# Patient Record
Sex: MALE | Race: BLACK OR AFRICAN AMERICAN | NOT HISPANIC OR LATINO | Employment: FULL TIME | URBAN - METROPOLITAN AREA
[De-identification: names, ages, dates, MRNs, and addresses within clinical notes are randomized per-mention and may not be internally consistent; named-entity substitution may affect disease eponyms.]

---

## 2020-09-17 ENCOUNTER — OFFICE VISIT (OUTPATIENT)
Dept: PRIMARY CARE CLINIC | Facility: CLINIC | Age: 80
End: 2020-09-17
Payer: COMMERCIAL

## 2020-09-17 ENCOUNTER — LAB VISIT (OUTPATIENT)
Dept: LAB | Facility: HOSPITAL | Age: 80
End: 2020-09-17
Attending: PHYSICIAN ASSISTANT
Payer: COMMERCIAL

## 2020-09-17 VITALS
SYSTOLIC BLOOD PRESSURE: 134 MMHG | WEIGHT: 153.56 LBS | DIASTOLIC BLOOD PRESSURE: 60 MMHG | OXYGEN SATURATION: 99 % | TEMPERATURE: 98 F | HEART RATE: 89 BPM

## 2020-09-17 DIAGNOSIS — I10 HYPERTENSION, UNSPECIFIED TYPE: ICD-10-CM

## 2020-09-17 DIAGNOSIS — Z00.00 WELLNESS EXAMINATION: ICD-10-CM

## 2020-09-17 DIAGNOSIS — K21.00 REFLUX ESOPHAGITIS: ICD-10-CM

## 2020-09-17 DIAGNOSIS — E78.5 HYPERLIPIDEMIA, UNSPECIFIED HYPERLIPIDEMIA TYPE: ICD-10-CM

## 2020-09-17 DIAGNOSIS — I10 HYPERTENSION, UNSPECIFIED TYPE: Primary | ICD-10-CM

## 2020-09-17 DIAGNOSIS — R35.0 URINARY FREQUENCY: ICD-10-CM

## 2020-09-17 LAB
ALBUMIN SERPL BCP-MCNC: 4.1 G/DL (ref 3.5–5.2)
ALP SERPL-CCNC: 33 U/L (ref 55–135)
ALT SERPL W/O P-5'-P-CCNC: 10 U/L (ref 10–44)
ANION GAP SERPL CALC-SCNC: 9 MMOL/L (ref 8–16)
AST SERPL-CCNC: 22 U/L (ref 10–40)
BILIRUB SERPL-MCNC: 0.6 MG/DL (ref 0.1–1)
BUN SERPL-MCNC: 15 MG/DL (ref 8–23)
CALCIUM SERPL-MCNC: 9.1 MG/DL (ref 8.7–10.5)
CHLORIDE SERPL-SCNC: 104 MMOL/L (ref 95–110)
CHOLEST SERPL-MCNC: 144 MG/DL (ref 120–199)
CHOLEST/HDLC SERPL: 2.4 {RATIO} (ref 2–5)
CO2 SERPL-SCNC: 27 MMOL/L (ref 23–29)
CREAT SERPL-MCNC: 0.8 MG/DL (ref 0.5–1.4)
EST. GFR  (AFRICAN AMERICAN): >60 ML/MIN/1.73 M^2
EST. GFR  (NON AFRICAN AMERICAN): >60 ML/MIN/1.73 M^2
GLUCOSE SERPL-MCNC: 90 MG/DL (ref 70–110)
HDLC SERPL-MCNC: 61 MG/DL (ref 40–75)
HDLC SERPL: 42.4 % (ref 20–50)
LDLC SERPL CALC-MCNC: 74 MG/DL (ref 63–159)
NONHDLC SERPL-MCNC: 83 MG/DL
POTASSIUM SERPL-SCNC: 4.2 MMOL/L (ref 3.5–5.1)
PROT SERPL-MCNC: 7.1 G/DL (ref 6–8.4)
SODIUM SERPL-SCNC: 140 MMOL/L (ref 136–145)
TRIGL SERPL-MCNC: 45 MG/DL (ref 30–150)

## 2020-09-17 PROCEDURE — 99214 OFFICE O/P EST MOD 30 MIN: CPT | Mod: PBBFAC,PN | Performed by: PHYSICIAN ASSISTANT

## 2020-09-17 PROCEDURE — 87088 URINE BACTERIA CULTURE: CPT

## 2020-09-17 PROCEDURE — 99999 PR PBB SHADOW E&M-EST. PATIENT-LVL IV: ICD-10-PCS | Mod: PBBFAC,,, | Performed by: PHYSICIAN ASSISTANT

## 2020-09-17 PROCEDURE — 99203 OFFICE O/P NEW LOW 30 MIN: CPT | Mod: S$GLB,,, | Performed by: PHYSICIAN ASSISTANT

## 2020-09-17 PROCEDURE — 99999 PR PBB SHADOW E&M-EST. PATIENT-LVL IV: CPT | Mod: PBBFAC,,, | Performed by: PHYSICIAN ASSISTANT

## 2020-09-17 PROCEDURE — 87086 URINE CULTURE/COLONY COUNT: CPT

## 2020-09-17 PROCEDURE — 87077 CULTURE AEROBIC IDENTIFY: CPT

## 2020-09-17 PROCEDURE — 36415 COLL VENOUS BLD VENIPUNCTURE: CPT | Mod: PN

## 2020-09-17 PROCEDURE — 80053 COMPREHEN METABOLIC PANEL: CPT

## 2020-09-17 PROCEDURE — 80061 LIPID PANEL: CPT

## 2020-09-17 PROCEDURE — 99203 PR OFFICE/OUTPT VISIT, NEW, LEVL III, 30-44 MIN: ICD-10-PCS | Mod: S$GLB,,, | Performed by: PHYSICIAN ASSISTANT

## 2020-09-17 PROCEDURE — 87186 SC STD MICRODIL/AGAR DIL: CPT

## 2020-09-17 RX ORDER — HYDROCHLOROTHIAZIDE 12.5 MG/1
12.5 TABLET ORAL DAILY
COMMUNITY
End: 2020-09-17 | Stop reason: SDUPTHER

## 2020-09-17 RX ORDER — ATORVASTATIN CALCIUM 10 MG/1
10 TABLET, FILM COATED ORAL DAILY
COMMUNITY
End: 2020-09-17 | Stop reason: SDUPTHER

## 2020-09-17 RX ORDER — FAMOTIDINE 40 MG/1
40 TABLET, FILM COATED ORAL DAILY
COMMUNITY
End: 2020-09-17

## 2020-09-17 RX ORDER — ATORVASTATIN CALCIUM 10 MG/1
10 TABLET, FILM COATED ORAL DAILY
Qty: 30 TABLET | Refills: 11 | Status: SHIPPED | OUTPATIENT
Start: 2020-09-17 | End: 2021-03-17 | Stop reason: SDUPTHER

## 2020-09-17 RX ORDER — PANTOPRAZOLE SODIUM 40 MG/1
40 TABLET, DELAYED RELEASE ORAL DAILY
Qty: 30 TABLET | Refills: 11 | Status: SHIPPED | OUTPATIENT
Start: 2020-09-17 | End: 2021-03-17 | Stop reason: SDUPTHER

## 2020-09-17 RX ORDER — TIOTROPIUM BROMIDE 18 UG/1
18 CAPSULE ORAL; RESPIRATORY (INHALATION) DAILY
COMMUNITY
End: 2021-03-01 | Stop reason: SDUPTHER

## 2020-09-17 RX ORDER — HYDROCHLOROTHIAZIDE 12.5 MG/1
12.5 TABLET ORAL DAILY
Qty: 30 TABLET | Refills: 11 | Status: SHIPPED | OUTPATIENT
Start: 2020-09-17 | End: 2021-03-17 | Stop reason: SDUPTHER

## 2020-09-17 RX ORDER — PANTOPRAZOLE SODIUM 40 MG/1
40 TABLET, DELAYED RELEASE ORAL DAILY
COMMUNITY
End: 2020-09-17 | Stop reason: CLARIF

## 2020-09-17 NOTE — LETTER
September 17, 2020      [facility]  [id number]  [place] [address]  Phone: [phone]  Fax: [phone]       Patient: Richy Douglas   YOB: 1940  Date of Visit: 09/17/2020    To Whom It May Concern:    Richard Douglas  was at [facility] on 09/17/2020. He may return to work on 9/17/2020 with no restrictions. If you have any questions or concerns, or if I can be of further assistance, please do not hesitate to contact me.    Sincerely,          Vivian Chao PA-C

## 2020-09-17 NOTE — PROGRESS NOTES
"Subjective:      Patient ID: Richy Douglas is a 79 y.o. male.    Chief Complaint: Annual Exam    Richy Douglas is a very pleasant 79-year-old gentleman who presents to clinic to establish care.  Has a history of Iron deficiency from anemia of chronic disease, hypertension, reflux, prostate cancer.  He has a known leaking aortic aneurysm.  Per patient, Dr. Garcia with CVT, did not recommend repair.  Per Richy, he was told by Dr. Garcia that he would never come off the table if he had it repaired.  He receives blood infusion treatments for his anemia.      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  He worked previously as [employer] in [employer].      Still smoking.  Has thought about quitting.  He is not interested in the smoking cessation clinic at this time, saying he can "do it on his own" and those [employer] teams make him want to smoke.       He was recently seen at [facility] for some fluid on his elbow, which has gone down without the need for draining.  Doctor recommended elbow sleeve, which he is wearing today.       Believe it was Dr. Kim who prescribed Spiriva.  Denies much trouble with difficulty breathing.      Patient declines flu shot today     He would like medication refills here today.  BP has been well controlled on HCTZ.        Review of Systems   Constitutional: Negative for chills, diaphoresis, fatigue and fever.   HENT: Negative for congestion, ear discharge, ear pain, postnasal drip, rhinorrhea, sinus pressure, sinus pain, sneezing and sore throat.    Respiratory: Negative for cough, shortness of breath and wheezing.    Cardiovascular: Negative for chest pain and palpitations.   Genitourinary: Positive for frequency. Negative for discharge, dysuria, flank pain and hematuria.   Musculoskeletal: Negative for back pain and myalgias.        Elbow swelling, improving    Neurological: Negative for weakness and headaches.     "   Objective:   /60   Pulse 89   Temp 97.7 °F (36.5 °C)   Wt 69.7 kg (153 lb 8.8 oz)   SpO2 99%   Physical Exam  Constitutional:       General: He is not in acute distress.     Appearance: Normal appearance. He is well-developed. He is not ill-appearing, toxic-appearing or diaphoretic.   HENT:      Head: Normocephalic.      Right Ear: Tympanic membrane, ear canal and external ear normal.      Left Ear: Tympanic membrane, ear canal and external ear normal.      Nose: Nose normal. No mucosal edema or rhinorrhea.      Right Sinus: No maxillary sinus tenderness or frontal sinus tenderness.      Left Sinus: No maxillary sinus tenderness or frontal sinus tenderness.      Mouth/Throat:      Pharynx: Uvula midline. No oropharyngeal exudate or posterior oropharyngeal erythema.   Eyes:      Conjunctiva/sclera: Conjunctivae normal.   Neck:      Musculoskeletal: Normal range of motion and neck supple.   Cardiovascular:      Rate and Rhythm: Normal rate and regular rhythm.      Pulses:           Radial pulses are 2+ on the right side and 2+ on the left side.      Heart sounds: Normal heart sounds, S1 normal and S2 normal.   Pulmonary:      Effort: Pulmonary effort is normal. No tachypnea, bradypnea, accessory muscle usage or respiratory distress.      Breath sounds: Normal breath sounds. No decreased breath sounds, wheezing, rhonchi or rales.   Chest:      Chest wall: No tenderness.   Abdominal:      General: Bowel sounds are normal. There is no distension.      Palpations: Abdomen is soft. Abdomen is not rigid.      Tenderness: There is no abdominal tenderness. There is no guarding or rebound. Negative signs include Brown's sign and McBurney's sign.   Lymphadenopathy:      Head:      Right side of head: No submental, submandibular or tonsillar adenopathy.      Left side of head: No submental, submandibular or tonsillar adenopathy.      Cervical: No cervical adenopathy.   Skin:     General: Skin is warm and dry.    Neurological:      Mental Status: He is alert and oriented to person, place, and time. He is not disoriented.      Cranial Nerves: No cranial nerve deficit.      Sensory: No sensory deficit.   Psychiatric:         Mood and Affect: Mood normal.         Behavior: Behavior normal.         Thought Content: Thought content normal.       Assessment:      1. Hypertension, unspecified type    2. Reflux esophagitis    3. Hyperlipidemia, unspecified hyperlipidemia type    4. Urinary frequency    5. Wellness examination       Plan:   Hypertension, unspecified type  Comments:  stable, refill HCTZ, check electrolytes   Orders:  -     hydroCHLOROthiazide (HYDRODIURIL) 12.5 MG Tab; Take 1 tablet (12.5 mg total) by mouth once daily.  Dispense: 30 tablet; Refill: 11  -     atorvastatin (LIPITOR) 10 MG tablet; Take 1 tablet (10 mg total) by mouth once daily.  Dispense: 30 tablet; Refill: 11  -     Comprehensive metabolic panel; Future; Expected date: 09/17/2020    Reflux esophagitis  Comments:  stable, cont. protonix   Orders:  -     pantoprazole (PROTONIX) 40 MG tablet; Take 1 tablet (40 mg total) by mouth once daily.  Dispense: 30 tablet; Refill: 11    Hyperlipidemia, unspecified hyperlipidemia type  Comments:  continue statin medication, check lipid panel   Orders:  -     atorvastatin (LIPITOR) 10 MG tablet; Take 1 tablet (10 mg total) by mouth once daily.  Dispense: 30 tablet; Refill: 11  -     Lipid Panel; Future; Expected date: 09/17/2020    Urinary frequency  Comments:  urinalysis to ensure no signs of infection   Orders:  -     Cancel: Urinalysis; Future; Expected date: 09/17/2020  -     Urinalysis; Future; Expected date: 09/17/2020    Wellness examination  -     Lipid Panel; Future; Expected date: 09/17/2020  -     Comprehensive metabolic panel; Future; Expected date: 09/17/2020          Vivian Chao PA-C   Physician Assistant   [facility]

## 2020-09-18 ENCOUNTER — TELEPHONE (OUTPATIENT)
Dept: PRIMARY CARE CLINIC | Facility: CLINIC | Age: 80
End: 2020-09-18

## 2020-09-18 DIAGNOSIS — N30.00 ACUTE CYSTITIS WITHOUT HEMATURIA: Primary | ICD-10-CM

## 2020-09-18 RX ORDER — CEPHALEXIN 500 MG/1
500 CAPSULE ORAL EVERY 12 HOURS
Qty: 14 CAPSULE | Refills: 0 | Status: SHIPPED | OUTPATIENT
Start: 2020-09-18 | End: 2020-09-28

## 2020-09-18 NOTE — TELEPHONE ENCOUNTER
----- Message from Vivian Chao PA-C sent at 9/18/2020  1:35 PM CDT -----  Unable to reach Richy earlier today.  Please try to reach to let know that he had white blood cells which can indicate infection in his urine.  I sent an antibiotic to the pharmacy.  We will repeat UA/UC in 2 weeks after the antibiotic is complete (orders in)    Vivian Chao PA-C   Physician Assistant   [facility]

## 2020-09-18 NOTE — TELEPHONE ENCOUNTER
----- Message from Vivian Chao PA-C sent at 9/18/2020  1:35 PM CDT -----  Unable to reach Richy earlier today.  Please try to reach to let know that he had white blood cells which can indicate infection in his urine.  I sent an antibiotic to the pharmacy.  We will repeat UA/UC in 2 weeks after the antibiotic is complete (orders in)    Vivian Chao PA-C   Physician Assistant   [facility]

## 2020-09-21 ENCOUNTER — TELEPHONE (OUTPATIENT)
Dept: PRIMARY CARE CLINIC | Facility: CLINIC | Age: 80
End: 2020-09-21

## 2020-09-24 LAB — BACTERIA UR CULT: ABNORMAL

## 2020-09-25 ENCOUNTER — TELEPHONE (OUTPATIENT)
Dept: PRIMARY CARE CLINIC | Facility: CLINIC | Age: 80
End: 2020-09-25

## 2020-09-25 NOTE — TELEPHONE ENCOUNTER
----- Message from Vivian Chao PA-C sent at 9/25/2020  8:08 AM CDT -----  Please call patient and let him know his urine culture is positive for E. Coli.  This antibiotic that he was placed on should be effective against this infection.  Please advise that he complete the antibiotic and let us know if he has any further symptoms.    Vivian Chao PA-C   Physician Assistant   [facility]

## 2020-09-25 NOTE — TELEPHONE ENCOUNTER
Called and spoke with patient spouse and she advised that patient is still taking his antibiotic and have no concerning questions.

## 2020-09-28 ENCOUNTER — OFFICE VISIT (OUTPATIENT)
Dept: OPHTHALMOLOGY | Facility: CLINIC | Age: 80
End: 2020-09-28
Payer: COMMERCIAL

## 2020-09-28 DIAGNOSIS — H52.223 REGULAR ASTIGMATISM OF BOTH EYES: ICD-10-CM

## 2020-09-28 DIAGNOSIS — Z96.1 PSEUDOPHAKIA OF BOTH EYES: ICD-10-CM

## 2020-09-28 DIAGNOSIS — H40.013 OPEN ANGLE WITH BORDERLINE FINDINGS OF BOTH EYES: Primary | ICD-10-CM

## 2020-09-28 PROCEDURE — 99212 OFFICE O/P EST SF 10 MIN: CPT | Mod: PBBFAC,25 | Performed by: OPTOMETRIST

## 2020-09-28 PROCEDURE — 99999 PR PBB SHADOW E&M-EST. PATIENT-LVL II: CPT | Mod: PBBFAC,,, | Performed by: OPTOMETRIST

## 2020-09-28 PROCEDURE — 92133 POSTERIOR SEGMENT OCT OPTIC NERVE(OCULAR COHERENCE TOMOGRAPHY) - OU - BOTH EYES: ICD-10-PCS | Mod: 26,S$PBB,, | Performed by: OPTOMETRIST

## 2020-09-28 PROCEDURE — 92015 PR REFRACTION: ICD-10-PCS | Mod: ,,, | Performed by: OPTOMETRIST

## 2020-09-28 PROCEDURE — 92015 DETERMINE REFRACTIVE STATE: CPT | Mod: ,,, | Performed by: OPTOMETRIST

## 2020-09-28 PROCEDURE — 92133 CPTRZD OPH DX IMG PST SGM ON: CPT | Mod: PBBFAC | Performed by: OPTOMETRIST

## 2020-09-28 PROCEDURE — 99999 PR PBB SHADOW E&M-EST. PATIENT-LVL II: ICD-10-PCS | Mod: PBBFAC,,, | Performed by: OPTOMETRIST

## 2020-09-28 PROCEDURE — 92004 PR EYE EXAM, NEW PATIENT,COMPREHESV: ICD-10-PCS | Mod: S$PBB,,, | Performed by: OPTOMETRIST

## 2020-09-28 PROCEDURE — 92004 COMPRE OPH EXAM NEW PT 1/>: CPT | Mod: S$PBB,,, | Performed by: OPTOMETRIST

## 2020-09-28 NOTE — PROGRESS NOTES
HPI     Eye Exam     Comments: Yearly              Comments     NP to DNL  Patient here today for yearly eye exam  HPI    Any vision changes since last exam: No   Eye pain: No  Other ocular symptoms: No    Do you wear currently wear glasses or contacts? Glasses Bifocal    Interested in contacts today? No    Do you plan on getting new glasses today? If needed                Last edited by Karina Spencer, PCT on 9/28/2020 10:42 AM. (History)              Assessment /Plan     For exam results, see Encounter Report.    Open angle with borderline findings of both eyes  -     Posterior Segment OCT Optic Nerve- Both eyes  Suspect based on ONH cupping with asymmetry  No NFL thinning on gOCT with nice, symmetrical GCL OD, OS  Stable IOP   Monitor 12 months    Pseudophakia of both eyes  Doing well OU  Monitor 12 months    Regular astigmatism of both eyes  Eyeglass Final Rx     Eyeglass Final Rx       Sphere Cylinder Axis Add    Right [place] +0.75 170 +2.50    Left [place] +0.50 155 +2.50    Expiration Date: 9/29/2021              RTC 1 yr for dilated eye exam with gOCT or PRN if any problems.   Discussed above and answered questions.

## 2020-10-07 ENCOUNTER — OFFICE VISIT (OUTPATIENT)
Dept: INTERNAL MEDICINE | Facility: CLINIC | Age: 80
End: 2020-10-07
Payer: COMMERCIAL

## 2020-10-07 VITALS
WEIGHT: 153.69 LBS | SYSTOLIC BLOOD PRESSURE: 132 MMHG | OXYGEN SATURATION: 97 % | BODY MASS INDEX: 20.37 KG/M2 | HEART RATE: 92 BPM | TEMPERATURE: 98 F | DIASTOLIC BLOOD PRESSURE: 62 MMHG | HEIGHT: 73 IN

## 2020-10-07 DIAGNOSIS — M54.2 NECK PAIN: Primary | ICD-10-CM

## 2020-10-07 DIAGNOSIS — M62.838 NECK MUSCLE SPASM: ICD-10-CM

## 2020-10-07 PROCEDURE — 99214 OFFICE O/P EST MOD 30 MIN: CPT | Mod: PBBFAC,25 | Performed by: NURSE PRACTITIONER

## 2020-10-07 PROCEDURE — 99214 PR OFFICE/OUTPT VISIT, EST, LEVL IV, 30-39 MIN: ICD-10-PCS | Mod: S$GLB,,, | Performed by: NURSE PRACTITIONER

## 2020-10-07 PROCEDURE — 99999 PR PBB SHADOW E&M-EST. PATIENT-LVL IV: ICD-10-PCS | Mod: PBBFAC,,, | Performed by: NURSE PRACTITIONER

## 2020-10-07 PROCEDURE — 96372 THER/PROPH/DIAG INJ SC/IM: CPT | Mod: PBBFAC

## 2020-10-07 PROCEDURE — 99999 PR PBB SHADOW E&M-EST. PATIENT-LVL IV: CPT | Mod: PBBFAC,,, | Performed by: NURSE PRACTITIONER

## 2020-10-07 PROCEDURE — 99214 OFFICE O/P EST MOD 30 MIN: CPT | Mod: S$GLB,,, | Performed by: NURSE PRACTITIONER

## 2020-10-07 RX ORDER — NAPROXEN 375 MG/1
375 TABLET ORAL 2 TIMES DAILY WITH MEALS
Qty: 10 TABLET | Refills: 0 | Status: SHIPPED | OUTPATIENT
Start: 2020-10-07 | End: 2020-10-14

## 2020-10-07 RX ORDER — KETOROLAC TROMETHAMINE 30 MG/ML
30 INJECTION, SOLUTION INTRAMUSCULAR; INTRAVENOUS
Status: COMPLETED | OUTPATIENT
Start: 2020-10-07 | End: 2020-10-07

## 2020-10-07 RX ORDER — TIZANIDINE 2 MG/1
2 TABLET ORAL NIGHTLY PRN
Qty: 7 TABLET | Refills: 0 | Status: SHIPPED | OUTPATIENT
Start: 2020-10-07 | End: 2020-10-17

## 2020-10-07 RX ADMIN — KETOROLAC TROMETHAMINE 30 MG: 30 INJECTION, SOLUTION INTRAMUSCULAR; INTRAVENOUS at 11:10

## 2020-10-07 NOTE — PROGRESS NOTES
Subjective:       Patient ID: Richy Douglas is a 79 y.o. male.    Chief Complaint: Neck Pain    HPI    Pt reports neck pain bilateral x 5 days. He reports falling asleep in the recliner multiple days in a row watching sports. No inciting injury otherwise. He tried a heating pad which did not help. There is no other associated complaints    Past Medical History:   Diagnosis Date    Anemia of chronic disease     Aortic aneurysm     GERD (gastroesophageal reflux disease)     Hypertension     Prostate cancer      Past Surgical History:   Procedure Laterality Date    COLONOSCOPY      PROSTATE SURGERY      SPINE SURGERY       Social History     Socioeconomic History    Marital status:      Spouse name: Not on file    Number of children: Not on file    Years of education: Not on file    Highest education level: Not on file   Occupational History    Not on file   Social Needs    Financial resource strain: Not on file    Food insecurity     Worry: Not on file     Inability: Not on file    Transportation needs     Medical: Not on file     Non-medical: Not on file   Tobacco Use    Smoking status: Current Every Day Smoker     Packs/day: 1.00     Types: Cigarettes    Smokeless tobacco: Never Used   Substance and Sexual Activity    Alcohol use: Not on file    Drug use: Not on file    Sexual activity: Not on file   Lifestyle    Physical activity     Days per week: Not on file     Minutes per session: Not on file    Stress: Not on file   Relationships    Social connections     Talks on phone: Not on file     Gets together: Not on file     Attends [employer] service: Not on file     Active member of club or organization: Not on file     Attends meetings of clubs or organizations: Not on file     Relationship status: Not on file   Other Topics Concern    Not on file   Social History Narrative    Not on file     Review of patient's allergies indicates:   Allergen Reactions    Fish containing products      Iodine and iodide containing products      Current Outpatient Medications   Medication Sig    atorvastatin (LIPITOR) 10 MG tablet Take 1 tablet (10 mg total) by mouth once daily.    hydroCHLOROthiazide (HYDRODIURIL) 12.5 MG Tab Take 1 tablet (12.5 mg total) by mouth once daily.    pantoprazole (PROTONIX) 40 MG tablet Take 1 tablet (40 mg total) by mouth once daily.    tiotropium (SPIRIVA) 18 mcg inhalation capsule Inhale 18 mcg into the lungs once daily. Controller    naproxen (EC-NAPROSYN) 375 MG TbEC EC tablet Take 1 tablet (375 mg total) by mouth 2 (two) times daily with meals. Start tomorrow for 5 days    tiZANidine (ZANAFLEX) 2 MG tablet Take 1 tablet (2 mg total) by mouth nightly as needed. Caution for drowsiness     No current facility-administered medications for this visit.            Review of Systems   Constitutional: Negative for activity change, appetite change, chills, diaphoresis, fatigue, fever and unexpected weight change.   HENT: Negative for congestion, ear pain, postnasal drip, rhinorrhea, sinus pressure, sinus pain, sneezing, sore throat, tinnitus, trouble swallowing and voice change.    Eyes: Negative for photophobia, pain and visual disturbance.   Respiratory: Negative for cough, chest tightness, shortness of breath and wheezing.    Cardiovascular: Negative for chest pain, palpitations and leg swelling.   Gastrointestinal: Negative for abdominal distention, abdominal pain, constipation, diarrhea, nausea and vomiting.   Genitourinary: Negative for decreased urine volume, difficulty urinating, dysuria, flank pain, frequency, hematuria and urgency.   Musculoskeletal: Positive for neck pain and neck stiffness. Negative for arthralgias, back pain and joint swelling.   Allergic/Immunologic: Negative for immunocompromised state.   Neurological: Negative for dizziness, tremors, seizures, syncope, facial asymmetry, speech difficulty, weakness, light-headedness, numbness and headaches.    Hematological: Negative for adenopathy. Does not bruise/bleed easily.   Psychiatric/Behavioral: Negative for confusion and sleep disturbance.       Objective:      Physical Exam  Vitals signs reviewed.   Neck:      Musculoskeletal: Spinous process tenderness and muscular tenderness present.     Cardiovascular:      Rate and Rhythm: Normal rate and regular rhythm.      Pulses: Normal pulses.      Heart sounds: Normal heart sounds.   Pulmonary:      Effort: Pulmonary effort is normal.      Breath sounds: Normal breath sounds.   Skin:     General: Skin is warm and dry.   Neurological:      General: No focal deficit present.      Mental Status: He is alert and oriented to person, place, and time.         Assessment:     Vitals:    10/07/20 1004   BP: 132/62   Pulse: 92   Temp: 97.5 °F (36.4 °C)         1. Neck pain    2. Neck muscle spasm        Plan:   Neck pain  -     naproxen (EC-NAPROSYN) 375 MG TbEC EC tablet; Take 1 tablet (375 mg total) by mouth 2 (two) times daily with meals. Start tomorrow for 5 days  Dispense: 10 tablet; Refill: 0  -     tiZANidine (ZANAFLEX) 2 MG tablet; Take 1 tablet (2 mg total) by mouth nightly as needed. Caution for drowsiness  Dispense: 7 tablet; Refill: 0  -     ketorolac injection 30 mg    Neck muscle spasm    as above  Med indications and SE discussed  Heat as discussed  Return PRN

## 2020-10-07 NOTE — PATIENT INSTRUCTIONS
Neck Spasm     A spasm of the neck muscles can happen after a sudden awkward neck movement. Sleeping with your neck in a crooked position can also cause spasm. Some people respond to emotional stress by tensing the muscles of their neck, shoulders, and upper back. If neck spasm lasts long enough, it can cause headache.  The treatment described below will usually help the pain to go away in 5 to 7 days. Pain that continues may need further evaluation or other types of treatment such as physical therapy.  Home care  · Rest and relax the muscles. Use a comfortable pillow that supports the head and keeps the spine in a neutral position. The position of the head should not be tilted forward or backward. A rolled up towel may help for a custom fit.  · Some people find relief with heat. Heat can be applied with either a warm shower or bath or a moist towel heated in the microwave and massage. Others prefer cold packs. You can make an ice pack by filling a plastic bag that seals at the top with ice cubes or crushed ice and then wrapping it with a thin towel. Try both and use the method that feels best for 15 to 20 minutes, several times a day.  · Whether using ice or heat, be careful that you do not injure your skin. Never put ice directly on the skin. Always wrap the ice in a towel or other type of cloth. This is very important, especially in people with poor skin sensations.  · Try to reduce your stress level. Emotional stress can lead to neck muscle tension and get in the way of or delay the healing process.  · You may use over-the-counter pain medicine to control pain, unless another medicine was prescribed.If you have chronic liver or kidney disease or ever had a stomach ulcer or GI bleeding, talk with your healthcare provider before using these medicines.  Follow-up care  Follow up with your healthcare provider if your symptoms do not show signs of improvement after one week. Physical therapy or further tests may be  needed.  If X-rays, CT scans, or MRI scans were taken, you will be told of any new findings that may affect your care.  Call 911  Call 911 if you have:  · Sudden weakness or numbness in one or both arms  · Neck swelling, difficulty or painful swallowing  · Difficulty breathing  · Chest pain  When to seek medical advice  Call your healthcare provider right away if any of these occur:  · Pain becomes worse or spreads into one or both arms  · Increasing headache with nausea or vomiting  · Fever of 100.4°F (38°C) or above lasting for 24 to 48 hours  Date Last Reviewed: 11/21/2015  © 2445-1131 [employer]. [address] [place] [address], [place] [address]. All rights reserved. This information is not intended as a substitute for professional medical care. Always follow your healthcare professional's instructions.

## 2020-11-11 ENCOUNTER — HOSPITAL ENCOUNTER (EMERGENCY)
Facility: HOSPITAL | Age: 80
Discharge: HOME | End: 2020-11-11
Attending: EMERGENCY MEDICINE
Payer: MEDICARE

## 2020-11-11 VITALS
BODY MASS INDEX: 20.24 KG/M2 | WEIGHT: 166.25 LBS | DIASTOLIC BLOOD PRESSURE: 75 MMHG | RESPIRATION RATE: 27 BRPM | OXYGEN SATURATION: 98 % | SYSTOLIC BLOOD PRESSURE: 191 MMHG | HEART RATE: 75 BPM | HEIGHT: 76 IN | TEMPERATURE: 98 F

## 2020-11-11 DIAGNOSIS — S22.32XA CLOSED FRACTURE OF ONE RIB OF LEFT SIDE, INITIAL ENCOUNTER: Primary | ICD-10-CM

## 2020-11-11 DIAGNOSIS — D64.9 ANEMIA, UNSPECIFIED TYPE: ICD-10-CM

## 2020-11-11 DIAGNOSIS — R06.02 SOB (SHORTNESS OF BREATH): ICD-10-CM

## 2020-11-11 LAB
ABO + RH BLD: NORMAL
ALBUMIN SERPL BCP-MCNC: 4 G/DL (ref 3.5–5.2)
ALP SERPL-CCNC: 34 U/L (ref 55–135)
ALT SERPL W/O P-5'-P-CCNC: 11 U/L (ref 10–44)
ANION GAP SERPL CALC-SCNC: 6 MMOL/L (ref 8–16)
AST SERPL-CCNC: 19 U/L (ref 10–40)
BASOPHILS # BLD AUTO: 0.09 K/UL (ref 0–0.2)
BASOPHILS NFR BLD: 1.9 % (ref 0–1.9)
BILIRUB SERPL-MCNC: 0.6 MG/DL (ref 0.1–1)
BLD GP AB SCN CELLS X3 SERPL QL: NORMAL
BNP SERPL-MCNC: 202 PG/ML (ref 0–99)
BUN SERPL-MCNC: 15 MG/DL (ref 8–23)
CALCIUM SERPL-MCNC: 9 MG/DL (ref 8.7–10.5)
CHLORIDE SERPL-SCNC: 104 MMOL/L (ref 95–110)
CO2 SERPL-SCNC: 27 MMOL/L (ref 23–29)
CREAT SERPL-MCNC: 0.8 MG/DL (ref 0.5–1.4)
DACRYOCYTES BLD QL SMEAR: ABNORMAL
DIFFERENTIAL METHOD: ABNORMAL
EOSINOPHIL # BLD AUTO: 0.4 K/UL (ref 0–0.5)
EOSINOPHIL NFR BLD: 8.5 % (ref 0–8)
ERYTHROCYTE [DISTWIDTH] IN BLOOD BY AUTOMATED COUNT: 25.4 % (ref 11.5–14.5)
EST. GFR  (AFRICAN AMERICAN): >60 ML/MIN/1.73 M^2
EST. GFR  (NON AFRICAN AMERICAN): >60 ML/MIN/1.73 M^2
GLUCOSE SERPL-MCNC: 93 MG/DL (ref 70–110)
HCT VFR BLD AUTO: 25.8 % (ref 40–54)
HGB BLD-MCNC: 7.1 G/DL (ref 14–18)
HYPOCHROMIA BLD QL SMEAR: ABNORMAL
IMM GRANULOCYTES # BLD AUTO: 0.01 K/UL (ref 0–0.04)
IMM GRANULOCYTES NFR BLD AUTO: 0.2 % (ref 0–0.5)
LYMPHOCYTES # BLD AUTO: 0.7 K/UL (ref 1–4.8)
LYMPHOCYTES NFR BLD: 13.7 % (ref 18–48)
MCH RBC QN AUTO: 18.3 PG (ref 27–31)
MCHC RBC AUTO-ENTMCNC: 27.5 G/DL (ref 32–36)
MCV RBC AUTO: 67 FL (ref 82–98)
MONOCYTES # BLD AUTO: 0.5 K/UL (ref 0.3–1)
MONOCYTES NFR BLD: 10.1 % (ref 4–15)
NEUTROPHILS # BLD AUTO: 3.2 K/UL (ref 1.8–7.7)
NEUTROPHILS NFR BLD: 65.6 % (ref 38–73)
NRBC BLD-RTO: 0 /100 WBC
OVALOCYTES BLD QL SMEAR: ABNORMAL
PLATELET # BLD AUTO: 298 K/UL (ref 150–350)
PMV BLD AUTO: ABNORMAL FL (ref 9.2–12.9)
POIKILOCYTOSIS BLD QL SMEAR: SLIGHT
POTASSIUM SERPL-SCNC: 4.2 MMOL/L (ref 3.5–5.1)
PROT SERPL-MCNC: 7 G/DL (ref 6–8.4)
RBC # BLD AUTO: 3.88 M/UL (ref 4.6–6.2)
SARS-COV-2 RDRP RESP QL NAA+PROBE: NEGATIVE
SODIUM SERPL-SCNC: 137 MMOL/L (ref 136–145)
TARGETS BLD QL SMEAR: ABNORMAL
TROPONIN I SERPL DL<=0.01 NG/ML-MCNC: <0.006 NG/ML (ref 0–0.03)
WBC # BLD AUTO: 4.83 K/UL (ref 3.9–12.7)

## 2020-11-11 PROCEDURE — 84484 ASSAY OF TROPONIN QUANT: CPT

## 2020-11-11 PROCEDURE — U0002 COVID-19 LAB TEST NON-CDC: HCPCS

## 2020-11-11 PROCEDURE — 93005 ELECTROCARDIOGRAM TRACING: CPT

## 2020-11-11 PROCEDURE — 80053 COMPREHEN METABOLIC PANEL: CPT

## 2020-11-11 PROCEDURE — 93010 ELECTROCARDIOGRAM REPORT: CPT | Mod: ,,, | Performed by: INTERNAL MEDICINE

## 2020-11-11 PROCEDURE — 85025 COMPLETE CBC W/AUTO DIFF WBC: CPT

## 2020-11-11 PROCEDURE — 93010 EKG 12-LEAD: ICD-10-PCS | Mod: ,,, | Performed by: INTERNAL MEDICINE

## 2020-11-11 PROCEDURE — 86901 BLOOD TYPING SEROLOGIC RH(D): CPT

## 2020-11-11 PROCEDURE — 99285 EMERGENCY DEPT VISIT HI MDM: CPT | Mod: 25

## 2020-11-11 PROCEDURE — 94799 UNLISTED PULMONARY SVC/PX: CPT

## 2020-11-11 PROCEDURE — 83880 ASSAY OF NATRIURETIC PEPTIDE: CPT

## 2020-11-11 PROCEDURE — 99900035 HC TECH TIME PER 15 MIN (STAT)

## 2020-11-11 RX ORDER — BENZONATATE 100 MG/1
100 CAPSULE ORAL 3 TIMES DAILY PRN
Qty: 30 CAPSULE | Refills: 0 | Status: CANCELLED | OUTPATIENT
Start: 2020-11-11

## 2020-11-11 RX ORDER — HYDROCODONE BITARTRATE AND ACETAMINOPHEN 5; 325 MG/1; MG/1
1 TABLET ORAL EVERY 6 HOURS PRN
Qty: 18 TABLET | Refills: 0 | Status: CANCELLED | OUTPATIENT
Start: 2020-11-11

## 2020-11-11 RX ORDER — HYDROCODONE BITARTRATE AND ACETAMINOPHEN 5; 325 MG/1; MG/1
1 TABLET ORAL EVERY 6 HOURS PRN
Qty: 18 TABLET | Refills: 0 | Status: SHIPPED | OUTPATIENT
Start: 2020-11-11 | End: 2022-01-09

## 2020-11-11 RX ORDER — BENZONATATE 100 MG/1
100 CAPSULE ORAL 3 TIMES DAILY PRN
Qty: 30 CAPSULE | Refills: 0 | Status: SHIPPED | OUTPATIENT
Start: 2020-11-11 | End: 2022-01-09

## 2020-11-11 NOTE — ED TRIAGE NOTES
Patient complains of shortness of breath and knot to left side of chest since last Friday. + white productive cough

## 2020-11-11 NOTE — ED PROVIDER NOTES
SCRIBE #1 NOTE: I, Valorie Draper, am scribing for, and in the presence of, Syed Forman MD. I have scribed the entire note.       History     Chief Complaint   Patient presents with    Shortness of Breath     Review of patient's allergies indicates:   Allergen Reactions    Fish containing products     Iodine and iodide containing products          History of Present Illness     HPI    11/11/2020, 8:12 AM  History obtained from the patient      History of Present Illness: Richy Douglas is a 80 y.o. male patient with a PMHx of anemia of chronic disease, aortic aneurysm, GERD, HTN, prostate cancer who presents to the Emergency Department for evaluation of left sided lower chest pain. Associated symptoms include cough with white sputum. Symptoms are intermittent and moderate in severity. Reports left lower chest wall pain when he coughs, laughs, and breaths. Patient denies any fever, chills, SOB, leg pain/swelling, palpitations, HA, dizziness, extremity weakness/numbness, hematochezia, abd pain, n/v, CP at rest and all other sxs at this time. No prior Tx included. No further complaints or concerns at this time.       Arrival mode: Personal vehicle     PCP: Primary Doctor No        Past Medical History:  Past Medical History:   Diagnosis Date    Anemia of chronic disease     Aortic aneurysm     GERD (gastroesophageal reflux disease)     Hypertension     Prostate cancer        Past Surgical History:  Past Surgical History:   Procedure Laterality Date    COLONOSCOPY      PROSTATE SURGERY      SPINE SURGERY           Family History:  History reviewed. No pertinent family history.     Social History:  Social History     Tobacco Use    Smoking status: Current Every Day Smoker     Packs/day: 1.00     Types: Cigarettes    Smokeless tobacco: Never Used   Substance and Sexual Activity    Alcohol use: Yes     Comment: reports only drinks red wine when games are on    Drug use: Unknown    Sexual activity: Unknown         Review of Systems     Review of Systems   Constitutional: Negative for chills and fever.   HENT: Negative for sore throat.    Respiratory: Positive for cough (w/ white sputum). Negative for shortness of breath.    Cardiovascular: Positive for chest pain (left lower chest wall). Negative for palpitations and leg swelling.   Gastrointestinal: Negative for abdominal pain, blood in stool, nausea and vomiting.   Genitourinary: Negative for dysuria.   Musculoskeletal: Negative for back pain.   Skin: Negative for rash.   Neurological: Negative for dizziness, weakness, numbness and headaches.   Hematological: Does not bruise/bleed easily.   All other systems reviewed and are negative.       Physical Exam     Initial Vitals [11/11/20 0759]   BP Pulse Resp Temp SpO2   (!) 183/82 87 (!) 28 97.9 °F (36.6 °C) 95 %      MAP       --          Physical Exam  Nursing Notes and Vital Signs Reviewed.  Constitutional: Patient is in no acute distress. Well-developed and well-nourished.  Head: Atraumatic. Normocephalic.  Eyes: PERRL. EOM intact. Conjunctivae are not pale. No scleral icterus.  ENT: Mucous membranes are moist.   Neck: Supple. Full ROM.   Cardiovascular: Regular rate. Regular rhythm. No murmurs, rubs, or gallops. Distal pulses are 2+ and symmetric.  Pulmonary/Chest: No respiratory distress. Clear to auscultation bilaterally. No wheezing or rales.splinting respirations. Pain with deep breathing  Abdominal: Soft and non-distended.  There is no tenderness.  No rebound, guarding, or rigidity. Good bowel sounds.  Genitourinary: No CVA tenderness  Musculoskeletal: Moves all extremities. There is L lateral rib tenderness. No obvious deformities. No edema. No calf tenderness.  Skin: Warm and dry.  Neurological:  Alert, awake, and appropriate.  Normal speech.  No acute focal neurological deficits are appreciated.  Psychiatric: Normal affect. Good eye contact. Appropriate in content.     ED Course   Procedures  ED Vital  "Signs:  Vitals:    11/11/20 0759 11/11/20 0808 11/11/20 0840 11/11/20 0843   BP: (!) 183/82  (!) 153/69    Pulse: 87 82 74 74   Resp: (!) 28  20    Temp: 97.9 °F (36.6 °C)      TempSrc: Oral      SpO2: 95%  99%    Weight: 75.4 kg (166 lb 3.6 oz)      Height: 6' 4" (1.93 m)       11/11/20 0900 11/11/20 1000 11/11/20 1030   BP: (!) 179/75 (!) 171/70 (!) 191/75   Pulse: 74 66 75   Resp: (!) 21 19 (!) 27   Temp:   98 °F (36.7 °C)   TempSrc:   Oral   SpO2: 98% 97% 98%   Weight:      Height:          Abnormal Lab Results:  Labs Reviewed   CBC W/ AUTO DIFFERENTIAL - Abnormal; Notable for the following components:       Result Value    RBC 3.88 (*)     Hemoglobin 7.1 (*)     Hematocrit 25.8 (*)     MCV 67 (*)     MCH 18.3 (*)     MCHC 27.5 (*)     RDW 25.4 (*)     Lymph # 0.7 (*)     Lymph % 13.7 (*)     Eosinophil % 8.5 (*)     All other components within normal limits   COMPREHENSIVE METABOLIC PANEL - Abnormal; Notable for the following components:    Alkaline Phosphatase 34 (*)     Anion Gap 6 (*)     All other components within normal limits   B-TYPE NATRIURETIC PEPTIDE - Abnormal; Notable for the following components:     (*)     All other components within normal limits   TROPONIN I   SARS-COV-2 RNA AMPLIFICATION, QUAL   TYPE & SCREEN        All Lab Results:  Results for orders placed or performed during the hospital encounter of 11/11/20   CBC auto differential   Result Value Ref Range    WBC 4.83 3.90 - 12.70 K/uL    RBC 3.88 (L) 4.60 - 6.20 M/uL    Hemoglobin 7.1 (L) 14.0 - 18.0 g/dL    Hematocrit 25.8 (L) 40.0 - 54.0 %    MCV 67 (L) 82 - 98 fL    MCH 18.3 (L) 27.0 - 31.0 pg    MCHC 27.5 (L) 32.0 - 36.0 g/dL    RDW 25.4 (H) 11.5 - 14.5 %    Platelets 298 150 - 350 K/uL    MPV SEE COMMENT 9.2 - 12.9 fL    Immature Granulocytes 0.2 0.0 - 0.5 %    Gran # (ANC) 3.2 1.8 - 7.7 K/uL    Immature Grans (Abs) 0.01 0.00 - 0.04 K/uL    Lymph # 0.7 (L) 1.0 - 4.8 K/uL    Mono # 0.5 0.3 - 1.0 K/uL    Eos # 0.4 0.0 - 0.5 " K/uL    Baso # 0.09 0.00 - 0.20 K/uL    nRBC 0 0 /100 WBC    Gran % 65.6 38.0 - 73.0 %    Lymph % 13.7 (L) 18.0 - 48.0 %    Mono % 10.1 4.0 - 15.0 %    Eosinophil % 8.5 (H) 0.0 - 8.0 %    Basophil % 1.9 0.0 - 1.9 %    Poik Slight     Hypo Occasional     Ovalocytes Occasional     Target Cells Occasional     Tear Drop Cells Occasional     Differential Method Automated    Comprehensive metabolic panel   Result Value Ref Range    Sodium 137 136 - 145 mmol/L    Potassium 4.2 3.5 - 5.1 mmol/L    Chloride 104 95 - 110 mmol/L    CO2 27 23 - 29 mmol/L    Glucose 93 70 - 110 mg/dL    BUN 15 8 - 23 mg/dL    Creatinine 0.8 0.5 - 1.4 mg/dL    Calcium 9.0 8.7 - 10.5 mg/dL    Total Protein 7.0 6.0 - 8.4 g/dL    Albumin 4.0 3.5 - 5.2 g/dL    Total Bilirubin 0.6 0.1 - 1.0 mg/dL    Alkaline Phosphatase 34 (L) 55 - 135 U/L    AST 19 10 - 40 U/L    ALT 11 10 - 44 U/L    Anion Gap 6 (L) 8 - 16 mmol/L    eGFR if African American >60 >60 mL/min/1.73 m^2    eGFR if non African American >60 >60 mL/min/1.73 m^2   Troponin I   Result Value Ref Range    Troponin I <0.006 0.000 - 0.026 ng/mL   Brain natriuretic peptide   Result Value Ref Range     (H) 0 - 99 pg/mL   COVID-19 Rapid Screening   Result Value Ref Range    SARS-CoV-2 RNA, Amplification, Qual Negative Negative   Type & Screen   Result Value Ref Range    Group & Rh A POS     Indirect Xavi NEG          Imaging Results:  Imaging Results          X-Ray Chest AP Portable (Edited Result - FINAL)  Result time 11/11/20 10:19:26    Addendum 1 of 1 by Syed Lozada MD (11/11/20 10:19:26)      Nondisplaced left ninth posterolateral rib fracture noted.  Consider dedicated views of the ribs.      Electronically signed by: Syed Lozada MD  Date:    11/11/2020  Time:    10:19               Final result by Syed Lozada MD (11/11/20 09:42:18)                 Impression:      No acute process seen.      Electronically signed by: Syed Lozada MD  Date:    11/11/2020  Time:    09:42              Narrative:    EXAMINATION:  XR CHEST AP PORTABLE    CLINICAL HISTORY:  chest pain;    FINDINGS:  Single view of the chest.  No comparison.    Cardiac silhouette is normal.  Aorta demonstrates atherosclerotic disease.  The lungs demonstrate no evidence of active disease.  Emphysematous changes are seen within the lung apices.  No evidence of pleural effusion or pneumothorax.  Bones demonstrate moderate degenerative changes.  Skin folds are suspected bilaterally.                                 The EKG was ordered, reviewed, and independently interpreted by the ED provider.  Interpretation time: 758  Rate: 84 BPM  Rhythm: normal sinus rhythm  Interpretation: No acute ST changes. No STEMI.           The Emergency Provider reviewed the vital signs and test results, which are outlined above.     ED Discussion     10:19 AM: Discussed pt's case with Dr. Lozada (Diagnostic Radiology) who states L Nondisplaced ninth posterolateral rib fracture noted.      10:34 AM: Reassessed pt at this time. Pt denies blood in stool. Pt reports hx of anemia, used to receive iron transfusions. Pt will follow up with PCP. Discussed with pt all pertinent ED information and results. Discussed pt dx and plan of tx. Gave pt all f/u and return to the ED instructions. All questions and concerns were addressed at this time. Pt expresses understanding of information and instructions, and is comfortable with plan to discharge. Pt is stable for discharge.    I discussed with patient and/or family/caretaker that evaluation in the ED does not suggest any emergent or life threatening medical conditions requiring immediate intervention beyond what was provided in the ED, and I believe patient is safe for discharge.  Regardless, an unremarkable evaluation in the ED does not preclude the development or presence of a serious of life threatening condition. As such, patient was instructed to return immediately for any worsening or change in current  symptoms.         Medical Decision Making:   Clinical Tests:   Lab Tests: Ordered and Reviewed  Radiological Study: Ordered and Reviewed  Medical Tests: Ordered and Reviewed  Left rib fracture.  Prescriptions provided for pain medication and cough medication.  Patient was provided with an incentive spirometer to use at home.  Chronic anemia.  Advised primary care physician follow-up.  ER return precautions provided for any new or worsening symptoms           ED Medication(s):  Medications - No data to display    Discharge Medication List as of 11/11/2020 10:33 AM      START taking these medications    Details   benzonatate (TESSALON) 100 MG capsule Take 1 capsule (100 mg total) by mouth 3 (three) times daily as needed for Cough., Starting Wed 11/11/2020, Print      HYDROcodone-acetaminophen (NORCO) 5-325 mg per tablet Take 1 tablet by mouth every 6 (six) hours as needed for Pain., Starting Wed 11/11/2020, Print             Follow-up Information     [facility]    Specialty: Emergency Medicine  Why: As needed, If symptoms worsen  Contact information:  [address] [address] [phone]           Schedule an appointment as soon as possible for a visit  with Follow-up with your primary care physician.                     Scribe Attestation:   Scribe #1: I performed the above scribed service and the documentation accurately describes the services I performed. I attest to the accuracy of the note.     Attending:   Physician Attestation Statement for Scribe #1: I, Syed Forman MD, personally performed the services described in this documentation, as scribed by Valorie Draper, in my presence, and it is both accurate and complete.           Clinical Impression       ICD-10-CM ICD-9-CM   1. Closed fracture of one rib of left side, initial encounter  S22.32XA 807.01   2. SOB (shortness of breath)  R06.02 786.05   3. Anemia, unspecified type  D64.9 285.9       Disposition:    Disposition: Discharged  Condition: Stable         Syed Forman MD  11/12/20 2051

## 2020-11-12 ENCOUNTER — TELEPHONE (OUTPATIENT)
Dept: PRIMARY CARE CLINIC | Facility: CLINIC | Age: 80
End: 2020-11-12

## 2020-11-12 DIAGNOSIS — D50.0 BLOOD LOSS ANEMIA: Primary | ICD-10-CM

## 2020-11-12 NOTE — TELEPHONE ENCOUNTER
Patient was seen in the ED at [facility] please schedule an appt asap Per Dr Ch referral in system.

## 2020-11-12 NOTE — TELEPHONE ENCOUNTER
Patient just came from [facility] on 11/11/2020 . He has fractured ribs and was advised that he will need a transfusion his doctor is no longer at [facility] and he want to know if you can send a referral to one of the [facility] Doctors for his transfusion.

## 2020-11-12 NOTE — TELEPHONE ENCOUNTER
Referral placed for hematology since he has seen Vivian Chao, my PA. However, if worsens will need to go back to ED if worsens or further blood loss.

## 2020-11-12 NOTE — TELEPHONE ENCOUNTER
Patient was seen in the ED at [facility] please schedule an appt asap Per Dr Ch referral in system.    Thank you

## 2020-11-13 ENCOUNTER — TELEPHONE (OUTPATIENT)
Dept: HEMATOLOGY/ONCOLOGY | Facility: CLINIC | Age: 80
End: 2020-11-13

## 2020-11-13 NOTE — TELEPHONE ENCOUNTER
Appt scheduled for 11/24 at [facility] at 4:00 with Dr. Street. Verbalized understanding

## 2020-11-16 NOTE — TELEPHONE ENCOUNTER
I think you sent this to the wrong office, We are family medicine in [place]. I called the patient and he let me know he was in [place]. It took me a few days to get in contact with them sorry for the delayed response!

## 2020-11-24 ENCOUNTER — OFFICE VISIT (OUTPATIENT)
Dept: HEMATOLOGY/ONCOLOGY | Facility: CLINIC | Age: 80
End: 2020-11-24
Payer: COMMERCIAL

## 2020-11-24 ENCOUNTER — LAB VISIT (OUTPATIENT)
Dept: LAB | Facility: HOSPITAL | Age: 80
End: 2020-11-24
Attending: INTERNAL MEDICINE
Payer: COMMERCIAL

## 2020-11-24 VITALS
TEMPERATURE: 99 F | BODY MASS INDEX: 20.74 KG/M2 | SYSTOLIC BLOOD PRESSURE: 151 MMHG | OXYGEN SATURATION: 98 % | WEIGHT: 156.5 LBS | HEART RATE: 94 BPM | HEIGHT: 73 IN | DIASTOLIC BLOOD PRESSURE: 61 MMHG

## 2020-11-24 DIAGNOSIS — D50.0 BLOOD LOSS ANEMIA: ICD-10-CM

## 2020-11-24 DIAGNOSIS — D52.0 DIETARY FOLATE DEFICIENCY ANEMIA: ICD-10-CM

## 2020-11-24 DIAGNOSIS — R73.03 PREDIABETES: ICD-10-CM

## 2020-11-24 DIAGNOSIS — D50.9 MICROCYTIC ANEMIA: ICD-10-CM

## 2020-11-24 DIAGNOSIS — D51.8 OTHER VITAMIN B12 DEFICIENCY ANEMIAS: ICD-10-CM

## 2020-11-24 DIAGNOSIS — D53.9 NUTRITIONAL ANEMIA: ICD-10-CM

## 2020-11-24 LAB
ALBUMIN SERPL BCP-MCNC: 4.1 G/DL (ref 3.5–5.2)
ALP SERPL-CCNC: 32 U/L (ref 55–135)
ALT SERPL W/O P-5'-P-CCNC: 9 U/L (ref 10–44)
ANION GAP SERPL CALC-SCNC: 9 MMOL/L (ref 8–16)
AST SERPL-CCNC: 19 U/L (ref 10–40)
BILIRUB SERPL-MCNC: 0.5 MG/DL (ref 0.1–1)
BUN SERPL-MCNC: 24 MG/DL (ref 8–23)
CALCIUM SERPL-MCNC: 9.3 MG/DL (ref 8.7–10.5)
CHLORIDE SERPL-SCNC: 106 MMOL/L (ref 95–110)
CO2 SERPL-SCNC: 25 MMOL/L (ref 23–29)
CREAT SERPL-MCNC: 0.8 MG/DL (ref 0.5–1.4)
ERYTHROCYTE [DISTWIDTH] IN BLOOD BY AUTOMATED COUNT: 25.7 % (ref 11.5–14.5)
EST. GFR  (AFRICAN AMERICAN): >60 ML/MIN/1.73 M^2
EST. GFR  (NON AFRICAN AMERICAN): >60 ML/MIN/1.73 M^2
GLUCOSE SERPL-MCNC: 90 MG/DL (ref 70–110)
HCT VFR BLD AUTO: 24 % (ref 40–54)
HGB BLD-MCNC: 6.7 G/DL (ref 14–18)
IMM GRANULOCYTES # BLD AUTO: 0.01 K/UL (ref 0–0.04)
MCH RBC QN AUTO: 18.8 PG (ref 27–31)
MCHC RBC AUTO-ENTMCNC: 27.9 G/DL (ref 32–36)
MCV RBC AUTO: 67 FL (ref 82–98)
NEUTROPHILS # BLD AUTO: 4.5 K/UL (ref 1.8–7.7)
PLATELET # BLD AUTO: 347 K/UL (ref 150–350)
PMV BLD AUTO: 9 FL (ref 9.2–12.9)
POTASSIUM SERPL-SCNC: 4.1 MMOL/L (ref 3.5–5.1)
PROT SERPL-MCNC: 6.9 G/DL (ref 6–8.4)
RBC # BLD AUTO: 3.57 M/UL (ref 4.6–6.2)
SODIUM SERPL-SCNC: 140 MMOL/L (ref 136–145)
WBC # BLD AUTO: 6.22 K/UL (ref 3.9–12.7)

## 2020-11-24 PROCEDURE — 99213 OFFICE O/P EST LOW 20 MIN: CPT | Mod: PBBFAC | Performed by: INTERNAL MEDICINE

## 2020-11-24 PROCEDURE — 82607 VITAMIN B-12: CPT

## 2020-11-24 PROCEDURE — 85027 COMPLETE CBC AUTOMATED: CPT

## 2020-11-24 PROCEDURE — 82728 ASSAY OF FERRITIN: CPT

## 2020-11-24 PROCEDURE — 82746 ASSAY OF FOLIC ACID SERUM: CPT

## 2020-11-24 PROCEDURE — 84443 ASSAY THYROID STIM HORMONE: CPT

## 2020-11-24 PROCEDURE — 36415 COLL VENOUS BLD VENIPUNCTURE: CPT

## 2020-11-24 PROCEDURE — 99999 PR PBB SHADOW E&M-EST. PATIENT-LVL III: CPT | Mod: PBBFAC,,, | Performed by: INTERNAL MEDICINE

## 2020-11-24 PROCEDURE — 83540 ASSAY OF IRON: CPT

## 2020-11-24 PROCEDURE — 80053 COMPREHEN METABOLIC PANEL: CPT

## 2020-11-24 PROCEDURE — 99999 PR PBB SHADOW E&M-EST. PATIENT-LVL III: ICD-10-PCS | Mod: PBBFAC,,, | Performed by: INTERNAL MEDICINE

## 2020-11-24 PROCEDURE — 99205 PR OFFICE/OUTPT VISIT, NEW, LEVL V, 60-74 MIN: ICD-10-PCS | Mod: S$GLB,,, | Performed by: INTERNAL MEDICINE

## 2020-11-24 PROCEDURE — 99205 OFFICE O/P NEW HI 60 MIN: CPT | Mod: S$GLB,,, | Performed by: INTERNAL MEDICINE

## 2020-11-24 NOTE — LETTER
November 24, 2020      Vivian Ch MD  [address] [facility] [address]           [facility]  [address] [address]  Phone: [phone]  Fax: [phone]          Patient: Richy Douglas   MR Number: [id number]   YOB: 1940   Date of Visit: 11/24/2020       Dear Dr. Vivian Ch:    Thank you for referring Richy Douglas to me for evaluation. Attached you will find relevant portions of my assessment and plan of care.    If you have questions, please do not hesitate to call me. I look forward to following Richy oDuglas along with you.    Sincerely,    Antony Street MD    Enclosure  CC:  No Recipients    If you would like to receive this communication electronically, please contact [email or web address] or [phone] to request more information on [email or web address] Link access.    For providers and/or their staff who would like to refer a patient to [facility], please contact us through our one-stop-shop provider referral line, [facility] , at [phone].    If you feel you have received this communication in error or would no longer like to receive these types of communications, please e-mail [email or web address]

## 2020-11-24 NOTE — PROGRESS NOTES
Subjective:   Date of Visit: 11/24/20   ?   ?    REFERRING PROVIDER: Vivian Ch MD  [address] [facility]  [place]  [place] [address]   ?   CHIEF COMPLAINT:  Anemia???????   ?   HPI:  80-year-old male with past medical history significant for prostate cancer, hypertension, GERD, aortic aneurysm was referred to us by Dr. Ch for evaluation and management of microcytic anemia.    Most recent CBC performed on 11/11/2020 showed hemoglobin of 7.1 grams/deciliter, hematocrit of 25.8 an MCV of 67 with mildly elevated RDW at 25.4.  Review of medical record in care everywhere showed the persistent microcytic anemia dating back to 01/04/2016.    Endorses chronic fatigue, shortness of breath and generalized weakness.  Denies abnormal bleeding including epistaxis, hemoptysis, hematemesis, hematochezia, melena or hematuria.  Also denies unintentional weight loss, nausea vomiting, abdominal pain, diarrhea dysuria.    Not up-to-date with screening colonoscopy.  No pertinent family history.    Review of Systems   Constitutional: Positive for fatigue. Negative for activity change, appetite change, chills, fever and unexpected weight change.   HENT: Negative for hearing loss, mouth sores, nosebleeds, sore throat, tinnitus, trouble swallowing and voice change.    Eyes: Negative for visual disturbance.   Respiratory: Positive for shortness of breath. Negative for cough, chest tightness and wheezing.    Cardiovascular: Negative for chest pain, palpitations and leg swelling.   Gastrointestinal: Negative for abdominal pain, anal bleeding, blood in stool, constipation, diarrhea, nausea and vomiting.   Genitourinary: Negative for frequency, hematuria and testicular pain.   Musculoskeletal: Negative for arthralgias, back pain, gait problem and neck pain.   Skin: Negative for color change, pallor, rash and wound.   Allergic/Immunologic: Negative for immunocompromised state.   Neurological: Positive for weakness. Negative for seizures,  syncope, numbness and headaches.   Hematological: Negative for adenopathy. Does not bruise/bleed easily.   Psychiatric/Behavioral: Negative for agitation, confusion, decreased concentration, hallucinations and sleep disturbance. The patient is not nervous/anxious.        ?   PAST MEDICAL HISTORY:   Past Medical History:   Diagnosis Date    Anemia of chronic disease     Aortic aneurysm     GERD (gastroesophageal reflux disease)     Hypertension     Microcytic anemia 11/24/2020    Prostate cancer     ?     PAST SURGICAL HISTORY:   Past Surgical History:   Procedure Laterality Date    COLONOSCOPY      PROSTATE SURGERY      SPINE SURGERY        ?   ALLERGIES:   Allergies as of 11/24/2020 - Reviewed 11/24/2020   Allergen Reaction Noted    Fish containing products  09/17/2020    Iodine and iodide containing products  09/17/2020      ?   MEDICATIONS:?   Outpatient Medications Marked as Taking for the 11/24/20 encounter (Office Visit) with Antony Street MD   Medication Sig Dispense Refill    atorvastatin (LIPITOR) 10 MG tablet Take 1 tablet (10 mg total) by mouth once daily. 30 tablet 11    benzonatate (TESSALON) 100 MG capsule Take 1 capsule (100 mg total) by mouth 3 (three) times daily as needed for Cough. 30 capsule 0    hydroCHLOROthiazide (HYDRODIURIL) 12.5 MG Tab Take 1 tablet (12.5 mg total) by mouth once daily. 30 tablet 11    HYDROcodone-acetaminophen (NORCO) 5-325 mg per tablet Take 1 tablet by mouth every 6 (six) hours as needed for Pain. 18 tablet 0    pantoprazole (PROTONIX) 40 MG tablet Take 1 tablet (40 mg total) by mouth once daily. 30 tablet 11    tiotropium (SPIRIVA) 18 mcg inhalation capsule Inhale 18 mcg into the lungs once daily. Controller        ?   SOCIAL HISTORY:?   Social History     Tobacco Use    Smoking status: Current Every Day Smoker     Packs/day: 1.00     Types: Cigarettes    Smokeless tobacco: Never Used   Substance Use Topics    Alcohol use: Yes     Comment:  reports only drinks red wine when games are on        ?   FAMILY HISTORY:   family history is not on file.   ?     Objective:      Physical Exam  Constitutional:       General: He is not in acute distress.     Appearance: He is well-developed. He is ill-appearing.   HENT:      Head: Normocephalic and atraumatic.      Right Ear: External ear normal.      Left Ear: External ear normal.      Mouth/Throat:      Pharynx: No oropharyngeal exudate.   Eyes:      General: No scleral icterus.        Right eye: No discharge.         Left eye: No discharge.      Conjunctiva/sclera: Conjunctivae normal.      Pupils: Pupils are equal, round, and reactive to light.   Neck:      Musculoskeletal: Normal range of motion and neck supple.      Thyroid: No thyromegaly.   Cardiovascular:      Rate and Rhythm: Normal rate and regular rhythm.      Heart sounds: Normal heart sounds. No murmur.   Pulmonary:      Effort: Pulmonary effort is normal. No respiratory distress.      Breath sounds: Normal breath sounds. No wheezing.   Chest:      Chest wall: No tenderness.   Abdominal:      General: Bowel sounds are normal. There is no distension.      Palpations: Abdomen is soft. There is no mass.      Tenderness: There is no abdominal tenderness. There is no rebound.   Musculoskeletal: Normal range of motion.         General: No tenderness.   Lymphadenopathy:      Cervical: No cervical adenopathy.      Right cervical: No superficial cervical adenopathy.     Left cervical: No superficial cervical adenopathy.      Upper Body:      Right upper body: No supraclavicular or pectoral adenopathy.      Left upper body: No supraclavicular or pectoral adenopathy.   Skin:     General: Skin is warm and dry.      Capillary Refill: Capillary refill takes 2 to 3 seconds.      Coloration: Skin is not pale.      Findings: No erythema or rash.   Neurological:      Mental Status: He is alert and oriented to person, place, and time.      Cranial Nerves: No cranial  nerve deficit.      Sensory: No sensory deficit.   Psychiatric:         Behavior: Behavior normal.         Judgment: Judgment normal.         ?   Vitals:    11/24/20 1519   BP: (!) 151/61   Pulse: 94   Temp: 98.6 °F (37 °C)      ?     ECOG SCORE         ?   Laboratory:  ?   No visits with results within 1 Day(s) from this visit.   Latest known visit with results is:   Admission on 11/11/2020, Discharged on 11/11/2020   Component Date Value Ref Range Status    WBC 11/11/2020 4.83  3.90 - 12.70 K/uL Final    RBC 11/11/2020 3.88* 4.60 - 6.20 M/uL Final    Hemoglobin 11/11/2020 7.1* 14.0 - 18.0 g/dL Final    Hematocrit 11/11/2020 25.8* 40.0 - 54.0 % Final    MCV 11/11/2020 67* 82 - 98 fL Final    MCH 11/11/2020 18.3* 27.0 - 31.0 pg Final    MCHC 11/11/2020 27.5* 32.0 - 36.0 g/dL Final    RDW 11/11/2020 25.4* 11.5 - 14.5 % Final    Platelets 11/11/2020 298  150 - 350 K/uL Final    MPV 11/11/2020 SEE COMMENT  9.2 - 12.9 fL Final    Immature Granulocytes 11/11/2020 0.2  0.0 - 0.5 % Final    Gran # (ANC) 11/11/2020 3.2  1.8 - 7.7 K/uL Final    Immature Grans (Abs) 11/11/2020 0.01  0.00 - 0.04 K/uL Final    Lymph # 11/11/2020 0.7* 1.0 - 4.8 K/uL Final    Mono # 11/11/2020 0.5  0.3 - 1.0 K/uL Final    Eos # 11/11/2020 0.4  0.0 - 0.5 K/uL Final    Baso # 11/11/2020 0.09  0.00 - 0.20 K/uL Final    nRBC 11/11/2020 0  0 /100 WBC Final    Gran % 11/11/2020 65.6  38.0 - 73.0 % Final    Lymph % 11/11/2020 13.7* 18.0 - 48.0 % Final    Mono % 11/11/2020 10.1  4.0 - 15.0 % Final    Eosinophil % 11/11/2020 8.5* 0.0 - 8.0 % Final    Basophil % 11/11/2020 1.9  0.0 - 1.9 % Final    Poik 11/11/2020 Slight   Final    Hypo 11/11/2020 Occasional   Final    Ovalocytes 11/11/2020 Occasional   Final    Target Cells 11/11/2020 Occasional   Final    Tear Drop Cells 11/11/2020 Occasional   Final    Differential Method 11/11/2020 Automated   Final    Sodium 11/11/2020 137  136 - 145 mmol/L Final    Potassium 11/11/2020  4.2  3.5 - 5.1 mmol/L Final    Chloride 11/11/2020 104  95 - 110 mmol/L Final    CO2 11/11/2020 27  23 - 29 mmol/L Final    Glucose 11/11/2020 93  70 - 110 mg/dL Final    BUN 11/11/2020 15  8 - 23 mg/dL Final    Creatinine 11/11/2020 0.8  0.5 - 1.4 mg/dL Final    Calcium 11/11/2020 9.0  8.7 - 10.5 mg/dL Final    Total Protein 11/11/2020 7.0  6.0 - 8.4 g/dL Final    Albumin 11/11/2020 4.0  3.5 - 5.2 g/dL Final    Total Bilirubin 11/11/2020 0.6  0.1 - 1.0 mg/dL Final    Alkaline Phosphatase 11/11/2020 34* 55 - 135 U/L Final    AST 11/11/2020 19  10 - 40 U/L Final    ALT 11/11/2020 11  10 - 44 U/L Final    Anion Gap 11/11/2020 6* 8 - 16 mmol/L Final    eGFR if African American 11/11/2020 >60  >60 mL/min/1.73 m^2 Final    eGFR if non African American 11/11/2020 >60  >60 mL/min/1.73 m^2 Final    Troponin I 11/11/2020 <0.006  0.000 - 0.026 ng/mL Final    BNP 11/11/2020 202* 0 - 99 pg/mL Final    SARS-CoV-2 RNA, Amplification, Qual 11/11/2020 Negative  Negative Final    Group & Rh 11/11/2020 A POS   Final    Indirect Xavi 11/11/2020 NEG   Final      ?   Tumor markers   ?   ?   Imaging: X-Ray Chest AP Portable  Addendum: Nondisplaced left ninth posterolateral rib fracture noted.  Consider  dedicated views of the ribs.     Electronically signed by: Syed Lozada MD   Date:    11/11/2020   Time:    10:19  Narrative: EXAMINATION:  XR CHEST AP PORTABLE    CLINICAL HISTORY:  chest pain;    FINDINGS:  Single view of the chest.  No comparison.    Cardiac silhouette is normal.  Aorta demonstrates atherosclerotic disease.  The lungs demonstrate no evidence of active disease.  Emphysematous changes are seen within the lung apices.  No evidence of pleural effusion or pneumothorax.  Bones demonstrate moderate degenerative changes.  Skin folds are suspected bilaterally.  Impression: No acute process seen.    Electronically signed by: Syed Lozada MD  Date:    11/11/2020  Time:    09:42      ?      Pathology:  Pathology Results  (Last 10 years)    None           ?   Assessment/Plan:       1. Microcytic anemia    2. Blood loss anemia    3. Nutritional anemia    4. Dietary folate deficiency anemia     5. Other vitamin B12 deficiency anemias     6. Prediabetes           Microcytic anemia  Unknown etiology.  Noted negative Xavi test.  Anemia is likely due to chronic blood loss.  Will obtain iron studies.  Will also check TSH, B12 and folate levels to rule out nutritional anemia.  Will order Cologuard test.  Instructions on how this is utilize was explained clearly to patient and he verbalized understanding.  Further recommendation regarding endoscopic evaluation be based off of the results.      ?Microcytic anemia  -     CBC Oncology; Future; Expected date: 11/24/2020  -     Comprehensive Metabolic Panel; Future; Expected date: 11/24/2020  -     Iron and TIBC; Future; Expected date: 11/24/2020  -     Ferritin; Future; Expected date: 11/24/2020  -     Folate; Future; Expected date: 11/24/2020  -     Vitamin B12; Future; Expected date: 11/24/2020  -     TSH; Future; Expected date: 11/24/2020  -     CBC Oncology; Future; Expected date: 11/24/2020  -     Comprehensive Metabolic Panel; Future; Expected date: 11/24/2020  -     Cologuard Screening (Multitarget Stool DNA); Future; Expected date: 11/24/2020    Blood loss anemia  -     CBC Oncology; Future; Expected date: 11/24/2020  -     Comprehensive Metabolic Panel; Future; Expected date: 11/24/2020  -     Iron and TIBC; Future; Expected date: 11/24/2020  -     Ferritin; Future; Expected date: 11/24/2020  -     Folate; Future; Expected date: 11/24/2020  -     Vitamin B12; Future; Expected date: 11/24/2020  -     TSH; Future; Expected date: 11/24/2020  -     Ambulatory referral/consult to Hematology / Oncology  -     Cologuard Screening (Multitarget Stool DNA); Future; Expected date: 11/24/2020    Nutritional anemia  -     CBC Oncology; Future; Expected date:  11/24/2020  -     Comprehensive Metabolic Panel; Future; Expected date: 11/24/2020    Dietary folate deficiency anemia   -     Folate; Future; Expected date: 11/24/2020    Other vitamin B12 deficiency anemias   -     Vitamin B12; Future; Expected date: 11/24/2020    Prediabetes   -     TSH; Future; Expected date: 11/24/2020      ?   Follow-Up: Follow up in about 2 weeks (around 12/8/2020).    JAYNA RODRIGUEZ Md., Ph.D  Hematology & Oncology Department  Phone #: [phone]

## 2020-11-24 NOTE — ASSESSMENT & PLAN NOTE
Unknown etiology.  Noted negative Xavi test.  Anemia is likely due to chronic blood loss.  Will obtain iron studies.  Will also check TSH, B12 and folate levels to rule out nutritional anemia.  Will order Cologuard test.  Instructions on how this is utilize was explained clearly to patient and he verbalized understanding.  Further recommendation regarding endoscopic evaluation be based off of the results.

## 2020-11-25 LAB
FERRITIN SERPL-MCNC: 7 NG/ML (ref 20–300)
FOLATE SERPL-MCNC: 13.9 NG/ML (ref 4–24)
IRON SERPL-MCNC: 15 UG/DL (ref 45–160)
SATURATED IRON: 3 % (ref 20–50)
TOTAL IRON BINDING CAPACITY: 509 UG/DL (ref 250–450)
TRANSFERRIN SERPL-MCNC: 344 MG/DL (ref 200–375)
TSH SERPL DL<=0.005 MIU/L-ACNC: 0.87 UIU/ML (ref 0.4–4)
VIT B12 SERPL-MCNC: 770 PG/ML (ref 210–950)

## 2020-11-26 RX ORDER — HEPARIN 100 UNIT/ML
500 SYRINGE INTRAVENOUS
Status: CANCELLED | OUTPATIENT
Start: 2020-12-10

## 2020-11-26 RX ORDER — HEPARIN 100 UNIT/ML
500 SYRINGE INTRAVENOUS
Status: CANCELLED | OUTPATIENT
Start: 2020-12-03

## 2020-11-26 RX ORDER — SODIUM CHLORIDE 0.9 % (FLUSH) 0.9 %
10 SYRINGE (ML) INJECTION
Status: CANCELLED | OUTPATIENT
Start: 2020-12-10

## 2020-11-26 RX ORDER — SODIUM CHLORIDE 0.9 % (FLUSH) 0.9 %
10 SYRINGE (ML) INJECTION
Status: CANCELLED | OUTPATIENT
Start: 2020-12-03

## 2020-11-27 ENCOUNTER — TELEPHONE (OUTPATIENT)
Dept: HEMATOLOGY/ONCOLOGY | Facility: CLINIC | Age: 80
End: 2020-11-27

## 2020-11-27 NOTE — TELEPHONE ENCOUNTER
Spoke to spouse about scheduling iron injection. Prefers the [place]. Will follow up with apt.

## 2020-11-27 NOTE — TELEPHONE ENCOUNTER
Spoke with pt wife about iron infusion. Notified of time, date, and location. Understanding verbalized.

## 2020-11-27 NOTE — TELEPHONE ENCOUNTER
----- Message from Antony Street MD sent at 11/26/2020  8:02 AM CST -----  Ordered IV iron. Please schedule and let her know

## 2020-12-09 RX ORDER — HEPARIN 100 UNIT/ML
500 SYRINGE INTRAVENOUS
Status: CANCELLED | OUTPATIENT
Start: 2020-12-09

## 2020-12-09 RX ORDER — EPINEPHRINE 0.3 MG/.3ML
0.3 INJECTION SUBCUTANEOUS ONCE AS NEEDED
Status: CANCELLED | OUTPATIENT
Start: 2020-12-09

## 2020-12-09 RX ORDER — METHYLPREDNISOLONE SOD SUCC 125 MG
125 VIAL (EA) INJECTION ONCE AS NEEDED
Status: CANCELLED | OUTPATIENT
Start: 2020-12-09

## 2020-12-09 RX ORDER — SODIUM CHLORIDE 0.9 % (FLUSH) 0.9 %
10 SYRINGE (ML) INJECTION
Status: CANCELLED | OUTPATIENT
Start: 2020-12-09

## 2020-12-09 RX ORDER — DIPHENHYDRAMINE HYDROCHLORIDE 50 MG/ML
50 INJECTION INTRAMUSCULAR; INTRAVENOUS ONCE AS NEEDED
Status: CANCELLED | OUTPATIENT
Start: 2020-12-09

## 2020-12-17 ENCOUNTER — TELEPHONE (OUTPATIENT)
Dept: HEMATOLOGY/ONCOLOGY | Facility: CLINIC | Age: 80
End: 2020-12-17

## 2020-12-17 ENCOUNTER — INFUSION (OUTPATIENT)
Dept: INFUSION THERAPY | Facility: HOSPITAL | Age: 80
End: 2020-12-17
Attending: INTERNAL MEDICINE
Payer: COMMERCIAL

## 2020-12-17 VITALS
TEMPERATURE: 98 F | DIASTOLIC BLOOD PRESSURE: 66 MMHG | HEART RATE: 74 BPM | SYSTOLIC BLOOD PRESSURE: 148 MMHG | RESPIRATION RATE: 18 BRPM | OXYGEN SATURATION: 94 %

## 2020-12-17 DIAGNOSIS — D50.9 MICROCYTIC ANEMIA: Primary | ICD-10-CM

## 2020-12-17 PROCEDURE — 25000003 PHARM REV CODE 250: Performed by: INTERNAL MEDICINE

## 2020-12-17 PROCEDURE — 63600175 PHARM REV CODE 636 W HCPCS: Performed by: INTERNAL MEDICINE

## 2020-12-17 PROCEDURE — 96365 THER/PROPH/DIAG IV INF INIT: CPT

## 2020-12-17 RX ORDER — SODIUM CHLORIDE 0.9 % (FLUSH) 0.9 %
10 SYRINGE (ML) INJECTION
Status: CANCELLED | OUTPATIENT
Start: 2020-12-24

## 2020-12-17 RX ORDER — METHYLPREDNISOLONE SOD SUCC 125 MG
125 VIAL (EA) INJECTION ONCE AS NEEDED
Status: CANCELLED | OUTPATIENT
Start: 2020-12-24

## 2020-12-17 RX ORDER — DIPHENHYDRAMINE HYDROCHLORIDE 50 MG/ML
50 INJECTION INTRAMUSCULAR; INTRAVENOUS ONCE AS NEEDED
Status: CANCELLED | OUTPATIENT
Start: 2020-12-24

## 2020-12-17 RX ORDER — EPINEPHRINE 0.3 MG/.3ML
0.3 INJECTION SUBCUTANEOUS ONCE AS NEEDED
Status: CANCELLED | OUTPATIENT
Start: 2020-12-24

## 2020-12-17 RX ORDER — HEPARIN 100 UNIT/ML
500 SYRINGE INTRAVENOUS
Status: CANCELLED | OUTPATIENT
Start: 2020-12-24

## 2020-12-17 RX ADMIN — SODIUM CHLORIDE 125 MG: 9 INJECTION, SOLUTION INTRAVENOUS at 01:12

## 2020-12-17 NOTE — TELEPHONE ENCOUNTER
----- Message from Barbara Marquez sent at 12/17/2020  8:18 AM CST -----  Contact: Alexandra/[facility]  Alexandra is calling to follow up on a cologuard order she stated that the ICD10 code is use for  iron deficiency anemia which is not a reason to screen for colon rectal and may have issues with billing, please call her at [phone] with case# [id number].    Thanks  Td

## 2020-12-17 NOTE — DISCHARGE INSTRUCTIONS
.[facility]  [address] [facility]  [address]  [phone] phone     [phone] fax  Hours of Operation: Monday- Friday 8:00am- 5:00pm  After hours phone  [phone]  Hematology / Oncology Physicians on call      VIVI Mcneill Dr., Dr., Dr., Dr., NP Sydney Prescott, NP Tyesha Taylor, NP    Please call with any concerns regarding your appointment today.

## 2020-12-17 NOTE — TELEPHONE ENCOUNTER
Instructed by John to update dx code on letterhead and fax to [phone].  Dx updated and faxed.

## 2020-12-28 ENCOUNTER — INFUSION (OUTPATIENT)
Dept: INFUSION THERAPY | Facility: HOSPITAL | Age: 80
End: 2020-12-28
Attending: INTERNAL MEDICINE
Payer: COMMERCIAL

## 2020-12-28 VITALS
OXYGEN SATURATION: 96 % | HEART RATE: 81 BPM | SYSTOLIC BLOOD PRESSURE: 138 MMHG | BODY MASS INDEX: 20.74 KG/M2 | WEIGHT: 156.5 LBS | DIASTOLIC BLOOD PRESSURE: 60 MMHG | HEIGHT: 73 IN | TEMPERATURE: 98 F | RESPIRATION RATE: 16 BRPM

## 2020-12-28 DIAGNOSIS — D50.9 MICROCYTIC ANEMIA: Primary | ICD-10-CM

## 2020-12-28 PROCEDURE — 96365 THER/PROPH/DIAG IV INF INIT: CPT

## 2020-12-28 PROCEDURE — A4216 STERILE WATER/SALINE, 10 ML: HCPCS | Performed by: INTERNAL MEDICINE

## 2020-12-28 PROCEDURE — 63600175 PHARM REV CODE 636 W HCPCS: Performed by: INTERNAL MEDICINE

## 2020-12-28 PROCEDURE — 25000003 PHARM REV CODE 250: Performed by: INTERNAL MEDICINE

## 2020-12-28 RX ORDER — DIPHENHYDRAMINE HYDROCHLORIDE 50 MG/ML
50 INJECTION INTRAMUSCULAR; INTRAVENOUS ONCE AS NEEDED
Status: CANCELLED | OUTPATIENT
Start: 2021-01-04

## 2020-12-28 RX ORDER — HEPARIN 100 UNIT/ML
500 SYRINGE INTRAVENOUS
Status: CANCELLED | OUTPATIENT
Start: 2021-01-04

## 2020-12-28 RX ORDER — EPINEPHRINE 0.3 MG/.3ML
0.3 INJECTION SUBCUTANEOUS ONCE AS NEEDED
Status: CANCELLED | OUTPATIENT
Start: 2021-01-04

## 2020-12-28 RX ORDER — SODIUM CHLORIDE 0.9 % (FLUSH) 0.9 %
10 SYRINGE (ML) INJECTION
Status: DISCONTINUED | OUTPATIENT
Start: 2020-12-28 | End: 2020-12-28 | Stop reason: HOSPADM

## 2020-12-28 RX ORDER — SODIUM CHLORIDE 0.9 % (FLUSH) 0.9 %
10 SYRINGE (ML) INJECTION
Status: CANCELLED | OUTPATIENT
Start: 2021-01-04

## 2020-12-28 RX ORDER — METHYLPREDNISOLONE SOD SUCC 125 MG
125 VIAL (EA) INJECTION ONCE AS NEEDED
Status: CANCELLED | OUTPATIENT
Start: 2021-01-04

## 2020-12-28 RX ADMIN — SODIUM CHLORIDE 125 MG: 9 INJECTION, SOLUTION INTRAVENOUS at 01:12

## 2020-12-28 RX ADMIN — Medication 10 ML: at 12:12

## 2020-12-28 NOTE — DISCHARGE INSTRUCTIONS
[facility]  [address] [facility]  [address]  [phone] phone     [phone] fax  Hours of Operation: Monday- Friday 8:00am- 5:00pm  After hours phone  [phone]  Hematology / Oncology Physicians on call      VIVI Mcneill Dr., Dr., Dr., Dr., NP Sydney Prescott, NP Tyesha Taylor, NP    Please call with any concerns regarding your appointment today.

## 2021-01-04 ENCOUNTER — INFUSION (OUTPATIENT)
Dept: INFUSION THERAPY | Facility: HOSPITAL | Age: 81
End: 2021-01-04
Attending: INTERNAL MEDICINE
Payer: COMMERCIAL

## 2021-01-04 VITALS
DIASTOLIC BLOOD PRESSURE: 55 MMHG | RESPIRATION RATE: 16 BRPM | TEMPERATURE: 99 F | OXYGEN SATURATION: 97 % | SYSTOLIC BLOOD PRESSURE: 135 MMHG | HEART RATE: 77 BPM

## 2021-01-04 DIAGNOSIS — D50.9 MICROCYTIC ANEMIA: Primary | ICD-10-CM

## 2021-01-04 PROCEDURE — 96365 THER/PROPH/DIAG IV INF INIT: CPT

## 2021-01-04 PROCEDURE — 25000003 PHARM REV CODE 250: Performed by: INTERNAL MEDICINE

## 2021-01-04 PROCEDURE — 63600175 PHARM REV CODE 636 W HCPCS: Performed by: INTERNAL MEDICINE

## 2021-01-04 RX ORDER — HEPARIN 100 UNIT/ML
500 SYRINGE INTRAVENOUS
Status: CANCELLED | OUTPATIENT
Start: 2021-01-11

## 2021-01-04 RX ORDER — METHYLPREDNISOLONE SOD SUCC 125 MG
125 VIAL (EA) INJECTION ONCE AS NEEDED
Status: CANCELLED | OUTPATIENT
Start: 2021-01-11

## 2021-01-04 RX ORDER — EPINEPHRINE 0.3 MG/.3ML
0.3 INJECTION SUBCUTANEOUS ONCE AS NEEDED
Status: CANCELLED | OUTPATIENT
Start: 2021-01-11

## 2021-01-04 RX ORDER — SODIUM CHLORIDE 0.9 % (FLUSH) 0.9 %
10 SYRINGE (ML) INJECTION
Status: CANCELLED | OUTPATIENT
Start: 2021-01-11

## 2021-01-04 RX ORDER — DIPHENHYDRAMINE HYDROCHLORIDE 50 MG/ML
50 INJECTION INTRAMUSCULAR; INTRAVENOUS ONCE AS NEEDED
Status: CANCELLED | OUTPATIENT
Start: 2021-01-11

## 2021-01-04 RX ADMIN — SODIUM CHLORIDE 125 MG: 9 INJECTION, SOLUTION INTRAVENOUS at 01:01

## 2021-01-10 ENCOUNTER — IMMUNIZATION (OUTPATIENT)
Dept: INTERNAL MEDICINE | Facility: CLINIC | Age: 81
End: 2021-01-10
Payer: COMMERCIAL

## 2021-01-10 DIAGNOSIS — Z23 NEED FOR VACCINATION: ICD-10-CM

## 2021-01-10 PROCEDURE — 91300 COVID-19, MRNA, LNP-S, PF, 30 MCG/0.3 ML DOSE VACCINE: CPT | Mod: PBBFAC | Performed by: FAMILY MEDICINE

## 2021-01-11 ENCOUNTER — INFUSION (OUTPATIENT)
Dept: INFUSION THERAPY | Facility: HOSPITAL | Age: 81
End: 2021-01-11
Attending: INTERNAL MEDICINE
Payer: COMMERCIAL

## 2021-01-11 VITALS
SYSTOLIC BLOOD PRESSURE: 121 MMHG | OXYGEN SATURATION: 96 % | TEMPERATURE: 98 F | RESPIRATION RATE: 16 BRPM | DIASTOLIC BLOOD PRESSURE: 55 MMHG | HEART RATE: 79 BPM

## 2021-01-11 DIAGNOSIS — D50.9 MICROCYTIC ANEMIA: Primary | ICD-10-CM

## 2021-01-11 PROCEDURE — 96365 THER/PROPH/DIAG IV INF INIT: CPT

## 2021-01-11 PROCEDURE — 25000003 PHARM REV CODE 250: Performed by: INTERNAL MEDICINE

## 2021-01-11 PROCEDURE — 63600175 PHARM REV CODE 636 W HCPCS: Performed by: INTERNAL MEDICINE

## 2021-01-11 RX ORDER — HEPARIN 100 UNIT/ML
500 SYRINGE INTRAVENOUS
Status: CANCELLED | OUTPATIENT
Start: 2021-01-18

## 2021-01-11 RX ORDER — SODIUM CHLORIDE 0.9 % (FLUSH) 0.9 %
10 SYRINGE (ML) INJECTION
Status: CANCELLED | OUTPATIENT
Start: 2021-01-18

## 2021-01-11 RX ORDER — DIPHENHYDRAMINE HYDROCHLORIDE 50 MG/ML
50 INJECTION INTRAMUSCULAR; INTRAVENOUS ONCE AS NEEDED
Status: CANCELLED | OUTPATIENT
Start: 2021-01-18

## 2021-01-11 RX ORDER — EPINEPHRINE 0.3 MG/.3ML
0.3 INJECTION SUBCUTANEOUS ONCE AS NEEDED
Status: CANCELLED | OUTPATIENT
Start: 2021-01-18

## 2021-01-11 RX ORDER — METHYLPREDNISOLONE SOD SUCC 125 MG
125 VIAL (EA) INJECTION ONCE AS NEEDED
Status: CANCELLED | OUTPATIENT
Start: 2021-01-18

## 2021-01-11 RX ADMIN — SODIUM CHLORIDE 125 MG: 9 INJECTION, SOLUTION INTRAVENOUS at 01:01

## 2021-01-19 ENCOUNTER — INFUSION (OUTPATIENT)
Dept: INFUSION THERAPY | Facility: HOSPITAL | Age: 81
End: 2021-01-19
Attending: INTERNAL MEDICINE
Payer: COMMERCIAL

## 2021-01-19 VITALS
DIASTOLIC BLOOD PRESSURE: 60 MMHG | BODY MASS INDEX: 20.74 KG/M2 | WEIGHT: 156.5 LBS | OXYGEN SATURATION: 97 % | HEIGHT: 73 IN | RESPIRATION RATE: 18 BRPM | TEMPERATURE: 98 F | SYSTOLIC BLOOD PRESSURE: 117 MMHG | HEART RATE: 94 BPM

## 2021-01-19 DIAGNOSIS — D50.9 MICROCYTIC ANEMIA: Primary | ICD-10-CM

## 2021-01-19 PROCEDURE — A4216 STERILE WATER/SALINE, 10 ML: HCPCS | Performed by: INTERNAL MEDICINE

## 2021-01-19 PROCEDURE — 96365 THER/PROPH/DIAG IV INF INIT: CPT

## 2021-01-19 PROCEDURE — 63600175 PHARM REV CODE 636 W HCPCS: Performed by: INTERNAL MEDICINE

## 2021-01-19 PROCEDURE — 25000003 PHARM REV CODE 250: Performed by: INTERNAL MEDICINE

## 2021-01-19 RX ORDER — METHYLPREDNISOLONE SOD SUCC 125 MG
125 VIAL (EA) INJECTION ONCE AS NEEDED
Status: CANCELLED | OUTPATIENT
Start: 2021-01-25

## 2021-01-19 RX ORDER — EPINEPHRINE 0.3 MG/.3ML
0.3 INJECTION SUBCUTANEOUS ONCE AS NEEDED
Status: CANCELLED | OUTPATIENT
Start: 2021-01-25

## 2021-01-19 RX ORDER — DIPHENHYDRAMINE HYDROCHLORIDE 50 MG/ML
50 INJECTION INTRAMUSCULAR; INTRAVENOUS ONCE AS NEEDED
Status: CANCELLED | OUTPATIENT
Start: 2021-01-25

## 2021-01-19 RX ORDER — SODIUM CHLORIDE 0.9 % (FLUSH) 0.9 %
10 SYRINGE (ML) INJECTION
Status: DISCONTINUED | OUTPATIENT
Start: 2021-01-19 | End: 2021-01-19 | Stop reason: HOSPADM

## 2021-01-19 RX ORDER — SODIUM CHLORIDE 0.9 % (FLUSH) 0.9 %
10 SYRINGE (ML) INJECTION
Status: CANCELLED | OUTPATIENT
Start: 2021-01-25

## 2021-01-19 RX ORDER — HEPARIN 100 UNIT/ML
500 SYRINGE INTRAVENOUS
Status: CANCELLED | OUTPATIENT
Start: 2021-01-25

## 2021-01-19 RX ADMIN — SODIUM CHLORIDE 125 MG: 9 INJECTION, SOLUTION INTRAVENOUS at 01:01

## 2021-01-19 RX ADMIN — Medication 10 ML: at 12:01

## 2021-01-26 ENCOUNTER — INFUSION (OUTPATIENT)
Dept: INFUSION THERAPY | Facility: HOSPITAL | Age: 81
End: 2021-01-26
Attending: INTERNAL MEDICINE
Payer: COMMERCIAL

## 2021-01-26 VITALS
HEART RATE: 77 BPM | TEMPERATURE: 98 F | SYSTOLIC BLOOD PRESSURE: 108 MMHG | RESPIRATION RATE: 16 BRPM | DIASTOLIC BLOOD PRESSURE: 58 MMHG | OXYGEN SATURATION: 97 %

## 2021-01-26 DIAGNOSIS — D50.9 MICROCYTIC ANEMIA: Primary | ICD-10-CM

## 2021-01-26 PROCEDURE — 25000003 PHARM REV CODE 250: Performed by: INTERNAL MEDICINE

## 2021-01-26 PROCEDURE — 63600175 PHARM REV CODE 636 W HCPCS: Performed by: INTERNAL MEDICINE

## 2021-01-26 PROCEDURE — 96365 THER/PROPH/DIAG IV INF INIT: CPT

## 2021-01-26 RX ORDER — EPINEPHRINE 0.3 MG/.3ML
0.3 INJECTION SUBCUTANEOUS ONCE AS NEEDED
Status: CANCELLED | OUTPATIENT
Start: 2021-02-01

## 2021-01-26 RX ORDER — DIPHENHYDRAMINE HYDROCHLORIDE 50 MG/ML
50 INJECTION INTRAMUSCULAR; INTRAVENOUS ONCE AS NEEDED
Status: CANCELLED | OUTPATIENT
Start: 2021-02-01

## 2021-01-26 RX ORDER — SODIUM CHLORIDE 0.9 % (FLUSH) 0.9 %
10 SYRINGE (ML) INJECTION
Status: CANCELLED | OUTPATIENT
Start: 2021-02-01

## 2021-01-26 RX ORDER — HEPARIN 100 UNIT/ML
500 SYRINGE INTRAVENOUS
Status: CANCELLED | OUTPATIENT
Start: 2021-02-01

## 2021-01-26 RX ORDER — METHYLPREDNISOLONE SOD SUCC 125 MG
125 VIAL (EA) INJECTION ONCE AS NEEDED
Status: CANCELLED | OUTPATIENT
Start: 2021-02-01

## 2021-01-26 RX ADMIN — SODIUM CHLORIDE 125 MG: 9 INJECTION, SOLUTION INTRAVENOUS at 01:01

## 2021-01-31 ENCOUNTER — IMMUNIZATION (OUTPATIENT)
Dept: INTERNAL MEDICINE | Facility: CLINIC | Age: 81
End: 2021-01-31
Payer: COMMERCIAL

## 2021-01-31 DIAGNOSIS — Z23 NEED FOR VACCINATION: Primary | ICD-10-CM

## 2021-01-31 PROCEDURE — 91300 COVID-19, MRNA, LNP-S, PF, 30 MCG/0.3 ML DOSE VACCINE: CPT | Mod: PBBFAC | Performed by: FAMILY MEDICINE

## 2021-01-31 PROCEDURE — 0002A COVID-19, MRNA, LNP-S, PF, 30 MCG/0.3 ML DOSE VACCINE: CPT | Mod: PBBFAC | Performed by: FAMILY MEDICINE

## 2021-02-02 ENCOUNTER — INFUSION (OUTPATIENT)
Dept: INFUSION THERAPY | Facility: HOSPITAL | Age: 81
End: 2021-02-02
Attending: INTERNAL MEDICINE
Payer: COMMERCIAL

## 2021-02-02 VITALS
HEART RATE: 71 BPM | TEMPERATURE: 98 F | RESPIRATION RATE: 18 BRPM | OXYGEN SATURATION: 94 % | DIASTOLIC BLOOD PRESSURE: 50 MMHG | SYSTOLIC BLOOD PRESSURE: 110 MMHG

## 2021-02-02 DIAGNOSIS — D50.9 MICROCYTIC ANEMIA: Primary | ICD-10-CM

## 2021-02-02 PROCEDURE — 25000003 PHARM REV CODE 250: Performed by: INTERNAL MEDICINE

## 2021-02-02 PROCEDURE — 63600175 PHARM REV CODE 636 W HCPCS: Performed by: INTERNAL MEDICINE

## 2021-02-02 PROCEDURE — 96365 THER/PROPH/DIAG IV INF INIT: CPT

## 2021-02-02 RX ORDER — DIPHENHYDRAMINE HYDROCHLORIDE 50 MG/ML
50 INJECTION INTRAMUSCULAR; INTRAVENOUS ONCE AS NEEDED
Status: CANCELLED | OUTPATIENT
Start: 2021-02-09

## 2021-02-02 RX ORDER — HEPARIN 100 UNIT/ML
500 SYRINGE INTRAVENOUS
Status: CANCELLED | OUTPATIENT
Start: 2021-02-09

## 2021-02-02 RX ORDER — METHYLPREDNISOLONE SOD SUCC 125 MG
125 VIAL (EA) INJECTION ONCE AS NEEDED
Status: CANCELLED | OUTPATIENT
Start: 2021-02-09

## 2021-02-02 RX ORDER — SODIUM CHLORIDE 0.9 % (FLUSH) 0.9 %
10 SYRINGE (ML) INJECTION
Status: CANCELLED | OUTPATIENT
Start: 2021-02-09

## 2021-02-02 RX ORDER — EPINEPHRINE 0.3 MG/.3ML
0.3 INJECTION SUBCUTANEOUS ONCE AS NEEDED
Status: CANCELLED | OUTPATIENT
Start: 2021-02-09

## 2021-02-02 RX ADMIN — SODIUM CHLORIDE 125 MG: 9 INJECTION, SOLUTION INTRAVENOUS at 01:02

## 2021-02-09 ENCOUNTER — INFUSION (OUTPATIENT)
Dept: INFUSION THERAPY | Facility: HOSPITAL | Age: 81
End: 2021-02-09
Attending: INTERNAL MEDICINE
Payer: COMMERCIAL

## 2021-02-09 VITALS
HEART RATE: 73 BPM | DIASTOLIC BLOOD PRESSURE: 54 MMHG | SYSTOLIC BLOOD PRESSURE: 120 MMHG | TEMPERATURE: 99 F | RESPIRATION RATE: 18 BRPM | OXYGEN SATURATION: 98 %

## 2021-02-09 DIAGNOSIS — D50.9 MICROCYTIC ANEMIA: Primary | ICD-10-CM

## 2021-02-09 PROCEDURE — 63600175 PHARM REV CODE 636 W HCPCS: Performed by: INTERNAL MEDICINE

## 2021-02-09 PROCEDURE — 96365 THER/PROPH/DIAG IV INF INIT: CPT

## 2021-02-09 PROCEDURE — 25000003 PHARM REV CODE 250: Performed by: INTERNAL MEDICINE

## 2021-02-09 RX ORDER — HEPARIN 100 UNIT/ML
500 SYRINGE INTRAVENOUS
Status: CANCELLED | OUTPATIENT
Start: 2021-02-16

## 2021-02-09 RX ORDER — METHYLPREDNISOLONE SOD SUCC 125 MG
125 VIAL (EA) INJECTION ONCE AS NEEDED
Status: CANCELLED | OUTPATIENT
Start: 2021-02-16

## 2021-02-09 RX ORDER — DIPHENHYDRAMINE HYDROCHLORIDE 50 MG/ML
50 INJECTION INTRAMUSCULAR; INTRAVENOUS ONCE AS NEEDED
Status: CANCELLED | OUTPATIENT
Start: 2021-02-16

## 2021-02-09 RX ORDER — SODIUM CHLORIDE 0.9 % (FLUSH) 0.9 %
10 SYRINGE (ML) INJECTION
Status: CANCELLED | OUTPATIENT
Start: 2021-02-16

## 2021-02-09 RX ORDER — EPINEPHRINE 0.3 MG/.3ML
0.3 INJECTION SUBCUTANEOUS ONCE AS NEEDED
Status: CANCELLED | OUTPATIENT
Start: 2021-02-16

## 2021-02-09 RX ADMIN — SODIUM CHLORIDE 125 MG: 9 INJECTION, SOLUTION INTRAVENOUS at 01:02

## 2021-02-10 ENCOUNTER — TELEPHONE (OUTPATIENT)
Dept: HEMATOLOGY/ONCOLOGY | Facility: CLINIC | Age: 81
End: 2021-02-10

## 2021-03-01 ENCOUNTER — HOSPITAL ENCOUNTER (EMERGENCY)
Facility: HOSPITAL | Age: 81
Discharge: HOME | End: 2021-03-01
Attending: EMERGENCY MEDICINE
Payer: COMMERCIAL

## 2021-03-01 VITALS
BODY MASS INDEX: 21.96 KG/M2 | HEIGHT: 71 IN | TEMPERATURE: 99 F | DIASTOLIC BLOOD PRESSURE: 70 MMHG | OXYGEN SATURATION: 98 % | HEART RATE: 80 BPM | SYSTOLIC BLOOD PRESSURE: 158 MMHG | RESPIRATION RATE: 16 BRPM | WEIGHT: 156.88 LBS

## 2021-03-01 DIAGNOSIS — Z72.0 TOBACCO ABUSE DISORDER: ICD-10-CM

## 2021-03-01 DIAGNOSIS — J44.1 ACUTE EXACERBATION OF CHRONIC OBSTRUCTIVE PULMONARY DISEASE (COPD): Primary | ICD-10-CM

## 2021-03-01 DIAGNOSIS — R06.02 SOB (SHORTNESS OF BREATH): ICD-10-CM

## 2021-03-01 DIAGNOSIS — I10 CHRONIC HYPERTENSION: ICD-10-CM

## 2021-03-01 LAB
ABO + RH BLD: NORMAL
ALBUMIN SERPL BCP-MCNC: 4 G/DL (ref 3.5–5.2)
ALP SERPL-CCNC: 36 U/L (ref 55–135)
ALT SERPL W/O P-5'-P-CCNC: 12 U/L (ref 10–44)
ANION GAP SERPL CALC-SCNC: 8 MMOL/L (ref 8–16)
ANISOCYTOSIS BLD QL SMEAR: ABNORMAL
AST SERPL-CCNC: 21 U/L (ref 10–40)
BASOPHILS # BLD AUTO: 0.13 K/UL (ref 0–0.2)
BASOPHILS NFR BLD: 2 % (ref 0–1.9)
BILIRUB SERPL-MCNC: 0.9 MG/DL (ref 0.1–1)
BLD GP AB SCN CELLS X3 SERPL QL: NORMAL
BNP SERPL-MCNC: 1258 PG/ML (ref 0–99)
BUN SERPL-MCNC: 13 MG/DL (ref 8–23)
CALCIUM SERPL-MCNC: 9.1 MG/DL (ref 8.7–10.5)
CHLORIDE SERPL-SCNC: 107 MMOL/L (ref 95–110)
CK SERPL-CCNC: 129 U/L (ref 20–200)
CO2 SERPL-SCNC: 25 MMOL/L (ref 23–29)
CREAT SERPL-MCNC: 0.8 MG/DL (ref 0.5–1.4)
DACRYOCYTES BLD QL SMEAR: ABNORMAL
DIFFERENTIAL METHOD: ABNORMAL
EOSINOPHIL # BLD AUTO: 0.3 K/UL (ref 0–0.5)
EOSINOPHIL NFR BLD: 4.3 % (ref 0–8)
ERYTHROCYTE [DISTWIDTH] IN BLOOD BY AUTOMATED COUNT: 30.7 % (ref 11.5–14.5)
EST. GFR  (AFRICAN AMERICAN): >60 ML/MIN/1.73 M^2
EST. GFR  (NON AFRICAN AMERICAN): >60 ML/MIN/1.73 M^2
GLUCOSE SERPL-MCNC: 70 MG/DL (ref 70–110)
HCT VFR BLD AUTO: 32.1 % (ref 40–54)
HGB BLD-MCNC: 9.7 G/DL (ref 14–18)
HYPOCHROMIA BLD QL SMEAR: ABNORMAL
IMM GRANULOCYTES # BLD AUTO: 0.02 K/UL (ref 0–0.04)
IMM GRANULOCYTES NFR BLD AUTO: 0.3 % (ref 0–0.5)
INR PPP: 1.1 (ref 0.8–1.2)
LYMPHOCYTES # BLD AUTO: 0.9 K/UL (ref 1–4.8)
LYMPHOCYTES NFR BLD: 13.9 % (ref 18–48)
MCH RBC QN AUTO: 22.3 PG (ref 27–31)
MCHC RBC AUTO-ENTMCNC: 30.2 G/DL (ref 32–36)
MCV RBC AUTO: 74 FL (ref 82–98)
MONOCYTES # BLD AUTO: 0.6 K/UL (ref 0.3–1)
MONOCYTES NFR BLD: 8.4 % (ref 4–15)
NEUTROPHILS # BLD AUTO: 4.7 K/UL (ref 1.8–7.7)
NEUTROPHILS NFR BLD: 71.1 % (ref 38–73)
NRBC BLD-RTO: 0 /100 WBC
OVALOCYTES BLD QL SMEAR: ABNORMAL
PLATELET # BLD AUTO: 387 K/UL (ref 150–350)
PLATELET BLD QL SMEAR: ABNORMAL
PMV BLD AUTO: ABNORMAL FL (ref 9.2–12.9)
POIKILOCYTOSIS BLD QL SMEAR: ABNORMAL
POTASSIUM SERPL-SCNC: 5 MMOL/L (ref 3.5–5.1)
PROT SERPL-MCNC: 6.8 G/DL (ref 6–8.4)
PROTHROMBIN TIME: 12.5 SEC (ref 9–12.5)
RBC # BLD AUTO: 4.35 M/UL (ref 4.6–6.2)
SCHISTOCYTES BLD QL SMEAR: PRESENT
SODIUM SERPL-SCNC: 140 MMOL/L (ref 136–145)
SPHEROCYTES BLD QL SMEAR: ABNORMAL
STOMATOCYTES BLD QL SMEAR: PRESENT
TARGETS BLD QL SMEAR: ABNORMAL
TROPONIN I SERPL DL<=0.01 NG/ML-MCNC: 0.01 NG/ML (ref 0–0.03)
WBC # BLD AUTO: 6.56 K/UL (ref 3.9–12.7)

## 2021-03-01 PROCEDURE — 99285 EMERGENCY DEPT VISIT HI MDM: CPT | Mod: 25

## 2021-03-01 PROCEDURE — 83880 ASSAY OF NATRIURETIC PEPTIDE: CPT

## 2021-03-01 PROCEDURE — 93005 ELECTROCARDIOGRAM TRACING: CPT

## 2021-03-01 PROCEDURE — 63600175 PHARM REV CODE 636 W HCPCS: Performed by: EMERGENCY MEDICINE

## 2021-03-01 PROCEDURE — 96374 THER/PROPH/DIAG INJ IV PUSH: CPT

## 2021-03-01 PROCEDURE — 93010 EKG 12-LEAD: ICD-10-PCS | Mod: ,,, | Performed by: INTERNAL MEDICINE

## 2021-03-01 PROCEDURE — 85610 PROTHROMBIN TIME: CPT

## 2021-03-01 PROCEDURE — 94761 N-INVAS EAR/PLS OXIMETRY MLT: CPT

## 2021-03-01 PROCEDURE — 25000242 PHARM REV CODE 250 ALT 637 W/ HCPCS: Performed by: EMERGENCY MEDICINE

## 2021-03-01 PROCEDURE — 86900 BLOOD TYPING SEROLOGIC ABO: CPT

## 2021-03-01 PROCEDURE — 94640 AIRWAY INHALATION TREATMENT: CPT

## 2021-03-01 PROCEDURE — 80053 COMPREHEN METABOLIC PANEL: CPT

## 2021-03-01 PROCEDURE — 85025 COMPLETE CBC W/AUTO DIFF WBC: CPT

## 2021-03-01 PROCEDURE — 84484 ASSAY OF TROPONIN QUANT: CPT

## 2021-03-01 PROCEDURE — 93010 ELECTROCARDIOGRAM REPORT: CPT | Mod: ,,, | Performed by: INTERNAL MEDICINE

## 2021-03-01 PROCEDURE — 82550 ASSAY OF CK (CPK): CPT

## 2021-03-01 RX ORDER — PREDNISONE 20 MG/1
40 TABLET ORAL DAILY
Qty: 10 TABLET | Refills: 0 | Status: SHIPPED | OUTPATIENT
Start: 2021-03-01 | End: 2021-03-06

## 2021-03-01 RX ORDER — ALBUTEROL SULFATE 90 UG/1
1-2 AEROSOL, METERED RESPIRATORY (INHALATION) EVERY 6 HOURS PRN
Qty: 18 G | Refills: 0 | Status: SHIPPED | OUTPATIENT
Start: 2021-03-01 | End: 2022-02-21 | Stop reason: SDUPTHER

## 2021-03-01 RX ORDER — IPRATROPIUM BROMIDE AND ALBUTEROL SULFATE 2.5; .5 MG/3ML; MG/3ML
3 SOLUTION RESPIRATORY (INHALATION)
Status: COMPLETED | OUTPATIENT
Start: 2021-03-01 | End: 2021-03-01

## 2021-03-01 RX ORDER — METHYLPREDNISOLONE SOD SUCC 125 MG
125 VIAL (EA) INJECTION
Status: COMPLETED | OUTPATIENT
Start: 2021-03-01 | End: 2021-03-01

## 2021-03-01 RX ORDER — TIOTROPIUM BROMIDE 18 UG/1
18 CAPSULE ORAL; RESPIRATORY (INHALATION) DAILY
Qty: 30 CAPSULE | Refills: 1 | Status: SHIPPED | OUTPATIENT
Start: 2021-03-01 | End: 2022-02-21 | Stop reason: SDUPTHER

## 2021-03-01 RX ADMIN — METHYLPREDNISOLONE SODIUM SUCCINATE 125 MG: 125 INJECTION, POWDER, FOR SOLUTION INTRAMUSCULAR; INTRAVENOUS at 06:03

## 2021-03-01 RX ADMIN — IPRATROPIUM BROMIDE AND ALBUTEROL SULFATE 3 ML: .5; 3 SOLUTION RESPIRATORY (INHALATION) at 06:03

## 2021-03-05 ENCOUNTER — LAB VISIT (OUTPATIENT)
Dept: LAB | Facility: HOSPITAL | Age: 81
End: 2021-03-05
Attending: INTERNAL MEDICINE
Payer: COMMERCIAL

## 2021-03-05 DIAGNOSIS — D50.9 MICROCYTIC ANEMIA: ICD-10-CM

## 2021-03-05 DIAGNOSIS — D53.9 NUTRITIONAL ANEMIA: ICD-10-CM

## 2021-03-05 LAB
ALBUMIN SERPL BCP-MCNC: 4 G/DL (ref 3.5–5.2)
ALP SERPL-CCNC: 32 U/L (ref 55–135)
ALT SERPL W/O P-5'-P-CCNC: 10 U/L (ref 10–44)
ANION GAP SERPL CALC-SCNC: 4 MMOL/L (ref 8–16)
AST SERPL-CCNC: 20 U/L (ref 10–40)
BILIRUB SERPL-MCNC: 0.9 MG/DL (ref 0.1–1)
BUN SERPL-MCNC: 17 MG/DL (ref 8–23)
CALCIUM SERPL-MCNC: 8.9 MG/DL (ref 8.7–10.5)
CHLORIDE SERPL-SCNC: 105 MMOL/L (ref 95–110)
CO2 SERPL-SCNC: 29 MMOL/L (ref 23–29)
CREAT SERPL-MCNC: 0.8 MG/DL (ref 0.5–1.4)
ERYTHROCYTE [DISTWIDTH] IN BLOOD BY AUTOMATED COUNT: 30.5 % (ref 11.5–14.5)
EST. GFR  (AFRICAN AMERICAN): >60 ML/MIN/1.73 M^2
EST. GFR  (NON AFRICAN AMERICAN): >60 ML/MIN/1.73 M^2
GLUCOSE SERPL-MCNC: 84 MG/DL (ref 70–110)
HCT VFR BLD AUTO: 31.5 % (ref 40–54)
HGB BLD-MCNC: 9.5 G/DL (ref 14–18)
IMM GRANULOCYTES # BLD AUTO: 0.02 K/UL (ref 0–0.04)
MCH RBC QN AUTO: 22.6 PG (ref 27–31)
MCHC RBC AUTO-ENTMCNC: 30.2 G/DL (ref 32–36)
MCV RBC AUTO: 75 FL (ref 82–98)
NEUTROPHILS # BLD AUTO: 3.7 K/UL (ref 1.8–7.7)
PLATELET # BLD AUTO: 345 K/UL (ref 150–350)
PMV BLD AUTO: ABNORMAL FL (ref 9.2–12.9)
POTASSIUM SERPL-SCNC: 4.3 MMOL/L (ref 3.5–5.1)
PROT SERPL-MCNC: 6.6 G/DL (ref 6–8.4)
RBC # BLD AUTO: 4.2 M/UL (ref 4.6–6.2)
SODIUM SERPL-SCNC: 138 MMOL/L (ref 136–145)
WBC # BLD AUTO: 5.24 K/UL (ref 3.9–12.7)

## 2021-03-05 PROCEDURE — 80053 COMPREHEN METABOLIC PANEL: CPT | Performed by: INTERNAL MEDICINE

## 2021-03-05 PROCEDURE — 36415 COLL VENOUS BLD VENIPUNCTURE: CPT | Performed by: INTERNAL MEDICINE

## 2021-03-05 PROCEDURE — 85027 COMPLETE CBC AUTOMATED: CPT | Performed by: INTERNAL MEDICINE

## 2021-03-08 ENCOUNTER — OFFICE VISIT (OUTPATIENT)
Dept: HEMATOLOGY/ONCOLOGY | Facility: CLINIC | Age: 81
End: 2021-03-08
Payer: COMMERCIAL

## 2021-03-08 VITALS
WEIGHT: 155.63 LBS | HEIGHT: 70 IN | BODY MASS INDEX: 22.28 KG/M2 | TEMPERATURE: 97 F | DIASTOLIC BLOOD PRESSURE: 67 MMHG | HEART RATE: 88 BPM | OXYGEN SATURATION: 97 % | SYSTOLIC BLOOD PRESSURE: 125 MMHG

## 2021-03-08 DIAGNOSIS — D50.9 MICROCYTIC ANEMIA: ICD-10-CM

## 2021-03-08 DIAGNOSIS — D53.9 NUTRITIONAL ANEMIA: Primary | ICD-10-CM

## 2021-03-08 PROCEDURE — 99214 OFFICE O/P EST MOD 30 MIN: CPT | Mod: S$GLB,,, | Performed by: INTERNAL MEDICINE

## 2021-03-08 PROCEDURE — 99214 PR OFFICE/OUTPT VISIT, EST, LEVL IV, 30-39 MIN: ICD-10-PCS | Mod: S$GLB,,, | Performed by: INTERNAL MEDICINE

## 2021-03-08 PROCEDURE — 99213 OFFICE O/P EST LOW 20 MIN: CPT | Mod: PBBFAC | Performed by: INTERNAL MEDICINE

## 2021-03-08 PROCEDURE — 99999 PR PBB SHADOW E&M-EST. PATIENT-LVL III: ICD-10-PCS | Mod: PBBFAC,,, | Performed by: INTERNAL MEDICINE

## 2021-03-08 PROCEDURE — 99999 PR PBB SHADOW E&M-EST. PATIENT-LVL III: CPT | Mod: PBBFAC,,, | Performed by: INTERNAL MEDICINE

## 2021-03-09 ENCOUNTER — TELEPHONE (OUTPATIENT)
Dept: HEMATOLOGY/ONCOLOGY | Facility: CLINIC | Age: 81
End: 2021-03-09

## 2021-03-10 ENCOUNTER — TELEPHONE (OUTPATIENT)
Dept: HEMATOLOGY/ONCOLOGY | Facility: CLINIC | Age: 81
End: 2021-03-10

## 2021-03-11 ENCOUNTER — TELEPHONE (OUTPATIENT)
Dept: HEMATOLOGY/ONCOLOGY | Facility: CLINIC | Age: 81
End: 2021-03-11

## 2021-03-17 ENCOUNTER — OFFICE VISIT (OUTPATIENT)
Dept: PRIMARY CARE CLINIC | Facility: CLINIC | Age: 81
End: 2021-03-17
Payer: COMMERCIAL

## 2021-03-17 ENCOUNTER — TELEPHONE (OUTPATIENT)
Dept: PRIMARY CARE CLINIC | Facility: CLINIC | Age: 81
End: 2021-03-17

## 2021-03-17 VITALS
SYSTOLIC BLOOD PRESSURE: 139 MMHG | HEART RATE: 88 BPM | BODY MASS INDEX: 23.77 KG/M2 | OXYGEN SATURATION: 97 % | HEIGHT: 70 IN | WEIGHT: 166 LBS | TEMPERATURE: 98 F | DIASTOLIC BLOOD PRESSURE: 82 MMHG

## 2021-03-17 DIAGNOSIS — Z00.00 ROUTINE GENERAL MEDICAL EXAMINATION AT A HEALTH CARE FACILITY: ICD-10-CM

## 2021-03-17 DIAGNOSIS — D63.8 ANEMIA, CHRONIC DISEASE: ICD-10-CM

## 2021-03-17 DIAGNOSIS — R91.1 SOLITARY PULMONARY NODULE: ICD-10-CM

## 2021-03-17 DIAGNOSIS — I50.22 CHRONIC SYSTOLIC CONGESTIVE HEART FAILURE: ICD-10-CM

## 2021-03-17 DIAGNOSIS — J43.9 PULMONARY EMPHYSEMA, UNSPECIFIED EMPHYSEMA TYPE: ICD-10-CM

## 2021-03-17 DIAGNOSIS — I35.1 NONRHEUMATIC AORTIC VALVE INSUFFICIENCY: ICD-10-CM

## 2021-03-17 DIAGNOSIS — R35.0 URINARY FREQUENCY: ICD-10-CM

## 2021-03-17 DIAGNOSIS — E78.5 HYPERLIPIDEMIA, UNSPECIFIED HYPERLIPIDEMIA TYPE: ICD-10-CM

## 2021-03-17 DIAGNOSIS — R91.1 PULMONARY NODULE: ICD-10-CM

## 2021-03-17 DIAGNOSIS — I10 ESSENTIAL HYPERTENSION: ICD-10-CM

## 2021-03-17 DIAGNOSIS — K21.00 GASTROESOPHAGEAL REFLUX DISEASE WITH ESOPHAGITIS, UNSPECIFIED WHETHER HEMORRHAGE: ICD-10-CM

## 2021-03-17 DIAGNOSIS — I10 HYPERTENSION, UNSPECIFIED TYPE: Primary | ICD-10-CM

## 2021-03-17 PROBLEM — R06.02 SHORTNESS OF BREATH: Status: ACTIVE | Noted: 2017-08-31

## 2021-03-17 PROBLEM — Z87.19 HISTORY OF LOWER GASTROINTESTINAL BLEEDING: Status: ACTIVE | Noted: 2017-04-03

## 2021-03-17 PROBLEM — D50.0 IRON DEFICIENCY ANEMIA DUE TO CHRONIC BLOOD LOSS: Status: ACTIVE | Noted: 2017-04-03

## 2021-03-17 PROBLEM — Z72.0 NICOTINE ABUSE: Status: ACTIVE | Noted: 2017-08-31

## 2021-03-17 PROBLEM — D53.9 NUTRITIONAL ANEMIA: Status: RESOLVED | Noted: 2020-11-24 | Resolved: 2021-03-17

## 2021-03-17 LAB
BILIRUB SERPL-MCNC: NORMAL MG/DL
BLOOD URINE, POC: 250
CLARITY, POC UA: CLEAR
COLOR, POC UA: NORMAL
GLUCOSE UR QL STRIP: NORMAL
KETONES UR QL STRIP: NORMAL
LEUKOCYTE ESTERASE URINE, POC: NORMAL
NITRITE, POC UA: NORMAL
PH, POC UA: 6
PROTEIN, POC: NORMAL
SPECIFIC GRAVITY, POC UA: 1.02
UROBILINOGEN, POC UA: NORMAL

## 2021-03-17 PROCEDURE — 82043 UR ALBUMIN QUANTITATIVE: CPT | Performed by: FAMILY MEDICINE

## 2021-03-17 PROCEDURE — 99999 PR PBB SHADOW E&M-EST. PATIENT-LVL V: CPT | Mod: PBBFAC,,, | Performed by: FAMILY MEDICINE

## 2021-03-17 PROCEDURE — 99999 PR PBB SHADOW E&M-EST. PATIENT-LVL V: ICD-10-PCS | Mod: PBBFAC,,, | Performed by: FAMILY MEDICINE

## 2021-03-17 PROCEDURE — 99214 PR OFFICE/OUTPT VISIT, EST, LEVL IV, 30-39 MIN: ICD-10-PCS | Mod: 25,S$PBB,, | Performed by: FAMILY MEDICINE

## 2021-03-17 PROCEDURE — 99214 OFFICE O/P EST MOD 30 MIN: CPT | Mod: 25,S$PBB,, | Performed by: FAMILY MEDICINE

## 2021-03-17 PROCEDURE — 99215 OFFICE O/P EST HI 40 MIN: CPT | Mod: PBBFAC,PN | Performed by: FAMILY MEDICINE

## 2021-03-17 PROCEDURE — 1157F PR ADVANCE CARE PLAN OR EQUIV PRESENT IN MEDICAL RECORD: ICD-10-PCS | Mod: ,,, | Performed by: FAMILY MEDICINE

## 2021-03-17 PROCEDURE — 81002 URINALYSIS NONAUTO W/O SCOPE: CPT | Mod: PBBFAC,PN | Performed by: FAMILY MEDICINE

## 2021-03-17 PROCEDURE — 1157F ADVNC CARE PLAN IN RCRD: CPT | Mod: ,,, | Performed by: FAMILY MEDICINE

## 2021-03-17 PROCEDURE — 82570 ASSAY OF URINE CREATININE: CPT | Performed by: FAMILY MEDICINE

## 2021-03-17 RX ORDER — PANTOPRAZOLE SODIUM 40 MG/1
40 TABLET, DELAYED RELEASE ORAL DAILY
Qty: 90 TABLET | Refills: 3 | Status: SHIPPED | OUTPATIENT
Start: 2021-03-17 | End: 2022-06-16

## 2021-03-17 RX ORDER — ATORVASTATIN CALCIUM 10 MG/1
10 TABLET, FILM COATED ORAL DAILY
Qty: 90 TABLET | Refills: 3 | Status: SHIPPED | OUTPATIENT
Start: 2021-03-17 | End: 2022-06-16

## 2021-03-17 RX ORDER — CIPROFLOXACIN 500 MG/1
500 TABLET ORAL 2 TIMES DAILY
Qty: 28 TABLET | Refills: 0 | Status: SHIPPED | OUTPATIENT
Start: 2021-03-17 | End: 2021-03-31

## 2021-03-17 RX ORDER — HYDROCHLOROTHIAZIDE 12.5 MG/1
12.5 TABLET ORAL DAILY
Qty: 90 TABLET | Refills: 3 | Status: SHIPPED | OUTPATIENT
Start: 2021-03-17 | End: 2021-08-03 | Stop reason: SDUPTHER

## 2021-03-18 LAB
ALBUMIN/CREAT UR: 321.6 UG/MG (ref 0–30)
CREAT UR-MCNC: 74 MG/DL (ref 23–375)
MICROALBUMIN UR DL<=1MG/L-MCNC: 238 UG/ML

## 2021-03-25 ENCOUNTER — TELEPHONE (OUTPATIENT)
Dept: HEMATOLOGY/ONCOLOGY | Facility: CLINIC | Age: 81
End: 2021-03-25

## 2021-03-26 ENCOUNTER — TELEPHONE (OUTPATIENT)
Dept: PRIMARY CARE CLINIC | Facility: CLINIC | Age: 81
End: 2021-03-26

## 2021-03-26 ENCOUNTER — PATIENT MESSAGE (OUTPATIENT)
Dept: PRIMARY CARE CLINIC | Facility: CLINIC | Age: 81
End: 2021-03-26

## 2021-03-26 ENCOUNTER — HOSPITAL ENCOUNTER (OUTPATIENT)
Dept: RADIOLOGY | Facility: HOSPITAL | Age: 81
Discharge: HOME | End: 2021-03-26
Attending: FAMILY MEDICINE
Payer: COMMERCIAL

## 2021-03-26 DIAGNOSIS — J84.10 CALCIFIED GRANULOMA OF LUNG: ICD-10-CM

## 2021-03-26 DIAGNOSIS — R91.1 SOLITARY PULMONARY NODULE: ICD-10-CM

## 2021-03-26 DIAGNOSIS — J43.9 PULMONARY EMPHYSEMA, UNSPECIFIED EMPHYSEMA TYPE: Primary | ICD-10-CM

## 2021-03-26 DIAGNOSIS — I70.0 ATHEROSCLEROSIS OF ABDOMINAL AORTA: ICD-10-CM

## 2021-03-26 PROBLEM — J98.4 CALCIFIED GRANULOMA OF LUNG: Status: ACTIVE | Noted: 2017-08-31

## 2021-03-26 PROCEDURE — 71250 CT THORAX DX C-: CPT | Mod: 26,,, | Performed by: RADIOLOGY

## 2021-03-26 PROCEDURE — 71250 CT THORAX DX C-: CPT | Mod: TC

## 2021-03-26 PROCEDURE — 71250 CT CHEST WITHOUT CONTRAST: ICD-10-PCS | Mod: 26,,, | Performed by: RADIOLOGY

## 2021-04-01 ENCOUNTER — HOSPITAL ENCOUNTER (OUTPATIENT)
Dept: CARDIOLOGY | Facility: HOSPITAL | Age: 81
Discharge: HOME | End: 2021-04-01
Attending: FAMILY MEDICINE
Payer: COMMERCIAL

## 2021-04-01 VITALS
HEART RATE: 70 BPM | DIASTOLIC BLOOD PRESSURE: 82 MMHG | BODY MASS INDEX: 23.77 KG/M2 | HEIGHT: 70 IN | WEIGHT: 166 LBS | SYSTOLIC BLOOD PRESSURE: 139 MMHG

## 2021-04-01 DIAGNOSIS — I35.1 NONRHEUMATIC AORTIC VALVE INSUFFICIENCY: ICD-10-CM

## 2021-04-01 DIAGNOSIS — I50.22 CHRONIC SYSTOLIC CONGESTIVE HEART FAILURE: ICD-10-CM

## 2021-04-01 DIAGNOSIS — J43.9 PULMONARY EMPHYSEMA, UNSPECIFIED EMPHYSEMA TYPE: ICD-10-CM

## 2021-04-01 PROCEDURE — 93306 TTE W/DOPPLER COMPLETE: CPT | Mod: 26,,, | Performed by: INTERNAL MEDICINE

## 2021-04-01 PROCEDURE — 93306 ECHO (CUPID ONLY): ICD-10-PCS | Mod: 26,,, | Performed by: INTERNAL MEDICINE

## 2021-04-01 PROCEDURE — 93306 TTE W/DOPPLER COMPLETE: CPT

## 2021-04-03 LAB
ASCENDING AORTA: 3.86 CM
AV INDEX (PROSTH): 0.76
AV MEAN GRADIENT: 4 MMHG
AV PEAK GRADIENT: 7 MMHG
AV VALVE AREA: 3.3 CM2
AV VELOCITY RATIO: 0.78
BSA FOR ECHO PROCEDURE: 1.93 M2
CV ECHO LV RWT: 0.47 CM
DOP CALC AO PEAK VEL: 1.35 M/S
DOP CALC AO VTI: 29.91 CM
DOP CALC LVOT AREA: 4.3 CM2
DOP CALC LVOT DIAMETER: 2.35 CM
DOP CALC LVOT PEAK VEL: 1.05 M/S
DOP CALC LVOT STROKE VOLUME: 98.8 CM3
DOP CALC RVOT PEAK VEL: 0.45 M/S
DOP CALC RVOT VTI: 10.29 CM
DOP CALCLVOT PEAK VEL VTI: 22.79 CM
E WAVE DECELERATION TIME: 220.81 MSEC
E/A RATIO: 1.04
E/E' RATIO: 10 M/S
ECHO LV POSTERIOR WALL: 1.17 CM (ref 0.6–1.1)
FRACTIONAL SHORTENING: 15 % (ref 28–44)
INTERVENTRICULAR SEPTUM: 1.09 CM (ref 0.6–1.1)
IVRT: 78.5 MSEC
LA MAJOR: 4.97 CM
LA MINOR: 5.25 CM
LA WIDTH: 3.61 CM
LEFT ATRIUM SIZE: 3.25 CM
LEFT ATRIUM VOLUME INDEX: 26.4 ML/M2
LEFT ATRIUM VOLUME: 50.92 CM3
LEFT INTERNAL DIMENSION IN SYSTOLE: 4.26 CM (ref 2.1–4)
LEFT VENTRICLE DIASTOLIC VOLUME INDEX: 61.81 ML/M2
LEFT VENTRICLE DIASTOLIC VOLUME: 119.3 ML
LEFT VENTRICLE MASS INDEX: 112 G/M2
LEFT VENTRICLE SYSTOLIC VOLUME INDEX: 42 ML/M2
LEFT VENTRICLE SYSTOLIC VOLUME: 81.1 ML
LEFT VENTRICULAR INTERNAL DIMENSION IN DIASTOLE: 5.02 CM (ref 3.5–6)
LEFT VENTRICULAR MASS: 216.36 G
LV LATERAL E/E' RATIO: 10.71 M/S
LV SEPTAL E/E' RATIO: 9.38 M/S
MV PEAK A VEL: 0.72 M/S
MV PEAK E VEL: 0.75 M/S
MV STENOSIS PRESSURE HALF TIME: 64.04 MS
MV VALVE AREA P 1/2 METHOD: 3.44 CM2
PISA TR MAX VEL: 2.79 M/S
PULM VEIN S/D RATIO: 1.43
PV MEAN GRADIENT: 1 MMHG
PV PEAK D VEL: 0.42 M/S
PV PEAK S VEL: 0.6 M/S
PV PEAK VELOCITY: 0.9 CM/S
RA MAJOR: 4.77 CM
RA WIDTH: 4.22 CM
RIGHT VENTRICULAR END-DIASTOLIC DIMENSION: 3.58 CM
SINUS: 3.94 CM
STJ: 3.84 CM
TDI LATERAL: 0.07 M/S
TDI SEPTAL: 0.08 M/S
TDI: 0.08 M/S
TR MAX PG: 31 MMHG

## 2021-04-08 ENCOUNTER — TELEPHONE (OUTPATIENT)
Dept: PRIMARY CARE CLINIC | Facility: CLINIC | Age: 81
End: 2021-04-08

## 2021-04-27 ENCOUNTER — OFFICE VISIT (OUTPATIENT)
Dept: CARDIOLOGY | Facility: CLINIC | Age: 81
End: 2021-04-27
Payer: COMMERCIAL

## 2021-04-27 VITALS
OXYGEN SATURATION: 98 % | DIASTOLIC BLOOD PRESSURE: 60 MMHG | RESPIRATION RATE: 16 BRPM | BODY MASS INDEX: 22.69 KG/M2 | SYSTOLIC BLOOD PRESSURE: 114 MMHG | HEIGHT: 70 IN | WEIGHT: 158.5 LBS | HEART RATE: 87 BPM

## 2021-04-27 DIAGNOSIS — I70.0 ATHEROSCLEROSIS OF ABDOMINAL AORTA: ICD-10-CM

## 2021-04-27 DIAGNOSIS — R06.09 OTHER FORM OF DYSPNEA: ICD-10-CM

## 2021-04-27 DIAGNOSIS — I50.22 CHRONIC SYSTOLIC CONGESTIVE HEART FAILURE: Primary | ICD-10-CM

## 2021-04-27 DIAGNOSIS — R06.02 SHORTNESS OF BREATH: ICD-10-CM

## 2021-04-27 DIAGNOSIS — I35.1 NONRHEUMATIC AORTIC VALVE INSUFFICIENCY: ICD-10-CM

## 2021-04-27 DIAGNOSIS — I10 ESSENTIAL HYPERTENSION: ICD-10-CM

## 2021-04-27 DIAGNOSIS — E78.49 OTHER HYPERLIPIDEMIA: ICD-10-CM

## 2021-04-27 DIAGNOSIS — J43.9 PULMONARY EMPHYSEMA, UNSPECIFIED EMPHYSEMA TYPE: ICD-10-CM

## 2021-04-27 PROCEDURE — 99999 PR PBB SHADOW E&M-EST. PATIENT-LVL IV: CPT | Mod: PBBFAC,,, | Performed by: INTERNAL MEDICINE

## 2021-04-27 PROCEDURE — 99999 PR PBB SHADOW E&M-EST. PATIENT-LVL IV: ICD-10-PCS | Mod: PBBFAC,,, | Performed by: INTERNAL MEDICINE

## 2021-04-27 PROCEDURE — 99205 OFFICE O/P NEW HI 60 MIN: CPT | Mod: S$GLB,,, | Performed by: INTERNAL MEDICINE

## 2021-04-27 PROCEDURE — 1157F ADVNC CARE PLAN IN RCRD: CPT | Mod: S$GLB,,, | Performed by: INTERNAL MEDICINE

## 2021-04-27 PROCEDURE — 1157F PR ADVANCE CARE PLAN OR EQUIV PRESENT IN MEDICAL RECORD: ICD-10-PCS | Mod: S$GLB,,, | Performed by: INTERNAL MEDICINE

## 2021-04-27 PROCEDURE — 99205 PR OFFICE/OUTPT VISIT, NEW, LEVL V, 60-74 MIN: ICD-10-PCS | Mod: S$GLB,,, | Performed by: INTERNAL MEDICINE

## 2021-05-07 ENCOUNTER — HOSPITAL ENCOUNTER (OUTPATIENT)
Dept: RADIOLOGY | Facility: HOSPITAL | Age: 81
Discharge: HOME | End: 2021-05-07
Attending: INTERNAL MEDICINE
Payer: COMMERCIAL

## 2021-05-07 ENCOUNTER — HOSPITAL ENCOUNTER (OUTPATIENT)
Dept: CARDIOLOGY | Facility: HOSPITAL | Age: 81
Discharge: HOME | End: 2021-05-07
Attending: INTERNAL MEDICINE
Payer: COMMERCIAL

## 2021-05-07 DIAGNOSIS — R06.02 SHORTNESS OF BREATH: ICD-10-CM

## 2021-05-07 DIAGNOSIS — I50.22 CHRONIC SYSTOLIC CONGESTIVE HEART FAILURE: ICD-10-CM

## 2021-05-07 DIAGNOSIS — I70.0 ATHEROSCLEROSIS OF ABDOMINAL AORTA: ICD-10-CM

## 2021-05-07 DIAGNOSIS — R06.09 OTHER FORM OF DYSPNEA: ICD-10-CM

## 2021-05-07 PROCEDURE — 93017 CV STRESS TEST TRACING ONLY: CPT

## 2021-05-07 PROCEDURE — 93018 CV STRESS TEST I&R ONLY: CPT | Mod: ,,, | Performed by: INTERNAL MEDICINE

## 2021-05-07 PROCEDURE — 93016 CV STRESS TEST SUPVJ ONLY: CPT | Mod: ,,, | Performed by: INTERNAL MEDICINE

## 2021-05-07 PROCEDURE — A9502 TC99M TETROFOSMIN: HCPCS

## 2021-05-07 PROCEDURE — 78452 STRESS TEST WITH MYOCARDIAL PERFUSION (CUPID ONLY): ICD-10-PCS | Mod: 26,,, | Performed by: INTERNAL MEDICINE

## 2021-05-07 PROCEDURE — 93016 STRESS TEST WITH MYOCARDIAL PERFUSION (CUPID ONLY): ICD-10-PCS | Mod: ,,, | Performed by: INTERNAL MEDICINE

## 2021-05-07 PROCEDURE — 78452 HT MUSCLE IMAGE SPECT MULT: CPT | Mod: 26,,, | Performed by: INTERNAL MEDICINE

## 2021-05-07 PROCEDURE — 63600175 PHARM REV CODE 636 W HCPCS: Performed by: INTERNAL MEDICINE

## 2021-05-07 PROCEDURE — 93018 STRESS TEST WITH MYOCARDIAL PERFUSION (CUPID ONLY): ICD-10-PCS | Mod: ,,, | Performed by: INTERNAL MEDICINE

## 2021-05-07 PROCEDURE — 78452 HT MUSCLE IMAGE SPECT MULT: CPT

## 2021-05-07 RX ORDER — REGADENOSON 0.08 MG/ML
0.4 INJECTION, SOLUTION INTRAVENOUS ONCE
Status: COMPLETED | OUTPATIENT
Start: 2021-05-07 | End: 2021-05-07

## 2021-05-07 RX ADMIN — REGADENOSON 0.4 MG: 0.08 INJECTION, SOLUTION INTRAVENOUS at 08:05

## 2021-05-08 LAB
CV STRESS BASE HR: 83 BPM
DIASTOLIC BLOOD PRESSURE: 71 MMHG
NUC REST EJECTION FRACTION: 45
NUC STRESS EJECTION FRACTION: 45 %
OHS CV CPX 85 PERCENT MAX PREDICTED HEART RATE MALE: 119
OHS CV CPX MAX PREDICTED HEART RATE: 140
OHS CV CPX PATIENT IS FEMALE: 0
OHS CV CPX PATIENT IS MALE: 1
OHS CV CPX PEAK DIASTOLIC BLOOD PRESSURE: 80 MMHG
OHS CV CPX PEAK HEAR RATE: 87 BPM
OHS CV CPX PEAK RATE PRESSURE PRODUCT: NORMAL
OHS CV CPX PEAK SYSTOLIC BLOOD PRESSURE: 148 MMHG
OHS CV CPX PERCENT MAX PREDICTED HEART RATE ACHIEVED: 62
OHS CV CPX RATE PRESSURE PRODUCT PRESENTING: NORMAL
STRESS ECHO POST EXERCISE DUR MIN: 1 MINUTES
STRESS ECHO POST EXERCISE DUR SEC: 4 SECONDS
SYSTOLIC BLOOD PRESSURE: 140 MMHG

## 2021-05-09 DIAGNOSIS — D50.9 MICROCYTIC ANEMIA: Primary | ICD-10-CM

## 2021-05-09 RX ORDER — HEPARIN 100 UNIT/ML
500 SYRINGE INTRAVENOUS
Status: CANCELLED | OUTPATIENT
Start: 2021-06-16

## 2021-05-09 RX ORDER — SODIUM CHLORIDE 0.9 % (FLUSH) 0.9 %
10 SYRINGE (ML) INJECTION
Status: CANCELLED | OUTPATIENT
Start: 2021-05-17

## 2021-05-09 RX ORDER — HEPARIN 100 UNIT/ML
500 SYRINGE INTRAVENOUS
Status: CANCELLED | OUTPATIENT
Start: 2021-05-17

## 2021-05-09 RX ORDER — SODIUM CHLORIDE 0.9 % (FLUSH) 0.9 %
10 SYRINGE (ML) INJECTION
Status: CANCELLED | OUTPATIENT
Start: 2021-06-16

## 2021-06-14 ENCOUNTER — OFFICE VISIT (OUTPATIENT)
Dept: HEMATOLOGY/ONCOLOGY | Facility: CLINIC | Age: 81
End: 2021-06-14
Payer: COMMERCIAL

## 2021-06-14 ENCOUNTER — LAB VISIT (OUTPATIENT)
Dept: LAB | Facility: HOSPITAL | Age: 81
End: 2021-06-14
Attending: NURSE PRACTITIONER
Payer: COMMERCIAL

## 2021-06-14 VITALS
BODY MASS INDEX: 22.19 KG/M2 | HEIGHT: 70 IN | DIASTOLIC BLOOD PRESSURE: 51 MMHG | TEMPERATURE: 97 F | HEART RATE: 87 BPM | OXYGEN SATURATION: 97 % | SYSTOLIC BLOOD PRESSURE: 122 MMHG | WEIGHT: 155 LBS

## 2021-06-14 DIAGNOSIS — D50.9 MICROCYTIC ANEMIA: Primary | ICD-10-CM

## 2021-06-14 DIAGNOSIS — I35.1 NONRHEUMATIC AORTIC VALVE INSUFFICIENCY: ICD-10-CM

## 2021-06-14 DIAGNOSIS — R79.89 ELEVATED BRAIN NATRIURETIC PEPTIDE (BNP) LEVEL: ICD-10-CM

## 2021-06-14 DIAGNOSIS — L82.1 OTHER SEBORRHEIC KERATOSIS: ICD-10-CM

## 2021-06-14 DIAGNOSIS — D50.0 IRON DEFICIENCY ANEMIA DUE TO CHRONIC BLOOD LOSS: Primary | ICD-10-CM

## 2021-06-14 DIAGNOSIS — D50.0 BLOOD LOSS ANEMIA: ICD-10-CM

## 2021-06-14 DIAGNOSIS — R19.5 POSITIVE COLORECTAL CANCER SCREENING USING COLOGUARD TEST: ICD-10-CM

## 2021-06-14 DIAGNOSIS — D50.0 IRON DEFICIENCY ANEMIA DUE TO CHRONIC BLOOD LOSS: ICD-10-CM

## 2021-06-14 LAB
BASOPHILS # BLD AUTO: 0.12 K/UL (ref 0–0.2)
BASOPHILS NFR BLD: 2.1 % (ref 0–1.9)
DIFFERENTIAL METHOD: ABNORMAL
EOSINOPHIL # BLD AUTO: 0.4 K/UL (ref 0–0.5)
EOSINOPHIL NFR BLD: 6.7 % (ref 0–8)
ERYTHROCYTE [DISTWIDTH] IN BLOOD BY AUTOMATED COUNT: 26.1 % (ref 11.5–14.5)
HCT VFR BLD AUTO: 27.8 % (ref 40–54)
HGB BLD-MCNC: 8.2 G/DL (ref 14–18)
IMM GRANULOCYTES # BLD AUTO: 0.01 K/UL (ref 0–0.04)
IMM GRANULOCYTES NFR BLD AUTO: 0.2 % (ref 0–0.5)
LYMPHOCYTES # BLD AUTO: 1 K/UL (ref 1–4.8)
LYMPHOCYTES NFR BLD: 16.8 % (ref 18–48)
MCH RBC QN AUTO: 20.1 PG (ref 27–31)
MCHC RBC AUTO-ENTMCNC: 29.5 G/DL (ref 32–36)
MCV RBC AUTO: 68 FL (ref 82–98)
MONOCYTES # BLD AUTO: 0.7 K/UL (ref 0.3–1)
MONOCYTES NFR BLD: 11.8 % (ref 4–15)
NEUTROPHILS # BLD AUTO: 3.6 K/UL (ref 1.8–7.7)
NEUTROPHILS NFR BLD: 62.4 % (ref 38–73)
NRBC BLD-RTO: 0 /100 WBC
PLATELET # BLD AUTO: 277 K/UL (ref 150–450)
PMV BLD AUTO: ABNORMAL FL (ref 9.2–12.9)
RBC # BLD AUTO: 4.08 M/UL (ref 4.6–6.2)
WBC # BLD AUTO: 5.78 K/UL (ref 3.9–12.7)

## 2021-06-14 PROCEDURE — 1157F PR ADVANCE CARE PLAN OR EQUIV PRESENT IN MEDICAL RECORD: ICD-10-PCS | Mod: S$GLB,,, | Performed by: NURSE PRACTITIONER

## 2021-06-14 PROCEDURE — 82728 ASSAY OF FERRITIN: CPT | Performed by: NURSE PRACTITIONER

## 2021-06-14 PROCEDURE — 99214 OFFICE O/P EST MOD 30 MIN: CPT | Mod: S$GLB,,, | Performed by: NURSE PRACTITIONER

## 2021-06-14 PROCEDURE — 1126F AMNT PAIN NOTED NONE PRSNT: CPT | Mod: S$GLB,,, | Performed by: NURSE PRACTITIONER

## 2021-06-14 PROCEDURE — 99999 PR PBB SHADOW E&M-EST. PATIENT-LVL IV: CPT | Mod: PBBFAC,,, | Performed by: NURSE PRACTITIONER

## 2021-06-14 PROCEDURE — 1159F MED LIST DOCD IN RCRD: CPT | Mod: S$GLB,,, | Performed by: NURSE PRACTITIONER

## 2021-06-14 PROCEDURE — 1159F PR MEDICATION LIST DOCUMENTED IN MEDICAL RECORD: ICD-10-PCS | Mod: S$GLB,,, | Performed by: NURSE PRACTITIONER

## 2021-06-14 PROCEDURE — 99214 PR OFFICE/OUTPT VISIT, EST, LEVL IV, 30-39 MIN: ICD-10-PCS | Mod: S$GLB,,, | Performed by: NURSE PRACTITIONER

## 2021-06-14 PROCEDURE — 36415 COLL VENOUS BLD VENIPUNCTURE: CPT | Performed by: NURSE PRACTITIONER

## 2021-06-14 PROCEDURE — 1157F ADVNC CARE PLAN IN RCRD: CPT | Mod: S$GLB,,, | Performed by: NURSE PRACTITIONER

## 2021-06-14 PROCEDURE — 85025 COMPLETE CBC W/AUTO DIFF WBC: CPT | Performed by: NURSE PRACTITIONER

## 2021-06-14 PROCEDURE — 99999 PR PBB SHADOW E&M-EST. PATIENT-LVL IV: ICD-10-PCS | Mod: PBBFAC,,, | Performed by: NURSE PRACTITIONER

## 2021-06-14 PROCEDURE — 83540 ASSAY OF IRON: CPT | Performed by: NURSE PRACTITIONER

## 2021-06-14 PROCEDURE — 1126F PR PAIN SEVERITY QUANTIFIED, NO PAIN PRESENT: ICD-10-PCS | Mod: S$GLB,,, | Performed by: NURSE PRACTITIONER

## 2021-06-14 RX ORDER — KETOCONAZOLE 20 MG/G
CREAM TOPICAL 2 TIMES DAILY
Qty: 30 G | Refills: 2 | Status: SHIPPED | OUTPATIENT
Start: 2021-06-14 | End: 2022-01-09

## 2021-06-15 ENCOUNTER — INFUSION (OUTPATIENT)
Dept: INFUSION THERAPY | Facility: HOSPITAL | Age: 81
End: 2021-06-15
Attending: INTERNAL MEDICINE
Payer: COMMERCIAL

## 2021-06-15 VITALS
SYSTOLIC BLOOD PRESSURE: 139 MMHG | HEART RATE: 79 BPM | OXYGEN SATURATION: 97 % | DIASTOLIC BLOOD PRESSURE: 64 MMHG | RESPIRATION RATE: 16 BRPM | TEMPERATURE: 98 F

## 2021-06-15 DIAGNOSIS — D63.8 ANEMIA, CHRONIC DISEASE: Primary | ICD-10-CM

## 2021-06-15 LAB
FERRITIN SERPL-MCNC: 10 NG/ML (ref 20–300)
IRON SERPL-MCNC: 18 UG/DL (ref 45–160)
SATURATED IRON: 4 % (ref 20–50)
TOTAL IRON BINDING CAPACITY: 497 UG/DL (ref 250–450)
TRANSFERRIN SERPL-MCNC: 336 MG/DL (ref 200–375)

## 2021-06-15 PROCEDURE — 96365 THER/PROPH/DIAG IV INF INIT: CPT

## 2021-06-15 PROCEDURE — 63600175 PHARM REV CODE 636 W HCPCS: Mod: JG | Performed by: INTERNAL MEDICINE

## 2021-06-15 PROCEDURE — 25000003 PHARM REV CODE 250: Performed by: INTERNAL MEDICINE

## 2021-06-15 RX ADMIN — FERRIC CARBOXYMALTOSE INJECTION 750 MG: 50 INJECTION, SOLUTION INTRAVENOUS at 01:06

## 2021-06-22 ENCOUNTER — INFUSION (OUTPATIENT)
Dept: INFUSION THERAPY | Facility: HOSPITAL | Age: 81
End: 2021-06-22
Attending: INTERNAL MEDICINE
Payer: COMMERCIAL

## 2021-06-22 VITALS
OXYGEN SATURATION: 98 % | SYSTOLIC BLOOD PRESSURE: 127 MMHG | HEART RATE: 81 BPM | DIASTOLIC BLOOD PRESSURE: 54 MMHG | RESPIRATION RATE: 16 BRPM | TEMPERATURE: 99 F

## 2021-06-22 DIAGNOSIS — D63.8 ANEMIA, CHRONIC DISEASE: Primary | ICD-10-CM

## 2021-06-22 PROCEDURE — 96365 THER/PROPH/DIAG IV INF INIT: CPT

## 2021-06-22 PROCEDURE — 63600175 PHARM REV CODE 636 W HCPCS: Mod: JG | Performed by: INTERNAL MEDICINE

## 2021-06-22 PROCEDURE — 25000003 PHARM REV CODE 250: Performed by: INTERNAL MEDICINE

## 2021-06-22 RX ORDER — SODIUM CHLORIDE 0.9 % (FLUSH) 0.9 %
10 SYRINGE (ML) INJECTION
Status: CANCELLED | OUTPATIENT
Start: 2021-06-29

## 2021-06-22 RX ORDER — HEPARIN 100 UNIT/ML
500 SYRINGE INTRAVENOUS
Status: CANCELLED | OUTPATIENT
Start: 2021-06-29

## 2021-06-22 RX ORDER — EPINEPHRINE 0.3 MG/.3ML
0.3 INJECTION SUBCUTANEOUS ONCE AS NEEDED
Status: CANCELLED | OUTPATIENT
Start: 2021-06-29

## 2021-06-22 RX ORDER — METHYLPREDNISOLONE SOD SUCC 125 MG
125 VIAL (EA) INJECTION ONCE AS NEEDED
Status: CANCELLED | OUTPATIENT
Start: 2021-06-29

## 2021-06-22 RX ORDER — DIPHENHYDRAMINE HYDROCHLORIDE 50 MG/ML
50 INJECTION INTRAMUSCULAR; INTRAVENOUS ONCE AS NEEDED
Status: CANCELLED | OUTPATIENT
Start: 2021-06-29

## 2021-06-22 RX ADMIN — FERRIC CARBOXYMALTOSE INJECTION 750 MG: 50 INJECTION, SOLUTION INTRAVENOUS at 01:06

## 2021-07-01 ENCOUNTER — PATIENT MESSAGE (OUTPATIENT)
Dept: ADMINISTRATIVE | Facility: OTHER | Age: 81
End: 2021-07-01

## 2021-08-03 ENCOUNTER — PATIENT OUTREACH (OUTPATIENT)
Dept: ADMINISTRATIVE | Facility: OTHER | Age: 81
End: 2021-08-03

## 2021-08-03 ENCOUNTER — OFFICE VISIT (OUTPATIENT)
Dept: CARDIOLOGY | Facility: CLINIC | Age: 81
End: 2021-08-03
Payer: COMMERCIAL

## 2021-08-03 VITALS
BODY MASS INDEX: 22.02 KG/M2 | HEART RATE: 83 BPM | SYSTOLIC BLOOD PRESSURE: 128 MMHG | DIASTOLIC BLOOD PRESSURE: 73 MMHG | WEIGHT: 153.44 LBS | OXYGEN SATURATION: 96 %

## 2021-08-03 DIAGNOSIS — I10 HYPERTENSION, UNSPECIFIED TYPE: ICD-10-CM

## 2021-08-03 DIAGNOSIS — I35.1 NONRHEUMATIC AORTIC VALVE INSUFFICIENCY: ICD-10-CM

## 2021-08-03 DIAGNOSIS — I10 ESSENTIAL HYPERTENSION: ICD-10-CM

## 2021-08-03 DIAGNOSIS — I50.22 CHRONIC SYSTOLIC CONGESTIVE HEART FAILURE: Primary | ICD-10-CM

## 2021-08-03 DIAGNOSIS — E78.49 OTHER HYPERLIPIDEMIA: ICD-10-CM

## 2021-08-03 DIAGNOSIS — R06.02 SHORTNESS OF BREATH: ICD-10-CM

## 2021-08-03 PROCEDURE — 99214 OFFICE O/P EST MOD 30 MIN: CPT | Mod: S$GLB,,, | Performed by: INTERNAL MEDICINE

## 2021-08-03 PROCEDURE — 3078F DIAST BP <80 MM HG: CPT | Mod: CPTII,S$GLB,, | Performed by: INTERNAL MEDICINE

## 2021-08-03 PROCEDURE — 1160F PR REVIEW ALL MEDS BY PRESCRIBER/CLIN PHARMACIST DOCUMENTED: ICD-10-PCS | Mod: CPTII,S$GLB,, | Performed by: INTERNAL MEDICINE

## 2021-08-03 PROCEDURE — 99999 PR PBB SHADOW E&M-EST. PATIENT-LVL III: ICD-10-PCS | Mod: PBBFAC,,, | Performed by: INTERNAL MEDICINE

## 2021-08-03 PROCEDURE — 99214 PR OFFICE/OUTPT VISIT, EST, LEVL IV, 30-39 MIN: ICD-10-PCS | Mod: S$GLB,,, | Performed by: INTERNAL MEDICINE

## 2021-08-03 PROCEDURE — 3078F PR MOST RECENT DIASTOLIC BLOOD PRESSURE < 80 MM HG: ICD-10-PCS | Mod: CPTII,S$GLB,, | Performed by: INTERNAL MEDICINE

## 2021-08-03 PROCEDURE — 1159F MED LIST DOCD IN RCRD: CPT | Mod: CPTII,S$GLB,, | Performed by: INTERNAL MEDICINE

## 2021-08-03 PROCEDURE — 1157F PR ADVANCE CARE PLAN OR EQUIV PRESENT IN MEDICAL RECORD: ICD-10-PCS | Mod: CPTII,S$GLB,, | Performed by: INTERNAL MEDICINE

## 2021-08-03 PROCEDURE — 3074F PR MOST RECENT SYSTOLIC BLOOD PRESSURE < 130 MM HG: ICD-10-PCS | Mod: CPTII,S$GLB,, | Performed by: INTERNAL MEDICINE

## 2021-08-03 PROCEDURE — 1159F PR MEDICATION LIST DOCUMENTED IN MEDICAL RECORD: ICD-10-PCS | Mod: CPTII,S$GLB,, | Performed by: INTERNAL MEDICINE

## 2021-08-03 PROCEDURE — 3074F SYST BP LT 130 MM HG: CPT | Mod: CPTII,S$GLB,, | Performed by: INTERNAL MEDICINE

## 2021-08-03 PROCEDURE — 99999 PR PBB SHADOW E&M-EST. PATIENT-LVL III: CPT | Mod: PBBFAC,,, | Performed by: INTERNAL MEDICINE

## 2021-08-03 PROCEDURE — 1160F RVW MEDS BY RX/DR IN RCRD: CPT | Mod: CPTII,S$GLB,, | Performed by: INTERNAL MEDICINE

## 2021-08-03 PROCEDURE — 1157F ADVNC CARE PLAN IN RCRD: CPT | Mod: CPTII,S$GLB,, | Performed by: INTERNAL MEDICINE

## 2021-08-03 RX ORDER — HYDROCHLOROTHIAZIDE 25 MG/1
25 TABLET ORAL DAILY
Qty: 90 TABLET | Refills: 1 | Status: ON HOLD | OUTPATIENT
Start: 2021-08-03 | End: 2022-01-14 | Stop reason: HOSPADM

## 2021-08-03 RX ORDER — HYDROCHLOROTHIAZIDE 25 MG/1
25 TABLET ORAL DAILY
Qty: 90 TABLET | Refills: 1 | Status: SHIPPED | OUTPATIENT
Start: 2021-08-03 | End: 2021-08-03 | Stop reason: SDUPTHER

## 2021-08-06 ENCOUNTER — LAB VISIT (OUTPATIENT)
Dept: LAB | Facility: HOSPITAL | Age: 81
End: 2021-08-06
Attending: FAMILY MEDICINE
Payer: COMMERCIAL

## 2021-08-06 DIAGNOSIS — I50.22 CHRONIC SYSTOLIC CONGESTIVE HEART FAILURE: ICD-10-CM

## 2021-08-06 DIAGNOSIS — D50.9 MICROCYTIC ANEMIA: ICD-10-CM

## 2021-08-06 DIAGNOSIS — D50.0 BLOOD LOSS ANEMIA: ICD-10-CM

## 2021-08-06 DIAGNOSIS — R19.5 POSITIVE COLORECTAL CANCER SCREENING USING COLOGUARD TEST: ICD-10-CM

## 2021-08-06 LAB
ALBUMIN SERPL BCP-MCNC: 4.2 G/DL (ref 3.5–5.2)
ALP SERPL-CCNC: 40 U/L (ref 55–135)
ALT SERPL W/O P-5'-P-CCNC: 12 U/L (ref 10–44)
ANION GAP SERPL CALC-SCNC: 7 MMOL/L (ref 8–16)
ANION GAP SERPL CALC-SCNC: 7 MMOL/L (ref 8–16)
ANISOCYTOSIS BLD QL SMEAR: ABNORMAL
AST SERPL-CCNC: 18 U/L (ref 10–40)
BASOPHILS # BLD AUTO: 0.07 K/UL (ref 0–0.2)
BASOPHILS NFR BLD: 1.5 % (ref 0–1.9)
BILIRUB SERPL-MCNC: 1.5 MG/DL (ref 0.1–1)
BNP SERPL-MCNC: 1019 PG/ML (ref 0–99)
BUN SERPL-MCNC: 14 MG/DL (ref 8–23)
BUN SERPL-MCNC: 14 MG/DL (ref 8–23)
CALCIUM SERPL-MCNC: 9.2 MG/DL (ref 8.7–10.5)
CALCIUM SERPL-MCNC: 9.2 MG/DL (ref 8.7–10.5)
CHLORIDE SERPL-SCNC: 107 MMOL/L (ref 95–110)
CHLORIDE SERPL-SCNC: 107 MMOL/L (ref 95–110)
CO2 SERPL-SCNC: 29 MMOL/L (ref 23–29)
CO2 SERPL-SCNC: 29 MMOL/L (ref 23–29)
CREAT SERPL-MCNC: 0.8 MG/DL (ref 0.5–1.4)
CREAT SERPL-MCNC: 0.8 MG/DL (ref 0.5–1.4)
DACRYOCYTES BLD QL SMEAR: ABNORMAL
DIFFERENTIAL METHOD: ABNORMAL
EOSINOPHIL # BLD AUTO: 0.4 K/UL (ref 0–0.5)
EOSINOPHIL NFR BLD: 8.2 % (ref 0–8)
ERYTHROCYTE [DISTWIDTH] IN BLOOD BY AUTOMATED COUNT: 32.3 % (ref 11.5–14.5)
EST. GFR  (AFRICAN AMERICAN): >60 ML/MIN/1.73 M^2
EST. GFR  (AFRICAN AMERICAN): >60 ML/MIN/1.73 M^2
EST. GFR  (NON AFRICAN AMERICAN): >60 ML/MIN/1.73 M^2
EST. GFR  (NON AFRICAN AMERICAN): >60 ML/MIN/1.73 M^2
FERRITIN SERPL-MCNC: 136 NG/ML (ref 20–300)
GIANT PLATELETS BLD QL SMEAR: PRESENT
GLUCOSE SERPL-MCNC: 82 MG/DL (ref 70–110)
GLUCOSE SERPL-MCNC: 82 MG/DL (ref 70–110)
HCT VFR BLD AUTO: 33.7 % (ref 40–54)
HGB BLD-MCNC: 10.5 G/DL (ref 14–18)
HYPOCHROMIA BLD QL SMEAR: ABNORMAL
IMM GRANULOCYTES # BLD AUTO: 0.01 K/UL (ref 0–0.04)
IMM GRANULOCYTES NFR BLD AUTO: 0.2 % (ref 0–0.5)
IRON SERPL-MCNC: 114 UG/DL (ref 45–160)
LYMPHOCYTES # BLD AUTO: 0.9 K/UL (ref 1–4.8)
LYMPHOCYTES NFR BLD: 18.3 % (ref 18–48)
MAGNESIUM SERPL-MCNC: 2 MG/DL (ref 1.6–2.6)
MCH RBC QN AUTO: 24.2 PG (ref 27–31)
MCHC RBC AUTO-ENTMCNC: 31.2 G/DL (ref 32–36)
MCV RBC AUTO: 78 FL (ref 82–98)
MONOCYTES # BLD AUTO: 0.5 K/UL (ref 0.3–1)
MONOCYTES NFR BLD: 10.1 % (ref 4–15)
NEUTROPHILS # BLD AUTO: 2.9 K/UL (ref 1.8–7.7)
NEUTROPHILS NFR BLD: 61.7 % (ref 38–73)
NRBC BLD-RTO: 0 /100 WBC
PLATELET # BLD AUTO: 266 K/UL (ref 150–450)
PLATELET BLD QL SMEAR: ABNORMAL
PMV BLD AUTO: ABNORMAL FL (ref 9.2–12.9)
POIKILOCYTOSIS BLD QL SMEAR: SLIGHT
POTASSIUM SERPL-SCNC: 4 MMOL/L (ref 3.5–5.1)
POTASSIUM SERPL-SCNC: 4 MMOL/L (ref 3.5–5.1)
PROT SERPL-MCNC: 7 G/DL (ref 6–8.4)
RBC # BLD AUTO: 4.33 M/UL (ref 4.6–6.2)
SATURATED IRON: 37 % (ref 20–50)
SODIUM SERPL-SCNC: 143 MMOL/L (ref 136–145)
SODIUM SERPL-SCNC: 143 MMOL/L (ref 136–145)
TARGETS BLD QL SMEAR: ABNORMAL
TOTAL IRON BINDING CAPACITY: 311 UG/DL (ref 250–450)
TRANSFERRIN SERPL-MCNC: 210 MG/DL (ref 200–375)
WBC # BLD AUTO: 4.65 K/UL (ref 3.9–12.7)

## 2021-08-06 PROCEDURE — 36415 COLL VENOUS BLD VENIPUNCTURE: CPT | Performed by: NURSE PRACTITIONER

## 2021-08-06 PROCEDURE — 82728 ASSAY OF FERRITIN: CPT | Performed by: NURSE PRACTITIONER

## 2021-08-06 PROCEDURE — 85025 COMPLETE CBC W/AUTO DIFF WBC: CPT | Performed by: NURSE PRACTITIONER

## 2021-08-06 PROCEDURE — 83735 ASSAY OF MAGNESIUM: CPT | Performed by: INTERNAL MEDICINE

## 2021-08-06 PROCEDURE — 83540 ASSAY OF IRON: CPT | Performed by: NURSE PRACTITIONER

## 2021-08-06 PROCEDURE — 80053 COMPREHEN METABOLIC PANEL: CPT | Performed by: NURSE PRACTITIONER

## 2021-08-06 PROCEDURE — 83880 ASSAY OF NATRIURETIC PEPTIDE: CPT | Performed by: INTERNAL MEDICINE

## 2021-08-09 ENCOUNTER — OFFICE VISIT (OUTPATIENT)
Dept: HEMATOLOGY/ONCOLOGY | Facility: CLINIC | Age: 81
End: 2021-08-09
Payer: COMMERCIAL

## 2021-08-09 VITALS
HEART RATE: 81 BPM | SYSTOLIC BLOOD PRESSURE: 126 MMHG | HEIGHT: 70 IN | WEIGHT: 156.94 LBS | DIASTOLIC BLOOD PRESSURE: 62 MMHG | TEMPERATURE: 98 F | OXYGEN SATURATION: 97 % | BODY MASS INDEX: 22.47 KG/M2

## 2021-08-09 DIAGNOSIS — D63.8 ANEMIA, CHRONIC DISEASE: ICD-10-CM

## 2021-08-09 DIAGNOSIS — D50.0 IRON DEFICIENCY ANEMIA DUE TO CHRONIC BLOOD LOSS: ICD-10-CM

## 2021-08-09 PROCEDURE — 3078F DIAST BP <80 MM HG: CPT | Mod: CPTII,S$GLB,, | Performed by: INTERNAL MEDICINE

## 2021-08-09 PROCEDURE — 1157F ADVNC CARE PLAN IN RCRD: CPT | Mod: CPTII,S$GLB,, | Performed by: INTERNAL MEDICINE

## 2021-08-09 PROCEDURE — 1126F AMNT PAIN NOTED NONE PRSNT: CPT | Mod: CPTII,S$GLB,, | Performed by: INTERNAL MEDICINE

## 2021-08-09 PROCEDURE — 99214 OFFICE O/P EST MOD 30 MIN: CPT | Mod: S$GLB,,, | Performed by: INTERNAL MEDICINE

## 2021-08-09 PROCEDURE — 99999 PR PBB SHADOW E&M-EST. PATIENT-LVL III: ICD-10-PCS | Mod: PBBFAC,,, | Performed by: INTERNAL MEDICINE

## 2021-08-09 PROCEDURE — 3074F PR MOST RECENT SYSTOLIC BLOOD PRESSURE < 130 MM HG: ICD-10-PCS | Mod: CPTII,S$GLB,, | Performed by: INTERNAL MEDICINE

## 2021-08-09 PROCEDURE — 3078F PR MOST RECENT DIASTOLIC BLOOD PRESSURE < 80 MM HG: ICD-10-PCS | Mod: CPTII,S$GLB,, | Performed by: INTERNAL MEDICINE

## 2021-08-09 PROCEDURE — 99999 PR PBB SHADOW E&M-EST. PATIENT-LVL III: CPT | Mod: PBBFAC,,, | Performed by: INTERNAL MEDICINE

## 2021-08-09 PROCEDURE — 1159F MED LIST DOCD IN RCRD: CPT | Mod: CPTII,S$GLB,, | Performed by: INTERNAL MEDICINE

## 2021-08-09 PROCEDURE — 1126F PR PAIN SEVERITY QUANTIFIED, NO PAIN PRESENT: ICD-10-PCS | Mod: CPTII,S$GLB,, | Performed by: INTERNAL MEDICINE

## 2021-08-09 PROCEDURE — 1159F PR MEDICATION LIST DOCUMENTED IN MEDICAL RECORD: ICD-10-PCS | Mod: CPTII,S$GLB,, | Performed by: INTERNAL MEDICINE

## 2021-08-09 PROCEDURE — 3074F SYST BP LT 130 MM HG: CPT | Mod: CPTII,S$GLB,, | Performed by: INTERNAL MEDICINE

## 2021-08-09 PROCEDURE — 99214 PR OFFICE/OUTPT VISIT, EST, LEVL IV, 30-39 MIN: ICD-10-PCS | Mod: S$GLB,,, | Performed by: INTERNAL MEDICINE

## 2021-08-09 PROCEDURE — 1157F PR ADVANCE CARE PLAN OR EQUIV PRESENT IN MEDICAL RECORD: ICD-10-PCS | Mod: CPTII,S$GLB,, | Performed by: INTERNAL MEDICINE

## 2021-09-24 ENCOUNTER — HOSPITAL ENCOUNTER (INPATIENT)
Facility: HOSPITAL | Age: 81
LOS: 4 days | Discharge: HOME HEALTH | DRG: 871 | End: 2021-09-28
Attending: EMERGENCY MEDICINE | Admitting: FAMILY MEDICINE
Payer: COMMERCIAL

## 2021-09-24 DIAGNOSIS — R07.9 CHEST PAIN: ICD-10-CM

## 2021-09-24 DIAGNOSIS — R06.02 SOB (SHORTNESS OF BREATH): ICD-10-CM

## 2021-09-24 DIAGNOSIS — R78.81 BACTEREMIA: ICD-10-CM

## 2021-09-24 DIAGNOSIS — J18.9 PNEUMONIA OF LEFT UPPER LOBE DUE TO INFECTIOUS ORGANISM: Primary | ICD-10-CM

## 2021-09-24 DIAGNOSIS — R00.0 TACHYCARDIA: ICD-10-CM

## 2021-09-24 DIAGNOSIS — I48.0 PAF (PAROXYSMAL ATRIAL FIBRILLATION): ICD-10-CM

## 2021-09-24 DIAGNOSIS — Z20.822 SUSPECTED COVID-19 VIRUS INFECTION: ICD-10-CM

## 2021-09-24 DIAGNOSIS — I48.91 ATRIAL FIBRILLATION WITH RVR: ICD-10-CM

## 2021-09-24 DIAGNOSIS — A41.9 SEPSIS: ICD-10-CM

## 2021-09-24 DIAGNOSIS — R09.02 HYPOXIA: ICD-10-CM

## 2021-09-24 DIAGNOSIS — A41.9 SEPSIS, DUE TO UNSPECIFIED ORGANISM, UNSPECIFIED WHETHER ACUTE ORGAN DYSFUNCTION PRESENT: ICD-10-CM

## 2021-09-24 LAB
ACANTHOCYTES BLD QL SMEAR: PRESENT
ACANTHOCYTES BLD QL SMEAR: PRESENT
ALBUMIN SERPL BCP-MCNC: 2.8 G/DL (ref 3.5–5.2)
ALP SERPL-CCNC: 45 U/L (ref 55–135)
ALT SERPL W/O P-5'-P-CCNC: 9 U/L (ref 10–44)
ANION GAP SERPL CALC-SCNC: 8 MMOL/L (ref 8–16)
ANISOCYTOSIS BLD QL SMEAR: SLIGHT
ANISOCYTOSIS BLD QL SMEAR: SLIGHT
APTT BLDCRRT: 27.7 SEC (ref 21–32)
APTT BLDCRRT: 34.4 SEC (ref 21–32)
AST SERPL-CCNC: 13 U/L (ref 10–40)
BASOPHILS # BLD AUTO: 0.05 K/UL (ref 0–0.2)
BASOPHILS # BLD AUTO: 0.07 K/UL (ref 0–0.2)
BASOPHILS NFR BLD: 0.3 % (ref 0–1.9)
BASOPHILS NFR BLD: 0.3 % (ref 0–1.9)
BILIRUB SERPL-MCNC: 1.7 MG/DL (ref 0.1–1)
BNP SERPL-MCNC: 969 PG/ML (ref 0–99)
BUN SERPL-MCNC: 15 MG/DL (ref 8–23)
CALCIUM SERPL-MCNC: 9.1 MG/DL (ref 8.7–10.5)
CHLORIDE SERPL-SCNC: 102 MMOL/L (ref 95–110)
CK SERPL-CCNC: 88 U/L (ref 20–200)
CO2 SERPL-SCNC: 27 MMOL/L (ref 23–29)
CREAT SERPL-MCNC: 0.7 MG/DL (ref 0.5–1.4)
CRP SERPL-MCNC: 175.3 MG/L (ref 0–8.2)
CTP QC/QA: YES
DACRYOCYTES BLD QL SMEAR: ABNORMAL
DACRYOCYTES BLD QL SMEAR: ABNORMAL
DIFFERENTIAL METHOD: ABNORMAL
DIFFERENTIAL METHOD: ABNORMAL
EOSINOPHIL # BLD AUTO: 0 K/UL (ref 0–0.5)
EOSINOPHIL # BLD AUTO: 0.1 K/UL (ref 0–0.5)
EOSINOPHIL NFR BLD: 0.1 % (ref 0–8)
EOSINOPHIL NFR BLD: 0.3 % (ref 0–8)
ERYTHROCYTE [DISTWIDTH] IN BLOOD BY AUTOMATED COUNT: 23.8 % (ref 11.5–14.5)
ERYTHROCYTE [DISTWIDTH] IN BLOOD BY AUTOMATED COUNT: 23.9 % (ref 11.5–14.5)
EST. GFR  (AFRICAN AMERICAN): >60 ML/MIN/1.73 M^2
EST. GFR  (NON AFRICAN AMERICAN): >60 ML/MIN/1.73 M^2
FERRITIN SERPL-MCNC: 328 NG/ML (ref 20–300)
GLUCOSE SERPL-MCNC: 84 MG/DL (ref 70–110)
HCT VFR BLD AUTO: 28.1 % (ref 40–54)
HCT VFR BLD AUTO: 29.5 % (ref 40–54)
HGB BLD-MCNC: 8.8 G/DL (ref 14–18)
HGB BLD-MCNC: 9.1 G/DL (ref 14–18)
HYPOCHROMIA BLD QL SMEAR: ABNORMAL
HYPOCHROMIA BLD QL SMEAR: ABNORMAL
IMM GRANULOCYTES # BLD AUTO: 0.09 K/UL (ref 0–0.04)
IMM GRANULOCYTES # BLD AUTO: 0.13 K/UL (ref 0–0.04)
IMM GRANULOCYTES NFR BLD AUTO: 0.5 % (ref 0–0.5)
IMM GRANULOCYTES NFR BLD AUTO: 0.6 % (ref 0–0.5)
INR PPP: 1.4 (ref 0.8–1.2)
LACTATE SERPL-SCNC: 1.1 MMOL/L (ref 0.5–2.2)
LDH SERPL L TO P-CCNC: 204 U/L (ref 110–260)
LYMPHOCYTES # BLD AUTO: 0.3 K/UL (ref 1–4.8)
LYMPHOCYTES # BLD AUTO: 0.4 K/UL (ref 1–4.8)
LYMPHOCYTES NFR BLD: 1.6 % (ref 18–48)
LYMPHOCYTES NFR BLD: 2.4 % (ref 18–48)
MCH RBC QN AUTO: 24.4 PG (ref 27–31)
MCH RBC QN AUTO: 24.6 PG (ref 27–31)
MCHC RBC AUTO-ENTMCNC: 30.8 G/DL (ref 32–36)
MCHC RBC AUTO-ENTMCNC: 31.3 G/DL (ref 32–36)
MCV RBC AUTO: 79 FL (ref 82–98)
MCV RBC AUTO: 79 FL (ref 82–98)
MONOCYTES # BLD AUTO: 0.6 K/UL (ref 0.3–1)
MONOCYTES # BLD AUTO: 0.7 K/UL (ref 0.3–1)
MONOCYTES NFR BLD: 3.2 % (ref 4–15)
MONOCYTES NFR BLD: 3.2 % (ref 4–15)
NEUTROPHILS # BLD AUTO: 17.3 K/UL (ref 1.8–7.7)
NEUTROPHILS # BLD AUTO: 19.3 K/UL (ref 1.8–7.7)
NEUTROPHILS NFR BLD: 93.3 % (ref 38–73)
NEUTROPHILS NFR BLD: 94.2 % (ref 38–73)
NRBC BLD-RTO: 0 /100 WBC
NRBC BLD-RTO: 0 /100 WBC
OVALOCYTES BLD QL SMEAR: ABNORMAL
OVALOCYTES BLD QL SMEAR: ABNORMAL
PLATELET # BLD AUTO: 382 K/UL (ref 150–450)
PLATELET # BLD AUTO: 399 K/UL (ref 150–450)
PLATELET BLD QL SMEAR: ABNORMAL
PLATELET BLD QL SMEAR: ABNORMAL
PMV BLD AUTO: 10 FL (ref 9.2–12.9)
PMV BLD AUTO: 10.2 FL (ref 9.2–12.9)
POIKILOCYTOSIS BLD QL SMEAR: SLIGHT
POIKILOCYTOSIS BLD QL SMEAR: SLIGHT
POTASSIUM SERPL-SCNC: 4 MMOL/L (ref 3.5–5.1)
PROCALCITONIN SERPL IA-MCNC: 0.47 NG/ML
PROT SERPL-MCNC: 6.9 G/DL (ref 6–8.4)
PROTHROMBIN TIME: 15 SEC (ref 9–12.5)
RBC # BLD AUTO: 3.58 M/UL (ref 4.6–6.2)
RBC # BLD AUTO: 3.73 M/UL (ref 4.6–6.2)
SARS-COV-2 RDRP RESP QL NAA+PROBE: NEGATIVE
SCHISTOCYTES BLD QL SMEAR: PRESENT
SCHISTOCYTES BLD QL SMEAR: PRESENT
SODIUM SERPL-SCNC: 137 MMOL/L (ref 136–145)
TARGETS BLD QL SMEAR: ABNORMAL
TARGETS BLD QL SMEAR: ABNORMAL
TROPONIN I SERPL DL<=0.01 NG/ML-MCNC: <0.006 NG/ML (ref 0–0.03)
TROPONIN I SERPL DL<=0.01 NG/ML-MCNC: <0.006 NG/ML (ref 0–0.03)
WBC # BLD AUTO: 18.48 K/UL (ref 3.9–12.7)
WBC # BLD AUTO: 20.5 K/UL (ref 3.9–12.7)

## 2021-09-24 PROCEDURE — 82550 ASSAY OF CK (CPK): CPT | Performed by: EMERGENCY MEDICINE

## 2021-09-24 PROCEDURE — 93010 EKG 12-LEAD: ICD-10-PCS | Mod: ,,, | Performed by: INTERNAL MEDICINE

## 2021-09-24 PROCEDURE — 63600175 PHARM REV CODE 636 W HCPCS: Performed by: NURSE PRACTITIONER

## 2021-09-24 PROCEDURE — 25000003 PHARM REV CODE 250: Performed by: PHYSICIAN ASSISTANT

## 2021-09-24 PROCEDURE — 99222 PR INITIAL HOSPITAL CARE,LEVL II: ICD-10-PCS | Mod: 25,,, | Performed by: INTERNAL MEDICINE

## 2021-09-24 PROCEDURE — 85025 COMPLETE CBC W/AUTO DIFF WBC: CPT | Performed by: EMERGENCY MEDICINE

## 2021-09-24 PROCEDURE — 93005 ELECTROCARDIOGRAM TRACING: CPT

## 2021-09-24 PROCEDURE — 85610 PROTHROMBIN TIME: CPT | Performed by: NURSE PRACTITIONER

## 2021-09-24 PROCEDURE — 86140 C-REACTIVE PROTEIN: CPT | Performed by: EMERGENCY MEDICINE

## 2021-09-24 PROCEDURE — 80053 COMPREHEN METABOLIC PANEL: CPT | Performed by: EMERGENCY MEDICINE

## 2021-09-24 PROCEDURE — 87149 DNA/RNA DIRECT PROBE: CPT | Performed by: EMERGENCY MEDICINE

## 2021-09-24 PROCEDURE — 85025 COMPLETE CBC W/AUTO DIFF WBC: CPT | Mod: 91 | Performed by: NURSE PRACTITIONER

## 2021-09-24 PROCEDURE — 93010 ELECTROCARDIOGRAM REPORT: CPT | Mod: ,,, | Performed by: INTERNAL MEDICINE

## 2021-09-24 PROCEDURE — 36415 COLL VENOUS BLD VENIPUNCTURE: CPT | Performed by: FAMILY MEDICINE

## 2021-09-24 PROCEDURE — 99291 CRITICAL CARE FIRST HOUR: CPT | Mod: 25

## 2021-09-24 PROCEDURE — 83605 ASSAY OF LACTIC ACID: CPT | Performed by: EMERGENCY MEDICINE

## 2021-09-24 PROCEDURE — 25000003 PHARM REV CODE 250: Performed by: NURSE PRACTITIONER

## 2021-09-24 PROCEDURE — 83615 LACTATE (LD) (LDH) ENZYME: CPT | Performed by: EMERGENCY MEDICINE

## 2021-09-24 PROCEDURE — 85730 THROMBOPLASTIN TIME PARTIAL: CPT | Mod: 91 | Performed by: FAMILY MEDICINE

## 2021-09-24 PROCEDURE — 84145 PROCALCITONIN (PCT): CPT | Performed by: EMERGENCY MEDICINE

## 2021-09-24 PROCEDURE — 83880 ASSAY OF NATRIURETIC PEPTIDE: CPT | Performed by: EMERGENCY MEDICINE

## 2021-09-24 PROCEDURE — 99222 1ST HOSP IP/OBS MODERATE 55: CPT | Mod: 25,,, | Performed by: INTERNAL MEDICINE

## 2021-09-24 PROCEDURE — 87186 SC STD MICRODIL/AGAR DIL: CPT | Performed by: EMERGENCY MEDICINE

## 2021-09-24 PROCEDURE — 87077 CULTURE AEROBIC IDENTIFY: CPT | Performed by: EMERGENCY MEDICINE

## 2021-09-24 PROCEDURE — 99292 CRITICAL CARE ADDL 30 MIN: CPT | Mod: 25

## 2021-09-24 PROCEDURE — 84484 ASSAY OF TROPONIN QUANT: CPT | Performed by: NURSE PRACTITIONER

## 2021-09-24 PROCEDURE — 85730 THROMBOPLASTIN TIME PARTIAL: CPT | Performed by: NURSE PRACTITIONER

## 2021-09-24 PROCEDURE — 21400001 HC TELEMETRY ROOM

## 2021-09-24 PROCEDURE — 82728 ASSAY OF FERRITIN: CPT | Performed by: EMERGENCY MEDICINE

## 2021-09-24 PROCEDURE — 63600175 PHARM REV CODE 636 W HCPCS: Performed by: EMERGENCY MEDICINE

## 2021-09-24 PROCEDURE — 84484 ASSAY OF TROPONIN QUANT: CPT | Mod: 91 | Performed by: EMERGENCY MEDICINE

## 2021-09-24 PROCEDURE — 87040 BLOOD CULTURE FOR BACTERIA: CPT | Mod: 59 | Performed by: EMERGENCY MEDICINE

## 2021-09-24 PROCEDURE — U0002 COVID-19 LAB TEST NON-CDC: HCPCS | Performed by: EMERGENCY MEDICINE

## 2021-09-24 PROCEDURE — 27000221 HC OXYGEN, UP TO 24 HOURS

## 2021-09-24 RX ORDER — ONDANSETRON 2 MG/ML
4 INJECTION INTRAMUSCULAR; INTRAVENOUS EVERY 8 HOURS PRN
Status: DISCONTINUED | OUTPATIENT
Start: 2021-09-24 | End: 2021-09-28 | Stop reason: HOSPADM

## 2021-09-24 RX ORDER — HEPARIN SODIUM,PORCINE/D5W 25000/250
0-40 INTRAVENOUS SOLUTION INTRAVENOUS CONTINUOUS
Status: DISCONTINUED | OUTPATIENT
Start: 2021-09-24 | End: 2021-09-25

## 2021-09-24 RX ORDER — ENOXAPARIN SODIUM 100 MG/ML
40 INJECTION SUBCUTANEOUS EVERY 24 HOURS
Status: DISCONTINUED | OUTPATIENT
Start: 2021-09-24 | End: 2021-09-24

## 2021-09-24 RX ORDER — DILTIAZEM HCL/D5W 125 MG/125
5 PLASTIC BAG, INJECTION (ML) INTRAVENOUS CONTINUOUS
Status: DISCONTINUED | OUTPATIENT
Start: 2021-09-24 | End: 2021-09-25

## 2021-09-24 RX ORDER — LEVOFLOXACIN 5 MG/ML
750 INJECTION, SOLUTION INTRAVENOUS
Status: DISCONTINUED | OUTPATIENT
Start: 2021-09-25 | End: 2021-09-27

## 2021-09-24 RX ORDER — SODIUM CHLORIDE 0.9 % (FLUSH) 0.9 %
10 SYRINGE (ML) INJECTION EVERY 12 HOURS PRN
Status: DISCONTINUED | OUTPATIENT
Start: 2021-09-24 | End: 2021-09-28 | Stop reason: HOSPADM

## 2021-09-24 RX ORDER — GLUCAGON 1 MG
1 KIT INJECTION
Status: DISCONTINUED | OUTPATIENT
Start: 2021-09-24 | End: 2021-09-28 | Stop reason: HOSPADM

## 2021-09-24 RX ORDER — IBUPROFEN 200 MG
16 TABLET ORAL
Status: DISCONTINUED | OUTPATIENT
Start: 2021-09-24 | End: 2021-09-28 | Stop reason: HOSPADM

## 2021-09-24 RX ORDER — LEVOFLOXACIN 5 MG/ML
750 INJECTION, SOLUTION INTRAVENOUS
Status: COMPLETED | OUTPATIENT
Start: 2021-09-24 | End: 2021-09-24

## 2021-09-24 RX ORDER — NALOXONE HCL 0.4 MG/ML
0.02 VIAL (ML) INJECTION
Status: DISCONTINUED | OUTPATIENT
Start: 2021-09-24 | End: 2021-09-28 | Stop reason: HOSPADM

## 2021-09-24 RX ORDER — ACETAMINOPHEN 325 MG/1
650 TABLET ORAL EVERY 4 HOURS PRN
Status: DISCONTINUED | OUTPATIENT
Start: 2021-09-24 | End: 2021-09-28 | Stop reason: HOSPADM

## 2021-09-24 RX ORDER — ATORVASTATIN CALCIUM 10 MG/1
10 TABLET, FILM COATED ORAL DAILY
Status: DISCONTINUED | OUTPATIENT
Start: 2021-09-24 | End: 2021-09-28 | Stop reason: HOSPADM

## 2021-09-24 RX ORDER — IBUPROFEN 200 MG
24 TABLET ORAL
Status: DISCONTINUED | OUTPATIENT
Start: 2021-09-24 | End: 2021-09-28 | Stop reason: HOSPADM

## 2021-09-24 RX ORDER — PANTOPRAZOLE SODIUM 40 MG/1
40 TABLET, DELAYED RELEASE ORAL DAILY
Status: DISCONTINUED | OUTPATIENT
Start: 2021-09-24 | End: 2021-09-28 | Stop reason: HOSPADM

## 2021-09-24 RX ORDER — SODIUM CHLORIDE 9 MG/ML
INJECTION, SOLUTION INTRAVENOUS CONTINUOUS
Status: DISCONTINUED | OUTPATIENT
Start: 2021-09-24 | End: 2021-09-26

## 2021-09-24 RX ORDER — IPRATROPIUM BROMIDE AND ALBUTEROL SULFATE 2.5; .5 MG/3ML; MG/3ML
3 SOLUTION RESPIRATORY (INHALATION) EVERY 4 HOURS PRN
Status: DISCONTINUED | OUTPATIENT
Start: 2021-09-24 | End: 2021-09-28 | Stop reason: HOSPADM

## 2021-09-24 RX ADMIN — ATORVASTATIN CALCIUM 10 MG: 10 TABLET, FILM COATED ORAL at 05:09

## 2021-09-24 RX ADMIN — SODIUM CHLORIDE: 0.9 INJECTION, SOLUTION INTRAVENOUS at 05:09

## 2021-09-24 RX ADMIN — PANTOPRAZOLE SODIUM 40 MG: 40 TABLET, DELAYED RELEASE ORAL at 05:09

## 2021-09-24 RX ADMIN — LEVOFLOXACIN 750 MG: 750 INJECTION, SOLUTION INTRAVENOUS at 09:09

## 2021-09-24 RX ADMIN — Medication 5 MG/HR: at 02:09

## 2021-09-24 RX ADMIN — HEPARIN SODIUM 12 UNITS/KG/HR: 10000 INJECTION, SOLUTION INTRAVENOUS at 02:09

## 2021-09-25 LAB
ALBUMIN SERPL BCP-MCNC: 2.5 G/DL (ref 3.5–5.2)
ALP SERPL-CCNC: 51 U/L (ref 55–135)
ALT SERPL W/O P-5'-P-CCNC: 9 U/L (ref 10–44)
ANION GAP SERPL CALC-SCNC: 11 MMOL/L (ref 8–16)
APTT BLDCRRT: 31.5 SEC (ref 21–32)
APTT BLDCRRT: 36.1 SEC (ref 21–32)
AST SERPL-CCNC: 17 U/L (ref 10–40)
BASOPHILS # BLD AUTO: 0.06 K/UL (ref 0–0.2)
BASOPHILS NFR BLD: 0.6 % (ref 0–1.9)
BILIRUB SERPL-MCNC: 0.8 MG/DL (ref 0.1–1)
BUN SERPL-MCNC: 12 MG/DL (ref 8–23)
CALCIUM SERPL-MCNC: 9.1 MG/DL (ref 8.7–10.5)
CHLORIDE SERPL-SCNC: 102 MMOL/L (ref 95–110)
CO2 SERPL-SCNC: 24 MMOL/L (ref 23–29)
CREAT SERPL-MCNC: 0.7 MG/DL (ref 0.5–1.4)
DIFFERENTIAL METHOD: ABNORMAL
EOSINOPHIL # BLD AUTO: 0.2 K/UL (ref 0–0.5)
EOSINOPHIL NFR BLD: 1.7 % (ref 0–8)
ERYTHROCYTE [DISTWIDTH] IN BLOOD BY AUTOMATED COUNT: 24 % (ref 11.5–14.5)
EST. GFR  (AFRICAN AMERICAN): >60 ML/MIN/1.73 M^2
EST. GFR  (NON AFRICAN AMERICAN): >60 ML/MIN/1.73 M^2
GLUCOSE SERPL-MCNC: 77 MG/DL (ref 70–110)
HCT VFR BLD AUTO: 29.4 % (ref 40–54)
HGB BLD-MCNC: 8.8 G/DL (ref 14–18)
IMM GRANULOCYTES # BLD AUTO: 0.05 K/UL (ref 0–0.04)
IMM GRANULOCYTES NFR BLD AUTO: 0.5 % (ref 0–0.5)
LYMPHOCYTES # BLD AUTO: 0.6 K/UL (ref 1–4.8)
LYMPHOCYTES NFR BLD: 5.2 % (ref 18–48)
MAGNESIUM SERPL-MCNC: 1.8 MG/DL (ref 1.6–2.6)
MCH RBC QN AUTO: 24.4 PG (ref 27–31)
MCHC RBC AUTO-ENTMCNC: 29.9 G/DL (ref 32–36)
MCV RBC AUTO: 81 FL (ref 82–98)
MONOCYTES # BLD AUTO: 0.6 K/UL (ref 0.3–1)
MONOCYTES NFR BLD: 5.5 % (ref 4–15)
NEUTROPHILS # BLD AUTO: 9.2 K/UL (ref 1.8–7.7)
NEUTROPHILS NFR BLD: 86.5 % (ref 38–73)
NRBC BLD-RTO: 0 /100 WBC
PLATELET # BLD AUTO: 353 K/UL (ref 150–450)
PMV BLD AUTO: 11 FL (ref 9.2–12.9)
POCT GLUCOSE: 87 MG/DL (ref 70–110)
POTASSIUM SERPL-SCNC: 3.9 MMOL/L (ref 3.5–5.1)
PROT SERPL-MCNC: 6.6 G/DL (ref 6–8.4)
RBC # BLD AUTO: 3.61 M/UL (ref 4.6–6.2)
SODIUM SERPL-SCNC: 137 MMOL/L (ref 136–145)
WBC # BLD AUTO: 10.67 K/UL (ref 3.9–12.7)

## 2021-09-25 PROCEDURE — 99900035 HC TECH TIME PER 15 MIN (STAT)

## 2021-09-25 PROCEDURE — 93010 EKG 12-LEAD: ICD-10-PCS | Mod: ,,, | Performed by: INTERNAL MEDICINE

## 2021-09-25 PROCEDURE — 83735 ASSAY OF MAGNESIUM: CPT | Performed by: NURSE PRACTITIONER

## 2021-09-25 PROCEDURE — 85730 THROMBOPLASTIN TIME PARTIAL: CPT | Mod: 91 | Performed by: FAMILY MEDICINE

## 2021-09-25 PROCEDURE — 63600175 PHARM REV CODE 636 W HCPCS: Performed by: NURSE PRACTITIONER

## 2021-09-25 PROCEDURE — 80053 COMPREHEN METABOLIC PANEL: CPT | Performed by: NURSE PRACTITIONER

## 2021-09-25 PROCEDURE — 99233 SBSQ HOSP IP/OBS HIGH 50: CPT | Mod: ,,, | Performed by: INTERNAL MEDICINE

## 2021-09-25 PROCEDURE — 99233 PR SUBSEQUENT HOSPITAL CARE,LEVL III: ICD-10-PCS | Mod: ,,, | Performed by: INTERNAL MEDICINE

## 2021-09-25 PROCEDURE — 93010 ELECTROCARDIOGRAM REPORT: CPT | Mod: ,,, | Performed by: INTERNAL MEDICINE

## 2021-09-25 PROCEDURE — 93005 ELECTROCARDIOGRAM TRACING: CPT

## 2021-09-25 PROCEDURE — 36415 COLL VENOUS BLD VENIPUNCTURE: CPT | Performed by: FAMILY MEDICINE

## 2021-09-25 PROCEDURE — 21400001 HC TELEMETRY ROOM

## 2021-09-25 PROCEDURE — 85025 COMPLETE CBC W/AUTO DIFF WBC: CPT | Performed by: NURSE PRACTITIONER

## 2021-09-25 PROCEDURE — 25000003 PHARM REV CODE 250: Performed by: INTERNAL MEDICINE

## 2021-09-25 PROCEDURE — 25000003 PHARM REV CODE 250: Performed by: NURSE PRACTITIONER

## 2021-09-25 RX ORDER — DOCUSATE SODIUM 100 MG/1
100 CAPSULE, LIQUID FILLED ORAL DAILY
Status: DISCONTINUED | OUTPATIENT
Start: 2021-09-25 | End: 2021-09-28 | Stop reason: HOSPADM

## 2021-09-25 RX ORDER — SENNOSIDES 8.6 MG/1
8.6 TABLET ORAL DAILY
Status: DISCONTINUED | OUTPATIENT
Start: 2021-09-25 | End: 2021-09-28 | Stop reason: HOSPADM

## 2021-09-25 RX ORDER — DILTIAZEM HYDROCHLORIDE 180 MG/1
180 CAPSULE, COATED, EXTENDED RELEASE ORAL DAILY
Status: DISCONTINUED | OUTPATIENT
Start: 2021-09-25 | End: 2021-09-28 | Stop reason: HOSPADM

## 2021-09-25 RX ADMIN — PANTOPRAZOLE SODIUM 40 MG: 40 TABLET, DELAYED RELEASE ORAL at 08:09

## 2021-09-25 RX ADMIN — APIXABAN 5 MG: 2.5 TABLET, FILM COATED ORAL at 09:09

## 2021-09-25 RX ADMIN — DOCUSATE SODIUM 100 MG: 100 CAPSULE, LIQUID FILLED ORAL at 04:09

## 2021-09-25 RX ADMIN — SENNOSIDES 8.6 MG: 8.6 TABLET, COATED ORAL at 04:09

## 2021-09-25 RX ADMIN — ATORVASTATIN CALCIUM 10 MG: 10 TABLET, FILM COATED ORAL at 08:09

## 2021-09-25 RX ADMIN — DILTIAZEM HYDROCHLORIDE 180 MG: 180 CAPSULE, COATED, EXTENDED RELEASE ORAL at 09:09

## 2021-09-25 RX ADMIN — LEVOFLOXACIN 750 MG: 750 INJECTION, SOLUTION INTRAVENOUS at 08:09

## 2021-09-25 RX ADMIN — APIXABAN 5 MG: 2.5 TABLET, FILM COATED ORAL at 12:09

## 2021-09-25 RX ADMIN — SODIUM CHLORIDE: 0.9 INJECTION, SOLUTION INTRAVENOUS at 09:09

## 2021-09-26 PROBLEM — R78.81 BACTEREMIA: Status: ACTIVE | Noted: 2021-09-26

## 2021-09-26 LAB
DIFFERENTIAL METHOD: ABNORMAL
ERYTHROCYTE [DISTWIDTH] IN BLOOD BY AUTOMATED COUNT: 24.1 % (ref 11.5–14.5)
HCT VFR BLD AUTO: 29.6 % (ref 40–54)
HGB BLD-MCNC: 8.9 G/DL (ref 14–18)
IMM GRANULOCYTES # BLD AUTO: ABNORMAL K/UL
IMM GRANULOCYTES NFR BLD AUTO: ABNORMAL %
MCH RBC QN AUTO: 24.4 PG (ref 27–31)
MCHC RBC AUTO-ENTMCNC: 30.1 G/DL (ref 32–36)
MCV RBC AUTO: 81 FL (ref 82–98)
NRBC BLD-RTO: 0 /100 WBC
PLATELET # BLD AUTO: 407 K/UL (ref 150–450)
PMV BLD AUTO: 10.4 FL (ref 9.2–12.9)
RBC # BLD AUTO: 3.65 M/UL (ref 4.6–6.2)
WBC # BLD AUTO: 7.24 K/UL (ref 3.9–12.7)

## 2021-09-26 PROCEDURE — 25000003 PHARM REV CODE 250: Performed by: NURSE PRACTITIONER

## 2021-09-26 PROCEDURE — 99232 SBSQ HOSP IP/OBS MODERATE 35: CPT | Mod: ,,, | Performed by: INTERNAL MEDICINE

## 2021-09-26 PROCEDURE — 99232 PR SUBSEQUENT HOSPITAL CARE,LEVL II: ICD-10-PCS | Mod: ,,, | Performed by: INTERNAL MEDICINE

## 2021-09-26 PROCEDURE — 25000003 PHARM REV CODE 250: Performed by: INTERNAL MEDICINE

## 2021-09-26 PROCEDURE — 21400001 HC TELEMETRY ROOM

## 2021-09-26 PROCEDURE — 87040 BLOOD CULTURE FOR BACTERIA: CPT | Mod: 59 | Performed by: NURSE PRACTITIONER

## 2021-09-26 PROCEDURE — 63600175 PHARM REV CODE 636 W HCPCS: Performed by: NURSE PRACTITIONER

## 2021-09-26 PROCEDURE — 27000221 HC OXYGEN, UP TO 24 HOURS

## 2021-09-26 PROCEDURE — 94761 N-INVAS EAR/PLS OXIMETRY MLT: CPT

## 2021-09-26 PROCEDURE — 87205 SMEAR GRAM STAIN: CPT | Performed by: NURSE PRACTITIONER

## 2021-09-26 PROCEDURE — 87070 CULTURE OTHR SPECIMN AEROBIC: CPT | Performed by: NURSE PRACTITIONER

## 2021-09-26 PROCEDURE — 36415 COLL VENOUS BLD VENIPUNCTURE: CPT | Performed by: NURSE PRACTITIONER

## 2021-09-26 PROCEDURE — 99900035 HC TECH TIME PER 15 MIN (STAT)

## 2021-09-26 RX ADMIN — APIXABAN 5 MG: 2.5 TABLET, FILM COATED ORAL at 08:09

## 2021-09-26 RX ADMIN — ATORVASTATIN CALCIUM 10 MG: 10 TABLET, FILM COATED ORAL at 08:09

## 2021-09-26 RX ADMIN — LEVOFLOXACIN 750 MG: 750 INJECTION, SOLUTION INTRAVENOUS at 08:09

## 2021-09-26 RX ADMIN — DOCUSATE SODIUM 100 MG: 100 CAPSULE, LIQUID FILLED ORAL at 08:09

## 2021-09-26 RX ADMIN — DILTIAZEM HYDROCHLORIDE 180 MG: 180 CAPSULE, COATED, EXTENDED RELEASE ORAL at 08:09

## 2021-09-26 RX ADMIN — SENNOSIDES 8.6 MG: 8.6 TABLET, COATED ORAL at 08:09

## 2021-09-26 RX ADMIN — PANTOPRAZOLE SODIUM 40 MG: 40 TABLET, DELAYED RELEASE ORAL at 08:09

## 2021-09-27 LAB
ANION GAP SERPL CALC-SCNC: 12 MMOL/L (ref 8–16)
BACTERIA BLD CULT: ABNORMAL
BASOPHILS # BLD AUTO: 0.05 K/UL (ref 0–0.2)
BASOPHILS NFR BLD: 0.7 % (ref 0–1.9)
BUN SERPL-MCNC: 14 MG/DL (ref 8–23)
CALCIUM SERPL-MCNC: 9.1 MG/DL (ref 8.7–10.5)
CHLORIDE SERPL-SCNC: 101 MMOL/L (ref 95–110)
CO2 SERPL-SCNC: 19 MMOL/L (ref 23–29)
CREAT SERPL-MCNC: 0.8 MG/DL (ref 0.5–1.4)
DIFFERENTIAL METHOD: ABNORMAL
EOSINOPHIL # BLD AUTO: 0.6 K/UL (ref 0–0.5)
EOSINOPHIL NFR BLD: 8.7 % (ref 0–8)
ERYTHROCYTE [DISTWIDTH] IN BLOOD BY AUTOMATED COUNT: 24.2 % (ref 11.5–14.5)
EST. GFR  (AFRICAN AMERICAN): >60 ML/MIN/1.73 M^2
EST. GFR  (NON AFRICAN AMERICAN): >60 ML/MIN/1.73 M^2
GLUCOSE SERPL-MCNC: 95 MG/DL (ref 70–110)
HCT VFR BLD AUTO: 31.1 % (ref 40–54)
HGB BLD-MCNC: 9.3 G/DL (ref 14–18)
IMM GRANULOCYTES # BLD AUTO: 0.03 K/UL (ref 0–0.04)
IMM GRANULOCYTES NFR BLD AUTO: 0.4 % (ref 0–0.5)
LYMPHOCYTES # BLD AUTO: 0.5 K/UL (ref 1–4.8)
LYMPHOCYTES NFR BLD: 7.9 % (ref 18–48)
MAGNESIUM SERPL-MCNC: 1.8 MG/DL (ref 1.6–2.6)
MCH RBC QN AUTO: 24.2 PG (ref 27–31)
MCHC RBC AUTO-ENTMCNC: 29.9 G/DL (ref 32–36)
MCV RBC AUTO: 81 FL (ref 82–98)
MONOCYTES # BLD AUTO: 0.4 K/UL (ref 0.3–1)
MONOCYTES NFR BLD: 5.7 % (ref 4–15)
NEUTROPHILS # BLD AUTO: 5.2 K/UL (ref 1.8–7.7)
NEUTROPHILS NFR BLD: 76.6 % (ref 38–73)
NRBC BLD-RTO: 0 /100 WBC
PHOSPHATE SERPL-MCNC: 3 MG/DL (ref 2.7–4.5)
PLATELET # BLD AUTO: 484 K/UL (ref 150–450)
PMV BLD AUTO: 10 FL (ref 9.2–12.9)
POCT GLUCOSE: 106 MG/DL (ref 70–110)
POTASSIUM SERPL-SCNC: 3.8 MMOL/L (ref 3.5–5.1)
RBC # BLD AUTO: 3.85 M/UL (ref 4.6–6.2)
SODIUM SERPL-SCNC: 132 MMOL/L (ref 136–145)
WBC # BLD AUTO: 6.8 K/UL (ref 3.9–12.7)

## 2021-09-27 PROCEDURE — 63600175 PHARM REV CODE 636 W HCPCS: Performed by: NURSE PRACTITIONER

## 2021-09-27 PROCEDURE — 25000003 PHARM REV CODE 250: Performed by: NURSE PRACTITIONER

## 2021-09-27 PROCEDURE — 94760 N-INVAS EAR/PLS OXIMETRY 1: CPT

## 2021-09-27 PROCEDURE — 84100 ASSAY OF PHOSPHORUS: CPT | Performed by: NURSE PRACTITIONER

## 2021-09-27 PROCEDURE — 21400001 HC TELEMETRY ROOM

## 2021-09-27 PROCEDURE — 99233 PR SUBSEQUENT HOSPITAL CARE,LEVL III: ICD-10-PCS | Mod: ,,, | Performed by: INTERNAL MEDICINE

## 2021-09-27 PROCEDURE — 25000003 PHARM REV CODE 250: Performed by: INTERNAL MEDICINE

## 2021-09-27 PROCEDURE — 99900035 HC TECH TIME PER 15 MIN (STAT)

## 2021-09-27 PROCEDURE — 83735 ASSAY OF MAGNESIUM: CPT | Performed by: NURSE PRACTITIONER

## 2021-09-27 PROCEDURE — 63600175 PHARM REV CODE 636 W HCPCS: Performed by: INTERNAL MEDICINE

## 2021-09-27 PROCEDURE — 99223 1ST HOSP IP/OBS HIGH 75: CPT | Mod: NSCH,,, | Performed by: INTERNAL MEDICINE

## 2021-09-27 PROCEDURE — 36415 COLL VENOUS BLD VENIPUNCTURE: CPT | Performed by: NURSE PRACTITIONER

## 2021-09-27 PROCEDURE — 99233 SBSQ HOSP IP/OBS HIGH 50: CPT | Mod: ,,, | Performed by: INTERNAL MEDICINE

## 2021-09-27 PROCEDURE — 99223 PR INITIAL HOSPITAL CARE,LEVL III: ICD-10-PCS | Mod: NSCH,,, | Performed by: INTERNAL MEDICINE

## 2021-09-27 PROCEDURE — 85025 COMPLETE CBC W/AUTO DIFF WBC: CPT | Performed by: NURSE PRACTITIONER

## 2021-09-27 PROCEDURE — 80048 BASIC METABOLIC PNL TOTAL CA: CPT | Performed by: NURSE PRACTITIONER

## 2021-09-27 RX ORDER — HYDROCHLOROTHIAZIDE 25 MG/1
25 TABLET ORAL DAILY
Status: DISCONTINUED | OUTPATIENT
Start: 2021-09-27 | End: 2021-09-28 | Stop reason: HOSPADM

## 2021-09-27 RX ADMIN — PANTOPRAZOLE SODIUM 40 MG: 40 TABLET, DELAYED RELEASE ORAL at 08:09

## 2021-09-27 RX ADMIN — ATORVASTATIN CALCIUM 10 MG: 10 TABLET, FILM COATED ORAL at 08:09

## 2021-09-27 RX ADMIN — HYDROCHLOROTHIAZIDE 25 MG: 25 TABLET ORAL at 12:09

## 2021-09-27 RX ADMIN — SENNOSIDES 8.6 MG: 8.6 TABLET, COATED ORAL at 08:09

## 2021-09-27 RX ADMIN — DILTIAZEM HYDROCHLORIDE 180 MG: 180 CAPSULE, COATED, EXTENDED RELEASE ORAL at 08:09

## 2021-09-27 RX ADMIN — APIXABAN 5 MG: 2.5 TABLET, FILM COATED ORAL at 08:09

## 2021-09-27 RX ADMIN — ONDANSETRON 4 MG: 2 INJECTION INTRAMUSCULAR; INTRAVENOUS at 08:09

## 2021-09-27 RX ADMIN — DOCUSATE SODIUM 100 MG: 100 CAPSULE, LIQUID FILLED ORAL at 08:09

## 2021-09-27 RX ADMIN — CEFTRIAXONE 2 G: 2 INJECTION, SOLUTION INTRAVENOUS at 08:09

## 2021-09-27 RX ADMIN — APIXABAN 5 MG: 2.5 TABLET, FILM COATED ORAL at 10:09

## 2021-09-28 PROBLEM — A41.9 SEPSIS: Status: RESOLVED | Noted: 2021-09-24 | Resolved: 2021-09-28

## 2021-09-28 PROBLEM — I48.91 ATRIAL FIBRILLATION WITH RVR: Status: RESOLVED | Noted: 2021-09-24 | Resolved: 2021-09-28

## 2021-09-28 LAB
ANION GAP SERPL CALC-SCNC: 7 MMOL/L (ref 8–16)
BASOPHILS # BLD AUTO: 0.06 K/UL (ref 0–0.2)
BASOPHILS NFR BLD: 0.9 % (ref 0–1.9)
BUN SERPL-MCNC: 9 MG/DL (ref 8–23)
CALCIUM SERPL-MCNC: 9.3 MG/DL (ref 8.7–10.5)
CHLORIDE SERPL-SCNC: 101 MMOL/L (ref 95–110)
CO2 SERPL-SCNC: 31 MMOL/L (ref 23–29)
CREAT SERPL-MCNC: 0.7 MG/DL (ref 0.5–1.4)
DIFFERENTIAL METHOD: ABNORMAL
EOSINOPHIL # BLD AUTO: 0.6 K/UL (ref 0–0.5)
EOSINOPHIL NFR BLD: 9.3 % (ref 0–8)
ERYTHROCYTE [DISTWIDTH] IN BLOOD BY AUTOMATED COUNT: 24.1 % (ref 11.5–14.5)
EST. GFR  (AFRICAN AMERICAN): >60 ML/MIN/1.73 M^2
EST. GFR  (NON AFRICAN AMERICAN): >60 ML/MIN/1.73 M^2
GLUCOSE SERPL-MCNC: 90 MG/DL (ref 70–110)
HCT VFR BLD AUTO: 30.4 % (ref 40–54)
HGB BLD-MCNC: 9.1 G/DL (ref 14–18)
IMM GRANULOCYTES # BLD AUTO: 0.03 K/UL (ref 0–0.04)
IMM GRANULOCYTES NFR BLD AUTO: 0.4 % (ref 0–0.5)
LYMPHOCYTES # BLD AUTO: 0.6 K/UL (ref 1–4.8)
LYMPHOCYTES NFR BLD: 8.2 % (ref 18–48)
MAGNESIUM SERPL-MCNC: 1.7 MG/DL (ref 1.6–2.6)
MCH RBC QN AUTO: 24 PG (ref 27–31)
MCHC RBC AUTO-ENTMCNC: 29.9 G/DL (ref 32–36)
MCV RBC AUTO: 80 FL (ref 82–98)
MONOCYTES # BLD AUTO: 0.4 K/UL (ref 0.3–1)
MONOCYTES NFR BLD: 5.3 % (ref 4–15)
NEUTROPHILS # BLD AUTO: 5.2 K/UL (ref 1.8–7.7)
NEUTROPHILS NFR BLD: 75.9 % (ref 38–73)
NRBC BLD-RTO: 0 /100 WBC
PHOSPHATE SERPL-MCNC: 2.2 MG/DL (ref 2.7–4.5)
PLATELET # BLD AUTO: 492 K/UL (ref 150–450)
PMV BLD AUTO: 9.9 FL (ref 9.2–12.9)
POTASSIUM SERPL-SCNC: 4.6 MMOL/L (ref 3.5–5.1)
RBC # BLD AUTO: 3.79 M/UL (ref 4.6–6.2)
SODIUM SERPL-SCNC: 139 MMOL/L (ref 136–145)
WBC # BLD AUTO: 6.79 K/UL (ref 3.9–12.7)

## 2021-09-28 PROCEDURE — 25000003 PHARM REV CODE 250: Performed by: NURSE PRACTITIONER

## 2021-09-28 PROCEDURE — 99223 1ST HOSP IP/OBS HIGH 75: CPT | Mod: NSCH,,, | Performed by: INTERNAL MEDICINE

## 2021-09-28 PROCEDURE — 83735 ASSAY OF MAGNESIUM: CPT | Performed by: NURSE PRACTITIONER

## 2021-09-28 PROCEDURE — 99223 PR INITIAL HOSPITAL CARE,LEVL III: ICD-10-PCS | Mod: NSCH,,, | Performed by: INTERNAL MEDICINE

## 2021-09-28 PROCEDURE — 80048 BASIC METABOLIC PNL TOTAL CA: CPT | Performed by: NURSE PRACTITIONER

## 2021-09-28 PROCEDURE — 99233 SBSQ HOSP IP/OBS HIGH 50: CPT | Mod: ,,, | Performed by: INTERNAL MEDICINE

## 2021-09-28 PROCEDURE — 36569 INSJ PICC 5 YR+ W/O IMAGING: CPT

## 2021-09-28 PROCEDURE — C1751 CATH, INF, PER/CENT/MIDLINE: HCPCS

## 2021-09-28 PROCEDURE — 25000003 PHARM REV CODE 250: Performed by: INTERNAL MEDICINE

## 2021-09-28 PROCEDURE — 84100 ASSAY OF PHOSPHORUS: CPT | Performed by: NURSE PRACTITIONER

## 2021-09-28 PROCEDURE — 85025 COMPLETE CBC W/AUTO DIFF WBC: CPT | Performed by: NURSE PRACTITIONER

## 2021-09-28 PROCEDURE — 94761 N-INVAS EAR/PLS OXIMETRY MLT: CPT

## 2021-09-28 PROCEDURE — 63600175 PHARM REV CODE 636 W HCPCS: Performed by: INTERNAL MEDICINE

## 2021-09-28 PROCEDURE — 36415 COLL VENOUS BLD VENIPUNCTURE: CPT | Performed by: NURSE PRACTITIONER

## 2021-09-28 PROCEDURE — 99233 PR SUBSEQUENT HOSPITAL CARE,LEVL III: ICD-10-PCS | Mod: ,,, | Performed by: INTERNAL MEDICINE

## 2021-09-28 RX ORDER — SODIUM CHLORIDE 0.9 % (FLUSH) 0.9 %
10 SYRINGE (ML) INJECTION
Status: DISCONTINUED | OUTPATIENT
Start: 2021-09-28 | End: 2021-09-28 | Stop reason: HOSPADM

## 2021-09-28 RX ORDER — CEFPODOXIME PROXETIL 200 MG/1
200 TABLET, FILM COATED ORAL 2 TIMES DAILY
Qty: 42 TABLET | Refills: 0 | Status: SHIPPED | OUTPATIENT
Start: 2021-09-28 | End: 2021-09-28 | Stop reason: HOSPADM

## 2021-09-28 RX ORDER — DILTIAZEM HYDROCHLORIDE 180 MG/1
180 CAPSULE, COATED, EXTENDED RELEASE ORAL DAILY
Qty: 30 CAPSULE | Refills: 0 | Status: SHIPPED | OUTPATIENT
Start: 2021-09-29 | End: 2021-12-09 | Stop reason: SDUPTHER

## 2021-09-28 RX ORDER — SODIUM CHLORIDE 0.9 % (FLUSH) 0.9 %
10 SYRINGE (ML) INJECTION EVERY 6 HOURS
Status: DISCONTINUED | OUTPATIENT
Start: 2021-09-28 | End: 2021-09-28 | Stop reason: HOSPADM

## 2021-09-28 RX ORDER — SODIUM,POTASSIUM PHOSPHATES 280-250MG
1 POWDER IN PACKET (EA) ORAL ONCE
Status: COMPLETED | OUTPATIENT
Start: 2021-09-28 | End: 2021-09-28

## 2021-09-28 RX ADMIN — PANTOPRAZOLE SODIUM 40 MG: 40 TABLET, DELAYED RELEASE ORAL at 08:09

## 2021-09-28 RX ADMIN — ATORVASTATIN CALCIUM 10 MG: 10 TABLET, FILM COATED ORAL at 08:09

## 2021-09-28 RX ADMIN — CEFTRIAXONE 2 G: 2 INJECTION, SOLUTION INTRAVENOUS at 09:09

## 2021-09-28 RX ADMIN — Medication 1 PACKET: at 09:09

## 2021-09-28 RX ADMIN — DOCUSATE SODIUM 100 MG: 100 CAPSULE, LIQUID FILLED ORAL at 08:09

## 2021-09-28 RX ADMIN — DILTIAZEM HYDROCHLORIDE 180 MG: 180 CAPSULE, COATED, EXTENDED RELEASE ORAL at 08:09

## 2021-09-28 RX ADMIN — APIXABAN 5 MG: 2.5 TABLET, FILM COATED ORAL at 08:09

## 2021-09-28 RX ADMIN — SENNOSIDES 8.6 MG: 8.6 TABLET, COATED ORAL at 08:09

## 2021-09-28 RX ADMIN — HYDROCHLOROTHIAZIDE 25 MG: 25 TABLET ORAL at 08:09

## 2021-09-29 VITALS
TEMPERATURE: 98 F | HEIGHT: 70 IN | WEIGHT: 148.38 LBS | SYSTOLIC BLOOD PRESSURE: 120 MMHG | DIASTOLIC BLOOD PRESSURE: 76 MMHG | HEART RATE: 70 BPM | OXYGEN SATURATION: 96 % | BODY MASS INDEX: 21.24 KG/M2 | RESPIRATION RATE: 18 BRPM

## 2021-09-29 LAB
BACTERIA SPEC AEROBE CULT: NORMAL
BACTERIA SPEC AEROBE CULT: NORMAL
GRAM STN SPEC: NORMAL

## 2021-10-01 LAB
BACTERIA BLD CULT: NORMAL
BACTERIA BLD CULT: NORMAL

## 2021-10-04 ENCOUNTER — LAB VISIT (OUTPATIENT)
Dept: LAB | Facility: HOSPITAL | Age: 81
End: 2021-10-04
Payer: COMMERCIAL

## 2021-10-04 DIAGNOSIS — A41.9 SEPTICEMIA DURING LABOR: Primary | ICD-10-CM

## 2021-10-04 DIAGNOSIS — A41.9 SYSTEMIC INFECTION: ICD-10-CM

## 2021-10-04 DIAGNOSIS — J18.8 OTHER PNEUMONIA, UNSPECIFIED ORGANISM: ICD-10-CM

## 2021-10-04 DIAGNOSIS — R78.81 BACTEREMIA: ICD-10-CM

## 2021-10-04 LAB
ALBUMIN SERPL BCP-MCNC: 3.2 G/DL (ref 3.5–5.2)
ALP SERPL-CCNC: 50 U/L (ref 55–135)
ALT SERPL W/O P-5'-P-CCNC: 15 U/L (ref 10–44)
ANION GAP SERPL CALC-SCNC: 8 MMOL/L (ref 8–16)
AST SERPL-CCNC: 20 U/L (ref 10–40)
BILIRUB SERPL-MCNC: 0.4 MG/DL (ref 0.1–1)
BUN SERPL-MCNC: 18 MG/DL (ref 8–23)
CALCIUM SERPL-MCNC: 8.6 MG/DL (ref 8.7–10.5)
CHLORIDE SERPL-SCNC: 102 MMOL/L (ref 95–110)
CO2 SERPL-SCNC: 26 MMOL/L (ref 23–29)
CREAT SERPL-MCNC: 0.8 MG/DL (ref 0.5–1.4)
ERYTHROCYTE [DISTWIDTH] IN BLOOD BY AUTOMATED COUNT: 25.6 % (ref 11.5–14.5)
EST. GFR  (AFRICAN AMERICAN): >60 ML/MIN/1.73 M^2
EST. GFR  (NON AFRICAN AMERICAN): >60 ML/MIN/1.73 M^2
GLUCOSE SERPL-MCNC: 90 MG/DL (ref 70–110)
HCT VFR BLD AUTO: 25.9 % (ref 40–54)
HGB BLD-MCNC: 8 G/DL (ref 14–18)
MCH RBC QN AUTO: 24.8 PG (ref 27–31)
MCHC RBC AUTO-ENTMCNC: 30.9 G/DL (ref 32–36)
MCV RBC AUTO: 80 FL (ref 82–98)
PLATELET # BLD AUTO: 551 K/UL (ref 150–450)
PMV BLD AUTO: 10.7 FL (ref 9.2–12.9)
POTASSIUM SERPL-SCNC: 4.3 MMOL/L (ref 3.5–5.1)
PROT SERPL-MCNC: 7.3 G/DL (ref 6–8.4)
RBC # BLD AUTO: 3.23 M/UL (ref 4.6–6.2)
SODIUM SERPL-SCNC: 136 MMOL/L (ref 136–145)
WBC # BLD AUTO: 12.93 K/UL (ref 3.9–12.7)

## 2021-10-04 PROCEDURE — 80053 COMPREHEN METABOLIC PANEL: CPT | Performed by: INTERNAL MEDICINE

## 2021-10-04 PROCEDURE — 85027 COMPLETE CBC AUTOMATED: CPT | Performed by: INTERNAL MEDICINE

## 2021-10-11 ENCOUNTER — EXTERNAL HOME HEALTH (OUTPATIENT)
Dept: HOME HEALTH SERVICES | Facility: HOSPITAL | Age: 81
End: 2021-10-11
Payer: MEDICARE

## 2021-10-11 ENCOUNTER — LAB VISIT (OUTPATIENT)
Dept: LAB | Facility: HOSPITAL | Age: 81
End: 2021-10-11
Attending: INTERNAL MEDICINE
Payer: COMMERCIAL

## 2021-10-11 DIAGNOSIS — J18.8 OTHER PNEUMONIA, UNSPECIFIED ORGANISM: ICD-10-CM

## 2021-10-11 DIAGNOSIS — R78.81 BACTEREMIA: ICD-10-CM

## 2021-10-11 DIAGNOSIS — A41.9 SEPTICEMIA DURING LABOR: ICD-10-CM

## 2021-10-11 DIAGNOSIS — A41.9 SEPTICEMIA DURING LABOR: Primary | ICD-10-CM

## 2021-10-11 LAB
ALBUMIN SERPL BCP-MCNC: 3.5 G/DL (ref 3.5–5.2)
ALP SERPL-CCNC: 58 U/L (ref 55–135)
ALT SERPL W/O P-5'-P-CCNC: 20 U/L (ref 10–44)
ANION GAP SERPL CALC-SCNC: 10 MMOL/L (ref 8–16)
AST SERPL-CCNC: 29 U/L (ref 10–40)
BILIRUB SERPL-MCNC: 0.6 MG/DL (ref 0.1–1)
BUN SERPL-MCNC: 22 MG/DL (ref 8–23)
CALCIUM SERPL-MCNC: 9.3 MG/DL (ref 8.7–10.5)
CHLORIDE SERPL-SCNC: 103 MMOL/L (ref 95–110)
CO2 SERPL-SCNC: 24 MMOL/L (ref 23–29)
CREAT SERPL-MCNC: 0.7 MG/DL (ref 0.5–1.4)
ERYTHROCYTE [DISTWIDTH] IN BLOOD BY AUTOMATED COUNT: 27.5 % (ref 11.5–14.5)
EST. GFR  (AFRICAN AMERICAN): >60 ML/MIN/1.73 M^2
EST. GFR  (NON AFRICAN AMERICAN): >60 ML/MIN/1.73 M^2
GLUCOSE SERPL-MCNC: 81 MG/DL (ref 70–110)
HCT VFR BLD AUTO: 28 % (ref 40–54)
HGB BLD-MCNC: 8.3 G/DL (ref 14–18)
MCH RBC QN AUTO: 23.9 PG (ref 27–31)
MCHC RBC AUTO-ENTMCNC: 29.6 G/DL (ref 32–36)
MCV RBC AUTO: 81 FL (ref 82–98)
PLATELET # BLD AUTO: 491 K/UL (ref 150–450)
PMV BLD AUTO: 10.7 FL (ref 9.2–12.9)
POTASSIUM SERPL-SCNC: 4.5 MMOL/L (ref 3.5–5.1)
PROT SERPL-MCNC: 7.6 G/DL (ref 6–8.4)
RBC # BLD AUTO: 3.47 M/UL (ref 4.6–6.2)
SODIUM SERPL-SCNC: 137 MMOL/L (ref 136–145)
WBC # BLD AUTO: 9.09 K/UL (ref 3.9–12.7)

## 2021-10-11 PROCEDURE — 85027 COMPLETE CBC AUTOMATED: CPT | Performed by: INTERNAL MEDICINE

## 2021-10-11 PROCEDURE — 80053 COMPREHEN METABOLIC PANEL: CPT | Performed by: INTERNAL MEDICINE

## 2021-10-27 ENCOUNTER — HOSPITAL ENCOUNTER (OUTPATIENT)
Dept: RADIOLOGY | Facility: HOSPITAL | Age: 81
Discharge: HOME | End: 2021-10-27
Attending: NURSE PRACTITIONER
Payer: COMMERCIAL

## 2021-10-27 ENCOUNTER — OFFICE VISIT (OUTPATIENT)
Dept: PRIMARY CARE CLINIC | Facility: CLINIC | Age: 81
End: 2021-10-27
Payer: COMMERCIAL

## 2021-10-27 VITALS
TEMPERATURE: 98 F | DIASTOLIC BLOOD PRESSURE: 74 MMHG | WEIGHT: 152.25 LBS | BODY MASS INDEX: 21.84 KG/M2 | HEART RATE: 80 BPM | SYSTOLIC BLOOD PRESSURE: 120 MMHG | OXYGEN SATURATION: 96 %

## 2021-10-27 DIAGNOSIS — I10 HYPERTENSION, UNSPECIFIED TYPE: ICD-10-CM

## 2021-10-27 DIAGNOSIS — Z87.01 HISTORY OF PNEUMONIA: Primary | ICD-10-CM

## 2021-10-27 DIAGNOSIS — J84.10 CALCIFIED GRANULOMA OF LUNG: ICD-10-CM

## 2021-10-27 DIAGNOSIS — R91.1 SOLITARY PULMONARY NODULE: ICD-10-CM

## 2021-10-27 DIAGNOSIS — I50.22 CHRONIC SYSTOLIC CONGESTIVE HEART FAILURE: ICD-10-CM

## 2021-10-27 DIAGNOSIS — I35.1 NONRHEUMATIC AORTIC VALVE INSUFFICIENCY: ICD-10-CM

## 2021-10-27 DIAGNOSIS — J43.9 PULMONARY EMPHYSEMA, UNSPECIFIED EMPHYSEMA TYPE: ICD-10-CM

## 2021-10-27 DIAGNOSIS — Z87.01 HISTORY OF PNEUMONIA: ICD-10-CM

## 2021-10-27 PROCEDURE — 1101F PR PT FALLS ASSESS DOC 0-1 FALLS W/OUT INJ PAST YR: ICD-10-PCS | Mod: CPTII,S$GLB,, | Performed by: NURSE PRACTITIONER

## 2021-10-27 PROCEDURE — 1111F PR DISCHARGE MEDS RECONCILED W/ CURRENT OUTPATIENT MED LIST: ICD-10-PCS | Mod: CPTII,S$GLB,, | Performed by: NURSE PRACTITIONER

## 2021-10-27 PROCEDURE — 99999 PR PBB SHADOW E&M-EST. PATIENT-LVL IV: ICD-10-PCS | Mod: PBBFAC,,, | Performed by: NURSE PRACTITIONER

## 2021-10-27 PROCEDURE — 3074F SYST BP LT 130 MM HG: CPT | Mod: CPTII,S$GLB,, | Performed by: NURSE PRACTITIONER

## 2021-10-27 PROCEDURE — 1101F PT FALLS ASSESS-DOCD LE1/YR: CPT | Mod: CPTII,S$GLB,, | Performed by: NURSE PRACTITIONER

## 2021-10-27 PROCEDURE — 3078F DIAST BP <80 MM HG: CPT | Mod: CPTII,S$GLB,, | Performed by: NURSE PRACTITIONER

## 2021-10-27 PROCEDURE — 1159F PR MEDICATION LIST DOCUMENTED IN MEDICAL RECORD: ICD-10-PCS | Mod: CPTII,S$GLB,, | Performed by: NURSE PRACTITIONER

## 2021-10-27 PROCEDURE — 99214 OFFICE O/P EST MOD 30 MIN: CPT | Mod: S$GLB,,, | Performed by: NURSE PRACTITIONER

## 2021-10-27 PROCEDURE — 3074F PR MOST RECENT SYSTOLIC BLOOD PRESSURE < 130 MM HG: ICD-10-PCS | Mod: CPTII,S$GLB,, | Performed by: NURSE PRACTITIONER

## 2021-10-27 PROCEDURE — 1160F RVW MEDS BY RX/DR IN RCRD: CPT | Mod: CPTII,S$GLB,, | Performed by: NURSE PRACTITIONER

## 2021-10-27 PROCEDURE — 1160F PR REVIEW ALL MEDS BY PRESCRIBER/CLIN PHARMACIST DOCUMENTED: ICD-10-PCS | Mod: CPTII,S$GLB,, | Performed by: NURSE PRACTITIONER

## 2021-10-27 PROCEDURE — 99999 PR PBB SHADOW E&M-EST. PATIENT-LVL IV: CPT | Mod: PBBFAC,,, | Performed by: NURSE PRACTITIONER

## 2021-10-27 PROCEDURE — 1157F ADVNC CARE PLAN IN RCRD: CPT | Mod: CPTII,S$GLB,, | Performed by: NURSE PRACTITIONER

## 2021-10-27 PROCEDURE — 71046 X-RAY EXAM CHEST 2 VIEWS: CPT | Mod: TC

## 2021-10-27 PROCEDURE — 3288F PR FALLS RISK ASSESSMENT DOCUMENTED: ICD-10-PCS | Mod: CPTII,S$GLB,, | Performed by: NURSE PRACTITIONER

## 2021-10-27 PROCEDURE — 99214 PR OFFICE/OUTPT VISIT, EST, LEVL IV, 30-39 MIN: ICD-10-PCS | Mod: S$GLB,,, | Performed by: NURSE PRACTITIONER

## 2021-10-27 PROCEDURE — 1126F PR PAIN SEVERITY QUANTIFIED, NO PAIN PRESENT: ICD-10-PCS | Mod: CPTII,S$GLB,, | Performed by: NURSE PRACTITIONER

## 2021-10-27 PROCEDURE — 71046 X-RAY EXAM CHEST 2 VIEWS: CPT | Mod: 26,,, | Performed by: RADIOLOGY

## 2021-10-27 PROCEDURE — 1159F MED LIST DOCD IN RCRD: CPT | Mod: CPTII,S$GLB,, | Performed by: NURSE PRACTITIONER

## 2021-10-27 PROCEDURE — 1111F DSCHRG MED/CURRENT MED MERGE: CPT | Mod: CPTII,S$GLB,, | Performed by: NURSE PRACTITIONER

## 2021-10-27 PROCEDURE — 71046 XR CHEST PA AND LATERAL: ICD-10-PCS | Mod: 26,,, | Performed by: RADIOLOGY

## 2021-10-27 PROCEDURE — 1157F PR ADVANCE CARE PLAN OR EQUIV PRESENT IN MEDICAL RECORD: ICD-10-PCS | Mod: CPTII,S$GLB,, | Performed by: NURSE PRACTITIONER

## 2021-10-27 PROCEDURE — 1126F AMNT PAIN NOTED NONE PRSNT: CPT | Mod: CPTII,S$GLB,, | Performed by: NURSE PRACTITIONER

## 2021-10-27 PROCEDURE — 3288F FALL RISK ASSESSMENT DOCD: CPT | Mod: CPTII,S$GLB,, | Performed by: NURSE PRACTITIONER

## 2021-10-27 PROCEDURE — 3078F PR MOST RECENT DIASTOLIC BLOOD PRESSURE < 80 MM HG: ICD-10-PCS | Mod: CPTII,S$GLB,, | Performed by: NURSE PRACTITIONER

## 2021-10-28 ENCOUNTER — PATIENT MESSAGE (OUTPATIENT)
Dept: HEMATOLOGY/ONCOLOGY | Facility: CLINIC | Age: 81
End: 2021-10-28
Payer: MEDICARE

## 2021-10-28 ENCOUNTER — PATIENT MESSAGE (OUTPATIENT)
Dept: PRIMARY CARE CLINIC | Facility: CLINIC | Age: 81
End: 2021-10-28
Payer: MEDICARE

## 2021-10-28 DIAGNOSIS — R91.1 SOLITARY PULMONARY NODULE: ICD-10-CM

## 2021-10-28 DIAGNOSIS — J43.9 PULMONARY EMPHYSEMA, UNSPECIFIED EMPHYSEMA TYPE: Primary | ICD-10-CM

## 2021-10-28 DIAGNOSIS — Z87.01 HISTORY OF PNEUMONIA: ICD-10-CM

## 2021-11-02 DIAGNOSIS — R91.1 SOLITARY PULMONARY NODULE: ICD-10-CM

## 2021-11-02 DIAGNOSIS — J43.9 PULMONARY EMPHYSEMA, UNSPECIFIED EMPHYSEMA TYPE: Primary | ICD-10-CM

## 2021-11-02 DIAGNOSIS — J84.10 CALCIFIED GRANULOMA OF LUNG: ICD-10-CM

## 2021-11-03 ENCOUNTER — DOCUMENT SCAN (OUTPATIENT)
Dept: HOME HEALTH SERVICES | Facility: HOSPITAL | Age: 81
End: 2021-11-03
Payer: MEDICARE

## 2021-11-04 ENCOUNTER — PATIENT MESSAGE (OUTPATIENT)
Dept: PRIMARY CARE CLINIC | Facility: CLINIC | Age: 81
End: 2021-11-04
Payer: MEDICARE

## 2021-12-09 ENCOUNTER — OFFICE VISIT (OUTPATIENT)
Dept: CARDIOLOGY | Facility: CLINIC | Age: 81
End: 2021-12-09
Payer: COMMERCIAL

## 2021-12-09 VITALS
DIASTOLIC BLOOD PRESSURE: 62 MMHG | RESPIRATION RATE: 16 BRPM | OXYGEN SATURATION: 94 % | HEART RATE: 70 BPM | BODY MASS INDEX: 22.28 KG/M2 | HEIGHT: 70 IN | SYSTOLIC BLOOD PRESSURE: 122 MMHG | WEIGHT: 155.63 LBS

## 2021-12-09 DIAGNOSIS — I35.1 NONRHEUMATIC AORTIC VALVE INSUFFICIENCY: ICD-10-CM

## 2021-12-09 DIAGNOSIS — E78.49 OTHER HYPERLIPIDEMIA: ICD-10-CM

## 2021-12-09 DIAGNOSIS — R06.02 SHORTNESS OF BREATH: ICD-10-CM

## 2021-12-09 DIAGNOSIS — I70.0 ATHEROSCLEROSIS OF ABDOMINAL AORTA: ICD-10-CM

## 2021-12-09 DIAGNOSIS — I10 HYPERTENSION, UNSPECIFIED TYPE: ICD-10-CM

## 2021-12-09 DIAGNOSIS — I50.22 CHRONIC SYSTOLIC CONGESTIVE HEART FAILURE: ICD-10-CM

## 2021-12-09 DIAGNOSIS — I48.0 PAF (PAROXYSMAL ATRIAL FIBRILLATION): Primary | ICD-10-CM

## 2021-12-09 DIAGNOSIS — I10 ESSENTIAL HYPERTENSION: ICD-10-CM

## 2021-12-09 DIAGNOSIS — R06.09 OTHER FORM OF DYSPNEA: ICD-10-CM

## 2021-12-09 PROCEDURE — 99999 PR PBB SHADOW E&M-EST. PATIENT-LVL III: CPT | Mod: PBBFAC,,, | Performed by: INTERNAL MEDICINE

## 2021-12-09 PROCEDURE — 1157F ADVNC CARE PLAN IN RCRD: CPT | Mod: CPTII,S$GLB,, | Performed by: INTERNAL MEDICINE

## 2021-12-09 PROCEDURE — 99214 OFFICE O/P EST MOD 30 MIN: CPT | Mod: S$GLB,,, | Performed by: INTERNAL MEDICINE

## 2021-12-09 PROCEDURE — 99214 PR OFFICE/OUTPT VISIT, EST, LEVL IV, 30-39 MIN: ICD-10-PCS | Mod: S$GLB,,, | Performed by: INTERNAL MEDICINE

## 2021-12-09 PROCEDURE — 1157F PR ADVANCE CARE PLAN OR EQUIV PRESENT IN MEDICAL RECORD: ICD-10-PCS | Mod: CPTII,S$GLB,, | Performed by: INTERNAL MEDICINE

## 2021-12-09 PROCEDURE — 99999 PR PBB SHADOW E&M-EST. PATIENT-LVL III: ICD-10-PCS | Mod: PBBFAC,,, | Performed by: INTERNAL MEDICINE

## 2021-12-09 RX ORDER — DILTIAZEM HYDROCHLORIDE 180 MG/1
180 CAPSULE, COATED, EXTENDED RELEASE ORAL DAILY
Qty: 90 CAPSULE | Refills: 1 | Status: ON HOLD | OUTPATIENT
Start: 2021-12-09 | End: 2022-01-14 | Stop reason: HOSPADM

## 2021-12-10 ENCOUNTER — TELEPHONE (OUTPATIENT)
Dept: HEMATOLOGY/ONCOLOGY | Facility: CLINIC | Age: 81
End: 2021-12-10
Payer: MEDICARE

## 2021-12-10 ENCOUNTER — LAB VISIT (OUTPATIENT)
Dept: LAB | Facility: HOSPITAL | Age: 81
End: 2021-12-10
Attending: INTERNAL MEDICINE
Payer: MEDICARE

## 2021-12-10 ENCOUNTER — HOSPITAL ENCOUNTER (OUTPATIENT)
Facility: HOSPITAL | Age: 81
Discharge: HOME | End: 2021-12-11
Attending: EMERGENCY MEDICINE | Admitting: INTERNAL MEDICINE
Payer: COMMERCIAL

## 2021-12-10 DIAGNOSIS — D64.9 SYMPTOMATIC ANEMIA: Primary | ICD-10-CM

## 2021-12-10 DIAGNOSIS — D50.0 IRON DEFICIENCY ANEMIA DUE TO CHRONIC BLOOD LOSS: ICD-10-CM

## 2021-12-10 DIAGNOSIS — D63.8 ANEMIA, CHRONIC DISEASE: ICD-10-CM

## 2021-12-10 DIAGNOSIS — D64.9 ANEMIA: ICD-10-CM

## 2021-12-10 DIAGNOSIS — D63.8 ANEMIA OF CHRONIC DISEASE: ICD-10-CM

## 2021-12-10 LAB
ABO + RH BLD: NORMAL
ALBUMIN SERPL BCP-MCNC: 3.7 G/DL (ref 3.5–5.2)
ALBUMIN SERPL BCP-MCNC: 3.8 G/DL (ref 3.5–5.2)
ALP SERPL-CCNC: 33 U/L (ref 55–135)
ALP SERPL-CCNC: 35 U/L (ref 55–135)
ALT SERPL W/O P-5'-P-CCNC: 8 U/L (ref 10–44)
ALT SERPL W/O P-5'-P-CCNC: 8 U/L (ref 10–44)
ANION GAP SERPL CALC-SCNC: 6 MMOL/L (ref 8–16)
ANION GAP SERPL CALC-SCNC: 7 MMOL/L (ref 8–16)
ANISOCYTOSIS BLD QL SMEAR: ABNORMAL
ANISOCYTOSIS BLD QL SMEAR: ABNORMAL
AST SERPL-CCNC: 12 U/L (ref 10–40)
AST SERPL-CCNC: 17 U/L (ref 10–40)
BASOPHILS # BLD AUTO: 0.06 K/UL (ref 0–0.2)
BASOPHILS # BLD AUTO: 0.08 K/UL (ref 0–0.2)
BASOPHILS NFR BLD: 1.3 % (ref 0–1.9)
BASOPHILS NFR BLD: 1.7 % (ref 0–1.9)
BILIRUB SERPL-MCNC: 0.6 MG/DL (ref 0.1–1)
BILIRUB SERPL-MCNC: 0.6 MG/DL (ref 0.1–1)
BLD GP AB SCN CELLS X3 SERPL QL: NORMAL
BUN SERPL-MCNC: 16 MG/DL (ref 8–23)
BUN SERPL-MCNC: 16 MG/DL (ref 8–23)
CALCIUM SERPL-MCNC: 8.3 MG/DL (ref 8.7–10.5)
CALCIUM SERPL-MCNC: 8.9 MG/DL (ref 8.7–10.5)
CHLORIDE SERPL-SCNC: 109 MMOL/L (ref 95–110)
CHLORIDE SERPL-SCNC: 110 MMOL/L (ref 95–110)
CO2 SERPL-SCNC: 24 MMOL/L (ref 23–29)
CO2 SERPL-SCNC: 26 MMOL/L (ref 23–29)
CREAT SERPL-MCNC: 0.7 MG/DL (ref 0.5–1.4)
CREAT SERPL-MCNC: 0.8 MG/DL (ref 0.5–1.4)
DACRYOCYTES BLD QL SMEAR: ABNORMAL
DIFFERENTIAL METHOD: ABNORMAL
DIFFERENTIAL METHOD: ABNORMAL
EOSINOPHIL # BLD AUTO: 0.2 K/UL (ref 0–0.5)
EOSINOPHIL # BLD AUTO: 0.3 K/UL (ref 0–0.5)
EOSINOPHIL NFR BLD: 4.4 % (ref 0–8)
EOSINOPHIL NFR BLD: 5.2 % (ref 0–8)
ERYTHROCYTE [DISTWIDTH] IN BLOOD BY AUTOMATED COUNT: 27 % (ref 11.5–14.5)
ERYTHROCYTE [DISTWIDTH] IN BLOOD BY AUTOMATED COUNT: 27 % (ref 11.5–14.5)
EST. GFR  (AFRICAN AMERICAN): >60 ML/MIN/1.73 M^2
EST. GFR  (AFRICAN AMERICAN): >60 ML/MIN/1.73 M^2
EST. GFR  (NON AFRICAN AMERICAN): >60 ML/MIN/1.73 M^2
EST. GFR  (NON AFRICAN AMERICAN): >60 ML/MIN/1.73 M^2
FERRITIN SERPL-MCNC: 13 NG/ML (ref 20–300)
GIANT PLATELETS BLD QL SMEAR: PRESENT
GLUCOSE SERPL-MCNC: 86 MG/DL (ref 70–110)
GLUCOSE SERPL-MCNC: 96 MG/DL (ref 70–110)
HCT VFR BLD AUTO: 21.5 % (ref 40–54)
HCT VFR BLD AUTO: 23 % (ref 40–54)
HGB BLD-MCNC: 6.2 G/DL (ref 14–18)
HGB BLD-MCNC: 6.4 G/DL (ref 14–18)
HYPOCHROMIA BLD QL SMEAR: ABNORMAL
HYPOCHROMIA BLD QL SMEAR: ABNORMAL
IMM GRANULOCYTES # BLD AUTO: 0.01 K/UL (ref 0–0.04)
IMM GRANULOCYTES # BLD AUTO: 0.01 K/UL (ref 0–0.04)
IMM GRANULOCYTES NFR BLD AUTO: 0.2 % (ref 0–0.5)
IMM GRANULOCYTES NFR BLD AUTO: 0.2 % (ref 0–0.5)
IRON SERPL-MCNC: 14 UG/DL (ref 45–160)
LIPASE SERPL-CCNC: 8 U/L (ref 4–60)
LYMPHOCYTES # BLD AUTO: 0.8 K/UL (ref 1–4.8)
LYMPHOCYTES # BLD AUTO: 0.9 K/UL (ref 1–4.8)
LYMPHOCYTES NFR BLD: 17.5 % (ref 18–48)
LYMPHOCYTES NFR BLD: 18.3 % (ref 18–48)
MCH RBC QN AUTO: 19 PG (ref 27–31)
MCH RBC QN AUTO: 19.1 PG (ref 27–31)
MCHC RBC AUTO-ENTMCNC: 27.8 G/DL (ref 32–36)
MCHC RBC AUTO-ENTMCNC: 28.8 G/DL (ref 32–36)
MCV RBC AUTO: 66 FL (ref 82–98)
MCV RBC AUTO: 68 FL (ref 82–98)
MONOCYTES # BLD AUTO: 0.5 K/UL (ref 0.3–1)
MONOCYTES # BLD AUTO: 0.5 K/UL (ref 0.3–1)
MONOCYTES NFR BLD: 10.8 % (ref 4–15)
MONOCYTES NFR BLD: 9.8 % (ref 4–15)
NEUTROPHILS # BLD AUTO: 3.1 K/UL (ref 1.8–7.7)
NEUTROPHILS # BLD AUTO: 3.2 K/UL (ref 1.8–7.7)
NEUTROPHILS NFR BLD: 63.8 % (ref 38–73)
NEUTROPHILS NFR BLD: 66.8 % (ref 38–73)
NRBC BLD-RTO: 0 /100 WBC
NRBC BLD-RTO: 1 /100 WBC
PLATELET # BLD AUTO: 292 K/UL (ref 150–450)
PLATELET # BLD AUTO: 309 K/UL (ref 150–450)
PLATELET BLD QL SMEAR: ABNORMAL
PLATELET BLD QL SMEAR: ABNORMAL
PMV BLD AUTO: 9.6 FL (ref 9.2–12.9)
PMV BLD AUTO: ABNORMAL FL (ref 9.2–12.9)
POIKILOCYTOSIS BLD QL SMEAR: SLIGHT
POIKILOCYTOSIS BLD QL SMEAR: SLIGHT
POLYCHROMASIA BLD QL SMEAR: ABNORMAL
POTASSIUM SERPL-SCNC: 3.9 MMOL/L (ref 3.5–5.1)
POTASSIUM SERPL-SCNC: 4.3 MMOL/L (ref 3.5–5.1)
PROT SERPL-MCNC: 7 G/DL (ref 6–8.4)
PROT SERPL-MCNC: 7.1 G/DL (ref 6–8.4)
RBC # BLD AUTO: 3.24 M/UL (ref 4.6–6.2)
RBC # BLD AUTO: 3.37 M/UL (ref 4.6–6.2)
SATURATED IRON: 3 % (ref 20–50)
SODIUM SERPL-SCNC: 140 MMOL/L (ref 136–145)
SODIUM SERPL-SCNC: 142 MMOL/L (ref 136–145)
TARGETS BLD QL SMEAR: ABNORMAL
TOTAL IRON BINDING CAPACITY: 468 UG/DL (ref 250–450)
TRANSFERRIN SERPL-MCNC: 316 MG/DL (ref 200–375)
WBC # BLD AUTO: 4.8 K/UL (ref 3.9–12.7)
WBC # BLD AUTO: 4.82 K/UL (ref 3.9–12.7)

## 2021-12-10 PROCEDURE — G0378 HOSPITAL OBSERVATION PER HR: HCPCS

## 2021-12-10 PROCEDURE — 86920 COMPATIBILITY TEST SPIN: CPT | Performed by: EMERGENCY MEDICINE

## 2021-12-10 PROCEDURE — 82728 ASSAY OF FERRITIN: CPT | Performed by: INTERNAL MEDICINE

## 2021-12-10 PROCEDURE — 99291 CRITICAL CARE FIRST HOUR: CPT | Mod: 25

## 2021-12-10 PROCEDURE — 84466 ASSAY OF TRANSFERRIN: CPT | Performed by: INTERNAL MEDICINE

## 2021-12-10 PROCEDURE — 85025 COMPLETE CBC W/AUTO DIFF WBC: CPT | Mod: 91 | Performed by: EMERGENCY MEDICINE

## 2021-12-10 PROCEDURE — P9016 RBC LEUKOCYTES REDUCED: HCPCS | Performed by: EMERGENCY MEDICINE

## 2021-12-10 PROCEDURE — 86900 BLOOD TYPING SEROLOGIC ABO: CPT | Performed by: NURSE PRACTITIONER

## 2021-12-10 PROCEDURE — 80053 COMPREHEN METABOLIC PANEL: CPT | Performed by: INTERNAL MEDICINE

## 2021-12-10 PROCEDURE — 25000003 PHARM REV CODE 250: Performed by: NURSE PRACTITIONER

## 2021-12-10 PROCEDURE — 93010 EKG 12-LEAD: ICD-10-PCS | Mod: ,,, | Performed by: INTERNAL MEDICINE

## 2021-12-10 PROCEDURE — 86920 COMPATIBILITY TEST SPIN: CPT | Performed by: INTERNAL MEDICINE

## 2021-12-10 PROCEDURE — 36415 COLL VENOUS BLD VENIPUNCTURE: CPT | Performed by: INTERNAL MEDICINE

## 2021-12-10 PROCEDURE — 93005 ELECTROCARDIOGRAM TRACING: CPT

## 2021-12-10 PROCEDURE — 85025 COMPLETE CBC W/AUTO DIFF WBC: CPT | Performed by: INTERNAL MEDICINE

## 2021-12-10 PROCEDURE — 80053 COMPREHEN METABOLIC PANEL: CPT | Mod: 91 | Performed by: EMERGENCY MEDICINE

## 2021-12-10 PROCEDURE — 93010 ELECTROCARDIOGRAM REPORT: CPT | Mod: ,,, | Performed by: INTERNAL MEDICINE

## 2021-12-10 PROCEDURE — 99292 CRITICAL CARE ADDL 30 MIN: CPT

## 2021-12-10 PROCEDURE — 83690 ASSAY OF LIPASE: CPT | Performed by: EMERGENCY MEDICINE

## 2021-12-10 RX ORDER — HYDROCODONE BITARTRATE AND ACETAMINOPHEN 500; 5 MG/1; MG/1
TABLET ORAL
Status: DISCONTINUED | OUTPATIENT
Start: 2021-12-10 | End: 2021-12-11 | Stop reason: HOSPADM

## 2021-12-10 RX ADMIN — SODIUM CHLORIDE: 0.9 INJECTION, SOLUTION INTRAVENOUS at 09:12

## 2021-12-11 VITALS
DIASTOLIC BLOOD PRESSURE: 63 MMHG | WEIGHT: 155.63 LBS | HEART RATE: 72 BPM | RESPIRATION RATE: 16 BRPM | BODY MASS INDEX: 22.28 KG/M2 | TEMPERATURE: 98 F | OXYGEN SATURATION: 98 % | HEIGHT: 70 IN | SYSTOLIC BLOOD PRESSURE: 126 MMHG

## 2021-12-11 LAB
ANISOCYTOSIS BLD QL SMEAR: ABNORMAL
BASOPHILS # BLD AUTO: 0.06 K/UL (ref 0–0.2)
BASOPHILS NFR BLD: 1.2 % (ref 0–1.9)
BLD PROD TYP BPU: NORMAL
BLOOD UNIT EXPIRATION DATE: NORMAL
BLOOD UNIT TYPE CODE: 6200
BLOOD UNIT TYPE: NORMAL
CODING SYSTEM: NORMAL
DACRYOCYTES BLD QL SMEAR: ABNORMAL
DIFFERENTIAL METHOD: ABNORMAL
DISPENSE STATUS: NORMAL
EOSINOPHIL # BLD AUTO: 0.4 K/UL (ref 0–0.5)
EOSINOPHIL NFR BLD: 7.3 % (ref 0–8)
ERYTHROCYTE [DISTWIDTH] IN BLOOD BY AUTOMATED COUNT: 27.5 % (ref 11.5–14.5)
HCT VFR BLD AUTO: 24.5 % (ref 40–54)
HGB BLD-MCNC: 7 G/DL (ref 14–18)
HYPOCHROMIA BLD QL SMEAR: ABNORMAL
IMM GRANULOCYTES # BLD AUTO: 0.01 K/UL (ref 0–0.04)
IMM GRANULOCYTES NFR BLD AUTO: 0.2 % (ref 0–0.5)
LYMPHOCYTES # BLD AUTO: 0.7 K/UL (ref 1–4.8)
LYMPHOCYTES NFR BLD: 13.4 % (ref 18–48)
MCH RBC QN AUTO: 19.9 PG (ref 27–31)
MCHC RBC AUTO-ENTMCNC: 28.6 G/DL (ref 32–36)
MCV RBC AUTO: 70 FL (ref 82–98)
MONOCYTES # BLD AUTO: 0.5 K/UL (ref 0.3–1)
MONOCYTES NFR BLD: 9.7 % (ref 4–15)
NEUTROPHILS # BLD AUTO: 3.5 K/UL (ref 1.8–7.7)
NEUTROPHILS NFR BLD: 68.2 % (ref 38–73)
NRBC BLD-RTO: 0 /100 WBC
NUM UNITS TRANS PACKED RBC: NORMAL
PLATELET # BLD AUTO: 310 K/UL (ref 150–450)
PLATELET BLD QL SMEAR: ABNORMAL
PMV BLD AUTO: ABNORMAL FL (ref 9.2–12.9)
POIKILOCYTOSIS BLD QL SMEAR: SLIGHT
POLYCHROMASIA BLD QL SMEAR: ABNORMAL
RBC # BLD AUTO: 3.51 M/UL (ref 4.6–6.2)
TARGETS BLD QL SMEAR: ABNORMAL
WBC # BLD AUTO: 5.07 K/UL (ref 3.9–12.7)

## 2021-12-11 PROCEDURE — G0378 HOSPITAL OBSERVATION PER HR: HCPCS

## 2021-12-11 PROCEDURE — 85025 COMPLETE CBC W/AUTO DIFF WBC: CPT | Performed by: INTERNAL MEDICINE

## 2021-12-11 PROCEDURE — 25000003 PHARM REV CODE 250: Performed by: INTERNAL MEDICINE

## 2021-12-11 PROCEDURE — 63600175 PHARM REV CODE 636 W HCPCS: Performed by: INTERNAL MEDICINE

## 2021-12-11 PROCEDURE — A4216 STERILE WATER/SALINE, 10 ML: HCPCS | Performed by: INTERNAL MEDICINE

## 2021-12-11 PROCEDURE — 94761 N-INVAS EAR/PLS OXIMETRY MLT: CPT

## 2021-12-11 PROCEDURE — P9016 RBC LEUKOCYTES REDUCED: HCPCS | Performed by: INTERNAL MEDICINE

## 2021-12-11 PROCEDURE — 96372 THER/PROPH/DIAG INJ SC/IM: CPT | Mod: 59

## 2021-12-11 PROCEDURE — 36415 COLL VENOUS BLD VENIPUNCTURE: CPT | Performed by: INTERNAL MEDICINE

## 2021-12-11 PROCEDURE — 36430 TRANSFUSION BLD/BLD COMPNT: CPT

## 2021-12-11 PROCEDURE — 99900038 HC OT GENERIC THERAPY SCREENING (STAT)

## 2021-12-11 RX ORDER — THIAMINE HCL 100 MG
100 TABLET ORAL DAILY
Status: DISCONTINUED | OUTPATIENT
Start: 2021-12-11 | End: 2021-12-11 | Stop reason: HOSPADM

## 2021-12-11 RX ORDER — LANOLIN ALCOHOL/MO/W.PET/CERES
800 CREAM (GRAM) TOPICAL
Status: DISCONTINUED | OUTPATIENT
Start: 2021-12-11 | End: 2021-12-11 | Stop reason: HOSPADM

## 2021-12-11 RX ORDER — DILTIAZEM HYDROCHLORIDE 180 MG/1
180 CAPSULE, COATED, EXTENDED RELEASE ORAL DAILY
Status: DISCONTINUED | OUTPATIENT
Start: 2021-12-11 | End: 2021-12-11 | Stop reason: HOSPADM

## 2021-12-11 RX ORDER — SODIUM,POTASSIUM PHOSPHATES 280-250MG
2 POWDER IN PACKET (EA) ORAL
Status: DISCONTINUED | OUTPATIENT
Start: 2021-12-11 | End: 2021-12-11 | Stop reason: HOSPADM

## 2021-12-11 RX ORDER — SODIUM CHLORIDE 0.9 % (FLUSH) 0.9 %
10 SYRINGE (ML) INJECTION EVERY 8 HOURS
Status: DISCONTINUED | OUTPATIENT
Start: 2021-12-11 | End: 2021-12-11 | Stop reason: HOSPADM

## 2021-12-11 RX ORDER — IBUPROFEN 200 MG
16 TABLET ORAL
Status: DISCONTINUED | OUTPATIENT
Start: 2021-12-11 | End: 2021-12-11 | Stop reason: HOSPADM

## 2021-12-11 RX ORDER — HYDROCODONE BITARTRATE AND ACETAMINOPHEN 5; 325 MG/1; MG/1
1 TABLET ORAL EVERY 6 HOURS PRN
Status: DISCONTINUED | OUTPATIENT
Start: 2021-12-11 | End: 2021-12-11 | Stop reason: HOSPADM

## 2021-12-11 RX ORDER — NALOXONE HCL 0.4 MG/ML
0.02 VIAL (ML) INJECTION
Status: DISCONTINUED | OUTPATIENT
Start: 2021-12-11 | End: 2021-12-11 | Stop reason: HOSPADM

## 2021-12-11 RX ORDER — TALC
6 POWDER (GRAM) TOPICAL NIGHTLY PRN
Status: DISCONTINUED | OUTPATIENT
Start: 2021-12-11 | End: 2021-12-11 | Stop reason: HOSPADM

## 2021-12-11 RX ORDER — IBUPROFEN 200 MG
24 TABLET ORAL
Status: DISCONTINUED | OUTPATIENT
Start: 2021-12-11 | End: 2021-12-11 | Stop reason: HOSPADM

## 2021-12-11 RX ORDER — HYDROCODONE BITARTRATE AND ACETAMINOPHEN 500; 5 MG/1; MG/1
TABLET ORAL
Status: DISCONTINUED | OUTPATIENT
Start: 2021-12-11 | End: 2021-12-11 | Stop reason: HOSPADM

## 2021-12-11 RX ORDER — CYANOCOBALAMIN 1000 UG/ML
1000 INJECTION, SOLUTION INTRAMUSCULAR; SUBCUTANEOUS ONCE
Status: COMPLETED | OUTPATIENT
Start: 2021-12-11 | End: 2021-12-11

## 2021-12-11 RX ORDER — GLUCAGON 1 MG
1 KIT INJECTION
Status: DISCONTINUED | OUTPATIENT
Start: 2021-12-11 | End: 2021-12-11 | Stop reason: HOSPADM

## 2021-12-11 RX ORDER — ACETAMINOPHEN 325 MG/1
650 TABLET ORAL EVERY 4 HOURS PRN
Status: DISCONTINUED | OUTPATIENT
Start: 2021-12-11 | End: 2021-12-11 | Stop reason: HOSPADM

## 2021-12-11 RX ORDER — POLYETHYLENE GLYCOL 3350 17 G/17G
17 POWDER, FOR SOLUTION ORAL DAILY PRN
Status: DISCONTINUED | OUTPATIENT
Start: 2021-12-11 | End: 2021-12-11 | Stop reason: HOSPADM

## 2021-12-11 RX ORDER — FOLIC ACID 1 MG/1
1 TABLET ORAL DAILY
Status: DISCONTINUED | OUTPATIENT
Start: 2021-12-11 | End: 2021-12-11 | Stop reason: HOSPADM

## 2021-12-11 RX ADMIN — DILTIAZEM HYDROCHLORIDE 180 MG: 180 CAPSULE, COATED, EXTENDED RELEASE ORAL at 10:12

## 2021-12-11 RX ADMIN — CYANOCOBALAMIN 1000 MCG: 1000 INJECTION, SOLUTION INTRAMUSCULAR; SUBCUTANEOUS at 10:12

## 2021-12-11 RX ADMIN — Medication 100 MG: at 10:12

## 2021-12-11 RX ADMIN — FOLIC ACID 1 MG: 1 TABLET ORAL at 10:12

## 2021-12-11 RX ADMIN — Medication 10 ML: at 06:12

## 2021-12-14 ENCOUNTER — OFFICE VISIT (OUTPATIENT)
Dept: HEMATOLOGY/ONCOLOGY | Facility: CLINIC | Age: 81
End: 2021-12-14
Payer: COMMERCIAL

## 2021-12-14 VITALS
TEMPERATURE: 98 F | SYSTOLIC BLOOD PRESSURE: 130 MMHG | HEIGHT: 70 IN | OXYGEN SATURATION: 90 % | BODY MASS INDEX: 22.79 KG/M2 | DIASTOLIC BLOOD PRESSURE: 62 MMHG | WEIGHT: 159.19 LBS | HEART RATE: 88 BPM

## 2021-12-14 DIAGNOSIS — D50.0 IRON DEFICIENCY ANEMIA DUE TO CHRONIC BLOOD LOSS: ICD-10-CM

## 2021-12-14 LAB
BLD PROD TYP BPU: NORMAL
BLOOD UNIT EXPIRATION DATE: NORMAL
BLOOD UNIT TYPE CODE: 6200
BLOOD UNIT TYPE: NORMAL
CODING SYSTEM: NORMAL
DISPENSE STATUS: NORMAL
NUM UNITS TRANS PACKED RBC: NORMAL

## 2021-12-14 PROCEDURE — 99999 PR PBB SHADOW E&M-EST. PATIENT-LVL IV: ICD-10-PCS | Mod: PBBFAC,,, | Performed by: INTERNAL MEDICINE

## 2021-12-14 PROCEDURE — 1157F ADVNC CARE PLAN IN RCRD: CPT | Mod: CPTII,S$GLB,, | Performed by: INTERNAL MEDICINE

## 2021-12-14 PROCEDURE — 99215 PR OFFICE/OUTPT VISIT, EST, LEVL V, 40-54 MIN: ICD-10-PCS | Mod: 25,S$GLB,, | Performed by: INTERNAL MEDICINE

## 2021-12-14 PROCEDURE — 99999 PR PBB SHADOW E&M-EST. PATIENT-LVL IV: CPT | Mod: PBBFAC,,, | Performed by: INTERNAL MEDICINE

## 2021-12-14 PROCEDURE — 1157F PR ADVANCE CARE PLAN OR EQUIV PRESENT IN MEDICAL RECORD: ICD-10-PCS | Mod: CPTII,S$GLB,, | Performed by: INTERNAL MEDICINE

## 2021-12-14 PROCEDURE — 99215 OFFICE O/P EST HI 40 MIN: CPT | Mod: 25,S$GLB,, | Performed by: INTERNAL MEDICINE

## 2021-12-14 RX ORDER — SODIUM CHLORIDE 0.9 % (FLUSH) 0.9 %
10 SYRINGE (ML) INJECTION
Status: CANCELLED | OUTPATIENT
Start: 2021-12-21

## 2021-12-14 RX ORDER — HEPARIN 100 UNIT/ML
500 SYRINGE INTRAVENOUS
Status: CANCELLED | OUTPATIENT
Start: 2021-12-28

## 2021-12-14 RX ORDER — SODIUM CHLORIDE 0.9 % (FLUSH) 0.9 %
10 SYRINGE (ML) INJECTION
Status: CANCELLED | OUTPATIENT
Start: 2021-12-28

## 2021-12-14 RX ORDER — HEPARIN 100 UNIT/ML
500 SYRINGE INTRAVENOUS
Status: CANCELLED | OUTPATIENT
Start: 2021-12-21

## 2021-12-17 RX ORDER — HEPARIN 100 UNIT/ML
500 SYRINGE INTRAVENOUS
Status: CANCELLED | OUTPATIENT
Start: 2021-12-24

## 2021-12-17 RX ORDER — EPINEPHRINE 0.3 MG/.3ML
0.3 INJECTION SUBCUTANEOUS ONCE AS NEEDED
Status: CANCELLED | OUTPATIENT
Start: 2021-12-24

## 2021-12-17 RX ORDER — METHYLPREDNISOLONE SOD SUCC 125 MG
125 VIAL (EA) INJECTION ONCE AS NEEDED
Status: CANCELLED | OUTPATIENT
Start: 2021-12-24

## 2021-12-17 RX ORDER — SODIUM CHLORIDE 0.9 % (FLUSH) 0.9 %
10 SYRINGE (ML) INJECTION
Status: CANCELLED | OUTPATIENT
Start: 2021-12-24

## 2021-12-17 RX ORDER — DIPHENHYDRAMINE HYDROCHLORIDE 50 MG/ML
50 INJECTION INTRAMUSCULAR; INTRAVENOUS ONCE AS NEEDED
Status: CANCELLED | OUTPATIENT
Start: 2021-12-24

## 2021-12-20 RX ORDER — HEPARIN 100 UNIT/ML
500 SYRINGE INTRAVENOUS
Status: CANCELLED | OUTPATIENT
Start: 2021-12-20

## 2021-12-20 RX ORDER — SODIUM CHLORIDE 0.9 % (FLUSH) 0.9 %
10 SYRINGE (ML) INJECTION
Status: CANCELLED | OUTPATIENT
Start: 2021-12-20

## 2021-12-20 RX ORDER — EPINEPHRINE 0.3 MG/.3ML
0.3 INJECTION SUBCUTANEOUS ONCE AS NEEDED
Status: CANCELLED | OUTPATIENT
Start: 2021-12-20

## 2021-12-20 RX ORDER — METHYLPREDNISOLONE SOD SUCC 125 MG
125 VIAL (EA) INJECTION ONCE AS NEEDED
Status: CANCELLED | OUTPATIENT
Start: 2021-12-20

## 2021-12-20 RX ORDER — DIPHENHYDRAMINE HYDROCHLORIDE 50 MG/ML
50 INJECTION INTRAMUSCULAR; INTRAVENOUS ONCE AS NEEDED
Status: CANCELLED | OUTPATIENT
Start: 2021-12-20

## 2021-12-21 ENCOUNTER — PATIENT MESSAGE (OUTPATIENT)
Dept: PHARMACY | Facility: CLINIC | Age: 81
End: 2021-12-21
Payer: MEDICARE

## 2021-12-21 ENCOUNTER — INFUSION (OUTPATIENT)
Dept: INFUSION THERAPY | Facility: HOSPITAL | Age: 81
End: 2021-12-21
Attending: INTERNAL MEDICINE
Payer: MEDICARE

## 2021-12-21 VITALS
WEIGHT: 161.38 LBS | DIASTOLIC BLOOD PRESSURE: 62 MMHG | TEMPERATURE: 97 F | SYSTOLIC BLOOD PRESSURE: 143 MMHG | RESPIRATION RATE: 16 BRPM | HEART RATE: 89 BPM | OXYGEN SATURATION: 95 % | BODY MASS INDEX: 23.16 KG/M2

## 2021-12-21 DIAGNOSIS — D50.0 IRON DEFICIENCY ANEMIA DUE TO CHRONIC BLOOD LOSS: Primary | ICD-10-CM

## 2021-12-21 PROCEDURE — 63600175 PHARM REV CODE 636 W HCPCS: Performed by: INTERNAL MEDICINE

## 2021-12-21 PROCEDURE — 96365 THER/PROPH/DIAG IV INF INIT: CPT

## 2021-12-21 PROCEDURE — 25000003 PHARM REV CODE 250: Performed by: INTERNAL MEDICINE

## 2021-12-21 RX ORDER — EPINEPHRINE 0.3 MG/.3ML
0.3 INJECTION SUBCUTANEOUS ONCE AS NEEDED
Status: CANCELLED | OUTPATIENT
Start: 2021-12-28

## 2021-12-21 RX ORDER — SODIUM CHLORIDE 0.9 % (FLUSH) 0.9 %
10 SYRINGE (ML) INJECTION
Status: CANCELLED | OUTPATIENT
Start: 2021-12-28

## 2021-12-21 RX ORDER — METHYLPREDNISOLONE SOD SUCC 125 MG
125 VIAL (EA) INJECTION ONCE AS NEEDED
Status: CANCELLED | OUTPATIENT
Start: 2021-12-28

## 2021-12-21 RX ORDER — HEPARIN 100 UNIT/ML
500 SYRINGE INTRAVENOUS
Status: CANCELLED | OUTPATIENT
Start: 2021-12-28

## 2021-12-21 RX ORDER — DIPHENHYDRAMINE HYDROCHLORIDE 50 MG/ML
50 INJECTION INTRAMUSCULAR; INTRAVENOUS ONCE AS NEEDED
Status: CANCELLED | OUTPATIENT
Start: 2021-12-28

## 2021-12-21 RX ADMIN — SODIUM CHLORIDE: 9 INJECTION, SOLUTION INTRAVENOUS at 01:12

## 2021-12-21 RX ADMIN — SODIUM CHLORIDE 125 MG: 9 INJECTION, SOLUTION INTRAVENOUS at 02:12

## 2021-12-28 ENCOUNTER — INFUSION (OUTPATIENT)
Dept: INFUSION THERAPY | Facility: HOSPITAL | Age: 81
End: 2021-12-28
Attending: INTERNAL MEDICINE
Payer: MEDICARE

## 2021-12-28 VITALS
DIASTOLIC BLOOD PRESSURE: 48 MMHG | HEART RATE: 78 BPM | RESPIRATION RATE: 16 BRPM | SYSTOLIC BLOOD PRESSURE: 95 MMHG | TEMPERATURE: 98 F | OXYGEN SATURATION: 96 %

## 2021-12-28 DIAGNOSIS — D50.0 IRON DEFICIENCY ANEMIA DUE TO CHRONIC BLOOD LOSS: Primary | ICD-10-CM

## 2021-12-28 PROCEDURE — 25000003 PHARM REV CODE 250: Performed by: INTERNAL MEDICINE

## 2021-12-28 PROCEDURE — 96365 THER/PROPH/DIAG IV INF INIT: CPT

## 2021-12-28 PROCEDURE — 63600175 PHARM REV CODE 636 W HCPCS: Performed by: INTERNAL MEDICINE

## 2021-12-28 RX ORDER — DIPHENHYDRAMINE HYDROCHLORIDE 50 MG/ML
50 INJECTION INTRAMUSCULAR; INTRAVENOUS ONCE AS NEEDED
Status: CANCELLED | OUTPATIENT
Start: 2022-01-04

## 2021-12-28 RX ORDER — SODIUM CHLORIDE 0.9 % (FLUSH) 0.9 %
10 SYRINGE (ML) INJECTION
Status: CANCELLED | OUTPATIENT
Start: 2022-01-04

## 2021-12-28 RX ORDER — EPINEPHRINE 0.3 MG/.3ML
0.3 INJECTION SUBCUTANEOUS ONCE AS NEEDED
Status: CANCELLED | OUTPATIENT
Start: 2022-01-04

## 2021-12-28 RX ORDER — METHYLPREDNISOLONE SOD SUCC 125 MG
125 VIAL (EA) INJECTION ONCE AS NEEDED
Status: CANCELLED | OUTPATIENT
Start: 2022-01-04

## 2021-12-28 RX ORDER — HEPARIN 100 UNIT/ML
500 SYRINGE INTRAVENOUS
Status: CANCELLED | OUTPATIENT
Start: 2022-01-04

## 2021-12-28 RX ADMIN — SODIUM CHLORIDE 125 MG: 9 INJECTION, SOLUTION INTRAVENOUS at 01:12

## 2022-01-04 ENCOUNTER — INFUSION (OUTPATIENT)
Dept: INFUSION THERAPY | Facility: HOSPITAL | Age: 82
End: 2022-01-04
Attending: INTERNAL MEDICINE
Payer: COMMERCIAL

## 2022-01-04 VITALS
RESPIRATION RATE: 16 BRPM | SYSTOLIC BLOOD PRESSURE: 142 MMHG | OXYGEN SATURATION: 98 % | DIASTOLIC BLOOD PRESSURE: 62 MMHG | HEART RATE: 81 BPM | TEMPERATURE: 98 F

## 2022-01-04 DIAGNOSIS — D50.0 IRON DEFICIENCY ANEMIA DUE TO CHRONIC BLOOD LOSS: Primary | ICD-10-CM

## 2022-01-04 PROCEDURE — 96365 THER/PROPH/DIAG IV INF INIT: CPT

## 2022-01-04 PROCEDURE — 25000003 PHARM REV CODE 250: Performed by: INTERNAL MEDICINE

## 2022-01-04 PROCEDURE — 63600175 PHARM REV CODE 636 W HCPCS: Performed by: INTERNAL MEDICINE

## 2022-01-04 RX ORDER — DIPHENHYDRAMINE HYDROCHLORIDE 50 MG/ML
50 INJECTION INTRAMUSCULAR; INTRAVENOUS ONCE AS NEEDED
Status: CANCELLED | OUTPATIENT
Start: 2022-01-11

## 2022-01-04 RX ORDER — EPINEPHRINE 0.3 MG/.3ML
0.3 INJECTION SUBCUTANEOUS ONCE AS NEEDED
Status: CANCELLED | OUTPATIENT
Start: 2022-01-11

## 2022-01-04 RX ORDER — HEPARIN 100 UNIT/ML
500 SYRINGE INTRAVENOUS
Status: CANCELLED | OUTPATIENT
Start: 2022-01-11

## 2022-01-04 RX ORDER — METHYLPREDNISOLONE SOD SUCC 125 MG
125 VIAL (EA) INJECTION ONCE AS NEEDED
Status: CANCELLED | OUTPATIENT
Start: 2022-01-11

## 2022-01-04 RX ORDER — SODIUM CHLORIDE 0.9 % (FLUSH) 0.9 %
10 SYRINGE (ML) INJECTION
Status: CANCELLED | OUTPATIENT
Start: 2022-01-11

## 2022-01-04 RX ADMIN — SODIUM CHLORIDE 125 MG: 9 INJECTION, SOLUTION INTRAVENOUS at 01:01

## 2022-01-04 NOTE — DISCHARGE INSTRUCTIONS
[facility]  [address] [facility]  [address]  [phone] phone     [phone] fax  Hours of Operation: Monday- Friday 8:00am- 5:00pm  After hours phone  [phone]  Hematology / Oncology Physicians on call      VIVI Swanson Dr., Dr., Dr., NP    Please call with any concerns regarding your appointment today.

## 2022-01-07 ENCOUNTER — PATIENT OUTREACH (OUTPATIENT)
Dept: ADMINISTRATIVE | Facility: OTHER | Age: 82
End: 2022-01-07
Payer: MEDICARE

## 2022-01-07 NOTE — PROGRESS NOTES
Health Maintenance Due   Topic Date Due    Pneumococcal Vaccines (Age 65+) (1 of 2 - PPSV23) Never done    TETANUS VACCINE  Never done    Shingles Vaccine (1 of 2) Never done    Influenza Vaccine (1) Never done     Updates were requested from care everywhere.  Chart was reviewed for overdue Proactive [facility] Encounters ([facility]) topics (CRS, Breast Cancer Screening, Eye exam)  Health Maintenance has been updated.  LINKS immunization registry triggered.  Immunizations were reconciled.

## 2022-01-09 ENCOUNTER — HOSPITAL ENCOUNTER (INPATIENT)
Facility: HOSPITAL | Age: 82
LOS: 4 days | Discharge: HOME | DRG: 286 | End: 2022-01-14
Attending: EMERGENCY MEDICINE | Admitting: FAMILY MEDICINE
Payer: COMMERCIAL

## 2022-01-09 DIAGNOSIS — I48.0 PAROXYSMAL ATRIAL FIBRILLATION: ICD-10-CM

## 2022-01-09 DIAGNOSIS — U07.1 COVID-19: ICD-10-CM

## 2022-01-09 DIAGNOSIS — I50.22 CHRONIC SYSTOLIC (CONGESTIVE) HEART FAILURE: ICD-10-CM

## 2022-01-09 DIAGNOSIS — R07.9 CHEST PAIN: ICD-10-CM

## 2022-01-09 DIAGNOSIS — I48.91 ATRIAL FIBRILLATION, UNSPECIFIED TYPE: ICD-10-CM

## 2022-01-09 DIAGNOSIS — J43.9 PULMONARY EMPHYSEMA, UNSPECIFIED EMPHYSEMA TYPE: ICD-10-CM

## 2022-01-09 DIAGNOSIS — R00.0 TACHYCARDIA: ICD-10-CM

## 2022-01-09 DIAGNOSIS — J81.0 ACUTE PULMONARY EDEMA: ICD-10-CM

## 2022-01-09 DIAGNOSIS — R94.30 EJECTION FRACTION < 50%: ICD-10-CM

## 2022-01-09 DIAGNOSIS — R79.89 ELEVATED BRAIN NATRIURETIC PEPTIDE (BNP) LEVEL: ICD-10-CM

## 2022-01-09 DIAGNOSIS — R06.02 SOB (SHORTNESS OF BREATH): Primary | ICD-10-CM

## 2022-01-09 DIAGNOSIS — R09.02 HYPOXIA: ICD-10-CM

## 2022-01-09 LAB
ACANTHOCYTES BLD QL SMEAR: PRESENT
ALBUMIN SERPL BCP-MCNC: 3.3 G/DL (ref 3.5–5.2)
ALP SERPL-CCNC: 51 U/L (ref 55–135)
ALT SERPL W/O P-5'-P-CCNC: 12 U/L (ref 10–44)
ANION GAP SERPL CALC-SCNC: 6 MMOL/L (ref 8–16)
ANISOCYTOSIS BLD QL SMEAR: SLIGHT
AST SERPL-CCNC: 17 U/L (ref 10–40)
BASOPHILS # BLD AUTO: 0.07 K/UL (ref 0–0.2)
BASOPHILS NFR BLD: 0.7 % (ref 0–1.9)
BILIRUB SERPL-MCNC: 1.5 MG/DL (ref 0.1–1)
BNP SERPL-MCNC: 1844 PG/ML (ref 0–99)
BUN SERPL-MCNC: 15 MG/DL (ref 8–23)
CALCIUM SERPL-MCNC: 8.8 MG/DL (ref 8.7–10.5)
CHLORIDE SERPL-SCNC: 107 MMOL/L (ref 95–110)
CK SERPL-CCNC: 91 U/L (ref 20–200)
CO2 SERPL-SCNC: 27 MMOL/L (ref 23–29)
CREAT SERPL-MCNC: 0.7 MG/DL (ref 0.5–1.4)
CRP SERPL-MCNC: 151.4 MG/L (ref 0–8.2)
CTP QC/QA: YES
DACRYOCYTES BLD QL SMEAR: ABNORMAL
DIFFERENTIAL METHOD: ABNORMAL
EOSINOPHIL # BLD AUTO: 0.7 K/UL (ref 0–0.5)
EOSINOPHIL NFR BLD: 6.4 % (ref 0–8)
ERYTHROCYTE [DISTWIDTH] IN BLOOD BY AUTOMATED COUNT: 32.3 % (ref 11.5–14.5)
EST. GFR  (AFRICAN AMERICAN): >60 ML/MIN/1.73 M^2
EST. GFR  (NON AFRICAN AMERICAN): >60 ML/MIN/1.73 M^2
FERRITIN SERPL-MCNC: 118 NG/ML (ref 20–300)
GLUCOSE SERPL-MCNC: 80 MG/DL (ref 70–110)
HCT VFR BLD AUTO: 32.7 % (ref 40–54)
HGB BLD-MCNC: 9.7 G/DL (ref 14–18)
HYPOCHROMIA BLD QL SMEAR: ABNORMAL
IMM GRANULOCYTES # BLD AUTO: 0.04 K/UL (ref 0–0.04)
IMM GRANULOCYTES NFR BLD AUTO: 0.4 % (ref 0–0.5)
LACTATE SERPL-SCNC: 0.9 MMOL/L (ref 0.5–2.2)
LDH SERPL L TO P-CCNC: 755 U/L (ref 110–260)
LYMPHOCYTES # BLD AUTO: 0.4 K/UL (ref 1–4.8)
LYMPHOCYTES NFR BLD: 3.5 % (ref 18–48)
MCH RBC QN AUTO: 20.9 PG (ref 27–31)
MCHC RBC AUTO-ENTMCNC: 29.7 G/DL (ref 32–36)
MCV RBC AUTO: 70 FL (ref 82–98)
MONOCYTES # BLD AUTO: 0.7 K/UL (ref 0.3–1)
MONOCYTES NFR BLD: 6.5 % (ref 4–15)
NEUTROPHILS # BLD AUTO: 8.6 K/UL (ref 1.8–7.7)
NEUTROPHILS NFR BLD: 82.5 % (ref 38–73)
NRBC BLD-RTO: 0 /100 WBC
OVALOCYTES BLD QL SMEAR: ABNORMAL
PLATELET # BLD AUTO: 401 K/UL (ref 150–450)
PLATELET BLD QL SMEAR: ABNORMAL
PMV BLD AUTO: ABNORMAL FL (ref 9.2–12.9)
POIKILOCYTOSIS BLD QL SMEAR: SLIGHT
POLYCHROMASIA BLD QL SMEAR: ABNORMAL
POTASSIUM SERPL-SCNC: 3.7 MMOL/L (ref 3.5–5.1)
PROCALCITONIN SERPL IA-MCNC: 0.03 NG/ML
PROT SERPL-MCNC: 7.1 G/DL (ref 6–8.4)
RBC # BLD AUTO: 4.65 M/UL (ref 4.6–6.2)
SARS-COV-2 RDRP RESP QL NAA+PROBE: NEGATIVE
SODIUM SERPL-SCNC: 140 MMOL/L (ref 136–145)
TROPONIN I SERPL DL<=0.01 NG/ML-MCNC: 0.03 NG/ML (ref 0–0.03)
TROPONIN I SERPL DL<=0.01 NG/ML-MCNC: 0.03 NG/ML (ref 0–0.03)
TROPONIN I SERPL DL<=0.01 NG/ML-MCNC: 0.04 NG/ML (ref 0–0.03)
WBC # BLD AUTO: 10.4 K/UL (ref 3.9–12.7)

## 2022-01-09 PROCEDURE — 84484 ASSAY OF TROPONIN QUANT: CPT | Mod: 91 | Performed by: NURSE PRACTITIONER

## 2022-01-09 PROCEDURE — 25000242 PHARM REV CODE 250 ALT 637 W/ HCPCS: Performed by: EMERGENCY MEDICINE

## 2022-01-09 PROCEDURE — 36415 COLL VENOUS BLD VENIPUNCTURE: CPT | Performed by: NURSE PRACTITIONER

## 2022-01-09 PROCEDURE — 99900035 HC TECH TIME PER 15 MIN (STAT)

## 2022-01-09 PROCEDURE — 82550 ASSAY OF CK (CPK): CPT | Performed by: EMERGENCY MEDICINE

## 2022-01-09 PROCEDURE — 85025 COMPLETE CBC W/AUTO DIFF WBC: CPT | Performed by: EMERGENCY MEDICINE

## 2022-01-09 PROCEDURE — 63600175 PHARM REV CODE 636 W HCPCS: Performed by: EMERGENCY MEDICINE

## 2022-01-09 PROCEDURE — 99291 CRITICAL CARE FIRST HOUR: CPT | Mod: 25

## 2022-01-09 PROCEDURE — 93010 EKG 12-LEAD: ICD-10-PCS | Mod: ,,, | Performed by: INTERNAL MEDICINE

## 2022-01-09 PROCEDURE — 83880 ASSAY OF NATRIURETIC PEPTIDE: CPT | Performed by: EMERGENCY MEDICINE

## 2022-01-09 PROCEDURE — 93005 ELECTROCARDIOGRAM TRACING: CPT

## 2022-01-09 PROCEDURE — 27000221 HC OXYGEN, UP TO 24 HOURS

## 2022-01-09 PROCEDURE — 84145 PROCALCITONIN (PCT): CPT | Performed by: EMERGENCY MEDICINE

## 2022-01-09 PROCEDURE — G0378 HOSPITAL OBSERVATION PER HR: HCPCS

## 2022-01-09 PROCEDURE — 63600175 PHARM REV CODE 636 W HCPCS: Performed by: NURSE PRACTITIONER

## 2022-01-09 PROCEDURE — 83605 ASSAY OF LACTIC ACID: CPT | Performed by: EMERGENCY MEDICINE

## 2022-01-09 PROCEDURE — 25000003 PHARM REV CODE 250: Performed by: NURSE PRACTITIONER

## 2022-01-09 PROCEDURE — 93010 ELECTROCARDIOGRAM REPORT: CPT | Mod: ,,, | Performed by: INTERNAL MEDICINE

## 2022-01-09 PROCEDURE — 83615 LACTATE (LD) (LDH) ENZYME: CPT | Performed by: EMERGENCY MEDICINE

## 2022-01-09 PROCEDURE — 94640 AIRWAY INHALATION TREATMENT: CPT

## 2022-01-09 PROCEDURE — 87040 BLOOD CULTURE FOR BACTERIA: CPT | Mod: 59 | Performed by: EMERGENCY MEDICINE

## 2022-01-09 PROCEDURE — 84484 ASSAY OF TROPONIN QUANT: CPT | Performed by: EMERGENCY MEDICINE

## 2022-01-09 PROCEDURE — 82728 ASSAY OF FERRITIN: CPT | Performed by: EMERGENCY MEDICINE

## 2022-01-09 PROCEDURE — 25000242 PHARM REV CODE 250 ALT 637 W/ HCPCS: Performed by: NURSE PRACTITIONER

## 2022-01-09 PROCEDURE — 94761 N-INVAS EAR/PLS OXIMETRY MLT: CPT

## 2022-01-09 PROCEDURE — 86140 C-REACTIVE PROTEIN: CPT | Performed by: EMERGENCY MEDICINE

## 2022-01-09 PROCEDURE — 94760 N-INVAS EAR/PLS OXIMETRY 1: CPT

## 2022-01-09 PROCEDURE — 80053 COMPREHEN METABOLIC PANEL: CPT | Performed by: EMERGENCY MEDICINE

## 2022-01-09 PROCEDURE — U0002 COVID-19 LAB TEST NON-CDC: HCPCS | Performed by: EMERGENCY MEDICINE

## 2022-01-09 RX ORDER — LEVOFLOXACIN 5 MG/ML
750 INJECTION, SOLUTION INTRAVENOUS
Status: COMPLETED | OUTPATIENT
Start: 2022-01-09 | End: 2022-01-09

## 2022-01-09 RX ORDER — ALBUTEROL SULFATE 0.83 MG/ML
2.5 SOLUTION RESPIRATORY (INHALATION) EVERY 6 HOURS PRN
Status: DISCONTINUED | OUTPATIENT
Start: 2022-01-09 | End: 2022-01-14 | Stop reason: HOSPADM

## 2022-01-09 RX ORDER — ACETAMINOPHEN 325 MG/1
650 TABLET ORAL EVERY 8 HOURS PRN
Status: DISCONTINUED | OUTPATIENT
Start: 2022-01-09 | End: 2022-01-14 | Stop reason: HOSPADM

## 2022-01-09 RX ORDER — IBUPROFEN 200 MG
24 TABLET ORAL
Status: DISCONTINUED | OUTPATIENT
Start: 2022-01-09 | End: 2022-01-14 | Stop reason: HOSPADM

## 2022-01-09 RX ORDER — POLYETHYLENE GLYCOL 3350 17 G/17G
17 POWDER, FOR SOLUTION ORAL 2 TIMES DAILY PRN
Status: DISCONTINUED | OUTPATIENT
Start: 2022-01-09 | End: 2022-01-14 | Stop reason: HOSPADM

## 2022-01-09 RX ORDER — DILTIAZEM HYDROCHLORIDE 180 MG/1
180 CAPSULE, COATED, EXTENDED RELEASE ORAL DAILY
Status: DISCONTINUED | OUTPATIENT
Start: 2022-01-09 | End: 2022-01-11

## 2022-01-09 RX ORDER — HYDROCHLOROTHIAZIDE 25 MG/1
25 TABLET ORAL DAILY
Status: DISCONTINUED | OUTPATIENT
Start: 2022-01-09 | End: 2022-01-12

## 2022-01-09 RX ORDER — GLUCAGON 1 MG
1 KIT INJECTION
Status: DISCONTINUED | OUTPATIENT
Start: 2022-01-09 | End: 2022-01-14 | Stop reason: HOSPADM

## 2022-01-09 RX ORDER — FUROSEMIDE 10 MG/ML
40 INJECTION INTRAMUSCULAR; INTRAVENOUS
Status: DISCONTINUED | OUTPATIENT
Start: 2022-01-09 | End: 2022-01-12

## 2022-01-09 RX ORDER — LEVOFLOXACIN 5 MG/ML
750 INJECTION, SOLUTION INTRAVENOUS
Status: DISCONTINUED | OUTPATIENT
Start: 2022-01-09 | End: 2022-01-11

## 2022-01-09 RX ORDER — ONDANSETRON 2 MG/ML
4 INJECTION INTRAMUSCULAR; INTRAVENOUS EVERY 8 HOURS PRN
Status: DISCONTINUED | OUTPATIENT
Start: 2022-01-09 | End: 2022-01-13

## 2022-01-09 RX ORDER — PANTOPRAZOLE SODIUM 40 MG/1
40 TABLET, DELAYED RELEASE ORAL DAILY
Status: DISCONTINUED | OUTPATIENT
Start: 2022-01-09 | End: 2022-01-14 | Stop reason: HOSPADM

## 2022-01-09 RX ORDER — ATORVASTATIN CALCIUM 10 MG/1
10 TABLET, FILM COATED ORAL DAILY
Status: DISCONTINUED | OUTPATIENT
Start: 2022-01-09 | End: 2022-01-14 | Stop reason: HOSPADM

## 2022-01-09 RX ORDER — SODIUM CHLORIDE 0.9 % (FLUSH) 0.9 %
10 SYRINGE (ML) INJECTION EVERY 12 HOURS PRN
Status: DISCONTINUED | OUTPATIENT
Start: 2022-01-09 | End: 2022-01-14 | Stop reason: HOSPADM

## 2022-01-09 RX ORDER — IBUPROFEN 200 MG
16 TABLET ORAL
Status: DISCONTINUED | OUTPATIENT
Start: 2022-01-09 | End: 2022-01-14 | Stop reason: HOSPADM

## 2022-01-09 RX ORDER — IPRATROPIUM BROMIDE 0.5 MG/2.5ML
0.5 SOLUTION RESPIRATORY (INHALATION) EVERY 6 HOURS
Status: DISCONTINUED | OUTPATIENT
Start: 2022-01-09 | End: 2022-01-11

## 2022-01-09 RX ORDER — NALOXONE HCL 0.4 MG/ML
0.02 VIAL (ML) INJECTION
Status: DISCONTINUED | OUTPATIENT
Start: 2022-01-09 | End: 2022-01-14 | Stop reason: HOSPADM

## 2022-01-09 RX ORDER — ACETAMINOPHEN 325 MG/1
650 TABLET ORAL EVERY 4 HOURS PRN
Status: DISCONTINUED | OUTPATIENT
Start: 2022-01-09 | End: 2022-01-13

## 2022-01-09 RX ORDER — FUROSEMIDE 10 MG/ML
60 INJECTION INTRAMUSCULAR; INTRAVENOUS
Status: COMPLETED | OUTPATIENT
Start: 2022-01-09 | End: 2022-01-09

## 2022-01-09 RX ADMIN — FUROSEMIDE 60 MG: 10 INJECTION, SOLUTION INTRAVENOUS at 11:01

## 2022-01-09 RX ADMIN — ATORVASTATIN CALCIUM 10 MG: 10 TABLET, FILM COATED ORAL at 01:01

## 2022-01-09 RX ADMIN — LEVOFLOXACIN 750 MG: 750 INJECTION, SOLUTION INTRAVENOUS at 11:01

## 2022-01-09 RX ADMIN — FUROSEMIDE 40 MG: 10 INJECTION, SOLUTION INTRAMUSCULAR; INTRAVENOUS at 06:01

## 2022-01-09 RX ADMIN — APIXABAN 5 MG: 2.5 TABLET, FILM COATED ORAL at 09:01

## 2022-01-09 RX ADMIN — PANTOPRAZOLE SODIUM 40 MG: 40 TABLET, DELAYED RELEASE ORAL at 01:01

## 2022-01-09 RX ADMIN — IPRATROPIUM BROMIDE 0.5 MG: 0.5 SOLUTION RESPIRATORY (INHALATION) at 12:01

## 2022-01-09 RX ADMIN — IPRATROPIUM BROMIDE 0.5 MG: 0.5 SOLUTION RESPIRATORY (INHALATION) at 05:01

## 2022-01-09 RX ADMIN — APIXABAN 5 MG: 2.5 TABLET, FILM COATED ORAL at 01:01

## 2022-01-09 RX ADMIN — HYDROCHLOROTHIAZIDE 25 MG: 25 TABLET ORAL at 01:01

## 2022-01-09 RX ADMIN — ALBUTEROL SULFATE 2.5 MG: 2.5 SOLUTION RESPIRATORY (INHALATION) at 12:01

## 2022-01-09 RX ADMIN — DILTIAZEM HYDROCHLORIDE 180 MG: 180 CAPSULE, COATED, EXTENDED RELEASE ORAL at 01:01

## 2022-01-09 NOTE — SUBJECTIVE & OBJECTIVE
Past Medical History:   Diagnosis Date    Anemia of chronic disease     Aortic aneurysm     Atrial fibrillation with RVR 9/24/2021    Chronic systolic congestive heart failure 8/31/2017    Emphysema of lung 8/31/2017    GERD (gastroesophageal reflux disease)     Hypertension     Microcytic anemia 11/24/2020    Other hyperlipidemia 4/27/2021    Prostate cancer        Past Surgical History:   Procedure Laterality Date    COLONOSCOPY      PROSTATE SURGERY      SPINE SURGERY         Review of patient's allergies indicates:   Allergen Reactions    Fish containing products     Iodine and iodide containing products     Shellfish containing products        No current facility-administered medications on file prior to encounter.     Current Outpatient Medications on File Prior to Encounter   Medication Sig    apixaban (ELIQUIS) 5 mg Tab Take 1 tablet (5 mg total) by mouth 2 (two) times daily.    atorvastatin (LIPITOR) 10 MG tablet Take 1 tablet (10 mg total) by mouth once daily.    diltiaZEM (CARDIZEM CD) 180 MG 24 hr capsule Take 1 capsule (180 mg total) by mouth once daily.    hydroCHLOROthiazide (HYDRODIURIL) 25 MG tablet Take 1 tablet (25 mg total) by mouth once daily.    pantoprazole (PROTONIX) 40 MG tablet Take 1 tablet (40 mg total) by mouth once daily.    [DISCONTINUED] HYDROcodone-acetaminophen (NORCO) 5-325 mg per tablet Take 1 tablet by mouth every 6 (six) hours as needed for Pain.    albuterol (PROVENTIL/VENTOLIN HFA) 90 mcg/actuation inhaler Inhale 1-2 puffs into the lungs every 6 (six) hours as needed for Wheezing or Shortness of Breath. Rescue    tiotropium (SPIRIVA) 18 mcg inhalation capsule Inhale 1 capsule (18 mcg total) into the lungs once daily. Controller    [DISCONTINUED] benzonatate (TESSALON) 100 MG capsule Take 1 capsule (100 mg total) by mouth 3 (three) times daily as needed for Cough.    [DISCONTINUED] ketoconazole (NIZORAL) 2 % cream Apply topically 2 (two) times daily.      Family History     Problem Relation (Age of Onset)    Diabetes Mother    Peripheral vascular disease Mother        Tobacco Use    Smoking status: Current Every Day Smoker     Packs/day: 1.00     Types: Cigarettes    Smokeless tobacco: Never Used   Substance and Sexual Activity    Alcohol use: Yes     Comment: reports only drinks red wine when games are on    Drug use: Never    Sexual activity: Yes     Partners: Female     Review of Systems   Constitutional: Positive for activity change and fatigue. Negative for appetite change, chills, diaphoresis, fever and unexpected weight change.   HENT: Negative for congestion, hearing loss, nosebleeds, postnasal drip, rhinorrhea and trouble swallowing.    Eyes: Negative for discharge and visual disturbance.   Respiratory: Positive for shortness of breath. Negative for cough and chest tightness.    Cardiovascular: Positive for chest pain and leg swelling. Negative for palpitations.   Gastrointestinal: Negative for abdominal distention, abdominal pain, constipation, diarrhea, nausea and vomiting.   Endocrine: Negative for cold intolerance and heat intolerance.   Genitourinary: Negative for difficulty urinating, dysuria, frequency and hematuria.   Musculoskeletal: Positive for arthralgias and back pain. Negative for gait problem and myalgias.   Skin: Negative.    Neurological: Negative for dizziness, weakness, light-headedness and headaches.   Hematological: Negative for adenopathy. Does not bruise/bleed easily.   Psychiatric/Behavioral: Negative for agitation, behavioral problems and confusion. The patient is not nervous/anxious.      Objective:     Vital Signs (Most Recent):  Temp: 99.4 °F (37.4 °C) (01/09/22 0951)  Pulse: 91 (01/09/22 1302)  Resp: (!) 28 (01/09/22 1304)  BP: (!) 160/74 (01/09/22 1302)  SpO2: (!) 92 % (01/09/22 1304) Vital Signs (24h Range):  Temp:  [99.4 °F (37.4 °C)] 99.4 °F (37.4 °C)  Pulse:  [82-96] 91  Resp:  [20-30] 28  SpO2:  [85 %-97 %] 92  %  BP: (140-160)/(58-74) 160/74     Weight: 71.8 kg (158 lb 4.6 oz)  Body mass index is 22.71 kg/m².    Physical Exam  Vitals and nursing note reviewed.   Constitutional:       General: He is not in acute distress.     Appearance: He is well-developed. He is not diaphoretic.   HENT:      Head: Normocephalic and atraumatic.      Right Ear: Hearing and external ear normal.      Left Ear: Hearing and external ear normal.      Nose: Nose normal. No mucosal edema or rhinorrhea.      Mouth/Throat:      Pharynx: Uvula midline.   Eyes:      General:         Right eye: No discharge.         Left eye: No discharge.      Conjunctiva/sclera: Conjunctivae normal.      Right eye: No chemosis.     Left eye: No chemosis.     Pupils: Pupils are equal, round, and reactive to light.   Neck:      Thyroid: No thyroid mass or thyromegaly.      Trachea: Trachea normal.   Cardiovascular:      Rate and Rhythm: Normal rate and regular rhythm.      Pulses:           Dorsalis pedis pulses are 2+ on the right side and 2+ on the left side.      Heart sounds: Normal heart sounds. No murmur heard.      Pulmonary:      Effort: Pulmonary effort is normal. No respiratory distress.      Breath sounds: Examination of the right-lower field reveals decreased breath sounds. Examination of the left-lower field reveals decreased breath sounds. Decreased breath sounds present. No wheezing.   Chest:   Breasts:      Right: No supraclavicular adenopathy.      Left: No supraclavicular adenopathy.       Abdominal:      General: Bowel sounds are normal. There is no distension.      Palpations: Abdomen is soft.      Tenderness: There is no abdominal tenderness.   Musculoskeletal:         General: Normal range of motion.      Cervical back: Normal range of motion and neck supple.      Right lower leg: Edema present.      Left lower leg: Edema present.   Lymphadenopathy:      Cervical: No cervical adenopathy.      Upper Body:      Right upper body: No supraclavicular  adenopathy.      Left upper body: No supraclavicular adenopathy.   Skin:     General: Skin is warm and dry.      Capillary Refill: Capillary refill takes less than 2 seconds.      Findings: No rash.   Neurological:      Mental Status: He is alert and oriented to person, place, and time.   Psychiatric:         Mood and Affect: Mood is not anxious.         Speech: Speech normal.         Behavior: Behavior normal.         Thought Content: Thought content normal.         Judgment: Judgment normal.           CRANIAL NERVES     CN III, IV, VI   Pupils are equal, round, and reactive to light.       Significant Labs:   All pertinent labs within the past 24 hours have been reviewed.  CBC:   Recent Labs   Lab 01/09/22  1017   WBC 10.40   HGB 9.7*   HCT 32.7*        CMP:   Recent Labs   Lab 01/09/22  1017      K 3.7      CO2 27   GLU 80   BUN 15   CREATININE 0.7   CALCIUM 8.8   PROT 7.1   ALBUMIN 3.3*   BILITOT 1.5*   ALKPHOS 51*   AST 17   ALT 12   ANIONGAP 6*   EGFRNONAA >60       Significant Imaging: I have reviewed all pertinent imaging results/findings within the past 24 hours.

## 2022-01-09 NOTE — HPI
81 y.o. male patient with a PMHx of aortic aneurysm, atrial fibrillation with RVR, emphysema of lung, chronic systolic congestive heart failure, GERD, HTN, other HLD, and prostate cancer who presents to the Emergency Department for SOB which onset gradually 2 days PTA. The pt reports that his SpO2 was 85% on RA. Symptoms are constant and moderate in severity. No mitigating or exacerbating factors reported. Associated sxs include L sided anterior CP that radiates down his L arm since yesterday and L arm paresthesias. Patient denies any fever, chills, numbness, n/v/d, diaphoresis, headahces, and all other sxs at this time. No prior Tx reported. No further complaints or concerns at this time. In the ED, BNP: 1844, H/H: 9.7/32.7, CRP: 151.4, CXR: Left lung consolidation, Lactic: normal, Procalcitonin: Normal. Treated with Lasix and Levaquin. Patient is a full code, surrogate decision maker is his spouse, Britany Douglas. Patient admitted to observation under the care of hospital medicine.

## 2022-01-09 NOTE — ED PROVIDER NOTES
SCRIBE #1 NOTE: I, Celeste Brown, am scribing for, and in the presence of, Nir Vaughn MD. I have scribed the entire note.      History      Chief Complaint   Patient presents with    Shortness of Breath     Pt reports L. Anterior CP and pins and needles going down L. Arm since yesterday.        Review of patient's allergies indicates:   Allergen Reactions    Fish containing products     Iodine and iodide containing products     Shellfish containing products         HPI   HPI    1/9/2022, 10:13 AM   History obtained from the patient      History of Present Illness: Richy Douglas is a 81 y.o. male patient with a PMHx of aortic aneurysm, atrial fibrillation with RVR, emphysema of lung, chronic systolic congestive heart failure, GERD, HTN, other HLD, and prostate cancer who presents to the Emergency Department for SOB which onset gradually 2 days PTA. The pt reports that his SpO2 was 85% on RA. Symptoms are constant and moderate in severity. No mitigating or exacerbating factors reported. Associated sxs include L sided anterior CP that radiates down his L arm since yesterday and L arm paresthesias. Patient denies any fever, chills, numbness, n/v/d, diaphoresis, headahces, and all other sxs at this time. No prior Tx reported. No further complaints or concerns at this time.         Arrival mode: Personal vehicle      PCP: Vivian Ch MD       Past Medical History:  Past Medical History:   Diagnosis Date    Anemia of chronic disease     Aortic aneurysm     Atrial fibrillation with RVR 9/24/2021    Chronic systolic congestive heart failure 8/31/2017    Emphysema of lung 8/31/2017    GERD (gastroesophageal reflux disease)     Hypertension     Microcytic anemia 11/24/2020    Other hyperlipidemia 4/27/2021    Prostate cancer        Past Surgical History:  Past Surgical History:   Procedure Laterality Date    COLONOSCOPY      PROSTATE SURGERY      SPINE SURGERY           Family History:  Family  History   Problem Relation Age of Onset    Diabetes Mother     Peripheral vascular disease Mother        Social History:  Social History     Tobacco Use    Smoking status: Current Every Day Smoker     Packs/day: 1.00     Types: Cigarettes    Smokeless tobacco: Never Used   Substance and Sexual Activity    Alcohol use: Yes     Comment: reports only drinks red wine when games are on    Drug use: Not on file    Sexual activity: Not on file       ROS   Review of Systems   Constitutional: Negative for chills, diaphoresis and fever.   HENT: Negative for sore throat.    Respiratory: Positive for shortness of breath.    Cardiovascular: Positive for chest pain (L sided anterior and radiates down L arm).   Gastrointestinal: Negative for diarrhea, nausea and vomiting.   Genitourinary: Negative for dysuria.   Musculoskeletal: Negative for back pain.   Skin: Negative for rash.   Neurological: Negative for numbness and headaches.        (+) L arm paresthesias   Hematological: Does not bruise/bleed easily.   All other systems reviewed and are negative.    Physical Exam      Initial Vitals   BP Pulse Resp Temp SpO2   01/09/22 0951 01/09/22 0952 01/09/22 0951 01/09/22 0951 01/09/22 0951   (!) 144/58 86 (!) 30 99.4 °F (37.4 °C) (!) 85 %      MAP       --                 Physical Exam  Nursing Notes and Vital Signs Reviewed.  Constitutional: Patient is in no acute distress. Elderly and frail.  Head: Atraumatic. Normocephalic.  Eyes: PERRL. EOM intact. Conjunctivae are not pale. No scleral icterus.  ENT: Mucous membranes are moist. Oropharynx is clear and symmetric.    Neck: Supple. Full ROM. No lymphadenopathy.  Cardiovascular: Regular rate. Regular rhythm. No murmurs, rubs, or gallops. Distal pulses are 2+ and symmetric.  Pulmonary/Chest: No respiratory distress. Clear to auscultation bilaterally. No wheezing or rales.  Abdominal: Soft and non-distended.  There is no tenderness.  No rebound, guarding, or rigidity.    Musculoskeletal: Moves all extremities. No obvious deformities. No edema.  Skin: Warm and dry.  Neurological:  Alert, awake, and appropriate.  Normal speech.  No acute focal neurological deficits are appreciated.  Psychiatric: Normal affect. Good eye contact. Appropriate in content.    ED Course    Critical Care    Date/Time: 1/9/2022 11:17 AM  Performed by: Nir Vaughn MD  Authorized by: Nir Vaughn MD   Direct patient critical care time: 25 minutes  Additional history critical care time: 10 minutes  Ordering / reviewing critical care time: 10 minutes  Documentation critical care time: 5 minutes  Consulting other physicians critical care time: 10 minutes  Total critical care time (exclusive of procedural time) : 60 minutes  Critical care time was exclusive of separately billable procedures and treating other patients and teaching time.  Critical care was necessary to treat or prevent imminent or life-threatening deterioration of the following conditions: hypoxia.  Critical care was time spent personally by me on the following activities: blood draw for specimens, development of treatment plan with patient or surrogate, discussions with consultants, interpretation of cardiac output measurements, evaluation of patient's response to treatment, examination of patient, obtaining history from patient or surrogate, ordering and performing treatments and interventions, ordering and review of laboratory studies, ordering and review of radiographic studies, pulse oximetry, re-evaluation of patient's condition and review of old charts.        ED Vital Signs:  Vitals:    01/09/22 0951 01/09/22 0952 01/09/22 1000 01/09/22 1003   BP: (!) 144/58   (!) 148/67   Pulse:  86 96    Resp: (!) 30      Temp: 99.4 °F (37.4 °C)      TempSrc: Oral      SpO2: (!) 85%      Weight:        01/09/22 1008 01/09/22 1025 01/09/22 1027   BP:      Pulse: 91  85   Resp:   (!) 28   Temp:      TempSrc:      SpO2: 97%  97%   Weight:  71.8  kg (158 lb 4.6 oz)        Abnormal Lab Results:  Labs Reviewed   CBC W/ AUTO DIFFERENTIAL - Abnormal; Notable for the following components:       Result Value    Hemoglobin 9.7 (*)     Hematocrit 32.7 (*)     MCV 70 (*)     MCH 20.9 (*)     MCHC 29.7 (*)     RDW 32.3 (*)     Gran # (ANC) 8.6 (*)     Lymph # 0.4 (*)     Eos # 0.7 (*)     Gran % 82.5 (*)     Lymph % 3.5 (*)     Platelet Estimate Increased (*)     All other components within normal limits   COMPREHENSIVE METABOLIC PANEL - Abnormal; Notable for the following components:    Albumin 3.3 (*)     Total Bilirubin 1.5 (*)     Alkaline Phosphatase 51 (*)     Anion Gap 6 (*)     All other components within normal limits   C-REACTIVE PROTEIN - Abnormal; Notable for the following components:    .4 (*)     All other components within normal limits   LACTATE DEHYDROGENASE - Abnormal; Notable for the following components:     (*)     All other components within normal limits   TROPONIN I - Abnormal; Notable for the following components:    Troponin I 0.032 (*)     All other components within normal limits   B-TYPE NATRIURETIC PEPTIDE - Abnormal; Notable for the following components:    BNP 1,844 (*)     All other components within normal limits   SARS-COV-2 RDRP GENE - Normal    Narrative:     This test utilizes isothermal nucleic acid amplification   technology to detect the SARS-CoV-2 RdRp nucleic acid segment.   The analytical sensitivity (limit of detection) is 125 genome   equivalents/mL.   A POSITIVE result implies infection with the SARS-CoV-2 virus;   the patient is presumed to be contagious.     A NEGATIVE result means that SARS-CoV-2 nucleic acids are not   present above the limit of detection. A NEGATIVE result should be   treated as presumptive. It does not rule out the possibility of   COVID-19 and should not be the sole basis for treatment decisions.   If COVID-19 is strongly suspected based on clinical and exposure   history, re-testing  using an alternate molecular assay should be   considered.   This test is only for use under the Food and Drug   Administration s Emergency Use Authorization (EUA).   Commercial kits are provided by [employer].   Performance characteristics of the EUA have been independently   verified by [facility] Department of   Pathology and Laboratory Medicine.   _________________________________________________________________   The authorized Fact Sheet for Healthcare Providers and the authorized Fact   Sheet for Patients of the ID NOW COVID-19 are available on the FDA   website:     https://www.fda.gov/media/986571/download  https://www.fda.gov/media/616547/download       CULTURE, BLOOD   CULTURE, BLOOD   FERRITIN   CK   LACTIC ACID, PLASMA   PROCALCITONIN        All Lab Results:  Results for orders placed or performed during the hospital encounter of 01/09/22   CBC auto differential   Result Value Ref Range    WBC 10.40 3.90 - 12.70 K/uL    RBC 4.65 4.60 - 6.20 M/uL    Hemoglobin 9.7 (L) 14.0 - 18.0 g/dL    Hematocrit 32.7 (L) 40.0 - 54.0 %    MCV 70 (L) 82 - 98 fL    MCH 20.9 (L) 27.0 - 31.0 pg    MCHC 29.7 (L) 32.0 - 36.0 g/dL    RDW 32.3 (H) 11.5 - 14.5 %    Platelets 401 150 - 450 K/uL    MPV SEE COMMENT 9.2 - 12.9 fL    Immature Granulocytes 0.4 0.0 - 0.5 %    Gran # (ANC) 8.6 (H) 1.8 - 7.7 K/uL    Immature Grans (Abs) 0.04 0.00 - 0.04 K/uL    Lymph # 0.4 (L) 1.0 - 4.8 K/uL    Mono # 0.7 0.3 - 1.0 K/uL    Eos # 0.7 (H) 0.0 - 0.5 K/uL    Baso # 0.07 0.00 - 0.20 K/uL    nRBC 0 0 /100 WBC    Gran % 82.5 (H) 38.0 - 73.0 %    Lymph % 3.5 (L) 18.0 - 48.0 %    Mono % 6.5 4.0 - 15.0 %    Eosinophil % 6.4 0.0 - 8.0 %    Basophil % 0.7 0.0 - 1.9 %    Platelet Estimate Increased (A)     Aniso Slight     Poik Slight     Poly Occasional     Hypo Occasional     Ovalocytes Occasional     Tear Drop Cells Occasional     Acanthocytes Present     Differential Method Automated    Comprehensive metabolic panel   Result  Value Ref Range    Sodium 140 136 - 145 mmol/L    Potassium 3.7 3.5 - 5.1 mmol/L    Chloride 107 95 - 110 mmol/L    CO2 27 23 - 29 mmol/L    Glucose 80 70 - 110 mg/dL    BUN 15 8 - 23 mg/dL    Creatinine 0.7 0.5 - 1.4 mg/dL    Calcium 8.8 8.7 - 10.5 mg/dL    Total Protein 7.1 6.0 - 8.4 g/dL    Albumin 3.3 (L) 3.5 - 5.2 g/dL    Total Bilirubin 1.5 (H) 0.1 - 1.0 mg/dL    Alkaline Phosphatase 51 (L) 55 - 135 U/L    AST 17 10 - 40 U/L    ALT 12 10 - 44 U/L    Anion Gap 6 (L) 8 - 16 mmol/L    eGFR if African American >60 >60 mL/min/1.73 m^2    eGFR if non African American >60 >60 mL/min/1.73 m^2   C-Reactive Protein   Result Value Ref Range    .4 (H) 0.0 - 8.2 mg/L   Ferritin   Result Value Ref Range    Ferritin 118 20.0 - 300.0 ng/mL   Lactate Dehydrogenase   Result Value Ref Range     (H) 110 - 260 U/L   CK   Result Value Ref Range    CPK 91 20 - 200 U/L   Lactic Acid, Plasma   Result Value Ref Range    Lactate (Lactic Acid) 0.9 0.5 - 2.2 mmol/L   Troponin I   Result Value Ref Range    Troponin I 0.032 (H) 0.000 - 0.026 ng/mL   Procalcitonin   Result Value Ref Range    Procalcitonin 0.03 <0.25 ng/mL   Brain Natriuretic Peptide   Result Value Ref Range    BNP 1,844 (H) 0 - 99 pg/mL   POCT COVID-19 Rapid Screening   Result Value Ref Range    POC Rapid COVID Negative Negative     Acceptable Yes          Imaging Results:  Imaging Results          X-Ray Chest AP Portable (Final result)  Result time 01/09/22 10:24:50    Final result by URSULA Milian Sr., MD (01/09/22 10:24:50)                 Impression:      There has been interval worsening of the appearance of the lungs. There is a marked amount of alveolar consolidation scattered throughout the left lung. There is a mild amount of alveolar consolidation scattered throughout the right lung.  This is consistent with the patient's history and characteristic of pneumonia.    .      Electronically signed by: Joshua Milian,  MD  Date:    01/09/2022  Time:    10:24             Narrative:    EXAMINATION:  XR CHEST AP PORTABLE    CLINICAL HISTORY:  COVID-19;    COMPARISON:  10/27/2021    FINDINGS:  The size of the heart is normal.  There has been interval worsening of the appearance of the lungs.  There is a marked amount of alveolar consolidation scattered throughout the left lung.  There is a mild amount of alveolar consolidation scattered throughout the right lung.  There is no pneumothorax.  The costophrenic angles are sharp.                               The EKG was ordered, reviewed, and independently interpreted by the ED provider.  Interpretation time: 9:44  Rate: 94 BPM  Rhythm: Sinus rhythm with occasional premature ventricular complexes  Interpretation: No acute ST changes. No STEMI.           The Emergency Provider reviewed the vital signs and test results, which are outlined above.    ED Discussion     11:05 AM: Discussed case with Lindsay Aaron NP ([facility]) who recommends IV lasix. Dr. Barahona agrees with current care and management of pt and accepts admission.   Admitting Service: Hospital Medicine  Admitting Physician: Dr. Barahona  Admit to: Tele Obs    11:06 AM: Re-evaluated pt. I have discussed test results, shared treatment plan, and the need for admission with patient and family at bedside. Pt and family express understanding at this time and agree with all information. All questions answered. Pt and family have no further questions or concerns at this time. Pt is ready for admit.           ED Medication(s):  Medications   levoFLOXacin 750 mg/150 mL IVPB 750 mg (750 mg Intravenous New Bag 1/9/22 1116)   furosemide injection 60 mg (60 mg Intravenous Given 1/9/22 1111)           New Prescriptions    No medications on file         Medical Decision Making    Medical Decision Making:   Clinical Tests:   Lab Tests: Ordered and Reviewed  Radiological Study: Ordered and Reviewed  Medical Tests: Ordered and  Reviewed           Scribe Attestation:   Scribe #1: I performed the above scribed service and the documentation accurately describes the services I performed. I attest to the accuracy of the note.    Attending:   Physician Attestation Statement for Scribe #1: I, Nir Vaughn MD, personally performed the services described in this documentation, as scribed by Celeste Brown, in my presence, and it is both accurate and complete.          Clinical Impression       ICD-10-CM ICD-9-CM   1. SOB (shortness of breath)  R06.02 786.05   2. COVID-19  U07.1 079.89   3. Hypoxia  R09.02 799.02   4. Acute pulmonary edema  J81.0 518.4       Disposition:   Disposition: Placed in Observation  Condition: Fair         Nir Vaughn MD  01/09/22 1124

## 2022-01-09 NOTE — ASSESSMENT & PLAN NOTE
Patient is identified as having Systolic (HFrEF) heart failure that is Acute on chronic. CHF is currently uncontrolled due to Continued edema of extremities. Latest ECHO performed and demonstrates- Results for orders placed during the hospital encounter of 04/01/21    Echo Color Flow Doppler? Yes    Interpretation Summary  · Concentric remodeling and normal systolic function. The estimated ejection fraction is 60%.  · Normal left ventricular diastolic function.  · With normal right ventricular systolic function.  . Continue Beta Blocker, ACE/ARB and Furosemide and monitor clinical status closely. Monitor on telemetry. Patient is on CHF pathway.  Monitor strict Is&Os and daily weights.  Place on fluid restriction of 1.5 L. Continue to stress to patient importance of self efficacy and  on diet for CHF. Last BNP reviewed- and noted below   Recent Labs   Lab 01/09/22  1017   BNP 1,844*   .  --Daily weights  --Lasix 40mg IV BID  --Cardiac Diet

## 2022-01-09 NOTE — Clinical Note
ID band present and verified. Family is not present. Contact # : Gogo Black (daughter) [phone].

## 2022-01-09 NOTE — ASSESSMENT & PLAN NOTE
Hypertensive in the ED, treated with Lasix IV, resumed home medications    --Resume home medications  --Vitals Q4H  --Telemetry

## 2022-01-09 NOTE — Clinical Note
The PA catheter was repositioned to the right pulmonary artery. Hemodynamics were performed. O2 saturation was measured at 63%.

## 2022-01-09 NOTE — Clinical Note
70 ml of contrast were injected throughout the case. 0 mL of contrast was the total wasted during the case. 70 mL was the total amount used during the case.

## 2022-01-09 NOTE — PHARMACY MED REC
"Admission Medication History     The home medication history was taken by David Hedrick.    You may go to "Admission" then "Reconcile Home Medications" tabs to review and/or act upon these items.      The home medication list has been updated by the Pharmacy department.    Please read ALL comments highlighted in yellow.    Please address this information as you see fit.     Feel free to contact us if you have any questions or require assistance.        David Hedrick  [phone]    Current Outpatient Medications on File Prior to Encounter   Medication Sig Dispense Refill Last Dose    apixaban (ELIQUIS) 5 mg Tab Take 1 tablet (5 mg total) by mouth 2 (two) times daily. 180 tablet 1 1/8/2022    atorvastatin (LIPITOR) 10 MG tablet Take 1 tablet (10 mg total) by mouth once daily. 90 tablet 3 1/8/2022    diltiaZEM (CARDIZEM CD) 180 MG 24 hr capsule Take 1 capsule (180 mg total) by mouth once daily. 90 capsule 1 1/8/2022    hydroCHLOROthiazide (HYDRODIURIL) 25 MG tablet Take 1 tablet (25 mg total) by mouth once daily. 90 tablet 1 1/8/2022    pantoprazole (PROTONIX) 40 MG tablet Take 1 tablet (40 mg total) by mouth once daily. 90 tablet 3 1/8/2022    [DISCONTINUED] HYDROcodone-acetaminophen (NORCO) 5-325 mg per tablet Take 1 tablet by mouth every 6 (six) hours as needed for Pain. 18 tablet 0 Past Month at Unknown time    albuterol (PROVENTIL/VENTOLIN HFA) 90 mcg/actuation inhaler Inhale 1-2 puffs into the lungs every 6 (six) hours as needed for Wheezing or Shortness of Breath. Rescue 18 g 0 Unknown at Unknown time    tiotropium (SPIRIVA) 18 mcg inhalation capsule Inhale 1 capsule (18 mcg total) into the lungs once daily. Controller 30 capsule 1 More than a month                             .          "

## 2022-01-09 NOTE — Clinical Note
The right radial was prepped. The site was prepped with ChloraPrep. The site was clipped. The patient was draped. The patient was positioned supine.

## 2022-01-09 NOTE — Clinical Note
The PA catheter insertion attempt was made into the superior vena cava. with 0.025 J wire. Unable to cross right subclavian

## 2022-01-09 NOTE — H&P
[facility]  [facility]  History & Physical    Patient Name: Richy Douglas  MRN: [id number]  Patient Class: OP- Observation  Admission Date: 1/9/2022  Attending Physician: Rubi Barahona MD   Primary Care Provider: Vivian Ch MD         Patient information was obtained from patient and ER records.     Subjective:     Principal Problem:Pneumonia of left upper lobe due to infectious organism    Chief Complaint:   Chief Complaint   Patient presents with    Shortness of Breath     Pt reports L. Anterior CP and pins and needles going down L. Arm since yesterday.         HPI: 81 y.o. male patient with a PMHx of aortic aneurysm, atrial fibrillation with RVR, emphysema of lung, chronic systolic congestive heart failure, GERD, HTN, other HLD, and prostate cancer who presents to the Emergency Department for SOB which onset gradually 2 days PTA. The pt reports that his SpO2 was 85% on RA. Symptoms are constant and moderate in severity. No mitigating or exacerbating factors reported. Associated sxs include L sided anterior CP that radiates down his L arm since yesterday and L arm paresthesias. Patient denies any fever, chills, numbness, n/v/d, diaphoresis, headahces, and all other sxs at this time. No prior Tx reported. No further complaints or concerns at this time. In the ED, BNP: 1844, H/H: 9.7/32.7, CRP: 151.4, CXR: Left lung consolidation, Lactic: normal, Procalcitonin: Normal. Treated with Lasix and Levaquin. Patient is a full code, surrogate decision maker is his spouse, Britany Douglas. Patient admitted to observation under the care of [facility] medicine.       Past Medical History:   Diagnosis Date    Anemia of chronic disease     Aortic aneurysm     Atrial fibrillation with RVR 9/24/2021    Chronic systolic congestive heart failure 8/31/2017    Emphysema of lung 8/31/2017    GERD (gastroesophageal reflux disease)     Hypertension     Microcytic anemia 11/24/2020    Other hyperlipidemia  4/27/2021    Prostate cancer        Past Surgical History:   Procedure Laterality Date    COLONOSCOPY      PROSTATE SURGERY      SPINE SURGERY         Review of patient's allergies indicates:   Allergen Reactions    Fish containing products     Iodine and iodide containing products     Shellfish containing products        No current facility-administered medications on file prior to encounter.     Current Outpatient Medications on File Prior to Encounter   Medication Sig    apixaban (ELIQUIS) 5 mg Tab Take 1 tablet (5 mg total) by mouth 2 (two) times daily.    atorvastatin (LIPITOR) 10 MG tablet Take 1 tablet (10 mg total) by mouth once daily.    diltiaZEM (CARDIZEM CD) 180 MG 24 hr capsule Take 1 capsule (180 mg total) by mouth once daily.    hydroCHLOROthiazide (HYDRODIURIL) 25 MG tablet Take 1 tablet (25 mg total) by mouth once daily.    pantoprazole (PROTONIX) 40 MG tablet Take 1 tablet (40 mg total) by mouth once daily.    [DISCONTINUED] HYDROcodone-acetaminophen (NORCO) 5-325 mg per tablet Take 1 tablet by mouth every 6 (six) hours as needed for Pain.    albuterol (PROVENTIL/VENTOLIN HFA) 90 mcg/actuation inhaler Inhale 1-2 puffs into the lungs every 6 (six) hours as needed for Wheezing or Shortness of Breath. Rescue    tiotropium (SPIRIVA) 18 mcg inhalation capsule Inhale 1 capsule (18 mcg total) into the lungs once daily. Controller    [DISCONTINUED] benzonatate (TESSALON) 100 MG capsule Take 1 capsule (100 mg total) by mouth 3 (three) times daily as needed for Cough.    [DISCONTINUED] ketoconazole (NIZORAL) 2 % cream Apply topically 2 (two) times daily.     Family History     Problem Relation (Age of Onset)    Diabetes Mother    Peripheral vascular disease Mother        Tobacco Use    Smoking status: Current Every Day Smoker     Packs/day: 1.00     Types: Cigarettes    Smokeless tobacco: Never Used   Substance and Sexual Activity    Alcohol use: Yes     Comment: reports only drinks red  wine when games are on    Drug use: Never    Sexual activity: Yes     Partners: Female     Review of Systems   Constitutional: Positive for activity change and fatigue. Negative for appetite change, chills, diaphoresis, fever and unexpected weight change.   HENT: Negative for congestion, hearing loss, nosebleeds, postnasal drip, rhinorrhea and trouble swallowing.    Eyes: Negative for discharge and visual disturbance.   Respiratory: Positive for shortness of breath. Negative for cough and chest tightness.    Cardiovascular: Positive for chest pain and leg swelling. Negative for palpitations.   Gastrointestinal: Negative for abdominal distention, abdominal pain, constipation, diarrhea, nausea and vomiting.   Endocrine: Negative for cold intolerance and heat intolerance.   Genitourinary: Negative for difficulty urinating, dysuria, frequency and hematuria.   Musculoskeletal: Positive for arthralgias and back pain. Negative for gait problem and myalgias.   Skin: Negative.    Neurological: Negative for dizziness, weakness, light-headedness and headaches.   Hematological: Negative for adenopathy. Does not bruise/bleed easily.   Psychiatric/Behavioral: Negative for agitation, behavioral problems and confusion. The patient is not nervous/anxious.      Objective:     Vital Signs (Most Recent):  Temp: 99.4 °F (37.4 °C) (01/09/22 0951)  Pulse: 91 (01/09/22 1302)  Resp: (!) 28 (01/09/22 1304)  BP: (!) 160/74 (01/09/22 1302)  SpO2: (!) 92 % (01/09/22 1304) Vital Signs (24h Range):  Temp:  [99.4 °F (37.4 °C)] 99.4 °F (37.4 °C)  Pulse:  [82-96] 91  Resp:  [20-30] 28  SpO2:  [85 %-97 %] 92 %  BP: (140-160)/(58-74) 160/74     Weight: 71.8 kg (158 lb 4.6 oz)  Body mass index is 22.71 kg/m².    Physical Exam  Vitals and nursing note reviewed.   Constitutional:       General: He is not in acute distress.     Appearance: He is well-developed. He is not diaphoretic.   HENT:      Head: Normocephalic and atraumatic.      Right Ear:  Hearing and external ear normal.      Left Ear: Hearing and external ear normal.      Nose: Nose normal. No mucosal edema or rhinorrhea.      Mouth/Throat:      Pharynx: Uvula midline.   Eyes:      General:         Right eye: No discharge.         Left eye: No discharge.      Conjunctiva/sclera: Conjunctivae normal.      Right eye: No chemosis.     Left eye: No chemosis.     Pupils: Pupils are equal, round, and reactive to light.   Neck:      Thyroid: No thyroid mass or thyromegaly.      Trachea: Trachea normal.   Cardiovascular:      Rate and Rhythm: Normal rate and regular rhythm.      Pulses:           Dorsalis pedis pulses are 2+ on the right side and 2+ on the left side.      Heart sounds: Normal heart sounds. No murmur heard.      Pulmonary:      Effort: Pulmonary effort is normal. No respiratory distress.      Breath sounds: Examination of the right-lower field reveals decreased breath sounds. Examination of the left-lower field reveals decreased breath sounds. Decreased breath sounds present. No wheezing.   Chest:   Breasts:      Right: No supraclavicular adenopathy.      Left: No supraclavicular adenopathy.       Abdominal:      General: Bowel sounds are normal. There is no distension.      Palpations: Abdomen is soft.      Tenderness: There is no abdominal tenderness.   Musculoskeletal:         General: Normal range of motion.      Cervical back: Normal range of motion and neck supple.      Right lower leg: Edema present.      Left lower leg: Edema present.   Lymphadenopathy:      Cervical: No cervical adenopathy.      Upper Body:      Right upper body: No supraclavicular adenopathy.      Left upper body: No supraclavicular adenopathy.   Skin:     General: Skin is warm and dry.      Capillary Refill: Capillary refill takes less than 2 seconds.      Findings: No rash.   Neurological:      Mental Status: He is alert and oriented to person, place, and time.   Psychiatric:         Mood and Affect: Mood is not  anxious.         Speech: Speech normal.         Behavior: Behavior normal.         Thought Content: Thought content normal.         Judgment: Judgment normal.           CRANIAL NERVES     CN III, IV, VI   Pupils are equal, round, and reactive to light.       Significant Labs:   All pertinent labs within the past 24 hours have been reviewed.  CBC:   Recent Labs   Lab 01/09/22  1017   WBC 10.40   HGB 9.7*   HCT 32.7*        CMP:   Recent Labs   Lab 01/09/22  1017      K 3.7      CO2 27   GLU 80   BUN 15   CREATININE 0.7   CALCIUM 8.8   PROT 7.1   ALBUMIN 3.3*   BILITOT 1.5*   ALKPHOS 51*   AST 17   ALT 12   ANIONGAP 6*   EGFRNONAA >60       Significant Imaging: I have reviewed all pertinent imaging results/findings within the past 24 hours.    Assessment/Plan:     * Pneumonia of left upper lobe due to infectious organism  Admitted with hypoxemia, CXR: There is a marked amount of alveolar consolidation scattered throughout the left lung. There is a mild amount of alveolar consolidation scattered throughout the right lung    --Levaquin IV Daily  --Neb Tx PRN  --Continue O2 for supportive care      Atrial fibrillation  Patient treated with Eliquis for thromboembolism prevention    --Continue Eliquis  --Telemetry        Iron deficiency anemia due to chronic blood loss  Patient due for 2nd iron infusion with Hematology on 1/10/22, s/p 2U PRBC transfusion approx. 4 weeks ago.     --Daily CBC  --Transfuse with 1 unit PRBC for Hgb <7.0 or <8.0 if symptomatic       Hypertension  Hypertensive in the ED, treated with Lasix IV, resumed home medications    --Resume home medications  --Vitals Q4H  --Telemetry        Chronic systolic congestive heart failure  Patient is identified as having Systolic (HFrEF) heart failure that is Acute on chronic. CHF is currently uncontrolled due to Continued edema of extremities. Latest ECHO performed and demonstrates- Results for orders placed during the hospital encounter of  04/01/21    Echo Color Flow Doppler? Yes    Interpretation Summary  · Concentric remodeling and normal systolic function. The estimated ejection fraction is 60%.  · Normal left ventricular diastolic function.  · With normal right ventricular systolic function.  . Continue Beta Blocker, ACE/ARB and Furosemide and monitor clinical status closely. Monitor on telemetry. Patient is on CHF pathway.  Monitor strict Is&Os and daily weights.  Place on fluid restriction of 1.5 L. Continue to stress to patient importance of self efficacy and  on diet for CHF. Last BNP reviewed- and noted below   Recent Labs   Lab 01/09/22  1017   BNP 1,844*   .  --Daily weights  --Lasix 40mg IV BID  --Cardiac Diet      VTE Risk Mitigation (From admission, onward)         Ordered     apixaban tablet 5 mg  2 times daily         01/09/22 1133     IP VTE HIGH RISK PATIENT  Once         01/09/22 1133     Place sequential compression device  Until discontinued         01/09/22 1133     Reason for No Pharmacological VTE Prophylaxis  Once        Question:  Reasons:  Answer:  Already adequately anticoagulated on oral Anticoagulants    01/09/22 1133                   Pamela Mixon NP  Department of Hospital Medicine   [facility] - Emergency Dept.

## 2022-01-09 NOTE — ASSESSMENT & PLAN NOTE
Admitted with hypoxemia, CXR: There is a marked amount of alveolar consolidation scattered throughout the left lung. There is a mild amount of alveolar consolidation scattered throughout the right lung    --Levaquin IV Daily  --Neb Tx PRN  --Continue O2 for supportive care

## 2022-01-09 NOTE — Clinical Note
The radial band was applied to the right radial artery. 12 cc's of air were inserted into the closure device.

## 2022-01-09 NOTE — Clinical Note
A percutaneous stick to the right femoral vein was performed. Ultrasound guidance was used to obtain access.

## 2022-01-09 NOTE — Clinical Note
The catheter was inserted over the wire into the left ventricle. Hemodynamics were performed.  and Pullback was recorded.

## 2022-01-09 NOTE — ASSESSMENT & PLAN NOTE
Patient due for 2nd iron infusion with Hematology on 1/10/22, s/p 2U PRBC transfusion approx. 4 weeks ago.     --Daily CBC  --Transfuse with 1 unit PRBC for Hgb <7.0 or <8.0 if symptomatic

## 2022-01-09 NOTE — ASSESSMENT & PLAN NOTE
Patient treated with Eliquis for thromboembolism prevention    --Continue Eliquis  --Telemetry

## 2022-01-10 ENCOUNTER — PATIENT MESSAGE (OUTPATIENT)
Dept: ENDOSCOPY | Facility: HOSPITAL | Age: 82
End: 2022-01-10
Payer: MEDICARE

## 2022-01-10 PROCEDURE — 25000242 PHARM REV CODE 250 ALT 637 W/ HCPCS: Performed by: EMERGENCY MEDICINE

## 2022-01-10 PROCEDURE — 94640 AIRWAY INHALATION TREATMENT: CPT

## 2022-01-10 PROCEDURE — 25000003 PHARM REV CODE 250: Performed by: NURSE PRACTITIONER

## 2022-01-10 PROCEDURE — 21400001 HC TELEMETRY ROOM

## 2022-01-10 PROCEDURE — 63600175 PHARM REV CODE 636 W HCPCS: Performed by: NURSE PRACTITIONER

## 2022-01-10 PROCEDURE — 99900035 HC TECH TIME PER 15 MIN (STAT)

## 2022-01-10 PROCEDURE — 94761 N-INVAS EAR/PLS OXIMETRY MLT: CPT

## 2022-01-10 PROCEDURE — 94760 N-INVAS EAR/PLS OXIMETRY 1: CPT

## 2022-01-10 PROCEDURE — 27000221 HC OXYGEN, UP TO 24 HOURS

## 2022-01-10 RX ADMIN — IPRATROPIUM BROMIDE 0.5 MG: 0.5 SOLUTION RESPIRATORY (INHALATION) at 01:01

## 2022-01-10 RX ADMIN — HYDROCHLOROTHIAZIDE 25 MG: 25 TABLET ORAL at 09:01

## 2022-01-10 RX ADMIN — APIXABAN 5 MG: 2.5 TABLET, FILM COATED ORAL at 09:01

## 2022-01-10 RX ADMIN — IPRATROPIUM BROMIDE 0.5 MG: 0.5 SOLUTION RESPIRATORY (INHALATION) at 07:01

## 2022-01-10 RX ADMIN — ATORVASTATIN CALCIUM 10 MG: 10 TABLET, FILM COATED ORAL at 09:01

## 2022-01-10 RX ADMIN — LEVOFLOXACIN 750 MG: 750 INJECTION, SOLUTION INTRAVENOUS at 12:01

## 2022-01-10 RX ADMIN — PANTOPRAZOLE SODIUM 40 MG: 40 TABLET, DELAYED RELEASE ORAL at 09:01

## 2022-01-10 RX ADMIN — DILTIAZEM HYDROCHLORIDE 180 MG: 180 CAPSULE, COATED, EXTENDED RELEASE ORAL at 09:01

## 2022-01-10 RX ADMIN — FUROSEMIDE 40 MG: 10 INJECTION, SOLUTION INTRAMUSCULAR; INTRAVENOUS at 06:01

## 2022-01-10 RX ADMIN — APIXABAN 5 MG: 2.5 TABLET, FILM COATED ORAL at 08:01

## 2022-01-10 NOTE — PLAN OF CARE
POC reviewed with pt. Pt verbalizes understanding of POC. No questions at this time.  AAOx3. NADN.  2L O2 via nasal cannula.  NSR w/ PVCs on cardiac monitor.  Pt remains free of falls tonight.  No complaints at this time.  Safety measures in place. Will continue to monitor.  Informed pt to call for assistance before getting up. Pt verbalizes understanding.

## 2022-01-10 NOTE — PLAN OF CARE
POC reviewed with pt. Pt verbalizes understanding of POC. No questions at this time.  AAOx3. NADN.  NSR on cardiac monitor.  2L O2 via nasal cannula.  Pt remains free of falls tonight.  No complaints at this time.  Safety measures in place. Will continue to monitor.  Informed pt to call for assistance before getting up. Pt verbalizes understanding.

## 2022-01-10 NOTE — PLAN OF CARE
[facility] - Telemetry (Hospital)  Initial Discharge Assessment       Primary Care Provider: Vivian Ch MD    Admission Diagnosis: Acute pulmonary edema [J81.0]  SOB (shortness of breath) [R06.02]  Hypoxia [R09.02]  Elevated brain natriuretic peptide (BNP) level [R79.89]  Chest pain [R07.9]  COVID-19 [U07.1]    Admission Date: 1/9/2022  Expected Discharge Date:     Discharge Barriers Identified: None    Payor: [employer] / Plan: [facility] CHOICE PLUS / Product Type: Commercial /     Extended Emergency Contact Information  Primary Emergency Contact: Britany Douglas  [place] Phone: [phone]  Mobile Phone: [phone]  Relation: Spouse   needed? No  Secondary Emergency Contact: Gogo Blacknoy  Mobile Phone: [phone]  Relation: Healthcare Power of   Preferred language: English   needed? No    Discharge Plan A: Home,Home with family         [facility]  [address] [facility] [address] [facility] [address] [place] [id number]  Phone: [phone] Fax: [phone]      Initial Assessment (most recent)     Adult Discharge Assessment - 01/10/22 1227        Discharge Assessment    Assessment Type Discharge Planning Assessment     Confirmed/corrected address, phone number and insurance Yes     Confirmed Demographics Correct on Facesheet     Source of Information patient     Communicated MEGAN with patient/caregiver Date not available/Unable to determine     Reason For Admission Pneuomnia of left upper lobe due to infectious organism     Lives With spouse     Facility Arrived From: Home     Do you expect to return to your current living situation? Yes     Do you have help at home or someone to help you manage your care at home? Yes     Who are your caregiver(s) and their phone number(s)? Pt's spouse     Prior to hospitilization cognitive status: Alert/Oriented     Current cognitive status: Alert/Oriented     Walking or Climbing Stairs Difficulty none     Dressing/Bathing Difficulty none      Equipment Currently Used at Home none     Readmission within 30 days? No     Patient currently being followed by outpatient case management? No     Do you currently have service(s) that help you manage your care at home? No     Do you take prescription medications? Yes     Do you have prescription coverage? Yes     Do you have any problems affording any of your prescribed medications? No     Is the patient taking medications as prescribed? yes     Who is going to help you get home at discharge? Pt's DTR     How do you get to doctors appointments? car, drives self;family or friend will provide     Are you on dialysis? No     Do you take coumadin? No     Discharge Plan A Home;Home with family     DME Needed Upon Discharge  none     Discharge Plan discussed with: Patient     Discharge Barriers Identified None               Swer met with patient at the bedside to complete discharge assessment. Patient states that he lives at home with his spouse. Pt's spouse will be pt's help at home if assistance is needed. Patient state that he is independent with ADLs. Patient denied the use of DME. Patient states that he still works and drives. Patient's discharge transportation will be his daughter. Patient explained that he drove himself to hospital.   Patient state that he's had home health in the past but states he does not need it at this time.     Swer updated patient's whiteboard to reflect discharge plan and provide SW contact information.     No additional CM needs expressed or identified at this time.   Swer to continue following.

## 2022-01-10 NOTE — PLAN OF CARE
Pt AAO x4.  VSS.  Pt remained afebrile throughout this shift.   No IV administered per order.   Pt remained free of falls this shift.   Pt complaints of pain this shift.   Pain meds administered as ordered.   Plan of care reviewed. Patient verbalizes understanding.   Pt moving/turing q2h. Frequent weight shifting encouraged.  Patient ns on monitor.   Bed low, side rails up x 2, wheels locked, call light in reach.   Bed alarm maintained for safety.   Patient instructed to call for assistance.   Hourly rounding completed.   24 hour chart check completed.  Will continue to monitor.

## 2022-01-11 LAB
ALBUMIN SERPL BCP-MCNC: 3.2 G/DL (ref 3.5–5.2)
ALP SERPL-CCNC: 51 U/L (ref 55–135)
ALT SERPL W/O P-5'-P-CCNC: 10 U/L (ref 10–44)
ANION GAP SERPL CALC-SCNC: 10 MMOL/L (ref 8–16)
ANISOCYTOSIS BLD QL SMEAR: SLIGHT
AORTIC ROOT ANNULUS: 3.94 CM
ASCENDING AORTA: 4.69 CM
AST SERPL-CCNC: 13 U/L (ref 10–40)
AV INDEX (PROSTH): 0.74
AV MEAN GRADIENT: 6 MMHG
AV PEAK GRADIENT: 10 MMHG
AV REGURGITATION PRESSURE HALF TIME: 295.27 MS
AV VALVE AREA: 3.64 CM2
AV VELOCITY RATIO: 0.65
BASOPHILS # BLD AUTO: 0.03 K/UL (ref 0–0.2)
BASOPHILS NFR BLD: 0.3 % (ref 0–1.9)
BILIRUB SERPL-MCNC: 1.2 MG/DL (ref 0.1–1)
BSA FOR ECHO PROCEDURE: 1.81 M2
BUN SERPL-MCNC: 18 MG/DL (ref 8–23)
BURR CELLS BLD QL SMEAR: ABNORMAL
CALCIUM SERPL-MCNC: 9.1 MG/DL (ref 8.7–10.5)
CHLORIDE SERPL-SCNC: 95 MMOL/L (ref 95–110)
CO2 SERPL-SCNC: 33 MMOL/L (ref 23–29)
CREAT SERPL-MCNC: 0.8 MG/DL (ref 0.5–1.4)
CV ECHO LV RWT: 0.54 CM
DACRYOCYTES BLD QL SMEAR: ABNORMAL
DIFFERENTIAL METHOD: ABNORMAL
DOP CALC AO PEAK VEL: 1.62 M/S
DOP CALC AO VTI: 23.9 CM
DOP CALC LVOT AREA: 4.9 CM2
DOP CALC LVOT DIAMETER: 2.51 CM
DOP CALC LVOT PEAK VEL: 1.05 M/S
DOP CALC LVOT STROKE VOLUME: 87.04 CM3
DOP CALC RVOT PEAK VEL: 0.73 M/S
DOP CALC RVOT VTI: 14.3 CM
DOP CALCLVOT PEAK VEL VTI: 17.6 CM
E WAVE DECELERATION TIME: 153.89 MSEC
E/A RATIO: 1.24
E/E' RATIO: 12.71 M/S
ECHO EF ESTIMATED: 34 %
ECHO LV POSTERIOR WALL: 1.48 CM (ref 0.6–1.1)
EJECTION FRACTION: 25 %
EOSINOPHIL # BLD AUTO: 1 K/UL (ref 0–0.5)
EOSINOPHIL NFR BLD: 8.4 % (ref 0–8)
ERYTHROCYTE [DISTWIDTH] IN BLOOD BY AUTOMATED COUNT: 32.4 % (ref 11.5–14.5)
EST. GFR  (AFRICAN AMERICAN): >60 ML/MIN/1.73 M^2
EST. GFR  (NON AFRICAN AMERICAN): >60 ML/MIN/1.73 M^2
FRACTIONAL SHORTENING: 16 % (ref 28–44)
GLUCOSE SERPL-MCNC: 96 MG/DL (ref 70–110)
HCT VFR BLD AUTO: 30.6 % (ref 40–54)
HGB BLD-MCNC: 9 G/DL (ref 14–18)
HYPOCHROMIA BLD QL SMEAR: ABNORMAL
IMM GRANULOCYTES # BLD AUTO: 0.06 K/UL (ref 0–0.04)
IMM GRANULOCYTES NFR BLD AUTO: 0.5 % (ref 0–0.5)
INTERVENTRICULAR SEPTUM: 1.77 CM (ref 0.6–1.1)
IVC DIAMETER: 2.68 CM
IVRT: 79.92 MSEC
LA WIDTH: 4.96 CM
LEFT ATRIUM SIZE: 3.81 CM
LEFT INTERNAL DIMENSION IN SYSTOLE: 4.61 CM (ref 2.1–4)
LEFT VENTRICLE DIASTOLIC VOLUME INDEX: 80.81 ML/M2
LEFT VENTRICLE DIASTOLIC VOLUME: 147.89 ML
LEFT VENTRICLE MASS INDEX: 230 G/M2
LEFT VENTRICLE SYSTOLIC VOLUME INDEX: 53.5 ML/M2
LEFT VENTRICLE SYSTOLIC VOLUME: 97.94 ML
LEFT VENTRICULAR INTERNAL DIMENSION IN DIASTOLE: 5.51 CM (ref 3.5–6)
LEFT VENTRICULAR MASS: 420.71 G
LV LATERAL E/E' RATIO: 11.13 M/S
LV SEPTAL E/E' RATIO: 14.83 M/S
LVOT MG: 2.54 MMHG
LVOT MV: 0.77 CM/S
LYMPHOCYTES # BLD AUTO: 0.7 K/UL (ref 1–4.8)
LYMPHOCYTES NFR BLD: 5.6 % (ref 18–48)
MCH RBC QN AUTO: 20.6 PG (ref 27–31)
MCHC RBC AUTO-ENTMCNC: 29.4 G/DL (ref 32–36)
MCV RBC AUTO: 70 FL (ref 82–98)
MONOCYTES # BLD AUTO: 1 K/UL (ref 0.3–1)
MONOCYTES NFR BLD: 8.2 % (ref 4–15)
MV PEAK A VEL: 0.72 M/S
MV PEAK E VEL: 0.89 M/S
MV STENOSIS PRESSURE HALF TIME: 44.63 MS
MV VALVE AREA P 1/2 METHOD: 4.93 CM2
NEUTROPHILS # BLD AUTO: 9.2 K/UL (ref 1.8–7.7)
NEUTROPHILS NFR BLD: 77 % (ref 38–73)
NRBC BLD-RTO: 0 /100 WBC
OVALOCYTES BLD QL SMEAR: ABNORMAL
PISA AR MAX VEL: 5.2 M/S
PISA TR MAX VEL: 5.26 M/S
PLATELET # BLD AUTO: 502 K/UL (ref 150–450)
PLATELET BLD QL SMEAR: ABNORMAL
PMV BLD AUTO: ABNORMAL FL (ref 9.2–12.9)
POIKILOCYTOSIS BLD QL SMEAR: SLIGHT
POLYCHROMASIA BLD QL SMEAR: ABNORMAL
POTASSIUM SERPL-SCNC: 3.5 MMOL/L (ref 3.5–5.1)
PROT SERPL-MCNC: 6.8 G/DL (ref 6–8.4)
PV MEAN GRADIENT: 1.24 MMHG
RA MAJOR: 5.05 CM
RA PRESSURE: 8 MMHG
RA WIDTH: 4.8 CM
RBC # BLD AUTO: 4.36 M/UL (ref 4.6–6.2)
SINUS: 4.63 CM
SODIUM SERPL-SCNC: 138 MMOL/L (ref 136–145)
SPHEROCYTES BLD QL SMEAR: ABNORMAL
STJ: 4.92 CM
TARGETS BLD QL SMEAR: ABNORMAL
TDI LATERAL: 0.08 M/S
TDI SEPTAL: 0.06 M/S
TDI: 0.07 M/S
TR MAX PG: 111 MMHG
TRICUSPID ANNULAR PLANE SYSTOLIC EXCURSION: 2.05 CM
TV REST PULMONARY ARTERY PRESSURE: 119 MMHG
WBC # BLD AUTO: 11.91 K/UL (ref 3.9–12.7)

## 2022-01-11 PROCEDURE — 85025 COMPLETE CBC W/AUTO DIFF WBC: CPT | Performed by: FAMILY MEDICINE

## 2022-01-11 PROCEDURE — 99223 1ST HOSP IP/OBS HIGH 75: CPT | Mod: ,,, | Performed by: INTERNAL MEDICINE

## 2022-01-11 PROCEDURE — 25000242 PHARM REV CODE 250 ALT 637 W/ HCPCS: Performed by: FAMILY MEDICINE

## 2022-01-11 PROCEDURE — 94640 AIRWAY INHALATION TREATMENT: CPT

## 2022-01-11 PROCEDURE — 25000003 PHARM REV CODE 250: Performed by: NURSE PRACTITIONER

## 2022-01-11 PROCEDURE — 99223 PR INITIAL HOSPITAL CARE,LEVL III: ICD-10-PCS | Mod: ,,, | Performed by: INTERNAL MEDICINE

## 2022-01-11 PROCEDURE — 25000003 PHARM REV CODE 250: Performed by: FAMILY MEDICINE

## 2022-01-11 PROCEDURE — 27000221 HC OXYGEN, UP TO 24 HOURS

## 2022-01-11 PROCEDURE — 36415 COLL VENOUS BLD VENIPUNCTURE: CPT | Performed by: FAMILY MEDICINE

## 2022-01-11 PROCEDURE — 94761 N-INVAS EAR/PLS OXIMETRY MLT: CPT

## 2022-01-11 PROCEDURE — 80053 COMPREHEN METABOLIC PANEL: CPT | Performed by: FAMILY MEDICINE

## 2022-01-11 PROCEDURE — 94760 N-INVAS EAR/PLS OXIMETRY 1: CPT

## 2022-01-11 PROCEDURE — 25000242 PHARM REV CODE 250 ALT 637 W/ HCPCS: Performed by: EMERGENCY MEDICINE

## 2022-01-11 PROCEDURE — 21400001 HC TELEMETRY ROOM

## 2022-01-11 PROCEDURE — 63600175 PHARM REV CODE 636 W HCPCS: Performed by: NURSE PRACTITIONER

## 2022-01-11 PROCEDURE — 63600175 PHARM REV CODE 636 W HCPCS: Performed by: FAMILY MEDICINE

## 2022-01-11 PROCEDURE — 63700000 PHARM REV CODE 250 ALT 637 W/O HCPCS: Performed by: FAMILY MEDICINE

## 2022-01-11 PROCEDURE — 99900035 HC TECH TIME PER 15 MIN (STAT)

## 2022-01-11 RX ORDER — AZITHROMYCIN 250 MG/1
500 TABLET, FILM COATED ORAL DAILY
Status: COMPLETED | OUTPATIENT
Start: 2022-01-11 | End: 2022-01-13

## 2022-01-11 RX ORDER — LEVOFLOXACIN 750 MG/1
750 TABLET ORAL DAILY
Status: DISCONTINUED | OUTPATIENT
Start: 2022-01-11 | End: 2022-01-11

## 2022-01-11 RX ORDER — ARFORMOTEROL TARTRATE 15 UG/2ML
15 SOLUTION RESPIRATORY (INHALATION) 2 TIMES DAILY
Status: DISCONTINUED | OUTPATIENT
Start: 2022-01-11 | End: 2022-01-14 | Stop reason: HOSPADM

## 2022-01-11 RX ORDER — IPRATROPIUM BROMIDE AND ALBUTEROL SULFATE 2.5; .5 MG/3ML; MG/3ML
3 SOLUTION RESPIRATORY (INHALATION) EVERY 6 HOURS
Status: DISCONTINUED | OUTPATIENT
Start: 2022-01-11 | End: 2022-01-14 | Stop reason: HOSPADM

## 2022-01-11 RX ORDER — SODIUM CHLORIDE 9 MG/ML
INJECTION, SOLUTION INTRAVENOUS
Status: DISCONTINUED | OUTPATIENT
Start: 2022-01-11 | End: 2022-01-14 | Stop reason: HOSPADM

## 2022-01-11 RX ORDER — METOPROLOL SUCCINATE 50 MG/1
50 TABLET, EXTENDED RELEASE ORAL DAILY
Status: DISCONTINUED | OUTPATIENT
Start: 2022-01-12 | End: 2022-01-12

## 2022-01-11 RX ADMIN — IPRATROPIUM BROMIDE 0.5 MG: 0.5 SOLUTION RESPIRATORY (INHALATION) at 12:01

## 2022-01-11 RX ADMIN — FUROSEMIDE 40 MG: 10 INJECTION, SOLUTION INTRAMUSCULAR; INTRAVENOUS at 06:01

## 2022-01-11 RX ADMIN — CEFTRIAXONE 1 G: 1 INJECTION, SOLUTION INTRAVENOUS at 08:01

## 2022-01-11 RX ADMIN — AZITHROMYCIN MONOHYDRATE 500 MG: 250 TABLET ORAL at 06:01

## 2022-01-11 RX ADMIN — ARFORMOTEROL TARTRATE 15 MCG: 15 SOLUTION RESPIRATORY (INHALATION) at 08:01

## 2022-01-11 RX ADMIN — IPRATROPIUM BROMIDE AND ALBUTEROL SULFATE 3 ML: 2.5; .5 SOLUTION RESPIRATORY (INHALATION) at 08:01

## 2022-01-11 RX ADMIN — APIXABAN 5 MG: 2.5 TABLET, FILM COATED ORAL at 08:01

## 2022-01-11 RX ADMIN — HYDROCHLOROTHIAZIDE 25 MG: 25 TABLET ORAL at 08:01

## 2022-01-11 RX ADMIN — IPRATROPIUM BROMIDE 0.5 MG: 0.5 SOLUTION RESPIRATORY (INHALATION) at 07:01

## 2022-01-11 RX ADMIN — DILTIAZEM HYDROCHLORIDE 180 MG: 180 CAPSULE, COATED, EXTENDED RELEASE ORAL at 08:01

## 2022-01-11 RX ADMIN — PANTOPRAZOLE SODIUM 40 MG: 40 TABLET, DELAYED RELEASE ORAL at 08:01

## 2022-01-11 RX ADMIN — SODIUM CHLORIDE: 0.9 INJECTION, SOLUTION INTRAVENOUS at 08:01

## 2022-01-11 RX ADMIN — ATORVASTATIN CALCIUM 10 MG: 10 TABLET, FILM COATED ORAL at 08:01

## 2022-01-11 RX ADMIN — FUROSEMIDE 40 MG: 10 INJECTION, SOLUTION INTRAMUSCULAR; INTRAVENOUS at 08:01

## 2022-01-11 RX ADMIN — LEVOFLOXACIN 750 MG: 750 TABLET, FILM COATED ORAL at 08:01

## 2022-01-11 NOTE — CONSULTS
[facility] ([facility])  Cardiology  Consult Note    Patient Name: Richy Douglas  MRN: [id number]  Admission Date: 1/9/2022  Hospital Length of Stay: 1 days  Code Status: Full Code   Attending Provider: Rubi Barahona MD   Consulting Provider: Janneth Tovar MD  Primary Care Physician: Vivian Ch MD  Principal Problem:Pneumonia of left upper lobe due to infectious organism    Patient information was obtained from patient, relative(s), past medical records and ER records.     Inpatient consult to Cardiology  Consult performed by: Janneth Tovar MD  Consult ordered by: Rubi Barahona MD  Reason for consult: new onset heart failure        Subjective:     Chief Complaint:  Heart failure     HPI:   81 y.o. male patient with a PMHx of aortic aneurysm, atrial fibrillation with RVR, emphysema of lung, chronic systolic congestive heart failure, GERD, HTN, other HLD, and prostate cancer who presents to the Emergency Department for SOB which onset gradually 2 days PTA. The pt reports that his SpO2 was 85% on RA. Symptoms are constant and moderate in severity. No mitigating or exacerbating factors reported. Associated sxs include L sided anterior CP that radiates down his L arm since yesterday and L arm paresthesias. Patient denies any fever, chills, numbness, n/v/d, diaphoresis, headahces, and all other sxs at this time. No prior Tx reported. No further complaints or concerns at this time. In the ED, BNP: 1844, H/H: 9.7/32.7, CRP: 151.4, CXR: Left lung consolidation, Lactic: normal, Procalcitonin: Normal. Treated with Lasix and Levaquin. Patient is a full code, surrogate decision maker is his spouse, Britany Douglas.   Treated for pneumonia   On eliquis denies any bleeding   Drinks alcohol daily as per daughter   Had severe anterior chest pain on admission , now resolved   Echo showed EF IN THE 20% , new changes when compared to his echo done last year, had a normal lexiscan last year            Past Medical  History:   Diagnosis Date    Anemia of chronic disease     Aortic aneurysm     Atrial fibrillation with RVR 9/24/2021    Chronic systolic congestive heart failure 8/31/2017    Emphysema of lung 8/31/2017    GERD (gastroesophageal reflux disease)     Hypertension     Microcytic anemia 11/24/2020    Other hyperlipidemia 4/27/2021    Prostate cancer        Past Surgical History:   Procedure Laterality Date    COLONOSCOPY      PROSTATE SURGERY      SPINE SURGERY         Review of patient's allergies indicates:   Allergen Reactions    Fish containing products     Iodine and iodide containing products     Shellfish containing products        No current facility-administered medications on file prior to encounter.     Current Outpatient Medications on File Prior to Encounter   Medication Sig    apixaban (ELIQUIS) 5 mg Tab Take 1 tablet (5 mg total) by mouth 2 (two) times daily.    atorvastatin (LIPITOR) 10 MG tablet Take 1 tablet (10 mg total) by mouth once daily.    diltiaZEM (CARDIZEM CD) 180 MG 24 hr capsule Take 1 capsule (180 mg total) by mouth once daily.    hydroCHLOROthiazide (HYDRODIURIL) 25 MG tablet Take 1 tablet (25 mg total) by mouth once daily.    pantoprazole (PROTONIX) 40 MG tablet Take 1 tablet (40 mg total) by mouth once daily.    albuterol (PROVENTIL/VENTOLIN HFA) 90 mcg/actuation inhaler Inhale 1-2 puffs into the lungs every 6 (six) hours as needed for Wheezing or Shortness of Breath. Rescue    tiotropium (SPIRIVA) 18 mcg inhalation capsule Inhale 1 capsule (18 mcg total) into the lungs once daily. Controller     Family History     Problem Relation (Age of Onset)    Diabetes Mother    Peripheral vascular disease Mother        Tobacco Use    Smoking status: Current Every Day Smoker     Packs/day: 1.00     Types: Cigarettes    Smokeless tobacco: Never Used   Substance and Sexual Activity    Alcohol use: Yes     Comment: reports only drinks red wine when games are on    Drug use:  Never    Sexual activity: Yes     Partners: Female     Review of Systems   Constitutional: Positive for activity change and fatigue. Negative for appetite change, chills, diaphoresis, fever and unexpected weight change.   HENT: Negative for congestion, hearing loss, nosebleeds, postnasal drip, rhinorrhea and trouble swallowing.    Eyes: Negative for discharge and visual disturbance.   Respiratory: Positive for shortness of breath. Negative for cough and chest tightness.    Cardiovascular: Positive for chest pain and leg swelling. Negative for palpitations.   Gastrointestinal: Negative for abdominal distention, abdominal pain, constipation, diarrhea, nausea and vomiting.   Endocrine: Negative for cold intolerance and heat intolerance.   Genitourinary: Negative for difficulty urinating, dysuria, frequency and hematuria.   Musculoskeletal: Positive for arthralgias and back pain. Negative for gait problem and myalgias.   Skin: Negative.    Neurological: Negative for dizziness, weakness, light-headedness and headaches.   Hematological: Negative for adenopathy. Does not bruise/bleed easily.   Psychiatric/Behavioral: Negative for agitation, behavioral problems and confusion. The patient is not nervous/anxious.      Objective:     Vital Signs (Most Recent):  Temp: 97.8 °F (36.6 °C) (01/11/22 1505)  Pulse: 69 (01/11/22 1505)  Resp: 16 (01/11/22 1505)  BP: (!) 110/54 (01/11/22 1505)  SpO2: 97 % (01/11/22 1505) Vital Signs (24h Range):  Temp:  [97.7 °F (36.5 °C)-98.6 °F (37 °C)] 97.8 °F (36.6 °C)  Pulse:  [68-86] 69  Resp:  [16-18] 16  SpO2:  [92 %-99 %] 97 %  BP: (110-129)/(54-60) 110/54     Weight: 66.7 kg (147 lb)  Body mass index is 21.09 kg/m².    Physical Exam  Vitals and nursing note reviewed.   Constitutional:       General: He is not in acute distress.     Appearance: He is well-developed. He is not diaphoretic.   HENT:      Head: Normocephalic and atraumatic.      Right Ear: Hearing and external ear normal.       Left Ear: Hearing and external ear normal.      Nose: Nose normal. No mucosal edema or rhinorrhea.      Mouth/Throat:      Pharynx: Uvula midline.   Eyes:      General:         Right eye: No discharge.         Left eye: No discharge.      Conjunctiva/sclera: Conjunctivae normal.      Right eye: No chemosis.     Left eye: No chemosis.     Pupils: Pupils are equal, round, and reactive to light.   Neck:      Thyroid: No thyroid mass or thyromegaly.      Trachea: Trachea normal.   Cardiovascular:      Rate and Rhythm: Normal rate and regular rhythm.      Pulses:           Dorsalis pedis pulses are 2+ on the right side and 2+ on the left side.      Heart sounds: Normal heart sounds. No murmur heard.      Pulmonary:      Effort: Pulmonary effort is normal. No respiratory distress.      Breath sounds: Examination of the right-lower field reveals decreased breath sounds. Examination of the left-lower field reveals decreased breath sounds. Decreased breath sounds present. No wheezing.   Chest:   Breasts:      Right: No supraclavicular adenopathy.      Left: No supraclavicular adenopathy.       Abdominal:      General: Bowel sounds are normal. There is no distension.      Palpations: Abdomen is soft.      Tenderness: There is no abdominal tenderness.   Musculoskeletal:         General: Normal range of motion.      Cervical back: Normal range of motion and neck supple.      Right lower leg: Edema present.      Left lower leg: Edema present.   Lymphadenopathy:      Cervical: No cervical adenopathy.      Upper Body:      Right upper body: No supraclavicular adenopathy.      Left upper body: No supraclavicular adenopathy.   Skin:     General: Skin is warm and dry.      Capillary Refill: Capillary refill takes less than 2 seconds.      Findings: No rash.   Neurological:      Mental Status: He is alert and oriented to person, place, and time.   Psychiatric:         Mood and Affect: Mood is not anxious.         Speech: Speech  normal.         Behavior: Behavior normal.         Thought Content: Thought content normal.         Judgment: Judgment normal.           CRANIAL NERVES     CN III, IV, VI   Pupils are equal, round, and reactive to light.       Significant Labs:   All pertinent labs within the past 24 hours have been reviewed.  CBC:   Recent Labs   Lab 01/11/22 0459   WBC 11.91   HGB 9.0*   HCT 30.6*   *     CMP:   Recent Labs   Lab 01/11/22 0459      K 3.5   CL 95   CO2 33*   GLU 96   BUN 18   CREATININE 0.8   CALCIUM 9.1   PROT 6.8   ALBUMIN 3.2*   BILITOT 1.2*   ALKPHOS 51*   AST 13   ALT 10   ANIONGAP 10   EGFRNONAA >60       Significant Imaging: I have reviewed all pertinent imaging results/findings within the past 24 hours.    Review of Systems   Constitutional: Positive for activity change and fatigue. Negative for appetite change, chills, diaphoresis, fever and unexpected weight change.   HENT: Negative for congestion, hearing loss, nosebleeds, postnasal drip, rhinorrhea and trouble swallowing.    Eyes: Negative for discharge and visual disturbance.   Cardiovascular: Positive for chest pain and leg swelling. Negative for palpitations.   Respiratory: Positive for shortness of breath. Negative for chest tightness and cough.    Endocrine: Negative for cold intolerance and heat intolerance.   Hematologic/Lymphatic: Negative for adenopathy. Does not bruise/bleed easily.   Skin: Negative.    Musculoskeletal: Positive for arthralgias and back pain. Negative for gait problem and myalgias.   Gastrointestinal: Negative for abdominal distention, abdominal pain, constipation, diarrhea, nausea and vomiting.   Genitourinary: Negative for difficulty urinating, dysuria, frequency and hematuria.   Neurological: Negative for dizziness, headaches, light-headedness and weakness.   Psychiatric/Behavioral: Negative for agitation, behavioral problems and confusion. The patient is not nervous/anxious.        Physical Exam  Vitals and  nursing note reviewed.   Constitutional:       General: He is not in acute distress.     Appearance: He is well-developed. He is not diaphoretic.   HENT:      Head: Normocephalic and atraumatic.      Right Ear: Hearing and external ear normal.      Left Ear: Hearing and external ear normal.      Nose: Nose normal. No mucosal edema or rhinorrhea.      Mouth/Throat:      Pharynx: Uvula midline.   Eyes:      General:         Right eye: No discharge.         Left eye: No discharge.      Conjunctiva/sclera: Conjunctivae normal.      Right eye: No chemosis.     Left eye: No chemosis.     Pupils: Pupils are equal, round, and reactive to light.   Neck:      Thyroid: No thyroid mass or thyromegaly.      Trachea: Trachea normal.   Cardiovascular:      Rate and Rhythm: Normal rate and regular rhythm.      Pulses:           Dorsalis pedis pulses are 2+ on the right side and 2+ on the left side.      Heart sounds: Normal heart sounds. No murmur heard.      Pulmonary:      Effort: Pulmonary effort is normal. No respiratory distress.      Breath sounds: Examination of the right-lower field reveals decreased breath sounds. Examination of the left-lower field reveals decreased breath sounds. Decreased breath sounds present. No wheezing.   Chest:   Breasts:      Right: No supraclavicular adenopathy.      Left: No supraclavicular adenopathy.       Abdominal:      General: Bowel sounds are normal. There is no distension.      Palpations: Abdomen is soft.      Tenderness: There is no abdominal tenderness.   Musculoskeletal:         General: Normal range of motion.      Cervical back: Normal range of motion and neck supple.      Right lower leg: Edema present.      Left lower leg: Edema present.   Lymphadenopathy:      Cervical: No cervical adenopathy.      Upper Body:      Right upper body: No supraclavicular adenopathy.      Left upper body: No supraclavicular adenopathy.   Skin:     General: Skin is warm and dry.      Capillary  Refill: Capillary refill takes less than 2 seconds.      Findings: No rash.   Neurological:      Mental Status: He is alert and oriented to person, place, and time.   Psychiatric:         Mood and Affect: Mood is not anxious.         Speech: Speech normal.         Behavior: Behavior normal.         Thought Content: Thought content normal.         Judgment: Judgment normal.           Assessment and Plan:     * Pneumonia of left upper lobe due to infectious organism  AS PER PRIMARY    Atrial fibrillation  IN SINUS RHYTHM   ON ELIQUIS       Chronic systolic congestive heart failure  Patient is identified as having Combined Systolic and Diastolic heart failure that is Acute on chronic. CHF is currently controlled. Latest ECHO performed and demonstrates- Results for orders placed during the hospital encounter of 01/09/22    Echo Saline Bubble? No    Interpretation Summary  · Concentric hypertrophy and severely decreased systolic function.  · The estimated ejection fraction is 25%.  · Grade I left ventricular diastolic dysfunction.  · Normal right ventricular size with mildly reduced right ventricular systolic function.  · Mild-to-moderate mitral regurgitation.  · Mild-to-moderate aortic regurgitation.  · Moderate left atrial enlargement.  · Moderate right atrial enlargement.  · Moderate tricuspid regurgitation.  · Intermediate central venous pressure (8 mmHg).  · The estimated PA systolic pressure is 119 mmHg.  · There is pulmonary hypertension.  · The aortic root is moderately dilated.  . Continue Furosemide and monitor clinical status closely. Monitor on telemetry. Patient is on CHF pathway.  Monitor strict Is&Os and daily weights.  Place on fluid restriction of 2 L. Continue to stress to patient importance of self efficacy and  on diet for CHF. Last BNP reviewed- and noted below   Recent Labs   Lab 01/09/22  1017   BNP 1,844*   .    New diagnosis of systolic heart failure, normal EF last year   Also had chest  pain ,with trop elevated from demand ischemia and flat   cw diuresis   Change cardizem to toprol   Alcohol cessation   R/lhc in am   Hx of chronic anemia and hx of crohn's disease   No bleeding , on apixaban, hold prior to cath      NPO AFTER MN     VTE Risk Mitigation (From admission, onward)         Ordered     apixaban tablet 5 mg  2 times daily         01/09/22 1133     IP VTE HIGH RISK PATIENT  Once         01/09/22 1133     Place sequential compression device  Until discontinued         01/09/22 1133     Reason for No Pharmacological VTE Prophylaxis  Once        Question:  Reasons:  Answer:  Already adequately anticoagulated on oral Anticoagulants    01/09/22 1133                Thank you for your consult. I will follow-up with patient. Please contact us if you have any additional questions.    Janneth Tovar MD  Cardiology   [facility] - [facility] ([facility])

## 2022-01-11 NOTE — PROGRESS NOTES
Pharmacist Intervention IV to PO Note    Richy Douglas is a 81 y.o. male being treated with IV medication  levofloxacin    Patient Data:    Vital Signs (Most Recent):  Temp: 98.2 °F (36.8 °C) (01/11/22 0017)  Pulse: 72 (01/11/22 0023)  Resp: 18 (01/11/22 0023)  BP: (!) 119/56 (01/11/22 0017)  SpO2: 95 % (01/11/22 0023)   Vital Signs (72h Range):  Temp:  [97.7 °F (36.5 °C)-100 °F (37.8 °C)]   Pulse:  [68-96]   Resp:  [16-30]   BP: (106-160)/(53-74)   SpO2:  [72 %-99 %]      CBC:  Recent Labs   Lab 01/09/22  1017   WBC 10.40   RBC 4.65   HGB 9.7*   HCT 32.7*      MCV 70*   MCH 20.9*   MCHC 29.7*     CMP:     Recent Labs   Lab 01/09/22  1017   GLU 80   CALCIUM 8.8   ALBUMIN 3.3*   PROT 7.1      K 3.7   CO2 27      BUN 15   CREATININE 0.7   ALKPHOS 51*   ALT 12   AST 17   BILITOT 1.5*       Dietary Orders:  Diet Orders  Report           Diet Cardiac: Cardiac starting at 01/09 1306            Based on the following criteria, this patient qualifies for intravenous to oral conversion:  [x] The patients gastrointestinal tract is functioning (tolerating medications via oral or enteral route for 24 hours and tolerating food or enteral feeds for 24 hours).  [x] The patient is hemodynamically stable for 24 hours (heart rate <100 beats per minute, systolic blood pressure >99 mm Hg, and respiratory rate <20 breaths per minute).  [x] The patient shows clinical improvement (afebrile for at least 24 hours and white blood cell count downtrending or normalized). Additionally, the patient must be non-neutropenic (absolute neutrophil count >500 cells/mm3).  [x] For antimicrobials, the patient has received IV therapy for at least 24 hours.    IV medication (levofloxacin 750 mg Q24H) will be changed to oral medication (levofloxacin 750 mg QD)    Pharmacist's Name: Basia Martin  Pharmacist's Extension: [phone]

## 2022-01-11 NOTE — PLAN OF CARE
Pt AAOx4.  VSS.  Pt remained afebrile throughout this shift.   No IV  administered per order.   Pt remained free of falls this shift.   Pt denies of pain this shift.  Plan of care reviewed. Patient verbalizes understanding.   Pt moving/turing q2h. Frequent weight shifting encouraged.  Patient NS pvs on monitor.   Bed low, side rails up x 2, wheels locked, call light in reach.   Bed alarm maintained for safety.   Patient instructed to call for assistance.   Hourly rounding completed.   24 hour chart check completed.  Will continue to monitor.

## 2022-01-11 NOTE — ASSESSMENT & PLAN NOTE
Admitted with hypoxemia, CXR: There is a marked amount of alveolar consolidation scattered throughout the left lung. There is a mild amount of alveolar consolidation scattered throughout the right lung  --check CT Chest pt has no elevation in WBC's, fever that would correlate to such extensive CXR findings entire left lung involved  --current smoker need to rule out malignancy  --cont Levaquin IV Daily  --Neb Tx PRN  --Continue O2 for supportive care

## 2022-01-11 NOTE — CHAPLAIN
Initial visit with patient.  Took time to visit with patient to assess for spiritual and emotional needs.  Pt was doing well at the time of my visit but did want me to pray.  I did this for him before leaving and spiritual care remains available as needed.    Chaplain Damon Zhao M.Div., Psychiatric

## 2022-01-11 NOTE — ASSESSMENT & PLAN NOTE
Patient is identified as having Combined Systolic and Diastolic heart failure that is Acute on chronic. CHF is currently controlled. Latest ECHO performed and demonstrates- Results for orders placed during the hospital encounter of 01/09/22    Echo Saline Bubble? No    Interpretation Summary  · Concentric hypertrophy and severely decreased systolic function.  · The estimated ejection fraction is 25%.  · Grade I left ventricular diastolic dysfunction.  · Normal right ventricular size with mildly reduced right ventricular systolic function.  · Mild-to-moderate mitral regurgitation.  · Mild-to-moderate aortic regurgitation.  · Moderate left atrial enlargement.  · Moderate right atrial enlargement.  · Moderate tricuspid regurgitation.  · Intermediate central venous pressure (8 mmHg).  · The estimated PA systolic pressure is 119 mmHg.  · There is pulmonary hypertension.  · The aortic root is moderately dilated.  . Continue Furosemide and monitor clinical status closely. Monitor on telemetry. Patient is on CHF pathway.  Monitor strict Is&Os and daily weights.  Place on fluid restriction of 2 L. Continue to stress to patient importance of self efficacy and  on diet for CHF. Last BNP reviewed- and noted below   Recent Labs   Lab 01/09/22  1017   BNP 1,844*   .    New diagnosis of systolic heart failure, normal EF last year   Also had chest pain ,with trop elevated from demand ischemia and flat   cw diuresis   Change cardizem to toprol   Alcohol cessation   R/lhc in am   Hx of chronic anemia and hx of crohn's disease   No bleeding , on apixaban, hold prior to cath

## 2022-01-11 NOTE — SUBJECTIVE & OBJECTIVE
Review of Systems   Constitutional: Negative for fever.   HENT: Negative for congestion.    Respiratory: Negative for cough.    Gastrointestinal: Negative for abdominal pain.   Endocrine: Negative for polyuria.   Genitourinary: Negative for flank pain.   Musculoskeletal: Negative for back pain.   Skin: Negative for rash.   Neurological: Negative for syncope.   Psychiatric/Behavioral: Negative for confusion.     Objective:     Vital Signs (Most Recent):  Temp: 98.6 °F (37 °C) (01/10/22 1628)  Pulse: 73 (01/10/22 1700)  Resp: 18 (01/10/22 1332)  BP: (!) 117/56 (01/10/22 1628)  SpO2: 96 % (01/10/22 1628) Vital Signs (24h Range):  Temp:  [98 °F (36.7 °C)-100 °F (37.8 °C)] 98.6 °F (37 °C)  Pulse:  [68-81] 73  Resp:  [16-20] 18  SpO2:  [72 %-97 %] 96 %  BP: (106-119)/(56-60) 117/56     Weight: 66.8 kg (147 lb 4.3 oz)  Body mass index is 21.13 kg/m².    Intake/Output Summary (Last 24 hours) at 1/10/2022 1851  Last data filed at 1/10/2022 1837  Gross per 24 hour   Intake 604.9 ml   Output 3050 ml   Net -2445.1 ml      Physical Exam  Vitals and nursing note reviewed.   Constitutional:       General: He is not in acute distress.     Appearance: He is well-developed. He is not diaphoretic.   HENT:      Head: Normocephalic and atraumatic.      Right Ear: Hearing and external ear normal.      Left Ear: Hearing and external ear normal.      Nose: Nose normal. No mucosal edema or rhinorrhea.      Mouth/Throat:      Pharynx: Uvula midline.   Eyes:      General:         Right eye: No discharge.         Left eye: No discharge.      Conjunctiva/sclera: Conjunctivae normal.      Right eye: No chemosis.     Left eye: No chemosis.     Pupils: Pupils are equal, round, and reactive to light.   Neck:      Thyroid: No thyroid mass or thyromegaly.      Trachea: Trachea normal.   Cardiovascular:      Rate and Rhythm: Normal rate and regular rhythm.      Pulses:           Dorsalis pedis pulses are 2+ on the right side and 2+ on the left  side.      Heart sounds: Normal heart sounds. No murmur heard.      Pulmonary:      Effort: Pulmonary effort is normal. No respiratory distress.      Breath sounds: Examination of the right-lower field reveals decreased breath sounds. Examination of the left-lower field reveals decreased breath sounds. Decreased breath sounds present. No wheezing.   Chest:   Breasts:      Right: No supraclavicular adenopathy.      Left: No supraclavicular adenopathy.       Abdominal:      General: Bowel sounds are normal. There is no distension.      Palpations: Abdomen is soft.      Tenderness: There is no abdominal tenderness.   Musculoskeletal:         General: Normal range of motion.      Cervical back: Normal range of motion and neck supple.      Right lower leg: Edema present.      Left lower leg: Edema present.   Lymphadenopathy:      Cervical: No cervical adenopathy.      Upper Body:      Right upper body: No supraclavicular adenopathy.      Left upper body: No supraclavicular adenopathy.   Skin:     General: Skin is warm and dry.      Capillary Refill: Capillary refill takes less than 2 seconds.      Findings: No rash.   Neurological:      Mental Status: He is alert and oriented to person, place, and time.   Psychiatric:         Mood and Affect: Mood is not anxious.         Speech: Speech normal.         Behavior: Behavior normal.         Thought Content: Thought content normal.         Judgment: Judgment normal.         Significant Labs: All pertinent labs within the past 24 hours have been reviewed.    Significant Imaging: I have reviewed all pertinent imaging results/findings within the past 24 hours.

## 2022-01-11 NOTE — PLAN OF CARE
POC reviewed with pt. Pt verbalizes understanding of POC. No questions at this time.  AAOx3. NADN.  2L nasal cannula.  NSR on cardiac monitor.  Pt remains free of falls.  No complaints at this time.  Safety measures in place. Will continue to monitor.  Informed pt to call for assistance before getting up. Pt verbalizes understanding.

## 2022-01-11 NOTE — HPI
81 y.o. male patient with a PMHx of aortic aneurysm, atrial fibrillation with RVR, emphysema of lung, chronic systolic congestive heart failure, GERD, HTN, other HLD, and prostate cancer who presents to the Emergency Department for SOB which onset gradually 2 days PTA. The pt reports that his SpO2 was 85% on RA. Symptoms are constant and moderate in severity. No mitigating or exacerbating factors reported. Associated sxs include L sided anterior CP that radiates down his L arm since yesterday and L arm paresthesias. Patient denies any fever, chills, numbness, n/v/d, diaphoresis, headahces, and all other sxs at this time. No prior Tx reported. No further complaints or concerns at this time. In the ED, BNP: 1844, H/H: 9.7/32.7, CRP: 151.4, CXR: Left lung consolidation, Lactic: normal, Procalcitonin: Normal. Treated with Lasix and Levaquin. Patient is a full code, surrogate decision maker is his spouse, Britany Douglas.   Treated for pneumonia   On eliquis denies any bleeding   Drinks alcohol daily as per daughter   Had severe anterior chest pain on admission , now resolved   Echo showed EF IN THE 20% , new changes when compared to his echo done last year, had a normal lexiscan last year

## 2022-01-11 NOTE — PROGRESS NOTES
[facility]  [facility]  Progress Note    Patient Name: Richy Douglas  MRN: [id number]  Patient Class: IP- Inpatient   Admission Date: 1/9/2022  Length of Stay: 0 days  Attending Physician: Rubi Barahona MD  Primary Care Provider: Vivian Ch MD        Subjective:     Principal Problem:Pneumonia of left upper lobe due to infectious organism        HPI:  81 y.o. male patient with a PMHx of aortic aneurysm, atrial fibrillation with RVR, emphysema of lung, chronic systolic congestive heart failure, GERD, HTN, other HLD, and prostate cancer who presents to the Emergency Department for SOB which onset gradually 2 days PTA. The pt reports that his SpO2 was 85% on RA. Symptoms are constant and moderate in severity. No mitigating or exacerbating factors reported. Associated sxs include L sided anterior CP that radiates down his L arm since yesterday and L arm paresthesias. Patient denies any fever, chills, numbness, n/v/d, diaphoresis, headahces, and all other sxs at this time. No prior Tx reported. No further complaints or concerns at this time. In the ED, BNP: 1844, H/H: 9.7/32.7, CRP: 151.4, CXR: Left lung consolidation, Lactic: normal, Procalcitonin: Normal. Treated with Lasix and Levaquin. Patient is a full code, surrogate decision maker is his spouse, Britany Douglas. Patient admitted to observation under the care of [facility] medicine.       Overview/Hospital Course:  1/10/22- pt comfortable on 2L via NC current smoker physical exam consistent with COPD          Review of Systems   Constitutional: Negative for fever.   HENT: Negative for congestion.    Respiratory: Negative for cough.    Gastrointestinal: Negative for abdominal pain.   Endocrine: Negative for polyuria.   Genitourinary: Negative for flank pain.   Musculoskeletal: Negative for back pain.   Skin: Negative for rash.   Neurological: Negative for syncope.   Psychiatric/Behavioral: Negative for confusion.     Objective:     Vital  Signs (Most Recent):  Temp: 98.6 °F (37 °C) (01/10/22 1628)  Pulse: 73 (01/10/22 1700)  Resp: 18 (01/10/22 1332)  BP: (!) 117/56 (01/10/22 1628)  SpO2: 96 % (01/10/22 1628) Vital Signs (24h Range):  Temp:  [98 °F (36.7 °C)-100 °F (37.8 °C)] 98.6 °F (37 °C)  Pulse:  [68-81] 73  Resp:  [16-20] 18  SpO2:  [72 %-97 %] 96 %  BP: (106-119)/(56-60) 117/56     Weight: 66.8 kg (147 lb 4.3 oz)  Body mass index is 21.13 kg/m².    Intake/Output Summary (Last 24 hours) at 1/10/2022 1851  Last data filed at 1/10/2022 1837  Gross per 24 hour   Intake 604.9 ml   Output 3050 ml   Net -2445.1 ml      Physical Exam  Vitals and nursing note reviewed.   Constitutional:       General: He is not in acute distress.     Appearance: He is well-developed. He is not diaphoretic.   HENT:      Head: Normocephalic and atraumatic.      Right Ear: Hearing and external ear normal.      Left Ear: Hearing and external ear normal.      Nose: Nose normal. No mucosal edema or rhinorrhea.      Mouth/Throat:      Pharynx: Uvula midline.   Eyes:      General:         Right eye: No discharge.         Left eye: No discharge.      Conjunctiva/sclera: Conjunctivae normal.      Right eye: No chemosis.     Left eye: No chemosis.     Pupils: Pupils are equal, round, and reactive to light.   Neck:      Thyroid: No thyroid mass or thyromegaly.      Trachea: Trachea normal.   Cardiovascular:      Rate and Rhythm: Normal rate and regular rhythm.      Pulses:           Dorsalis pedis pulses are 2+ on the right side and 2+ on the left side.      Heart sounds: Normal heart sounds. No murmur heard.      Pulmonary:      Effort: Pulmonary effort is normal. No respiratory distress.      Breath sounds: Examination of the right-lower field reveals decreased breath sounds. Examination of the left-lower field reveals decreased breath sounds. Decreased breath sounds present. No wheezing.   Chest:   Breasts:      Right: No supraclavicular adenopathy.      Left: No supraclavicular  adenopathy.       Abdominal:      General: Bowel sounds are normal. There is no distension.      Palpations: Abdomen is soft.      Tenderness: There is no abdominal tenderness.   Musculoskeletal:         General: Normal range of motion.      Cervical back: Normal range of motion and neck supple.      Right lower leg: Edema present.      Left lower leg: Edema present.   Lymphadenopathy:      Cervical: No cervical adenopathy.      Upper Body:      Right upper body: No supraclavicular adenopathy.      Left upper body: No supraclavicular adenopathy.   Skin:     General: Skin is warm and dry.      Capillary Refill: Capillary refill takes less than 2 seconds.      Findings: No rash.   Neurological:      Mental Status: He is alert and oriented to person, place, and time.   Psychiatric:         Mood and Affect: Mood is not anxious.         Speech: Speech normal.         Behavior: Behavior normal.         Thought Content: Thought content normal.         Judgment: Judgment normal.         Significant Labs: All pertinent labs within the past 24 hours have been reviewed.    Significant Imaging: I have reviewed all pertinent imaging results/findings within the past 24 hours.      Assessment/Plan:      * Pneumonia of left upper lobe due to infectious organism  Admitted with hypoxemia, CXR: There is a marked amount of alveolar consolidation scattered throughout the left lung. There is a mild amount of alveolar consolidation scattered throughout the right lung  --check CT Chest pt has no elevation in WBC's, fever that would correlate to such extensive CXR findings entire left lung involved  --current smoker need to rule out malignancy  --cont Levaquin IV Daily  --Neb Tx PRN  --Continue O2 for supportive care      Atrial fibrillation  Patient treated with Eliquis for thromboembolism prevention    --Continue Eliquis  --Telemetry        Iron deficiency anemia due to chronic blood loss  Patient due for 2nd iron infusion with Hematology  on 1/10/22, s/p 2U PRBC transfusion approx. 4 weeks ago.     --Daily CBC  --Transfuse with 1 unit PRBC for Hgb <7.0 or <8.0 if symptomatic       Hypertension  Hypertensive in the ED, treated with Lasix IV, resumed home medications    --Resume home medications  --Vitals Q4H  --Telemetry        Chronic systolic congestive heart failure  Patient is identified as having Systolic (HFrEF) heart failure that is Acute on chronic. CHF is currently uncontrolled due to Continued edema of extremities. Latest ECHO performed and demonstrates- Results for orders placed during the hospital encounter of 04/01/21    Echo Color Flow Doppler? Yes    Interpretation Summary  · Concentric remodeling and normal systolic function. The estimated ejection fraction is 60%.  · Normal left ventricular diastolic function.  · With normal right ventricular systolic function.  . Continue Beta Blocker, ACE/ARB and Furosemide and monitor clinical status closely. Monitor on telemetry. Patient is on CHF pathway.  Monitor strict Is&Os and daily weights.  Place on fluid restriction of 1.5 L. Continue to stress to patient importance of self efficacy and  on diet for CHF. Last BNP reviewed- and noted below   Recent Labs   Lab 01/09/22  1017   BNP 1,844*   .  --Daily weights  --Lasix 40mg IV BID  --Cardiac Diet      VTE Risk Mitigation (From admission, onward)         Ordered     apixaban tablet 5 mg  2 times daily         01/09/22 1133     IP VTE HIGH RISK PATIENT  Once         01/09/22 1133     Place sequential compression device  Until discontinued         01/09/22 1133     Reason for No Pharmacological VTE Prophylaxis  Once        Question:  Reasons:  Answer:  Already adequately anticoagulated on oral Anticoagulants    01/09/22 1133                Discharge Planning   MEGAN:      Code Status: Full Code   Is the patient medically ready for discharge?:     Reason for patient still in hospital (select all that apply): Pending disposition  Discharge  Plan A: Home,Home with family                  Rubi Barahona MD  Department of Hospital Medicine   [facility] - Telemetry ([facility])

## 2022-01-11 NOTE — SUBJECTIVE & OBJECTIVE
Past Medical History:   Diagnosis Date    Anemia of chronic disease     Aortic aneurysm     Atrial fibrillation with RVR 9/24/2021    Chronic systolic congestive heart failure 8/31/2017    Emphysema of lung 8/31/2017    GERD (gastroesophageal reflux disease)     Hypertension     Microcytic anemia 11/24/2020    Other hyperlipidemia 4/27/2021    Prostate cancer        Past Surgical History:   Procedure Laterality Date    COLONOSCOPY      PROSTATE SURGERY      SPINE SURGERY         Review of patient's allergies indicates:   Allergen Reactions    Fish containing products     Iodine and iodide containing products     Shellfish containing products        No current facility-administered medications on file prior to encounter.     Current Outpatient Medications on File Prior to Encounter   Medication Sig    apixaban (ELIQUIS) 5 mg Tab Take 1 tablet (5 mg total) by mouth 2 (two) times daily.    atorvastatin (LIPITOR) 10 MG tablet Take 1 tablet (10 mg total) by mouth once daily.    diltiaZEM (CARDIZEM CD) 180 MG 24 hr capsule Take 1 capsule (180 mg total) by mouth once daily.    hydroCHLOROthiazide (HYDRODIURIL) 25 MG tablet Take 1 tablet (25 mg total) by mouth once daily.    pantoprazole (PROTONIX) 40 MG tablet Take 1 tablet (40 mg total) by mouth once daily.    albuterol (PROVENTIL/VENTOLIN HFA) 90 mcg/actuation inhaler Inhale 1-2 puffs into the lungs every 6 (six) hours as needed for Wheezing or Shortness of Breath. Rescue    tiotropium (SPIRIVA) 18 mcg inhalation capsule Inhale 1 capsule (18 mcg total) into the lungs once daily. Controller     Family History     Problem Relation (Age of Onset)    Diabetes Mother    Peripheral vascular disease Mother        Tobacco Use    Smoking status: Current Every Day Smoker     Packs/day: 1.00     Types: Cigarettes    Smokeless tobacco: Never Used   Substance and Sexual Activity    Alcohol use: Yes     Comment: reports only drinks red wine when games are on     Drug use: Never    Sexual activity: Yes     Partners: Female     Review of Systems   Constitutional: Positive for activity change and fatigue. Negative for appetite change, chills, diaphoresis, fever and unexpected weight change.   HENT: Negative for congestion, hearing loss, nosebleeds, postnasal drip, rhinorrhea and trouble swallowing.    Eyes: Negative for discharge and visual disturbance.   Respiratory: Positive for shortness of breath. Negative for cough and chest tightness.    Cardiovascular: Positive for chest pain and leg swelling. Negative for palpitations.   Gastrointestinal: Negative for abdominal distention, abdominal pain, constipation, diarrhea, nausea and vomiting.   Endocrine: Negative for cold intolerance and heat intolerance.   Genitourinary: Negative for difficulty urinating, dysuria, frequency and hematuria.   Musculoskeletal: Positive for arthralgias and back pain. Negative for gait problem and myalgias.   Skin: Negative.    Neurological: Negative for dizziness, weakness, light-headedness and headaches.   Hematological: Negative for adenopathy. Does not bruise/bleed easily.   Psychiatric/Behavioral: Negative for agitation, behavioral problems and confusion. The patient is not nervous/anxious.      Objective:     Vital Signs (Most Recent):  Temp: 97.8 °F (36.6 °C) (01/11/22 1505)  Pulse: 69 (01/11/22 1505)  Resp: 16 (01/11/22 1505)  BP: (!) 110/54 (01/11/22 1505)  SpO2: 97 % (01/11/22 1505) Vital Signs (24h Range):  Temp:  [97.7 °F (36.5 °C)-98.6 °F (37 °C)] 97.8 °F (36.6 °C)  Pulse:  [68-86] 69  Resp:  [16-18] 16  SpO2:  [92 %-99 %] 97 %  BP: (110-129)/(54-60) 110/54     Weight: 66.7 kg (147 lb)  Body mass index is 21.09 kg/m².    Physical Exam  Vitals and nursing note reviewed.   Constitutional:       General: He is not in acute distress.     Appearance: He is well-developed. He is not diaphoretic.   HENT:      Head: Normocephalic and atraumatic.      Right Ear: Hearing and external ear  normal.      Left Ear: Hearing and external ear normal.      Nose: Nose normal. No mucosal edema or rhinorrhea.      Mouth/Throat:      Pharynx: Uvula midline.   Eyes:      General:         Right eye: No discharge.         Left eye: No discharge.      Conjunctiva/sclera: Conjunctivae normal.      Right eye: No chemosis.     Left eye: No chemosis.     Pupils: Pupils are equal, round, and reactive to light.   Neck:      Thyroid: No thyroid mass or thyromegaly.      Trachea: Trachea normal.   Cardiovascular:      Rate and Rhythm: Normal rate and regular rhythm.      Pulses:           Dorsalis pedis pulses are 2+ on the right side and 2+ on the left side.      Heart sounds: Normal heart sounds. No murmur heard.      Pulmonary:      Effort: Pulmonary effort is normal. No respiratory distress.      Breath sounds: Examination of the right-lower field reveals decreased breath sounds. Examination of the left-lower field reveals decreased breath sounds. Decreased breath sounds present. No wheezing.   Chest:   Breasts:      Right: No supraclavicular adenopathy.      Left: No supraclavicular adenopathy.       Abdominal:      General: Bowel sounds are normal. There is no distension.      Palpations: Abdomen is soft.      Tenderness: There is no abdominal tenderness.   Musculoskeletal:         General: Normal range of motion.      Cervical back: Normal range of motion and neck supple.      Right lower leg: Edema present.      Left lower leg: Edema present.   Lymphadenopathy:      Cervical: No cervical adenopathy.      Upper Body:      Right upper body: No supraclavicular adenopathy.      Left upper body: No supraclavicular adenopathy.   Skin:     General: Skin is warm and dry.      Capillary Refill: Capillary refill takes less than 2 seconds.      Findings: No rash.   Neurological:      Mental Status: He is alert and oriented to person, place, and time.   Psychiatric:         Mood and Affect: Mood is not anxious.         Speech:  Speech normal.         Behavior: Behavior normal.         Thought Content: Thought content normal.         Judgment: Judgment normal.           CRANIAL NERVES     CN III, IV, VI   Pupils are equal, round, and reactive to light.       Significant Labs:   All pertinent labs within the past 24 hours have been reviewed.  CBC:   Recent Labs   Lab 01/11/22 0459   WBC 11.91   HGB 9.0*   HCT 30.6*   *     CMP:   Recent Labs   Lab 01/11/22 0459      K 3.5   CL 95   CO2 33*   GLU 96   BUN 18   CREATININE 0.8   CALCIUM 9.1   PROT 6.8   ALBUMIN 3.2*   BILITOT 1.2*   ALKPHOS 51*   AST 13   ALT 10   ANIONGAP 10   EGFRNONAA >60       Significant Imaging: I have reviewed all pertinent imaging results/findings within the past 24 hours.    Review of Systems   Constitutional: Positive for activity change and fatigue. Negative for appetite change, chills, diaphoresis, fever and unexpected weight change.   HENT: Negative for congestion, hearing loss, nosebleeds, postnasal drip, rhinorrhea and trouble swallowing.    Eyes: Negative for discharge and visual disturbance.   Cardiovascular: Positive for chest pain and leg swelling. Negative for palpitations.   Respiratory: Positive for shortness of breath. Negative for chest tightness and cough.    Endocrine: Negative for cold intolerance and heat intolerance.   Hematologic/Lymphatic: Negative for adenopathy. Does not bruise/bleed easily.   Skin: Negative.    Musculoskeletal: Positive for arthralgias and back pain. Negative for gait problem and myalgias.   Gastrointestinal: Negative for abdominal distention, abdominal pain, constipation, diarrhea, nausea and vomiting.   Genitourinary: Negative for difficulty urinating, dysuria, frequency and hematuria.   Neurological: Negative for dizziness, headaches, light-headedness and weakness.   Psychiatric/Behavioral: Negative for agitation, behavioral problems and confusion. The patient is not nervous/anxious.        Physical  Exam  Vitals and nursing note reviewed.   Constitutional:       General: He is not in acute distress.     Appearance: He is well-developed. He is not diaphoretic.   HENT:      Head: Normocephalic and atraumatic.      Right Ear: Hearing and external ear normal.      Left Ear: Hearing and external ear normal.      Nose: Nose normal. No mucosal edema or rhinorrhea.      Mouth/Throat:      Pharynx: Uvula midline.   Eyes:      General:         Right eye: No discharge.         Left eye: No discharge.      Conjunctiva/sclera: Conjunctivae normal.      Right eye: No chemosis.     Left eye: No chemosis.     Pupils: Pupils are equal, round, and reactive to light.   Neck:      Thyroid: No thyroid mass or thyromegaly.      Trachea: Trachea normal.   Cardiovascular:      Rate and Rhythm: Normal rate and regular rhythm.      Pulses:           Dorsalis pedis pulses are 2+ on the right side and 2+ on the left side.      Heart sounds: Normal heart sounds. No murmur heard.      Pulmonary:      Effort: Pulmonary effort is normal. No respiratory distress.      Breath sounds: Examination of the right-lower field reveals decreased breath sounds. Examination of the left-lower field reveals decreased breath sounds. Decreased breath sounds present. No wheezing.   Chest:   Breasts:      Right: No supraclavicular adenopathy.      Left: No supraclavicular adenopathy.       Abdominal:      General: Bowel sounds are normal. There is no distension.      Palpations: Abdomen is soft.      Tenderness: There is no abdominal tenderness.   Musculoskeletal:         General: Normal range of motion.      Cervical back: Normal range of motion and neck supple.      Right lower leg: Edema present.      Left lower leg: Edema present.   Lymphadenopathy:      Cervical: No cervical adenopathy.      Upper Body:      Right upper body: No supraclavicular adenopathy.      Left upper body: No supraclavicular adenopathy.   Skin:     General: Skin is warm and dry.       Capillary Refill: Capillary refill takes less than 2 seconds.      Findings: No rash.   Neurological:      Mental Status: He is alert and oriented to person, place, and time.   Psychiatric:         Mood and Affect: Mood is not anxious.         Speech: Speech normal.         Behavior: Behavior normal.         Thought Content: Thought content normal.         Judgment: Judgment normal.

## 2022-01-12 PROBLEM — F10.20 ALCOHOL DEPENDENCE: Status: ACTIVE | Noted: 2022-01-12

## 2022-01-12 PROCEDURE — 25000003 PHARM REV CODE 250: Performed by: PHYSICIAN ASSISTANT

## 2022-01-12 PROCEDURE — 93010 ELECTROCARDIOGRAM REPORT: CPT | Mod: ,,, | Performed by: INTERNAL MEDICINE

## 2022-01-12 PROCEDURE — 94640 AIRWAY INHALATION TREATMENT: CPT

## 2022-01-12 PROCEDURE — 94761 N-INVAS EAR/PLS OXIMETRY MLT: CPT

## 2022-01-12 PROCEDURE — 63700000 PHARM REV CODE 250 ALT 637 W/O HCPCS: Performed by: FAMILY MEDICINE

## 2022-01-12 PROCEDURE — 25000003 PHARM REV CODE 250: Performed by: NURSE PRACTITIONER

## 2022-01-12 PROCEDURE — 63600175 PHARM REV CODE 636 W HCPCS: Performed by: INTERNAL MEDICINE

## 2022-01-12 PROCEDURE — 25000003 PHARM REV CODE 250: Performed by: INTERNAL MEDICINE

## 2022-01-12 PROCEDURE — 63600175 PHARM REV CODE 636 W HCPCS: Performed by: FAMILY MEDICINE

## 2022-01-12 PROCEDURE — 63600175 PHARM REV CODE 636 W HCPCS: Performed by: NURSE PRACTITIONER

## 2022-01-12 PROCEDURE — 25000242 PHARM REV CODE 250 ALT 637 W/ HCPCS: Performed by: FAMILY MEDICINE

## 2022-01-12 PROCEDURE — 21400001 HC TELEMETRY ROOM

## 2022-01-12 PROCEDURE — 99900035 HC TECH TIME PER 15 MIN (STAT)

## 2022-01-12 PROCEDURE — 99233 SBSQ HOSP IP/OBS HIGH 50: CPT | Mod: ,,, | Performed by: INTERNAL MEDICINE

## 2022-01-12 PROCEDURE — 93010 EKG 12-LEAD: ICD-10-PCS | Mod: ,,, | Performed by: INTERNAL MEDICINE

## 2022-01-12 PROCEDURE — 27000221 HC OXYGEN, UP TO 24 HOURS

## 2022-01-12 PROCEDURE — 99233 PR SUBSEQUENT HOSPITAL CARE,LEVL III: ICD-10-PCS | Mod: ,,, | Performed by: INTERNAL MEDICINE

## 2022-01-12 PROCEDURE — 25000003 PHARM REV CODE 250: Performed by: FAMILY MEDICINE

## 2022-01-12 PROCEDURE — 93005 ELECTROCARDIOGRAM TRACING: CPT

## 2022-01-12 RX ORDER — METOPROLOL TARTRATE 1 MG/ML
5 INJECTION, SOLUTION INTRAVENOUS ONCE
Status: COMPLETED | OUTPATIENT
Start: 2022-01-12 | End: 2022-01-12

## 2022-01-12 RX ORDER — METOPROLOL SUCCINATE 50 MG/1
50 TABLET, EXTENDED RELEASE ORAL 2 TIMES DAILY
Status: DISCONTINUED | OUTPATIENT
Start: 2022-01-12 | End: 2022-01-14 | Stop reason: HOSPADM

## 2022-01-12 RX ORDER — METOPROLOL TARTRATE 1 MG/ML
5 INJECTION, SOLUTION INTRAVENOUS
Status: CANCELLED | OUTPATIENT
Start: 2022-01-12

## 2022-01-12 RX ORDER — FAMOTIDINE 20 MG/1
20 TABLET, FILM COATED ORAL 2 TIMES DAILY
Status: DISCONTINUED | OUTPATIENT
Start: 2022-01-12 | End: 2022-01-14 | Stop reason: HOSPADM

## 2022-01-12 RX ORDER — DIPHENHYDRAMINE HCL 25 MG
25 CAPSULE ORAL ONCE
Status: COMPLETED | OUTPATIENT
Start: 2022-01-12 | End: 2022-01-12

## 2022-01-12 RX ORDER — CHLORDIAZEPOXIDE HYDROCHLORIDE 25 MG/1
50 CAPSULE, GELATIN COATED ORAL 3 TIMES DAILY
Status: DISCONTINUED | OUTPATIENT
Start: 2022-01-12 | End: 2022-01-14 | Stop reason: HOSPADM

## 2022-01-12 RX ADMIN — FAMOTIDINE 20 MG: 20 TABLET ORAL at 09:01

## 2022-01-12 RX ADMIN — FAMOTIDINE 20 MG: 20 TABLET ORAL at 10:01

## 2022-01-12 RX ADMIN — ATORVASTATIN CALCIUM 10 MG: 10 TABLET, FILM COATED ORAL at 08:01

## 2022-01-12 RX ADMIN — METOPROLOL SUCCINATE 50 MG: 50 TABLET, EXTENDED RELEASE ORAL at 09:01

## 2022-01-12 RX ADMIN — ARFORMOTEROL TARTRATE 15 MCG: 15 SOLUTION RESPIRATORY (INHALATION) at 08:01

## 2022-01-12 RX ADMIN — IPRATROPIUM BROMIDE AND ALBUTEROL SULFATE 3 ML: 2.5; .5 SOLUTION RESPIRATORY (INHALATION) at 12:01

## 2022-01-12 RX ADMIN — METOPROLOL SUCCINATE 50 MG: 50 TABLET, EXTENDED RELEASE ORAL at 08:01

## 2022-01-12 RX ADMIN — IPRATROPIUM BROMIDE AND ALBUTEROL SULFATE 3 ML: 2.5; .5 SOLUTION RESPIRATORY (INHALATION) at 07:01

## 2022-01-12 RX ADMIN — ARFORMOTEROL TARTRATE 15 MCG: 15 SOLUTION RESPIRATORY (INHALATION) at 07:01

## 2022-01-12 RX ADMIN — METHYLPREDNISOLONE SODIUM SUCCINATE 40 MG: 40 INJECTION, POWDER, FOR SOLUTION INTRAMUSCULAR; INTRAVENOUS at 10:01

## 2022-01-12 RX ADMIN — CHLORDIAZEPOXIDE HYDROCHLORIDE 50 MG: 25 CAPSULE ORAL at 02:01

## 2022-01-12 RX ADMIN — METOROPROLOL TARTRATE 5 MG: 5 INJECTION, SOLUTION INTRAVENOUS at 02:01

## 2022-01-12 RX ADMIN — CEFTRIAXONE 1 G: 1 INJECTION, SOLUTION INTRAVENOUS at 06:01

## 2022-01-12 RX ADMIN — FUROSEMIDE 40 MG: 10 INJECTION, SOLUTION INTRAMUSCULAR; INTRAVENOUS at 07:01

## 2022-01-12 RX ADMIN — HYDROCHLOROTHIAZIDE 25 MG: 25 TABLET ORAL at 08:01

## 2022-01-12 RX ADMIN — IPRATROPIUM BROMIDE AND ALBUTEROL SULFATE 3 ML: 2.5; .5 SOLUTION RESPIRATORY (INHALATION) at 08:01

## 2022-01-12 RX ADMIN — PANTOPRAZOLE SODIUM 40 MG: 40 TABLET, DELAYED RELEASE ORAL at 08:01

## 2022-01-12 RX ADMIN — DIPHENHYDRAMINE HYDROCHLORIDE 25 MG: 25 CAPSULE ORAL at 10:01

## 2022-01-12 RX ADMIN — AZITHROMYCIN MONOHYDRATE 500 MG: 250 TABLET ORAL at 08:01

## 2022-01-12 NOTE — PROGRESS NOTES
Pharmacist Renal Dose Adjustment Note    Richy Douglas is a 81 y.o. male being treated with the medication famotidine.     Patient Data:    Vital Signs (Most Recent):  Temp: 98.4 °F (36.9 °C) (01/12/22 0700)  Pulse: 77 (01/12/22 0727)  Resp: 18 (01/12/22 0727)  BP: 136/64 (01/12/22 0700)  SpO2: 97 % (01/12/22 0727) Vital Signs (72h Range):  Temp:  [97.7 °F (36.5 °C)-100 °F (37.8 °C)]   Pulse:  [68-95]   Resp:  [16-28]   BP: (106-160)/(53-74)   SpO2:  [72 %-100 %]      Recent Labs   Lab 01/09/22  1017 01/11/22  0459   CREATININE 0.7 0.8     Serum creatinine: 0.8 mg/dL 01/11/22 0459  Estimated creatinine clearance: 68.3 mL/min    Famotidine 20 mg oral daily will be changed to famotidine 20 mg oral twice daily per [facility]'s renal dose protocol for CrCl > 50 ml/min.     Thank you,  Pharmacist's Name: Denis Olson

## 2022-01-12 NOTE — SUBJECTIVE & OBJECTIVE
Review of Systems   Constitutional: Negative for fever.   HENT: Negative for congestion.    Respiratory: Negative for cough.    Gastrointestinal: Negative for abdominal pain.   Endocrine: Negative for polyuria.   Genitourinary: Negative for flank pain.   Musculoskeletal: Negative for back pain.   Skin: Negative for rash.   Neurological: Negative for syncope.   Psychiatric/Behavioral: Negative for confusion.     Objective:     Vital Signs (Most Recent):  Temp: 97.8 °F (36.6 °C) (01/11/22 1505)  Pulse: 69 (01/11/22 1505)  Resp: 16 (01/11/22 1505)  BP: (!) 110/54 (01/11/22 1505)  SpO2: 97 % (01/11/22 1505) Vital Signs (24h Range):  Temp:  [97.7 °F (36.5 °C)-98.6 °F (37 °C)] 97.8 °F (36.6 °C)  Pulse:  [68-86] 69  Resp:  [16-18] 16  SpO2:  [92 %-99 %] 97 %  BP: (110-129)/(54-60) 110/54     Weight: 66.7 kg (147 lb)  Body mass index is 21.09 kg/m².    Intake/Output Summary (Last 24 hours) at 1/11/2022 1812  Last data filed at 1/11/2022 0900  Gross per 24 hour   Intake 359.9 ml   Output 1300 ml   Net -940.1 ml      Physical Exam  Vitals and nursing note reviewed.   Constitutional:       General: He is not in acute distress.     Appearance: He is well-developed. He is not diaphoretic.   HENT:      Head: Normocephalic and atraumatic.      Right Ear: Hearing and external ear normal.      Left Ear: Hearing and external ear normal.      Nose: Nose normal. No mucosal edema or rhinorrhea.      Mouth/Throat:      Pharynx: Uvula midline.   Eyes:      General:         Right eye: No discharge.         Left eye: No discharge.      Conjunctiva/sclera: Conjunctivae normal.      Right eye: No chemosis.     Left eye: No chemosis.     Pupils: Pupils are equal, round, and reactive to light.   Neck:      Thyroid: No thyroid mass or thyromegaly.      Trachea: Trachea normal.   Cardiovascular:      Rate and Rhythm: Normal rate and regular rhythm.      Pulses:           Dorsalis pedis pulses are 2+ on the right side and 2+ on the left side.       Heart sounds: Normal heart sounds. No murmur heard.      Pulmonary:      Effort: Pulmonary effort is normal. No respiratory distress.      Breath sounds: Examination of the right-lower field reveals decreased breath sounds. Examination of the left-lower field reveals decreased breath sounds. Decreased breath sounds present. No wheezing.   Chest:   Breasts:      Right: No supraclavicular adenopathy.      Left: No supraclavicular adenopathy.       Abdominal:      General: Bowel sounds are normal. There is no distension.      Palpations: Abdomen is soft.      Tenderness: There is no abdominal tenderness.   Musculoskeletal:         General: Normal range of motion.      Cervical back: Normal range of motion and neck supple.      Right lower leg: Edema present.      Left lower leg: Edema present.   Lymphadenopathy:      Cervical: No cervical adenopathy.      Upper Body:      Right upper body: No supraclavicular adenopathy.      Left upper body: No supraclavicular adenopathy.   Skin:     General: Skin is warm and dry.      Capillary Refill: Capillary refill takes less than 2 seconds.      Findings: No rash.   Neurological:      Mental Status: He is alert and oriented to person, place, and time.   Psychiatric:         Mood and Affect: Mood is not anxious.         Speech: Speech normal.         Behavior: Behavior normal.         Thought Content: Thought content normal.         Judgment: Judgment normal.         Significant Labs: All pertinent labs within the past 24 hours have been reviewed.    Significant Imaging: I have reviewed all pertinent imaging results/findings within the past 24 hours.

## 2022-01-12 NOTE — PROGRESS NOTES
[facility]  [facility] Medicine  Progress Note    Patient Name: Richy Douglas  MRN: [id number]  Patient Class: IP- Inpatient   Admission Date: 1/9/2022  Length of Stay: 1 days  Attending Physician: Rubi Barahona MD  Primary Care Provider: Vivian Ch MD        Subjective:     Principal Problem:Pneumonia of left upper lobe due to infectious organism        HPI:  81 y.o. male patient with a PMHx of aortic aneurysm, atrial fibrillation with RVR, emphysema of lung, chronic systolic congestive heart failure, GERD, HTN, other HLD, and prostate cancer who presents to the Emergency Department for SOB which onset gradually 2 days PTA. The pt reports that his SpO2 was 85% on RA. Symptoms are constant and moderate in severity. No mitigating or exacerbating factors reported. Associated sxs include L sided anterior CP that radiates down his L arm since yesterday and L arm paresthesias. Patient denies any fever, chills, numbness, n/v/d, diaphoresis, headahces, and all other sxs at this time. No prior Tx reported. No further complaints or concerns at this time. In the ED, BNP: 1844, H/H: 9.7/32.7, CRP: 151.4, CXR: Left lung consolidation, Lactic: normal, Procalcitonin: Normal. Treated with Lasix and Levaquin. Patient is a full code, surrogate decision maker is his spouse, Britany Douglas. Patient admitted to observation under the care of [facility] medicine.       Overview/Hospital Course:  1/10/22- pt comfortable on 2L via NC current smoker physical exam consistent with COPD  1/11/22- CT scan of the chest reveals severe emphysematous lungs with bully scattered throughout all lung fields bilaterally.  Consolidation left upper and middle jose lobes consistent with pneumonia.  No masses. Echo reveals new decline in EF to 25% pt reported chest pain on admission          Review of Systems   Constitutional: Negative for fever.   HENT: Negative for congestion.    Respiratory: Negative for cough.    Gastrointestinal:  Negative for abdominal pain.   Endocrine: Negative for polyuria.   Genitourinary: Negative for flank pain.   Musculoskeletal: Negative for back pain.   Skin: Negative for rash.   Neurological: Negative for syncope.   Psychiatric/Behavioral: Negative for confusion.     Objective:     Vital Signs (Most Recent):  Temp: 97.8 °F (36.6 °C) (01/11/22 1505)  Pulse: 69 (01/11/22 1505)  Resp: 16 (01/11/22 1505)  BP: (!) 110/54 (01/11/22 1505)  SpO2: 97 % (01/11/22 1505) Vital Signs (24h Range):  Temp:  [97.7 °F (36.5 °C)-98.6 °F (37 °C)] 97.8 °F (36.6 °C)  Pulse:  [68-86] 69  Resp:  [16-18] 16  SpO2:  [92 %-99 %] 97 %  BP: (110-129)/(54-60) 110/54     Weight: 66.7 kg (147 lb)  Body mass index is 21.09 kg/m².    Intake/Output Summary (Last 24 hours) at 1/11/2022 1812  Last data filed at 1/11/2022 0900  Gross per 24 hour   Intake 359.9 ml   Output 1300 ml   Net -940.1 ml      Physical Exam  Vitals and nursing note reviewed.   Constitutional:       General: He is not in acute distress.     Appearance: He is well-developed. He is not diaphoretic.   HENT:      Head: Normocephalic and atraumatic.      Right Ear: Hearing and external ear normal.      Left Ear: Hearing and external ear normal.      Nose: Nose normal. No mucosal edema or rhinorrhea.      Mouth/Throat:      Pharynx: Uvula midline.   Eyes:      General:         Right eye: No discharge.         Left eye: No discharge.      Conjunctiva/sclera: Conjunctivae normal.      Right eye: No chemosis.     Left eye: No chemosis.     Pupils: Pupils are equal, round, and reactive to light.   Neck:      Thyroid: No thyroid mass or thyromegaly.      Trachea: Trachea normal.   Cardiovascular:      Rate and Rhythm: Normal rate and regular rhythm.      Pulses:           Dorsalis pedis pulses are 2+ on the right side and 2+ on the left side.      Heart sounds: Normal heart sounds. No murmur heard.      Pulmonary:      Effort: Pulmonary effort is normal. No respiratory distress.      Breath  sounds: Examination of the right-lower field reveals decreased breath sounds. Examination of the left-lower field reveals decreased breath sounds. Decreased breath sounds present. No wheezing.   Chest:   Breasts:      Right: No supraclavicular adenopathy.      Left: No supraclavicular adenopathy.       Abdominal:      General: Bowel sounds are normal. There is no distension.      Palpations: Abdomen is soft.      Tenderness: There is no abdominal tenderness.   Musculoskeletal:         General: Normal range of motion.      Cervical back: Normal range of motion and neck supple.      Right lower leg: Edema present.      Left lower leg: Edema present.   Lymphadenopathy:      Cervical: No cervical adenopathy.      Upper Body:      Right upper body: No supraclavicular adenopathy.      Left upper body: No supraclavicular adenopathy.   Skin:     General: Skin is warm and dry.      Capillary Refill: Capillary refill takes less than 2 seconds.      Findings: No rash.   Neurological:      Mental Status: He is alert and oriented to person, place, and time.   Psychiatric:         Mood and Affect: Mood is not anxious.         Speech: Speech normal.         Behavior: Behavior normal.         Thought Content: Thought content normal.         Judgment: Judgment normal.         Significant Labs: All pertinent labs within the past 24 hours have been reviewed.    Significant Imaging: I have reviewed all pertinent imaging results/findings within the past 24 hours.      Assessment/Plan:      * Pneumonia of left upper lobe due to infectious organism  Admitted with hypoxemia, CXR: There is a marked amount of alveolar consolidation scattered throughout the left lung. There is a mild amount of alveolar consolidation scattered throughout the right lung  --check CT Chest >>>>>>>>>>>shows PNA, no mass  --discussed with Pulmonologist he recommends treating PNA for 7 days  --Nebulized LABA, ADELSO and ipratropium  --Continue O2 for supportive  care  --wean as tolerated  --cannot use steroid due to putative infection    Atrial fibrillation  Patient treated with Eliquis for thromboembolism prevention    --Continue Eliquis  --Telemetry        Iron deficiency anemia due to chronic blood loss  Patient due for 2nd iron infusion with Hematology on 1/10/22, s/p 2U PRBC transfusion approx. 4 weeks ago.     --Daily CBC  --Transfuse with 1 unit PRBC for Hgb <7.0 or <8.0 if symptomatic       Hypertension  Hypertensive in the ED, treated with Lasix IV, resumed home medications    --Resume home medications  --Vitals Q4H  --Telemetry        Chronic systolic congestive heart failure  Patient is identified as having Systolic (HFrEF) heart failure that is Acute on chronic. CHF is currently uncontrolled due to Continued edema of extremities. Latest ECHO performed and demonstrates- Results for orders placed during the hospital encounter of 04/01/21    Echo Color Flow Doppler? Yes    Interpretation Summary  · Concentric remodeling and normal systolic function. The estimated ejection fraction is 60%.  · Normal left ventricular diastolic function.  · With normal right ventricular systolic function.  . Continue Beta Blocker, ACE/ARB and Furosemide and monitor clinical status closely. Monitor on telemetry. Patient is on CHF pathway.  Monitor strict Is&Os and daily weights.  Place on fluid restriction of 1.5 L. Continue to stress to patient importance of self efficacy and  on diet for CHF. Last BNP reviewed- and noted below   Recent Labs   Lab 01/09/22  1017   BNP 1,844*   .  --Daily weights  --Lasix 40mg IV BID  --Cardiac Diet      VTE Risk Mitigation (From admission, onward)         Ordered     IP VTE HIGH RISK PATIENT  Once         01/09/22 1133     Place sequential compression device  Until discontinued         01/09/22 1133     Reason for No Pharmacological VTE Prophylaxis  Once        Question:  Reasons:  Answer:  Already adequately anticoagulated on oral  Anticoagulants    01/09/22 1133                Discharge Planning   MEGAN:      Code Status: Full Code   Is the patient medically ready for discharge?:     Reason for patient still in hospital (select all that apply): Pending disposition  Discharge Plan A: Home,Home with family                  Rubi Barahona MD  Department of Hospital Medicine   [facility] ([facility])

## 2022-01-12 NOTE — PLAN OF CARE
Patient AAOX 4. VSS.. Patient remained afebrile throughout shift.   IV abx administered per order   Patient remained free of falls this shift   Patient denies c/o pain this shift   Plan of care reviewed. Patient verbalized understanding.   Patient moving/turning independently. Frequent weight shifting encouraged.   Patient SR on monitor   Bed low, side rails up x2, wheels locked, call light in reach. Bed alarm maintained for safety. Patient instructed to call for assistance.   Hourly rounding completed.   Will continue to monitor.

## 2022-01-12 NOTE — ASSESSMENT & PLAN NOTE
Patient is identified as having Combined Systolic and Diastolic heart failure that is Acute on chronic. CHF is currently controlled. Latest ECHO performed and demonstrates- Results for orders placed during the hospital encounter of 01/09/22    Echo Saline Bubble? No    Interpretation Summary  · Concentric hypertrophy and severely decreased systolic function.  · The estimated ejection fraction is 25%.  · Grade I left ventricular diastolic dysfunction.  · Normal right ventricular size with mildly reduced right ventricular systolic function.  · Mild-to-moderate mitral regurgitation.  · Mild-to-moderate aortic regurgitation.  · Moderate left atrial enlargement.  · Moderate right atrial enlargement.  · Moderate tricuspid regurgitation.  · Intermediate central venous pressure (8 mmHg).  · The estimated PA systolic pressure is 119 mmHg.  · There is pulmonary hypertension.  · The aortic root is moderately dilated.  . Continue Furosemide and monitor clinical status closely. Monitor on telemetry. Patient is on CHF pathway.  Monitor strict Is&Os and daily weights.  Place on fluid restriction of 2 L. Continue to stress to patient importance of self efficacy and  on diet for CHF. Last BNP reviewed- and noted below   Recent Labs   Lab 01/09/22  1017   BNP 1,844*   .    New diagnosis of systolic heart failure, normal EF last year   Also had chest pain ,with trop elevated from demand ischemia and flat   cw diuresis   Change cardizem to toprol   Alcohol cessation   R/lhc in am   Hx of chronic anemia and hx of crohn's disease   No bleeding , on apixaban, hold prior to cath    1/12/2022  -Plan for R/LHC today  -Continue Statin, BB, IV lasix BID  -Optimize medical therapy for CHF post procedure today  -Keep NPO for now  -Risks, benefits and treatment alternatives reviewed per Dr Tovar. Patient has agreed to proceed with R/LHC today as planned.

## 2022-01-12 NOTE — ASSESSMENT & PLAN NOTE
Admitted with hypoxemia, CXR: There is a marked amount of alveolar consolidation scattered throughout the left lung. There is a mild amount of alveolar consolidation scattered throughout the right lung  --check CT Chest >>>>>>>>>>>shows PNA, no mass  --discussed with Pulmonologist he recommends treating PNA for 7 days  --Nebulized LABA, ADELSO and ipratropium  --Continue O2 for supportive care  --wean as tolerated  --cannot use steroid due to putative infection

## 2022-01-12 NOTE — PROGRESS NOTES
[facility] - Telemetry ([facility])  Cardiology  Progress Note    Patient Name: Richy Douglas  MRN: [id number]  Admission Date: 1/9/2022  Hospital Length of Stay: 2 days  Code Status: Full Code   Attending Physician: Rubi Barahona MD   Primary Care Physician: Vivian Ch MD  Expected Discharge Date:   Principal Problem:Pneumonia of left upper lobe due to infectious organism    Subjective:   HPI    81 y.o. male patient with a PMHx of aortic aneurysm, atrial fibrillation with RVR, emphysema of lung, chronic systolic congestive heart failure, GERD, HTN, other HLD, and prostate cancer who presents to the Emergency Department for SOB which onset gradually 2 days PTA. The pt reports that his SpO2 was 85% on RA. Symptoms are constant and moderate in severity. No mitigating or exacerbating factors reported. Associated sxs include L sided anterior CP that radiates down his L arm since yesterday and L arm paresthesias. Patient denies any fever, chills, numbness, n/v/d, diaphoresis, headahces, and all other sxs at this time. No prior Tx reported. No further complaints or concerns at this time. In the ED, BNP: 1844, H/H: 9.7/32.7, CRP: 151.4, CXR: Left lung consolidation, Lactic: normal, Procalcitonin: Normal. Treated with Lasix and Levaquin. Patient is a full code, surrogate decision maker is his spouse, Britany Douglas.   Treated for pneumonia   On eliquis denies any bleeding   Drinks alcohol daily as per daughter   Had severe anterior chest pain on admission , now resolved   Echo showed EF IN THE 20% , new changes when compared to his echo done last year, had a normal lexiscan last year         Hospital Course:   1/12/2022--Patient seen and examined in room, lying in bed. Feels ok today. No chest pain or shortness of breath. Plan  for R/LHC today per Dr Tovar.       Interval History: no acute issues noted o/n. Plan for R/LHC today. Further recs to follow.     Review of Systems   Constitutional: Positive for malaise/fatigue.    HENT: Negative for hearing loss and hoarse voice.    Eyes: Negative for blurred vision and visual disturbance.   Cardiovascular: Negative for chest pain, claudication, dyspnea on exertion, irregular heartbeat, leg swelling, near-syncope, orthopnea, palpitations, paroxysmal nocturnal dyspnea and syncope.   Respiratory: Negative for cough, hemoptysis, shortness of breath, sleep disturbances due to breathing, snoring and wheezing.    Endocrine: Negative for cold intolerance and heat intolerance.   Hematologic/Lymphatic: Does not bruise/bleed easily.   Skin: Negative for color change, dry skin and nail changes.   Musculoskeletal: Positive for arthritis and back pain. Negative for joint pain and myalgias.   Gastrointestinal: Negative for bloating, abdominal pain, constipation, nausea and vomiting.   Genitourinary: Negative for dysuria, flank pain, hematuria and hesitancy.   Neurological: Negative for headaches, light-headedness, loss of balance, numbness, paresthesias and weakness.   Psychiatric/Behavioral: Negative for altered mental status.   Allergic/Immunologic: Negative for environmental allergies.     Objective:     Vital Signs (Most Recent):  Temp: 98.4 °F (36.9 °C) (01/12/22 0700)  Pulse: 77 (01/12/22 0727)  Resp: 18 (01/12/22 0727)  BP: 136/64 (01/12/22 0700)  SpO2: 97 % (01/12/22 0727) Vital Signs (24h Range):  Temp:  [97.8 °F (36.6 °C)-98.6 °F (37 °C)] 98.4 °F (36.9 °C)  Pulse:  [68-86] 77  Resp:  [16-20] 18  SpO2:  [94 %-100 %] 97 %  BP: (110-136)/(54-65) 136/64     Weight: 66.7 kg (147 lb)  Body mass index is 21.09 kg/m².     SpO2: 97 %  O2 Device (Oxygen Therapy): nasal cannula      Intake/Output Summary (Last 24 hours) at 1/12/2022 1018  Last data filed at 1/12/2022 0900  Gross per 24 hour   Intake 47.95 ml   Output 800 ml   Net -752.05 ml       Lines/Drains/Airways     Peripheral Intravenous Line                 Peripheral IV - Single Lumen 01/09/22 1008 20 G Right Antecubital 3 days                 Physical Exam  Vitals and nursing note reviewed.   Constitutional:       General: He is not in acute distress.     Appearance: Normal appearance. He is well-developed and well-nourished. He is not ill-appearing.   HENT:      Head: Normocephalic and atraumatic.      Nose: Nose normal.      Mouth/Throat:      Mouth: Mucous membranes are moist.   Eyes:      Pupils: Pupils are equal, round, and reactive to light.   Neck:      Thyroid: No thyromegaly.      Vascular: No JVD.      Trachea: No tracheal deviation.   Cardiovascular:      Rate and Rhythm: Normal rate and regular rhythm.      Chest Wall: PMI is not displaced.      Pulses: Intact distal pulses.           Radial pulses are 2+ on the right side and 2+ on the left side.        Dorsalis pedis pulses are 2+ on the right side and 2+ on the left side.      Heart sounds: S1 normal and S2 normal. Heart sounds not distant. No murmur heard.      Pulmonary:      Effort: Pulmonary effort is normal. No respiratory distress.      Breath sounds: Normal breath sounds. No wheezing.   Abdominal:      General: Bowel sounds are normal.   Musculoskeletal:         General: Edema present. Normal range of motion.      Cervical back: Full passive range of motion without pain, normal range of motion and neck supple.      Right ankle: No swelling.      Left ankle: No swelling.   Skin:     General: Skin is warm and dry.      Nails: There is no clubbing or cyanosis.   Neurological:      Mental Status: He is alert and oriented to person, place, and time.   Psychiatric:         Mood and Affect: Mood and affect normal.         Speech: Speech normal.         Behavior: Behavior normal.         Thought Content: Thought content normal.         Cognition and Memory: Cognition and memory normal.         Judgment: Judgment normal.         Significant Labs:   BMP:   Recent Labs   Lab 01/11/22  0459   GLU 96      K 3.5   CL 95   CO2 33*   BUN 18   CREATININE 0.8   CALCIUM 9.1   , CBC    Recent Labs   Lab 01/11/22  0459   WBC 11.91   HGB 9.0*   HCT 30.6*   *    and All pertinent lab results from the last 24 hours have been reviewed.    Significant Imaging: Echocardiogram:   Transthoracic echo (TTE) complete ([facility] Only):   Results for orders placed or performed during the hospital encounter of 01/09/22   Echo Saline Bubble? No   Result Value Ref Range    BSA 1.81 m2    TDI SEPTAL 0.06 m/s    LV LATERAL E/E' RATIO 11.13 m/s    LV SEPTAL E/E' RATIO 14.83 m/s    LA WIDTH 4.96 cm    IVC diameter 2.68 cm    Left Ventricular Outflow Tract Mean Velocity 0.78888786588794 cm/s    Left Ventricular Outflow Tract Mean Gradient 2.54 mmHg    TDI LATERAL 0.08 m/s    LVIDd 5.51 3.5 - 6.0 cm    IVS 1.77 (A) 0.6 - 1.1 cm    Posterior Wall 1.48 (A) 0.6 - 1.1 cm    Ao root annulus 3.94 cm    LVIDs 4.61 (A) 2.1 - 4.0 cm    FS 16 28 - 44 %    Sinus 4.63 cm    STJ 4.92 cm    Ascending aorta 4.69 cm    LV mass 420.71 g    LA size 3.81 cm    TAPSE 2.05 cm    Left Ventricle Relative Wall Thickness 0.54 cm    AV regurgitation pressure 1/2 time 295.50852925 ms    AV mean gradient 6 mmHg    AV valve area 3.64 cm2    AV Velocity Ratio 0.65     AV index (prosthetic) 0.74     MV valve area p 1/2 method 4.93 cm2    E/A ratio 1.24     Mean e' 0.07 m/s    E wave deceleration time 153.454012149823894 msec    IVRT 79.650124507405737 msec    LVOT diameter 2.51 cm    LVOT area 4.9 cm2    LVOT peak karl 1.05 m/s    LVOT peak VTI 17.60 cm    Ao peak karl 1.62 m/s    Ao VTI 23.9 cm    RVOT peak karl 0.73 m/s    RVOT peak VTI 14.3 cm    LVOT stroke volume 87.04 cm3    AV peak gradient 10 mmHg    PV mean gradient 1.24 mmHg    E/E' ratio 12.71 m/s    MV Peak E Karl 0.89 m/s    AR Max Karl 5.20 m/s    TR Max Karl 5.26 m/s    MV stenosis pressure 1/2 time 44.971269871259115 ms    MV Peak A Karl 0.72 m/s    LV Systolic Volume 97.94 mL    LV Systolic Volume Index 53.5 mL/m2    LV Diastolic Volume 147.89 mL    LV Diastolic Volume Index 80.81  mL/m2    LV Mass Index 230 g/m2    Echo EF Estimated 34 %    RA Major Axis 5.05 cm    Triscuspid Valve Regurgitation Peak Gradient 111 mmHg    RA Width 4.80 cm    Right Atrial Pressure (from IVC) 8 mmHg    EF 25 %    TV rest pulmonary artery pressure 119 mmHg    Narrative    · Concentric hypertrophy and severely decreased systolic function.  · The estimated ejection fraction is 25%.  · Grade I left ventricular diastolic dysfunction.  · Normal right ventricular size with mildly reduced right ventricular   systolic function.  · Mild-to-moderate mitral regurgitation.  · Mild-to-moderate aortic regurgitation.  · Moderate left atrial enlargement.  · Moderate right atrial enlargement.  · Moderate tricuspid regurgitation.  · Intermediate central venous pressure (8 mmHg).  · The estimated PA systolic pressure is 119 mmHg.  · There is pulmonary hypertension.  · The aortic root is moderately dilated.        Assessment and Plan:       * Pneumonia of left upper lobe due to infectious organism  AS PER PRIMARY    Atrial fibrillation  IN SINUS RHYTHM   ON ELIQUIS   Eliquis on hold for R/LHC today  Resume post procedure      Chronic systolic congestive heart failure  Patient is identified as having Combined Systolic and Diastolic heart failure that is Acute on chronic. CHF is currently controlled. Latest ECHO performed and demonstrates- Results for orders placed during the hospital encounter of 01/09/22    Echo Saline Bubble? No    Interpretation Summary  · Concentric hypertrophy and severely decreased systolic function.  · The estimated ejection fraction is 25%.  · Grade I left ventricular diastolic dysfunction.  · Normal right ventricular size with mildly reduced right ventricular systolic function.  · Mild-to-moderate mitral regurgitation.  · Mild-to-moderate aortic regurgitation.  · Moderate left atrial enlargement.  · Moderate right atrial enlargement.  · Moderate tricuspid regurgitation.  · Intermediate central venous  pressure (8 mmHg).  · The estimated PA systolic pressure is 119 mmHg.  · There is pulmonary hypertension.  · The aortic root is moderately dilated.  . Continue Furosemide and monitor clinical status closely. Monitor on telemetry. Patient is on CHF pathway.  Monitor strict Is&Os and daily weights.  Place on fluid restriction of 2 L. Continue to stress to patient importance of self efficacy and  on diet for CHF. Last BNP reviewed- and noted below   Recent Labs   Lab 01/09/22  1017   BNP 1,844*   .    New diagnosis of systolic heart failure, normal EF last year   Also had chest pain ,with trop elevated from demand ischemia and flat   cw diuresis   Change cardizem to toprol   Alcohol cessation   R/lhc in am   Hx of chronic anemia and hx of crohn's disease   No bleeding , on apixaban, hold prior to cath    1/12/2022  -Plan for R/LHC today  -Continue Statin, BB, IV lasix BID  -Optimize medical therapy for CHF post procedure today  -Keep NPO for now  -Risks, benefits and treatment alternatives reviewed per Dr Tovar. Patient has agreed to proceed with R/LHC today as planned.         VTE Risk Mitigation (From admission, onward)         Ordered     IP VTE HIGH RISK PATIENT  Once         01/09/22 1133     Place sequential compression device  Until discontinued         01/09/22 1133     Reason for No Pharmacological VTE Prophylaxis  Once        Question:  Reasons:  Answer:  Already adequately anticoagulated on oral Anticoagulants    01/09/22 1133                JOSÉ Mina  Cardiology  [facility] - Telemetry ([facility])

## 2022-01-12 NOTE — SUBJECTIVE & OBJECTIVE
Interval History: no acute issues noted o/n. Plan for R/LHC today. Further recs to follow.     Review of Systems   Constitutional: Positive for malaise/fatigue.   HENT: Negative for hearing loss and hoarse voice.    Eyes: Negative for blurred vision and visual disturbance.   Cardiovascular: Negative for chest pain, claudication, dyspnea on exertion, irregular heartbeat, leg swelling, near-syncope, orthopnea, palpitations, paroxysmal nocturnal dyspnea and syncope.   Respiratory: Negative for cough, hemoptysis, shortness of breath, sleep disturbances due to breathing, snoring and wheezing.    Endocrine: Negative for cold intolerance and heat intolerance.   Hematologic/Lymphatic: Does not bruise/bleed easily.   Skin: Negative for color change, dry skin and nail changes.   Musculoskeletal: Positive for arthritis and back pain. Negative for joint pain and myalgias.   Gastrointestinal: Negative for bloating, abdominal pain, constipation, nausea and vomiting.   Genitourinary: Negative for dysuria, flank pain, hematuria and hesitancy.   Neurological: Negative for headaches, light-headedness, loss of balance, numbness, paresthesias and weakness.   Psychiatric/Behavioral: Negative for altered mental status.   Allergic/Immunologic: Negative for environmental allergies.     Objective:     Vital Signs (Most Recent):  Temp: 98.4 °F (36.9 °C) (01/12/22 0700)  Pulse: 77 (01/12/22 0727)  Resp: 18 (01/12/22 0727)  BP: 136/64 (01/12/22 0700)  SpO2: 97 % (01/12/22 0727) Vital Signs (24h Range):  Temp:  [97.8 °F (36.6 °C)-98.6 °F (37 °C)] 98.4 °F (36.9 °C)  Pulse:  [68-86] 77  Resp:  [16-20] 18  SpO2:  [94 %-100 %] 97 %  BP: (110-136)/(54-65) 136/64     Weight: 66.7 kg (147 lb)  Body mass index is 21.09 kg/m².     SpO2: 97 %  O2 Device (Oxygen Therapy): nasal cannula      Intake/Output Summary (Last 24 hours) at 1/12/2022 1018  Last data filed at 1/12/2022 0900  Gross per 24 hour   Intake 47.95 ml   Output 800 ml   Net -752.05 ml        Lines/Drains/Airways     Peripheral Intravenous Line                 Peripheral IV - Single Lumen 01/09/22 1008 20 G Right Antecubital 3 days                Physical Exam  Vitals and nursing note reviewed.   Constitutional:       General: He is not in acute distress.     Appearance: Normal appearance. He is well-developed and well-nourished. He is not ill-appearing.   HENT:      Head: Normocephalic and atraumatic.      Nose: Nose normal.      Mouth/Throat:      Mouth: Mucous membranes are moist.   Eyes:      Pupils: Pupils are equal, round, and reactive to light.   Neck:      Thyroid: No thyromegaly.      Vascular: No JVD.      Trachea: No tracheal deviation.   Cardiovascular:      Rate and Rhythm: Normal rate and regular rhythm.      Chest Wall: PMI is not displaced.      Pulses: Intact distal pulses.           Radial pulses are 2+ on the right side and 2+ on the left side.        Dorsalis pedis pulses are 2+ on the right side and 2+ on the left side.      Heart sounds: S1 normal and S2 normal. Heart sounds not distant. No murmur heard.      Pulmonary:      Effort: Pulmonary effort is normal. No respiratory distress.      Breath sounds: Normal breath sounds. No wheezing.   Abdominal:      General: Bowel sounds are normal.   Musculoskeletal:         General: Edema present. Normal range of motion.      Cervical back: Full passive range of motion without pain, normal range of motion and neck supple.      Right ankle: No swelling.      Left ankle: No swelling.   Skin:     General: Skin is warm and dry.      Nails: There is no clubbing or cyanosis.   Neurological:      Mental Status: He is alert and oriented to person, place, and time.   Psychiatric:         Mood and Affect: Mood and affect normal.         Speech: Speech normal.         Behavior: Behavior normal.         Thought Content: Thought content normal.         Cognition and Memory: Cognition and memory normal.         Judgment: Judgment normal.          Significant Labs:   BMP:   Recent Labs   Lab 01/11/22  0459   GLU 96      K 3.5   CL 95   CO2 33*   BUN 18   CREATININE 0.8   CALCIUM 9.1   , CBC   Recent Labs   Lab 01/11/22 0459   WBC 11.91   HGB 9.0*   HCT 30.6*   *    and All pertinent lab results from the last 24 hours have been reviewed.    Significant Imaging: Echocardiogram:   Transthoracic echo (TTE) complete (Cupid Only):   Results for orders placed or performed during the hospital encounter of 01/09/22   Echo Saline Bubble? No   Result Value Ref Range    BSA 1.81 m2    TDI SEPTAL 0.06 m/s    LV LATERAL E/E' RATIO 11.13 m/s    LV SEPTAL E/E' RATIO 14.83 m/s    LA WIDTH 4.96 cm    IVC diameter 2.68 cm    Left Ventricular Outflow Tract Mean Velocity 0.58019255759697 cm/s    Left Ventricular Outflow Tract Mean Gradient 2.54 mmHg    TDI LATERAL 0.08 m/s    LVIDd 5.51 3.5 - 6.0 cm    IVS 1.77 (A) 0.6 - 1.1 cm    Posterior Wall 1.48 (A) 0.6 - 1.1 cm    Ao root annulus 3.94 cm    LVIDs 4.61 (A) 2.1 - 4.0 cm    FS 16 28 - 44 %    Sinus 4.63 cm    STJ 4.92 cm    Ascending aorta 4.69 cm    LV mass 420.71 g    LA size 3.81 cm    TAPSE 2.05 cm    Left Ventricle Relative Wall Thickness 0.54 cm    AV regurgitation pressure 1/2 time 295.68897817 ms    AV mean gradient 6 mmHg    AV valve area 3.64 cm2    AV Velocity Ratio 0.65     AV index (prosthetic) 0.74     MV valve area p 1/2 method 4.93 cm2    E/A ratio 1.24     Mean e' 0.07 m/s    E wave deceleration time 153.476235534871604 msec    IVRT 79.314266430968376 msec    LVOT diameter 2.51 cm    LVOT area 4.9 cm2    LVOT peak karl 1.05 m/s    LVOT peak VTI 17.60 cm    Ao peak karl 1.62 m/s    Ao VTI 23.9 cm    RVOT peak karl 0.73 m/s    RVOT peak VTI 14.3 cm    LVOT stroke volume 87.04 cm3    AV peak gradient 10 mmHg    PV mean gradient 1.24 mmHg    E/E' ratio 12.71 m/s    MV Peak E Karl 0.89 m/s    AR Max Karl 5.20 m/s    TR Max Karl 5.26 m/s    MV stenosis pressure 1/2 time 44.349622991679311 ms    MV  Peak A Karl 0.72 m/s    LV Systolic Volume 97.94 mL    LV Systolic Volume Index 53.5 mL/m2    LV Diastolic Volume 147.89 mL    LV Diastolic Volume Index 80.81 mL/m2    LV Mass Index 230 g/m2    Echo EF Estimated 34 %    RA Major Axis 5.05 cm    Triscuspid Valve Regurgitation Peak Gradient 111 mmHg    RA Width 4.80 cm    Right Atrial Pressure (from IVC) 8 mmHg    EF 25 %    TV rest pulmonary artery pressure 119 mmHg    Narrative    · Concentric hypertrophy and severely decreased systolic function.  · The estimated ejection fraction is 25%.  · Grade I left ventricular diastolic dysfunction.  · Normal right ventricular size with mildly reduced right ventricular   systolic function.  · Mild-to-moderate mitral regurgitation.  · Mild-to-moderate aortic regurgitation.  · Moderate left atrial enlargement.  · Moderate right atrial enlargement.  · Moderate tricuspid regurgitation.  · Intermediate central venous pressure (8 mmHg).  · The estimated PA systolic pressure is 119 mmHg.  · There is pulmonary hypertension.  · The aortic root is moderately dilated.

## 2022-01-13 ENCOUNTER — TELEPHONE (OUTPATIENT)
Dept: CARDIOLOGY | Facility: HOSPITAL | Age: 82
End: 2022-01-13

## 2022-01-13 LAB
ANION GAP SERPL CALC-SCNC: 10 MMOL/L (ref 8–16)
BUN SERPL-MCNC: 25 MG/DL (ref 8–23)
CALCIUM SERPL-MCNC: 9.8 MG/DL (ref 8.7–10.5)
CHLORIDE SERPL-SCNC: 93 MMOL/L (ref 95–110)
CO2 SERPL-SCNC: 37 MMOL/L (ref 23–29)
CREAT SERPL-MCNC: 0.8 MG/DL (ref 0.5–1.4)
DELSYS: ABNORMAL
EST. GFR  (AFRICAN AMERICAN): >60 ML/MIN/1.73 M^2
EST. GFR  (NON AFRICAN AMERICAN): >60 ML/MIN/1.73 M^2
GLUCOSE SERPL-MCNC: 120 MG/DL (ref 70–110)
HCO3 UR-SCNC: 42 MMOL/L (ref 24–28)
PCO2 BLDA: 60.6 MMHG (ref 35–45)
PH SMN: 7.45 [PH] (ref 7.35–7.45)
PO2 BLDA: 33 MMHG (ref 80–100)
POC BE: 18 MMOL/L
POC SATURATED O2: 63 % (ref 95–100)
POTASSIUM SERPL-SCNC: 3.8 MMOL/L (ref 3.5–5.1)
SAMPLE: ABNORMAL
SITE: ABNORMAL
SODIUM SERPL-SCNC: 140 MMOL/L (ref 136–145)

## 2022-01-13 PROCEDURE — 25000003 PHARM REV CODE 250: Performed by: FAMILY MEDICINE

## 2022-01-13 PROCEDURE — 25000003 PHARM REV CODE 250: Performed by: INTERNAL MEDICINE

## 2022-01-13 PROCEDURE — 25000003 PHARM REV CODE 250: Performed by: NURSE PRACTITIONER

## 2022-01-13 PROCEDURE — 21400001 HC TELEMETRY ROOM

## 2022-01-13 PROCEDURE — 99900035 HC TECH TIME PER 15 MIN (STAT)

## 2022-01-13 PROCEDURE — 63600175 PHARM REV CODE 636 W HCPCS: Performed by: INTERNAL MEDICINE

## 2022-01-13 PROCEDURE — 80048 BASIC METABOLIC PNL TOTAL CA: CPT | Performed by: INTERNAL MEDICINE

## 2022-01-13 PROCEDURE — 63600175 PHARM REV CODE 636 W HCPCS: Performed by: FAMILY MEDICINE

## 2022-01-13 PROCEDURE — 94761 N-INVAS EAR/PLS OXIMETRY MLT: CPT

## 2022-01-13 PROCEDURE — 25500020 PHARM REV CODE 255: Performed by: INTERNAL MEDICINE

## 2022-01-13 PROCEDURE — 99232 SBSQ HOSP IP/OBS MODERATE 35: CPT | Mod: 25,,, | Performed by: INTERNAL MEDICINE

## 2022-01-13 PROCEDURE — 36415 COLL VENOUS BLD VENIPUNCTURE: CPT | Performed by: INTERNAL MEDICINE

## 2022-01-13 PROCEDURE — 99152 MOD SED SAME PHYS/QHP 5/>YRS: CPT | Mod: ,,, | Performed by: INTERNAL MEDICINE

## 2022-01-13 PROCEDURE — 25000242 PHARM REV CODE 250 ALT 637 W/ HCPCS: Performed by: FAMILY MEDICINE

## 2022-01-13 PROCEDURE — C1751 CATH, INF, PER/CENT/MIDLINE: HCPCS | Performed by: INTERNAL MEDICINE

## 2022-01-13 PROCEDURE — 25000242 PHARM REV CODE 250 ALT 637 W/ HCPCS: Performed by: NURSE PRACTITIONER

## 2022-01-13 PROCEDURE — 93460 R&L HRT ART/VENTRICLE ANGIO: CPT | Performed by: INTERNAL MEDICINE

## 2022-01-13 PROCEDURE — 27000221 HC OXYGEN, UP TO 24 HOURS

## 2022-01-13 PROCEDURE — 93460 R&L HRT ART/VENTRICLE ANGIO: CPT | Mod: 26,,, | Performed by: INTERNAL MEDICINE

## 2022-01-13 PROCEDURE — 27201423 OPTIME MED/SURG SUP & DEVICES STERILE SUPPLY: Performed by: INTERNAL MEDICINE

## 2022-01-13 PROCEDURE — 99232 PR SUBSEQUENT HOSPITAL CARE,LEVL II: ICD-10-PCS | Mod: 25,,, | Performed by: INTERNAL MEDICINE

## 2022-01-13 PROCEDURE — C1887 CATHETER, GUIDING: HCPCS | Performed by: INTERNAL MEDICINE

## 2022-01-13 PROCEDURE — C1769 GUIDE WIRE: HCPCS | Performed by: INTERNAL MEDICINE

## 2022-01-13 PROCEDURE — 63700000 PHARM REV CODE 250 ALT 637 W/O HCPCS: Performed by: FAMILY MEDICINE

## 2022-01-13 PROCEDURE — 99152 PR MOD CONSCIOUS SEDATION, SAME PHYS, 5+ YRS, FIRST 15 MIN: ICD-10-PCS | Mod: ,,, | Performed by: INTERNAL MEDICINE

## 2022-01-13 PROCEDURE — C1894 INTRO/SHEATH, NON-LASER: HCPCS | Performed by: INTERNAL MEDICINE

## 2022-01-13 PROCEDURE — 94640 AIRWAY INHALATION TREATMENT: CPT

## 2022-01-13 PROCEDURE — 93460 PR CATH PLACE/CORON ANGIO, IMG SUPER/INTERP,R&L HRT CATH, L HRT VENTRIC: ICD-10-PCS | Mod: 26,,, | Performed by: INTERNAL MEDICINE

## 2022-01-13 RX ORDER — NITROGLYCERIN 5 MG/ML
INJECTION, SOLUTION INTRAVENOUS
Status: DISCONTINUED | OUTPATIENT
Start: 2022-01-13 | End: 2022-01-13 | Stop reason: HOSPADM

## 2022-01-13 RX ORDER — ACETAMINOPHEN 325 MG/1
650 TABLET ORAL EVERY 4 HOURS PRN
Status: DISCONTINUED | OUTPATIENT
Start: 2022-01-13 | End: 2022-01-14 | Stop reason: HOSPADM

## 2022-01-13 RX ORDER — LIDOCAINE HYDROCHLORIDE 20 MG/ML
INJECTION, SOLUTION EPIDURAL; INFILTRATION; INTRACAUDAL; PERINEURAL
Status: DISCONTINUED | OUTPATIENT
Start: 2022-01-13 | End: 2022-01-13 | Stop reason: HOSPADM

## 2022-01-13 RX ORDER — SODIUM CHLORIDE 9 MG/ML
INJECTION, SOLUTION INTRAVENOUS
Status: DISCONTINUED | OUTPATIENT
Start: 2022-01-13 | End: 2022-01-14 | Stop reason: HOSPADM

## 2022-01-13 RX ORDER — ONDANSETRON 8 MG/1
8 TABLET, ORALLY DISINTEGRATING ORAL EVERY 8 HOURS PRN
Status: DISCONTINUED | OUTPATIENT
Start: 2022-01-13 | End: 2022-01-14 | Stop reason: HOSPADM

## 2022-01-13 RX ORDER — HEPARIN SODIUM 1000 [USP'U]/ML
INJECTION, SOLUTION INTRAVENOUS; SUBCUTANEOUS
Status: DISCONTINUED | OUTPATIENT
Start: 2022-01-13 | End: 2022-01-13 | Stop reason: HOSPADM

## 2022-01-13 RX ORDER — LOSARTAN POTASSIUM 25 MG/1
25 TABLET ORAL DAILY
Status: DISCONTINUED | OUTPATIENT
Start: 2022-01-14 | End: 2022-01-14 | Stop reason: HOSPADM

## 2022-01-13 RX ORDER — DIPHENHYDRAMINE HYDROCHLORIDE 50 MG/ML
INJECTION INTRAMUSCULAR; INTRAVENOUS
Status: DISCONTINUED | OUTPATIENT
Start: 2022-01-13 | End: 2022-01-13 | Stop reason: HOSPADM

## 2022-01-13 RX ORDER — FUROSEMIDE 20 MG/1
20 TABLET ORAL DAILY
Status: DISCONTINUED | OUTPATIENT
Start: 2022-01-14 | End: 2022-01-14 | Stop reason: HOSPADM

## 2022-01-13 RX ADMIN — IPRATROPIUM BROMIDE AND ALBUTEROL SULFATE 3 ML: 2.5; .5 SOLUTION RESPIRATORY (INHALATION) at 12:01

## 2022-01-13 RX ADMIN — FAMOTIDINE 20 MG: 20 TABLET ORAL at 09:01

## 2022-01-13 RX ADMIN — ATORVASTATIN CALCIUM 10 MG: 10 TABLET, FILM COATED ORAL at 09:01

## 2022-01-13 RX ADMIN — ARFORMOTEROL TARTRATE 15 MCG: 15 SOLUTION RESPIRATORY (INHALATION) at 07:01

## 2022-01-13 RX ADMIN — CHLORDIAZEPOXIDE HYDROCHLORIDE 50 MG: 25 CAPSULE ORAL at 03:01

## 2022-01-13 RX ADMIN — ALBUTEROL SULFATE 2.5 MG: 2.5 SOLUTION RESPIRATORY (INHALATION) at 07:01

## 2022-01-13 RX ADMIN — AZITHROMYCIN MONOHYDRATE 500 MG: 250 TABLET ORAL at 09:01

## 2022-01-13 RX ADMIN — METOPROLOL SUCCINATE 50 MG: 50 TABLET, EXTENDED RELEASE ORAL at 08:01

## 2022-01-13 RX ADMIN — FAMOTIDINE 20 MG: 20 TABLET ORAL at 08:01

## 2022-01-13 RX ADMIN — IPRATROPIUM BROMIDE AND ALBUTEROL SULFATE 3 ML: 2.5; .5 SOLUTION RESPIRATORY (INHALATION) at 07:01

## 2022-01-13 RX ADMIN — CEFTRIAXONE 1 G: 1 INJECTION, SOLUTION INTRAVENOUS at 06:01

## 2022-01-13 RX ADMIN — PANTOPRAZOLE SODIUM 40 MG: 40 TABLET, DELAYED RELEASE ORAL at 09:01

## 2022-01-13 RX ADMIN — METOPROLOL SUCCINATE 50 MG: 50 TABLET, EXTENDED RELEASE ORAL at 09:01

## 2022-01-13 RX ADMIN — CHLORDIAZEPOXIDE HYDROCHLORIDE 50 MG: 25 CAPSULE ORAL at 09:01

## 2022-01-13 RX ADMIN — IPRATROPIUM BROMIDE AND ALBUTEROL SULFATE 3 ML: 2.5; .5 SOLUTION RESPIRATORY (INHALATION) at 02:01

## 2022-01-13 NOTE — SUBJECTIVE & OBJECTIVE
Interval History: no acute issues noted o/n. Plan for R/LHC today. Further recs to follow.     Review of Systems   Constitutional: Positive for malaise/fatigue.   HENT: Negative for hearing loss and hoarse voice.    Eyes: Negative for blurred vision and visual disturbance.   Cardiovascular: Negative for chest pain, claudication, dyspnea on exertion, irregular heartbeat, leg swelling, near-syncope, orthopnea, palpitations, paroxysmal nocturnal dyspnea and syncope.   Respiratory: Negative for cough, hemoptysis, shortness of breath, sleep disturbances due to breathing, snoring and wheezing.    Endocrine: Negative for cold intolerance and heat intolerance.   Hematologic/Lymphatic: Does not bruise/bleed easily.   Skin: Negative for color change, dry skin and nail changes.   Musculoskeletal: Positive for arthritis and back pain. Negative for joint pain and myalgias.   Gastrointestinal: Negative for bloating, abdominal pain, constipation, nausea and vomiting.   Genitourinary: Negative for dysuria, flank pain, hematuria and hesitancy.   Neurological: Negative for headaches, light-headedness, loss of balance, numbness, paresthesias and weakness.   Psychiatric/Behavioral: Negative for altered mental status.   Allergic/Immunologic: Negative for environmental allergies.     Objective:     Vital Signs (Most Recent):  Temp: 97 °F (36.1 °C) (01/13/22 1548)  Pulse: 83 (01/13/22 1548)  Resp: 18 (01/13/22 1548)  BP: (!) 152/72 (01/13/22 1548)  SpO2: (!) 94 % (01/13/22 1548) Vital Signs (24h Range):  Temp:  [97 °F (36.1 °C)-98.6 °F (37 °C)] 97 °F (36.1 °C)  Pulse:  [] 83  Resp:  [15-27] 18  SpO2:  [88 %-99 %] 94 %  BP: (110-152)/(49-72) 152/72     Weight: 63.5 kg (139 lb 15.9 oz)  Body mass index is 20.09 kg/m².     SpO2: (!) 94 %  O2 Device (Oxygen Therapy): nasal cannula      Intake/Output Summary (Last 24 hours) at 1/13/2022 1631  Last data filed at 1/13/2022 0500  Gross per 24 hour   Intake 259.47 ml   Output 250 ml   Net  9.47 ml       Lines/Drains/Airways     Peripheral Intravenous Line                 Peripheral IV - Single Lumen 01/13/22 1030 20 G Anterior;Left;Proximal Forearm <1 day                Physical Exam  Vitals and nursing note reviewed.   Constitutional:       General: He is not in acute distress.     Appearance: Normal appearance. He is well-developed. He is not ill-appearing.   HENT:      Head: Normocephalic and atraumatic.      Nose: Nose normal.      Mouth/Throat:      Mouth: Mucous membranes are moist.   Eyes:      Pupils: Pupils are equal, round, and reactive to light.   Neck:      Thyroid: No thyromegaly.      Vascular: No JVD.      Trachea: No tracheal deviation.   Cardiovascular:      Rate and Rhythm: Normal rate and regular rhythm.      Chest Wall: PMI is not displaced.      Pulses: Intact distal pulses.           Radial pulses are 2+ on the right side and 2+ on the left side.        Dorsalis pedis pulses are 2+ on the right side and 2+ on the left side.      Heart sounds: S1 normal and S2 normal. Heart sounds not distant. No murmur heard.      Pulmonary:      Effort: Pulmonary effort is normal. No respiratory distress.      Breath sounds: Normal breath sounds. No wheezing.   Abdominal:      General: Bowel sounds are normal.   Musculoskeletal:         General: Normal range of motion.      Cervical back: Full passive range of motion without pain, normal range of motion and neck supple.      Right ankle: No swelling.      Left ankle: No swelling.   Skin:     General: Skin is warm and dry.      Nails: There is no clubbing.   Neurological:      Mental Status: He is alert and oriented to person, place, and time.   Psychiatric:         Speech: Speech normal.         Behavior: Behavior normal.         Thought Content: Thought content normal.         Judgment: Judgment normal.         Significant Labs:   BMP:   Recent Labs   Lab 01/13/22  0657   *      K 3.8   CL 93*   CO2 37*   BUN 25*   CREATININE 0.8    CALCIUM 9.8   , CBC   No results for input(s): WBC, HGB, HCT, PLT in the last 48 hours. and All pertinent lab results from the last 24 hours have been reviewed.    Significant Imaging: Echocardiogram:   Transthoracic echo (TTE) complete (Cupid Only):   Results for orders placed or performed during the hospital encounter of 01/09/22   Echo Saline Bubble? No   Result Value Ref Range    BSA 1.81 m2    TDI SEPTAL 0.06 m/s    LV LATERAL E/E' RATIO 11.13 m/s    LV SEPTAL E/E' RATIO 14.83 m/s    LA WIDTH 4.96 cm    IVC diameter 2.68 cm    Left Ventricular Outflow Tract Mean Velocity 0.22894193852643 cm/s    Left Ventricular Outflow Tract Mean Gradient 2.54 mmHg    TDI LATERAL 0.08 m/s    LVIDd 5.51 3.5 - 6.0 cm    IVS 1.77 (A) 0.6 - 1.1 cm    Posterior Wall 1.48 (A) 0.6 - 1.1 cm    Ao root annulus 3.94 cm    LVIDs 4.61 (A) 2.1 - 4.0 cm    FS 16 28 - 44 %    Sinus 4.63 cm    STJ 4.92 cm    Ascending aorta 4.69 cm    LV mass 420.71 g    LA size 3.81 cm    TAPSE 2.05 cm    Left Ventricle Relative Wall Thickness 0.54 cm    AV regurgitation pressure 1/2 time 295.21671298 ms    AV mean gradient 6 mmHg    AV valve area 3.64 cm2    AV Velocity Ratio 0.65     AV index (prosthetic) 0.74     MV valve area p 1/2 method 4.93 cm2    E/A ratio 1.24     Mean e' 0.07 m/s    E wave deceleration time 153.774522605287598 msec    IVRT 79.495704750115691 msec    LVOT diameter 2.51 cm    LVOT area 4.9 cm2    LVOT peak karl 1.05 m/s    LVOT peak VTI 17.60 cm    Ao peak karl 1.62 m/s    Ao VTI 23.9 cm    RVOT peak karl 0.73 m/s    RVOT peak VTI 14.3 cm    LVOT stroke volume 87.04 cm3    AV peak gradient 10 mmHg    PV mean gradient 1.24 mmHg    E/E' ratio 12.71 m/s    MV Peak E Karl 0.89 m/s    AR Max Karl 5.20 m/s    TR Max Karl 5.26 m/s    MV stenosis pressure 1/2 time 44.035571793982136 ms    MV Peak A Karl 0.72 m/s    LV Systolic Volume 97.94 mL    LV Systolic Volume Index 53.5 mL/m2    LV Diastolic Volume 147.89 mL    LV Diastolic Volume Index  80.81 mL/m2    LV Mass Index 230 g/m2    Echo EF Estimated 34 %    RA Major Axis 5.05 cm    Triscuspid Valve Regurgitation Peak Gradient 111 mmHg    RA Width 4.80 cm    Right Atrial Pressure (from IVC) 8 mmHg    EF 25 %    TV rest pulmonary artery pressure 119 mmHg    Narrative    · Concentric hypertrophy and severely decreased systolic function.  · The estimated ejection fraction is 25%.  · Grade I left ventricular diastolic dysfunction.  · Normal right ventricular size with mildly reduced right ventricular   systolic function.  · Mild-to-moderate mitral regurgitation.  · Mild-to-moderate aortic regurgitation.  · Moderate left atrial enlargement.  · Moderate right atrial enlargement.  · Moderate tricuspid regurgitation.  · Intermediate central venous pressure (8 mmHg).  · The estimated PA systolic pressure is 119 mmHg.  · There is pulmonary hypertension.  · The aortic root is moderately dilated.

## 2022-01-13 NOTE — PLAN OF CARE
Patient kept free from falls/injury, bed low and call light close  Pt to cath lab, no interventions needed  Pt resting and eating, questions about care answered to satisfaction  Wrist to remain in immobilizing brace until tomorrow

## 2022-01-13 NOTE — ASSESSMENT & PLAN NOTE
Patient treated with Eliquis for thromboembolism prevention    --Continue Eliquis  --po metoprolol

## 2022-01-13 NOTE — BRIEF OP NOTE
[facility] - Cath Lab ([facility])  Brief Operative Note  Cardiology    SUMMARY     Surgery Date: 1/13/2022     Surgeon(s) and Role:     * Janneth Tovar MD - Primary    Assisting Surgeon: None    Pre-op Diagnosis:  Chronic systolic (congestive) heart failure [I50.22]Paroxysmal atrial fibrillation [I48.0]Ejection fraction < 50% [R94.30]SOB (shortness of breath) [R06.02]    Post-op Diagnosis: Post-Op Diagnosis Codes:     * Chronic systolic (congestive) heart failure [I50.22]     * Paroxysmal atrial fibrillation [I48.0]     * Ejection fraction < 50% [R94.30]     * SOB (shortness of breath) [R06.02]    Procedure Performed:    Procedure(s) (LRB):  CATHETERIZATION, HEART, BOTH LEFT AND RIGHT (N/A)    Technical Procedures Used:  Right dominant   Normal coronaries     lvedp 13   ra 3     Normal pressures right and left sided   Ok to discharge home   Lasix 20 mg po daily start tomorrow   Losartan 25 mg daily   cw toprol on discharge to optimize HF treatment   Resume eliquis this evening   Follow up with cardiology as outpatient     Estimated Blood Loss: * No values recorded between 1/13/2022 10:26 AM and 1/13/2022 11:13 AM *         Specimens:   Specimen (24h ago, onward)            None

## 2022-01-13 NOTE — PROGRESS NOTES
[facility] ([facility])  Cardiology  Progress Note    Patient Name: Richy Douglas  MRN: [id number]  Admission Date: 1/9/2022  Hospital Length of Stay: 3 days  Code Status: Full Code   Attending Physician: Rubi Barahona MD   Primary Care Physician: Vivian Ch MD  Expected Discharge Date:   Principal Problem:Pneumonia of left upper lobe due to infectious organism    Subjective:     Hospital Course:   1/12/2022--Patient seen and examined in room, lying in bed. Feels ok today. No chest pain or shortness of breath. Plan  for R/LHC today per Dr Tovar.   1/13/2022 CATH EARLIER TODAY SHOWED NORMAL CORS    DOING WELL, NO DYSPNEA, NO MORE SVT ,   HAD SVT AND WAS PUT ON LIBRIUM YESTERDAY , TODAY AWAKE AND COHERENT NO MORE SVT       Interval History: no acute issues noted o/n. Plan for R/LHC today. Further recs to follow.     Review of Systems   Constitutional: Positive for malaise/fatigue.   HENT: Negative for hearing loss and hoarse voice.    Eyes: Negative for blurred vision and visual disturbance.   Cardiovascular: Negative for chest pain, claudication, dyspnea on exertion, irregular heartbeat, leg swelling, near-syncope, orthopnea, palpitations, paroxysmal nocturnal dyspnea and syncope.   Respiratory: Negative for cough, hemoptysis, shortness of breath, sleep disturbances due to breathing, snoring and wheezing.    Endocrine: Negative for cold intolerance and heat intolerance.   Hematologic/Lymphatic: Does not bruise/bleed easily.   Skin: Negative for color change, dry skin and nail changes.   Musculoskeletal: Positive for arthritis and back pain. Negative for joint pain and myalgias.   Gastrointestinal: Negative for bloating, abdominal pain, constipation, nausea and vomiting.   Genitourinary: Negative for dysuria, flank pain, hematuria and hesitancy.   Neurological: Negative for headaches, light-headedness, loss of balance, numbness, paresthesias and weakness.   Psychiatric/Behavioral: Negative for altered  mental status.   Allergic/Immunologic: Negative for environmental allergies.     Objective:     Vital Signs (Most Recent):  Temp: 97 °F (36.1 °C) (01/13/22 1548)  Pulse: 83 (01/13/22 1548)  Resp: 18 (01/13/22 1548)  BP: (!) 152/72 (01/13/22 1548)  SpO2: (!) 94 % (01/13/22 1548) Vital Signs (24h Range):  Temp:  [97 °F (36.1 °C)-98.6 °F (37 °C)] 97 °F (36.1 °C)  Pulse:  [] 83  Resp:  [15-27] 18  SpO2:  [88 %-99 %] 94 %  BP: (110-152)/(49-72) 152/72     Weight: 63.5 kg (139 lb 15.9 oz)  Body mass index is 20.09 kg/m².     SpO2: (!) 94 %  O2 Device (Oxygen Therapy): nasal cannula      Intake/Output Summary (Last 24 hours) at 1/13/2022 1631  Last data filed at 1/13/2022 0500  Gross per 24 hour   Intake 259.47 ml   Output 250 ml   Net 9.47 ml       Lines/Drains/Airways     Peripheral Intravenous Line                 Peripheral IV - Single Lumen 01/13/22 1030 20 G Anterior;Left;Proximal Forearm <1 day                Physical Exam  Vitals and nursing note reviewed.   Constitutional:       General: He is not in acute distress.     Appearance: Normal appearance. He is well-developed. He is not ill-appearing.   HENT:      Head: Normocephalic and atraumatic.      Nose: Nose normal.      Mouth/Throat:      Mouth: Mucous membranes are moist.   Eyes:      Pupils: Pupils are equal, round, and reactive to light.   Neck:      Thyroid: No thyromegaly.      Vascular: No JVD.      Trachea: No tracheal deviation.   Cardiovascular:      Rate and Rhythm: Normal rate and regular rhythm.      Chest Wall: PMI is not displaced.      Pulses: Intact distal pulses.           Radial pulses are 2+ on the right side and 2+ on the left side.        Dorsalis pedis pulses are 2+ on the right side and 2+ on the left side.      Heart sounds: S1 normal and S2 normal. Heart sounds not distant. No murmur heard.      Pulmonary:      Effort: Pulmonary effort is normal. No respiratory distress.      Breath sounds: Normal breath sounds. No wheezing.    Abdominal:      General: Bowel sounds are normal.   Musculoskeletal:         General: Normal range of motion.      Cervical back: Full passive range of motion without pain, normal range of motion and neck supple.      Right ankle: No swelling.      Left ankle: No swelling.   Skin:     General: Skin is warm and dry.      Nails: There is no clubbing.   Neurological:      Mental Status: He is alert and oriented to person, place, and time.   Psychiatric:         Speech: Speech normal.         Behavior: Behavior normal.         Thought Content: Thought content normal.         Judgment: Judgment normal.         Significant Labs:   BMP:   Recent Labs   Lab 01/13/22  0657   *      K 3.8   CL 93*   CO2 37*   BUN 25*   CREATININE 0.8   CALCIUM 9.8   , CBC   No results for input(s): WBC, HGB, HCT, PLT in the last 48 hours. and All pertinent lab results from the last 24 hours have been reviewed.    Significant Imaging: Echocardiogram:   Transthoracic echo (TTE) complete (C[facility] Only):   Results for orders placed or performed during the hospital encounter of 01/09/22   Echo Saline Bubble? No   Result Value Ref Range    BSA 1.81 m2    TDI SEPTAL 0.06 m/s    LV LATERAL E/E' RATIO 11.13 m/s    LV SEPTAL E/E' RATIO 14.83 m/s    LA WIDTH 4.96 cm    IVC diameter 2.68 cm    Left Ventricular Outflow Tract Mean Velocity 0.30339779631302 cm/s    Left Ventricular Outflow Tract Mean Gradient 2.54 mmHg    TDI LATERAL 0.08 m/s    LVIDd 5.51 3.5 - 6.0 cm    IVS 1.77 (A) 0.6 - 1.1 cm    Posterior Wall 1.48 (A) 0.6 - 1.1 cm    Ao root annulus 3.94 cm    LVIDs 4.61 (A) 2.1 - 4.0 cm    FS 16 28 - 44 %    Sinus 4.63 cm    STJ 4.92 cm    Ascending aorta 4.69 cm    LV mass 420.71 g    LA size 3.81 cm    TAPSE 2.05 cm    Left Ventricle Relative Wall Thickness 0.54 cm    AV regurgitation pressure 1/2 time 295.85893592 ms    AV mean gradient 6 mmHg    AV valve area 3.64 cm2    AV Velocity Ratio 0.65     AV index (prosthetic) 0.74     MV  valve area p 1/2 method 4.93 cm2    E/A ratio 1.24     Mean e' 0.07 m/s    E wave deceleration time 153.093152764516278 msec    IVRT 79.755057642636316 msec    LVOT diameter 2.51 cm    LVOT area 4.9 cm2    LVOT peak karl 1.05 m/s    LVOT peak VTI 17.60 cm    Ao peak karl 1.62 m/s    Ao VTI 23.9 cm    RVOT peak karl 0.73 m/s    RVOT peak VTI 14.3 cm    LVOT stroke volume 87.04 cm3    AV peak gradient 10 mmHg    PV mean gradient 1.24 mmHg    E/E' ratio 12.71 m/s    MV Peak E Karl 0.89 m/s    AR Max Karl 5.20 m/s    TR Max Karl 5.26 m/s    MV stenosis pressure 1/2 time 44.664673167292200 ms    MV Peak A Karl 0.72 m/s    LV Systolic Volume 97.94 mL    LV Systolic Volume Index 53.5 mL/m2    LV Diastolic Volume 147.89 mL    LV Diastolic Volume Index 80.81 mL/m2    LV Mass Index 230 g/m2    Echo EF Estimated 34 %    RA Major Axis 5.05 cm    Triscuspid Valve Regurgitation Peak Gradient 111 mmHg    RA Width 4.80 cm    Right Atrial Pressure (from IVC) 8 mmHg    EF 25 %    TV rest pulmonary artery pressure 119 mmHg    Narrative    · Concentric hypertrophy and severely decreased systolic function.  · The estimated ejection fraction is 25%.  · Grade I left ventricular diastolic dysfunction.  · Normal right ventricular size with mildly reduced right ventricular   systolic function.  · Mild-to-moderate mitral regurgitation.  · Mild-to-moderate aortic regurgitation.  · Moderate left atrial enlargement.  · Moderate right atrial enlargement.  · Moderate tricuspid regurgitation.  · Intermediate central venous pressure (8 mmHg).  · The estimated PA systolic pressure is 119 mmHg.  · There is pulmonary hypertension.  · The aortic root is moderately dilated.        Assessment and Plan:         * Pneumonia of left upper lobe due to infectious organism  AS PER PRIMARY    Atrial fibrillation  IN SINUS RHYTHM   ON ELIQUIS   Eliquis on hold for R/LHC today  Resume post procedure      Chronic systolic congestive heart failure  Patient is identified as  having Combined Systolic and Diastolic heart failure that is Acute on chronic. CHF is currently controlled. Latest ECHO performed and demonstrates- Results for orders placed during the hospital encounter of 01/09/22    Echo Saline Bubble? No    Interpretation Summary  · Concentric hypertrophy and severely decreased systolic function.  · The estimated ejection fraction is 25%.  · Grade I left ventricular diastolic dysfunction.  · Normal right ventricular size with mildly reduced right ventricular systolic function.  · Mild-to-moderate mitral regurgitation.  · Mild-to-moderate aortic regurgitation.  · Moderate left atrial enlargement.  · Moderate right atrial enlargement.  · Moderate tricuspid regurgitation.  · Intermediate central venous pressure (8 mmHg).  · The estimated PA systolic pressure is 119 mmHg.  · There is pulmonary hypertension.  · The aortic root is moderately dilated.  . Continue Furosemide and monitor clinical status closely. Monitor on telemetry. Patient is on CHF pathway.  Monitor strict Is&Os and daily weights.  Place on fluid restriction of 2 L. Continue to stress to patient importance of self efficacy and  on diet for CHF. Last BNP reviewed- and noted below   Recent Labs   Lab 01/09/22  1017   BNP 1,844*   .    New diagnosis of systolic heart failure, normal EF last year   Also had chest pain ,with trop elevated from demand ischemia and flat   cw diuresis   Change cardizem to toprol   Alcohol cessation   R/lhc in am   Hx of chronic anemia and hx of crohn's disease   No bleeding , on apixaban, hold prior to cath    1/12/2022  -Plan for R/LHC today  -Continue Statin, BB, IV lasix BID  -Optimize medical therapy for CHF post procedure today  -Keep NPO for now  -Risks, benefits and treatment alternatives reviewed per Dr Tovar. Patient has agreed to proceed with R/LHC today as planned.       1/13/2022      Cath showed  Right dominant   Normal coronaries      lvedp 13   ra 3      Normal  pressures right and left sided   Ok to discharge home   Lasix 20 mg po daily start tomorrow   Losartan 25 mg daily   cw toprol on discharge to optimize HF treatment   Resume eliquis this evening   Follow up with cardiology as outpatient   VTE Risk Mitigation (From admission, onward)         Ordered     IP VTE HIGH RISK PATIENT  Once         01/09/22 1133     Place sequential compression device  Until discontinued         01/09/22 1133     Reason for No Pharmacological VTE Prophylaxis  Once        Question:  Reasons:  Answer:  Already adequately anticoagulated on oral Anticoagulants    01/09/22 1133                Janneth Tovar MD  Cardiology  [facility] - Tele[facility] ([facility])

## 2022-01-13 NOTE — NURSING
Patient back from cath lab  Sites and VS assessed  Pt stable and resting in bed  Bed low and call light close

## 2022-01-13 NOTE — PROGRESS NOTES
[facility] ([facility])  [facility] Medicine  Progress Note    Patient Name: Richy Douglas  MRN: [id number]  Patient Class: IP- Inpatient   Admission Date: 1/9/2022  Length of Stay: 2 days  Attending Physician: Rubi Barahona MD  Primary Care Provider: Vivian Ch MD        Subjective:     Principal Problem:Pneumonia of left upper lobe due to infectious organism        HPI:  81 y.o. male patient with a PMHx of aortic aneurysm, atrial fibrillation with RVR, emphysema of lung, chronic systolic congestive heart failure, GERD, HTN, other HLD, and prostate cancer who presents to the Emergency Department for SOB which onset gradually 2 days PTA. The pt reports that his SpO2 was 85% on RA. Symptoms are constant and moderate in severity. No mitigating or exacerbating factors reported. Associated sxs include L sided anterior CP that radiates down his L arm since yesterday and L arm paresthesias. Patient denies any fever, chills, numbness, n/v/d, diaphoresis, headahces, and all other sxs at this time. No prior Tx reported. No further complaints or concerns at this time. In the ED, BNP: 1844, H/H: 9.7/32.7, CRP: 151.4, CXR: Left lung consolidation, Lactic: normal, Procalcitonin: Normal. Treated with Lasix and Levaquin. Patient is a full code, surrogate decision maker is his spouse, Britany Douglas. Patient admitted to observation under the care of [facility] medicine.       Overview/Hospital Course:  1/10/22- pt comfortable on 2L via NC current smoker physical exam consistent with COPD  1/11/22- CT scan of the chest reveals severe emphysematous lungs with bully scattered throughout all lung fields bilaterally.  Consolidation left upper and middle jose lobes consistent with pneumonia.  No masses. Echo reveals new decline in EF to 25% pt reported chest pain on admission  1/12/22- pt in Afib coronary angiogram postponed. Rate controlled on metoprolol. Daily drinker started on librium          Review of Systems    Constitutional: Negative for fever.   HENT: Negative for congestion.    Respiratory: Negative for cough.    Gastrointestinal: Negative for abdominal pain.   Endocrine: Negative for polyuria.   Genitourinary: Negative for flank pain.   Musculoskeletal: Negative for back pain.   Skin: Negative for rash.   Neurological: Negative for syncope.   Psychiatric/Behavioral: Negative for confusion.     Objective:     Vital Signs (Most Recent):  Temp: 99 °F (37.2 °C) (01/12/22 1528)  Pulse: 91 (01/12/22 1700)  Resp: 18 (01/12/22 1528)  BP: (!) 135/58 (01/12/22 1528)  SpO2: 99 % (01/12/22 1528) Vital Signs (24h Range):  Temp:  [98.2 °F (36.8 °C)-99 °F (37.2 °C)] 99 °F (37.2 °C)  Pulse:  [] 91  Resp:  [17-20] 18  SpO2:  [94 %-100 %] 99 %  BP: (116-136)/(55-65) 135/58     Weight: 66.7 kg (147 lb)  Body mass index is 21.09 kg/m².    Intake/Output Summary (Last 24 hours) at 1/12/2022 1900  Last data filed at 1/12/2022 1900  Gross per 24 hour   Intake 307.42 ml   Output 1675 ml   Net -1367.58 ml      Physical Exam  Vitals and nursing note reviewed.   Constitutional:       General: He is not in acute distress.     Appearance: He is well-developed. He is not diaphoretic.   HENT:      Head: Normocephalic and atraumatic.      Right Ear: Hearing and external ear normal.      Left Ear: Hearing and external ear normal.      Nose: Nose normal. No mucosal edema or rhinorrhea.      Mouth/Throat:      Pharynx: Uvula midline.   Eyes:      General:         Right eye: No discharge.         Left eye: No discharge.      Conjunctiva/sclera: Conjunctivae normal.      Right eye: No chemosis.     Left eye: No chemosis.     Pupils: Pupils are equal, round, and reactive to light.   Neck:      Thyroid: No thyroid mass or thyromegaly.      Trachea: Trachea normal.   Cardiovascular:      Rate and Rhythm: Normal rate and regular rhythm.      Pulses:           Dorsalis pedis pulses are 2+ on the right side and 2+ on the left side.      Heart sounds:  Normal heart sounds. No murmur heard.      Pulmonary:      Effort: Pulmonary effort is normal. No respiratory distress.      Breath sounds: Examination of the right-lower field reveals decreased breath sounds. Examination of the left-lower field reveals decreased breath sounds. Decreased breath sounds present. No wheezing.   Chest:   Breasts:      Right: No supraclavicular adenopathy.      Left: No supraclavicular adenopathy.       Abdominal:      General: Bowel sounds are normal. There is no distension.      Palpations: Abdomen is soft.      Tenderness: There is no abdominal tenderness.   Musculoskeletal:         General: Normal range of motion.      Cervical back: Normal range of motion and neck supple.      Right lower leg: Edema present.      Left lower leg: Edema present.   Lymphadenopathy:      Cervical: No cervical adenopathy.      Upper Body:      Right upper body: No supraclavicular adenopathy.      Left upper body: No supraclavicular adenopathy.   Skin:     General: Skin is warm and dry.      Capillary Refill: Capillary refill takes less than 2 seconds.      Findings: No rash.   Neurological:      Mental Status: He is alert and oriented to person, place, and time.   Psychiatric:         Mood and Affect: Mood is not anxious.         Speech: Speech normal.         Behavior: Behavior normal.         Thought Content: Thought content normal.         Judgment: Judgment normal.         Significant Labs: All pertinent labs within the past 24 hours have been reviewed.    Significant Imaging: I have reviewed all pertinent imaging results/findings within the past 24 hours.      Assessment/Plan:      * Pneumonia of left upper lobe due to infectious organism  Admitted with hypoxemia, CXR: There is a marked amount of alveolar consolidation scattered throughout the left lung. There is a mild amount of alveolar consolidation scattered throughout the right lung  --check CT Chest >>>>>>>>>>>shows PNA, no mass  --discussed  with Pulmonologist he recommends treating PNA for 7 days  --Nebulized LABA, ADELSO and ipratropium  --Continue O2 for supportive care  --wean as tolerated  --cannot use steroid due to putative infection    Atrial fibrillation  Patient treated with Eliquis for thromboembolism prevention    --Continue Eliquis  --po metoprolol          Alcohol dependence  --start Librium 50mg po tid      Iron deficiency anemia due to chronic blood loss  Patient due for 2nd iron infusion with Hematology on 1/10/22, s/p 2U PRBC transfusion approx. 4 weeks ago.     --Daily CBC  --Transfuse with 1 unit PRBC for Hgb <7.0 or <8.0 if symptomatic       Hypertension  Hypertensive in the ED, treated with Lasix IV, resumed home medications    --Resume home medications  --Vitals Q4H  --Telemetry        Chronic systolic congestive heart failure  Patient is identified as having Systolic (HFrEF) heart failure that is Acute on chronic. CHF is currently uncontrolled due to Continued edema of extremities. Latest ECHO performed and demonstrates- Results for orders placed during the hospital encounter of 04/01/21    Echo Color Flow Doppler? Yes    Interpretation Summary  · Concentric remodeling and normal systolic function. The estimated ejection fraction is 60%.  · Normal left ventricular diastolic function.  · With normal right ventricular systolic function.  . Continue Beta Blocker, ACE/ARB and Furosemide and monitor clinical status closely. Monitor on telemetry. Patient is on CHF pathway.  Monitor strict Is&Os and daily weights.  Place on fluid restriction of 1.5 L. Continue to stress to patient importance of self efficacy and  on diet for CHF. Last BNP reviewed- and noted below   Recent Labs   Lab 01/09/22  1017   BNP 1,844*   .  --Daily weights  --Lasix 40mg IV BID  --Cardiac Diet      VTE Risk Mitigation (From admission, onward)         Ordered     IP VTE HIGH RISK PATIENT  Once         01/09/22 1133     Place sequential compression device   Until discontinued         01/09/22 1133     Reason for No Pharmacological VTE Prophylaxis  Once        Question:  Reasons:  Answer:  Already adequately anticoagulated on oral Anticoagulants    01/09/22 1133                Discharge Planning   MEGAN:      Code Status: Full Code   Is the patient medically ready for discharge?:     Reason for patient still in hospital (select all that apply): Pending disposition  Discharge Plan A: Home,Home with family                  Rubi Barahona MD  Department of Hospital Medicine   [facility] ([facility])

## 2022-01-13 NOTE — PLAN OF CARE
Patient AAOX 4. VSS.. Patient remained afebrile throughout shift.   IV abx administered per order   Patient remained free of falls this shift   Patient denies c/o  pain this shift    Plan of care reviewed. Patient verbalized understanding.   Patient moving/turning independently. Frequent weight shifting encouraged.   Patient SR with PVCs on monitor   Bed low, side rails up x2, wheels locked, call light in reach. Bed alarm maintained for safety. Patient instructed to call for assistance.   Hourly rounding completed.   Will continue to monitor.

## 2022-01-13 NOTE — PLAN OF CARE
Patient kept free from falls/injury, bed low and call light close  Questions about care answered to satisfaction  NPO at midnight for heart cath in morning, SVT with afib today so procedure delayed  IV metoprolol push given and HR monitored, now SR on monitor

## 2022-01-13 NOTE — SUBJECTIVE & OBJECTIVE
Review of Systems   Constitutional: Negative for fever.   HENT: Negative for congestion.    Respiratory: Negative for cough.    Gastrointestinal: Negative for abdominal pain.   Endocrine: Negative for polyuria.   Genitourinary: Negative for flank pain.   Musculoskeletal: Negative for back pain.   Skin: Negative for rash.   Neurological: Negative for syncope.   Psychiatric/Behavioral: Negative for confusion.     Objective:     Vital Signs (Most Recent):  Temp: 99 °F (37.2 °C) (01/12/22 1528)  Pulse: 91 (01/12/22 1700)  Resp: 18 (01/12/22 1528)  BP: (!) 135/58 (01/12/22 1528)  SpO2: 99 % (01/12/22 1528) Vital Signs (24h Range):  Temp:  [98.2 °F (36.8 °C)-99 °F (37.2 °C)] 99 °F (37.2 °C)  Pulse:  [] 91  Resp:  [17-20] 18  SpO2:  [94 %-100 %] 99 %  BP: (116-136)/(55-65) 135/58     Weight: 66.7 kg (147 lb)  Body mass index is 21.09 kg/m².    Intake/Output Summary (Last 24 hours) at 1/12/2022 1900  Last data filed at 1/12/2022 1900  Gross per 24 hour   Intake 307.42 ml   Output 1675 ml   Net -1367.58 ml      Physical Exam  Vitals and nursing note reviewed.   Constitutional:       General: He is not in acute distress.     Appearance: He is well-developed. He is not diaphoretic.   HENT:      Head: Normocephalic and atraumatic.      Right Ear: Hearing and external ear normal.      Left Ear: Hearing and external ear normal.      Nose: Nose normal. No mucosal edema or rhinorrhea.      Mouth/Throat:      Pharynx: Uvula midline.   Eyes:      General:         Right eye: No discharge.         Left eye: No discharge.      Conjunctiva/sclera: Conjunctivae normal.      Right eye: No chemosis.     Left eye: No chemosis.     Pupils: Pupils are equal, round, and reactive to light.   Neck:      Thyroid: No thyroid mass or thyromegaly.      Trachea: Trachea normal.   Cardiovascular:      Rate and Rhythm: Normal rate and regular rhythm.      Pulses:           Dorsalis pedis pulses are 2+ on the right side and 2+ on the left side.       Heart sounds: Normal heart sounds. No murmur heard.      Pulmonary:      Effort: Pulmonary effort is normal. No respiratory distress.      Breath sounds: Examination of the right-lower field reveals decreased breath sounds. Examination of the left-lower field reveals decreased breath sounds. Decreased breath sounds present. No wheezing.   Chest:   Breasts:      Right: No supraclavicular adenopathy.      Left: No supraclavicular adenopathy.       Abdominal:      General: Bowel sounds are normal. There is no distension.      Palpations: Abdomen is soft.      Tenderness: There is no abdominal tenderness.   Musculoskeletal:         General: Normal range of motion.      Cervical back: Normal range of motion and neck supple.      Right lower leg: Edema present.      Left lower leg: Edema present.   Lymphadenopathy:      Cervical: No cervical adenopathy.      Upper Body:      Right upper body: No supraclavicular adenopathy.      Left upper body: No supraclavicular adenopathy.   Skin:     General: Skin is warm and dry.      Capillary Refill: Capillary refill takes less than 2 seconds.      Findings: No rash.   Neurological:      Mental Status: He is alert and oriented to person, place, and time.   Psychiatric:         Mood and Affect: Mood is not anxious.         Speech: Speech normal.         Behavior: Behavior normal.         Thought Content: Thought content normal.         Judgment: Judgment normal.         Significant Labs: All pertinent labs within the past 24 hours have been reviewed.    Significant Imaging: I have reviewed all pertinent imaging results/findings within the past 24 hours.

## 2022-01-14 VITALS
WEIGHT: 136.25 LBS | DIASTOLIC BLOOD PRESSURE: 67 MMHG | BODY MASS INDEX: 19.51 KG/M2 | OXYGEN SATURATION: 94 % | RESPIRATION RATE: 18 BRPM | TEMPERATURE: 97 F | HEIGHT: 70 IN | SYSTOLIC BLOOD PRESSURE: 148 MMHG | HEART RATE: 84 BPM

## 2022-01-14 PROBLEM — J18.9 PNEUMONIA OF LEFT UPPER LOBE DUE TO INFECTIOUS ORGANISM: Status: RESOLVED | Noted: 2021-09-24 | Resolved: 2022-01-14

## 2022-01-14 LAB
ACANTHOCYTES BLD QL SMEAR: PRESENT
ANION GAP SERPL CALC-SCNC: 9 MMOL/L (ref 8–16)
ANISOCYTOSIS BLD QL SMEAR: SLIGHT
BACTERIA BLD CULT: NORMAL
BACTERIA BLD CULT: NORMAL
BASOPHILS # BLD AUTO: 0.07 K/UL (ref 0–0.2)
BASOPHILS NFR BLD: 1 % (ref 0–1.9)
BUN SERPL-MCNC: 23 MG/DL (ref 8–23)
BURR CELLS BLD QL SMEAR: ABNORMAL
CALCIUM SERPL-MCNC: 9.6 MG/DL (ref 8.7–10.5)
CHLORIDE SERPL-SCNC: 98 MMOL/L (ref 95–110)
CO2 SERPL-SCNC: 35 MMOL/L (ref 23–29)
CREAT SERPL-MCNC: 0.7 MG/DL (ref 0.5–1.4)
DACRYOCYTES BLD QL SMEAR: ABNORMAL
DIFFERENTIAL METHOD: ABNORMAL
EOSINOPHIL # BLD AUTO: 0.3 K/UL (ref 0–0.5)
EOSINOPHIL NFR BLD: 3.7 % (ref 0–8)
ERYTHROCYTE [DISTWIDTH] IN BLOOD BY AUTOMATED COUNT: 31.1 % (ref 11.5–14.5)
EST. GFR  (AFRICAN AMERICAN): >60 ML/MIN/1.73 M^2
EST. GFR  (NON AFRICAN AMERICAN): >60 ML/MIN/1.73 M^2
GLUCOSE SERPL-MCNC: 95 MG/DL (ref 70–110)
HCT VFR BLD AUTO: 29.9 % (ref 40–54)
HGB BLD-MCNC: 8.8 G/DL (ref 14–18)
HYPOCHROMIA BLD QL SMEAR: ABNORMAL
IMM GRANULOCYTES # BLD AUTO: 0.03 K/UL (ref 0–0.04)
IMM GRANULOCYTES NFR BLD AUTO: 0.4 % (ref 0–0.5)
LYMPHOCYTES # BLD AUTO: 0.6 K/UL (ref 1–4.8)
LYMPHOCYTES NFR BLD: 7.9 % (ref 18–48)
MCH RBC QN AUTO: 20.5 PG (ref 27–31)
MCHC RBC AUTO-ENTMCNC: 29.4 G/DL (ref 32–36)
MCV RBC AUTO: 70 FL (ref 82–98)
MONOCYTES # BLD AUTO: 0.7 K/UL (ref 0.3–1)
MONOCYTES NFR BLD: 10 % (ref 4–15)
NEUTROPHILS # BLD AUTO: 5.5 K/UL (ref 1.8–7.7)
NEUTROPHILS NFR BLD: 77 % (ref 38–73)
NRBC BLD-RTO: 0 /100 WBC
OVALOCYTES BLD QL SMEAR: ABNORMAL
PLATELET # BLD AUTO: 512 K/UL (ref 150–450)
PLATELET BLD QL SMEAR: ABNORMAL
PMV BLD AUTO: ABNORMAL FL (ref 9.2–12.9)
POCT GLUCOSE: 209 MG/DL (ref 70–110)
POIKILOCYTOSIS BLD QL SMEAR: ABNORMAL
POLYCHROMASIA BLD QL SMEAR: ABNORMAL
POTASSIUM SERPL-SCNC: 4.1 MMOL/L (ref 3.5–5.1)
RBC # BLD AUTO: 4.3 M/UL (ref 4.6–6.2)
SODIUM SERPL-SCNC: 142 MMOL/L (ref 136–145)
SPHEROCYTES BLD QL SMEAR: ABNORMAL
TARGETS BLD QL SMEAR: ABNORMAL
WBC # BLD AUTO: 6.85 K/UL (ref 3.9–12.7)

## 2022-01-14 PROCEDURE — 25000003 PHARM REV CODE 250: Performed by: PHYSICIAN ASSISTANT

## 2022-01-14 PROCEDURE — 25000003 PHARM REV CODE 250: Performed by: NURSE PRACTITIONER

## 2022-01-14 PROCEDURE — 25000003 PHARM REV CODE 250: Performed by: INTERNAL MEDICINE

## 2022-01-14 PROCEDURE — 25000242 PHARM REV CODE 250 ALT 637 W/ HCPCS: Performed by: FAMILY MEDICINE

## 2022-01-14 PROCEDURE — 85025 COMPLETE CBC W/AUTO DIFF WBC: CPT | Performed by: INTERNAL MEDICINE

## 2022-01-14 PROCEDURE — 94761 N-INVAS EAR/PLS OXIMETRY MLT: CPT

## 2022-01-14 PROCEDURE — 80048 BASIC METABOLIC PNL TOTAL CA: CPT | Performed by: INTERNAL MEDICINE

## 2022-01-14 PROCEDURE — 36415 COLL VENOUS BLD VENIPUNCTURE: CPT | Performed by: INTERNAL MEDICINE

## 2022-01-14 PROCEDURE — 99232 PR SUBSEQUENT HOSPITAL CARE,LEVL II: ICD-10-PCS | Mod: ,,, | Performed by: INTERNAL MEDICINE

## 2022-01-14 PROCEDURE — 99232 SBSQ HOSP IP/OBS MODERATE 35: CPT | Mod: ,,, | Performed by: INTERNAL MEDICINE

## 2022-01-14 PROCEDURE — 27000221 HC OXYGEN, UP TO 24 HOURS

## 2022-01-14 PROCEDURE — 25000003 PHARM REV CODE 250: Performed by: FAMILY MEDICINE

## 2022-01-14 PROCEDURE — 94640 AIRWAY INHALATION TREATMENT: CPT

## 2022-01-14 RX ORDER — METOPROLOL SUCCINATE 50 MG/1
50 TABLET, EXTENDED RELEASE ORAL 2 TIMES DAILY
Qty: 60 TABLET | Refills: 0 | Status: SHIPPED | OUTPATIENT
Start: 2022-01-14 | End: 2022-01-18 | Stop reason: SDUPTHER

## 2022-01-14 RX ORDER — AZITHROMYCIN 500 MG/1
500 TABLET, FILM COATED ORAL DAILY
Qty: 3 TABLET | Refills: 0 | Status: SHIPPED | OUTPATIENT
Start: 2022-01-14 | End: 2022-01-17

## 2022-01-14 RX ORDER — FUROSEMIDE 20 MG/1
20 TABLET ORAL DAILY
Qty: 30 TABLET | Refills: 0 | Status: SHIPPED | OUTPATIENT
Start: 2022-01-15 | End: 2022-01-18 | Stop reason: SDUPTHER

## 2022-01-14 RX ORDER — LOSARTAN POTASSIUM 25 MG/1
25 TABLET ORAL DAILY
Qty: 30 TABLET | Refills: 0 | Status: SHIPPED | OUTPATIENT
Start: 2022-01-15 | End: 2022-01-18

## 2022-01-14 RX ADMIN — IPRATROPIUM BROMIDE AND ALBUTEROL SULFATE 3 ML: 2.5; .5 SOLUTION RESPIRATORY (INHALATION) at 08:01

## 2022-01-14 RX ADMIN — LOSARTAN POTASSIUM 25 MG: 25 TABLET, FILM COATED ORAL at 08:01

## 2022-01-14 RX ADMIN — PANTOPRAZOLE SODIUM 40 MG: 40 TABLET, DELAYED RELEASE ORAL at 08:01

## 2022-01-14 RX ADMIN — CHLORDIAZEPOXIDE HYDROCHLORIDE 50 MG: 25 CAPSULE ORAL at 03:01

## 2022-01-14 RX ADMIN — FUROSEMIDE 20 MG: 20 TABLET ORAL at 08:01

## 2022-01-14 RX ADMIN — IPRATROPIUM BROMIDE AND ALBUTEROL SULFATE 3 ML: 2.5; .5 SOLUTION RESPIRATORY (INHALATION) at 12:01

## 2022-01-14 RX ADMIN — FAMOTIDINE 20 MG: 20 TABLET ORAL at 08:01

## 2022-01-14 RX ADMIN — ARFORMOTEROL TARTRATE 15 MCG: 15 SOLUTION RESPIRATORY (INHALATION) at 08:01

## 2022-01-14 RX ADMIN — CHLORDIAZEPOXIDE HYDROCHLORIDE 50 MG: 25 CAPSULE ORAL at 08:01

## 2022-01-14 RX ADMIN — APIXABAN 5 MG: 2.5 TABLET, FILM COATED ORAL at 10:01

## 2022-01-14 RX ADMIN — ATORVASTATIN CALCIUM 10 MG: 10 TABLET, FILM COATED ORAL at 08:01

## 2022-01-14 RX ADMIN — METOPROLOL SUCCINATE 50 MG: 50 TABLET, EXTENDED RELEASE ORAL at 08:01

## 2022-01-14 NOTE — RESPIRATORY THERAPY
Home Oxygen Evaluation    Date Performed: 2022    1) Patient's Home O2 Sat on room air, while at rest: 96        If O2 sats on room air at rest are 88% or below, patient qualifies. No additional testing needed. Document N/A in steps 2 and 3. If 89% or above, complete steps 2.      2) Patient's O2 Sat on room air while exercisin        If O2 sats on room air while exercising remain 89% or above patient does not qualify, no further testing needed Document N/A in step 3. If O2 sats on room air while exercising are 88% or below, continue to step 3.      3) Patient's O2 Sat while exercising on O2: n/a at nb/a LPM         (Must show improvement from #2 for patients to qualify)    If O2 sats improve on oxygen, patient qualifies for portable oxygen. If not, the patient does not qualify.

## 2022-01-14 NOTE — PLAN OF CARE
[facility] - Telemetry (Hospital)  Discharge Final Note    Primary Care Provider: Vivian Ch MD    Expected Discharge Date: 1/14/2022    Final Discharge Note (most recent)     Final Note - 01/14/22 1322        Final Note    Assessment Type Final Discharge Note     Anticipated Discharge Disposition Home or Self Care     What phone number can be called within the next 1-3 days to see how you are doing after discharge? --   [phone]    Hospital Resources/Appts/Education Provided Appointments scheduled and added to AVS        Post-Acute Status    Discharge Delays None known at this time               Patient did not qualify for Home O2. No CM needs for discharge. Appointments scheduled and added to AVS.     Important Message from Medicare             Contact Info     Vivian Ch MD   Specialty: Family Medicine   Relationship: PCP - General    [address] [facility] [address]   Phone: [phone]       Next Steps: Schedule an appointment as soon as possible for a visit in 3 day(s)    Instructions: hospital follow up appointment    Janneth Tovar MD   Specialty: INTERVENTIONAL CARDIOLOGY, Cardiology    [address]  [place] [address]   Phone: [phone]       Next Steps: Schedule an appointment as soon as possible for a visit in 1 week(s)    Instructions: cardiology hospital follow up

## 2022-01-14 NOTE — ASSESSMENT & PLAN NOTE
Admitted with hypoxemia, CXR: There is a marked amount of alveolar consolidation scattered throughout the left lung. There is a mild amount of alveolar consolidation scattered throughout the right lung  --check CT Chest >>>>>>>>>>>shows PNA, no mass  --discussed with Pulmonologist he recommends treating PNA for 7 days  --Nebulized LABA, ADESLO and ipratropium  --Continue O2 for supportive care  --wean as tolerated  --cannot use steroid due to  infection

## 2022-01-14 NOTE — DISCHARGE SUMMARY
[facility] - Telemetry ([facility])  [facility] Medicine  Discharge Summary      Patient Name: Richy Douglas  MRN: [id number]  Patient Class: IP- Inpatient  Admission Date: 1/9/2022  Hospital Length of Stay: 4 days  Discharge Date and Time:  01/14/2022 1:06 PM  Attending Physician: Mando Guallpa MD   Discharging Provider: Mando Guallpa MD  Primary Care Provider: Vivian Ch MD      HPI:   81 y.o. male patient with a PMHx of aortic aneurysm, atrial fibrillation with RVR, emphysema of lung, chronic systolic congestive heart failure, GERD, HTN, other HLD, and prostate cancer who presents to the Emergency Department for SOB which onset gradually 2 days PTA. The pt reports that his SpO2 was 85% on RA. Symptoms are constant and moderate in severity. No mitigating or exacerbating factors reported. Associated sxs include L sided anterior CP that radiates down his L arm since yesterday and L arm paresthesias. Patient denies any fever, chills, numbness, n/v/d, diaphoresis, headahces, and all other sxs at this time. No prior Tx reported. No further complaints or concerns at this time. In the ED, BNP: 1844, H/H: 9.7/32.7, CRP: 151.4, CXR: Left lung consolidation, Lactic: normal, Procalcitonin: Normal. Treated with Lasix and Levaquin. Patient is a full code, surrogate decision maker is his spouse, Britany Douglas. Patient admitted to observation under the care of [facility] medicine.       Procedure(s) (LRB):  CATHETERIZATION, HEART, BOTH LEFT AND RIGHT (N/A)  ANGIOGRAM, CORONARY ARTERY (N/A)      Hospital Course:   1/10/22- pt comfortable on 2L via NC current smoker physical exam consistent with COPD  1/11/22- CT scan of the chest reveals severe emphysematous lungs with bully scattered throughout all lung fields bilaterally.  Consolidation left upper and middle jose lobes consistent with pneumonia.  No masses. Echo reveals new decline in EF to 25% pt reported chest pain on admission  1/12/22- pt in Afib coronary angiogram postponed. Rate  controlled on metoprolol. Daily drinker started on librium  1/13/22- pt underwent LHC today with Dr. Tovar result was clean coronaries. EF documented at 50% although transthoracic echo EF was 25%    1/14 patient reports improvement in sx. Tolerated lhc procedure well. After discussing with cards, ok to d/c. Patient will need to discuss initiation of entresto on o/p basis. Continue losartan, bb, eliquis and lasix. New rxs delivered to bedside.    Started on iv rocephin for pna. Will send 3 days of azith on dc. Bcx ngtd. Ambulatory eval for home o2 in setting of copd and chf. Case mgt consulted for dme.     Patient admitted to smoking and drinking. Was initiated on librium for concerns of etoh withdrawal.     Patient seen and evaluated by me. Patient was determined to be suitable for d/c. Patient deemed stable for discharge to home with np to visit after d/c d/t risk for readmission.           Goals of Care Treatment Preferences:  Code Status: Full Code      Consults:   Consults (From admission, onward)        Status Ordering Provider     Inpatient consult to Social Work/Case Management  Once        Provider:  (Not yet assigned)    Ordered LOKI VEGA     Inpatient consult to Cardiology  Once        Provider:  Janneth Tovar MD    Completed HAWA BURLESON          No new Assessment & Plan notes have been filed under this hospital service since the last note was generated.  Service: Hospital Medicine    Final Active Diagnoses:    Diagnosis Date Noted POA    Alcohol dependence [F10.20] 01/12/2022 Yes    Atrial fibrillation [I48.91] 09/24/2021 Yes    Chronic systolic congestive heart failure [I50.22] 08/31/2017 Yes    Iron deficiency anemia due to chronic blood loss [D50.0] 04/03/2017 Yes    Hypertension [I10] 04/03/2017 Yes      Problems Resolved During this Admission:    Diagnosis Date Noted Date Resolved POA    PRINCIPAL PROBLEM:  Pneumonia of left upper lobe due to infectious organism [J18.9]  09/24/2021 01/14/2022 Yes       Discharged Condition: stable    Disposition: Home or Self Care    Follow Up:   Follow-up Information     Vivian Ch MD. Schedule an appointment as soon as possible for a visit in 3 days.    Specialty: Family Medicine  Why: hospital follow up appointment  Contact information:  [address] [facility] [address]  [phone]             Janneth Tovar MD. Schedule an appointment as soon as possible for a visit in 1 week.    Specialties: INTERVENTIONAL CARDIOLOGY, Cardiology  Why: cardiology hospital follow up  Contact information:  [address]  [place] [address]  [phone]                       Patient Instructions:      Ambulatory referral/consult to [facility]   Standing Status: Future   Referral Priority: Routine Referral Type: Consultation   Referral Reason: Specialty Services Required   Requested Specialty: Cardiology   Number of Visits Requested: 1     Ambulatory referral/consult to [facility]   Standing Status: Future   Referral Priority: Routine Referral Type: Consultation   Referral Reason: Specialty Services Required   Number of Visits Requested: 1     Diet Cardiac   Order Comments: Salt and fluid restrictions     Activity as tolerated       Significant Diagnostic Studies: Labs:   CMP   Recent Labs   Lab 01/13/22  0657 01/14/22  0526    142   K 3.8 4.1   CL 93* 98   CO2 37* 35*   * 95   BUN 25* 23   CREATININE 0.8 0.7   CALCIUM 9.8 9.6   ANIONGAP 10 9   ESTGFRAFRICA >60 >60   EGFRNONAA >60 >60   , CBC   Recent Labs   Lab 01/14/22  0526   WBC 6.85   HGB 8.8*   HCT 29.9*   *   , Lipid Panel   Lab Results   Component Value Date    CHOL 144 09/17/2020    HDL 61 09/17/2020    LDLCALC 74.0 09/17/2020    TRIG 45 09/17/2020    CHOLHDL 42.4 09/17/2020   , Troponin   Recent Labs   Lab 01/09/22  1017 01/09/22  1406 01/09/22 2049   TROPONINI 0.032* 0.030* 0.037*   , A1C: No results  for input(s): HGBA1C in the last 4320 hours. and All labs within the past 24 hours have been reviewed  Microbiology:   Blood Culture   Lab Results   Component Value Date    LABBLOO No Growth to date 01/09/2022    LABBLOO No Growth to date 01/09/2022    LABBLOO No Growth to date 01/09/2022    LABBLOO No Growth to date 01/09/2022    LABBLOO No Growth to date 01/09/2022     Radiology: X-Ray: CXR: portable   CT scan: CT chest w/o contrast  Cardiac Graphics: ECG: afib with rvr and Echocardiogram:   Transthoracic echo (TTE) complete (Cupid Only):   Results for orders placed or performed during the hospital encounter of 01/09/22   Echo Saline Bubble? No   Result Value Ref Range    BSA 1.81 m2    TDI SEPTAL 0.06 m/s    LV LATERAL E/E' RATIO 11.13 m/s    LV SEPTAL E/E' RATIO 14.83 m/s    LA WIDTH 4.96 cm    IVC diameter 2.68 cm    Left Ventricular Outflow Tract Mean Velocity 0.72991050743045 cm/s    Left Ventricular Outflow Tract Mean Gradient 2.54 mmHg    TDI LATERAL 0.08 m/s    LVIDd 5.51 3.5 - 6.0 cm    IVS 1.77 (A) 0.6 - 1.1 cm    Posterior Wall 1.48 (A) 0.6 - 1.1 cm    Ao root annulus 3.94 cm    LVIDs 4.61 (A) 2.1 - 4.0 cm    FS 16 28 - 44 %    Sinus 4.63 cm    STJ 4.92 cm    Ascending aorta 4.69 cm    LV mass 420.71 g    LA size 3.81 cm    TAPSE 2.05 cm    Left Ventricle Relative Wall Thickness 0.54 cm    AV regurgitation pressure 1/2 time 295.31095097 ms    AV mean gradient 6 mmHg    AV valve area 3.64 cm2    AV Velocity Ratio 0.65     AV index (prosthetic) 0.74     MV valve area p 1/2 method 4.93 cm2    E/A ratio 1.24     Mean e' 0.07 m/s    E wave deceleration time 153.787212771916400 msec    IVRT 79.352842796752156 msec    LVOT diameter 2.51 cm    LVOT area 4.9 cm2    LVOT peak michelet 1.05 m/s    LVOT peak VTI 17.60 cm    Ao peak michelet 1.62 m/s    Ao VTI 23.9 cm    RVOT peak michelet 0.73 m/s    RVOT peak VTI 14.3 cm    LVOT stroke volume 87.04 cm3    AV peak gradient 10 mmHg    PV mean gradient 1.24 mmHg    E/E' ratio 12.71  m/s    MV Peak E Karl 0.89 m/s    AR Max Karl 5.20 m/s    TR Max Karl 5.26 m/s    MV stenosis pressure 1/2 time 44.041518264919718 ms    MV Peak A Karl 0.72 m/s    LV Systolic Volume 97.94 mL    LV Systolic Volume Index 53.5 mL/m2    LV Diastolic Volume 147.89 mL    LV Diastolic Volume Index 80.81 mL/m2    LV Mass Index 230 g/m2    Echo EF Estimated 34 %    RA Major Axis 5.05 cm    Triscuspid Valve Regurgitation Peak Gradient 111 mmHg    RA Width 4.80 cm    Right Atrial Pressure (from IVC) 8 mmHg    EF 25 %    TV rest pulmonary artery pressure 119 mmHg    Narrative    · Concentric hypertrophy and severely decreased systolic function.  · The estimated ejection fraction is 25%.  · Grade I left ventricular diastolic dysfunction.  · Normal right ventricular size with mildly reduced right ventricular   systolic function.  · Mild-to-moderate mitral regurgitation.  · Mild-to-moderate aortic regurgitation.  · Moderate left atrial enlargement.  · Moderate right atrial enlargement.  · Moderate tricuspid regurgitation.  · Intermediate central venous pressure (8 mmHg).  · The estimated PA systolic pressure is 119 mmHg.  · There is pulmonary hypertension.  · The aortic root is moderately dilated.        Angiography: cardiac cath    Pending Diagnostic Studies:     None         Medications:  Reconciled Home Medications:      Medication List      START taking these medications    azithromycin 500 MG tablet  Commonly known as: ZITHROMAX  Take 1 tablet (500 mg total) by mouth once daily. for 3 days     furosemide 20 MG tablet  Commonly known as: LASIX  Take 1 tablet (20 mg total) by mouth once daily.  Start taking on: January 15, 2022     losartan 25 MG tablet  Commonly known as: COZAAR  Take 1 tablet (25 mg total) by mouth once daily.  Start taking on: January 15, 2022     metoprolol succinate 50 MG 24 hr tablet  Commonly known as: TOPROL-XL  Take 1 tablet (50 mg total) by mouth 2 (two) times daily.        CONTINUE taking these  medications    apixaban 5 mg Tab  Commonly known as: ELIQUIS  Take 1 tablet (5 mg total) by mouth 2 (two) times daily.     atorvastatin 10 MG tablet  Commonly known as: LIPITOR  Take 1 tablet (10 mg total) by mouth once daily.     pantoprazole 40 MG tablet  Commonly known as: PROTONIX  Take 1 tablet (40 mg total) by mouth once daily.     SPIRIVA WITH HANDIHALER 18 mcg inhalation capsule  Generic drug: tiotropium  Inhale 1 capsule (18 mcg total) into the lungs once daily. Controller     VENTOLIN HFA 90 mcg/actuation inhaler  Generic drug: albuterol  Inhale 1-2 puffs into the lungs every 6 (six) hours as needed for Wheezing or Shortness of Breath. Rescue        STOP taking these medications    diltiaZEM 180 MG 24 hr capsule  Commonly known as: CARDIZEM CD     hydroCHLOROthiazide 25 MG tablet  Commonly known as: HYDRODIURIL            Indwelling Lines/Drains at time of discharge:   Lines/Drains/Airways     None                 Time spent on the discharge of patient: 35 minutes         Mando Guallpa MD  Department of Hospital Medicine  [facility] - Telemetry ([facility])

## 2022-01-14 NOTE — SUBJECTIVE & OBJECTIVE
Review of Systems   Constitutional: Negative.   HENT: Negative.    Eyes: Negative.    Cardiovascular: Negative.    Respiratory: Negative.    Endocrine: Negative.    Hematologic/Lymphatic: Negative.    Skin: Negative.    Musculoskeletal: Negative.    Gastrointestinal: Negative.    Genitourinary: Negative.    Neurological: Negative.    Psychiatric/Behavioral: Negative.    Allergic/Immunologic: Negative.      Objective:     Vital Signs (Most Recent):  Temp: 98.1 °F (36.7 °C) (01/14/22 0723)  Pulse: 81 (01/14/22 0901)  Resp: 18 (01/14/22 0807)  BP: (!) 142/67 (01/14/22 0723)  SpO2: 100 % (01/14/22 0803) Vital Signs (24h Range):  Temp:  [97 °F (36.1 °C)-98.1 °F (36.7 °C)] 98.1 °F (36.7 °C)  Pulse:  [] 81  Resp:  [16-27] 18  SpO2:  [88 %-100 %] 100 %  BP: (110-152)/(49-72) 142/67     Weight: 61.8 kg (136 lb 3.9 oz)  Body mass index is 19.55 kg/m².     SpO2: 100 %  O2 Device (Oxygen Therapy): nasal cannula      Intake/Output Summary (Last 24 hours) at 1/14/2022 1047  Last data filed at 1/14/2022 0900  Gross per 24 hour   Intake 480 ml   Output --   Net 480 ml       Lines/Drains/Airways     Peripheral Intravenous Line                 Peripheral IV - Single Lumen 01/13/22 2100 22 G Anterior;Left Upper Arm <1 day                Physical Exam  Vitals and nursing note reviewed.   Constitutional:       General: He is not in acute distress.     Appearance: Normal appearance. He is well-developed and well-nourished. He is not diaphoretic.      Comments: Thin appearing   HENT:      Head: Normocephalic and atraumatic.   Eyes:      General:         Right eye: No discharge.         Left eye: No discharge.      Pupils: Pupils are equal, round, and reactive to light.   Neck:      Thyroid: No thyromegaly.      Vascular: No JVD.      Trachea: No tracheal deviation.   Cardiovascular:      Rate and Rhythm: Normal rate and regular rhythm.      Heart sounds: Normal heart sounds, S1 normal and S2 normal. No murmur  heard.      Pulmonary:      Effort: Pulmonary effort is normal. No respiratory distress.      Breath sounds: Normal breath sounds. No wheezing or rales.   Abdominal:      General: There is no distension.      Palpations: Abdomen is soft.      Tenderness: There is no rebound.   Musculoskeletal:         General: No edema.      Cervical back: Neck supple.   Skin:     General: Skin is warm and dry.      Findings: No erythema.      Comments: Radial access site C/D/I; no bleeding erythema or drainage, intact pulse   Neurological:      Mental Status: He is alert and oriented to person, place, and time.   Psychiatric:         Mood and Affect: Mood and affect and mood normal.         Behavior: Behavior normal.         Thought Content: Thought content normal.         Significant Labs:   CMP   Recent Labs   Lab 01/13/22  0657 01/14/22  0526    142   K 3.8 4.1   CL 93* 98   CO2 37* 35*   * 95   BUN 25* 23   CREATININE 0.8 0.7   CALCIUM 9.8 9.6   ANIONGAP 10 9   ESTGFRAFRICA >60 >60   EGFRNONAA >60 >60   , CBC   Recent Labs   Lab 01/14/22  0526   WBC 6.85   HGB 8.8*   HCT 29.9*   *   , Troponin No results for input(s): TROPONINI in the last 48 hours. and All pertinent lab results from the last 24 hours have been reviewed.    Significant Imaging: Echocardiogram:   Transthoracic echo (TTE) complete (Cupid Only):   Results for orders placed or performed during the hospital encounter of 01/09/22   Echo Saline Bubble? No   Result Value Ref Range    BSA 1.81 m2    TDI SEPTAL 0.06 m/s    LV LATERAL E/E' RATIO 11.13 m/s    LV SEPTAL E/E' RATIO 14.83 m/s    LA WIDTH 4.96 cm    IVC diameter 2.68 cm    Left Ventricular Outflow Tract Mean Velocity 0.44642169662856 cm/s    Left Ventricular Outflow Tract Mean Gradient 2.54 mmHg    TDI LATERAL 0.08 m/s    LVIDd 5.51 3.5 - 6.0 cm    IVS 1.77 (A) 0.6 - 1.1 cm    Posterior Wall 1.48 (A) 0.6 - 1.1 cm    Ao root annulus 3.94 cm    LVIDs 4.61 (A) 2.1 - 4.0 cm    FS 16 28 - 44 %     Sinus 4.63 cm    STJ 4.92 cm    Ascending aorta 4.69 cm    LV mass 420.71 g    LA size 3.81 cm    TAPSE 2.05 cm    Left Ventricle Relative Wall Thickness 0.54 cm    AV regurgitation pressure 1/2 time 295.83970401 ms    AV mean gradient 6 mmHg    AV valve area 3.64 cm2    AV Velocity Ratio 0.65     AV index (prosthetic) 0.74     MV valve area p 1/2 method 4.93 cm2    E/A ratio 1.24     Mean e' 0.07 m/s    E wave deceleration time 153.490617710267203 msec    IVRT 79.316857280007333 msec    LVOT diameter 2.51 cm    LVOT area 4.9 cm2    LVOT peak karl 1.05 m/s    LVOT peak VTI 17.60 cm    Ao peak karl 1.62 m/s    Ao VTI 23.9 cm    RVOT peak karl 0.73 m/s    RVOT peak VTI 14.3 cm    LVOT stroke volume 87.04 cm3    AV peak gradient 10 mmHg    PV mean gradient 1.24 mmHg    E/E' ratio 12.71 m/s    MV Peak E Karl 0.89 m/s    AR Max Karl 5.20 m/s    TR Max Karl 5.26 m/s    MV stenosis pressure 1/2 time 44.555263878701060 ms    MV Peak A Karl 0.72 m/s    LV Systolic Volume 97.94 mL    LV Systolic Volume Index 53.5 mL/m2    LV Diastolic Volume 147.89 mL    LV Diastolic Volume Index 80.81 mL/m2    LV Mass Index 230 g/m2    Echo EF Estimated 34 %    RA Major Axis 5.05 cm    Triscuspid Valve Regurgitation Peak Gradient 111 mmHg    RA Width 4.80 cm    Right Atrial Pressure (from IVC) 8 mmHg    EF 25 %    TV rest pulmonary artery pressure 119 mmHg    Narrative    · Concentric hypertrophy and severely decreased systolic function.  · The estimated ejection fraction is 25%.  · Grade I left ventricular diastolic dysfunction.  · Normal right ventricular size with mildly reduced right ventricular   systolic function.  · Mild-to-moderate mitral regurgitation.  · Mild-to-moderate aortic regurgitation.  · Moderate left atrial enlargement.  · Moderate right atrial enlargement.  · Moderate tricuspid regurgitation.  · Intermediate central venous pressure (8 mmHg).  · The estimated PA systolic pressure is 119 mmHg.  · There is pulmonary  hypertension.  · The aortic root is moderately dilated.      , EKG: Reviewed and X-Ray: CXR: X-Ray Chest 1 View (CXR): No results found for this visit on 01/09/22. and X-Ray Chest PA and Lateral (CXR): No results found for this visit on 01/09/22.

## 2022-01-14 NOTE — PROGRESS NOTES
[facility] - Telemetry ([facility])  [facility] Medicine  Progress Note    Patient Name: Richy Douglas  MRN: [id number]  Patient Class: IP- Inpatient   Admission Date: 1/9/2022  Length of Stay: 3 days  Attending Physician: Rubi Barahona MD  Primary Care Provider: Vivian Ch MD        Subjective:     Principal Problem:Pneumonia of left upper lobe due to infectious organism        HPI:  81 y.o. male patient with a PMHx of aortic aneurysm, atrial fibrillation with RVR, emphysema of lung, chronic systolic congestive heart failure, GERD, HTN, other HLD, and prostate cancer who presents to the Emergency Department for SOB which onset gradually 2 days PTA. The pt reports that his SpO2 was 85% on RA. Symptoms are constant and moderate in severity. No mitigating or exacerbating factors reported. Associated sxs include L sided anterior CP that radiates down his L arm since yesterday and L arm paresthesias. Patient denies any fever, chills, numbness, n/v/d, diaphoresis, headahces, and all other sxs at this time. No prior Tx reported. No further complaints or concerns at this time. In the ED, BNP: 1844, H/H: 9.7/32.7, CRP: 151.4, CXR: Left lung consolidation, Lactic: normal, Procalcitonin: Normal. Treated with Lasix and Levaquin. Patient is a full code, surrogate decision maker is his spouse, Britany Douglas. Patient admitted to observation under the care of [facility] medicine.       Overview/Hospital Course:  1/10/22- pt comfortable on 2L via NC current smoker physical exam consistent with COPD  1/11/22- CT scan of the chest reveals severe emphysematous lungs with bully scattered throughout all lung fields bilaterally.  Consolidation left upper and middle jose lobes consistent with pneumonia.  No masses. Echo reveals new decline in EF to 25% pt reported chest pain on admission  1/12/22- pt in Afib coronary angiogram postponed. Rate controlled on metoprolol. Daily drinker started on librium  1/13/22- pt underwent LHC today with   Abdallah result was clean coronaries. EF documented at 50% although transthoracic echo EF was 25%          Review of Systems   Constitutional: Negative for fever.   HENT: Negative for congestion.    Respiratory: Negative for cough.    Gastrointestinal: Negative for abdominal pain.   Endocrine: Negative for polyuria.   Genitourinary: Negative for flank pain.   Musculoskeletal: Negative for back pain.   Skin: Negative for rash.   Neurological: Negative for syncope.   Psychiatric/Behavioral: Negative for confusion.     Objective:     Vital Signs (Most Recent):  Temp: 97 °F (36.1 °C) (01/13/22 1548)  Pulse: 83 (01/13/22 1548)  Resp: 18 (01/13/22 1548)  BP: (!) 152/72 (01/13/22 1548)  SpO2: (!) 94 % (01/13/22 1548) Vital Signs (24h Range):  Temp:  [97 °F (36.1 °C)-98.6 °F (37 °C)] 97 °F (36.1 °C)  Pulse:  [] 83  Resp:  [15-27] 18  SpO2:  [88 %-99 %] 94 %  BP: (110-152)/(49-72) 152/72     Weight: 63.5 kg (139 lb 15.9 oz)  Body mass index is 20.09 kg/m².    Intake/Output Summary (Last 24 hours) at 1/13/2022 1807  Last data filed at 1/13/2022 1600  Gross per 24 hour   Intake 259.47 ml   Output 250 ml   Net 9.47 ml      Physical Exam  Vitals and nursing note reviewed.   Constitutional:       General: He is not in acute distress.     Appearance: He is well-developed. He is not diaphoretic.   HENT:      Head: Normocephalic and atraumatic.      Right Ear: Hearing and external ear normal.      Left Ear: Hearing and external ear normal.      Nose: Nose normal. No mucosal edema or rhinorrhea.      Mouth/Throat:      Pharynx: Uvula midline.   Eyes:      General:         Right eye: No discharge.         Left eye: No discharge.      Conjunctiva/sclera: Conjunctivae normal.      Right eye: No chemosis.     Left eye: No chemosis.     Pupils: Pupils are equal, round, and reactive to light.   Neck:      Thyroid: No thyroid mass or thyromegaly.      Trachea: Trachea normal.   Cardiovascular:      Rate and Rhythm: Normal rate and  regular rhythm.      Pulses:           Dorsalis pedis pulses are 2+ on the right side and 2+ on the left side.      Heart sounds: Normal heart sounds. No murmur heard.      Pulmonary:      Effort: Pulmonary effort is normal. No respiratory distress.      Breath sounds: Examination of the right-lower field reveals decreased breath sounds. Examination of the left-lower field reveals decreased breath sounds. Decreased breath sounds present. No wheezing.   Chest:   Breasts:      Right: No supraclavicular adenopathy.      Left: No supraclavicular adenopathy.       Abdominal:      General: Bowel sounds are normal. There is no distension.      Palpations: Abdomen is soft.      Tenderness: There is no abdominal tenderness.   Musculoskeletal:         General: Normal range of motion.      Cervical back: Normal range of motion and neck supple.      Right lower leg: Edema present.      Left lower leg: Edema present.   Lymphadenopathy:      Cervical: No cervical adenopathy.      Upper Body:      Right upper body: No supraclavicular adenopathy.      Left upper body: No supraclavicular adenopathy.   Skin:     General: Skin is warm and dry.      Capillary Refill: Capillary refill takes less than 2 seconds.      Findings: No rash.   Neurological:      Mental Status: He is alert and oriented to person, place, and time.   Psychiatric:         Mood and Affect: Mood is not anxious.         Speech: Speech normal.         Behavior: Behavior normal.         Thought Content: Thought content normal.         Judgment: Judgment normal.         Significant Labs: All pertinent labs within the past 24 hours have been reviewed.    Significant Imaging: I have reviewed all pertinent imaging results/findings within the past 24 hours.      Assessment/Plan:      * Pneumonia of left upper lobe due to infectious organism  Admitted with hypoxemia, CXR: There is a marked amount of alveolar consolidation scattered throughout the left lung. There is a mild  amount of alveolar consolidation scattered throughout the right lung  --check CT Chest >>>>>>>>>>>shows PNA, no mass  --discussed with Pulmonologist he recommends treating PNA for 7 days  --Nebulized LABA, ADELSO and ipratropium  --Continue O2 for supportive care  --wean as tolerated  --cannot use steroid due to  infection    Atrial fibrillation  Patient treated with Eliquis for thromboembolism prevention    --Continue Eliquis  --po metoprolol          Alcohol dependence  --start Librium 50mg po tid      Iron deficiency anemia due to chronic blood loss  Patient due for 2nd iron infusion with Hematology on 1/10/22, s/p 2U PRBC transfusion approx. 4 weeks ago.     --Daily CBC  --Transfuse with 1 unit PRBC for Hgb <7.0 or <8.0 if symptomatic       Hypertension  Hypertensive in the ED, treated with Lasix IV, resumed home medications    --Resume home medications  --Vitals Q4H  --Telemetry        Chronic systolic congestive heart failure    Recent Labs   Lab 01/09/22  1017   BNP 1,844*   .--Left heart cath was done 1/13 revealed clean coronaries  --EF is either 25% (TTE) or 50% (cath)  --f/u further cardiology recs regarding need for life vest  --likely d/c in am      VTE Risk Mitigation (From admission, onward)         Ordered     IP VTE HIGH RISK PATIENT  Once         01/09/22 1133     Place sequential compression device  Until discontinued         01/09/22 1133     Reason for No Pharmacological VTE Prophylaxis  Once        Question:  Reasons:  Answer:  Already adequately anticoagulated on oral Anticoagulants    01/09/22 1133                Discharge Planning   MEGAN:      Code Status: Full Code   Is the patient medically ready for discharge?:     Reason for patient still in hospital (select all that apply): Pending disposition  Discharge Plan A: Home,Home with family                  Rubi Barahona MD  Department of Hospital Medicine   [facility] - Telemetry ([facility])

## 2022-01-14 NOTE — PROGRESS NOTES
[facility] - Telemetry ([facility])  Cardiology  Progress Note    Patient Name: Richy Douglas  MRN: [id number]  Admission Date: 1/9/2022  Hospital Length of Stay: 4 days  Code Status: Full Code   Attending Physician: Mando Guallpa MD   Primary Care Physician: Vivian Ch MD  Expected Discharge Date:   Principal Problem:Pneumonia of left upper lobe due to infectious organism    Subjective:   HPI:  81 y.o. male patient with a PMHx of aortic aneurysm, atrial fibrillation with RVR, emphysema of lung, chronic systolic congestive heart failure, GERD, HTN, other HLD, and prostate cancer who presents to the Emergency Department for SOB which onset gradually 2 days PTA. The pt reports that his SpO2 was 85% on RA. Symptoms are constant and moderate in severity. No mitigating or exacerbating factors reported. Associated sxs include L sided anterior CP that radiates down his L arm since yesterday and L arm paresthesias. Patient denies any fever, chills, numbness, n/v/d, diaphoresis, headahces, and all other sxs at this time. No prior Tx reported. No further complaints or concerns at this time. In the ED, BNP: 1844, H/H: 9.7/32.7, CRP: 151.4, CXR: Left lung consolidation, Lactic: normal, Procalcitonin: Normal. Treated with Lasix and Levaquin. Patient is a full code, surrogate decision maker is his spouse, Britany Douglas.   Treated for pneumonia   On eliquis denies any bleeding   Drinks alcohol daily as per daughter   Had severe anterior chest pain on admission , now resolved   Echo showed EF IN THE 20% , new changes when compared to his echo done last year, had a normal lexiscan last year     Hospital Course:   1/12/2022--Patient seen and examined in room, lying in bed. Feels ok today. No chest pain or shortness of breath. Plan  for R/LHC today per Dr Tovar.   1/13/2022 CATH EARLIER TODAY SHOWED NORMAL CORS    DOING WELL, NO DYSPNEA, NO MORE SVT ,   HAD SVT AND WAS PUT ON LIBRIUM YESTERDAY , TODAY AWAKE AND COHERENT NO MORE SVT      1/14/22-Patient seen and examined today, resting in bed. No acute events overnight. No chest pain or SOB. Labs reviewed. H/H 8.8/29.9. No access site complaints. Counseled on ETOH cessation.        Review of Systems   Constitutional: Negative.   HENT: Negative.    Eyes: Negative.    Cardiovascular: Negative.    Respiratory: Negative.    Endocrine: Negative.    Hematologic/Lymphatic: Negative.    Skin: Negative.    Musculoskeletal: Negative.    Gastrointestinal: Negative.    Genitourinary: Negative.    Neurological: Negative.    Psychiatric/Behavioral: Negative.    Allergic/Immunologic: Negative.      Objective:     Vital Signs (Most Recent):  Temp: 98.1 °F (36.7 °C) (01/14/22 0723)  Pulse: 81 (01/14/22 0901)  Resp: 18 (01/14/22 0807)  BP: (!) 142/67 (01/14/22 0723)  SpO2: 100 % (01/14/22 0803) Vital Signs (24h Range):  Temp:  [97 °F (36.1 °C)-98.1 °F (36.7 °C)] 98.1 °F (36.7 °C)  Pulse:  [] 81  Resp:  [16-27] 18  SpO2:  [88 %-100 %] 100 %  BP: (110-152)/(49-72) 142/67     Weight: 61.8 kg (136 lb 3.9 oz)  Body mass index is 19.55 kg/m².     SpO2: 100 %  O2 Device (Oxygen Therapy): nasal cannula      Intake/Output Summary (Last 24 hours) at 1/14/2022 1047  Last data filed at 1/14/2022 0900  Gross per 24 hour   Intake 480 ml   Output --   Net 480 ml       Lines/Drains/Airways     Peripheral Intravenous Line                 Peripheral IV - Single Lumen 01/13/22 2100 22 G Anterior;Left Upper Arm <1 day                Physical Exam  Vitals and nursing note reviewed.   Constitutional:       General: He is not in acute distress.     Appearance: Normal appearance. He is well-developed and well-nourished. He is not diaphoretic.      Comments: Thin appearing   HENT:      Head: Normocephalic and atraumatic.   Eyes:      General:         Right eye: No discharge.         Left eye: No discharge.      Pupils: Pupils are equal, round, and reactive to light.   Neck:      Thyroid: No thyromegaly.      Vascular: No JVD.       Trachea: No tracheal deviation.   Cardiovascular:      Rate and Rhythm: Normal rate and regular rhythm.      Heart sounds: Normal heart sounds, S1 normal and S2 normal. No murmur heard.      Pulmonary:      Effort: Pulmonary effort is normal. No respiratory distress.      Breath sounds: Normal breath sounds. No wheezing or rales.   Abdominal:      General: There is no distension.      Palpations: Abdomen is soft.      Tenderness: There is no rebound.   Musculoskeletal:         General: No edema.      Cervical back: Neck supple.   Skin:     General: Skin is warm and dry.      Findings: No erythema.      Comments: Radial access site C/D/I; no bleeding erythema or drainage, intact pulse   Neurological:      Mental Status: He is alert and oriented to person, place, and time.   Psychiatric:         Mood and Affect: Mood and affect and mood normal.         Behavior: Behavior normal.         Thought Content: Thought content normal.         Significant Labs:   CMP   Recent Labs   Lab 01/13/22  0657 01/14/22  0526    142   K 3.8 4.1   CL 93* 98   CO2 37* 35*   * 95   BUN 25* 23   CREATININE 0.8 0.7   CALCIUM 9.8 9.6   ANIONGAP 10 9   ESTGFRAFRICA >60 >60   EGFRNONAA >60 >60   , CBC   Recent Labs   Lab 01/14/22  0526   WBC 6.85   HGB 8.8*   HCT 29.9*   *   , Troponin No results for input(s): TROPONINI in the last 48 hours. and All pertinent lab results from the last 24 hours have been reviewed.    Significant Imaging: Echocardiogram:   Transthoracic echo (TTE) complete ([facility] Only):   Results for orders placed or performed during the hospital encounter of 01/09/22   Echo Saline Bubble? No   Result Value Ref Range    BSA 1.81 m2    TDI SEPTAL 0.06 m/s    LV LATERAL E/E' RATIO 11.13 m/s    LV SEPTAL E/E' RATIO 14.83 m/s    LA WIDTH 4.96 cm    IVC diameter 2.68 cm    Left Ventricular Outflow Tract Mean Velocity 0.90584763202710 cm/s    Left Ventricular Outflow Tract Mean Gradient 2.54 mmHg    TDI LATERAL 0.08  m/s    LVIDd 5.51 3.5 - 6.0 cm    IVS 1.77 (A) 0.6 - 1.1 cm    Posterior Wall 1.48 (A) 0.6 - 1.1 cm    Ao root annulus 3.94 cm    LVIDs 4.61 (A) 2.1 - 4.0 cm    FS 16 28 - 44 %    Sinus 4.63 cm    STJ 4.92 cm    Ascending aorta 4.69 cm    LV mass 420.71 g    LA size 3.81 cm    TAPSE 2.05 cm    Left Ventricle Relative Wall Thickness 0.54 cm    AV regurgitation pressure 1/2 time 295.90455304 ms    AV mean gradient 6 mmHg    AV valve area 3.64 cm2    AV Velocity Ratio 0.65     AV index (prosthetic) 0.74     MV valve area p 1/2 method 4.93 cm2    E/A ratio 1.24     Mean e' 0.07 m/s    E wave deceleration time 153.679655975949663 msec    IVRT 79.795100588156237 msec    LVOT diameter 2.51 cm    LVOT area 4.9 cm2    LVOT peak karl 1.05 m/s    LVOT peak VTI 17.60 cm    Ao peak karl 1.62 m/s    Ao VTI 23.9 cm    RVOT peak karl 0.73 m/s    RVOT peak VTI 14.3 cm    LVOT stroke volume 87.04 cm3    AV peak gradient 10 mmHg    PV mean gradient 1.24 mmHg    E/E' ratio 12.71 m/s    MV Peak E Karl 0.89 m/s    AR Max Karl 5.20 m/s    TR Max Karl 5.26 m/s    MV stenosis pressure 1/2 time 44.404030204205006 ms    MV Peak A Karl 0.72 m/s    LV Systolic Volume 97.94 mL    LV Systolic Volume Index 53.5 mL/m2    LV Diastolic Volume 147.89 mL    LV Diastolic Volume Index 80.81 mL/m2    LV Mass Index 230 g/m2    Echo EF Estimated 34 %    RA Major Axis 5.05 cm    Triscuspid Valve Regurgitation Peak Gradient 111 mmHg    RA Width 4.80 cm    Right Atrial Pressure (from IVC) 8 mmHg    EF 25 %    TV rest pulmonary artery pressure 119 mmHg    Narrative    · Concentric hypertrophy and severely decreased systolic function.  · The estimated ejection fraction is 25%.  · Grade I left ventricular diastolic dysfunction.  · Normal right ventricular size with mildly reduced right ventricular   systolic function.  · Mild-to-moderate mitral regurgitation.  · Mild-to-moderate aortic regurgitation.  · Moderate left atrial enlargement.  · Moderate right atrial  enlargement.  · Moderate tricuspid regurgitation.  · Intermediate central venous pressure (8 mmHg).  · The estimated PA systolic pressure is 119 mmHg.  · There is pulmonary hypertension.  · The aortic root is moderately dilated.      , EKG: Reviewed and X-Ray: CXR: X-Ray Chest 1 View (CXR): No results found for this visit on 01/09/22. and X-Ray Chest PA and Lateral (CXR): No results found for this visit on 01/09/22.    Assessment and Plan:   Patient who presents with PNA/acute CHF. LHC showed normal coronaries. Meds optimized. Ok to d/c home. (do not d/c home on cardizem)    * Pneumonia of left upper lobe due to infectious organism  AS PER PRIMARY    Alcohol dependence  -ETOH cessation recommended    Atrial fibrillation  IN SINUS RHYTHM   ON ELIQUIS   Eliquis on hold for R/LHC today  Resume post procedure    1/14/22  -Continue Toprol  -Continue Entresto  -Remains in SR    Iron deficiency anemia due to chronic blood loss  -Monitor H/H    Hypertension  -BP controlled  -Continue BB, ARB    Chronic systolic congestive heart failure  Patient is identified as having Combined Systolic and Diastolic heart failure that is Acute on chronic. CHF is currently controlled. Latest ECHO performed and demonstrates- Results for orders placed during the hospital encounter of 01/09/22    Echo Saline Bubble? No    Interpretation Summary  · Concentric hypertrophy and severely decreased systolic function.  · The estimated ejection fraction is 25%.  · Grade I left ventricular diastolic dysfunction.  · Normal right ventricular size with mildly reduced right ventricular systolic function.  · Mild-to-moderate mitral regurgitation.  · Mild-to-moderate aortic regurgitation.  · Moderate left atrial enlargement.  · Moderate right atrial enlargement.  · Moderate tricuspid regurgitation.  · Intermediate central venous pressure (8 mmHg).  · The estimated PA systolic pressure is 119 mmHg.  · There is pulmonary hypertension.  · The aortic root is  moderately dilated.  . Continue Furosemide and monitor clinical status closely. Monitor on telemetry. Patient is on CHF pathway.  Monitor strict Is&Os and daily weights.  Place on fluid restriction of 2 L. Continue to stress to patient importance of self efficacy and  on diet for CHF. Last BNP reviewed- and noted below   Recent Labs   Lab 01/09/22  1017   BNP 1,844*   .    New diagnosis of systolic heart failure, normal EF last year   Also had chest pain ,with trop elevated from demand ischemia and flat   cw diuresis   Change cardizem to toprol   Alcohol cessation   R/lhc in am   Hx of chronic anemia and hx of crohn's disease   No bleeding , on apixaban, hold prior to cath    1/12/2022  -Plan for R/LHC today  -Continue Statin, BB, IV lasix BID  -Optimize medical therapy for CHF post procedure today  -Keep NPO for now  -Risks, benefits and treatment alternatives reviewed per Dr Tovar. Patient has agreed to proceed with R/LHC today as planned.     1/14/22  -s/p LHC which showed normal coronaries  -Continue statin, BB, ARB, po Lasix  -Follow-up in clinic        VTE Risk Mitigation (From admission, onward)         Ordered     apixaban tablet 5 mg  2 times daily         01/14/22 1022     IP VTE HIGH RISK PATIENT  Once         01/09/22 1133     Place sequential compression device  Until discontinued         01/09/22 1133     Reason for No Pharmacological VTE Prophylaxis  Once        Question:  Reasons:  Answer:  Already adequately anticoagulated on oral Anticoagulants    01/09/22 1133                Danitza Palacios PA-C  Cardiology  [facility] - Telemetry ([facility])

## 2022-01-14 NOTE — ASSESSMENT & PLAN NOTE
Recent Labs   Lab 01/09/22  1017   BNP 1,844*   .--Left heart cath was done 1/13 revealed clean coronaries  --EF is either 25% (TTE) or 50% (cath)  --f/u further cardiology recs regarding need for life vest  --likely d/c in am

## 2022-01-14 NOTE — PLAN OF CARE
Pt AAO x4.  Pt remains free of falls throughout shift.  Pt denies pain throughout shift.  Plan of care reviewed. Pt verbalizes understanding.  Pt moving and turning independently. Frequent weight shifting encouraged.  Normal sinus rhythm on monitor.  Went over discharge instructions with patient at bedside.  Stressed the importance of making and keeping all appointments.  Prescriptions delivered to bedside.  Pt verbalized understanding.   Had all questions regarding discharge answered to satisfaction.  IV removed without complications.  Tele box removed and returned to monitor tech.  Spoke to pt's daughter on phone. She will be here around 4:30PM to pick him up.

## 2022-01-14 NOTE — ASSESSMENT & PLAN NOTE
IN SINUS RHYTHM   ON ELIQUIS   Eliquis on hold for R/LHC today  Resume post procedure    1/14/22  -Continue Toprol  -Continue Entresto  -Remains in SR

## 2022-01-14 NOTE — SUBJECTIVE & OBJECTIVE
Review of Systems   Constitutional: Negative for fever.   HENT: Negative for congestion.    Respiratory: Negative for cough.    Gastrointestinal: Negative for abdominal pain.   Endocrine: Negative for polyuria.   Genitourinary: Negative for flank pain.   Musculoskeletal: Negative for back pain.   Skin: Negative for rash.   Neurological: Negative for syncope.   Psychiatric/Behavioral: Negative for confusion.     Objective:     Vital Signs (Most Recent):  Temp: 97 °F (36.1 °C) (01/13/22 1548)  Pulse: 83 (01/13/22 1548)  Resp: 18 (01/13/22 1548)  BP: (!) 152/72 (01/13/22 1548)  SpO2: (!) 94 % (01/13/22 1548) Vital Signs (24h Range):  Temp:  [97 °F (36.1 °C)-98.6 °F (37 °C)] 97 °F (36.1 °C)  Pulse:  [] 83  Resp:  [15-27] 18  SpO2:  [88 %-99 %] 94 %  BP: (110-152)/(49-72) 152/72     Weight: 63.5 kg (139 lb 15.9 oz)  Body mass index is 20.09 kg/m².    Intake/Output Summary (Last 24 hours) at 1/13/2022 1807  Last data filed at 1/13/2022 1600  Gross per 24 hour   Intake 259.47 ml   Output 250 ml   Net 9.47 ml      Physical Exam  Vitals and nursing note reviewed.   Constitutional:       General: He is not in acute distress.     Appearance: He is well-developed. He is not diaphoretic.   HENT:      Head: Normocephalic and atraumatic.      Right Ear: Hearing and external ear normal.      Left Ear: Hearing and external ear normal.      Nose: Nose normal. No mucosal edema or rhinorrhea.      Mouth/Throat:      Pharynx: Uvula midline.   Eyes:      General:         Right eye: No discharge.         Left eye: No discharge.      Conjunctiva/sclera: Conjunctivae normal.      Right eye: No chemosis.     Left eye: No chemosis.     Pupils: Pupils are equal, round, and reactive to light.   Neck:      Thyroid: No thyroid mass or thyromegaly.      Trachea: Trachea normal.   Cardiovascular:      Rate and Rhythm: Normal rate and regular rhythm.      Pulses:           Dorsalis pedis pulses are 2+ on the right side and 2+ on the left  side.      Heart sounds: Normal heart sounds. No murmur heard.      Pulmonary:      Effort: Pulmonary effort is normal. No respiratory distress.      Breath sounds: Examination of the right-lower field reveals decreased breath sounds. Examination of the left-lower field reveals decreased breath sounds. Decreased breath sounds present. No wheezing.   Chest:   Breasts:      Right: No supraclavicular adenopathy.      Left: No supraclavicular adenopathy.       Abdominal:      General: Bowel sounds are normal. There is no distension.      Palpations: Abdomen is soft.      Tenderness: There is no abdominal tenderness.   Musculoskeletal:         General: Normal range of motion.      Cervical back: Normal range of motion and neck supple.      Right lower leg: Edema present.      Left lower leg: Edema present.   Lymphadenopathy:      Cervical: No cervical adenopathy.      Upper Body:      Right upper body: No supraclavicular adenopathy.      Left upper body: No supraclavicular adenopathy.   Skin:     General: Skin is warm and dry.      Capillary Refill: Capillary refill takes less than 2 seconds.      Findings: No rash.   Neurological:      Mental Status: He is alert and oriented to person, place, and time.   Psychiatric:         Mood and Affect: Mood is not anxious.         Speech: Speech normal.         Behavior: Behavior normal.         Thought Content: Thought content normal.         Judgment: Judgment normal.         Significant Labs: All pertinent labs within the past 24 hours have been reviewed.    Significant Imaging: I have reviewed all pertinent imaging results/findings within the past 24 hours.

## 2022-01-14 NOTE — ASSESSMENT & PLAN NOTE
Patient is identified as having Combined Systolic and Diastolic heart failure that is Acute on chronic. CHF is currently controlled. Latest ECHO performed and demonstrates- Results for orders placed during the hospital encounter of 01/09/22    Echo Saline Bubble? No    Interpretation Summary  · Concentric hypertrophy and severely decreased systolic function.  · The estimated ejection fraction is 25%.  · Grade I left ventricular diastolic dysfunction.  · Normal right ventricular size with mildly reduced right ventricular systolic function.  · Mild-to-moderate mitral regurgitation.  · Mild-to-moderate aortic regurgitation.  · Moderate left atrial enlargement.  · Moderate right atrial enlargement.  · Moderate tricuspid regurgitation.  · Intermediate central venous pressure (8 mmHg).  · The estimated PA systolic pressure is 119 mmHg.  · There is pulmonary hypertension.  · The aortic root is moderately dilated.  . Continue Furosemide and monitor clinical status closely. Monitor on telemetry. Patient is on CHF pathway.  Monitor strict Is&Os and daily weights.  Place on fluid restriction of 2 L. Continue to stress to patient importance of self efficacy and  on diet for CHF. Last BNP reviewed- and noted below   Recent Labs   Lab 01/09/22  1017   BNP 1,844*   .    New diagnosis of systolic heart failure, normal EF last year   Also had chest pain ,with trop elevated from demand ischemia and flat   cw diuresis   Change cardizem to toprol   Alcohol cessation   R/lhc in am   Hx of chronic anemia and hx of crohn's disease   No bleeding , on apixaban, hold prior to cath    1/12/2022  -Plan for R/LHC today  -Continue Statin, BB, IV lasix BID  -Optimize medical therapy for CHF post procedure today  -Keep NPO for now  -Risks, benefits and treatment alternatives reviewed per Dr Tovar. Patient has agreed to proceed with R/LHC today as planned.     1/14/22  -s/p LHC which showed normal coronaries  -Continue statin, BB, ARB, po  Lasix  -Follow-up in clinic

## 2022-01-14 NOTE — DISCHARGE INSTRUCTIONS
You have been started on new medications for your heart condition. Please read the information provided and follow up with your primary providers.

## 2022-01-18 ENCOUNTER — TELEPHONE (OUTPATIENT)
Dept: CARDIOLOGY | Facility: CLINIC | Age: 82
End: 2022-01-18
Payer: MEDICARE

## 2022-01-18 ENCOUNTER — OFFICE VISIT (OUTPATIENT)
Dept: CARDIOLOGY | Facility: CLINIC | Age: 82
End: 2022-01-18
Payer: COMMERCIAL

## 2022-01-18 ENCOUNTER — PATIENT OUTREACH (OUTPATIENT)
Dept: ADMINISTRATIVE | Facility: CLINIC | Age: 82
End: 2022-01-18
Payer: MEDICARE

## 2022-01-18 VITALS
HEART RATE: 49 BPM | WEIGHT: 157.44 LBS | RESPIRATION RATE: 16 BRPM | DIASTOLIC BLOOD PRESSURE: 58 MMHG | BODY MASS INDEX: 22.54 KG/M2 | SYSTOLIC BLOOD PRESSURE: 108 MMHG | HEIGHT: 70 IN

## 2022-01-18 DIAGNOSIS — I48.0 PAF (PAROXYSMAL ATRIAL FIBRILLATION): ICD-10-CM

## 2022-01-18 DIAGNOSIS — I35.1 NONRHEUMATIC AORTIC VALVE INSUFFICIENCY: ICD-10-CM

## 2022-01-18 DIAGNOSIS — J43.9 PULMONARY EMPHYSEMA, UNSPECIFIED EMPHYSEMA TYPE: ICD-10-CM

## 2022-01-18 DIAGNOSIS — I50.22 CHRONIC SYSTOLIC (CONGESTIVE) HEART FAILURE: Primary | ICD-10-CM

## 2022-01-18 DIAGNOSIS — I50.9 CONGESTIVE HEART FAILURE, NYHA CLASS 3, UNSPECIFIED CONGESTIVE HEART FAILURE TYPE: ICD-10-CM

## 2022-01-18 DIAGNOSIS — R06.09 DOE (DYSPNEA ON EXERTION): ICD-10-CM

## 2022-01-18 DIAGNOSIS — I48.0 PAROXYSMAL ATRIAL FIBRILLATION: ICD-10-CM

## 2022-01-18 DIAGNOSIS — R79.89 ELEVATED BRAIN NATRIURETIC PEPTIDE (BNP) LEVEL: ICD-10-CM

## 2022-01-18 DIAGNOSIS — E78.49 OTHER HYPERLIPIDEMIA: ICD-10-CM

## 2022-01-18 DIAGNOSIS — I70.0 ATHEROSCLEROSIS OF ABDOMINAL AORTA: ICD-10-CM

## 2022-01-18 PROCEDURE — 99999 PR PBB SHADOW E&M-EST. PATIENT-LVL IV: CPT | Mod: PBBFAC,,, | Performed by: INTERNAL MEDICINE

## 2022-01-18 PROCEDURE — 99214 PR OFFICE/OUTPT VISIT, EST, LEVL IV, 30-39 MIN: ICD-10-PCS | Mod: S$GLB,,, | Performed by: INTERNAL MEDICINE

## 2022-01-18 PROCEDURE — 3078F DIAST BP <80 MM HG: CPT | Mod: CPTII,S$GLB,, | Performed by: INTERNAL MEDICINE

## 2022-01-18 PROCEDURE — 1111F PR DISCHARGE MEDS RECONCILED W/ CURRENT OUTPATIENT MED LIST: ICD-10-PCS | Mod: CPTII,S$GLB,, | Performed by: INTERNAL MEDICINE

## 2022-01-18 PROCEDURE — 1157F ADVNC CARE PLAN IN RCRD: CPT | Mod: CPTII,S$GLB,, | Performed by: INTERNAL MEDICINE

## 2022-01-18 PROCEDURE — 1159F MED LIST DOCD IN RCRD: CPT | Mod: CPTII,S$GLB,, | Performed by: INTERNAL MEDICINE

## 2022-01-18 PROCEDURE — 1101F PT FALLS ASSESS-DOCD LE1/YR: CPT | Mod: CPTII,S$GLB,, | Performed by: INTERNAL MEDICINE

## 2022-01-18 PROCEDURE — 1157F PR ADVANCE CARE PLAN OR EQUIV PRESENT IN MEDICAL RECORD: ICD-10-PCS | Mod: CPTII,S$GLB,, | Performed by: INTERNAL MEDICINE

## 2022-01-18 PROCEDURE — 3288F FALL RISK ASSESSMENT DOCD: CPT | Mod: CPTII,S$GLB,, | Performed by: INTERNAL MEDICINE

## 2022-01-18 PROCEDURE — 1160F RVW MEDS BY RX/DR IN RCRD: CPT | Mod: CPTII,S$GLB,, | Performed by: INTERNAL MEDICINE

## 2022-01-18 PROCEDURE — 1159F PR MEDICATION LIST DOCUMENTED IN MEDICAL RECORD: ICD-10-PCS | Mod: CPTII,S$GLB,, | Performed by: INTERNAL MEDICINE

## 2022-01-18 PROCEDURE — 3074F PR MOST RECENT SYSTOLIC BLOOD PRESSURE < 130 MM HG: ICD-10-PCS | Mod: CPTII,S$GLB,, | Performed by: INTERNAL MEDICINE

## 2022-01-18 PROCEDURE — 3078F PR MOST RECENT DIASTOLIC BLOOD PRESSURE < 80 MM HG: ICD-10-PCS | Mod: CPTII,S$GLB,, | Performed by: INTERNAL MEDICINE

## 2022-01-18 PROCEDURE — 3074F SYST BP LT 130 MM HG: CPT | Mod: CPTII,S$GLB,, | Performed by: INTERNAL MEDICINE

## 2022-01-18 PROCEDURE — 99214 OFFICE O/P EST MOD 30 MIN: CPT | Mod: S$GLB,,, | Performed by: INTERNAL MEDICINE

## 2022-01-18 PROCEDURE — 1126F PR PAIN SEVERITY QUANTIFIED, NO PAIN PRESENT: ICD-10-PCS | Mod: CPTII,S$GLB,, | Performed by: INTERNAL MEDICINE

## 2022-01-18 PROCEDURE — 3288F PR FALLS RISK ASSESSMENT DOCUMENTED: ICD-10-PCS | Mod: CPTII,S$GLB,, | Performed by: INTERNAL MEDICINE

## 2022-01-18 PROCEDURE — 99999 PR PBB SHADOW E&M-EST. PATIENT-LVL IV: ICD-10-PCS | Mod: PBBFAC,,, | Performed by: INTERNAL MEDICINE

## 2022-01-18 PROCEDURE — 1101F PR PT FALLS ASSESS DOC 0-1 FALLS W/OUT INJ PAST YR: ICD-10-PCS | Mod: CPTII,S$GLB,, | Performed by: INTERNAL MEDICINE

## 2022-01-18 PROCEDURE — 1111F DSCHRG MED/CURRENT MED MERGE: CPT | Mod: CPTII,S$GLB,, | Performed by: INTERNAL MEDICINE

## 2022-01-18 PROCEDURE — 1126F AMNT PAIN NOTED NONE PRSNT: CPT | Mod: CPTII,S$GLB,, | Performed by: INTERNAL MEDICINE

## 2022-01-18 PROCEDURE — 1160F PR REVIEW ALL MEDS BY PRESCRIBER/CLIN PHARMACIST DOCUMENTED: ICD-10-PCS | Mod: CPTII,S$GLB,, | Performed by: INTERNAL MEDICINE

## 2022-01-18 RX ORDER — SACUBITRIL AND VALSARTAN 24; 26 MG/1; MG/1
1 TABLET, FILM COATED ORAL 2 TIMES DAILY
Qty: 60 TABLET | Refills: 3 | Status: SHIPPED | OUTPATIENT
Start: 2022-01-18 | End: 2022-01-18 | Stop reason: SDUPTHER

## 2022-01-18 RX ORDER — SACUBITRIL AND VALSARTAN 24; 26 MG/1; MG/1
1 TABLET, FILM COATED ORAL 2 TIMES DAILY
Qty: 60 TABLET | Refills: 3 | Status: SHIPPED | OUTPATIENT
Start: 2022-01-18 | End: 2022-01-18

## 2022-01-18 RX ORDER — METOPROLOL SUCCINATE 50 MG/1
50 TABLET, EXTENDED RELEASE ORAL 2 TIMES DAILY
Qty: 60 TABLET | Refills: 3 | Status: SHIPPED | OUTPATIENT
Start: 2022-01-18 | End: 2022-06-16

## 2022-01-18 RX ORDER — SACUBITRIL AND VALSARTAN 24; 26 MG/1; MG/1
1 TABLET, FILM COATED ORAL 2 TIMES DAILY
Qty: 60 TABLET | Refills: 3 | Status: SHIPPED | OUTPATIENT
Start: 2022-01-18 | End: 2022-03-17

## 2022-01-18 RX ORDER — FUROSEMIDE 20 MG/1
20 TABLET ORAL DAILY
Qty: 90 TABLET | Refills: 0 | Status: SHIPPED | OUTPATIENT
Start: 2022-01-18 | End: 2022-03-17 | Stop reason: SDUPTHER

## 2022-01-18 NOTE — PROGRESS NOTES
"Subjective:   Patient ID:  Richy Douglas is a 81 y.o. male who presents for cardiac consult of No chief complaint on file.    Referring Physician:    Vivian Ch MD [id number]] [address] [place] [address]      Reason for consult: CHF, AI    HPI  The patient came in today for cardiac consult of No chief complaint on file.      Richy Douglas is a 81 y.o. male pt with h/o CHF, AI, GERD, HTN, emphysema, aortic aneurysm, anemia presents for follow up CV eval.     4/27/21  He had ER eval last month - Emergency Department for evaluation of SOB which onset gradually x 1 week ago. Pt reports sustaining a rib fracture 1 week ago and states his "breathing got bad". Pt reports smoking "only when football games are on". Symptoms are constant and moderate in severity. No mitigating or exacerbating factors reported. Associated sxs include cough. Pt also notes L foot swelling and L knee swelling that has been intermittent for a few months.     BNP was 1258.     He had CT chest -    Moderate to severe vascular calcification seen involving the aorta.  Coronary artery calcifications are also noted.  There is no pericardial effusion. No enlarged mediastinal, hilar or axillary lymph nodes are identified.    He feels more dyspnea but also CP from rib fracture.     8/3/21  Nuclear stress neg, EF 45%. BNP 1074- Told pt BNP is significantly elevated due to extra fluid, if feeling more shortness of breath or edema can double up fluid pills for 2-3 days and monitor pressures,     Ref Range & Units 2 mo ago 5 mo ago 8 mo ago    BNP 0 - 99 pg/mL 1,047 High   1,258 High  CM  202 High      He has been having some tingling/numbess in L arm.      Hosp stay Sept 2021    ED today with a chief complaint of worsening SOB that started the day prior. Associated symptoms included malaise, productive cough, fever, and pleuritic chest discomfort. Patient denied any associated kevin chest pain, nausea, vomiting, diaphoresis, palpitations, " near syncope, or syncope. Initial workup in ED revealed leukocytosis (WBC 18,000), anemia (H/H 8.8/28.1), and BNP of 929. CXR showed findings consistent with PNA. EKG showed new onset afib with RVR and patient was subsequently admitted for further evaluation and treatment. Cardiology consulted to assist with management. Patient seen and examined today in ED. Still endorses SOB along with pleuritic chest discomfort. No other CV complaints. No prior history of afib. He reports compliance with his medications. Followed on OP basis by Dr. Garces. Chart reviewed. Troponin x 1 negative. Echo 4/21 showed normal EF. MPI stress test 5/21 negative for ischemia.      Hospital Course:   9/25/21-Patient seen and examined today, resting in bed. Feeling much better, SOB improved. Still has some hoarseness. No chest pain. Remains in afib, but HR controlled. Cardizem switched to po and Eliquis initiated. Labs stable. Will need to monitor H/H on AC.  9/26/21-Patient seen and examined today, lying in bed. SOB continues to improve. Still has occasional cough. No chest pain. HR controlled. BC + for gram negative rods. H/H stable.  9/27/21- patient in bed resting. Has no complaints this morning. NSR 68 bpm.. BC +gram negative rods. Labs stable. No abnormal bleeding on Eliquis. cardizem gtt switched to PO  180mg daily   9/28/2021--Patient seen and examined in room, sitting on side of bed. Feels today. Remains in SR. Ok to discharge home from cards standpoint. Will need OP Cards follow up with Dr Garces next week.     12/9/21  Pt had hosp stay in Sept had new onset AFib. He converted to NSR with cardizem drip and treatment of sepsis PNA. BP and HR well controlled today.     Hosp stay  Hospital Course:   1/10/22- pt comfortable on 2L via NC current smoker physical exam consistent with COPD  1/11/22- CT scan of the chest reveals severe emphysematous lungs with bully scattered throughout all lung fields bilaterally.  Consolidation left upper  and middle jose lobes consistent with pneumonia.  No masses. Echo reveals new decline in EF to 25% pt reported chest pain on admission  1/12/22- pt in Afib coronary angiogram postponed. Rate controlled on metoprolol. Daily drinker started on librium  1/13/22- pt underwent LHC today with Dr. Tovar result was clean coronaries. EF documented at 50% although transthoracic echo EF was 25%     1/14 patient reports improvement in sx. Tolerated lhc procedure well. After discussing with cards, ok to d/c. Patient will need to discuss initiation of entresto on o/p basis. Continue losartan, bb, eliquis and lasix. New rxs delivered to bedside.    1/18/22  He presents post hosp stay, neg cath. ECHO with dec EF 25%,EF low normal on LHC.  BP is low normal, pulse 40s. He has been drinking daily for a while but has quit now.     Patient feels no leg swelling, no PND, no palpitation, no dizziness, no syncope, no CNS symptoms.    Patient has fairly good exercise tolerance. He does recycling, separation.     Patient is compliant with medications.    FH - mother - DM, PVD, tobacco    Results for orders placed during the hospital encounter of 01/09/22    Echo Saline Bubble? No    Interpretation Summary  · Concentric hypertrophy and severely decreased systolic function.  · The estimated ejection fraction is 25%.  · Grade I left ventricular diastolic dysfunction.  · Normal right ventricular size with mildly reduced right ventricular systolic function.  · Mild-to-moderate mitral regurgitation.  · Mild-to-moderate aortic regurgitation.  · Moderate left atrial enlargement.  · Moderate right atrial enlargement.  · Moderate tricuspid regurgitation.  · Intermediate central venous pressure (8 mmHg).  · The estimated PA systolic pressure is 119 mmHg.  · There is pulmonary hypertension.  · The aortic root is moderately dilated.    Results for orders placed during the hospital encounter of 01/09/22    Cardiac catheterization    Conclusion  · The  pre-procedure left ventricular end diastolic pressure was 13.  · The estimated blood loss was none.  · The coronary arteries were normal..  · The filling pressures on the right and left were normal.    The procedure log was documented by Documenter: Jenna Cantu, RN and verified by Janneth Tovar MD.    Date: 1/13/2022  Time: 5:22 PM          Past Medical History:   Diagnosis Date    Anemia of chronic disease     Aortic aneurysm     Atrial fibrillation with RVR 9/24/2021    Chronic systolic congestive heart failure 8/31/2017    Emphysema of lung 8/31/2017    GERD (gastroesophageal reflux disease)     Hypertension     Microcytic anemia 11/24/2020    Other hyperlipidemia 4/27/2021    Prostate cancer        Past Surgical History:   Procedure Laterality Date    CATHETERIZATION OF BOTH LEFT AND RIGHT HEART N/A 1/13/2022    Procedure: CATHETERIZATION, HEART, BOTH LEFT AND RIGHT;  Surgeon: Janneth Tovar MD;  Location: [facility] CATH LAB;  Service: Cardiology;  Laterality: N/A;  poss cx    COLONOSCOPY      CORONARY ANGIOGRAPHY N/A 1/13/2022    Procedure: ANGIOGRAM, CORONARY ARTERY;  Surgeon: Janneth Tovar MD;  Location: [facility] CATH LAB;  Service: Cardiology;  Laterality: N/A;    PROSTATE SURGERY      SPINE SURGERY         Social History     Tobacco Use    Smoking status: Current Every Day Smoker     Packs/day: 1.00     Types: Cigarettes    Smokeless tobacco: Never Used   Substance Use Topics    Alcohol use: Yes     Comment: reports only drinks red wine when games are on    Drug use: Never       Family History   Problem Relation Age of Onset    Diabetes Mother     Peripheral vascular disease Mother        Patient's Medications   New Prescriptions    SACUBITRIL-VALSARTAN (ENTRESTO) 24-26 MG PER TABLET    Take 1 tablet by mouth 2 (two) times daily.   Previous Medications    ALBUTEROL (PROVENTIL/VENTOLIN HFA) 90 MCG/ACTUATION INHALER    Inhale 1-2 puffs into the lungs every 6 (six) hours as needed for  "Wheezing or Shortness of Breath. Rescue    APIXABAN (ELIQUIS) 5 MG TAB    Take 1 tablet (5 mg total) by mouth 2 (two) times daily.    ATORVASTATIN (LIPITOR) 10 MG TABLET    Take 1 tablet (10 mg total) by mouth once daily.    FUROSEMIDE (LASIX) 20 MG TABLET    Take 1 tablet (20 mg total) by mouth once daily.    METOPROLOL SUCCINATE (TOPROL-XL) 50 MG 24 HR TABLET    Take 1 tablet (50 mg total) by mouth 2 (two) times daily.    PANTOPRAZOLE (PROTONIX) 40 MG TABLET    Take 1 tablet (40 mg total) by mouth once daily.    TIOTROPIUM (SPIRIVA) 18 MCG INHALATION CAPSULE    Inhale 1 capsule (18 mcg total) into the lungs once daily. Controller   Modified Medications    No medications on file   Discontinued Medications    LOSARTAN (COZAAR) 25 MG TABLET    Take 1 tablet (25 mg total) by mouth once daily.       Review of Systems   Constitutional: Positive for malaise/fatigue.   HENT: Negative.    Eyes: Negative.    Respiratory: Positive for shortness of breath.    Cardiovascular: Positive for chest pain.   Gastrointestinal: Negative.    Genitourinary: Negative.    Musculoskeletal: Negative.    Skin: Negative.    Neurological: Negative.    Endo/Heme/Allergies: Negative.    Psychiatric/Behavioral: Negative.    All 12 systems otherwise negative.      Wt Readings from Last 3 Encounters:   01/18/22 71.4 kg (157 lb 6.5 oz)   01/13/22 61.8 kg (136 lb 3.9 oz)   12/21/21 73.2 kg (161 lb 6 oz)     Temp Readings from Last 3 Encounters:   01/14/22 97.3 °F (36.3 °C) (Oral)   01/04/22 98.1 °F (36.7 °C)   12/28/21 98.2 °F (36.8 °C)     BP Readings from Last 3 Encounters:   01/18/22 (!) 108/58   01/14/22 (!) 148/67   01/04/22 (!) 142/62     Pulse Readings from Last 3 Encounters:   01/18/22 (!) 49   01/14/22 84   01/04/22 81       BP (!) 108/58 (BP Location: Left arm, Patient Position: Sitting, BP Method: Medium (Manual))   Pulse (!) 49   Resp 16   Ht 5' 10" (1.778 m)   Wt 71.4 kg (157 lb 6.5 oz)   BMI 22.59 kg/m²     Objective:   Physical " Exam  Vitals and nursing note reviewed.   Constitutional:       General: He is not in acute distress.     Appearance: He is well-developed. He is not diaphoretic.   HENT:      Head: Normocephalic and atraumatic.      Nose: Nose normal.   Eyes:      General: No scleral icterus.     Conjunctiva/sclera: Conjunctivae normal.   Neck:      Thyroid: No thyromegaly.      Vascular: No JVD.   Cardiovascular:      Rate and Rhythm: Normal rate and regular rhythm.      Heart sounds: S1 normal and S2 normal. No murmur heard.  No friction rub. No gallop. No S3 or S4 sounds.    Pulmonary:      Effort: Pulmonary effort is normal. No respiratory distress.      Breath sounds: Normal breath sounds. No stridor. No wheezing or rales.   Chest:      Chest wall: No tenderness.   Abdominal:      General: Bowel sounds are normal. There is no distension.      Palpations: Abdomen is soft. There is no mass.      Tenderness: There is no abdominal tenderness. There is no rebound.   Genitourinary:     Comments: Deferred  Musculoskeletal:         General: No tenderness or deformity. Normal range of motion.      Cervical back: Normal range of motion and neck supple.   Lymphadenopathy:      Cervical: No cervical adenopathy.   Skin:     General: Skin is warm and dry.      Coloration: Skin is not pale.      Findings: No erythema or rash.   Neurological:      Mental Status: He is alert and oriented to person, place, and time.      Motor: No abnormal muscle tone.      Coordination: Coordination normal.   Psychiatric:         Behavior: Behavior normal.         Thought Content: Thought content normal.         Judgment: Judgment normal.         Lab Results   Component Value Date     01/14/2022    K 4.1 01/14/2022    CL 98 01/14/2022    CO2 35 (H) 01/14/2022    BUN 23 01/14/2022    CREATININE 0.7 01/14/2022    GLU 95 01/14/2022    MG 1.7 09/28/2021    AST 13 01/11/2022    ALT 10 01/11/2022    ALBUMIN 3.2 (L) 01/11/2022    PROT 6.8 01/11/2022    BILITOT  1.2 (H) 01/11/2022    WBC 6.85 01/14/2022    HGB 8.8 (L) 01/14/2022    HCT 29.9 (L) 01/14/2022    MCV 70 (L) 01/14/2022     (H) 01/14/2022    INR 1.4 (H) 09/24/2021    TSH 0.870 11/24/2020    CHOL 144 09/17/2020    HDL 61 09/17/2020    LDLCALC 74.0 09/17/2020    TRIG 45 09/17/2020    BNP 1,844 (H) 01/09/2022     Assessment:      1. Chronic systolic (congestive) heart failure    2. Paroxysmal atrial fibrillation    3. Elevated brain natriuretic peptide (BNP) level    4. Pulmonary emphysema, unspecified emphysema type    5. PAF (paroxysmal atrial fibrillation)    6. Atherosclerosis of abdominal aorta    7. Other hyperlipidemia    8. Nonrheumatic aortic valve insufficiency    9. SANCHEZ (dyspnea on exertion)    10. Congestive heart failure, NYHA class 3, unspecified congestive heart failure type        Plan:   1. CHF, AI with aortic atherosc;  BNP 1047 -- 969; BNP 1844; EF low on ECHO, low normal with LHC  - ECHO with mild to mod valvular heart disease with low EF  - R/LHC normal coronaries 11/2022  - cont lasix  - change Losartan to Entresto - class 3 symptoms     2. Emphysema with dyspnea  - cont tx PCP/Pulm  - referral placed to pulm again     3. HTN with aortic root moderately dilated - Aortic root 4.6-4.9 cm  - titrate meds  - cont to monitor     4. HLD with aortic athero and Coronary artery calcifications  - cont statin    5. PAF, diagnosed in setting of sepsis/PNA, recent SVT in hosp - sec to alc withdrawal  - Cont Eliquis 5 BID and BB    Can return to work, needs to monitor BP and weights, monitor for symptoms.     Thank you for allowing me to participate in this patient's care. Please do not hesitate to contact me with any questions or concerns. Consult note has been forwarded to the referral physician.

## 2022-01-18 NOTE — TELEPHONE ENCOUNTER
48-72 hr phone follow up from hospital discharge.     I spoke with Richy Douglas    Current weight 157 lb.  Current blood pressure and heart rate 108/58     The patient was seen in clinic today by Dr. Garces.     CHF education reviewed with pt in detail  Recommend 2 gram sodium restriction and 1500cc fluid restriction.  Encourage physical activity with graded exercise program.  Requested patient to weigh themselves daily, and to notify us if their weight increases by more than 2 lbs in 1 day or 5 lbs in 1 week.       Watch for these Signs and Symptoms  If any of these occur, contact your doctor immediately:   Increase in shortness of breath with movement   Increase in swelling in your legs and ankles   Weight gain of more than 2 pounds in a night or 5 pounds in a week   Difficulty breathing when you are lying down   Worsening fatigue or tiredness   Stomach bloating, a full feeling or a loss of appetite   Increased coughing--especially when you are lying down    Call 911 if any of the followin: Worsening chest tightness or chest pain  2: Cough with pink, frothy sputum       office number provided for direct contact for any questions or concerns.

## 2022-01-19 ENCOUNTER — INFUSION (OUTPATIENT)
Dept: INFUSION THERAPY | Facility: HOSPITAL | Age: 82
End: 2022-01-19
Attending: INTERNAL MEDICINE
Payer: COMMERCIAL

## 2022-01-19 ENCOUNTER — PES CALL (OUTPATIENT)
Dept: HOME HEALTH SERVICES | Facility: CLINIC | Age: 82
End: 2022-01-19
Payer: MEDICARE

## 2022-01-19 VITALS
TEMPERATURE: 99 F | SYSTOLIC BLOOD PRESSURE: 120 MMHG | RESPIRATION RATE: 16 BRPM | DIASTOLIC BLOOD PRESSURE: 54 MMHG | OXYGEN SATURATION: 94 % | HEART RATE: 78 BPM

## 2022-01-19 DIAGNOSIS — D50.0 IRON DEFICIENCY ANEMIA DUE TO CHRONIC BLOOD LOSS: Primary | ICD-10-CM

## 2022-01-19 PROCEDURE — 63600175 PHARM REV CODE 636 W HCPCS: Performed by: INTERNAL MEDICINE

## 2022-01-19 PROCEDURE — 25000003 PHARM REV CODE 250: Performed by: INTERNAL MEDICINE

## 2022-01-19 PROCEDURE — 96365 THER/PROPH/DIAG IV INF INIT: CPT

## 2022-01-19 RX ORDER — EPINEPHRINE 0.3 MG/.3ML
0.3 INJECTION SUBCUTANEOUS ONCE AS NEEDED
Status: CANCELLED | OUTPATIENT
Start: 2022-01-25

## 2022-01-19 RX ORDER — HEPARIN 100 UNIT/ML
500 SYRINGE INTRAVENOUS
Status: CANCELLED | OUTPATIENT
Start: 2022-01-25

## 2022-01-19 RX ORDER — SODIUM CHLORIDE 0.9 % (FLUSH) 0.9 %
10 SYRINGE (ML) INJECTION
Status: CANCELLED | OUTPATIENT
Start: 2022-01-25

## 2022-01-19 RX ORDER — DIPHENHYDRAMINE HYDROCHLORIDE 50 MG/ML
50 INJECTION INTRAMUSCULAR; INTRAVENOUS ONCE AS NEEDED
Status: CANCELLED | OUTPATIENT
Start: 2022-01-25

## 2022-01-19 RX ORDER — METHYLPREDNISOLONE SOD SUCC 125 MG
125 VIAL (EA) INJECTION ONCE AS NEEDED
Status: CANCELLED | OUTPATIENT
Start: 2022-01-25

## 2022-01-19 RX ADMIN — SODIUM CHLORIDE 125 MG: 9 INJECTION, SOLUTION INTRAVENOUS at 02:01

## 2022-01-20 ENCOUNTER — PES CALL (OUTPATIENT)
Dept: HOME HEALTH SERVICES | Facility: CLINIC | Age: 82
End: 2022-01-20
Payer: MEDICARE

## 2022-01-26 ENCOUNTER — INFUSION (OUTPATIENT)
Dept: INFUSION THERAPY | Facility: HOSPITAL | Age: 82
End: 2022-01-26
Attending: INTERNAL MEDICINE
Payer: COMMERCIAL

## 2022-01-26 VITALS
HEART RATE: 80 BPM | DIASTOLIC BLOOD PRESSURE: 64 MMHG | RESPIRATION RATE: 16 BRPM | OXYGEN SATURATION: 98 % | SYSTOLIC BLOOD PRESSURE: 138 MMHG | TEMPERATURE: 97 F

## 2022-01-26 DIAGNOSIS — D63.8 ANEMIA OF CHRONIC DISEASE: Primary | ICD-10-CM

## 2022-01-26 DIAGNOSIS — D50.0 IRON DEFICIENCY ANEMIA DUE TO CHRONIC BLOOD LOSS: ICD-10-CM

## 2022-01-26 PROCEDURE — 63600175 PHARM REV CODE 636 W HCPCS: Performed by: INTERNAL MEDICINE

## 2022-01-26 PROCEDURE — 25000003 PHARM REV CODE 250: Performed by: INTERNAL MEDICINE

## 2022-01-26 PROCEDURE — 96365 THER/PROPH/DIAG IV INF INIT: CPT

## 2022-01-26 RX ORDER — SODIUM CHLORIDE 0.9 % (FLUSH) 0.9 %
10 SYRINGE (ML) INJECTION
Status: DISCONTINUED | OUTPATIENT
Start: 2022-01-26 | End: 2022-01-26 | Stop reason: HOSPADM

## 2022-01-26 RX ORDER — METHYLPREDNISOLONE SOD SUCC 125 MG
125 VIAL (EA) INJECTION ONCE AS NEEDED
Status: CANCELLED | OUTPATIENT
Start: 2022-02-02

## 2022-01-26 RX ORDER — EPINEPHRINE 0.3 MG/.3ML
0.3 INJECTION SUBCUTANEOUS ONCE AS NEEDED
Status: CANCELLED | OUTPATIENT
Start: 2022-02-02

## 2022-01-26 RX ORDER — HEPARIN 100 UNIT/ML
500 SYRINGE INTRAVENOUS
Status: CANCELLED | OUTPATIENT
Start: 2022-02-02

## 2022-01-26 RX ORDER — DIPHENHYDRAMINE HYDROCHLORIDE 50 MG/ML
50 INJECTION INTRAMUSCULAR; INTRAVENOUS ONCE AS NEEDED
Status: DISCONTINUED | OUTPATIENT
Start: 2022-01-26 | End: 2022-01-26 | Stop reason: HOSPADM

## 2022-01-26 RX ORDER — DIPHENHYDRAMINE HYDROCHLORIDE 50 MG/ML
50 INJECTION INTRAMUSCULAR; INTRAVENOUS ONCE AS NEEDED
Status: CANCELLED | OUTPATIENT
Start: 2022-02-02

## 2022-01-26 RX ORDER — EPINEPHRINE 0.3 MG/.3ML
0.3 INJECTION SUBCUTANEOUS ONCE AS NEEDED
Status: DISCONTINUED | OUTPATIENT
Start: 2022-01-26 | End: 2022-01-26 | Stop reason: HOSPADM

## 2022-01-26 RX ORDER — SODIUM CHLORIDE 0.9 % (FLUSH) 0.9 %
10 SYRINGE (ML) INJECTION
Status: CANCELLED | OUTPATIENT
Start: 2022-02-02

## 2022-01-26 RX ORDER — METHYLPREDNISOLONE SOD SUCC 125 MG
125 VIAL (EA) INJECTION ONCE AS NEEDED
Status: DISCONTINUED | OUTPATIENT
Start: 2022-01-26 | End: 2022-01-26 | Stop reason: HOSPADM

## 2022-01-26 RX ADMIN — SODIUM CHLORIDE 125 MG: 9 INJECTION, SOLUTION INTRAVENOUS at 10:01

## 2022-01-26 NOTE — PLAN OF CARE
Problem: Adult Inpatient Plan of Care  Goal: Plan of Care Review  Outcome: Ongoing, Progressing  Flowsheets (Taken 1/26/2022 1038)  Plan of Care Reviewed With: patient  Goal: Patient-Specific Goal (Individualized)  Outcome: Ongoing, Progressing  Flowsheets (Taken 1/26/2022 1038)  Anxieties, Fears or Concerns: none  Individualized Care Needs: feet elevated  Patient-Specific Goals (Include Timeframe): tolerate treatment  Goal: Optimal Comfort and Wellbeing  Outcome: Ongoing, Progressing     Problem: Fall Injury Risk  Goal: Absence of Fall and Fall-Related Injury  Outcome: Ongoing, Progressing

## 2022-01-26 NOTE — DISCHARGE INSTRUCTIONS
[facility]  [address] [facility]  [address]  [phone] phone     [phone] fax  Hours of Operation: Monday- Friday 8:00am- 5:00pm  After hours phone  [phone]  Hematology / Oncology Physicians on call      Dr. Ricky Prater, VIVI Puente NP    Please call with any concerns regarding your appointment today.  Patient Education       Ferric Gluconate (TIGRE ik GLOO koe colleen)   Brand Names: US Ferrlecit   Brand Names: Allie Ferrlecit   What is this drug used for?   · It is used to treat anemia.    What do I need to tell my doctor BEFORE I take this drug?   · If you are allergic to this drug; any part of this drug; or any other drugs, foods, or substances. Tell your doctor about the allergy and what signs you had.  · If you have any of these health problems: Too much iron in your body or anemia from a cause other than low iron.  · If you are breast-feeding. Do not breast-feed while you take this drug.  This is not a list of all drugs or health problems that interact with this drug.  Tell your doctor and pharmacist about all of your drugs (prescription or OTC, natural products, vitamins) and health problems. You must check to make sure that it is safe for you to take this drug with all of your drugs and health problems. Do not start, stop, or change the dose of any drug without checking with your doctor.  What are some things I need to know or do while I take this drug?   · Tell all of your health care providers that you take this drug. This includes your doctors, nurses, pharmacists, and dentists.  · To lower the chance of feeling dizzy or passing out, rise slowly if you have been sitting or lying down. Be careful going up and down stairs.  · Have blood work checked as you have been told by the doctor. Talk with the doctor.  · This drug may affect certain lab tests. Tell all of your health care providers  and lab workers that you take this drug.  · You will be watched closely while you receive this drug and for some time after your dose. Talk with your doctor.  · This drug has benzyl alcohol in it. Benzyl alcohol may cause very bad and sometimes deadly side effects in newborns or infants. If you have questions, talk with the doctor.  · Tell your doctor if you are pregnant or plan on getting pregnant. You will need to talk about the benefits and risks of using this drug while you are pregnant.    What are some side effects that I need to call my doctor about right away?   WARNING/CAUTION: Even though it may be rare, some people may have very bad and sometimes deadly side effects when taking a drug. Tell your doctor or get medical help right away if you have any of the following signs or symptoms that may be related to a very bad side effect:  · Signs of an allergic reaction, like rash; hives; itching; red, swollen, blistered, or peeling skin with or without fever; wheezing; tightness in the chest or throat; trouble breathing, swallowing, or talking; unusual hoarseness; or swelling of the mouth, face, lips, tongue, or throat. Rarely, some allergic reactions have been deadly.  · Signs of high potassium levels like a heartbeat that does not feel normal; feeling confused; feeling weak, lightheaded, or dizzy; feeling like passing out; numbness or tingling; or shortness of breath.  · Signs of high or low blood pressure like very bad headache or dizziness, passing out, or change in eyesight.  · Chest pain or pressure or a fast heartbeat.  · A burning, numbness, or tingling feeling that is not normal.  · Swelling.  · Cough.  · Fever or chills.  · Flushing.  · Shortness of breath.  · Very bad back pain.  · Very bad groin or thigh pain.  What are some other side effects of this drug?   All drugs may cause side effects. However, many people have no side effects or only have minor side effects. Call your doctor or get medical  help if any of these side effects or any other side effects bother you or do not go away:  · Cramps.  · Stomach pain or diarrhea.  · Upset stomach or throwing up.  · Not hungry.  · Feeling dizzy, tired, or weak.  · Headache.  · Irritation where this drug is given.  · Signs of a common cold.  These are not all of the side effects that may occur. If you have questions about side effects, call your doctor. Call your doctor for medical advice about side effects.  You may report side effects to your national health agency.  You may report side effects to the FDA at 1-206.313.7202. You may also report side effects at https://www.fda.gov/medwatch.  How is this drug best taken?   Use this drug as ordered by your doctor. Read all information given to you. Follow all instructions closely.  · It is given into a vein for a period of time.  What do I do if I miss a dose?   · Call your doctor to find out what to do.    How do I store and/or throw out this drug?   · If you need to store this drug at home, talk with your doctor, nurse, or pharmacist about how to store it.    General drug facts   · If your symptoms or health problems do not get better or if they become worse, call your doctor.  · Do not share your drugs with others and do not take anyone else's drugs.  · Keep all drugs in a safe place. Keep all drugs out of the reach of children and pets.  · Throw away unused or  drugs. Do not flush down a toilet or pour down a drain unless you are told to do so. Check with your pharmacist if you have questions about the best way to throw out drugs. There may be drug take-back programs in your area.  · Some drugs may have another patient information leaflet. If you have any questions about this drug, please talk with your doctor, nurse, pharmacist, or other health care provider.  · Some drugs may have another patient information leaflet. Check with your pharmacist. If you have any questions about this drug, please talk with  your doctor, nurse, pharmacist, or other health care provider.  · If you think there has been an overdose, call your poison control center or get medical care right away. Be ready to tell or show what was taken, how much, and when it happened.    Consumer Information Use and Disclaimer   This generalized information is a limited summary of diagnosis, treatment, and/or medication information. It is not meant to be comprehensive and should be used as a tool to help the user understand and/or assess potential diagnostic and treatment options. It does NOT include all information about conditions, treatments, medications, side effects, or risks that may apply to a specific patient. It is not intended to be medical advice or a substitute for the medical advice, diagnosis, or treatment of a health care provider based on the health care provider's examination and assessment of a patient's specific and unique circumstances. Patients must speak with a health care provider for complete information about their health, medical questions, and treatment options, including any risks or benefits regarding use of medications. This information does not endorse any treatments or medications as safe, effective, or approved for treating a specific patient. UpToDate, Inc. and its affiliates disclaim any warranty or liability relating to this information or the use thereof. The use of this information is governed by the Terms of Use, available at https://www.[employer].com/en/solutions/lexicomp/about/jailene.  Last Reviewed Date   2020-12-29  Copyright   © 2021 UpTo[employer], Inc. and its affiliates and/or licensors. All rights reserved.

## 2022-01-27 ENCOUNTER — CARE AT HOME (OUTPATIENT)
Dept: HOME HEALTH SERVICES | Facility: CLINIC | Age: 82
End: 2022-01-27
Payer: COMMERCIAL

## 2022-01-27 VITALS
TEMPERATURE: 98 F | OXYGEN SATURATION: 96 % | HEART RATE: 90 BPM | RESPIRATION RATE: 18 BRPM | SYSTOLIC BLOOD PRESSURE: 118 MMHG | DIASTOLIC BLOOD PRESSURE: 54 MMHG

## 2022-01-27 DIAGNOSIS — Z72.0 NICOTINE ABUSE: ICD-10-CM

## 2022-01-27 DIAGNOSIS — J43.9 PULMONARY EMPHYSEMA, UNSPECIFIED EMPHYSEMA TYPE: ICD-10-CM

## 2022-01-27 DIAGNOSIS — I50.22 CHRONIC SYSTOLIC CONGESTIVE HEART FAILURE: Primary | ICD-10-CM

## 2022-01-27 PROCEDURE — 99350 HOME/RES VST EST HIGH MDM 60: CPT | Mod: ,,, | Performed by: NURSE PRACTITIONER

## 2022-01-27 PROCEDURE — 99350 PR HOME VISIT,ESTAB PATIENT,LEVEL IV: ICD-10-PCS | Mod: ,,, | Performed by: NURSE PRACTITIONER

## 2022-01-28 NOTE — PROGRESS NOTES
[facility]  Transition of Care Home Visit    Visit Date: 1/27/2022  Encounter Provider: Silvia LEONE-JANAE  PCP:  Vivian Ch MD    PRESENTING HISTORY      Patient ID: Richy Douglas is a 81 y.o. male.    Consult Requested By:  Dr. Mando Guallpa  Reason for Consult:  Hospital Follow up    Richy is being seen at home due to physical debility that presents a taxing effort to leave the home, to mitigate high risk of hospital readmission and/or due to the limited availability of reliable or safe options for transportation to the point of access to the provider. Prior to treatment on this visit the chart was reviewed and patient verbal consent was obtained.      Chief Complaint: Transitional Care    Patient was admitted to hospital on 01/09 and discharged to home on 01/14    History of Present Illness: Mr. Richy Douglas is a 81 y.o. male who was recently admitted to the hospital. He has a PMHx of aortic aneurysm, atrial fibrillation with RVR, emphysema of lung, chronic systolic congestive heart failure, GERD, HTN, other HLD, and prostate cancer. He presents to the Emergency Department for SOB which onset gradually 2 days PTA. The pt reports that his SpO2 was 85% on RA. Symptoms are constant and moderate in severity. No mitigating or exacerbating factors reported. Associated sxs include L sided anterior CP that radiates down his L arm since yesterday and L arm paresthesias. Patient denies any fever, chills, numbness, n/v/d, diaphoresis, headahces, and all other sxs at this time. No prior Tx reported. No further complaints or concerns at this time. In the ED, BNP: 1844, H/H: 9.7/32.7, CRP: 151.4, CXR: Left lung consolidation, Lactic: normal, Procalcitonin: Normal. Treated with Lasix and Levaquin. Patient is a full code, surrogate decision maker is his spouse, Britany Douglas.   Treated for pneumonia   On eliquis denies any bleeding   Drinks alcohol daily as per daughter   Had severe anterior chest pain on  admission , now resolved   Echo showed EF IN THE 20% , new changes when compared to his echo done last year, had a normal lexiscan last year     ___________________________________________________________________    Today:    HPI:  Patient was seen today for a hospital follow up. Upon arrival patient was seated in his living room with his wife present. Patient was AAOx3 in no acute distress. See hospital course for details of patient's illness. Spoke with patient and wife about tobacco cessation. Offered to get patient to talk with the smoking cessation program at [facility] but patient declines at this time.    Review of Systems   Constitutional: Negative.    HENT: Negative.    Respiratory: Positive for shortness of breath.    Cardiovascular: Negative.    Gastrointestinal: Negative for constipation and diarrhea.   Genitourinary: Negative.    Musculoskeletal: Negative.    Skin: Negative for rash and wound.   Neurological: Negative for dizziness, weakness and light-headedness.   Psychiatric/Behavioral: Negative.        Assessments:  · Environmental: Single story dwelling, no steps at entrance, home is neat but smells of cigarettes, lighting is adequate and temperature is comfortable  · Functional Status: Patient is independent with all ADLs  · Safety: Fall Risk  · Nutritional: Adequate food in the home  · Home Health/DME/Supplies: No home health, patient has a shower bench    PAST HISTORY:     Past Medical History:   Diagnosis Date    Anemia of chronic disease     Aortic aneurysm     Atrial fibrillation with RVR 9/24/2021    Chronic systolic congestive heart failure 8/31/2017    Emphysema of lung 8/31/2017    GERD (gastroesophageal reflux disease)     Hypertension     Microcytic anemia 11/24/2020    Other hyperlipidemia 4/27/2021    Prostate cancer        Past Surgical History:   Procedure Laterality Date    CATHETERIZATION OF BOTH LEFT AND RIGHT HEART N/A 1/13/2022    Procedure: CATHETERIZATION, HEART, BOTH  LEFT AND RIGHT;  Surgeon: Janneth Tovar MD;  Location: [facility] CATH LAB;  Service: Cardiology;  Laterality: N/A;  poss cx    COLONOSCOPY      CORONARY ANGIOGRAPHY N/A 1/13/2022    Procedure: ANGIOGRAM, CORONARY ARTERY;  Surgeon: Janneth Tovar MD;  Location: [facility] CATH LAB;  Service: Cardiology;  Laterality: N/A;    PROSTATE SURGERY      SPINE SURGERY         Family History   Problem Relation Age of Onset    Diabetes Mother     Peripheral vascular disease Mother        Social History     Socioeconomic History    Marital status:      Spouse name: jenn     Number of children: 6   Tobacco Use    Smoking status: Current Every Day Smoker     Packs/day: 1.00     Types: Cigarettes    Smokeless tobacco: Never Used   Substance and Sexual Activity    Alcohol use: Yes     Comment: reports only drinks red wine when games are on    Drug use: Never    Sexual activity: Yes     Partners: Female     Social Determinants of Health     Financial Resource Strain: Low Risk     Difficulty of Paying Living Expenses: Not very hard   Food Insecurity: Food Insecurity Present    Worried About Running Out of Food in the Last Year: Often true    Ran Out of Food in the Last Year: Patient refused   Transportation Needs: No Transportation Needs    Lack of Transportation (Medical): No    Lack of Transportation (Non-Medical): No   Physical Activity: Unknown    Days of Exercise per Week: 5 days   Stress: No Stress Concern Present    Feeling of Stress : Not at all   Social Connections: Unknown    Frequency of Communication with Friends and Family: More than three times a week    Frequency of Social Gatherings with Friends and Family: More than three times a week    Active Member of Clubs or Organizations: No    Attends Club or Organization Meetings: Never    Marital Status:    Housing Stability: Unknown    Unable to Pay for Housing in the Last Year: Patient refused    Unstable Housing in the Last Year:  Patient refused       MEDICATIONS & ALLERGIES:     Current Outpatient Medications on File Prior to Visit   Medication Sig Dispense Refill    albuterol (PROVENTIL/VENTOLIN HFA) 90 mcg/actuation inhaler Inhale 1-2 puffs into the lungs every 6 (six) hours as needed for Wheezing or Shortness of Breath. Rescue 18 g 0    apixaban (ELIQUIS) 5 mg Tab Take 1 tablet (5 mg total) by mouth 2 (two) times daily. 180 tablet 1    atorvastatin (LIPITOR) 10 MG tablet Take 1 tablet (10 mg total) by mouth once daily. 90 tablet 3    furosemide (LASIX) 20 MG tablet Take 1 tablet (20 mg total) by mouth once daily. 90 tablet 0    metoprolol succinate (TOPROL-XL) 50 MG 24 hr tablet Take 1 tablet (50 mg total) by mouth 2 (two) times daily. 60 tablet 3    pantoprazole (PROTONIX) 40 MG tablet Take 1 tablet (40 mg total) by mouth once daily. 90 tablet 3    sacubitriL-valsartan (ENTRESTO) 24-26 mg per tablet Take 1 tablet by mouth 2 (two) times daily. 60 tablet 3    tiotropium (SPIRIVA) 18 mcg inhalation capsule Inhale 1 capsule (18 mcg total) into the lungs once daily. Controller 30 capsule 1     No current facility-administered medications on file prior to visit.        Review of patient's allergies indicates:   Allergen Reactions    Fish containing products     Iodine and iodide containing products     Shellfish containing products        OBJECTIVE:     Vital Signs:  Vitals:    01/27/22 0945   BP: (!) 118/54   Pulse: 90   Resp: 18   Temp: 97.8 °F (36.6 °C)     There is no height or weight on file to calculate BMI.     Physical Exam:  Physical Exam  Constitutional:       General: He is not in acute distress.     Appearance: Normal appearance.   HENT:      Head: Normocephalic and atraumatic.      Nose: Nose normal.      Mouth/Throat:      Mouth: Mucous membranes are moist.   Eyes:      Pupils: Pupils are equal, round, and reactive to light.   Cardiovascular:      Rate and Rhythm: Normal rate and regular rhythm.      Pulses: Normal  pulses.      Heart sounds: Normal heart sounds.   Pulmonary:      Effort: Pulmonary effort is normal.      Breath sounds: Normal breath sounds.   Abdominal:      General: Abdomen is flat. Bowel sounds are normal.      Palpations: Abdomen is soft.   Musculoskeletal:      Cervical back: Normal range of motion.      Right lower leg: No edema.      Left lower leg: Edema present.   Skin:     General: Skin is warm and dry.      Capillary Refill: Capillary refill takes less than 2 seconds.   Neurological:      General: No focal deficit present.      Mental Status: He is alert and oriented to person, place, and time.   Psychiatric:         Mood and Affect: Mood normal.         Behavior: Behavior normal.         Thought Content: Thought content normal.         Judgment: Judgment normal.         Laboratory  Lab Results   Component Value Date    WBC 6.85 01/14/2022    HGB 8.8 (L) 01/14/2022    HCT 29.9 (L) 01/14/2022    MCV 70 (L) 01/14/2022     (H) 01/14/2022     Lab Results   Component Value Date    INR 1.4 (H) 09/24/2021    INR 1.1 03/01/2021     No results found for: HGBA1C  No results for input(s): POCTGLUCOSE in the last 72 hours.    Diagnostic Results:      TRANSITION OF CARE:     [facility] Contact Note: 01/19/2022    Family and/or Caretaker present at visit?  Yes.  Diagnostic tests reviewed/disposition: No diagnosic tests pending after this hospitalization.  Disease/illness education: COPD, CHF, Nicotine dependence  Home health/community services discussion/referrals: Patient does not have home health established from hospital visit.  They do not need home health.  If needed, we will set up home health for the patient.   Establishment or re-establishment of referral orders for community resources: No other necessary community resources.   Discussion with other health care providers: No discussion with other health care providers necessary.     Transition of Care Visit:     I have reviewed and updated the  history and problem list.  I have reconciled the medication list.  I have discussed the hospitalization and current medical issues, prognosis and plans with the patient/family.  I  spent more than 50% of time discussing the care with the patient/family.  Total Face-to-Face Encounter: 60 minutes.    Medications Reconciliation:   I have reconciled the patient's home medications and discharge medications with the patient/family. I have updated all changes.  Refer to After-Visit Medication List.    ASSESSMENT & PLAN:     HIGH RISK CONDITION(S):  COPD, CHF    Richy was seen today for transitional care.    Diagnoses and all orders for this visit:    Chronic systolic congestive heart failure    Pulmonary emphysema, unspecified emphysema type  -     Ambulatory referral/consult to [facility]    Nicotine abuse    [facility] NP to schedule follow up appointment with patient in 4-6 weeks  Continue all medications, treatments and therapies as ordered  Follow all instructions and recommendations as discussed  Maintain safety and covid precautions  Attend all medical appointments as scheduled  For worsening of symptoms or new symptoms call your Primary Care Physician or Nurse Practitioner  For Emergencies call 911 or report to the nearest emergency department       Were controlled substances prescribed?  No    Instructions for the patient:    Scheduled Follow-up :  Future Appointments   Date Time Provider Department Center   2/18/2022 10:05 AM LABORATORY, [facility]   2/21/2022  1:20 PM Michael Jackson MD [facility] [place]   2/21/2022  3:40 PM Antony Street MD HG HEM ONC [facility]   3/17/2022 10:00 AM Uriel Garces MD [facility]   4/11/2022  3:00 PM Michael Jackson MD [facility] [place]       After Visit Medication List :     Medication List          Accurate as of January 27, 2022  8:06 PM. If you have any questions, ask your nurse or  doctor.            CONTINUE taking these medications    atorvastatin 10 MG tablet  Commonly known as: LIPITOR  Take 1 tablet (10 mg total) by mouth once daily.     ELIQUIS 5 mg Tab  Generic drug: apixaban  Take 1 tablet (5 mg total) by mouth 2 (two) times daily.     ENTRESTO 24-26 mg per tablet  Generic drug: sacubitriL-valsartan  Take 1 tablet by mouth 2 (two) times daily.     furosemide 20 MG tablet  Commonly known as: LASIX  Take 1 tablet (20 mg total) by mouth once daily.     metoprolol succinate 50 MG 24 hr tablet  Commonly known as: TOPROL-XL  Take 1 tablet (50 mg total) by mouth 2 (two) times daily.     pantoprazole 40 MG tablet  Commonly known as: PROTONIX  Take 1 tablet (40 mg total) by mouth once daily.     SPIRIVA WITH HANDIHALER 18 mcg inhalation capsule  Generic drug: tiotropium  Inhale 1 capsule (18 mcg total) into the lungs once daily. Controller     VENTOLIN HFA 90 mcg/actuation inhaler  Generic drug: albuterol  Inhale 1-2 puffs into the lungs every 6 (six) hours as needed for Wheezing or Shortness of Breath. Rescue            Signature:

## 2022-01-31 ENCOUNTER — OFFICE VISIT (OUTPATIENT)
Dept: PRIMARY CARE CLINIC | Facility: CLINIC | Age: 82
End: 2022-01-31
Payer: COMMERCIAL

## 2022-01-31 VITALS
SYSTOLIC BLOOD PRESSURE: 130 MMHG | WEIGHT: 151.69 LBS | OXYGEN SATURATION: 99 % | BODY MASS INDEX: 21.72 KG/M2 | DIASTOLIC BLOOD PRESSURE: 70 MMHG | HEIGHT: 70 IN | RESPIRATION RATE: 18 BRPM | HEART RATE: 82 BPM | TEMPERATURE: 98 F

## 2022-01-31 DIAGNOSIS — Z87.01 HISTORY OF PNEUMONIA: Primary | ICD-10-CM

## 2022-01-31 DIAGNOSIS — D63.8 ANEMIA OF CHRONIC DISEASE: ICD-10-CM

## 2022-01-31 DIAGNOSIS — I10 PRIMARY HYPERTENSION: ICD-10-CM

## 2022-01-31 DIAGNOSIS — I50.22 CHRONIC SYSTOLIC CONGESTIVE HEART FAILURE: ICD-10-CM

## 2022-01-31 PROCEDURE — 3075F PR MOST RECENT SYSTOLIC BLOOD PRESS GE 130-139MM HG: ICD-10-PCS | Mod: CPTII,S$GLB,, | Performed by: PHYSICIAN ASSISTANT

## 2022-01-31 PROCEDURE — 1111F DSCHRG MED/CURRENT MED MERGE: CPT | Mod: CPTII,S$GLB,, | Performed by: PHYSICIAN ASSISTANT

## 2022-01-31 PROCEDURE — 3075F SYST BP GE 130 - 139MM HG: CPT | Mod: CPTII,S$GLB,, | Performed by: PHYSICIAN ASSISTANT

## 2022-01-31 PROCEDURE — 3078F PR MOST RECENT DIASTOLIC BLOOD PRESSURE < 80 MM HG: ICD-10-PCS | Mod: CPTII,S$GLB,, | Performed by: PHYSICIAN ASSISTANT

## 2022-01-31 PROCEDURE — 1159F PR MEDICATION LIST DOCUMENTED IN MEDICAL RECORD: ICD-10-PCS | Mod: CPTII,S$GLB,, | Performed by: PHYSICIAN ASSISTANT

## 2022-01-31 PROCEDURE — 1126F PR PAIN SEVERITY QUANTIFIED, NO PAIN PRESENT: ICD-10-PCS | Mod: CPTII,S$GLB,, | Performed by: PHYSICIAN ASSISTANT

## 2022-01-31 PROCEDURE — 1111F PR DISCHARGE MEDS RECONCILED W/ CURRENT OUTPATIENT MED LIST: ICD-10-PCS | Mod: CPTII,S$GLB,, | Performed by: PHYSICIAN ASSISTANT

## 2022-01-31 PROCEDURE — 3078F DIAST BP <80 MM HG: CPT | Mod: CPTII,S$GLB,, | Performed by: PHYSICIAN ASSISTANT

## 2022-01-31 PROCEDURE — 1157F PR ADVANCE CARE PLAN OR EQUIV PRESENT IN MEDICAL RECORD: ICD-10-PCS | Mod: CPTII,S$GLB,, | Performed by: PHYSICIAN ASSISTANT

## 2022-01-31 PROCEDURE — 99214 OFFICE O/P EST MOD 30 MIN: CPT | Mod: S$GLB,,, | Performed by: PHYSICIAN ASSISTANT

## 2022-01-31 PROCEDURE — 1101F PR PT FALLS ASSESS DOC 0-1 FALLS W/OUT INJ PAST YR: ICD-10-PCS | Mod: CPTII,S$GLB,, | Performed by: PHYSICIAN ASSISTANT

## 2022-01-31 PROCEDURE — 99214 PR OFFICE/OUTPT VISIT, EST, LEVL IV, 30-39 MIN: ICD-10-PCS | Mod: S$GLB,,, | Performed by: PHYSICIAN ASSISTANT

## 2022-01-31 PROCEDURE — 3288F PR FALLS RISK ASSESSMENT DOCUMENTED: ICD-10-PCS | Mod: CPTII,S$GLB,, | Performed by: PHYSICIAN ASSISTANT

## 2022-01-31 PROCEDURE — 99999 PR PBB SHADOW E&M-EST. PATIENT-LVL IV: CPT | Mod: PBBFAC,,, | Performed by: PHYSICIAN ASSISTANT

## 2022-01-31 PROCEDURE — 99999 PR PBB SHADOW E&M-EST. PATIENT-LVL IV: ICD-10-PCS | Mod: PBBFAC,,, | Performed by: PHYSICIAN ASSISTANT

## 2022-01-31 PROCEDURE — 1160F RVW MEDS BY RX/DR IN RCRD: CPT | Mod: CPTII,S$GLB,, | Performed by: PHYSICIAN ASSISTANT

## 2022-01-31 PROCEDURE — 1101F PT FALLS ASSESS-DOCD LE1/YR: CPT | Mod: CPTII,S$GLB,, | Performed by: PHYSICIAN ASSISTANT

## 2022-01-31 PROCEDURE — 1126F AMNT PAIN NOTED NONE PRSNT: CPT | Mod: CPTII,S$GLB,, | Performed by: PHYSICIAN ASSISTANT

## 2022-01-31 PROCEDURE — 1160F PR REVIEW ALL MEDS BY PRESCRIBER/CLIN PHARMACIST DOCUMENTED: ICD-10-PCS | Mod: CPTII,S$GLB,, | Performed by: PHYSICIAN ASSISTANT

## 2022-01-31 PROCEDURE — 1159F MED LIST DOCD IN RCRD: CPT | Mod: CPTII,S$GLB,, | Performed by: PHYSICIAN ASSISTANT

## 2022-01-31 PROCEDURE — 3288F FALL RISK ASSESSMENT DOCD: CPT | Mod: CPTII,S$GLB,, | Performed by: PHYSICIAN ASSISTANT

## 2022-01-31 PROCEDURE — 1157F ADVNC CARE PLAN IN RCRD: CPT | Mod: CPTII,S$GLB,, | Performed by: PHYSICIAN ASSISTANT

## 2022-01-31 NOTE — LETTER
January 31, 2022      [facility]  [address] [address]  [place] [address]  Phone: [phone]  Fax: [phone]       Patient: Richy Douglas   YOB: 1940  Date of Visit: 01/31/2022    To Whom It May Concern:    Richard Douglas  was at [facility] on 01/31/2022. The patient may return to work on 2/1/22 with no restrictions. If you have any questions or concerns, or if I can be of further assistance, please do not hesitate to contact me.    Sincerely,        Vivian Chao PA-C

## 2022-01-31 NOTE — PROGRESS NOTES
Subjective:      Patient ID: Richy Douglas is a 81 y.o. male.    Chief Complaint: Paperwork    Richy Douglas is a 81 y.o. male who presents to clinic for follow-up after hospital discharge   Brought [facility] paperwork to be completed for work   Today, feeling much better.  Feel good enough to go back to work.  Out of work from 1/9-1/31.  Did have home health after hospital discharge., but was doing well, didn't need much, and they stopped coming.  Wasn't even using walker to walk.    Breathing is doing really good.  Was having trouble catching breath due to pneumonia but now breathing is fine. No issues.        Hospital discharge summary   81 y.o. male patient with a PMHx of aortic aneurysm, atrial fibrillation with RVR, emphysema of lung, chronic systolic congestive heart failure, GERD, HTN, other HLD, and prostate cancer who presents to the Emergency Department for SOB which onset gradually 2 days PTA. The pt reports that his SpO2 was 85% on RA. Symptoms are constant and moderate in severity. No mitigating or exacerbating factors reported. Associated sxs include L sided anterior CP that radiates down his L arm since yesterday and L arm paresthesias. Patient denies any fever, chills, numbness, n/v/d, diaphoresis, headahces, and all other sxs at this time. No prior Tx reported. No further complaints or concerns at this time. In the ED, BNP: 1844, H/H: 9.7/32.7, CRP: 151.4, CXR: Left lung consolidation, Lactic: normal, Procalcitonin: Normal. Treated with Lasix and Levaquin. Patient is a full code, surrogate decision maker is his spouse, Britany Douglas.   Treated for pneumonia   On eliquis denies any bleeding   Drinks alcohol daily as per daughter   Had severe anterior chest pain on admission , now resolved   Echo showed EF IN THE 20% , new changes when compared to his echo done last year, had a normal lexiscan last year      Hospital Course:   1/12/2022--Patient seen and examined in room, lying in bed. Feels ok today. No  chest pain or shortness of breath. Plan  for R/LHC today per Dr Tovar.   1/13/2022 CATH EARLIER TODAY SHOWED NORMAL CORS    DOING WELL, NO DYSPNEA, NO MORE SVT ,   HAD SVT AND WAS PUT ON LIBRIUM YESTERDAY , TODAY AWAKE AND COHERENT NO MORE SVT      1/14/22-Patient seen and examined today, resting in bed. No acute events overnight. No chest pain or SOB. Labs reviewed. H/H 8.8/29.9. No access site complaints. Counseled on ETOH cessation.           Review of Systems   Constitutional: Negative.   HENT: Negative.    Eyes: Negative.    Cardiovascular: Negative.    Respiratory: Negative.    Endocrine: Negative.    Hematologic/Lymphatic: Negative.    Skin: Negative.    Musculoskeletal: Negative.    Gastrointestinal: Negative.    Genitourinary: Negative.    Neurological: Negative.    Psychiatric/Behavioral: Negative.    Allergic/Immunologic: Negative.       Objective:     Vital Signs (Most Recent):  Temp: 98.1 °F (36.7 °C) (01/14/22 0723)  Pulse: 81 (01/14/22 0901)  Resp: 18 (01/14/22 0807)  BP: (!) 142/67 (01/14/22 0723)  SpO2: 100 % (01/14/22 0803) Vital Signs (24h Range):  Temp:  (97 °F (36.1 °C)-98.1 °F (36.7 °C)) 98.1 °F (36.7 °C)  Pulse:  () 81  Resp:  (16-27) 18  SpO2:  (88 %-100 %) 100 %  BP: (110-152)/(49-72) 142/67     Weight: 61.8 kg (136 lb 3.9 oz)  Body mass index is 19.55 kg/m².     SpO2: 100 %  O2 Device (Oxygen Therapy): nasal cannula        Intake/Output Summary (Last 24 hours) at 1/14/2022 1047  Last data filed at 1/14/2022 0900  Gross per 24 hour  Intake 480 ml  Output -  Net 480 ml        Lines/Drains/Airways       Peripheral Intravenous Line                  Peripheral IV - Single Lumen 01/13/22 2100 22 G Anterior;Left Upper Arm <1 day                 Physical Exam  Vitals and nursing note reviewed.   Constitutional:       General: He is not in acute distress.     Appearance: Normal appearance. He is well-developed and well-nourished. He is not diaphoretic.      Comments: Thin appearing   HENT:       Head: Normocephalic and atraumatic.   Eyes:      General:         Right eye: No discharge.         Left eye: No discharge.      Pupils: Pupils are equal, round, and reactive to light.   Neck:      Thyroid: No thyromegaly.      Vascular: No JVD.      Trachea: No tracheal deviation.   Cardiovascular:      Rate and Rhythm: Normal rate and regular rhythm.      Heart sounds: Normal heart sounds, S1 normal and S2 normal. No murmur heard.       Pulmonary:      Effort: Pulmonary effort is normal. No respiratory distress.      Breath sounds: Normal breath sounds. No wheezing or rales.   Abdominal:      General: There is no distension.      Palpations: Abdomen is soft.      Tenderness: There is no rebound.   Musculoskeletal:         General: No edema.      Cervical back: Neck supple.   Skin:     General: Skin is warm and dry.      Findings: No erythema.      Comments: Radial access site C/D/I; no bleeding erythema or drainage, intact pulse   Neurological:      Mental Status: He is alert and oriented to person, place, and time.   Psychiatric:         Mood and Affect: Mood and affect and mood normal.         Behavior: Behavior normal.         Thought Content: Thought content normal.            Significant Labs:   CMP   Recent Labs  Lab 01/13/22  0657 01/14/22  0526   142  K 3.8 4.1  CL 93* 98  CO2 37* 35*  * 95  BUN 25* 23  CREATININE 0.8 0.7  CALCIUM 9.8 9.6  ANIONGAP 10 9  ESTGFRAFRICA >60 >60  EGFRNONAA >60 >60  , CBC   Recent Labs  Lab 01/14/22  0526  WBC 6.85  HGB 8.8*  HCT 29.9*  *  , Troponin No results for input(s): TROPONINI in the last 48 hours. and All pertinent lab results from the last 24 hours have been reviewed.     Significant Imaging: Echocardiogram:   Transthoracic echo (TTE) complete (Cupid Only):   Results for orders placed or performed during the hospital encounter of 01/09/22  Echo Saline Bubble? No  Result Value Ref Range    BSA 1.81 m2    TDI SEPTAL 0.06 m/s    LV LATERAL E/E'  RATIO 11.13 m/s    LV SEPTAL E/E' RATIO 14.83 m/s    LA WIDTH 4.96 cm    IVC diameter 2.68 cm    Left Ventricular Outflow Tract Mean Velocity 0.79138032093336 cm/s    Left Ventricular Outflow Tract Mean Gradient 2.54 mmHg    TDI LATERAL 0.08 m/s    LVIDd 5.51 3.5 - 6.0 cm    IVS 1.77 (A) 0.6 - 1.1 cm    Posterior Wall 1.48 (A) 0.6 - 1.1 cm    Ao root annulus 3.94 cm    LVIDs 4.61 (A) 2.1 - 4.0 cm    FS 16 28 - 44 %    Sinus 4.63 cm    STJ 4.92 cm    Ascending aorta 4.69 cm    LV mass 420.71 g    LA size 3.81 cm    TAPSE 2.05 cm    Left Ventricle Relative Wall Thickness 0.54 cm    AV regurgitation pressure 1/2 time 295.50921241 ms    AV mean gradient 6 mmHg    AV valve area 3.64 cm2    AV Velocity Ratio 0.65      AV index (prosthetic) 0.74      MV valve area p 1/2 method 4.93 cm2    E/A ratio 1.24      Mean e' 0.07 m/s    E wave deceleration time 153.131769391384439 msec    IVRT 79.314284997199307 msec    LVOT diameter 2.51 cm    LVOT area 4.9 cm2    LVOT peak karl 1.05 m/s    LVOT peak VTI 17.60 cm    Ao peak karl 1.62 m/s    Ao VTI 23.9 cm    RVOT peak karl 0.73 m/s    RVOT peak VTI 14.3 cm    LVOT stroke volume 87.04 cm3    AV peak gradient 10 mmHg    PV mean gradient 1.24 mmHg    E/E' ratio 12.71 m/s    MV Peak E Karl 0.89 m/s    AR Max Karl 5.20 m/s    TR Max Karl 5.26 m/s    MV stenosis pressure 1/2 time 44.311313748555615 ms    MV Peak A Karl 0.72 m/s    LV Systolic Volume 97.94 mL    LV Systolic Volume Index 53.5 mL/m2    LV Diastolic Volume 147.89 mL    LV Diastolic Volume Index 80.81 mL/m2    LV Mass Index 230 g/m2    Echo EF Estimated 34 %    RA Major Axis 5.05 cm    Triscuspid Valve Regurgitation Peak Gradient 111 mmHg    RA Width 4.80 cm    Right Atrial Pressure (from IVC) 8 mmHg    EF 25 %    TV rest pulmonary artery pressure 119 mmHg    Narrative    · Concentric hypertrophy and severely decreased systolic function.  · The estimated ejection fraction is 25%.  · Grade I left ventricular diastolic  dysfunction.  · Normal right ventricular size with mildly reduced right ventricular   systolic function.  · Mild-to-moderate mitral regurgitation.  · Mild-to-moderate aortic regurgitation.  · Moderate left atrial enlargement.  · Moderate right atrial enlargement.  · Moderate tricuspid regurgitation.  · Intermediate central venous pressure (8 mmHg).  · The estimated PA systolic pressure is 119 mmHg.  · There is pulmonary hypertension.  · The aortic root is moderately dilated.     , EKG: Reviewed and X-Ray: CXR: X-Ray Chest 1 View (CXR): No results found for this visit on 01/09/22. and X-Ray Chest PA and Lateral (CXR): No results found for this visit on 01/09/22.     Assessment and Plan:  Patient who presents with PNA/acute CHF. LHC showed normal coronaries. Meds optimized. Ok to d/c home. (do not d/c home on cardizem)     * Pneumonia of left upper lobe due to infectious organism  AS PER PRIMARY     Alcohol dependence  -ETOH cessation recommended     Atrial fibrillation  IN SINUS RHYTHM   ON ELIQUIS   Eliquis on hold for R/LHC today  Resume post procedure     1/14/22  -Continue Toprol  -Continue Entresto  -Remains in SR     Iron deficiency anemia due to chronic blood loss  -Monitor H/H     Hypertension  -BP controlled  -Continue BB, ARB     Chronic systolic congestive heart failure  Patient is identified as having Combined Systolic and Diastolic heart failure that is Acute on chronic. CHF is currently controlled. Latest ECHO performed and demonstrates- Results for orders placed during the hospital encounter of 01/09/22     Echo Saline Bubble? No     Interpretation Summary  · Concentric hypertrophy and severely decreased systolic function.  · The estimated ejection fraction is 25%.  · Grade I left ventricular diastolic dysfunction.  · Normal right ventricular size with mildly reduced right ventricular systolic function.  · Mild-to-moderate mitral regurgitation.  · Mild-to-moderate aortic regurgitation.  · Moderate left  atrial enlargement.  · Moderate right atrial enlargement.  · Moderate tricuspid regurgitation.  · Intermediate central venous pressure (8 mmHg).  · The estimated PA systolic pressure is 119 mmHg.  · There is pulmonary hypertension.  · The aortic root is moderately dilated.  . Continue Furosemide and monitor clinical status closely. Monitor on telemetry. Patient is on CHF pathway.  Monitor strict Is&Os and daily weights.  Place on fluid restriction of 2 L. Continue to stress to patient importance of self efficacy and  on diet for CHF. Last BNP reviewed- and noted below   Recent Labs  Lab 01/09/22  1017  BNP 1,844*  .     New diagnosis of systolic heart failure, normal EF last year   Also had chest pain ,with trop elevated from demand ischemia and flat   cw diuresis   Change cardizem to toprol   Alcohol cessation   R/lhc in am   Hx of chronic anemia and hx of crohn's disease   No bleeding , on apixaban, hold prior to cath     1/12/2022  -Plan for R/LHC today  -Continue Statin, BB, IV lasix BID  -Optimize medical therapy for CHF post procedure today  -Keep NPO for now  -Risks, benefits and treatment alternatives reviewed per Dr Tovar. Patient has agreed to proceed with R/LHC today as planned.      1/14/22  -s/p LHC which showed normal coronaries  -Continue statin, BB, ARB, po Lasix  -Follow-up in clinic           Past Medical History:  No date: Anemia of chronic disease  No date: Aortic aneurysm  9/24/2021: Atrial fibrillation with RVR  8/31/2017: Chronic systolic congestive heart failure  8/31/2017: Emphysema of lung  No date: GERD (gastroesophageal reflux disease)  No date: Hypertension  11/24/2020: Microcytic anemia  4/27/2021: Other hyperlipidemia  No date: Prostate cancer    Past Surgical History:  1/13/2022: CATHETERIZATION OF BOTH LEFT AND RIGHT HEART; N/A      Comment:  Procedure: CATHETERIZATION, HEART, BOTH LEFT AND RIGHT;                Surgeon: Janneth Tovar MD;  Location: [facility] CATH LAB;        "         Service: Cardiology;  Laterality: N/A;  poss cx  No date: COLONOSCOPY  1/13/2022: CORONARY ANGIOGRAPHY; N/A      Comment:  Procedure: ANGIOGRAM, CORONARY ARTERY;  Surgeon: Janneth Tovar MD;  Location[facility] CATH LAB;  Service:                Cardiology;  Laterality: N/A;  No date: PROSTATE SURGERY  No date: SPINE SURGERY      Review of Systems   Constitutional: Negative for chills, diaphoresis, fatigue and fever.   HENT: Negative for congestion, ear discharge, ear pain, postnasal drip, rhinorrhea, sinus pressure, sinus pain, sneezing and sore throat.    Respiratory: Negative for cough, shortness of breath and wheezing.    Cardiovascular: Negative for chest pain and palpitations.   Musculoskeletal: Negative for back pain and myalgias.   Neurological: Negative for headaches.       Objective:   /70   Pulse 82   Temp 97.7 °F (36.5 °C) (Temporal)   Resp 18   Ht 5' 10" (1.778 m)   Wt 68.8 kg (151 lb 10.8 oz)   SpO2 99%   BMI 21.76 kg/m²   Physical Exam  Vitals reviewed.   Constitutional:       General: He is not in acute distress.     Appearance: He is well-developed and well-nourished. He is not ill-appearing, toxic-appearing, sickly-appearing or diaphoretic.   HENT:      Head: Normocephalic and atraumatic.      Right Ear: External ear normal.      Left Ear: External ear normal.   Eyes:      Extraocular Movements: EOM normal.      Conjunctiva/sclera: Conjunctivae normal.   Cardiovascular:      Rate and Rhythm: Normal rate and regular rhythm.      Pulses:           Radial pulses are 2+ on the right side and 2+ on the left side.      Heart sounds: Normal heart sounds, S1 normal and S2 normal.   Pulmonary:      Effort: Pulmonary effort is normal. No tachypnea, bradypnea, accessory muscle usage, respiratory distress or apnea.      Breath sounds: Normal breath sounds. No decreased breath sounds, wheezing, rhonchi or rales.   Chest:      Chest wall: No tenderness or bony tenderness. "   Musculoskeletal:         General: Normal range of motion.      Cervical back: Normal range of motion.   Skin:     General: Skin is warm and dry.   Neurological:      Mental Status: He is alert and oriented to person, place, and time. He is not disoriented.      Cranial Nerves: No cranial nerve deficit.      Sensory: No sensory deficit.      Motor: No abnormal muscle tone.   Psychiatric:         Mood and Affect: Mood and affect normal.         Behavior: Behavior normal.       Assessment:      1. History of pneumonia    2. Chronic systolic congestive heart failure    3. Anemia of chronic disease    4. Primary hypertension       Plan:   History of pneumonia  Comments:  clinically improving at today's appt     Chronic systolic congestive heart failure  Comments:  has had follow-up post- hospital d/c with Dr. Garces     Anemia of chronic disease  Comments:  followed by hematology, most recent infusion 1/26/22     Primary hypertension  Comments:  chronic stable, cont. current medications       FMLA paperwork completed for patient to return to work, reports back to his baseline, doing well     Vivian Chao PA-C   Physician Assistant   [facility]

## 2022-02-07 ENCOUNTER — TELEPHONE (OUTPATIENT)
Dept: PHARMACY | Facility: CLINIC | Age: 82
End: 2022-02-07
Payer: MEDICARE

## 2022-02-18 ENCOUNTER — LAB VISIT (OUTPATIENT)
Dept: LAB | Facility: HOSPITAL | Age: 82
End: 2022-02-18
Attending: INTERNAL MEDICINE
Payer: COMMERCIAL

## 2022-02-18 DIAGNOSIS — D50.0 IRON DEFICIENCY ANEMIA DUE TO CHRONIC BLOOD LOSS: ICD-10-CM

## 2022-02-18 LAB
ALBUMIN SERPL BCP-MCNC: 3.7 G/DL (ref 3.5–5.2)
ALP SERPL-CCNC: 39 U/L (ref 55–135)
ALT SERPL W/O P-5'-P-CCNC: 12 U/L (ref 10–44)
ANION GAP SERPL CALC-SCNC: 7 MMOL/L (ref 8–16)
ANISOCYTOSIS BLD QL SMEAR: SLIGHT
AST SERPL-CCNC: 19 U/L (ref 10–40)
BASOPHILS # BLD AUTO: 0.04 K/UL (ref 0–0.2)
BASOPHILS NFR BLD: 0.7 % (ref 0–1.9)
BILIRUB SERPL-MCNC: 0.7 MG/DL (ref 0.1–1)
BUN SERPL-MCNC: 14 MG/DL (ref 8–23)
CALCIUM SERPL-MCNC: 9.1 MG/DL (ref 8.7–10.5)
CHLORIDE SERPL-SCNC: 107 MMOL/L (ref 95–110)
CO2 SERPL-SCNC: 26 MMOL/L (ref 23–29)
CREAT SERPL-MCNC: 0.7 MG/DL (ref 0.5–1.4)
DACRYOCYTES BLD QL SMEAR: ABNORMAL
DIFFERENTIAL METHOD: ABNORMAL
EOSINOPHIL # BLD AUTO: 0.2 K/UL (ref 0–0.5)
EOSINOPHIL NFR BLD: 2.8 % (ref 0–8)
ERYTHROCYTE [DISTWIDTH] IN BLOOD BY AUTOMATED COUNT: 31.6 % (ref 11.5–14.5)
EST. GFR  (AFRICAN AMERICAN): >60 ML/MIN/1.73 M^2
EST. GFR  (NON AFRICAN AMERICAN): >60 ML/MIN/1.73 M^2
FERRITIN SERPL-MCNC: 30 NG/ML (ref 20–300)
GLUCOSE SERPL-MCNC: 96 MG/DL (ref 70–110)
HCT VFR BLD AUTO: 31.2 % (ref 40–54)
HGB BLD-MCNC: 9.2 G/DL (ref 14–18)
HYPOCHROMIA BLD QL SMEAR: ABNORMAL
IMM GRANULOCYTES # BLD AUTO: 0.02 K/UL (ref 0–0.04)
IMM GRANULOCYTES NFR BLD AUTO: 0.3 % (ref 0–0.5)
IRON SERPL-MCNC: 22 UG/DL (ref 45–160)
LYMPHOCYTES # BLD AUTO: 0.6 K/UL (ref 1–4.8)
LYMPHOCYTES NFR BLD: 9.6 % (ref 18–48)
MCH RBC QN AUTO: 22.1 PG (ref 27–31)
MCHC RBC AUTO-ENTMCNC: 29.5 G/DL (ref 32–36)
MCV RBC AUTO: 75 FL (ref 82–98)
MONOCYTES # BLD AUTO: 0.4 K/UL (ref 0.3–1)
MONOCYTES NFR BLD: 6.3 % (ref 4–15)
NEUTROPHILS # BLD AUTO: 4.9 K/UL (ref 1.8–7.7)
NEUTROPHILS NFR BLD: 80.3 % (ref 38–73)
NRBC BLD-RTO: 0 /100 WBC
PLATELET # BLD AUTO: 414 K/UL (ref 150–450)
PLATELET BLD QL SMEAR: ABNORMAL
PMV BLD AUTO: 9.7 FL (ref 9.2–12.9)
POIKILOCYTOSIS BLD QL SMEAR: SLIGHT
POTASSIUM SERPL-SCNC: 4.2 MMOL/L (ref 3.5–5.1)
PROT SERPL-MCNC: 7.1 G/DL (ref 6–8.4)
RBC # BLD AUTO: 4.16 M/UL (ref 4.6–6.2)
SATURATED IRON: 6 % (ref 20–50)
SODIUM SERPL-SCNC: 140 MMOL/L (ref 136–145)
TARGETS BLD QL SMEAR: ABNORMAL
TOTAL IRON BINDING CAPACITY: 394 UG/DL (ref 250–450)
TRANSFERRIN SERPL-MCNC: 266 MG/DL (ref 200–375)
WBC # BLD AUTO: 6.15 K/UL (ref 3.9–12.7)

## 2022-02-18 PROCEDURE — 80053 COMPREHEN METABOLIC PANEL: CPT | Performed by: INTERNAL MEDICINE

## 2022-02-18 PROCEDURE — 82728 ASSAY OF FERRITIN: CPT | Performed by: INTERNAL MEDICINE

## 2022-02-18 PROCEDURE — 85025 COMPLETE CBC W/AUTO DIFF WBC: CPT | Performed by: INTERNAL MEDICINE

## 2022-02-18 PROCEDURE — 36415 COLL VENOUS BLD VENIPUNCTURE: CPT | Performed by: INTERNAL MEDICINE

## 2022-02-18 PROCEDURE — 84466 ASSAY OF TRANSFERRIN: CPT | Performed by: INTERNAL MEDICINE

## 2022-02-19 ENCOUNTER — PATIENT OUTREACH (OUTPATIENT)
Dept: ADMINISTRATIVE | Facility: OTHER | Age: 82
End: 2022-02-19
Payer: MEDICARE

## 2022-02-19 NOTE — PROGRESS NOTES
Health Maintenance Due   Topic Date Due    TETANUS VACCINE  Never done    Shingles Vaccine (1 of 2) Never done    Pneumococcal Vaccines (Age 65+) (1 of 1 - PPSV23) Never done    Influenza Vaccine (1) Never done     Updates were requested from care everywhere.  Chart was reviewed for overdue Proactive [facility] Encounters ([facility]) topics (CRS, Breast Cancer Screening, Eye exam)  Health Maintenance has been updated.  [place] immunization registry triggered.  Immunizations were reconciled.

## 2022-02-21 ENCOUNTER — OFFICE VISIT (OUTPATIENT)
Dept: PULMONOLOGY | Facility: CLINIC | Age: 82
End: 2022-02-21
Payer: COMMERCIAL

## 2022-02-21 ENCOUNTER — OFFICE VISIT (OUTPATIENT)
Dept: HEMATOLOGY/ONCOLOGY | Facility: CLINIC | Age: 82
End: 2022-02-21
Payer: COMMERCIAL

## 2022-02-21 VITALS
HEIGHT: 70 IN | BODY MASS INDEX: 23.51 KG/M2 | SYSTOLIC BLOOD PRESSURE: 122 MMHG | DIASTOLIC BLOOD PRESSURE: 70 MMHG | WEIGHT: 164.25 LBS | OXYGEN SATURATION: 95 % | RESPIRATION RATE: 17 BRPM | HEART RATE: 81 BPM

## 2022-02-21 VITALS
WEIGHT: 163.81 LBS | OXYGEN SATURATION: 97 % | SYSTOLIC BLOOD PRESSURE: 131 MMHG | BODY MASS INDEX: 23.45 KG/M2 | DIASTOLIC BLOOD PRESSURE: 67 MMHG | HEART RATE: 72 BPM | HEIGHT: 70 IN | TEMPERATURE: 98 F

## 2022-02-21 DIAGNOSIS — R06.09 DOE (DYSPNEA ON EXERTION): ICD-10-CM

## 2022-02-21 DIAGNOSIS — J18.9 PNEUMONIA OF LEFT LUNG DUE TO INFECTIOUS ORGANISM, UNSPECIFIED PART OF LUNG: ICD-10-CM

## 2022-02-21 DIAGNOSIS — Z72.0 NICOTINE ABUSE: ICD-10-CM

## 2022-02-21 DIAGNOSIS — J43.9 PULMONARY EMPHYSEMA, UNSPECIFIED EMPHYSEMA TYPE: Primary | ICD-10-CM

## 2022-02-21 DIAGNOSIS — D50.0 IRON DEFICIENCY ANEMIA DUE TO CHRONIC BLOOD LOSS: ICD-10-CM

## 2022-02-21 DIAGNOSIS — R91.1 SOLITARY PULMONARY NODULE: ICD-10-CM

## 2022-02-21 PROCEDURE — 99205 OFFICE O/P NEW HI 60 MIN: CPT | Mod: S$GLB,,, | Performed by: INTERNAL MEDICINE

## 2022-02-21 PROCEDURE — 1157F ADVNC CARE PLAN IN RCRD: CPT | Mod: CPTII,S$GLB,, | Performed by: INTERNAL MEDICINE

## 2022-02-21 PROCEDURE — 99999 PR PBB SHADOW E&M-EST. PATIENT-LVL III: CPT | Mod: PBBFAC,,, | Performed by: INTERNAL MEDICINE

## 2022-02-21 PROCEDURE — 99999 PR PBB SHADOW E&M-EST. PATIENT-LVL III: ICD-10-PCS | Mod: PBBFAC,,, | Performed by: INTERNAL MEDICINE

## 2022-02-21 PROCEDURE — 1101F PT FALLS ASSESS-DOCD LE1/YR: CPT | Mod: CPTII,S$GLB,, | Performed by: INTERNAL MEDICINE

## 2022-02-21 PROCEDURE — 1157F ADVNC CARE PLAN IN RCRD: CPT | Mod: ,,, | Performed by: INTERNAL MEDICINE

## 2022-02-21 PROCEDURE — 1157F PR ADVANCE CARE PLAN OR EQUIV PRESENT IN MEDICAL RECORD: ICD-10-PCS | Mod: CPTII,S$GLB,, | Performed by: INTERNAL MEDICINE

## 2022-02-21 PROCEDURE — 99999 PR PBB SHADOW E&M-EST. PATIENT-LVL V: ICD-10-PCS | Mod: PBBFAC,,, | Performed by: INTERNAL MEDICINE

## 2022-02-21 PROCEDURE — 1101F PR PT FALLS ASSESS DOC 0-1 FALLS W/OUT INJ PAST YR: ICD-10-PCS | Mod: CPTII,S$GLB,, | Performed by: INTERNAL MEDICINE

## 2022-02-21 PROCEDURE — 99214 PR OFFICE/OUTPT VISIT, EST, LEVL IV, 30-39 MIN: ICD-10-PCS | Mod: S$GLB,,, | Performed by: INTERNAL MEDICINE

## 2022-02-21 PROCEDURE — 99205 PR OFFICE/OUTPT VISIT, NEW, LEVL V, 60-74 MIN: ICD-10-PCS | Mod: S$GLB,,, | Performed by: INTERNAL MEDICINE

## 2022-02-21 PROCEDURE — 3288F FALL RISK ASSESSMENT DOCD: CPT | Mod: CPTII,S$GLB,, | Performed by: INTERNAL MEDICINE

## 2022-02-21 PROCEDURE — 3288F PR FALLS RISK ASSESSMENT DOCUMENTED: ICD-10-PCS | Mod: CPTII,S$GLB,, | Performed by: INTERNAL MEDICINE

## 2022-02-21 PROCEDURE — 1157F PR ADVANCE CARE PLAN OR EQUIV PRESENT IN MEDICAL RECORD: ICD-10-PCS | Mod: ,,, | Performed by: INTERNAL MEDICINE

## 2022-02-21 PROCEDURE — 99999 PR PBB SHADOW E&M-EST. PATIENT-LVL V: CPT | Mod: PBBFAC,,, | Performed by: INTERNAL MEDICINE

## 2022-02-21 PROCEDURE — 3078F DIAST BP <80 MM HG: CPT | Mod: CPTII,S$GLB,, | Performed by: INTERNAL MEDICINE

## 2022-02-21 PROCEDURE — 3074F SYST BP LT 130 MM HG: CPT | Mod: CPTII,S$GLB,, | Performed by: INTERNAL MEDICINE

## 2022-02-21 PROCEDURE — 99213 OFFICE O/P EST LOW 20 MIN: CPT | Mod: PBBFAC | Performed by: INTERNAL MEDICINE

## 2022-02-21 PROCEDURE — 3074F PR MOST RECENT SYSTOLIC BLOOD PRESSURE < 130 MM HG: ICD-10-PCS | Mod: CPTII,S$GLB,, | Performed by: INTERNAL MEDICINE

## 2022-02-21 PROCEDURE — 3078F PR MOST RECENT DIASTOLIC BLOOD PRESSURE < 80 MM HG: ICD-10-PCS | Mod: CPTII,S$GLB,, | Performed by: INTERNAL MEDICINE

## 2022-02-21 PROCEDURE — 99214 OFFICE O/P EST MOD 30 MIN: CPT | Mod: S$GLB,,, | Performed by: INTERNAL MEDICINE

## 2022-02-21 RX ORDER — TIOTROPIUM BROMIDE 18 UG/1
18 CAPSULE ORAL; RESPIRATORY (INHALATION) DAILY
Qty: 30 CAPSULE | Refills: 11 | Status: SHIPPED | OUTPATIENT
Start: 2022-02-21 | End: 2022-06-16

## 2022-02-21 RX ORDER — ALBUTEROL SULFATE 90 UG/1
1-2 AEROSOL, METERED RESPIRATORY (INHALATION) EVERY 6 HOURS PRN
Qty: 18 G | Refills: 11 | Status: SHIPPED | OUTPATIENT
Start: 2022-02-21 | End: 2022-06-16

## 2022-02-21 RX ORDER — ALBUTEROL SULFATE 0.83 MG/ML
2.5 SOLUTION RESPIRATORY (INHALATION)
Qty: 360 ML | Refills: 11 | Status: SHIPPED | OUTPATIENT
Start: 2022-02-21 | End: 2022-06-16

## 2022-02-21 NOTE — PROGRESS NOTES
Subjective:     Patient ID: Richy Douglas is a 81 y.o. male.    Chief Complaint:      HPI       82 y/o   He   presents for evaluation and treatment of pneumonia  after being discharged from the hospital  5  weeks ago. Since discharge symptoms have gradually improving course since that time. Patient denies fever or chills. Symptoms are aggravated by exercise. Has retruned to work. Symptoms improve with rest.  Respiratory: positive for dyspnea on exertion; Cardiovascular: no chest pain or palpitations.  Patient currently is not on home oxygen therapy..    MEDICAL RECORDS FROM THE HOSPITAL REVIEWED:    [facility] ([facility])  [facility] Medicine  Discharge Summary        Patient Name: Richy Douglas  MRN: [id number]  Patient Class: IP- Inpatient  Admission Date: 1/9/2022  Hospital Length of Stay: 4 days  Discharge Date and Time:  01/14/2022 1:06 PM  Attending Physician: Mando Guallpa MD   Discharging Provider: Mando Guallpa MD  Primary Care Provider: Vivian Ch MD        HPI:   81 y.o. male patient with a PMHx of aortic aneurysm, atrial fibrillation with RVR, emphysema of lung, chronic systolic congestive heart failure, GERD, HTN, other HLD, and prostate cancer who presents to the Emergency Department for SOB which onset gradually 2 days PTA. The pt reports that his SpO2 was 85% on RA. Symptoms are constant and moderate in severity. No mitigating or exacerbating factors reported. Associated sxs include L sided anterior CP that radiates down his L arm since yesterday and L arm paresthesias. Patient denies any fever, chills, numbness, n/v/d, diaphoresis, headahces, and all other sxs at this time. No prior Tx reported. No further complaints or concerns at this time. In the ED, BNP: 1844, H/H: 9.7/32.7, CRP: 151.4, CXR: Left lung consolidation, Lactic: normal, Procalcitonin: Normal. Treated with Lasix and Levaquin. Patient is a full code, surrogate decision maker is his spouse, Britany Douglas. Patient admitted to  observation under the care of hospital medicine.         Procedure(s) (LRB):  CATHETERIZATION, HEART, BOTH LEFT AND RIGHT (N/A)  ANGIOGRAM, CORONARY ARTERY (N/A)       Hospital Course:   1/10/22- pt comfortable on 2L via NC current smoker physical exam consistent with COPD  1/11/22- CT scan of the chest reveals severe emphysematous lungs with bully scattered throughout all lung fields bilaterally.  Consolidation left upper and middle jose lobes consistent with pneumonia.  No masses. Echo reveals new decline in EF to 25% pt reported chest pain on admission  1/12/22- pt in Afib coronary angiogram postponed. Rate controlled on metoprolol. Daily drinker started on librium  1/13/22- pt underwent LHC today with Dr. Tovar result was clean coronaries. EF documented at 50% although transthoracic echo EF was 25%     1/14 patient reports improvement in sx. Tolerated lhc procedure well. After discussing with cards, ok to d/c. Patient will need to discuss initiation of entresto on o/p basis. Continue losartan, bb, eliquis and lasix. New rxs delivered to bedside.     Started on iv rocephin for pna. Will send 3 days of azith on dc. Bcx ngtd. Ambulatory eval for home o2 in setting of copd and chf. Case mgt consulted for dme.      Patient admitted to smoking and drinking. Was initiated on librium for concerns of etoh withdrawal.      Patient seen and evaluated by me. Patient was determined to be suitable for d/c. Patient deemed stable for discharge to home with np to visit after d/c d/t risk for readmission.              Goals of Care Treatment Preferences:  Code Status: Full Code        Consults:           Consults (From admission, onward)         Status Ordering Provider       Inpatient consult to Social Work/Case Management  Once        Provider:  (Not yet assigned)    Ordered LOKI VEGA       Inpatient consult to Cardiology  Once        Provider:  Janneth Tovar MD    Completed HAWA BURLESON  Assessment & Plan notes have been filed under this hospital service since the last note was generated.  Service: Hospital Medicine            Final Active Diagnoses:     Diagnosis Date Noted POA    Alcohol dependence [F10.20] 01/12/2022 Yes    Atrial fibrillation [I48.91] 09/24/2021 Yes    Chronic systolic congestive heart failure [I50.22] 08/31/2017 Yes    Iron deficiency anemia due to chronic blood loss [D50.0] 04/03/2017 Yes    Hypertension [I10] 04/03/2017 Yes       Problems Resolved During this Admission:     Diagnosis Date Noted Date Resolved POA    PRINCIPAL PROBLEM:  Pneumonia of left upper lobe due to infectious organism [J18.9] 09/24/2021 01/14/2022 Yes         Discharged Condition: stable     Disposition: Home or Self Care     Follow Up:         Follow-up Information           Vivian Ch MD. Schedule an appointment as soon as possible for a visit in 3 days.    Specialty: Family Medicine  Why: [facility] follow up appointment  Contact information:  [id number] [id number] [phone]                       Janneth Tovar MD. Schedule an appointment as soon as possible for a visit in 1 week.    Specialties: INTERVENTIONAL CARDIOLOGY, Cardiology  Why: cardiology hospital follow up  Contact information:  [address]  [place] [address] [phone]                   Review of medical records from hospital. Medical records from hospital were reviewed during office visit - these included but were not limited to review of radiographic studies and /or radiologists reports, laboratory studies, discharge summaries, procedure notes, consultations and other transcribed notes.    Past Medical History:   Diagnosis Date    Anemia of chronic disease     Aortic aneurysm     Atrial fibrillation with RVR 9/24/2021    Chronic systolic congestive heart failure 8/31/2017    Emphysema of lung 8/31/2017    GERD (gastroesophageal reflux disease)     Hypertension     Microcytic  anemia 11/24/2020    Other hyperlipidemia 4/27/2021    Prostate cancer      Past Surgical History:   Procedure Laterality Date    CATHETERIZATION OF BOTH LEFT AND RIGHT HEART N/A 1/13/2022    Procedure: CATHETERIZATION, HEART, BOTH LEFT AND RIGHT;  Surgeon: Janneth Tovar MD;  Location: [facility] CATH LAB;  Service: Cardiology;  Laterality: N/A;  poss cx    COLONOSCOPY      CORONARY ANGIOGRAPHY N/A 1/13/2022    Procedure: ANGIOGRAM, CORONARY ARTERY;  Surgeon: Janneth Tovar MD;  Location: [facility] CATH LAB;  Service: Cardiology;  Laterality: N/A;    PROSTATE SURGERY      SPINE SURGERY       Review of patient's allergies indicates:   Allergen Reactions    Fish containing products     Iodine and iodide containing products     Shellfish containing products      Current Outpatient Medications on File Prior to Visit   Medication Sig Dispense Refill    apixaban (ELIQUIS) 5 mg Tab Take 1 tablet (5 mg total) by mouth 2 (two) times daily. 180 tablet 1    atorvastatin (LIPITOR) 10 MG tablet Take 1 tablet (10 mg total) by mouth once daily. 90 tablet 3    pantoprazole (PROTONIX) 40 MG tablet Take 1 tablet (40 mg total) by mouth once daily. 90 tablet 3    sacubitriL-valsartan (ENTRESTO) 24-26 mg per tablet Take 1 tablet by mouth 2 (two) times daily. 60 tablet 3    [DISCONTINUED] albuterol (PROVENTIL/VENTOLIN HFA) 90 mcg/actuation inhaler Inhale 1-2 puffs into the lungs every 6 (six) hours as needed for Wheezing or Shortness of Breath. Rescue 18 g 0    [DISCONTINUED] tiotropium (SPIRIVA) 18 mcg inhalation capsule Inhale 1 capsule (18 mcg total) into the lungs once daily. Controller 30 capsule 1    furosemide (LASIX) 20 MG tablet Take 1 tablet (20 mg total) by mouth once daily. 90 tablet 0    metoprolol succinate (TOPROL-XL) 50 MG 24 hr tablet Take 1 tablet (50 mg total) by mouth 2 (two) times daily. 60 tablet 3     No current facility-administered medications on file prior to visit.     Social History  "    Socioeconomic History    Marital status:      Spouse name: jenn     Number of children: 6   Tobacco Use    Smoking status: Current Every Day Smoker     Packs/day: 1.00     Types: Cigarettes    Smokeless tobacco: Never Used   Substance and Sexual Activity    Alcohol use: Yes     Comment: reports only drinks red wine when games are on    Drug use: Never    Sexual activity: Yes     Partners: Female     Social Determinants of Health     Financial Resource Strain: Low Risk     Difficulty of Paying Living Expenses: Not very hard   Food Insecurity: Food Insecurity Present    Worried About Running Out of Food in the Last Year: Often true    Ran Out of Food in the Last Year: Patient refused   Transportation Needs: No Transportation Needs    Lack of Transportation (Medical): No    Lack of Transportation (Non-Medical): No   Physical Activity: Unknown    Days of Exercise per Week: 5 days   Stress: No Stress Concern Present    Feeling of Stress : Not at all   Social Connections: Unknown    Frequency of Communication with Friends and Family: More than three times a week    Frequency of Social Gatherings with Friends and Family: More than three times a week    Active Member of Clubs or Organizations: No    Attends Club or Organization Meetings: Never    Marital Status:    Housing Stability: Unknown    Unable to Pay for Housing in the Last Year: Patient refused    Unstable Housing in the Last Year: Patient refused     Family History   Problem Relation Age of Onset    Diabetes Mother     Peripheral vascular disease Mother        Review of Systems    Objective:      /70   Pulse 81   Resp 17   Ht 5' 10" (1.778 m)   Wt 74.5 kg (164 lb 3.9 oz)   SpO2 95%   BMI 23.57 kg/m²   Physical Exam  Vitals and nursing note reviewed.   Constitutional:       Appearance: He is well-developed.   HENT:      Head: Normocephalic and atraumatic.      Nose: Nose normal.      Mouth/Throat:      " Pharynx: Oropharyngeal exudate present.   Eyes:      Conjunctiva/sclera: Conjunctivae normal.      Pupils: Pupils are equal, round, and reactive to light.   Neck:      Thyroid: No thyromegaly.      Vascular: No JVD.      Trachea: No tracheal deviation.   Cardiovascular:      Rate and Rhythm: Normal rate. Rhythm irregular.      Heart sounds: Normal heart sounds. No murmur heard.  Pulmonary:      Effort: Pulmonary effort is normal.      Breath sounds: Examination of the right-lower field reveals wheezing. Examination of the left-lower field reveals wheezing. Decreased breath sounds and wheezing present. No rhonchi or rales.   Abdominal:      General: Bowel sounds are normal.      Palpations: Abdomen is soft.   Musculoskeletal:         General: No tenderness. Normal range of motion.      Cervical back: Neck supple.   Lymphadenopathy:      Cervical: No cervical adenopathy.   Skin:     General: Skin is warm and dry.   Neurological:      Mental Status: He is alert and oriented to person, place, and time.       Personal Diagnostic Review  CT of chest performed on 1/10/22 without contrast revealed pneumonia .      Narrative & Impression  EXAMINATION:  CT CHEST WITHOUT CONTRAST     CLINICAL HISTORY:  COPD.Pleural effusion, malignancy suspected;     TECHNIQUE:  Limited noncontrast CT of the chest.  All CT scans at this facility use dose modulation, iterative reconstruction and/or weight based dosing when appropriate to reduce radiation dose to as low as reasonably achievable.     COMPARISON:  Chest CT 03/26/2021.     FINDINGS:  There are extensive COPD changes present bilaterally.     The left lung reveals infiltrative markings in the left lung apex/upper lobe.  In addition further or extensive consolidative changes are seen involving the posterior left lower lobe.  These findings are new as compared to the previous chest CT.     There is a small left pleural effusion.     The cardiac size is enlarged with coronary and  valvular calcification.  Aortic atherosclerosis is noted as well.     No pneumothorax.     Images of the upper abdomen reveal atherosclerosis.     Thoracic spondylosis is present.     Impression:     Extensive opacity left upper and left lower lobes consistent with pneumonia.  Advise clinical and radiographic follow-up.     Extensive COPD changes.  See above for detail.        Electronically signed by: Carson Alcazar MD  Date:                                            01/10/2022  Time:                                           19:05             Exam Ended: 01/10/22 18:55 Last Resulted: 01/10/22 19:05               [facility] and [facility] Subsidiaries and Affiliates  Outside Information        CT Chest without Contrast      Narrative    CT CHEST WO CONTRAST:     Reason for study: COPD and abnormal chest x-ray     Findings: Study was performed without IV contrast which limits evaluation of mediastinal structures. Automated exposure control was used for dose reduction. Heart is normal in size and aorta is normal in caliber. There are multiple cystic changes throughout the lungs indicating COPD. There is an 8 mm ill-defined density in the anterior right middle lobe which could represents scarring rather than an active process and this could be followed. There is a 4 mm triangular-shaped density in the lateral aspect of right middle lobe which is of doubtful significance. No other nodular opacities detected within the lungs. There is no pneumothorax or pleural effusion. Osseous structures appear normal.     Impression:   1. Findings of COPD   2. An 8 mm nonspecific density anterior right middle lobe which could represent focal scarring and this could be followed with a noncontrasted CT in 3-6 months to assess any size change.    Other Result Text    Syed Alexander MD - 09/12/2017   Formatting of this note might be different from the original.   CT CHEST WO CONTRAST:     Reason for  study: COPD and abnormal chest x-ray     Findings: Study was performed without IV contrast which limits evaluation of mediastinal structures. Automated exposure control was used for dose reduction. Heart is normal in size and aorta is normal in caliber. There are multiple cystic changes throughout the lungs indicating COPD. There is an 8 mm ill-defined density in the anterior right middle lobe which could represents scarring rather than an active process and this could be followed. There is a 4 mm triangular-shaped density in the lateral aspect of right middle lobe which is of doubtful significance. No other nodular opacities detected within the lungs. There is no pneumothorax or pleural effusion. Osseous structures appear normal.     Impression:   1. Findings of COPD   2. An 8 mm nonspecific density anterior right middle lobe which could represent focal scarring and this could be followed with a noncontrasted CT in 3-6 months to assess any size change.  Specimen Collected: -- Last Resulted: --   Date: 09/12/17    [facility] and [facility]          Pulmonary Studies Review 2/21/2022   SpO2 95   Height 70   Weight 2627.88   BMI (Calculated) 23.6   Predicted Distance 292.46   Predicted Distance Meters (Calculated) 499.21       Cardiac catheterization  · The pre-procedure left ventricular end diastolic pressure was 13.  · The estimated blood loss was none.  · The coronary arteries were normal..  · The filling pressures on the right and left were normal.     The procedure log was documented by Documenter: Jenna Cantu, RN and   verified by Janneth Tovar MD.    Date: 1/13/2022  Time: 5:22 PM      Office Spirometry Results:     No flowsheet data found.  Pulmonary Studies Review 2/21/2022   SpO2 95   Height 70   Weight 2627.88   BMI (Calculated) 23.6   Predicted Distance 292.46   Predicted Distance Meters (Calculated) 499.21       REVIEW OF  OUTSIDE RECORDS:    CPFS: Severe obstruction; the FEV1 is 0.87 or 31% of predicted. The increase in the residual volume on the lung volumes suggest pulmonary overinflation. The DLCO is severely decreased at 29% of predicted.    Arterial blood gases on room air: PH 7.41 PCO2 47 PO2 67  No results found for: FVC, FEV1, WAW3JNO, TLC, DLCO, FEF25/75, FEV1-POST, FVC-POST, FEF2575-POST    Radiology:  Chest x-ray heart size is normal; no acute infiltrates or effusion. Right upper lobe approximately 0.7 cm likely chronic pulmonary nodule; flattening of the diaphragm; double aorta sign;    CT [facility] at [facility] on March 2, 2017: Posterior right apex has a small area of nonspecific groundglass opacity that does not really look like a mass needs follow-up; right middle lobe questionable 6 mm nodule. Emphysematous holes throughout the upper lobes    Impression:   Aortic insufficiency  Systolic congestive heart failure compensated  COPD emphysematous severe not in exacerbation  Dyspnea on exertion secondary to above  Nicotine abuse  History of iron deficiency anemia  History of prostate cancer  Pulmonary nodules small indeterminate    Patient is at significantly increased for perioperative complications and a pulmonary standpoint on the basis of his pulmonary function studies that coincide with his chest x-ray/CT. Functionally he gives a history that suggests less impairment.  I have discussed the risk with the patient and answered his questions. He should have a cardiopulmonary exercise treadmill test to assess his VO2 max to better evaluate his perioperative risk. Would recommend postponing his surgery at this time    Plan:  Essential to quit smoking; counseled 4 min on importance/methods/OLOL no smoking program  Spiriva Respimat 2.5 mg once a day  Cardiopulmonary exercise treadmill  Discussed with Dr. Garcia  Follow-up noncontrast CT from 6 months ago    RV:  3 weeks    Orders:  No orders of the defined  types were placed in this encounter.    Govind Martin MD, [facility]      Electronically signed by Govind Martin MD at 08/31/2017 2:51 PM CDT            Assessment:            Pulmonary emphysema, unspecified emphysema type  -     Ambulatory referral/consult to Pulmonology  -     Complete PFT without bronchodilator; Future; Expected date: 02/21/2022  -     albuterol (PROVENTIL) 2.5 mg /3 mL (0.083 %) nebulizer solution; Take 3 mLs (2.5 mg total) by nebulization every 4 to 6 hours as needed for Wheezing or Shortness of Breath.  Dispense: 360 mL; Refill: 11  -     NEBULIZER KIT (SUPPLIES) FOR HOME USE  -     NEBULIZER FOR HOME USE  -     albuterol (PROVENTIL/VENTOLIN HFA) 90 mcg/actuation inhaler; Inhale 1-2 puffs into the lungs every 6 (six) hours as needed for Wheezing or Shortness of Breath. Rescue  Dispense: 18 g; Refill: 11  -     tiotropium (SPIRIVA) 18 mcg inhalation capsule; Inhale 1 capsule (18 mcg total) into the lungs once daily. Controller  Dispense: 30 capsule; Refill: 11    Solitary pulmonary nodule  -     Ambulatory referral/consult to Pulmonology    Nicotine abuse  -     Ambulatory referral/consult to Smoking Cessation Program; Future; Expected date: 02/28/2022    Pneumonia of left lung due to infectious organism, unspecified part of lung          Outpatient Encounter Medications as of 2/21/2022   Medication Sig Dispense Refill    apixaban (ELIQUIS) 5 mg Tab Take 1 tablet (5 mg total) by mouth 2 (two) times daily. 180 tablet 1    atorvastatin (LIPITOR) 10 MG tablet Take 1 tablet (10 mg total) by mouth once daily. 90 tablet 3    pantoprazole (PROTONIX) 40 MG tablet Take 1 tablet (40 mg total) by mouth once daily. 90 tablet 3    sacubitriL-valsartan (ENTRESTO) 24-26 mg per tablet Take 1 tablet by mouth 2 (two) times daily. 60 tablet 3    [DISCONTINUED] albuterol (PROVENTIL/VENTOLIN HFA) 90 mcg/actuation inhaler Inhale 1-2 puffs into the lungs every 6 (six) hours as needed for Wheezing or Shortness  of Breath. Rescue 18 g 0    [DISCONTINUED] tiotropium (SPIRIVA) 18 mcg inhalation capsule Inhale 1 capsule (18 mcg total) into the lungs once daily. Controller 30 capsule 1    albuterol (PROVENTIL) 2.5 mg /3 mL (0.083 %) nebulizer solution Take 3 mLs (2.5 mg total) by nebulization every 4 to 6 hours as needed for Wheezing or Shortness of Breath. 360 mL 11    albuterol (PROVENTIL/VENTOLIN HFA) 90 mcg/actuation inhaler Inhale 1-2 puffs into the lungs every 6 (six) hours as needed for Wheezing or Shortness of Breath. Rescue 18 g 11    furosemide (LASIX) 20 MG tablet Take 1 tablet (20 mg total) by mouth once daily. 90 tablet 0    metoprolol succinate (TOPROL-XL) 50 MG 24 hr tablet Take 1 tablet (50 mg total) by mouth 2 (two) times daily. 60 tablet 3    tiotropium (SPIRIVA) 18 mcg inhalation capsule Inhale 1 capsule (18 mcg total) into the lungs once daily. Controller 30 capsule 11     No facility-administered encounter medications on file as of 2/21/2022.     Plan:       Requested Prescriptions     Signed Prescriptions Disp Refills    albuterol (PROVENTIL) 2.5 mg /3 mL (0.083 %) nebulizer solution 360 mL 11     Sig: Take 3 mLs (2.5 mg total) by nebulization every 4 to 6 hours as needed for Wheezing or Shortness of Breath.    albuterol (PROVENTIL/VENTOLIN HFA) 90 mcg/actuation inhaler 18 g 11     Sig: Inhale 1-2 puffs into the lungs every 6 (six) hours as needed for Wheezing or Shortness of Breath. Rescue    tiotropium (SPIRIVA) 18 mcg inhalation capsule 30 capsule 11     Sig: Inhale 1 capsule (18 mcg total) into the lungs once daily. Controller     Problem List Items Addressed This Visit     Emphysema of lung - Primary    Relevant Medications    albuterol (PROVENTIL) 2.5 mg /3 mL (0.083 %) nebulizer solution    albuterol (PROVENTIL/VENTOLIN HFA) 90 mcg/actuation inhaler    tiotropium (SPIRIVA) 18 mcg inhalation capsule    Other Relevant Orders    Complete PFT without bronchodilator    NEBULIZER KIT (SUPPLIES)  FOR HOME USE    NEBULIZER FOR HOME USE    Nicotine abuse    Relevant Orders    Ambulatory referral/consult to Smoking Cessation Program      Other Visit Diagnoses     Solitary pulmonary nodule        Pneumonia of left lung due to infectious organism, unspecified part of lung                 Follow up in about 3 weeks (around 3/14/2022) for Review CT/PET - on return visit.    MEDICAL DECISION MAKING: Moderate to high complexity.  Overall, the multiple problems listed are of moderate to high severity that may impact quality of life and activities of daily living. Side effects of medications, treatment plan as well as options and alternatives reviewed and discussed with patient. There was counseling of patient concerning these issues.    Total time spent in counseling and coordination of care - 60  minutes of total time spent on the encounter, which includes face to face time and non-face to face time preparing to see the patient (eg, review of tests), Obtaining and/or reviewing separately obtained history, Documenting clinical information in the electronic or other health record, Independently interpreting results (not separately reported) and communicating results to the patient/family/caregiver, or Care coordination (not separately reported).    Time was used in discussion of prognosis, risks, benefits of treatment, instructions and compliance with regimen . Discussion with other physicians and/or health care providers - home health or for use of durable medical equipment (oxygen, nebulizers, CPAP, BiPAP) occurred.

## 2022-02-21 NOTE — PROGRESS NOTES
Subjective:   Date of Visit: 2/21/22   ?   ?    REFERRING PROVIDER: No referring provider defined for this encounter.   ?   CHIEF COMPLAINT:  Anemia???????   ?   HPI:  81-year-old male with past medical history significant for prostate cancer, hypertension, GERD, aortic aneurysm was referred to us by Dr. Ch for evaluation and management of microcytic anemia.    Recent study showed KEN, s/p IV iron therapy with improvement in hgb and iron status. Cologuard test was noted to be positive. We recommended GI eval he declined.     Was in the ER few days ago with symptomatic anemia and was transfused with 2 units of pRBC. Presents today for follow up. Apart from fatigue, he has no other complaints.     Review of Systems   Constitutional: Positive for fatigue. Negative for activity change, appetite change, chills, fever and unexpected weight change.   HENT: Negative for hearing loss, mouth sores, nosebleeds, sore throat, tinnitus, trouble swallowing and voice change.    Eyes: Negative for visual disturbance.   Respiratory: Negative for cough, chest tightness and wheezing.    Cardiovascular: Negative for chest pain, palpitations and leg swelling.   Gastrointestinal: Negative for abdominal pain, anal bleeding, blood in stool, constipation, diarrhea, nausea and vomiting.   Genitourinary: Negative for frequency, hematuria and testicular pain.   Musculoskeletal: Negative for arthralgias, back pain, gait problem and neck pain.   Skin: Negative for color change, pallor, rash and wound.   Allergic/Immunologic: Negative for immunocompromised state.   Neurological: Positive for weakness. Negative for seizures, syncope, numbness and headaches.   Hematological: Negative for adenopathy. Does not bruise/bleed easily.   Psychiatric/Behavioral: Negative for agitation, confusion, decreased concentration, hallucinations and sleep disturbance. The patient is not nervous/anxious.        ?   PAST MEDICAL HISTORY:   Past Medical History:    Diagnosis Date    Anemia of chronic disease     Aortic aneurysm     Atrial fibrillation with RVR 9/24/2021    Chronic systolic congestive heart failure 8/31/2017    Emphysema of lung 8/31/2017    GERD (gastroesophageal reflux disease)     Hypertension     Microcytic anemia 11/24/2020    Other hyperlipidemia 4/27/2021    Prostate cancer     ?     PAST SURGICAL HISTORY:   Past Surgical History:   Procedure Laterality Date    CATHETERIZATION OF BOTH LEFT AND RIGHT HEART N/A 1/13/2022    Procedure: CATHETERIZATION, HEART, BOTH LEFT AND RIGHT;  Surgeon: Janneth Tovar MD;  Location: [facility] CATH LAB;  Service: Cardiology;  Laterality: N/A;  poss cx    COLONOSCOPY      CORONARY ANGIOGRAPHY N/A 1/13/2022    Procedure: ANGIOGRAM, CORONARY ARTERY;  Surgeon: Janneth Tovar MD;  Location: [facility] CATH LAB;  Service: Cardiology;  Laterality: N/A;    PROSTATE SURGERY      SPINE SURGERY        ?   ALLERGIES:   Allergies as of 02/21/2022 - Reviewed 02/21/2022   Allergen Reaction Noted    Fish containing products  09/17/2020    Iodine and iodide containing products  09/17/2020    Shellfish containing products  09/28/2021      ?   MEDICATIONS:?   Outpatient Medications Marked as Taking for the 2/21/22 encounter (Office Visit) with Antony Street MD   Medication Sig Dispense Refill    albuterol (PROVENTIL) 2.5 mg /3 mL (0.083 %) nebulizer solution Take 3 mLs (2.5 mg total) by nebulization every 4 to 6 hours as needed for Wheezing or Shortness of Breath. 360 mL 11    albuterol (PROVENTIL/VENTOLIN HFA) 90 mcg/actuation inhaler Inhale 1-2 puffs into the lungs every 6 (six) hours as needed for Wheezing or Shortness of Breath. Rescue 18 g 11    apixaban (ELIQUIS) 5 mg Tab Take 1 tablet (5 mg total) by mouth 2 (two) times daily. 180 tablet 1    atorvastatin (LIPITOR) 10 MG tablet Take 1 tablet (10 mg total) by mouth once daily. 90 tablet 3    pantoprazole (PROTONIX) 40 MG tablet Take 1 tablet (40 mg total) by  mouth once daily. 90 tablet 3    sacubitriL-valsartan (ENTRESTO) 24-26 mg per tablet Take 1 tablet by mouth 2 (two) times daily. 60 tablet 3    tiotropium (SPIRIVA) 18 mcg inhalation capsule Inhale 1 capsule (18 mcg total) into the lungs once daily. Controller 30 capsule 11      ?   SOCIAL HISTORY:?   Social History     Tobacco Use    Smoking status: Current Every Day Smoker     Packs/day: 1.00     Types: Cigarettes    Smokeless tobacco: Never Used   Substance Use Topics    Alcohol use: Yes     Comment: reports only drinks red wine when games are on        ?   FAMILY HISTORY:   family history includes Diabetes in his mother; Peripheral vascular disease in his mother.   ?     Objective:      Physical Exam  Constitutional:       General: He is not in acute distress.     Appearance: He is well-developed.   HENT:      Head: Normocephalic and atraumatic.      Right Ear: External ear normal.      Left Ear: External ear normal.      Mouth/Throat:      Pharynx: No oropharyngeal exudate.   Eyes:      General: No scleral icterus.        Right eye: No discharge.         Left eye: No discharge.      Conjunctiva/sclera: Conjunctivae normal.      Pupils: Pupils are equal, round, and reactive to light.   Neck:      Thyroid: No thyromegaly.   Cardiovascular:      Rate and Rhythm: Normal rate and regular rhythm.      Heart sounds: Normal heart sounds. No murmur heard.  Pulmonary:      Effort: Pulmonary effort is normal. No respiratory distress.      Breath sounds: Normal breath sounds. No wheezing.   Chest:      Chest wall: No tenderness.   Breasts:      Right: No supraclavicular adenopathy.      Left: No supraclavicular adenopathy.       Abdominal:      General: Bowel sounds are normal. There is no distension.      Palpations: Abdomen is soft. There is no mass.      Tenderness: There is no abdominal tenderness. There is no rebound.   Musculoskeletal:         General: No tenderness. Normal range of motion.      Cervical back:  Normal range of motion and neck supple.   Lymphadenopathy:      Cervical: No cervical adenopathy.      Right cervical: No superficial cervical adenopathy.     Left cervical: No superficial cervical adenopathy.      Upper Body:      Right upper body: No supraclavicular or pectoral adenopathy.      Left upper body: No supraclavicular or pectoral adenopathy.   Skin:     General: Skin is warm and dry.      Capillary Refill: Capillary refill takes 2 to 3 seconds.      Coloration: Skin is not pale.      Findings: No erythema or rash.   Neurological:      Mental Status: He is alert and oriented to person, place, and time.      Cranial Nerves: No cranial nerve deficit.      Sensory: No sensory deficit.   Psychiatric:         Behavior: Behavior normal.         Judgment: Judgment normal.         ?   Vitals:    02/21/22 1415   BP: 131/67   Pulse: 72   Temp: 97.5 °F (36.4 °C)      ?     ECOG SCORE    2 - Capable of all selfcare but unable to carry out any work activities, active > 50% of hours       ?   Laboratory:  ?   No visits with results within 1 Day(s) from this visit.   Latest known visit with results is:   Lab Visit on 02/18/2022   Component Date Value Ref Range Status    WBC 02/18/2022 6.15  3.90 - 12.70 K/uL Final    RBC 02/18/2022 4.16 (A) 4.60 - 6.20 M/uL Final    Hemoglobin 02/18/2022 9.2 (A) 14.0 - 18.0 g/dL Final    Hematocrit 02/18/2022 31.2 (A) 40.0 - 54.0 % Final    MCV 02/18/2022 75 (A) 82 - 98 fL Final    MCH 02/18/2022 22.1 (A) 27.0 - 31.0 pg Final    MCHC 02/18/2022 29.5 (A) 32.0 - 36.0 g/dL Final    RDW 02/18/2022 31.6 (A) 11.5 - 14.5 % Final    Platelets 02/18/2022 414  150 - 450 K/uL Final    MPV 02/18/2022 9.7  9.2 - 12.9 fL Final    Immature Granulocytes 02/18/2022 0.3  0.0 - 0.5 % Final    Gran # (ANC) 02/18/2022 4.9  1.8 - 7.7 K/uL Final    Immature Grans (Abs) 02/18/2022 0.02  0.00 - 0.04 K/uL Final    Lymph # 02/18/2022 0.6 (A) 1.0 - 4.8 K/uL Final    Mono # 02/18/2022 0.4  0.3 -  1.0 K/uL Final    Eos # 02/18/2022 0.2  0.0 - 0.5 K/uL Final    Baso # 02/18/2022 0.04  0.00 - 0.20 K/uL Final    nRBC 02/18/2022 0  0 /100 WBC Final    Gran % 02/18/2022 80.3 (A) 38.0 - 73.0 % Final    Lymph % 02/18/2022 9.6 (A) 18.0 - 48.0 % Final    Mono % 02/18/2022 6.3  4.0 - 15.0 % Final    Eosinophil % 02/18/2022 2.8  0.0 - 8.0 % Final    Basophil % 02/18/2022 0.7  0.0 - 1.9 % Final    Platelet Estimate 02/18/2022 Appears normal   Final    Aniso 02/18/2022 Slight   Final    Poik 02/18/2022 Slight   Final    Hypo 02/18/2022 Occasional   Final    Target Cells 02/18/2022 Occasional   Final    Tear Drop Cells 02/18/2022 Occasional   Final    Differential Method 02/18/2022 Automated   Final    Sodium 02/18/2022 140  136 - 145 mmol/L Final    Potassium 02/18/2022 4.2  3.5 - 5.1 mmol/L Final    Chloride 02/18/2022 107  95 - 110 mmol/L Final    CO2 02/18/2022 26  23 - 29 mmol/L Final    Glucose 02/18/2022 96  70 - 110 mg/dL Final    BUN 02/18/2022 14  8 - 23 mg/dL Final    Creatinine 02/18/2022 0.7  0.5 - 1.4 mg/dL Final    Calcium 02/18/2022 9.1  8.7 - 10.5 mg/dL Final    Total Protein 02/18/2022 7.1  6.0 - 8.4 g/dL Final    Albumin 02/18/2022 3.7  3.5 - 5.2 g/dL Final    Total Bilirubin 02/18/2022 0.7  0.1 - 1.0 mg/dL Final    Alkaline Phosphatase 02/18/2022 39 (A) 55 - 135 U/L Final    AST 02/18/2022 19  10 - 40 U/L Final    ALT 02/18/2022 12  10 - 44 U/L Final    Anion Gap 02/18/2022 7 (A) 8 - 16 mmol/L Final    eGFR if African American 02/18/2022 >60  >60 mL/min/1.73 m^2 Final    eGFR if non African American 02/18/2022 >60  >60 mL/min/1.73 m^2 Final    Iron 02/18/2022 22 (A) 45 - 160 ug/dL Final    Transferrin 02/18/2022 266  200 - 375 mg/dL Final    TIBC 02/18/2022 394  250 - 450 ug/dL Final    Saturated Iron 02/18/2022 6 (A) 20 - 50 % Final    Ferritin 02/18/2022 30  20.0 - 300.0 ng/mL Final      ?   Tumor markers   ?   ?   Imaging: Cardiac catheterization  · The  pre-procedure left ventricular end diastolic pressure was 13.  · The estimated blood loss was none.  · The coronary arteries were normal..  · The filling pressures on the right and left were normal.     The procedure log was documented by Documenter: Jenna Cantu, RN and   verified by Janneth Tovar MD.    Date: 1/13/2022  Time: 5:22 PM     ?      Pathology:  Pathology Results  (Last 10 years)    None           ?   Assessment/Plan:       1. Iron deficiency anemia due to chronic blood loss          Iron deficiency anemia due to chronic blood loss  Microcytic anemia is likely due to iron deficiency.  Iron studies performed in November of 2020 showed low iron saturation, serum iron levels.  Status post 2 course of IV iron therapy with improvement Hgb and iron.      Initially, recommended colonoscopy however patient declined. Cologuard test returned positive.  Was recently seen in the hospital due to symptomatic anemia was transfused with 2 units of packed red blood cell.  Most recent CBC from 02/18/2022 showed hemoglobin 9.2 per dL, hematocrit 31.2% with microcytosis.     Iron studies from 12/10/2021 showed severe iron deficiency with iron saturation of 3% and ferritin level of 13 nanograms/cc.  He received 2 courses of IV iron therapy with improvement in ferritin level to 118 nanograms/cc.  Unfortunately, most recent iron studies from 02/18/2022 showed decreasing ferritin level 30 nanograms/cc, indication possible GI bleed.    Reiterated importance following with GI for endoscopic evaluation to rule out patient verbalized understanding.  Return in 2-3 months with repeat labs or sooner if needed.      ?Iron deficiency anemia due to chronic blood loss  -     CBC Auto Differential; Future; Expected date: 02/21/2022  -     Comprehensive Metabolic Panel; Future; Expected date: 02/21/2022  -     Iron and TIBC; Future; Expected date: 02/21/2022  -     Ferritin; Future; Expected date: 02/21/2022  -     Case Request  Endoscopy: EGD (ESOPHAGOGASTRODUODENOSCOPY)  COLONOSCOPY      ?   Follow-Up: Follow up in about 3 months (around 5/21/2022).    JAYNA RODRIGUEZ Md., Ph.D  Hematology & Oncology Department  Phone #: [phone]

## 2022-02-21 NOTE — ASSESSMENT & PLAN NOTE
Microcytic anemia is likely due to iron deficiency.  Iron studies performed in November of 2020 showed low iron saturation, serum iron levels.  Status post 2 course of IV iron therapy with improvement Hgb and iron.      Initially, recommended colonoscopy however patient declined. Cologuard test returned positive.  Was recently seen in the hospital due to symptomatic anemia was transfused with 2 units of packed red blood cell.  Most recent CBC from 02/18/2022 showed hemoglobin 9.2 per dL, hematocrit 31.2% with microcytosis.     Iron studies from 12/10/2021 showed severe iron deficiency with iron saturation of 3% and ferritin level of 13 nanograms/cc.  He received 2 courses of IV iron therapy with improvement in ferritin level to 118 nanograms/cc.  Unfortunately, most recent iron studies from 02/18/2022 showed decreasing ferritin level 30 nanograms/cc, indication possible GI bleed.    Reiterated importance following with GI for endoscopic evaluation to rule out patient verbalized understanding.  Return in 2-3 months with repeat labs or sooner if needed.

## 2022-03-17 ENCOUNTER — LAB VISIT (OUTPATIENT)
Dept: LAB | Facility: HOSPITAL | Age: 82
End: 2022-03-17
Attending: INTERNAL MEDICINE
Payer: COMMERCIAL

## 2022-03-17 ENCOUNTER — TELEPHONE (OUTPATIENT)
Dept: CARDIOLOGY | Facility: CLINIC | Age: 82
End: 2022-03-17

## 2022-03-17 ENCOUNTER — OFFICE VISIT (OUTPATIENT)
Dept: CARDIOLOGY | Facility: CLINIC | Age: 82
End: 2022-03-17
Payer: COMMERCIAL

## 2022-03-17 VITALS
WEIGHT: 163.81 LBS | BODY MASS INDEX: 23.45 KG/M2 | HEIGHT: 70 IN | SYSTOLIC BLOOD PRESSURE: 127 MMHG | DIASTOLIC BLOOD PRESSURE: 65 MMHG | OXYGEN SATURATION: 95 % | HEART RATE: 91 BPM

## 2022-03-17 DIAGNOSIS — I50.42 CHRONIC COMBINED SYSTOLIC AND DIASTOLIC CONGESTIVE HEART FAILURE, NYHA CLASS 3: ICD-10-CM

## 2022-03-17 DIAGNOSIS — I50.22 CHRONIC SYSTOLIC (CONGESTIVE) HEART FAILURE: Primary | ICD-10-CM

## 2022-03-17 DIAGNOSIS — I35.1 NONRHEUMATIC AORTIC VALVE INSUFFICIENCY: ICD-10-CM

## 2022-03-17 DIAGNOSIS — R06.09 DOE (DYSPNEA ON EXERTION): ICD-10-CM

## 2022-03-17 DIAGNOSIS — I48.0 PAROXYSMAL ATRIAL FIBRILLATION: ICD-10-CM

## 2022-03-17 DIAGNOSIS — R79.89 ELEVATED BRAIN NATRIURETIC PEPTIDE (BNP) LEVEL: ICD-10-CM

## 2022-03-17 DIAGNOSIS — J43.9 PULMONARY EMPHYSEMA, UNSPECIFIED EMPHYSEMA TYPE: ICD-10-CM

## 2022-03-17 DIAGNOSIS — D64.9 SYMPTOMATIC ANEMIA: ICD-10-CM

## 2022-03-17 DIAGNOSIS — I70.0 ATHEROSCLEROSIS OF ABDOMINAL AORTA: ICD-10-CM

## 2022-03-17 DIAGNOSIS — I48.0 PAF (PAROXYSMAL ATRIAL FIBRILLATION): ICD-10-CM

## 2022-03-17 DIAGNOSIS — E78.49 OTHER HYPERLIPIDEMIA: ICD-10-CM

## 2022-03-17 LAB — BNP SERPL-MCNC: 1048 PG/ML (ref 0–99)

## 2022-03-17 PROCEDURE — 99213 OFFICE O/P EST LOW 20 MIN: CPT | Mod: PBBFAC | Performed by: INTERNAL MEDICINE

## 2022-03-17 PROCEDURE — 99214 OFFICE O/P EST MOD 30 MIN: CPT | Mod: S$GLB,,, | Performed by: INTERNAL MEDICINE

## 2022-03-17 PROCEDURE — 99999 PR PBB SHADOW E&M-EST. PATIENT-LVL III: CPT | Mod: PBBFAC,,, | Performed by: INTERNAL MEDICINE

## 2022-03-17 PROCEDURE — 36415 COLL VENOUS BLD VENIPUNCTURE: CPT | Performed by: INTERNAL MEDICINE

## 2022-03-17 PROCEDURE — 99214 PR OFFICE/OUTPT VISIT, EST, LEVL IV, 30-39 MIN: ICD-10-PCS | Mod: S$GLB,,, | Performed by: INTERNAL MEDICINE

## 2022-03-17 PROCEDURE — 83880 ASSAY OF NATRIURETIC PEPTIDE: CPT | Performed by: INTERNAL MEDICINE

## 2022-03-17 PROCEDURE — 99999 PR PBB SHADOW E&M-EST. PATIENT-LVL III: ICD-10-PCS | Mod: PBBFAC,,, | Performed by: INTERNAL MEDICINE

## 2022-03-17 RX ORDER — FUROSEMIDE 20 MG/1
20 TABLET ORAL DAILY
Qty: 90 TABLET | Refills: 0 | Status: SHIPPED | OUTPATIENT
Start: 2022-03-17 | End: 2022-07-28 | Stop reason: SDUPTHER

## 2022-03-17 RX ORDER — SACUBITRIL AND VALSARTAN 49; 51 MG/1; MG/1
1 TABLET, FILM COATED ORAL 2 TIMES DAILY
Qty: 60 TABLET | Refills: 3 | Status: SHIPPED | OUTPATIENT
Start: 2022-03-17 | End: 2022-06-16

## 2022-03-17 NOTE — PROGRESS NOTES
"Subjective:   Patient ID:  Richy Douglas is a 81 y.o. male who presents for cardiac consult of No chief complaint on file.    Referring Physician:    Vivian Ch MD [[id number] [address] [place] [address]      Reason for consult: CHF, AI    HPI  The patient came in today for cardiac consult of No chief complaint on file.      Richy Douglas is a 81 y.o. male pt with h/o CHF, AI, GERD, HTN, emphysema, aortic aneurysm, anemia presents for follow up CV eval.     4/27/21  He had ER eval last month - Emergency Department for evaluation of SOB which onset gradually x 1 week ago. Pt reports sustaining a rib fracture 1 week ago and states his "breathing got bad". Pt reports smoking "only when football games are on". Symptoms are constant and moderate in severity. No mitigating or exacerbating factors reported. Associated sxs include cough. Pt also notes L foot swelling and L knee swelling that has been intermittent for a few months.     BNP was 1258.     He had CT chest -    Moderate to severe vascular calcification seen involving the aorta.  Coronary artery calcifications are also noted.  There is no pericardial effusion. No enlarged mediastinal, hilar or axillary lymph nodes are identified.    He feels more dyspnea but also CP from rib fracture.     8/3/21  Nuclear stress neg, EF 45%. BNP 1074- Told pt BNP is significantly elevated due to extra fluid, if feeling more shortness of breath or edema can double up fluid pills for 2-3 days and monitor pressures,     Ref Range & Units 2 mo ago 5 mo ago 8 mo ago    BNP 0 - 99 pg/mL 1,047 High   1,258 High  CM  202 High      He has been having some tingling/numbess in L arm.      Hosp stay Sept 2021    ED today with a chief complaint of worsening SOB that started the day prior. Associated symptoms included malaise, productive cough, fever, and pleuritic chest discomfort. Patient denied any associated kevin chest pain, nausea, vomiting, diaphoresis, palpitations, " near syncope, or syncope. Initial workup in ED revealed leukocytosis (WBC 18,000), anemia (H/H 8.8/28.1), and BNP of 929. CXR showed findings consistent with PNA. EKG showed new onset afib with RVR and patient was subsequently admitted for further evaluation and treatment. Cardiology consulted to assist with management. Patient seen and examined today in ED. Still endorses SOB along with pleuritic chest discomfort. No other CV complaints. No prior history of afib. He reports compliance with his medications. Followed on OP basis by Dr. Garces. Chart reviewed. Troponin x 1 negative. Echo 4/21 showed normal EF. MPI stress test 5/21 negative for ischemia.      Hospital Course:   9/25/21-Patient seen and examined today, resting in bed. Feeling much better, SOB improved. Still has some hoarseness. No chest pain. Remains in afib, but HR controlled. Cardizem switched to po and Eliquis initiated. Labs stable. Will need to monitor H/H on AC.  9/26/21-Patient seen and examined today, lying in bed. SOB continues to improve. Still has occasional cough. No chest pain. HR controlled. BC + for gram negative rods. H/H stable.  9/27/21- patient in bed resting. Has no complaints this morning. NSR 68 bpm.. BC +gram negative rods. Labs stable. No abnormal bleeding on Eliquis. cardizem gtt switched to PO  180mg daily   9/28/2021--Patient seen and examined in room, sitting on side of bed. Feels today. Remains in SR. Ok to discharge home from cards standpoint. Will need OP Cards follow up with Dr Garces next week.     12/9/21  Pt had hosp stay in Sept had new onset AFib. He converted to NSR with cardizem drip and treatment of sepsis PNA. BP and HR well controlled today.     Hosp stay  Hospital Course:   1/10/22- pt comfortable on 2L via NC current smoker physical exam consistent with COPD  1/11/22- CT scan of the chest reveals severe emphysematous lungs with bully scattered throughout all lung fields bilaterally.  Consolidation left upper  and middle jose lobes consistent with pneumonia.  No masses. Echo reveals new decline in EF to 25% pt reported chest pain on admission  1/12/22- pt in Afib coronary angiogram postponed. Rate controlled on metoprolol. Daily drinker started on librium  1/13/22- pt underwent LHC today with Dr. Tovar result was clean coronaries. EF documented at 50% although transthoracic echo EF was 25%     1/14 patient reports improvement in sx. Tolerated lhc procedure well. After discussing with cards, ok to d/c. Patient will need to discuss initiation of entresto on o/p basis. Continue losartan, bb, eliquis and lasix. New rxs delivered to bedside.    1/18/22  He presents post hosp stay, neg cath. ECHO with dec EF 25%,EF low normal on LHC.  BP is low normal, pulse 40s. He has been drinking daily for a while but has quit now.     3/17/22  He had recent pulm eval last month and then heme/onc eval, he will need EGD/Csope to rule out GIB. BP and HR stable today. He remains busy with recycling.     Patient feels no leg swelling, no PND, no palpitation, no dizziness, no syncope, no CNS symptoms.    Patient has fairly good exercise tolerance. He does recycling, separation.     Patient is compliant with medications.    FH - mother - DM, PVD, tobacco      Results for orders placed during the hospital encounter of 01/09/22    Echo Saline Bubble? No    Interpretation Summary  · Concentric hypertrophy and severely decreased systolic function.  · The estimated ejection fraction is 25%.  · Grade I left ventricular diastolic dysfunction.  · Normal right ventricular size with mildly reduced right ventricular systolic function.  · Mild-to-moderate mitral regurgitation.  · Mild-to-moderate aortic regurgitation.  · Moderate left atrial enlargement.  · Moderate right atrial enlargement.  · Moderate tricuspid regurgitation.  · Intermediate central venous pressure (8 mmHg).  · The estimated PA systolic pressure is 119 mmHg.  · There is pulmonary  hypertension.  · The aortic root is moderately dilated.    Results for orders placed during the hospital encounter of 01/09/22    Cardiac catheterization    Conclusion  · The pre-procedure left ventricular end diastolic pressure was 13.  · The estimated blood loss was none.  · The coronary arteries were normal..  · The filling pressures on the right and left were normal.    The procedure log was documented by Documenter: Jenna Cantu, RN and verified by Janneth Tovar MD.    Date: 1/13/2022  Time: 5:22 PM          Past Medical History:   Diagnosis Date    Anemia of chronic disease     Aortic aneurysm     Atrial fibrillation with RVR 9/24/2021    Chronic systolic congestive heart failure 8/31/2017    Emphysema of lung 8/31/2017    GERD (gastroesophageal reflux disease)     Hypertension     Microcytic anemia 11/24/2020    Other hyperlipidemia 4/27/2021    Prostate cancer        Past Surgical History:   Procedure Laterality Date    CATHETERIZATION OF BOTH LEFT AND RIGHT HEART N/A 1/13/2022    Procedure: CATHETERIZATION, HEART, BOTH LEFT AND RIGHT;  Surgeon: Janneth Tovar MD;  Location: [facility] CATH LAB;  Service: Cardiology;  Laterality: N/A;  poss cx    COLONOSCOPY      CORONARY ANGIOGRAPHY N/A 1/13/2022    Procedure: ANGIOGRAM, CORONARY ARTERY;  Surgeon: Janneth Tovar MD;  Location: [facility] CATH LAB;  Service: Cardiology;  Laterality: N/A;    PROSTATE SURGERY      SPINE SURGERY         Social History     Tobacco Use    Smoking status: Current Every Day Smoker     Packs/day: 1.00     Types: Cigarettes    Smokeless tobacco: Never Used   Substance Use Topics    Alcohol use: Yes     Comment: reports only drinks red wine when games are on    Drug use: Never       Family History   Problem Relation Age of Onset    Diabetes Mother     Peripheral vascular disease Mother        Patient's Medications   New Prescriptions    SACUBITRIL-VALSARTAN (ENTRESTO) 49-51 MG PER TABLET    Take 1 tablet by mouth 2  (two) times daily.   Previous Medications    ALBUTEROL (PROVENTIL) 2.5 MG /3 ML (0.083 %) NEBULIZER SOLUTION    Take 3 mLs (2.5 mg total) by nebulization every 4 to 6 hours as needed for Wheezing or Shortness of Breath.    ALBUTEROL (PROVENTIL/VENTOLIN HFA) 90 MCG/ACTUATION INHALER    Inhale 1-2 puffs into the lungs every 6 (six) hours as needed for Wheezing or Shortness of Breath. Rescue    APIXABAN (ELIQUIS) 5 MG TAB    Take 1 tablet (5 mg total) by mouth 2 (two) times daily.    ATORVASTATIN (LIPITOR) 10 MG TABLET    Take 1 tablet (10 mg total) by mouth once daily.    FUROSEMIDE (LASIX) 20 MG TABLET    Take 1 tablet (20 mg total) by mouth once daily.    METOPROLOL SUCCINATE (TOPROL-XL) 50 MG 24 HR TABLET    Take 1 tablet (50 mg total) by mouth 2 (two) times daily.    PANTOPRAZOLE (PROTONIX) 40 MG TABLET    Take 1 tablet (40 mg total) by mouth once daily.    TIOTROPIUM (SPIRIVA) 18 MCG INHALATION CAPSULE    Inhale 1 capsule (18 mcg total) into the lungs once daily. Controller   Modified Medications    No medications on file   Discontinued Medications    SACUBITRIL-VALSARTAN (ENTRESTO) 24-26 MG PER TABLET    Take 1 tablet by mouth 2 (two) times daily.       Review of Systems   Constitutional: Positive for malaise/fatigue.   HENT: Negative.    Eyes: Negative.    Respiratory: Positive for shortness of breath.    Cardiovascular: Positive for chest pain.   Gastrointestinal: Negative.    Genitourinary: Negative.    Musculoskeletal: Negative.    Skin: Negative.    Neurological: Negative.    Endo/Heme/Allergies: Negative.    Psychiatric/Behavioral: Negative.    All 12 systems otherwise negative.      Wt Readings from Last 3 Encounters:   03/17/22 74.3 kg (163 lb 12.8 oz)   02/21/22 74.3 kg (163 lb 12.8 oz)   02/21/22 74.5 kg (164 lb 3.9 oz)     Temp Readings from Last 3 Encounters:   02/21/22 97.5 °F (36.4 °C) (Temporal)   01/31/22 97.7 °F (36.5 °C) (Temporal)   01/27/22 97.8 °F (36.6 °C)     BP Readings from Last 3  "Encounters:   03/17/22 127/65   02/21/22 131/67   02/21/22 122/70     Pulse Readings from Last 3 Encounters:   03/17/22 91   02/21/22 72   02/21/22 81       /65   Pulse 91   Ht 5' 10" (1.778 m)   Wt 74.3 kg (163 lb 12.8 oz)   SpO2 95%   BMI 23.50 kg/m²     Objective:   Physical Exam  Vitals and nursing note reviewed.   Constitutional:       General: He is not in acute distress.     Appearance: He is well-developed. He is not diaphoretic.   HENT:      Head: Normocephalic and atraumatic.      Nose: Nose normal.   Eyes:      General: No scleral icterus.     Conjunctiva/sclera: Conjunctivae normal.   Neck:      Thyroid: No thyromegaly.      Vascular: No JVD.   Cardiovascular:      Rate and Rhythm: Normal rate and regular rhythm.      Heart sounds: S1 normal and S2 normal. No murmur heard.    No friction rub. No gallop. No S3 or S4 sounds.   Pulmonary:      Effort: Pulmonary effort is normal. No respiratory distress.      Breath sounds: Normal breath sounds. No stridor. No wheezing or rales.   Chest:      Chest wall: No tenderness.   Abdominal:      General: Bowel sounds are normal. There is no distension.      Palpations: Abdomen is soft. There is no mass.      Tenderness: There is no abdominal tenderness. There is no rebound.   Genitourinary:     Comments: Deferred  Musculoskeletal:         General: No tenderness or deformity. Normal range of motion.      Cervical back: Normal range of motion and neck supple.   Lymphadenopathy:      Cervical: No cervical adenopathy.   Skin:     General: Skin is warm and dry.      Coloration: Skin is not pale.      Findings: No erythema or rash.   Neurological:      Mental Status: He is alert and oriented to person, place, and time.      Motor: No abnormal muscle tone.      Coordination: Coordination normal.   Psychiatric:         Behavior: Behavior normal.         Thought Content: Thought content normal.         Judgment: Judgment normal.         Lab Results   Component " Value Date     02/18/2022    K 4.2 02/18/2022     02/18/2022    CO2 26 02/18/2022    BUN 14 02/18/2022    CREATININE 0.7 02/18/2022    GLU 96 02/18/2022    MG 1.7 09/28/2021    AST 19 02/18/2022    ALT 12 02/18/2022    ALBUMIN 3.7 02/18/2022    PROT 7.1 02/18/2022    BILITOT 0.7 02/18/2022    WBC 6.15 02/18/2022    HGB 9.2 (L) 02/18/2022    HCT 31.2 (L) 02/18/2022    MCV 75 (L) 02/18/2022     02/18/2022    INR 1.4 (H) 09/24/2021    TSH 0.870 11/24/2020    CHOL 144 09/17/2020    HDL 61 09/17/2020    LDLCALC 74.0 09/17/2020    TRIG 45 09/17/2020    BNP 1,844 (H) 01/09/2022     Assessment:      1. Chronic systolic (congestive) heart failure    2. Paroxysmal atrial fibrillation    3. Elevated brain natriuretic peptide (BNP) level    4. Pulmonary emphysema, unspecified emphysema type    5. PAF (paroxysmal atrial fibrillation)    6. Atherosclerosis of abdominal aorta    7. Nonrheumatic aortic valve insufficiency    8. Other hyperlipidemia    9. SANCHEZ (dyspnea on exertion)    10. Symptomatic anemia    11. Chronic combined systolic and diastolic congestive heart failure, NYHA class 3        Plan:   1. CHF, AI with aortic atherosc;  BNP 1047 -- 969; BNP 1844; EF low on ECHO, low normal with LHC  - ECHO with mild to mod valvular heart disease with low EF - will repeat ECHO next month  - R/LHC normal coronaries 11/2022  - cont lasix  - changed Losartan to Entresto - class 3 symptoms - increase Entresto to moderate dose     2. Emphysema with dyspnea  - cont tx PCP/Pulm  - f/u pulm    3. HTN with aortic root moderately dilated with AI - Aortic root 4.6-4.9 cm  - titrate meds  - cont to monitor     4. HLD with aortic athero and Coronary artery calcifications  - cont statin    5. PAF, diagnosed in setting of sepsis/PNA, recent SVT in hosp - sec to alc withdrawal  - Cont Eliquis 5 BID and BB    6. Symptomatic anemia s/p PRBC  - needs EGD/Cscope - Elevated CV risk - proceed as medically/urgently needed - ok to hold  Eliquis 2 days prior   - proceed for scope - pt is well compensated     Thank you for allowing me to participate in this patient's care. Please do not hesitate to contact me with any questions or concerns. Consult note has been forwarded to the referral physician.

## 2022-03-17 NOTE — TELEPHONE ENCOUNTER
----- Message from Uriel Garces MD sent at 3/17/2022  1:47 PM CDT -----  Please contact the patient and let them know that their labs reveal elevated BNP due to extra fluid but improving significantly from January, continue lasix and the increased dose of Entresto as discsused. Monitor BP and weights daily.

## 2022-03-22 ENCOUNTER — PATIENT OUTREACH (OUTPATIENT)
Dept: ADMINISTRATIVE | Facility: OTHER | Age: 82
End: 2022-03-22
Payer: MEDICARE

## 2022-03-23 ENCOUNTER — OFFICE VISIT (OUTPATIENT)
Dept: PULMONOLOGY | Facility: CLINIC | Age: 82
End: 2022-03-23
Payer: COMMERCIAL

## 2022-03-23 ENCOUNTER — HOSPITAL ENCOUNTER (OUTPATIENT)
Dept: RADIOLOGY | Facility: HOSPITAL | Age: 82
Discharge: HOME | End: 2022-03-23
Attending: FAMILY MEDICINE
Payer: COMMERCIAL

## 2022-03-23 VITALS
RESPIRATION RATE: 18 BRPM | SYSTOLIC BLOOD PRESSURE: 120 MMHG | WEIGHT: 161.19 LBS | BODY MASS INDEX: 23.07 KG/M2 | DIASTOLIC BLOOD PRESSURE: 80 MMHG | HEART RATE: 76 BPM | OXYGEN SATURATION: 97 % | HEIGHT: 70 IN

## 2022-03-23 DIAGNOSIS — R91.1 SOLITARY PULMONARY NODULE: ICD-10-CM

## 2022-03-23 DIAGNOSIS — R05.9 COUGH: ICD-10-CM

## 2022-03-23 DIAGNOSIS — R09.02 EXERCISE HYPOXEMIA: Primary | ICD-10-CM

## 2022-03-23 DIAGNOSIS — J43.1 PANLOBULAR EMPHYSEMA: ICD-10-CM

## 2022-03-23 PROCEDURE — 99214 OFFICE O/P EST MOD 30 MIN: CPT | Mod: S$GLB,,, | Performed by: INTERNAL MEDICINE

## 2022-03-23 PROCEDURE — 3074F SYST BP LT 130 MM HG: CPT | Mod: CPTII,S$GLB,, | Performed by: INTERNAL MEDICINE

## 2022-03-23 PROCEDURE — 99999 PR PBB SHADOW E&M-EST. PATIENT-LVL IV: CPT | Mod: PBBFAC,,, | Performed by: INTERNAL MEDICINE

## 2022-03-23 PROCEDURE — 1159F MED LIST DOCD IN RCRD: CPT | Mod: CPTII,S$GLB,, | Performed by: INTERNAL MEDICINE

## 2022-03-23 PROCEDURE — 71250 CT THORAX DX C-: CPT | Mod: TC

## 2022-03-23 PROCEDURE — 1160F PR REVIEW ALL MEDS BY PRESCRIBER/CLIN PHARMACIST DOCUMENTED: ICD-10-PCS | Mod: CPTII,S$GLB,, | Performed by: INTERNAL MEDICINE

## 2022-03-23 PROCEDURE — 1157F PR ADVANCE CARE PLAN OR EQUIV PRESENT IN MEDICAL RECORD: ICD-10-PCS | Mod: CPTII,S$GLB,, | Performed by: INTERNAL MEDICINE

## 2022-03-23 PROCEDURE — 3079F DIAST BP 80-89 MM HG: CPT | Mod: CPTII,S$GLB,, | Performed by: INTERNAL MEDICINE

## 2022-03-23 PROCEDURE — 71250 CT THORAX DX C-: CPT | Mod: 26,,, | Performed by: RADIOLOGY

## 2022-03-23 PROCEDURE — 71250 CT CHEST WITHOUT CONTRAST: ICD-10-PCS | Mod: 26,,, | Performed by: RADIOLOGY

## 2022-03-23 PROCEDURE — 1160F RVW MEDS BY RX/DR IN RCRD: CPT | Mod: CPTII,S$GLB,, | Performed by: INTERNAL MEDICINE

## 2022-03-23 PROCEDURE — 3079F PR MOST RECENT DIASTOLIC BLOOD PRESSURE 80-89 MM HG: ICD-10-PCS | Mod: CPTII,S$GLB,, | Performed by: INTERNAL MEDICINE

## 2022-03-23 PROCEDURE — 99214 PR OFFICE/OUTPT VISIT, EST, LEVL IV, 30-39 MIN: ICD-10-PCS | Mod: S$GLB,,, | Performed by: INTERNAL MEDICINE

## 2022-03-23 PROCEDURE — 99999 PR PBB SHADOW E&M-EST. PATIENT-LVL IV: ICD-10-PCS | Mod: PBBFAC,,, | Performed by: INTERNAL MEDICINE

## 2022-03-23 PROCEDURE — 3074F PR MOST RECENT SYSTOLIC BLOOD PRESSURE < 130 MM HG: ICD-10-PCS | Mod: CPTII,S$GLB,, | Performed by: INTERNAL MEDICINE

## 2022-03-23 PROCEDURE — 1157F ADVNC CARE PLAN IN RCRD: CPT | Mod: CPTII,S$GLB,, | Performed by: INTERNAL MEDICINE

## 2022-03-23 PROCEDURE — 1159F PR MEDICATION LIST DOCUMENTED IN MEDICAL RECORD: ICD-10-PCS | Mod: CPTII,S$GLB,, | Performed by: INTERNAL MEDICINE

## 2022-03-23 NOTE — LETTER
March 23, 2022    [facility]  [employer]     The [address]  [address] [facility] [place] [address]  Phone: [phone]  Fax: [phone] March 23, 2022     Patient: Richy Douglas    YOB: 1940   Date of Visit: 3/23/2022       To Whom It May Concern:    It is my medical opinion that Richy Douglas  may return to participation on 3/24/2022.    If you have any questions or concerns, please don't hesitate to call.    Sincerely,            Michael Jackson MD

## 2022-03-23 NOTE — PROGRESS NOTES
Subjective:       Patient ID: Richy Douglas is a 81 y.o. male.    Chief Complaint:   HPI COPD  He presents for evaluation and treatment of COPD. The patient is not currently have symptoms / an exacerbation. The patient has COPD for approximately 10 years. Symptoms in previous episodes have included dyspnea, cough, wheezing and fatigue, and typically last 2 weeks. Previous episodes have been exacerbated by any exercise. Current treatment includes albuterol inhaler, which generally provides some relief of symptoms.   He uses 1 pillows at night. Patient currently is not on home oxygen therapy.. The patient is having no constitutional symptoms, denying fever, chills, anorexia, or weight loss. The patient has been hospitalized for this condition before. He has a history of 68 pack years. The patient is experiencing exercise intolerance (difficulty climbing 1 flights of stairs).      Past Medical History:   Diagnosis Date    Anemia of chronic disease     Aortic aneurysm     Atrial fibrillation with RVR 9/24/2021    Chronic systolic congestive heart failure 8/31/2017    Emphysema of lung 8/31/2017    GERD (gastroesophageal reflux disease)     Hypertension     Microcytic anemia 11/24/2020    Other hyperlipidemia 4/27/2021    Prostate cancer      Past Surgical History:   Procedure Laterality Date    CATHETERIZATION OF BOTH LEFT AND RIGHT HEART N/A 1/13/2022    Procedure: CATHETERIZATION, HEART, BOTH LEFT AND RIGHT;  Surgeon: Janneth Tovar MD;  Location: [facility] CATH LAB;  Service: Cardiology;  Laterality: N/A;  poss cx    COLONOSCOPY      CORONARY ANGIOGRAPHY N/A 1/13/2022    Procedure: ANGIOGRAM, CORONARY ARTERY;  Surgeon: Janneth Tovar MD;  Location: [facility] CATH LAB;  Service: Cardiology;  Laterality: N/A;    PROSTATE SURGERY      SPINE SURGERY       Social History     Socioeconomic History    Marital status:      Spouse name: jenn     Number of children: 6   Tobacco Use    Smoking status:  Current Every Day Smoker     Packs/day: 1.00     Years: 68.00     Pack years: 68.00     Types: Cigarettes     Start date: 1/1/1954    Smokeless tobacco: Never Used   Substance and Sexual Activity    Alcohol use: Yes     Comment: reports only drinks red wine when games are on    Drug use: Never    Sexual activity: Yes     Partners: Female     Social Determinants of Health     Financial Resource Strain: Low Risk     Difficulty of Paying Living Expenses: Not very hard   Food Insecurity: Food Insecurity Present    Worried About Running Out of Food in the Last Year: Often true    Ran Out of Food in the Last Year: Patient refused   Transportation Needs: No Transportation Needs    Lack of Transportation (Medical): No    Lack of Transportation (Non-Medical): No   Physical Activity: Unknown    Days of Exercise per Week: 5 days   Stress: No Stress Concern Present    Feeling of Stress : Not at all   Social Connections: Unknown    Frequency of Communication with Friends and Family: More than three times a week    Frequency of Social Gatherings with Friends and Family: More than three times a week    Active Member of Clubs or Organizations: No    Attends Club or Organization Meetings: Never    Marital Status:    Housing Stability: Unknown    Unable to Pay for Housing in the Last Year: Patient refused    Unstable Housing in the Last Year: Patient refused     @[facility]@  Review of Systems   Constitutional: Positive for fatigue. Negative for fever.   HENT: Positive for postnasal drip, rhinorrhea and congestion.    Eyes: Negative for redness and itching.   Respiratory: Positive for cough, sputum production, shortness of breath, dyspnea on extertion, use of rescue inhaler and Paroxysmal Nocturnal Dyspnea.    Cardiovascular: Negative for chest pain, palpitations and leg swelling.   Genitourinary: Negative for difficulty urinating and hematuria.   Endocrine: Negative for cold intolerance and heat intolerance.   "  Skin: Negative for rash.   Gastrointestinal: Negative for nausea and abdominal pain.   Neurological: Negative for dizziness, syncope, weakness and light-headedness.   Hematological: Negative for adenopathy. Does not bruise/bleed easily.   Psychiatric/Behavioral: Negative for sleep disturbance. The patient is not nervous/anxious.        Objective:      /80   Pulse 76   Resp 18   Ht 5' 10" (1.778 m)   Wt 73.1 kg (161 lb 2.5 oz)   SpO2 97%   BMI 23.12 kg/m²   Physical Exam  Vitals and nursing note reviewed.   Constitutional:       Appearance: He is well-developed.   HENT:      Head: Normocephalic and atraumatic.      Nose: Congestion present.   Eyes:      Conjunctiva/sclera: Conjunctivae normal.      Pupils: Pupils are equal, round, and reactive to light.   Neck:      Thyroid: No thyromegaly.      Vascular: No JVD.      Trachea: No tracheal deviation.   Cardiovascular:      Rate and Rhythm: Normal rate and regular rhythm.      Heart sounds: No murmur heard.  Pulmonary:      Breath sounds: Examination of the right-lower field reveals wheezing. Examination of the left-lower field reveals wheezing. Decreased breath sounds and wheezing present. No rhonchi or rales.   Abdominal:      General: Bowel sounds are normal.      Palpations: Abdomen is soft.   Musculoskeletal:         General: No tenderness. Normal range of motion.      Cervical back: Neck supple.   Lymphadenopathy:      Cervical: No cervical adenopathy.   Skin:     General: Skin is warm and dry.   Neurological:      Mental Status: He is alert and oriented to person, place, and time.       Personal Diagnostic Review  Chest X-Ray: I personally reviewed the films and findings are:, air trapping/emphysema  CT Chest Without Contrast  Narrative: EXAMINATION:  CT CHEST WITHOUT CONTRAST    CLINICAL HISTORY:  Lung nodule, < 6mm, high cancer risk;1 yr f/u calcified granuloma LLL; Solitary pulmonary nodule    TECHNIQUE:  Low dose axial images, sagittal and " coronal reformations were obtained from the thoracic inlet to the lung bases. Contrast was not administered.    COMPARISON:  01/10/2022, 03/26/2021    FINDINGS:  Heart and Great vessels: Scattered atherosclerotic plaque noted involving the thoracic aorta and aortic branch vessels, including the coronary arteries.  No pericardial effusion.    Thoracic Adenopathy: None demonstrated.    Lungs: Extensive emphysematous changes are again noted throughout the lungs bilaterally with prominent bullous disease and scarring present in the apices.  There is a trace left-sided pleural effusion which has decreased in size from prior.  Previously described airspace opacities in the left lung have largely resolved with some residual scattered opacities seen in the left upper lobe.  Previously noted 5 mm nodular opacity in the right middle lobe appears unchanged (series 4, image 369) and is most likely related to scarring.  No new pulmonary nodules demonstrated.    Upper Abdomen: No acute findings    Bones: No acute or suspicious osseous findings    Miscellaneous: None  Impression: 1. Interval improvement in previously described airspace opacity seen throughout the left lung with some scattered persistent opacities remaining in the left upper lobe.  2. Trace left-sided pleural effusion which has decreased in size from prior.  3. Unchanged appearance of previously described small nodular opacity in the right middle lobe.  4. Extensive pulmonary emphysematous disease.    Electronically signed by: Kevin Kingston MD  Date:    03/23/2022  Time:    14:06    Pulmonary function tests:  na  Pulmonary Studies Review 3/23/2022 3/17/2022 2/21/2022 2/21/2022 1/31/2022 1/27/2022 1/26/2022   SpO2 97 95 97 95 99 96 98   Height 70 70 70 70 70 - -   Weight 2578.5 2620.83 2620.83 2627.88 2426.82 - -   BMI (Calculated) 23.1 23.5 23.5 23.6 21.8 - -   Predicted Distance 295.27 293.03 293.03 292.46 302.56 - -   Predicted Distance Meters (Calculated)  501.67 499.56 499.56 499.21 509.24 - -     No flowsheet data found.      Assessment:       1. Exercise hypoxemia    2. Solitary pulmonary nodule    3. Cough    4. Panlobular emphysema             Outpatient Encounter Medications as of 3/23/2022   Medication Sig Dispense Refill    albuterol (PROVENTIL) 2.5 mg /3 mL (0.083 %) nebulizer solution Take 3 mLs (2.5 mg total) by nebulization every 4 to 6 hours as needed for Wheezing or Shortness of Breath. 360 mL 11    albuterol (PROVENTIL/VENTOLIN HFA) 90 mcg/actuation inhaler Inhale 1-2 puffs into the lungs every 6 (six) hours as needed for Wheezing or Shortness of Breath. Rescue 18 g 11    apixaban (ELIQUIS) 5 mg Tab Take 1 tablet (5 mg total) by mouth 2 (two) times daily. 180 tablet 1    atorvastatin (LIPITOR) 10 MG tablet Take 1 tablet (10 mg total) by mouth once daily. 90 tablet 3    furosemide (LASIX) 20 MG tablet Take 1 tablet (20 mg total) by mouth once daily. Do not take if blood pressure is low, feeling dry/dizzy/lightheaded. 90 tablet 0    pantoprazole (PROTONIX) 40 MG tablet Take 1 tablet (40 mg total) by mouth once daily. 90 tablet 3    sacubitriL-valsartan (ENTRESTO) 49-51 mg per tablet Take 1 tablet by mouth 2 (two) times daily. 60 tablet 3    tiotropium (SPIRIVA) 18 mcg inhalation capsule Inhale 1 capsule (18 mcg total) into the lungs once daily. Controller 30 capsule 11    metoprolol succinate (TOPROL-XL) 50 MG 24 hr tablet Take 1 tablet (50 mg total) by mouth 2 (two) times daily. 60 tablet 3     No facility-administered encounter medications on file as of 3/23/2022.     Orders Placed This Encounter   Procedures    CT Chest Without Contrast     Standing Status:   Future     Standing Expiration Date:   3/23/2023     Order Specific Question:   May the Radiologist modify the order per protocol to meet the clinical needs of the patient?     Answer:   Yes    Six Minute Walk Test to qualify for Home Oxygen     Standing Status:   Future     Standing  Expiration Date:   3/23/2023     Order Specific Question:   Release to patient     Answer:   Immediate     Plan:       Requested Prescriptions      No prescriptions requested or ordered in this encounter     Exercise hypoxemia  -     Six Minute Walk Test to qualify for Home Oxygen; Future    Solitary pulmonary nodule  -     CT Chest Without Contrast; Future; Expected date: 03/23/2022    Cough  -     CT Chest Without Contrast; Future; Expected date: 03/23/2022    Panlobular emphysema  -     Six Minute Walk Test to qualify for Home Oxygen; Future      Follow up in about 6 months (around 9/23/2022) for Review CT/PET - on return visit, 6 min walk - on return.    MEDICAL DECISION MAKING: Moderate to high complexity.  Overall, the multiple problems listed are of moderate to high severity that may impact quality of life and activities of daily living. Side effects of medications, treatment plan as well as options and alternatives reviewed and discussed with patient. There was counseling of patient concerning these issues.    Total time spent in counseling and coordination of care - 45  minutes of total time spent on the encounter, which includes face to face time and non-face to face time preparing to see the patient (eg, review of tests), Obtaining and/or reviewing separately obtained history, Documenting clinical information in the electronic or other health record, Independently interpreting results (not separately reported) and communicating results to the patient/family/caregiver, or Care coordination (not separately reported).    Time was used in discussion of prognosis, risks, benefits of treatment, instructions and compliance with regimen . Discussion with other physicians and/or health care providers - home health or for use of durable medical equipment (oxygen, nebulizers, CPAP, BiPAP) occurred.

## 2022-04-27 ENCOUNTER — PATIENT MESSAGE (OUTPATIENT)
Dept: SMOKING CESSATION | Facility: CLINIC | Age: 82
End: 2022-04-27
Payer: MEDICARE

## 2022-05-04 ENCOUNTER — OFFICE VISIT (OUTPATIENT)
Dept: CARDIOLOGY | Facility: CLINIC | Age: 82
End: 2022-05-04
Payer: COMMERCIAL

## 2022-05-04 ENCOUNTER — LAB VISIT (OUTPATIENT)
Dept: LAB | Facility: HOSPITAL | Age: 82
End: 2022-05-04
Attending: INTERNAL MEDICINE
Payer: COMMERCIAL

## 2022-05-04 VITALS
BODY MASS INDEX: 23.48 KG/M2 | HEART RATE: 88 BPM | OXYGEN SATURATION: 92 % | HEIGHT: 70 IN | RESPIRATION RATE: 16 BRPM | SYSTOLIC BLOOD PRESSURE: 112 MMHG | DIASTOLIC BLOOD PRESSURE: 54 MMHG | WEIGHT: 164 LBS

## 2022-05-04 DIAGNOSIS — J43.9 PULMONARY EMPHYSEMA, UNSPECIFIED EMPHYSEMA TYPE: ICD-10-CM

## 2022-05-04 DIAGNOSIS — I50.42 CHRONIC COMBINED SYSTOLIC AND DIASTOLIC CONGESTIVE HEART FAILURE, NYHA CLASS 3: ICD-10-CM

## 2022-05-04 DIAGNOSIS — R06.09 DOE (DYSPNEA ON EXERTION): ICD-10-CM

## 2022-05-04 DIAGNOSIS — I48.0 PAF (PAROXYSMAL ATRIAL FIBRILLATION): ICD-10-CM

## 2022-05-04 DIAGNOSIS — I48.0 PAROXYSMAL ATRIAL FIBRILLATION: ICD-10-CM

## 2022-05-04 DIAGNOSIS — D64.9 SYMPTOMATIC ANEMIA: ICD-10-CM

## 2022-05-04 DIAGNOSIS — I70.0 ATHEROSCLEROSIS OF ABDOMINAL AORTA: ICD-10-CM

## 2022-05-04 DIAGNOSIS — I73.9 CLAUDICATION: ICD-10-CM

## 2022-05-04 DIAGNOSIS — R60.0 LOCALIZED EDEMA: ICD-10-CM

## 2022-05-04 DIAGNOSIS — E78.49 OTHER HYPERLIPIDEMIA: ICD-10-CM

## 2022-05-04 DIAGNOSIS — I50.22 CHRONIC SYSTOLIC (CONGESTIVE) HEART FAILURE: Primary | ICD-10-CM

## 2022-05-04 DIAGNOSIS — I35.1 NONRHEUMATIC AORTIC VALVE INSUFFICIENCY: ICD-10-CM

## 2022-05-04 DIAGNOSIS — I50.22 CHRONIC SYSTOLIC (CONGESTIVE) HEART FAILURE: ICD-10-CM

## 2022-05-04 LAB
ACANTHOCYTES BLD QL SMEAR: PRESENT
ANION GAP SERPL CALC-SCNC: 8 MMOL/L (ref 8–16)
ANISOCYTOSIS BLD QL SMEAR: ABNORMAL
BASOPHILS # BLD AUTO: 0.14 K/UL (ref 0–0.2)
BASOPHILS NFR BLD: 2.4 % (ref 0–1.9)
BNP SERPL-MCNC: 615 PG/ML (ref 0–99)
BUN SERPL-MCNC: 18 MG/DL (ref 8–23)
CALCIUM SERPL-MCNC: 9.3 MG/DL (ref 8.7–10.5)
CHLORIDE SERPL-SCNC: 109 MMOL/L (ref 95–110)
CO2 SERPL-SCNC: 26 MMOL/L (ref 23–29)
CREAT SERPL-MCNC: 0.9 MG/DL (ref 0.5–1.4)
DACRYOCYTES BLD QL SMEAR: ABNORMAL
DIFFERENTIAL METHOD: ABNORMAL
EOSINOPHIL # BLD AUTO: 0.6 K/UL (ref 0–0.5)
EOSINOPHIL NFR BLD: 10.6 % (ref 0–8)
ERYTHROCYTE [DISTWIDTH] IN BLOOD BY AUTOMATED COUNT: 26.9 % (ref 11.5–14.5)
EST. GFR  (AFRICAN AMERICAN): >60 ML/MIN/1.73 M^2
EST. GFR  (NON AFRICAN AMERICAN): >60 ML/MIN/1.73 M^2
FERRITIN SERPL-MCNC: 10 NG/ML (ref 20–300)
GLUCOSE SERPL-MCNC: 81 MG/DL (ref 70–110)
HCT VFR BLD AUTO: 22.2 % (ref 40–54)
HGB BLD-MCNC: 6.2 G/DL (ref 14–18)
HYPOCHROMIA BLD QL SMEAR: ABNORMAL
IMM GRANULOCYTES # BLD AUTO: 0.02 K/UL (ref 0–0.04)
IMM GRANULOCYTES NFR BLD AUTO: 0.3 % (ref 0–0.5)
IRON SERPL-MCNC: 10 UG/DL (ref 45–160)
LYMPHOCYTES # BLD AUTO: 1 K/UL (ref 1–4.8)
LYMPHOCYTES NFR BLD: 17.1 % (ref 18–48)
MAGNESIUM SERPL-MCNC: 2.1 MG/DL (ref 1.6–2.6)
MCH RBC QN AUTO: 18.2 PG (ref 27–31)
MCHC RBC AUTO-ENTMCNC: 27.9 G/DL (ref 32–36)
MCV RBC AUTO: 65 FL (ref 82–98)
MONOCYTES # BLD AUTO: 0.6 K/UL (ref 0.3–1)
MONOCYTES NFR BLD: 10.3 % (ref 4–15)
NEUTROPHILS # BLD AUTO: 3.5 K/UL (ref 1.8–7.7)
NEUTROPHILS NFR BLD: 59.3 % (ref 38–73)
NRBC BLD-RTO: 0 /100 WBC
OVALOCYTES BLD QL SMEAR: ABNORMAL
PLATELET # BLD AUTO: 452 K/UL (ref 150–450)
PLATELET BLD QL SMEAR: ABNORMAL
PMV BLD AUTO: ABNORMAL FL (ref 9.2–12.9)
POIKILOCYTOSIS BLD QL SMEAR: ABNORMAL
POTASSIUM SERPL-SCNC: 4 MMOL/L (ref 3.5–5.1)
RBC # BLD AUTO: 3.41 M/UL (ref 4.6–6.2)
SATURATED IRON: 2 % (ref 20–50)
SCHISTOCYTES BLD QL SMEAR: PRESENT
SODIUM SERPL-SCNC: 143 MMOL/L (ref 136–145)
TARGETS BLD QL SMEAR: ABNORMAL
TOTAL IRON BINDING CAPACITY: 447 UG/DL (ref 250–450)
TRANSFERRIN SERPL-MCNC: 302 MG/DL (ref 200–375)
WBC # BLD AUTO: 5.92 K/UL (ref 3.9–12.7)

## 2022-05-04 PROCEDURE — 99214 OFFICE O/P EST MOD 30 MIN: CPT | Mod: S$GLB,,, | Performed by: INTERNAL MEDICINE

## 2022-05-04 PROCEDURE — 36415 COLL VENOUS BLD VENIPUNCTURE: CPT | Performed by: INTERNAL MEDICINE

## 2022-05-04 PROCEDURE — 1159F PR MEDICATION LIST DOCUMENTED IN MEDICAL RECORD: ICD-10-PCS | Mod: CPTII,S$GLB,, | Performed by: INTERNAL MEDICINE

## 2022-05-04 PROCEDURE — 99999 PR PBB SHADOW E&M-EST. PATIENT-LVL IV: CPT | Mod: PBBFAC,,, | Performed by: INTERNAL MEDICINE

## 2022-05-04 PROCEDURE — 83880 ASSAY OF NATRIURETIC PEPTIDE: CPT | Performed by: INTERNAL MEDICINE

## 2022-05-04 PROCEDURE — 1126F AMNT PAIN NOTED NONE PRSNT: CPT | Mod: CPTII,S$GLB,, | Performed by: INTERNAL MEDICINE

## 2022-05-04 PROCEDURE — 99214 PR OFFICE/OUTPT VISIT, EST, LEVL IV, 30-39 MIN: ICD-10-PCS | Mod: S$GLB,,, | Performed by: INTERNAL MEDICINE

## 2022-05-04 PROCEDURE — 82728 ASSAY OF FERRITIN: CPT | Performed by: INTERNAL MEDICINE

## 2022-05-04 PROCEDURE — 83735 ASSAY OF MAGNESIUM: CPT | Performed by: INTERNAL MEDICINE

## 2022-05-04 PROCEDURE — 3074F PR MOST RECENT SYSTOLIC BLOOD PRESSURE < 130 MM HG: ICD-10-PCS | Mod: CPTII,S$GLB,, | Performed by: INTERNAL MEDICINE

## 2022-05-04 PROCEDURE — 99999 PR PBB SHADOW E&M-EST. PATIENT-LVL IV: ICD-10-PCS | Mod: PBBFAC,,, | Performed by: INTERNAL MEDICINE

## 2022-05-04 PROCEDURE — 84466 ASSAY OF TRANSFERRIN: CPT | Performed by: INTERNAL MEDICINE

## 2022-05-04 PROCEDURE — 1160F RVW MEDS BY RX/DR IN RCRD: CPT | Mod: CPTII,S$GLB,, | Performed by: INTERNAL MEDICINE

## 2022-05-04 PROCEDURE — 1126F PR PAIN SEVERITY QUANTIFIED, NO PAIN PRESENT: ICD-10-PCS | Mod: CPTII,S$GLB,, | Performed by: INTERNAL MEDICINE

## 2022-05-04 PROCEDURE — 3074F SYST BP LT 130 MM HG: CPT | Mod: CPTII,S$GLB,, | Performed by: INTERNAL MEDICINE

## 2022-05-04 PROCEDURE — 80048 BASIC METABOLIC PNL TOTAL CA: CPT | Performed by: INTERNAL MEDICINE

## 2022-05-04 PROCEDURE — 85025 COMPLETE CBC W/AUTO DIFF WBC: CPT | Performed by: INTERNAL MEDICINE

## 2022-05-04 PROCEDURE — 1157F PR ADVANCE CARE PLAN OR EQUIV PRESENT IN MEDICAL RECORD: ICD-10-PCS | Mod: CPTII,S$GLB,, | Performed by: INTERNAL MEDICINE

## 2022-05-04 PROCEDURE — 1160F PR REVIEW ALL MEDS BY PRESCRIBER/CLIN PHARMACIST DOCUMENTED: ICD-10-PCS | Mod: CPTII,S$GLB,, | Performed by: INTERNAL MEDICINE

## 2022-05-04 PROCEDURE — 3078F PR MOST RECENT DIASTOLIC BLOOD PRESSURE < 80 MM HG: ICD-10-PCS | Mod: CPTII,S$GLB,, | Performed by: INTERNAL MEDICINE

## 2022-05-04 PROCEDURE — 1157F ADVNC CARE PLAN IN RCRD: CPT | Mod: CPTII,S$GLB,, | Performed by: INTERNAL MEDICINE

## 2022-05-04 PROCEDURE — 3078F DIAST BP <80 MM HG: CPT | Mod: CPTII,S$GLB,, | Performed by: INTERNAL MEDICINE

## 2022-05-04 PROCEDURE — 1159F MED LIST DOCD IN RCRD: CPT | Mod: CPTII,S$GLB,, | Performed by: INTERNAL MEDICINE

## 2022-05-04 NOTE — PROGRESS NOTES
"Subjective:   Patient ID:  Richy Douglas is a 81 y.o. male who presents for cardiac consult of No chief complaint on file.    Referring Physician:    Vivian Ch MD [id number] [place] [address]      Reason for consult: CHF, AI    HPI  The patient came in today for cardiac consult of No chief complaint on file.      Richy Douglas is a 81 y.o. male pt with h/o CHF, AI, GERD, HTN, emphysema, aortic aneurysm, anemia presents for follow up CV eval.     4/27/21  He had ER eval last month - Emergency Department for evaluation of SOB which onset gradually x 1 week ago. Pt reports sustaining a rib fracture 1 week ago and states his "breathing got bad". Pt reports smoking "only when football games are on". Symptoms are constant and moderate in severity. No mitigating or exacerbating factors reported. Associated sxs include cough. Pt also notes L foot swelling and L knee swelling that has been intermittent for a few months.     BNP was 1258.     He had CT chest -    Moderate to severe vascular calcification seen involving the aorta.  Coronary artery calcifications are also noted.  There is no pericardial effusion. No enlarged mediastinal, hilar or axillary lymph nodes are identified.    He feels more dyspnea but also CP from rib fracture.     8/3/21  Nuclear stress neg, EF 45%. BNP 1074- Told pt BNP is significantly elevated due to extra fluid, if feeling more shortness of breath or edema can double up fluid pills for 2-3 days and monitor pressures,     Ref Range & Units 2 mo ago 5 mo ago 8 mo ago    BNP 0 - 99 pg/mL 1,047 High   1,258 High  CM  202 High      He has been having some tingling/numbess in L arm.      Hosp stay Sept 2021    ED today with a chief complaint of worsening SOB that started the day prior. Associated symptoms included malaise, productive cough, fever, and pleuritic chest discomfort. Patient denied any associated kevin chest pain, nausea, vomiting, diaphoresis, palpitations, " near syncope, or syncope. Initial workup in ED revealed leukocytosis (WBC 18,000), anemia (H/H 8.8/28.1), and BNP of 929. CXR showed findings consistent with PNA. EKG showed new onset afib with RVR and patient was subsequently admitted for further evaluation and treatment. Cardiology consulted to assist with management. Patient seen and examined today in ED. Still endorses SOB along with pleuritic chest discomfort. No other CV complaints. No prior history of afib. He reports compliance with his medications. Followed on OP basis by Dr. Garces. Chart reviewed. Troponin x 1 negative. Echo 4/21 showed normal EF. MPI stress test 5/21 negative for ischemia.      Hospital Course:   9/25/21-Patient seen and examined today, resting in bed. Feeling much better, SOB improved. Still has some hoarseness. No chest pain. Remains in afib, but HR controlled. Cardizem switched to po and Eliquis initiated. Labs stable. Will need to monitor H/H on AC.  9/26/21-Patient seen and examined today, lying in bed. SOB continues to improve. Still has occasional cough. No chest pain. HR controlled. BC + for gram negative rods. H/H stable.  9/27/21- patient in bed resting. Has no complaints this morning. NSR 68 bpm.. BC +gram negative rods. Labs stable. No abnormal bleeding on Eliquis. cardizem gtt switched to PO  180mg daily   9/28/2021--Patient seen and examined in room, sitting on side of bed. Feels today. Remains in SR. Ok to discharge home from cards standpoint. Will need OP Cards follow up with Dr Garces next week.     12/9/21  Pt had hosp stay in Sept had new onset AFib. He converted to NSR with cardizem drip and treatment of sepsis PNA. BP and HR well controlled today.     Hosp stay  Hospital Course:   1/10/22- pt comfortable on 2L via NC current smoker physical exam consistent with COPD  1/11/22- CT scan of the chest reveals severe emphysematous lungs with bully scattered throughout all lung fields bilaterally.  Consolidation left upper  and middle jose lobes consistent with pneumonia.  No masses. Echo reveals new decline in EF to 25% pt reported chest pain on admission  1/12/22- pt in Afib coronary angiogram postponed. Rate controlled on metoprolol. Daily drinker started on librium  1/13/22- pt underwent LHC today with Dr. Tovar result was clean coronaries. EF documented at 50% although transthoracic echo EF was 25%     1/14 patient reports improvement in sx. Tolerated lhc procedure well. After discussing with cards, ok to d/c. Patient will need to discuss initiation of entresto on o/p basis. Continue losartan, bb, eliquis and lasix. New rxs delivered to bedside.    1/18/22  He presents post hosp stay, neg cath. ECHO with dec EF 25%,EF low normal on LHC.  BP is low normal, pulse 40s. He has been drinking daily for a while but has quit now.     3/17/22  He had recent pulm eval last month and then heme/onc eval, he will need EGD/Csope to rule out GIB. BP and HR stable today. He remains busy with recycling.     5/4/22  Repeat ECHO pending/not completed yet. BP is low normal. HR 80s. He had more swelling L leg/knee.     Patient feels no PND, no palpitation, no dizziness, no syncope, no CNS symptoms.    Patient has fairly good exercise tolerance. He does recycling, separation.     Patient is compliant with medications.    FH - mother - DM, PVD, tobacco      Results for orders placed during the hospital encounter of 01/09/22    Echo Saline Bubble? No    Interpretation Summary  · Concentric hypertrophy and severely decreased systolic function.  · The estimated ejection fraction is 25%.  · Grade I left ventricular diastolic dysfunction.  · Normal right ventricular size with mildly reduced right ventricular systolic function.  · Mild-to-moderate mitral regurgitation.  · Mild-to-moderate aortic regurgitation.  · Moderate left atrial enlargement.  · Moderate right atrial enlargement.  · Moderate tricuspid regurgitation.  · Intermediate central venous  pressure (8 mmHg).  · The estimated PA systolic pressure is 119 mmHg.  · There is pulmonary hypertension.  · The aortic root is moderately dilated.    Results for orders placed during the hospital encounter of 01/09/22    Cardiac catheterization    Conclusion  · The pre-procedure left ventricular end diastolic pressure was 13.  · The estimated blood loss was none.  · The coronary arteries were normal..  · The filling pressures on the right and left were normal.    The procedure log was documented by Documenter: Jenna Cantu, RN and verified by Janneth Tovar MD.    Date: 1/13/2022  Time: 5:22 PM          Past Medical History:   Diagnosis Date    Anemia of chronic disease     Aortic aneurysm     Atrial fibrillation with RVR 9/24/2021    Chronic systolic congestive heart failure 8/31/2017    Emphysema of lung 8/31/2017    GERD (gastroesophageal reflux disease)     Hypertension     Microcytic anemia 11/24/2020    Other hyperlipidemia 4/27/2021    Prostate cancer        Past Surgical History:   Procedure Laterality Date    CATHETERIZATION OF BOTH LEFT AND RIGHT HEART N/A 1/13/2022    Procedure: CATHETERIZATION, HEART, BOTH LEFT AND RIGHT;  Surgeon: Janneth Tovar MD;  Location: [facility] CATH LAB;  Service: Cardiology;  Laterality: N/A;  poss cx    COLONOSCOPY      CORONARY ANGIOGRAPHY N/A 1/13/2022    Procedure: ANGIOGRAM, CORONARY ARTERY;  Surgeon: Janneth Tovar MD;  Location: [facility] CATH LAB;  Service: Cardiology;  Laterality: N/A;    PROSTATE SURGERY      SPINE SURGERY         Social History     Tobacco Use    Smoking status: Current Every Day Smoker     Packs/day: 1.00     Years: 68.00     Pack years: 68.00     Types: Cigarettes     Start date: 1/1/1954    Smokeless tobacco: Never Used   Substance Use Topics    Alcohol use: Yes     Comment: reports only drinks red wine when games are on    Drug use: Never       Family History   Problem Relation Age of Onset    Diabetes Mother     Peripheral  vascular disease Mother        Patient's Medications   New Prescriptions    No medications on file   Previous Medications    ALBUTEROL (PROVENTIL) 2.5 MG /3 ML (0.083 %) NEBULIZER SOLUTION    Take 3 mLs (2.5 mg total) by nebulization every 4 to 6 hours as needed for Wheezing or Shortness of Breath.    ALBUTEROL (PROVENTIL/VENTOLIN HFA) 90 MCG/ACTUATION INHALER    Inhale 1-2 puffs into the lungs every 6 (six) hours as needed for Wheezing or Shortness of Breath. Rescue    APIXABAN (ELIQUIS) 5 MG TAB    Take 1 tablet (5 mg total) by mouth 2 (two) times daily.    ATORVASTATIN (LIPITOR) 10 MG TABLET    Take 1 tablet (10 mg total) by mouth once daily.    FUROSEMIDE (LASIX) 20 MG TABLET    Take 1 tablet (20 mg total) by mouth once daily. Do not take if blood pressure is low, feeling dry/dizzy/lightheaded.    METOPROLOL SUCCINATE (TOPROL-XL) 50 MG 24 HR TABLET    Take 1 tablet (50 mg total) by mouth 2 (two) times daily.    PANTOPRAZOLE (PROTONIX) 40 MG TABLET    Take 1 tablet (40 mg total) by mouth once daily.    SACUBITRIL-VALSARTAN (ENTRESTO) 49-51 MG PER TABLET    Take 1 tablet by mouth 2 (two) times daily.    TIOTROPIUM (SPIRIVA) 18 MCG INHALATION CAPSULE    Inhale 1 capsule (18 mcg total) into the lungs once daily. Controller   Modified Medications    No medications on file   Discontinued Medications    No medications on file       Review of Systems   Constitutional: Positive for malaise/fatigue.   HENT: Negative.    Eyes: Negative.    Respiratory: Positive for shortness of breath.    Cardiovascular: Positive for chest pain.   Gastrointestinal: Negative.    Genitourinary: Negative.    Musculoskeletal: Negative.    Skin: Negative.    Neurological: Negative.    Endo/Heme/Allergies: Negative.    Psychiatric/Behavioral: Negative.    All 12 systems otherwise negative.      Wt Readings from Last 3 Encounters:   03/23/22 73.1 kg (161 lb 2.5 oz)   03/17/22 74.3 kg (163 lb 12.8 oz)   02/21/22 74.3 kg (163 lb 12.8 oz)     Temp  Readings from Last 3 Encounters:   02/21/22 97.5 °F (36.4 °C) (Temporal)   01/31/22 97.7 °F (36.5 °C) (Temporal)   01/27/22 97.8 °F (36.6 °C)     BP Readings from Last 3 Encounters:   03/23/22 120/80   03/17/22 127/65   02/21/22 131/67     Pulse Readings from Last 3 Encounters:   03/23/22 76   03/17/22 91   02/21/22 72       There were no vitals taken for this visit.    Objective:   Physical Exam  Vitals and nursing note reviewed.   Constitutional:       General: He is not in acute distress.     Appearance: He is well-developed. He is not diaphoretic.   HENT:      Head: Normocephalic and atraumatic.      Nose: Nose normal.   Eyes:      General: No scleral icterus.     Conjunctiva/sclera: Conjunctivae normal.   Neck:      Thyroid: No thyromegaly.      Vascular: No JVD.   Cardiovascular:      Rate and Rhythm: Normal rate and regular rhythm.      Heart sounds: S1 normal and S2 normal. No murmur heard.    No friction rub. No gallop. No S3 or S4 sounds.   Pulmonary:      Effort: Pulmonary effort is normal. No respiratory distress.      Breath sounds: Normal breath sounds. No stridor. No wheezing or rales.   Chest:      Chest wall: No tenderness.   Abdominal:      General: Bowel sounds are normal. There is no distension.      Palpations: Abdomen is soft. There is no mass.      Tenderness: There is no abdominal tenderness. There is no rebound.   Genitourinary:     Comments: Deferred  Musculoskeletal:         General: No tenderness or deformity. Normal range of motion.      Cervical back: Normal range of motion and neck supple.   Lymphadenopathy:      Cervical: No cervical adenopathy.   Skin:     General: Skin is warm and dry.      Coloration: Skin is not pale.      Findings: No erythema or rash.   Neurological:      Mental Status: He is alert and oriented to person, place, and time.      Motor: No abnormal muscle tone.      Coordination: Coordination normal.   Psychiatric:         Behavior: Behavior normal.         Thought  Content: Thought content normal.         Judgment: Judgment normal.         Lab Results   Component Value Date     02/18/2022    K 4.2 02/18/2022     02/18/2022    CO2 26 02/18/2022    BUN 14 02/18/2022    CREATININE 0.7 02/18/2022    GLU 96 02/18/2022    MG 1.7 09/28/2021    AST 19 02/18/2022    ALT 12 02/18/2022    ALBUMIN 3.7 02/18/2022    PROT 7.1 02/18/2022    BILITOT 0.7 02/18/2022    WBC 6.15 02/18/2022    HGB 9.2 (L) 02/18/2022    HCT 31.2 (L) 02/18/2022    MCV 75 (L) 02/18/2022     02/18/2022    INR 1.4 (H) 09/24/2021    TSH 0.870 11/24/2020    CHOL 144 09/17/2020    HDL 61 09/17/2020    LDLCALC 74.0 09/17/2020    TRIG 45 09/17/2020    BNP 1,048 (H) 03/17/2022     Assessment:      1. Chronic systolic (congestive) heart failure    2. Paroxysmal atrial fibrillation    3. Pulmonary emphysema, unspecified emphysema type    4. PAF (paroxysmal atrial fibrillation)    5. Atherosclerosis of abdominal aorta    6. Nonrheumatic aortic valve insufficiency    7. Other hyperlipidemia    8. SANCHEZ (dyspnea on exertion)    9. Chronic combined systolic and diastolic congestive heart failure, NYHA class 3    10. Symptomatic anemia        Plan:   1. CHF, AI with aortic atherosc;  BNP 1047 -- 969; BNP 1844; EF low on ECHO, low normal with LHC  - ECHO with mild to mod valvular heart disease with low EF - repeat ECHO -pending  - R/LHC normal coronaries 1/2022  - cont lasix daily   - changed Losartan to Entresto - class 3 symptoms - increase Entresto to moderate dose     2. Emphysema with dyspnea  - cont tx PCP/Pulm  - f/u pulm    3. HTN with aortic root moderately dilated with AI - Aortic root 4.6-4.9 cm  - titrate meds  - cont to monitor     4. HLD with aortic athero and Coronary artery calcifications  - cont statin    5. PAF, diagnosed in setting of sepsis/PNA, had SVT in hosp - sec to alc withdrawal  - Cont Eliquis 5 BID and BB    6. Symptomatic anemia s/p PRBC  - needs EGD/Cscope - Elevated CV risk - proceed  as medically/urgently needed - ok to hold Eliquis 2 days prior   - proceed for scope - pt is well compensated   - f/u heme/onc    7. Leg Edema, pain with claudication    order LE u/s venous and arterial     Thank you for allowing me to participate in this patient's care. Please do not hesitate to contact me with any questions or concerns. Consult note has been forwarded to the referral physician.

## 2022-05-05 ENCOUNTER — HOSPITAL ENCOUNTER (EMERGENCY)
Facility: HOSPITAL | Age: 82
Discharge: HOME | End: 2022-05-05
Attending: SPECIALIST
Payer: COMMERCIAL

## 2022-05-05 ENCOUNTER — TELEPHONE (OUTPATIENT)
Dept: CARDIOLOGY | Facility: CLINIC | Age: 82
End: 2022-05-05
Payer: MEDICARE

## 2022-05-05 VITALS
BODY MASS INDEX: 24.61 KG/M2 | RESPIRATION RATE: 18 BRPM | WEIGHT: 166.13 LBS | OXYGEN SATURATION: 100 % | HEART RATE: 65 BPM | DIASTOLIC BLOOD PRESSURE: 62 MMHG | TEMPERATURE: 98 F | SYSTOLIC BLOOD PRESSURE: 135 MMHG | HEIGHT: 69 IN

## 2022-05-05 DIAGNOSIS — D64.9 ANEMIA, UNSPECIFIED TYPE: ICD-10-CM

## 2022-05-05 DIAGNOSIS — D46.1 IDIOPATHIC REFRACTORY ANEMIA: Primary | ICD-10-CM

## 2022-05-05 LAB
ABO + RH BLD: NORMAL
ALBUMIN SERPL BCP-MCNC: 3.7 G/DL (ref 3.5–5.2)
ALP SERPL-CCNC: 35 U/L (ref 55–135)
ALT SERPL W/O P-5'-P-CCNC: 9 U/L (ref 10–44)
ANION GAP SERPL CALC-SCNC: 8 MMOL/L (ref 8–16)
ANISOCYTOSIS BLD QL SMEAR: ABNORMAL
AST SERPL-CCNC: 19 U/L (ref 10–40)
BASOPHILS # BLD AUTO: 0.11 K/UL (ref 0–0.2)
BASOPHILS NFR BLD: 1.8 % (ref 0–1.9)
BILIRUB SERPL-MCNC: 0.6 MG/DL (ref 0.1–1)
BLD GP AB SCN CELLS X3 SERPL QL: NORMAL
BLD PROD TYP BPU: NORMAL
BLOOD UNIT EXPIRATION DATE: NORMAL
BLOOD UNIT TYPE CODE: 6200
BLOOD UNIT TYPE: NORMAL
BUN SERPL-MCNC: 19 MG/DL (ref 8–23)
CALCIUM SERPL-MCNC: 9 MG/DL (ref 8.7–10.5)
CHLORIDE SERPL-SCNC: 108 MMOL/L (ref 95–110)
CO2 SERPL-SCNC: 25 MMOL/L (ref 23–29)
CODING SYSTEM: NORMAL
CREAT SERPL-MCNC: 0.9 MG/DL (ref 0.5–1.4)
DACRYOCYTES BLD QL SMEAR: ABNORMAL
DIFFERENTIAL METHOD: ABNORMAL
DISPENSE STATUS: NORMAL
EOSINOPHIL # BLD AUTO: 0.4 K/UL (ref 0–0.5)
EOSINOPHIL NFR BLD: 5.7 % (ref 0–8)
ERYTHROCYTE [DISTWIDTH] IN BLOOD BY AUTOMATED COUNT: 26.3 % (ref 11.5–14.5)
EST. GFR  (AFRICAN AMERICAN): >60 ML/MIN/1.73 M^2
EST. GFR  (NON AFRICAN AMERICAN): >60 ML/MIN/1.73 M^2
GLUCOSE SERPL-MCNC: 92 MG/DL (ref 70–110)
HCT VFR BLD AUTO: 22.9 % (ref 40–54)
HGB BLD-MCNC: 6.6 G/DL (ref 14–18)
HYPOCHROMIA BLD QL SMEAR: ABNORMAL
IMM GRANULOCYTES # BLD AUTO: 0.02 K/UL (ref 0–0.04)
IMM GRANULOCYTES NFR BLD AUTO: 0.3 % (ref 0–0.5)
IRON SERPL-MCNC: 16 UG/DL (ref 45–160)
LYMPHOCYTES # BLD AUTO: 0.6 K/UL (ref 1–4.8)
LYMPHOCYTES NFR BLD: 10 % (ref 18–48)
MCH RBC QN AUTO: 18.4 PG (ref 27–31)
MCHC RBC AUTO-ENTMCNC: 28.8 G/DL (ref 32–36)
MCV RBC AUTO: 64 FL (ref 82–98)
MONOCYTES # BLD AUTO: 0.5 K/UL (ref 0.3–1)
MONOCYTES NFR BLD: 7.5 % (ref 4–15)
NEUTROPHILS # BLD AUTO: 4.7 K/UL (ref 1.8–7.7)
NEUTROPHILS NFR BLD: 74.7 % (ref 38–73)
NRBC BLD-RTO: 0 /100 WBC
NUM UNITS TRANS PACKED RBC: NORMAL
OVALOCYTES BLD QL SMEAR: ABNORMAL
PLATELET # BLD AUTO: 457 K/UL (ref 150–450)
PLATELET BLD QL SMEAR: ABNORMAL
PMV BLD AUTO: 8.9 FL (ref 9.2–12.9)
POIKILOCYTOSIS BLD QL SMEAR: ABNORMAL
POTASSIUM SERPL-SCNC: 4.2 MMOL/L (ref 3.5–5.1)
PROT SERPL-MCNC: 6.8 G/DL (ref 6–8.4)
RBC # BLD AUTO: 3.59 M/UL (ref 4.6–6.2)
SATURATED IRON: 4 % (ref 20–50)
SODIUM SERPL-SCNC: 141 MMOL/L (ref 136–145)
TARGETS BLD QL SMEAR: ABNORMAL
TOTAL IRON BINDING CAPACITY: 440 UG/DL (ref 250–450)
TRANSFERRIN SERPL-MCNC: 297 MG/DL (ref 200–375)
WBC # BLD AUTO: 6.27 K/UL (ref 3.9–12.7)

## 2022-05-05 PROCEDURE — P9016 RBC LEUKOCYTES REDUCED: HCPCS | Performed by: SPECIALIST

## 2022-05-05 PROCEDURE — 82607 VITAMIN B-12: CPT | Performed by: SPECIALIST

## 2022-05-05 PROCEDURE — 86901 BLOOD TYPING SEROLOGIC RH(D): CPT | Performed by: SPECIALIST

## 2022-05-05 PROCEDURE — 85025 COMPLETE CBC W/AUTO DIFF WBC: CPT | Performed by: SPECIALIST

## 2022-05-05 PROCEDURE — 36430 TRANSFUSION BLD/BLD COMPNT: CPT

## 2022-05-05 PROCEDURE — 86920 COMPATIBILITY TEST SPIN: CPT | Performed by: SPECIALIST

## 2022-05-05 PROCEDURE — 99285 EMERGENCY DEPT VISIT HI MDM: CPT | Mod: 25

## 2022-05-05 PROCEDURE — 80053 COMPREHEN METABOLIC PANEL: CPT | Performed by: SPECIALIST

## 2022-05-05 PROCEDURE — 84466 ASSAY OF TRANSFERRIN: CPT | Performed by: SPECIALIST

## 2022-05-05 PROCEDURE — 82746 ASSAY OF FOLIC ACID SERUM: CPT | Performed by: SPECIALIST

## 2022-05-05 RX ORDER — HYDROCODONE BITARTRATE AND ACETAMINOPHEN 500; 5 MG/1; MG/1
TABLET ORAL
Status: DISCONTINUED | OUTPATIENT
Start: 2022-05-05 | End: 2022-05-05 | Stop reason: HOSPADM

## 2022-05-05 NOTE — ED NOTES
Greeted and introduced self to patient  was sent here for blood transfusion by PCP for low hgb. Pt appears well and relaxed , no acute distress noted

## 2022-05-05 NOTE — ED PROVIDER NOTES
SCRIBE #1 NOTE: ITunde, am scribing for, and in the presence of, Anisha Elizalde MD. I have scribed the HPI,ROS, and PEx.     SCRIBE #2 NOTE: I, Eusebio Robb, am scribing for, and in the presence of,  Chinmay Buchanan Jr., MD. I have scribed the remaining portions of the note not scribed by Scribe #1.     History      Chief Complaint   Patient presents with    Anemia     Sent by Dr. Garces for hgb of 6; denies any s/s       Review of patient's allergies indicates:   Allergen Reactions    Fish containing products     Iodine and iodide containing products     Shellfish containing products         HPI   HPI    5/5/2022, 1:39 PM   History obtained from the patient      History of Present Illness: Richy Douglas is a 81 y.o. male patient who presents to the Emergency Department for abnormal lab. Pt was referred to the ED by Dr. Garces (Cardiology) after labs drawn yesterday showed low H/H of 6.2/22.2. Symptoms are constant and moderate in severity. No mitigating or exacerbating factors reported. Pt reports intermittent dark stool, and is scheduled for a colonoscopy in the near future. Patient denies any fever, chills, n/v/d, SOB, CP, weakness, numbness, dizziness, headache, and all other sxs at this time. No further complaints or concerns at this time.     Arrival mode: Personal vehicle    PCP: Vivian Ch MD       Past Medical History:  Past Medical History:   Diagnosis Date    Anemia of chronic disease     Aortic aneurysm     Atrial fibrillation with RVR 9/24/2021    Chronic systolic congestive heart failure 8/31/2017    Emphysema of lung 8/31/2017    GERD (gastroesophageal reflux disease)     Hypertension     Microcytic anemia 11/24/2020    Other hyperlipidemia 4/27/2021    Prostate cancer        Past Surgical History:  Past Surgical History:   Procedure Laterality Date    CATHETERIZATION OF BOTH LEFT AND RIGHT HEART N/A 1/13/2022    Procedure: CATHETERIZATION, HEART, BOTH LEFT AND RIGHT;   Surgeon: Janneth Tovar MD;  Location: [facility] CATH LAB;  Service: Cardiology;  Laterality: N/A;  poss cx    COLONOSCOPY      CORONARY ANGIOGRAPHY N/A 1/13/2022    Procedure: ANGIOGRAM, CORONARY ARTERY;  Surgeon: Janneth Tovar MD;  Location: [facility] CATH LAB;  Service: Cardiology;  Laterality: N/A;    PROSTATE SURGERY      SPINE SURGERY           Family History:  Family History   Problem Relation Age of Onset    Diabetes Mother     Peripheral vascular disease Mother        Social History:  Social History     Tobacco Use    Smoking status: Current Every Day Smoker     Packs/day: 1.00     Years: 68.00     Pack years: 68.00     Types: Cigarettes     Start date: 1/1/1954    Smokeless tobacco: Never Used   Substance and Sexual Activity    Alcohol use: Yes     Comment: reports only drinks red wine when games are on    Drug use: Never    Sexual activity: Yes     Partners: Female       ROS   Review of Systems   Constitutional: Negative for chills and fever.   HENT: Negative for sore throat.    Respiratory: Negative for shortness of breath.    Cardiovascular: Negative for chest pain.   Gastrointestinal: Positive for blood in stool (intermittent dark stool). Negative for diarrhea, nausea and vomiting.   Genitourinary: Negative for dysuria.   Musculoskeletal: Negative for back pain.   Skin: Negative for rash.   Neurological: Negative for dizziness, weakness, light-headedness, numbness and headaches.   Hematological: Does not bruise/bleed easily.   All other systems reviewed and are negative.    Physical Exam      Initial Vitals [05/05/22 1227]   BP Pulse Resp Temp SpO2   (!) 109/52 82 18 98.7 °F (37.1 °C) 95 %      MAP       --          Physical Exam  Nursing Notes and Vital Signs Reviewed.  Constitutional: Patient is in no acute distress. Well-developed and well-nourished.  Head: Atraumatic. Normocephalic.  Eyes: PERRL. EOM intact. Conjunctivae are pale. No scleral icterus.  ENT: Mucous membranes are moist.  "Oropharynx is clear and symmetric.    Neck: Supple. Full ROM. No lymphadenopathy.  Cardiovascular: Regular rate. Regular rhythm. No murmurs, rubs, or gallops. Distal pulses are 2+ and symmetric.  Pulmonary/Chest: No respiratory distress. Clear to auscultation bilaterally. No wheezing or rales.  Abdominal: Soft and non-distended.  There is no tenderness.  No rebound, guarding, or rigidity.   Musculoskeletal: Moves all extremities. No obvious deformities. No edema.  Skin: Warm and dry.  Neurological:  Alert, awake, and appropriate.  Normal speech.  No acute focal neurological deficits are appreciated.  Psychiatric: Normal affect. Good eye contact. Appropriate in content.    ED Course    Procedures  ED Vital Signs:  Vitals:    05/05/22 1227 05/05/22 1549 05/05/22 1610   BP: (!) 109/52 (!) 143/64 136/63   Pulse: 82 68 68   Resp: 18 18 18   Temp: 98.7 °F (37.1 °C) 98.2 °F (36.8 °C) 97.8 °F (36.6 °C)   TempSrc: Oral Oral Oral   SpO2: 95% 100% 100%   Weight: 75.4 kg (166 lb 1.9 oz)     Height: 5' 9" (1.753 m)         Abnormal Lab Results:  Labs Reviewed   CBC W/ AUTO DIFFERENTIAL - Abnormal; Notable for the following components:       Result Value    RBC 3.59 (*)     Hemoglobin 6.6 (*)     Hematocrit 22.9 (*)     MCV 64 (*)     MCH 18.4 (*)     MCHC 28.8 (*)     RDW 26.3 (*)     Platelets 457 (*)     MPV 8.9 (*)     Lymph # 0.6 (*)     Gran % 74.7 (*)     Lymph % 10.0 (*)     All other components within normal limits   COMPREHENSIVE METABOLIC PANEL - Abnormal; Notable for the following components:    Alkaline Phosphatase 35 (*)     ALT 9 (*)     All other components within normal limits   FOLATE   VITAMIN B12   IRON AND TIBC   TYPE & SCREEN   PREPARE RBC SOFT        All Lab Results:  Results for orders placed or performed during the hospital encounter of 05/05/22   CBC auto differential   Result Value Ref Range    WBC 6.27 3.90 - 12.70 K/uL    RBC 3.59 (L) 4.60 - 6.20 M/uL    Hemoglobin 6.6 (L) 14.0 - 18.0 g/dL    " Hematocrit 22.9 (L) 40.0 - 54.0 %    MCV 64 (L) 82 - 98 fL    MCH 18.4 (L) 27.0 - 31.0 pg    MCHC 28.8 (L) 32.0 - 36.0 g/dL    RDW 26.3 (H) 11.5 - 14.5 %    Platelets 457 (H) 150 - 450 K/uL    MPV 8.9 (L) 9.2 - 12.9 fL    Immature Granulocytes 0.3 0.0 - 0.5 %    Gran # (ANC) 4.7 1.8 - 7.7 K/uL    Immature Grans (Abs) 0.02 0.00 - 0.04 K/uL    Lymph # 0.6 (L) 1.0 - 4.8 K/uL    Mono # 0.5 0.3 - 1.0 K/uL    Eos # 0.4 0.0 - 0.5 K/uL    Baso # 0.11 0.00 - 0.20 K/uL    nRBC 0 0 /100 WBC    Gran % 74.7 (H) 38.0 - 73.0 %    Lymph % 10.0 (L) 18.0 - 48.0 %    Mono % 7.5 4.0 - 15.0 %    Eosinophil % 5.7 0.0 - 8.0 %    Basophil % 1.8 0.0 - 1.9 %    Platelet Estimate Appears normal     Aniso Marked     Poik Marked     Hypo Marked     Ovalocytes Moderate     Target Cells Marked     Tear Drop Cells Occasional     Differential Method Automated    Comprehensive metabolic panel   Result Value Ref Range    Sodium 141 136 - 145 mmol/L    Potassium 4.2 3.5 - 5.1 mmol/L    Chloride 108 95 - 110 mmol/L    CO2 25 23 - 29 mmol/L    Glucose 92 70 - 110 mg/dL    BUN 19 8 - 23 mg/dL    Creatinine 0.9 0.5 - 1.4 mg/dL    Calcium 9.0 8.7 - 10.5 mg/dL    Total Protein 6.8 6.0 - 8.4 g/dL    Albumin 3.7 3.5 - 5.2 g/dL    Total Bilirubin 0.6 0.1 - 1.0 mg/dL    Alkaline Phosphatase 35 (L) 55 - 135 U/L    AST 19 10 - 40 U/L    ALT 9 (L) 10 - 44 U/L    Anion Gap 8 8 - 16 mmol/L    eGFR if African American >60 >60 mL/min/1.73 m^2    eGFR if non African American >60 >60 mL/min/1.73 m^2   Type & Screen   Result Value Ref Range    Group & Rh A POS     Indirect Xavi NEG    Prepare RBC 1 Unit   Result Value Ref Range    UNIT NUMBER [id number]     Product Code [id number]     DISPENSE STATUS ISSUED     CODING SYSTEM [id number]     Unit Blood Type Code 6200     Unit Blood Type A POS     Unit Expiration [id number]      Imaging Results:  Imaging Results    None                 The Emergency Provider reviewed the vital signs and test results, which are outlined  above.    ED Discussion     4:41 PM: Reassessed pt at this time.Discussed with pt all pertinent ED information and results. Discussed pt dx and plan of tx. Gave pt all f/u and return to the ED instructions. All questions and concerns were addressed at this time. Pt expresses understanding of information and instructions, and is comfortable with plan to discharge. Pt is stable for discharge.    I discussed with patient and/or family/caretaker that evaluation in the ED does not suggest any emergent or life threatening medical conditions requiring immediate intervention beyond what was provided in the ED, and I believe patient is safe for discharge.  Regardless, an unremarkable evaluation in the ED does not preclude the development or presence of a serious of life threatening condition. As such, patient was instructed to return immediately for any worsening or change in current symptoms.      4:43 PM   patient is stable nontoxic.  Patient is pending colonoscopy already for chronic GI bleeding.  Patient is stable safe for discharge close follow-up.  Discussed with him all findings well as plan of care.  Verbalizes agreement understanding with all seems reliable.    ED Medication(s):  Medications   0.9%  NaCl infusion (for blood administration) (has no administration in time range)     New Prescriptions    No medications on file      Follow-up Information     Schedule an appointment as soon as possible for a visit  with Vivian Ch MD.    Specialty: Family Medicine  Contact information:  [address] [facility] [address] [phone]             [facility] - Emergency Dept..    Specialty: Emergency Medicine  Why: If symptoms worsen  Contact information:  [address] [facility] [place] [address] [phone]           Vivian Ch MD.    Specialty: Family Medicine  Contact information:  [address] [facility] [address]  [phone]                           Medical Decision  Making    Medical Decision Making:   Clinical Tests:   Lab Tests: Ordered and Reviewed           Scribe Attestation:   Scribe #1: I performed the above scribed service and the documentation accurately describes the services I performed. I attest to the accuracy of the note.    Attending:   Physician Attestation Statement for Scribe #1: I, Anisha Elizalde MD, personally performed the services described in this documentation, as scribed by Tunde Alvarez, in my presence, and it is both accurate and complete.       Scribe Attestation:   Scribe #2: I performed the above scribed service and the documentation accurately describes the services I performed. I attest to the accuracy of the note.    Attending Attestation:           Physician Attestation for Scribe:    Physician Attestation Statement for Scribe #2: I, Chinmay Buchanan Jr., MD, reviewed documentation, as scribed by Eusebio Robb in my presence, and it is both accurate and complete. I also acknowledge and confirm the content of the note done by Scribe #1.          Clinical Impression       ICD-10-CM ICD-9-CM   1. Idiopathic refractory anemia  D46.1 238.72   2. Anemia, unspecified type  D64.9 285.9       Disposition:   Disposition: Discharged  Condition: Stable         Chinmay Buchanan Jr., MD  05/05/22 5080

## 2022-05-05 NOTE — TELEPHONE ENCOUNTER
Spoke with the patient and explained that he needs to be brought to the ED immediately for blood transfusion. I instructed him not to drive and that he needed to have someone else drive him or be transported by ambulance per Dr. Garces's request.  He voiced understanding and said that he would go to ED immediately.     ----- Message from Uriel Garces MD sent at 5/5/2022 11:42 AM CDT -----  Please contact the patient and let them know that their labs reveal very low iron levels with anemia Hemoglobin less than 7 needs to go to ER for blood transfusion and admit and monitor. BNP is improved due to less fluid but need to monitor it after blood transfusion.

## 2022-05-05 NOTE — TELEPHONE ENCOUNTER
Spoke with Mr. Douglas and explained that he needs to go to the ED at [facility] immediately per Dr. Garces's request for blood transfusion. Mr Douglas voiced understanding and agreed to go now.  Patient was instructed that he can not drive himself, he will have to have someone drive him.

## 2022-05-05 NOTE — Clinical Note
"Richy"iNtin Douglas was seen and treated in our emergency department on 5/5/2022.  He may return with no restrictions on 05/06/2022.       Sincerely,      Chinmay Buchanan Jr., MD"

## 2022-05-05 NOTE — DISCHARGE INSTRUCTIONS
Follow-up with her GI doctor for your Colonoscopy as scheduled.  Return as needed for any worsening symptoms, problems, questions or concerns.

## 2022-05-06 LAB
FOLATE SERPL-MCNC: 13.2 NG/ML (ref 4–24)
VIT B12 SERPL-MCNC: 1233 PG/ML (ref 210–950)

## 2022-05-12 ENCOUNTER — LAB VISIT (OUTPATIENT)
Dept: LAB | Facility: HOSPITAL | Age: 82
End: 2022-05-12
Attending: INTERNAL MEDICINE
Payer: MEDICARE

## 2022-05-12 ENCOUNTER — OFFICE VISIT (OUTPATIENT)
Dept: HEMATOLOGY/ONCOLOGY | Facility: CLINIC | Age: 82
End: 2022-05-12
Payer: COMMERCIAL

## 2022-05-12 VITALS
HEART RATE: 73 BPM | WEIGHT: 162.25 LBS | SYSTOLIC BLOOD PRESSURE: 131 MMHG | HEIGHT: 69 IN | TEMPERATURE: 98 F | RESPIRATION RATE: 16 BRPM | OXYGEN SATURATION: 96 % | DIASTOLIC BLOOD PRESSURE: 54 MMHG | BODY MASS INDEX: 24.03 KG/M2

## 2022-05-12 DIAGNOSIS — D50.0 IRON DEFICIENCY ANEMIA DUE TO CHRONIC BLOOD LOSS: ICD-10-CM

## 2022-05-12 LAB
ALBUMIN SERPL BCP-MCNC: 3.8 G/DL (ref 3.5–5.2)
ALP SERPL-CCNC: 33 U/L (ref 55–135)
ALT SERPL W/O P-5'-P-CCNC: 10 U/L (ref 10–44)
ANION GAP SERPL CALC-SCNC: 7 MMOL/L (ref 8–16)
ANISOCYTOSIS BLD QL SMEAR: ABNORMAL
AST SERPL-CCNC: 14 U/L (ref 10–40)
BASOPHILS # BLD AUTO: 0.09 K/UL (ref 0–0.2)
BASOPHILS NFR BLD: 1.3 % (ref 0–1.9)
BILIRUB SERPL-MCNC: 0.6 MG/DL (ref 0.1–1)
BUN SERPL-MCNC: 21 MG/DL (ref 8–23)
CALCIUM SERPL-MCNC: 9 MG/DL (ref 8.7–10.5)
CHLORIDE SERPL-SCNC: 107 MMOL/L (ref 95–110)
CO2 SERPL-SCNC: 24 MMOL/L (ref 23–29)
CREAT SERPL-MCNC: 0.8 MG/DL (ref 0.5–1.4)
DACRYOCYTES BLD QL SMEAR: ABNORMAL
DIFFERENTIAL METHOD: ABNORMAL
EOSINOPHIL # BLD AUTO: 0.4 K/UL (ref 0–0.5)
EOSINOPHIL NFR BLD: 5.3 % (ref 0–8)
ERYTHROCYTE [DISTWIDTH] IN BLOOD BY AUTOMATED COUNT: 29.2 % (ref 11.5–14.5)
EST. GFR  (AFRICAN AMERICAN): >60 ML/MIN/1.73 M^2
EST. GFR  (NON AFRICAN AMERICAN): >60 ML/MIN/1.73 M^2
FERRITIN SERPL-MCNC: 14 NG/ML (ref 20–300)
GLUCOSE SERPL-MCNC: 91 MG/DL (ref 70–110)
HCT VFR BLD AUTO: 26.7 % (ref 40–54)
HGB BLD-MCNC: 7.6 G/DL (ref 14–18)
HYPOCHROMIA BLD QL SMEAR: ABNORMAL
IMM GRANULOCYTES # BLD AUTO: 0.02 K/UL (ref 0–0.04)
IMM GRANULOCYTES NFR BLD AUTO: 0.3 % (ref 0–0.5)
IRON SERPL-MCNC: 18 UG/DL (ref 45–160)
LYMPHOCYTES # BLD AUTO: 0.7 K/UL (ref 1–4.8)
LYMPHOCYTES NFR BLD: 9.9 % (ref 18–48)
MCH RBC QN AUTO: 19.4 PG (ref 27–31)
MCHC RBC AUTO-ENTMCNC: 28.5 G/DL (ref 32–36)
MCV RBC AUTO: 68 FL (ref 82–98)
MONOCYTES # BLD AUTO: 0.5 K/UL (ref 0.3–1)
MONOCYTES NFR BLD: 7.8 % (ref 4–15)
NEUTROPHILS # BLD AUTO: 5.1 K/UL (ref 1.8–7.7)
NEUTROPHILS NFR BLD: 75.4 % (ref 38–73)
NRBC BLD-RTO: 0 /100 WBC
OVALOCYTES BLD QL SMEAR: ABNORMAL
PLATELET # BLD AUTO: 314 K/UL (ref 150–450)
PLATELET BLD QL SMEAR: ABNORMAL
PMV BLD AUTO: ABNORMAL FL (ref 9.2–12.9)
POIKILOCYTOSIS BLD QL SMEAR: ABNORMAL
POTASSIUM SERPL-SCNC: 4.2 MMOL/L (ref 3.5–5.1)
PROT SERPL-MCNC: 6.9 G/DL (ref 6–8.4)
RBC # BLD AUTO: 3.92 M/UL (ref 4.6–6.2)
SATURATED IRON: 4 % (ref 20–50)
SCHISTOCYTES BLD QL SMEAR: PRESENT
SODIUM SERPL-SCNC: 138 MMOL/L (ref 136–145)
TARGETS BLD QL SMEAR: ABNORMAL
TOTAL IRON BINDING CAPACITY: 448 UG/DL (ref 250–450)
TRANSFERRIN SERPL-MCNC: 303 MG/DL (ref 200–375)
WBC # BLD AUTO: 6.78 K/UL (ref 3.9–12.7)

## 2022-05-12 PROCEDURE — 1157F PR ADVANCE CARE PLAN OR EQUIV PRESENT IN MEDICAL RECORD: ICD-10-PCS | Mod: CPTII,S$GLB,,

## 2022-05-12 PROCEDURE — 3075F PR MOST RECENT SYSTOLIC BLOOD PRESS GE 130-139MM HG: ICD-10-PCS | Mod: CPTII,S$GLB,,

## 2022-05-12 PROCEDURE — 99214 OFFICE O/P EST MOD 30 MIN: CPT | Mod: S$GLB,,,

## 2022-05-12 PROCEDURE — 99999 PR PBB SHADOW E&M-EST. PATIENT-LVL V: CPT | Mod: PBBFAC,,,

## 2022-05-12 PROCEDURE — 3288F PR FALLS RISK ASSESSMENT DOCUMENTED: ICD-10-PCS | Mod: CPTII,S$GLB,,

## 2022-05-12 PROCEDURE — 82728 ASSAY OF FERRITIN: CPT | Performed by: INTERNAL MEDICINE

## 2022-05-12 PROCEDURE — 99999 PR PBB SHADOW E&M-EST. PATIENT-LVL V: ICD-10-PCS | Mod: PBBFAC,,,

## 2022-05-12 PROCEDURE — 1160F RVW MEDS BY RX/DR IN RCRD: CPT | Mod: CPTII,S$GLB,,

## 2022-05-12 PROCEDURE — 1125F AMNT PAIN NOTED PAIN PRSNT: CPT | Mod: CPTII,S$GLB,,

## 2022-05-12 PROCEDURE — 1160F PR REVIEW ALL MEDS BY PRESCRIBER/CLIN PHARMACIST DOCUMENTED: ICD-10-PCS | Mod: CPTII,S$GLB,,

## 2022-05-12 PROCEDURE — 36415 COLL VENOUS BLD VENIPUNCTURE: CPT | Performed by: INTERNAL MEDICINE

## 2022-05-12 PROCEDURE — 3078F PR MOST RECENT DIASTOLIC BLOOD PRESSURE < 80 MM HG: ICD-10-PCS | Mod: CPTII,S$GLB,,

## 2022-05-12 PROCEDURE — 80053 COMPREHEN METABOLIC PANEL: CPT | Performed by: INTERNAL MEDICINE

## 2022-05-12 PROCEDURE — 1157F ADVNC CARE PLAN IN RCRD: CPT | Mod: CPTII,S$GLB,,

## 2022-05-12 PROCEDURE — 1159F PR MEDICATION LIST DOCUMENTED IN MEDICAL RECORD: ICD-10-PCS | Mod: CPTII,S$GLB,,

## 2022-05-12 PROCEDURE — 99214 PR OFFICE/OUTPT VISIT, EST, LEVL IV, 30-39 MIN: ICD-10-PCS | Mod: S$GLB,,,

## 2022-05-12 PROCEDURE — 85025 COMPLETE CBC W/AUTO DIFF WBC: CPT | Performed by: INTERNAL MEDICINE

## 2022-05-12 PROCEDURE — 3078F DIAST BP <80 MM HG: CPT | Mod: CPTII,S$GLB,,

## 2022-05-12 PROCEDURE — 1125F PR PAIN SEVERITY QUANTIFIED, PAIN PRESENT: ICD-10-PCS | Mod: CPTII,S$GLB,,

## 2022-05-12 PROCEDURE — 1159F MED LIST DOCD IN RCRD: CPT | Mod: CPTII,S$GLB,,

## 2022-05-12 PROCEDURE — 3075F SYST BP GE 130 - 139MM HG: CPT | Mod: CPTII,S$GLB,,

## 2022-05-12 PROCEDURE — 3288F FALL RISK ASSESSMENT DOCD: CPT | Mod: CPTII,S$GLB,,

## 2022-05-12 PROCEDURE — 84466 ASSAY OF TRANSFERRIN: CPT | Performed by: INTERNAL MEDICINE

## 2022-05-12 PROCEDURE — 1101F PR PT FALLS ASSESS DOC 0-1 FALLS W/OUT INJ PAST YR: ICD-10-PCS | Mod: CPTII,S$GLB,,

## 2022-05-12 PROCEDURE — 1101F PT FALLS ASSESS-DOCD LE1/YR: CPT | Mod: CPTII,S$GLB,,

## 2022-05-13 ENCOUNTER — TELEPHONE (OUTPATIENT)
Dept: HEMATOLOGY/ONCOLOGY | Facility: CLINIC | Age: 82
End: 2022-05-13
Payer: MEDICARE

## 2022-05-13 DIAGNOSIS — D50.0 IRON DEFICIENCY ANEMIA DUE TO CHRONIC BLOOD LOSS: Primary | ICD-10-CM

## 2022-05-13 RX ORDER — HEPARIN 100 UNIT/ML
500 SYRINGE INTRAVENOUS
Status: CANCELLED | OUTPATIENT
Start: 2022-05-27

## 2022-05-13 RX ORDER — SODIUM CHLORIDE 0.9 % (FLUSH) 0.9 %
10 SYRINGE (ML) INJECTION
Status: CANCELLED | OUTPATIENT
Start: 2022-05-27

## 2022-05-13 RX ORDER — HEPARIN 100 UNIT/ML
500 SYRINGE INTRAVENOUS
Status: CANCELLED | OUTPATIENT
Start: 2022-05-20

## 2022-05-13 RX ORDER — SODIUM CHLORIDE 0.9 % (FLUSH) 0.9 %
10 SYRINGE (ML) INJECTION
Status: CANCELLED | OUTPATIENT
Start: 2022-05-20

## 2022-05-13 NOTE — TELEPHONE ENCOUNTER
Contacted pt to review labs drawn after appt yesterday. Edu on need for IV iron replacement--plan for Injectafer x 2 already in place per Dr. Street. Advised pt of appt dates and times and follow-up--verbalized understanding.

## 2022-05-20 ENCOUNTER — HOSPITAL ENCOUNTER (OUTPATIENT)
Dept: CARDIOLOGY | Facility: HOSPITAL | Age: 82
Discharge: HOME | End: 2022-05-20
Attending: INTERNAL MEDICINE
Payer: COMMERCIAL

## 2022-05-20 ENCOUNTER — TELEPHONE (OUTPATIENT)
Dept: CARDIOLOGY | Facility: CLINIC | Age: 82
End: 2022-05-20
Payer: MEDICARE

## 2022-05-20 VITALS
BODY MASS INDEX: 23.99 KG/M2 | WEIGHT: 162 LBS | HEIGHT: 69 IN | WEIGHT: 162 LBS | HEIGHT: 69 IN | SYSTOLIC BLOOD PRESSURE: 148 MMHG | BODY MASS INDEX: 23.99 KG/M2 | WEIGHT: 162 LBS | SYSTOLIC BLOOD PRESSURE: 148 MMHG | DIASTOLIC BLOOD PRESSURE: 70 MMHG | DIASTOLIC BLOOD PRESSURE: 70 MMHG | BODY MASS INDEX: 23.99 KG/M2 | HEIGHT: 69 IN | SYSTOLIC BLOOD PRESSURE: 140 MMHG | DIASTOLIC BLOOD PRESSURE: 70 MMHG

## 2022-05-20 VITALS — HEIGHT: 69 IN | BODY MASS INDEX: 23.99 KG/M2 | WEIGHT: 162 LBS

## 2022-05-20 DIAGNOSIS — I48.0 PAF (PAROXYSMAL ATRIAL FIBRILLATION): ICD-10-CM

## 2022-05-20 DIAGNOSIS — I70.0 ATHEROSCLEROSIS OF ABDOMINAL AORTA: ICD-10-CM

## 2022-05-20 DIAGNOSIS — I50.42 CHRONIC COMBINED SYSTOLIC AND DIASTOLIC CONGESTIVE HEART FAILURE, NYHA CLASS 3: Primary | ICD-10-CM

## 2022-05-20 DIAGNOSIS — J43.9 PULMONARY EMPHYSEMA, UNSPECIFIED EMPHYSEMA TYPE: ICD-10-CM

## 2022-05-20 DIAGNOSIS — R06.09 DOE (DYSPNEA ON EXERTION): ICD-10-CM

## 2022-05-20 DIAGNOSIS — R60.0 LOCALIZED EDEMA: ICD-10-CM

## 2022-05-20 DIAGNOSIS — I35.1 NONRHEUMATIC AORTIC VALVE INSUFFICIENCY: ICD-10-CM

## 2022-05-20 DIAGNOSIS — I50.42 CHRONIC COMBINED SYSTOLIC AND DIASTOLIC CONGESTIVE HEART FAILURE, NYHA CLASS 3: ICD-10-CM

## 2022-05-20 DIAGNOSIS — I73.9 CLAUDICATION: ICD-10-CM

## 2022-05-20 DIAGNOSIS — I50.22 CHRONIC SYSTOLIC (CONGESTIVE) HEART FAILURE: ICD-10-CM

## 2022-05-20 LAB
AV INDEX (PROSTH): 0.69
AV MEAN GRADIENT: 4 MMHG
AV PEAK GRADIENT: 8 MMHG
AV REGURGITATION PRESSURE HALF TIME: 340.14 MS
AV VALVE AREA: 2.58 CM2
AV VELOCITY RATIO: 0.73
BSA FOR ECHO PROCEDURE: 1.89 M2
CV ECHO LV RWT: 0.33 CM
DOP CALC AO PEAK VEL: 1.4 M/S
DOP CALC AO VTI: 33.4 CM
DOP CALC LVOT AREA: 3.8 CM2
DOP CALC LVOT DIAMETER: 2.19 CM
DOP CALC LVOT PEAK VEL: 1.02 M/S
DOP CALC LVOT STROKE VOLUME: 86.22 CM3
DOP CALC RVOT PEAK VEL: 0.71 M/S
DOP CALC RVOT VTI: 17.5 CM
DOP CALCLVOT PEAK VEL VTI: 22.9 CM
E WAVE DECELERATION TIME: 190.77 MSEC
E/A RATIO: 1.31
E/E' RATIO: 14.67 M/S
ECHO LV POSTERIOR WALL: 1.01 CM (ref 0.6–1.1)
EJECTION FRACTION: 30 %
FRACTIONAL SHORTENING: 15 % (ref 28–44)
INTERVENTRICULAR SEPTUM: 1.12 CM (ref 0.6–1.1)
IVRT: 114.18 MSEC
LA MAJOR: 5.22 CM
LA MINOR: 5.43 CM
LA WIDTH: 5.2 CM
LEFT ABI: 1.17
LEFT ANT TIBIAL SYS PSV: 11 CM/S
LEFT ARM BP: 136 MMHG
LEFT ATRIUM SIZE: 4.22 CM
LEFT ATRIUM VOLUME INDEX MOD: 56.1 ML/M2
LEFT ATRIUM VOLUME INDEX: 52.5 ML/M2
LEFT ATRIUM VOLUME MOD: 106 CM3
LEFT ATRIUM VOLUME: 99.29 CM3
LEFT CFA PSV: 109 CM/S
LEFT DORSALIS PEDIS: 173 MMHG
LEFT INTERNAL DIMENSION IN SYSTOLE: 5.14 CM (ref 2.1–4)
LEFT PERONEAL SYS PSV: 67 CM/S
LEFT POPLITEAL PSV: 58 CM/S
LEFT POST TIBIAL SYS PSV: 24 CM/S
LEFT POSTERIOR TIBIAL: 171 MMHG
LEFT PROFUNDA SYS PSV: 125 CM/S
LEFT SUPER FEMORAL DIST SYS PSV: 62 CM/S
LEFT SUPER FEMORAL MID SYS PSV: 148 CM/S
LEFT SUPER FEMORAL OSTIAL SYS PSV: 99 CM/S
LEFT SUPER FEMORAL PROX SYS PSV: 94 CM/S
LEFT TIB/PER TRUNK SYS PSV: 85 CM/S
LEFT VENTRICLE DIASTOLIC VOLUME INDEX: 97.71 ML/M2
LEFT VENTRICLE DIASTOLIC VOLUME: 184.68 ML
LEFT VENTRICLE MASS INDEX: 145 G/M2
LEFT VENTRICLE SYSTOLIC VOLUME INDEX: 66.7 ML/M2
LEFT VENTRICLE SYSTOLIC VOLUME: 126.01 ML
LEFT VENTRICULAR INTERNAL DIMENSION IN DIASTOLE: 6.07 CM (ref 3.5–6)
LEFT VENTRICULAR MASS: 273.26 G
LV LATERAL E/E' RATIO: 12.57 M/S
LV SEPTAL E/E' RATIO: 17.6 M/S
LVOT MG: 2.12 MMHG
LVOT MV: 0.68 CM/S
MV PEAK A VEL: 0.67 M/S
MV PEAK E VEL: 0.88 M/S
OHS CV LEFT LOWER EXTREMITY ABI (NO CALC): 1.17
OHS CV RIGHT ABI LOWER EXTREMITY (NO CALC): 1.24
PISA AR MAX VEL: 4.96 M/S
PISA TR MAX VEL: 4.6 M/S
PULM VEIN S/D RATIO: 1.24
PV MEAN GRADIENT: 1.05 MMHG
PV PEAK D VEL: 0.37 M/S
PV PEAK S VEL: 0.46 M/S
PV PEAK VELOCITY: 0.94 CM/S
RA MAJOR: 4.98 CM
RA PRESSURE: 8 MMHG
RA WIDTH: 4.44 CM
RIGHT ABI: 1.24
RIGHT ANT TIBIAL SYS PSV: 0 CM/S
RIGHT ARM BP: 148 MMHG
RIGHT CFA PSV: 114 CM/S
RIGHT DORSALIS PEDIS: 184 MMHG
RIGHT PERONEAL SYS PSV: 115 CM/S
RIGHT POPLITEAL PSV: 95 CM/S
RIGHT POST TIBIAL SYS PSV: 0 CM/S
RIGHT POSTERIOR TIBIAL: 156 MMHG
RIGHT PROFUNDA SYS PSV: 136 CM/S
RIGHT SUPER FEMORAL DIST SYS PSV: 227 CM/S
RIGHT SUPER FEMORAL MID SYS PSV: 124 CM/S
RIGHT SUPER FEMORAL OSTIAL SYS PSV: 209 CM/S
RIGHT SUPER FEMORAL PROX SYS PSV: 134 CM/S
RIGHT TBI: 0.82
RIGHT TIB/PER TRUNK SYS PSV: 71 CM/S
RIGHT TOE PRESSURE: 122 MMHG
RIGHT VENTRICULAR END-DIASTOLIC DIMENSION: 4.45 CM
SINUS: 4.55 CM
STJ: 4.28 CM
TDI LATERAL: 0.07 M/S
TDI SEPTAL: 0.05 M/S
TDI: 0.06 M/S
TR MAX PG: 85 MMHG
TRICUSPID ANNULAR PLANE SYSTOLIC EXCURSION: 1.82 CM
TV REST PULMONARY ARTERY PRESSURE: 93 MMHG

## 2022-05-20 PROCEDURE — 93306 ECHO (CUPID ONLY): ICD-10-PCS | Mod: 26,,, | Performed by: INTERNAL MEDICINE

## 2022-05-20 PROCEDURE — 93306 TTE W/DOPPLER COMPLETE: CPT | Mod: 26,,, | Performed by: INTERNAL MEDICINE

## 2022-05-20 PROCEDURE — 93922 UPR/L XTREMITY ART 2 LEVELS: CPT | Mod: 26,,, | Performed by: INTERNAL MEDICINE

## 2022-05-20 PROCEDURE — 93306 TTE W/DOPPLER COMPLETE: CPT

## 2022-05-20 PROCEDURE — 93970 EXTREMITY STUDY: CPT | Mod: 26,,, | Performed by: INTERNAL MEDICINE

## 2022-05-20 PROCEDURE — 93922 UPR/L XTREMITY ART 2 LEVELS: CPT

## 2022-05-20 PROCEDURE — 93970 EXTREMITY STUDY: CPT | Mod: 50

## 2022-05-20 PROCEDURE — 93925 LOWER EXTREMITY STUDY: CPT

## 2022-05-20 PROCEDURE — 93922 ANKLE BRACHIAL INDICES (ABI): ICD-10-PCS | Mod: 26,,, | Performed by: INTERNAL MEDICINE

## 2022-05-20 PROCEDURE — 93925 CV US DOPPLER ARTERIAL LEGS BILATERAL (CUPID ONLY): ICD-10-PCS | Mod: 26,,, | Performed by: INTERNAL MEDICINE

## 2022-05-20 PROCEDURE — 93970 CV US DOPPLER VENOUS LEGS BILATERAL (CUPID ONLY): ICD-10-PCS | Mod: 26,,, | Performed by: INTERNAL MEDICINE

## 2022-05-20 PROCEDURE — 93925 LOWER EXTREMITY STUDY: CPT | Mod: 26,,, | Performed by: INTERNAL MEDICINE

## 2022-05-20 NOTE — TELEPHONE ENCOUNTER
----- Message from Uriel Garces MD sent at 5/20/2022 11:08 AM CDT -----  Please contact the patient and let them know that their echo reveals decreased heart function which will need further therapy with ICD - referral placed to EP - please schedule asap. Also please schedule visit with Dr. Gardner or Kamille in CHF clinic asap - next week or two.

## 2022-05-20 NOTE — ASSESSMENT & PLAN NOTE
No labs prior to visit will have pt scheduled for CBC, CMP, and iron studies today  -orders to follow pending lab results    Update:  Hgb 7.6g/dl, pt asymptomatic  Saturated iron 4%; Ferritin 14    Plan for IV injectafer x 2 doses  Follow-up in 6-8 weeks post with cbc, cmp, and iron studies    Pt scheduled with GI for 05/23/2022

## 2022-05-23 ENCOUNTER — TELEPHONE (OUTPATIENT)
Dept: GASTROENTEROLOGY | Facility: CLINIC | Age: 82
End: 2022-05-23

## 2022-05-23 ENCOUNTER — OFFICE VISIT (OUTPATIENT)
Dept: GASTROENTEROLOGY | Facility: CLINIC | Age: 82
End: 2022-05-23
Payer: COMMERCIAL

## 2022-05-23 ENCOUNTER — DOCUMENTATION ONLY (OUTPATIENT)
Dept: GASTROENTEROLOGY | Facility: CLINIC | Age: 82
End: 2022-05-23

## 2022-05-23 VITALS
WEIGHT: 160.69 LBS | HEIGHT: 69 IN | HEART RATE: 86 BPM | BODY MASS INDEX: 23.8 KG/M2 | SYSTOLIC BLOOD PRESSURE: 116 MMHG | DIASTOLIC BLOOD PRESSURE: 60 MMHG

## 2022-05-23 DIAGNOSIS — Z79.01 CURRENT USE OF LONG TERM ANTICOAGULATION: ICD-10-CM

## 2022-05-23 DIAGNOSIS — I48.91 ATRIAL FIBRILLATION, UNSPECIFIED TYPE: ICD-10-CM

## 2022-05-23 DIAGNOSIS — D50.9 IRON DEFICIENCY ANEMIA, UNSPECIFIED IRON DEFICIENCY ANEMIA TYPE: Primary | ICD-10-CM

## 2022-05-23 PROCEDURE — 99999 PR PBB SHADOW E&M-EST. PATIENT-LVL IV: CPT | Mod: PBBFAC,,, | Performed by: NURSE PRACTITIONER

## 2022-05-23 PROCEDURE — 1125F AMNT PAIN NOTED PAIN PRSNT: CPT | Mod: CPTII,S$GLB,, | Performed by: NURSE PRACTITIONER

## 2022-05-23 PROCEDURE — 1101F PT FALLS ASSESS-DOCD LE1/YR: CPT | Mod: CPTII,S$GLB,, | Performed by: NURSE PRACTITIONER

## 2022-05-23 PROCEDURE — 3074F SYST BP LT 130 MM HG: CPT | Mod: CPTII,S$GLB,, | Performed by: NURSE PRACTITIONER

## 2022-05-23 PROCEDURE — 1159F PR MEDICATION LIST DOCUMENTED IN MEDICAL RECORD: ICD-10-PCS | Mod: CPTII,S$GLB,, | Performed by: NURSE PRACTITIONER

## 2022-05-23 PROCEDURE — 3288F PR FALLS RISK ASSESSMENT DOCUMENTED: ICD-10-PCS | Mod: CPTII,S$GLB,, | Performed by: NURSE PRACTITIONER

## 2022-05-23 PROCEDURE — 99214 OFFICE O/P EST MOD 30 MIN: CPT | Mod: S$GLB,,, | Performed by: NURSE PRACTITIONER

## 2022-05-23 PROCEDURE — 1157F PR ADVANCE CARE PLAN OR EQUIV PRESENT IN MEDICAL RECORD: ICD-10-PCS | Mod: CPTII,S$GLB,, | Performed by: NURSE PRACTITIONER

## 2022-05-23 PROCEDURE — 1101F PR PT FALLS ASSESS DOC 0-1 FALLS W/OUT INJ PAST YR: ICD-10-PCS | Mod: CPTII,S$GLB,, | Performed by: NURSE PRACTITIONER

## 2022-05-23 PROCEDURE — 3078F DIAST BP <80 MM HG: CPT | Mod: CPTII,S$GLB,, | Performed by: NURSE PRACTITIONER

## 2022-05-23 PROCEDURE — 99999 PR PBB SHADOW E&M-EST. PATIENT-LVL IV: ICD-10-PCS | Mod: PBBFAC,,, | Performed by: NURSE PRACTITIONER

## 2022-05-23 PROCEDURE — 3074F PR MOST RECENT SYSTOLIC BLOOD PRESSURE < 130 MM HG: ICD-10-PCS | Mod: CPTII,S$GLB,, | Performed by: NURSE PRACTITIONER

## 2022-05-23 PROCEDURE — 1160F RVW MEDS BY RX/DR IN RCRD: CPT | Mod: CPTII,S$GLB,, | Performed by: NURSE PRACTITIONER

## 2022-05-23 PROCEDURE — 1160F PR REVIEW ALL MEDS BY PRESCRIBER/CLIN PHARMACIST DOCUMENTED: ICD-10-PCS | Mod: CPTII,S$GLB,, | Performed by: NURSE PRACTITIONER

## 2022-05-23 PROCEDURE — 3288F FALL RISK ASSESSMENT DOCD: CPT | Mod: CPTII,S$GLB,, | Performed by: NURSE PRACTITIONER

## 2022-05-23 PROCEDURE — 99214 PR OFFICE/OUTPT VISIT, EST, LEVL IV, 30-39 MIN: ICD-10-PCS | Mod: S$GLB,,, | Performed by: NURSE PRACTITIONER

## 2022-05-23 PROCEDURE — 3078F PR MOST RECENT DIASTOLIC BLOOD PRESSURE < 80 MM HG: ICD-10-PCS | Mod: CPTII,S$GLB,, | Performed by: NURSE PRACTITIONER

## 2022-05-23 PROCEDURE — 1159F MED LIST DOCD IN RCRD: CPT | Mod: CPTII,S$GLB,, | Performed by: NURSE PRACTITIONER

## 2022-05-23 PROCEDURE — 1125F PR PAIN SEVERITY QUANTIFIED, PAIN PRESENT: ICD-10-PCS | Mod: CPTII,S$GLB,, | Performed by: NURSE PRACTITIONER

## 2022-05-23 PROCEDURE — 1157F ADVNC CARE PLAN IN RCRD: CPT | Mod: CPTII,S$GLB,, | Performed by: NURSE PRACTITIONER

## 2022-05-23 RX ORDER — SODIUM PICOSULFATE, MAGNESIUM OXIDE, AND ANHYDROUS CITRIC ACID 10; 3.5; 12 MG/160ML; G/160ML; G/160ML
LIQUID ORAL
Qty: 320 ML | Refills: 0 | Status: SHIPPED | OUTPATIENT
Start: 2022-05-23 | End: 2022-06-16

## 2022-05-23 NOTE — PROGRESS NOTES
MD Kya Boss, SLIME; UGO Trammell     Yes he can hold Eliquis 2 days prior to procedure and resume. Thanks     - Dr. Garces

## 2022-05-23 NOTE — PROGRESS NOTES
Clinic Consult:  [facility] Consultation Note    Reason for Consult:  The primary encounter diagnosis was Iron deficiency anemia, unspecified iron deficiency anemia type. Diagnoses of Current use of long term anticoagulation and Atrial fibrillation, unspecified type were also pertinent to this visit.    PCP: Vivian Ch   [address] [facility] / [place] [address]    HPI:  This is a 81 y.o. male here for evaluation of anemia.   Onset: chronic  Type of anemia: iron deficiency   Last Hgb: 7.6 (5/12/22)  Received 1 unit PRBC on 5/5/22; scheduled to receive iron infusions on 5/26/22 and 6/2/22  Iron studies: iron low (18); saturated iron low (4); ferritin low (14)   Blood thinners:yes- Eliquis   Any overt GI bleeding: yes- sometimes has black stools   EGD/Colonoscopy done: none     Review of Systems   Constitutional: Negative for fever, malaise/fatigue and weight loss.   HENT: Negative for sore throat.    Respiratory: Negative for cough and wheezing.    Cardiovascular: Negative for chest pain and palpitations.   Gastrointestinal: Negative for abdominal pain, blood in stool, constipation, diarrhea, heartburn, melena, nausea and vomiting.   Genitourinary: Negative for dysuria and frequency.   Musculoskeletal: Negative for back pain, joint pain, myalgias and neck pain.   Skin: Negative for itching and rash.   Neurological: Negative for dizziness, speech change, seizures, loss of consciousness and headaches.   Psychiatric/Behavioral: Negative for depression and substance abuse. The patient is not nervous/anxious.        Medical History:  has a past medical history of Anemia of chronic disease, Aortic aneurysm, Atrial fibrillation with RVR (9/24/2021), Chronic systolic congestive heart failure (8/31/2017), Emphysema of lung (8/31/2017), GERD (gastroesophageal reflux disease), Hypertension, Microcytic anemia (11/24/2020), Other hyperlipidemia (4/27/2021), and Prostate cancer.    Surgical History:  has a past  surgical history that includes Prostate surgery; Spine surgery; Colonoscopy; Catheterization of both left and right heart (N/A, 1/13/2022); and Coronary angiography (N/A, 1/13/2022).    Family History: family history includes Diabetes in his mother; Peripheral vascular disease in his mother..     Social History:  reports that he has been smoking cigarettes. He started smoking about 68 years ago. He has a 68.00 pack-year smoking history. He has never used smokeless tobacco. He reports current alcohol use. He reports that he does not use drugs.    Allergies: Reviewed    Home Medications:   Current Outpatient Medications on File Prior to Visit   Medication Sig Dispense Refill    albuterol (PROVENTIL) 2.5 mg /3 mL (0.083 %) nebulizer solution Take 3 mLs (2.5 mg total) by nebulization every 4 to 6 hours as needed for Wheezing or Shortness of Breath. 360 mL 11    albuterol (PROVENTIL/VENTOLIN HFA) 90 mcg/actuation inhaler Inhale 1-2 puffs into the lungs every 6 (six) hours as needed for Wheezing or Shortness of Breath. Rescue 18 g 11    apixaban (ELIQUIS) 5 mg Tab Take 1 tablet (5 mg total) by mouth 2 (two) times daily. 180 tablet 1    atorvastatin (LIPITOR) 10 MG tablet Take 1 tablet (10 mg total) by mouth once daily. 90 tablet 3    furosemide (LASIX) 20 MG tablet Take 1 tablet (20 mg total) by mouth once daily. Do not take if blood pressure is low, feeling dry/dizzy/lightheaded. 90 tablet 0    pantoprazole (PROTONIX) 40 MG tablet Take 1 tablet (40 mg total) by mouth once daily. 90 tablet 3    sacubitriL-valsartan (ENTRESTO) 49-51 mg per tablet Take 1 tablet by mouth 2 (two) times daily. 60 tablet 3    tiotropium (SPIRIVA) 18 mcg inhalation capsule Inhale 1 capsule (18 mcg total) into the lungs once daily. Controller 30 capsule 11    metoprolol succinate (TOPROL-XL) 50 MG 24 hr tablet Take 1 tablet (50 mg total) by mouth 2 (two) times daily. 60 tablet 3     No current facility-administered medications on file  "prior to visit.       Physical Exam:  /60 (BP Location: Left arm, Patient Position: Sitting, BP Method: Medium (Manual))   Pulse 86   Ht 5' 9" (1.753 m)   Wt 72.9 kg (160 lb 11.5 oz)   BMI 23.73 kg/m²   Body mass index is 23.73 kg/m².  Physical Exam  Constitutional:       General: He is not in acute distress.  HENT:      Head: Normocephalic and atraumatic.   Eyes:      General: No scleral icterus.     Conjunctiva/sclera: Conjunctivae normal.   Cardiovascular:      Rate and Rhythm: Normal rate and regular rhythm.      Heart sounds: Normal heart sounds.   Pulmonary:      Effort: Pulmonary effort is normal. No respiratory distress.      Breath sounds: Normal breath sounds.   Skin:     General: Skin is warm and dry.      Findings: No rash.   Neurological:      General: No focal deficit present.      Mental Status: He is alert and oriented to person, place, and time.   Psychiatric:         Mood and Affect: Mood normal.         Behavior: Behavior normal.         Labs: Pertinent labs reviewed.  CRC Screening: due     Assessment:  1. Iron deficiency anemia, unspecified iron deficiency anemia type    2. Current use of long term anticoagulation    3. Atrial fibrillation, unspecified type        Recommendations:   - EGD and colonoscopy   - agree with hematology recommendations   - check cbc a few days before colonoscopy incase he needs pre-op blood transfusion.   - send request to hold Eliquis to cardiology.  - a. Fib management per cardiology     Iron deficiency anemia, unspecified iron deficiency anemia type  -     Ambulatory referral/consult to Gastroenterology  -     sod picosulf-mag ox-citric ac (CLENPIQ) 10 mg-3.5 gram -12 gram/160 mL Soln; Take as directed  Dispense: 320 mL; Refill: 0  -     CBC Auto Differential; Future; Expected date: 05/23/2022  -     Case Request Endoscopy: EGD (ESOPHAGOGASTRODUODENOSCOPY), COLONOSCOPY    Current use of long term anticoagulation    Atrial fibrillation, unspecified " type      Follow up to be determined after results/ procedure(s).    Thank you so much for allowing me to participate in the care of JSOÉ Pritchett

## 2022-05-23 NOTE — TELEPHONE ENCOUNTER
Location Screening:    If answers yes to either of the following, schedule at [facility] ONLY. If No, OK for either location.    1. Is procedure for esophageal banding ([facility])? no Yes, select [facility]  2. Is this an Advanced Endoscopic procedure (Dr Chance)?  no Yes, select [facility] (except for bariatric procedures - schedule those at the [facility])  3. Is the BMI > 50? no Yes, select [facility]  4. Does the patient have chronic hypoxic respiratory failure, as evidenced by symptoms below? no Yes, select [facility]  a. O2 Saturation <92%  b. Is the patient on supplemental O2  c. History of moderate to severe PFT's  d. Unable to complete a 6 min walk test    COVID Screening    1. Are you COVID vaccinated? yes    2. Are you experiencing shortness of breath, cough, muscle aches, loss of taste or loss of smell?  no    If answered yes to #2 then the patient must seek medical attention with their PCP, urgent care or ED.  Do not schedule the procedure.       ENDO screening    1. Have you been admitted for cardiac, kidney or pulmonary causes to the hospital in the past 3 months? yes   If yes, schedule an appointment with PCP before scheduling endoscopic procedure  Unless patient has been seen by Nephrology, Cardiologist or in the GI department within 3 months.      2. Have you had a stent placed in the last 12 months? no: If yes, have one of our providers review the chart and determine if they need to be seen in clinic.      3. Have you had a stroke or heart attack in the past 6 months? no If patient has not been seen by PCP, Neurology or Cardiology within 3 months, have one of our providers review the chart and determine if they need to be seen in clinic.        4. Have you had any chest pain in the past 3 months? no   If yes, have you been evaluated by your PCP and/or cardiologist and was it determined to not be heart related? no   If No, Pt needs to be seen by PCP or Cardiologist.  Pt can be scheduled once cardiac clearance obtained by  "either of those providers.     5. Do you take prescription weight loss medications?  no   If yes, must stop weight loss medication for 2 weeks prior to procedure.     6. Have you been diagnosed with diverticulitis within the past 3 months? no   If yes, must have been seen by GI within the last 3 months, if not schedule with GI ANANDA.    If Pt has been seen by GI, schedule procedure 8-12 weeks post antibiotic treatment.     7. Are you on Dialysis? no  If yes, schedule procedure for the day AFTER dialysis.  Appt time should be 9am or later, patient arrival time is 2 hours prior to procedure, for labs.  Nulytely or miralax prep must be used for all patients with kidney disease.     8. Are you diabetic?  no   If yes, schedule morning appt. Advise Pt to hold all diabetic meds day of procedure.     9. All patients 80 years of age and older must be seen in the GI clinic - please schedule an ANANDA appointment - Do not schedule a scope procedure appointment.     10. Is patient on a "high risk" medication (blood thinner/antiplatelet agent)?  yes   If yes, has cardiac clearance been obtained within the last 60 days? No   If no, a new cardiac clearance must be obtained.     Final Questions:    1.I have reviewed the last colonoscopy for recommendations regarding next procedure bowel prep.  yes  2. I have reviewed medications and allergies.  yes  3. I have verified the pharmacy information and appropriate prep sent if needed. yes  4. Prep instructions have been mailed or sent to portal per patient request. yes        All schedulers will have ability to reach out to the ordering GI provider to clarify any issues.         "

## 2022-05-24 RX ORDER — SODIUM CHLORIDE 0.9 % (FLUSH) 0.9 %
10 SYRINGE (ML) INJECTION
Status: CANCELLED | OUTPATIENT
Start: 2022-05-31

## 2022-05-24 RX ORDER — DIPHENHYDRAMINE HYDROCHLORIDE 50 MG/ML
50 INJECTION INTRAMUSCULAR; INTRAVENOUS ONCE AS NEEDED
Status: CANCELLED | OUTPATIENT
Start: 2022-05-31

## 2022-05-24 RX ORDER — HEPARIN 100 UNIT/ML
500 SYRINGE INTRAVENOUS
Status: CANCELLED | OUTPATIENT
Start: 2022-05-31

## 2022-05-24 RX ORDER — EPINEPHRINE 0.3 MG/.3ML
0.3 INJECTION SUBCUTANEOUS ONCE AS NEEDED
Status: CANCELLED | OUTPATIENT
Start: 2022-05-31

## 2022-05-24 RX ORDER — METHYLPREDNISOLONE SOD SUCC 125 MG
125 VIAL (EA) INJECTION ONCE AS NEEDED
Status: CANCELLED | OUTPATIENT
Start: 2022-05-31

## 2022-05-26 ENCOUNTER — TELEPHONE (OUTPATIENT)
Dept: INFUSION THERAPY | Facility: HOSPITAL | Age: 82
End: 2022-05-26
Payer: MEDICARE

## 2022-05-31 ENCOUNTER — PATIENT MESSAGE (OUTPATIENT)
Dept: PHARMACY | Facility: CLINIC | Age: 82
End: 2022-05-31
Payer: MEDICARE

## 2022-06-02 ENCOUNTER — TELEPHONE (OUTPATIENT)
Dept: INFUSION THERAPY | Facility: HOSPITAL | Age: 82
End: 2022-06-02
Payer: MEDICARE

## 2022-06-16 ENCOUNTER — HOSPITAL ENCOUNTER (EMERGENCY)
Facility: HOSPITAL | Age: 82
Discharge: HOME | End: 2022-06-16
Attending: EMERGENCY MEDICINE
Payer: COMMERCIAL

## 2022-06-16 ENCOUNTER — OFFICE VISIT (OUTPATIENT)
Dept: CARDIOLOGY | Facility: CLINIC | Age: 82
End: 2022-06-16
Payer: COMMERCIAL

## 2022-06-16 VITALS
RESPIRATION RATE: 18 BRPM | BODY MASS INDEX: 23.77 KG/M2 | HEART RATE: 77 BPM | DIASTOLIC BLOOD PRESSURE: 67 MMHG | TEMPERATURE: 98 F | SYSTOLIC BLOOD PRESSURE: 159 MMHG | OXYGEN SATURATION: 97 % | WEIGHT: 160.94 LBS

## 2022-06-16 VITALS
DIASTOLIC BLOOD PRESSURE: 72 MMHG | WEIGHT: 165.13 LBS | SYSTOLIC BLOOD PRESSURE: 140 MMHG | BODY MASS INDEX: 24.38 KG/M2 | OXYGEN SATURATION: 95 % | HEART RATE: 80 BPM

## 2022-06-16 DIAGNOSIS — I73.9 CLAUDICATION: ICD-10-CM

## 2022-06-16 DIAGNOSIS — I70.0 ATHEROSCLEROSIS OF ABDOMINAL AORTA: ICD-10-CM

## 2022-06-16 DIAGNOSIS — D64.9 ACUTE ON CHRONIC ANEMIA: Primary | ICD-10-CM

## 2022-06-16 DIAGNOSIS — S79.911A HIP INJURY, RIGHT, INITIAL ENCOUNTER: ICD-10-CM

## 2022-06-16 DIAGNOSIS — I50.42 CHRONIC COMBINED SYSTOLIC AND DIASTOLIC CONGESTIVE HEART FAILURE, NYHA CLASS 3: Primary | ICD-10-CM

## 2022-06-16 DIAGNOSIS — I50.22 CHRONIC SYSTOLIC (CONGESTIVE) HEART FAILURE: ICD-10-CM

## 2022-06-16 DIAGNOSIS — I27.20 PULMONARY HYPERTENSION: ICD-10-CM

## 2022-06-16 DIAGNOSIS — M25.569 KNEE PAIN, UNSPECIFIED CHRONICITY, UNSPECIFIED LATERALITY: ICD-10-CM

## 2022-06-16 DIAGNOSIS — I35.1 NONRHEUMATIC AORTIC VALVE INSUFFICIENCY: ICD-10-CM

## 2022-06-16 DIAGNOSIS — J43.9 PULMONARY EMPHYSEMA, UNSPECIFIED EMPHYSEMA TYPE: ICD-10-CM

## 2022-06-16 DIAGNOSIS — Z79.01 CHRONIC ANTICOAGULATION: ICD-10-CM

## 2022-06-16 DIAGNOSIS — R06.09 DOE (DYSPNEA ON EXERTION): ICD-10-CM

## 2022-06-16 DIAGNOSIS — R06.02 SHORTNESS OF BREATH: ICD-10-CM

## 2022-06-16 DIAGNOSIS — I48.0 PAF (PAROXYSMAL ATRIAL FIBRILLATION): ICD-10-CM

## 2022-06-16 DIAGNOSIS — R60.0 LOCALIZED EDEMA: ICD-10-CM

## 2022-06-16 DIAGNOSIS — I50.9 CHRONIC HEART FAILURE, UNSPECIFIED HEART FAILURE TYPE: ICD-10-CM

## 2022-06-16 DIAGNOSIS — S70.01XA TRAUMATIC HEMATOMA OF RIGHT HIP, INITIAL ENCOUNTER: ICD-10-CM

## 2022-06-16 DIAGNOSIS — I77.810 DILATED AORTIC ROOT: ICD-10-CM

## 2022-06-16 DIAGNOSIS — D50.0 IRON DEFICIENCY ANEMIA DUE TO CHRONIC BLOOD LOSS: ICD-10-CM

## 2022-06-16 DIAGNOSIS — Z87.19 HISTORY OF LOWER GASTROINTESTINAL BLEEDING: ICD-10-CM

## 2022-06-16 DIAGNOSIS — I73.9 PVD (PERIPHERAL VASCULAR DISEASE): ICD-10-CM

## 2022-06-16 LAB
ABO + RH BLD: ABNORMAL
ALBUMIN SERPL BCP-MCNC: 3.7 G/DL (ref 3.5–5.2)
ALP SERPL-CCNC: 38 U/L (ref 55–135)
ALT SERPL W/O P-5'-P-CCNC: 8 U/L (ref 10–44)
ANION GAP SERPL CALC-SCNC: 7 MMOL/L (ref 8–16)
ANISOCYTOSIS BLD QL SMEAR: ABNORMAL
AST SERPL-CCNC: 17 U/L (ref 10–40)
BASOPHILS # BLD AUTO: 0.1 K/UL (ref 0–0.2)
BASOPHILS NFR BLD: 1.6 % (ref 0–1.9)
BILIRUB SERPL-MCNC: 0.9 MG/DL (ref 0.1–1)
BLD GP AB SCN CELLS X3 SERPL QL: ABNORMAL
BLD PROD TYP BPU: NORMAL
BLOOD GROUP ANTIBODIES SERPL: NORMAL
BLOOD UNIT EXPIRATION DATE: NORMAL
BLOOD UNIT TYPE CODE: 9500
BLOOD UNIT TYPE: NORMAL
BNP SERPL-MCNC: 952 PG/ML (ref 0–99)
BUN SERPL-MCNC: 18 MG/DL (ref 8–23)
CALCIUM SERPL-MCNC: 8.8 MG/DL (ref 8.7–10.5)
CHLORIDE SERPL-SCNC: 108 MMOL/L (ref 95–110)
CO2 SERPL-SCNC: 24 MMOL/L (ref 23–29)
CODING SYSTEM: NORMAL
CREAT SERPL-MCNC: 0.7 MG/DL (ref 0.5–1.4)
DIFFERENTIAL METHOD: ABNORMAL
DISPENSE STATUS: NORMAL
EOSINOPHIL # BLD AUTO: 0.3 K/UL (ref 0–0.5)
EOSINOPHIL NFR BLD: 5.3 % (ref 0–8)
ERYTHROCYTE [DISTWIDTH] IN BLOOD BY AUTOMATED COUNT: 29.8 % (ref 11.5–14.5)
EST. GFR  (AFRICAN AMERICAN): >60 ML/MIN/1.73 M^2
EST. GFR  (NON AFRICAN AMERICAN): >60 ML/MIN/1.73 M^2
GLUCOSE SERPL-MCNC: 80 MG/DL (ref 70–110)
HCT VFR BLD AUTO: 21.3 % (ref 40–54)
HGB BLD-MCNC: 6.1 G/DL (ref 14–18)
HYPOCHROMIA BLD QL SMEAR: ABNORMAL
IMM GRANULOCYTES # BLD AUTO: 0.02 K/UL (ref 0–0.04)
IMM GRANULOCYTES NFR BLD AUTO: 0.3 % (ref 0–0.5)
LYMPHOCYTES # BLD AUTO: 0.7 K/UL (ref 1–4.8)
LYMPHOCYTES NFR BLD: 11 % (ref 18–48)
MCH RBC QN AUTO: 18.8 PG (ref 27–31)
MCHC RBC AUTO-ENTMCNC: 28.6 G/DL (ref 32–36)
MCV RBC AUTO: 66 FL (ref 82–98)
MONOCYTES # BLD AUTO: 0.5 K/UL (ref 0.3–1)
MONOCYTES NFR BLD: 8.3 % (ref 4–15)
NEUTROPHILS # BLD AUTO: 4.7 K/UL (ref 1.8–7.7)
NEUTROPHILS NFR BLD: 73.5 % (ref 38–73)
NRBC BLD-RTO: 0 /100 WBC
NUM UNITS TRANS PACKED RBC: NORMAL
OVALOCYTES BLD QL SMEAR: ABNORMAL
PLATELET # BLD AUTO: 430 K/UL (ref 150–450)
PMV BLD AUTO: ABNORMAL FL (ref 9.2–12.9)
POLYCHROMASIA BLD QL SMEAR: ABNORMAL
POTASSIUM SERPL-SCNC: 4.1 MMOL/L (ref 3.5–5.1)
PROT SERPL-MCNC: 6.9 G/DL (ref 6–8.4)
RBC # BLD AUTO: 3.24 M/UL (ref 4.6–6.2)
SCHISTOCYTES BLD QL SMEAR: PRESENT
SODIUM SERPL-SCNC: 139 MMOL/L (ref 136–145)
TARGETS BLD QL SMEAR: ABNORMAL
TROPONIN I SERPL DL<=0.01 NG/ML-MCNC: <0.006 NG/ML (ref 0–0.03)
WBC # BLD AUTO: 6.36 K/UL (ref 3.9–12.7)

## 2022-06-16 PROCEDURE — 99291 CRITICAL CARE FIRST HOUR: CPT | Mod: 25

## 2022-06-16 PROCEDURE — 3078F DIAST BP <80 MM HG: CPT | Mod: CPTII,S$GLB,, | Performed by: INTERNAL MEDICINE

## 2022-06-16 PROCEDURE — 93005 ELECTROCARDIOGRAM TRACING: CPT

## 2022-06-16 PROCEDURE — 84484 ASSAY OF TROPONIN QUANT: CPT | Performed by: EMERGENCY MEDICINE

## 2022-06-16 PROCEDURE — 86077 PATHOLOGIST INTERPRETATION AB/XM: ICD-10-PCS | Mod: ,,, | Performed by: PATHOLOGY

## 2022-06-16 PROCEDURE — 1160F PR REVIEW ALL MEDS BY PRESCRIBER/CLIN PHARMACIST DOCUMENTED: ICD-10-PCS | Mod: CPTII,S$GLB,, | Performed by: INTERNAL MEDICINE

## 2022-06-16 PROCEDURE — 99999 PR PBB SHADOW E&M-EST. PATIENT-LVL IV: ICD-10-PCS | Mod: PBBFAC,,, | Performed by: INTERNAL MEDICINE

## 2022-06-16 PROCEDURE — 99215 OFFICE O/P EST HI 40 MIN: CPT | Mod: S$GLB,,, | Performed by: INTERNAL MEDICINE

## 2022-06-16 PROCEDURE — 86077 PHYS BLOOD BANK SERV XMATCH: CPT | Mod: ,,, | Performed by: PATHOLOGY

## 2022-06-16 PROCEDURE — 1159F PR MEDICATION LIST DOCUMENTED IN MEDICAL RECORD: ICD-10-PCS | Mod: CPTII,S$GLB,, | Performed by: INTERNAL MEDICINE

## 2022-06-16 PROCEDURE — 63600175 PHARM REV CODE 636 W HCPCS: Performed by: EMERGENCY MEDICINE

## 2022-06-16 PROCEDURE — 1157F PR ADVANCE CARE PLAN OR EQUIV PRESENT IN MEDICAL RECORD: ICD-10-PCS | Mod: CPTII,S$GLB,, | Performed by: INTERNAL MEDICINE

## 2022-06-16 PROCEDURE — 85025 COMPLETE CBC W/AUTO DIFF WBC: CPT | Performed by: EMERGENCY MEDICINE

## 2022-06-16 PROCEDURE — 36430 TRANSFUSION BLD/BLD COMPNT: CPT

## 2022-06-16 PROCEDURE — 86922 COMPATIBILITY TEST ANTIGLOB: CPT | Performed by: EMERGENCY MEDICINE

## 2022-06-16 PROCEDURE — 1159F MED LIST DOCD IN RCRD: CPT | Mod: CPTII,S$GLB,, | Performed by: INTERNAL MEDICINE

## 2022-06-16 PROCEDURE — 1160F RVW MEDS BY RX/DR IN RCRD: CPT | Mod: CPTII,S$GLB,, | Performed by: INTERNAL MEDICINE

## 2022-06-16 PROCEDURE — 96374 THER/PROPH/DIAG INJ IV PUSH: CPT

## 2022-06-16 PROCEDURE — 1157F ADVNC CARE PLAN IN RCRD: CPT | Mod: CPTII,S$GLB,, | Performed by: INTERNAL MEDICINE

## 2022-06-16 PROCEDURE — 86850 RBC ANTIBODY SCREEN: CPT | Performed by: EMERGENCY MEDICINE

## 2022-06-16 PROCEDURE — P9016 RBC LEUKOCYTES REDUCED: HCPCS | Performed by: EMERGENCY MEDICINE

## 2022-06-16 PROCEDURE — 99215 PR OFFICE/OUTPT VISIT, EST, LEVL V, 40-54 MIN: ICD-10-PCS | Mod: S$GLB,,, | Performed by: INTERNAL MEDICINE

## 2022-06-16 PROCEDURE — 93010 ELECTROCARDIOGRAM REPORT: CPT | Mod: ,,, | Performed by: INTERNAL MEDICINE

## 2022-06-16 PROCEDURE — 93010 EKG 12-LEAD: ICD-10-PCS | Mod: ,,, | Performed by: INTERNAL MEDICINE

## 2022-06-16 PROCEDURE — 3077F SYST BP >= 140 MM HG: CPT | Mod: CPTII,S$GLB,, | Performed by: INTERNAL MEDICINE

## 2022-06-16 PROCEDURE — 3077F PR MOST RECENT SYSTOLIC BLOOD PRESSURE >= 140 MM HG: ICD-10-PCS | Mod: CPTII,S$GLB,, | Performed by: INTERNAL MEDICINE

## 2022-06-16 PROCEDURE — 86870 RBC ANTIBODY IDENTIFICATION: CPT | Performed by: EMERGENCY MEDICINE

## 2022-06-16 PROCEDURE — 80053 COMPREHEN METABOLIC PANEL: CPT | Performed by: EMERGENCY MEDICINE

## 2022-06-16 PROCEDURE — 99999 PR PBB SHADOW E&M-EST. PATIENT-LVL IV: CPT | Mod: PBBFAC,,, | Performed by: INTERNAL MEDICINE

## 2022-06-16 PROCEDURE — 83880 ASSAY OF NATRIURETIC PEPTIDE: CPT | Performed by: EMERGENCY MEDICINE

## 2022-06-16 PROCEDURE — 3078F PR MOST RECENT DIASTOLIC BLOOD PRESSURE < 80 MM HG: ICD-10-PCS | Mod: CPTII,S$GLB,, | Performed by: INTERNAL MEDICINE

## 2022-06-16 RX ORDER — SACUBITRIL AND VALSARTAN 97; 103 MG/1; MG/1
1 TABLET, FILM COATED ORAL 2 TIMES DAILY
Qty: 60 TABLET | Refills: 3 | Status: SHIPPED | OUTPATIENT
Start: 2022-06-16 | End: 2022-07-28 | Stop reason: SDUPTHER

## 2022-06-16 RX ORDER — FUROSEMIDE 10 MG/ML
40 INJECTION INTRAMUSCULAR; INTRAVENOUS
Status: COMPLETED | OUTPATIENT
Start: 2022-06-16 | End: 2022-06-16

## 2022-06-16 RX ORDER — HYDROCODONE BITARTRATE AND ACETAMINOPHEN 500; 5 MG/1; MG/1
TABLET ORAL
Status: DISCONTINUED | OUTPATIENT
Start: 2022-06-16 | End: 2022-06-16 | Stop reason: HOSPADM

## 2022-06-16 RX ADMIN — FUROSEMIDE 40 MG: 10 INJECTION, SOLUTION INTRAMUSCULAR; INTRAVENOUS at 01:06

## 2022-06-16 NOTE — PHARMACY MED REC
"Admission Medication History     The home medication history was taken by Bon Guzman.    You may go to "Admission" then "Reconcile Home Medications" tabs to review and/or act upon these items.      The home medication list has been updated by the Pharmacy department.    Please read ALL comments highlighted in yellow.    Please address this information as you see fit.     Feel free to contact us if you have any questions or require assistance.      The medications listed below were removed from the home medication list. Please reorder if appropriate:  Patient reports no longer taking the following medication(s):   ALBUTEROL 2.5 MG/3 mL (0.083 %) NEBULIZER SOLUTION   ALBUTEROL HFA 90 MCG/ACTUATION INHALER   CLENPIQ BOWEL PREP KIT   ATORVASTATIN 10 MG TABLET   METOPROLOL SUCCINATE 50 MG TABLET   PANTOPRAZOLE 40 MG TABLET   SACUBITRIL-VALSARTAN (ENTRESTO) 49-51 MG (dose increased to  mg)   TIOTROPIUM (SPIRIVA) 18 MCG INHALATION CAPSULE      Medications listed below were obtained from: Patient/family, Medications brought from home, Analytic software- [employer] and Patient's pharmacy  (Not in a hospital admission)      Potential issues to be addressed PRIOR TO DISCHARGE: NONE      Bon Gumzan CPhT  Pharmacy Technician Specialist-Medication History  [facility] [phone]  Secure chat preferred.       Current Outpatient Medications on File Prior to Encounter   Medication Sig Dispense Refill Last Dose    apixaban (ELIQUIS) 5 mg Tab Take 1 tablet (5 mg total) by mouth 2 (two) times daily. 180 tablet 1 6/15/2022 at Unknown time    furosemide (LASIX) 20 MG tablet Take 1 tablet (20 mg total) by mouth once daily. Do not take if blood pressure is low, feeling dry/dizzy/lightheaded. 90 tablet 0 6/15/2022 at Unknown time    sacubitriL-valsartan (ENTRESTO)  mg per tablet Take 1 tablet by mouth 2 (two) times daily. 60 tablet 3 6/16/2022 at Unknown time    [DISCONTINUED] albuterol (PROVENTIL) 2.5 mg " /3 mL (0.083 %) nebulizer solution Take 3 mLs (2.5 mg total) by nebulization every 4 to 6 hours as needed for Wheezing or Shortness of Breath. 360 mL 11     [DISCONTINUED] albuterol (PROVENTIL/VENTOLIN HFA) 90 mcg/actuation inhaler Inhale 1-2 puffs into the lungs every 6 (six) hours as needed for Wheezing or Shortness of Breath. Rescue 18 g 11     [DISCONTINUED] atorvastatin (LIPITOR) 10 MG tablet Take 1 tablet (10 mg total) by mouth once daily. 90 tablet 3     [DISCONTINUED] metoprolol succinate (TOPROL-XL) 50 MG 24 hr tablet Take 1 tablet (50 mg total) by mouth 2 (two) times daily. 60 tablet 3     [DISCONTINUED] pantoprazole (PROTONIX) 40 MG tablet Take 1 tablet (40 mg total) by mouth once daily. 90 tablet 3     [DISCONTINUED] sacubitriL-valsartan (ENTRESTO) 49-51 mg per tablet Take 1 tablet by mouth 2 (two) times daily. 60 tablet 3     [DISCONTINUED] sod picosulf-mag ox-citric ac (CLENPIQ) 10 mg-3.5 gram -12 gram/160 mL Soln Take as directed 320 mL 0     [DISCONTINUED] tiotropium (SPIRIVA) 18 mcg inhalation capsule Inhale 1 capsule (18 mcg total) into the lungs once daily. Controller 30 capsule 11                              .

## 2022-06-16 NOTE — ED PROVIDER NOTES
SCRIBE #1 NOTE: I, Yris Osman, am scribing for, and in the presence of, Kasey Naylor MD. I have scribed the entire note.       History     Chief Complaint   Patient presents with    Hip Pain     Right hip pain since fall on 6/1     Review of patient's allergies indicates:   Allergen Reactions    Fish containing products     Iodine and iodide containing products     Shellfish containing products          History of Present Illness     HPI    6/16/2022, 10:44 AM  History obtained from the patient      History of Present Illness: Richy Douglas is a 81 y.o. male patient with a PMHx of atrial fibrillation, HTN, HLD, prostate cancer, GERD, and anemia who presents to the Emergency Department for evaluation of right hip pain which onset gradually on 06/01/2022. Patient tripped over his foot stool and fell. Patient rates pain as 8/10. Patient is able to walk but adds that he experiences pain when doing so. Symptoms are constant and moderate in severity. Associated sxs include knee pain and SOB. Patient denies any head injuries, fever, chills, CP, N/V, diarrhea, and all other sxs at this time. No further complaints or concerns at this time.       Arrival mode: Personal vehicle     PCP: Vivian Ch MD        Past Medical History:  Past Medical History:   Diagnosis Date    Anemia of chronic disease     Aortic aneurysm     Atrial fibrillation with RVR 9/24/2021    Chronic systolic congestive heart failure 8/31/2017    Emphysema of lung 8/31/2017    GERD (gastroesophageal reflux disease)     Hypertension     Microcytic anemia 11/24/2020    Other hyperlipidemia 4/27/2021    Prostate cancer        Past Surgical History:  Past Surgical History:   Procedure Laterality Date    CATHETERIZATION OF BOTH LEFT AND RIGHT HEART N/A 1/13/2022    Procedure: CATHETERIZATION, HEART, BOTH LEFT AND RIGHT;  Surgeon: Janneth Tovar MD;  Location: [facility] CATH LAB;  Service: Cardiology;  Laterality: N/A;  poss cx     COLONOSCOPY      CORONARY ANGIOGRAPHY N/A 1/13/2022    Procedure: ANGIOGRAM, CORONARY ARTERY;  Surgeon: Janneth Tovar MD;  Location: [facility] CATH LAB;  Service: Cardiology;  Laterality: N/A;    PROSTATE SURGERY      SPINE SURGERY           Family History:  Family History   Problem Relation Age of Onset    Diabetes Mother     Peripheral vascular disease Mother        Social History:  Social History     Tobacco Use    Smoking status: Current Every Day Smoker     Packs/day: 1.00     Years: 68.00     Pack years: 68.00     Types: Cigarettes     Start date: 1/1/1954    Smokeless tobacco: Never Used   Substance and Sexual Activity    Alcohol use: Yes     Comment: reports only drinks red wine when games are on    Drug use: Never    Sexual activity: Yes     Partners: Female        Review of Systems     Review of Systems   Constitutional: Negative for chills and fatigue.   HENT: Negative for congestion, facial swelling, rhinorrhea and sore throat.    Respiratory: Positive for shortness of breath.    Cardiovascular: Negative for chest pain.   Gastrointestinal: Negative for abdominal pain, diarrhea, nausea and vomiting.   Genitourinary: Negative for dysuria, frequency and hematuria.   Musculoskeletal: Positive for myalgias (R Hip and knee pain bilaterally ). Negative for back pain and neck pain.   Skin: Negative for wound.   Neurological: Negative for dizziness, weakness, light-headedness, numbness and headaches.   Hematological: Does not bruise/bleed easily.        Physical Exam     Initial Vitals   BP Pulse Resp Temp SpO2   06/16/22 1102 06/16/22 1102 06/16/22 1116 06/16/22 1219 06/16/22 1102   135/63 74 20 98.1 °F (36.7 °C) 100 %      MAP       --                 Physical Exam  Nursing Notes and Vital Signs Reviewed.  Constitutional: Patient is in no apparent distress. Chronically ill appearing.   Head: Atraumatic. Normocephalic.  Eyes: EOM intact. No scleral icterus. Conjunctivitis.   ENT: Mucous membranes are  moist. Oropharynx is clear and symmetric.    Neck: Supple. Full ROM.   Cardiovascular: Regular rate. Regular rhythm. No murmurs, rubs, or gallops. Distal pulses are 2+ and symmetric.  Pulmonary/Chest: No respiratory distress. Clear to auscultation bilaterally. No wheezing or rales.  Abdominal: Soft and non-distended.  There is no tenderness.  No rebound, guarding, or rigidity.   Musculoskeletal: Patient is able to ambulate without assistance but has a slight limp. Golf-ball size area of firmness and tenderness over right hip. Moves all extremities. No obvious deformities. Trace edema in legs bilaterally.   Skin: No erythema or warmth.   Neurological:  Alert, awake, and appropriate.  Normal speech.  No acute focal neurological deficits are appreciated.       ED Course   Critical Care    Date/Time: 6/16/2022 4:06 PM  Performed by: Kasey Naylor MD  Authorized by: Kasey Naylor MD   Direct patient critical care time: 25 minutes  Additional history critical care time: 6 minutes  Ordering / reviewing critical care time: 6 minutes  Documentation critical care time: 6 minutes  Total critical care time (exclusive of procedural time) : 43 minutes  Critical care was necessary to treat or prevent imminent or life-threatening deterioration of the following conditions: symptomatic anemia.  Critical care was time spent personally by me on the following activities: blood draw for specimens, discussions with primary provider, ordering and performing treatments and interventions, pulse oximetry, re-evaluation of patient's condition, ordering and review of laboratory studies, examination of patient, development of treatment plan with patient or surrogate, interpretation of cardiac output measurements, obtaining history from patient or surrogate, ordering and review of radiographic studies and review of old charts.        ED Vital Signs:  Vitals:    06/16/22 1102 06/16/22 1114 06/16/22 1116 06/16/22 1117   BP: 135/63      Pulse:  74 72     Resp:   20    Temp:       TempSrc:       SpO2: 100%      Weight:    73 kg (160 lb 15 oz)    06/16/22 1202 06/16/22 1219 06/16/22 1302 06/16/22 1402   BP: 134/63  (!) 144/67 136/64   Pulse: 68  72 67   Resp: (!) 28  18 20   Temp:  98.1 °F (36.7 °C)     TempSrc:  Oral     SpO2: 97%  98% 95%   Weight:        06/16/22 1526 06/16/22 1530 06/16/22 1547   BP: (!) 143/67 (!) 146/63 (!) 155/68   Pulse: 99 68 68   Resp: 20 20 20   Temp: 97.8 °F (36.6 °C) 97.8 °F (36.6 °C) 97.5 °F (36.4 °C)   TempSrc: Oral Oral Oral   SpO2: 99% 96% 96%   Weight:          Abnormal Lab Results:  Labs Reviewed   CBC W/ AUTO DIFFERENTIAL - Abnormal; Notable for the following components:       Result Value    RBC 3.24 (*)     Hemoglobin 6.1 (*)     Hematocrit 21.3 (*)     MCV 66 (*)     MCH 18.8 (*)     MCHC 28.6 (*)     RDW 29.8 (*)     Lymph # 0.7 (*)     Gran % 73.5 (*)     Lymph % 11.0 (*)     All other components within normal limits   COMPREHENSIVE METABOLIC PANEL - Abnormal; Notable for the following components:    Alkaline Phosphatase 38 (*)     ALT 8 (*)     Anion Gap 7 (*)     All other components within normal limits   B-TYPE NATRIURETIC PEPTIDE - Abnormal; Notable for the following components:     (*)     All other components within normal limits   TYPE & SCREEN - Abnormal; Notable for the following components:    Indirect Xavi POS (*)     All other components within normal limits   TROPONIN I   ANTIBODY IDENTIFICATION   PREPARE RBC SOFT        All Lab Results:  Results for orders placed or performed during the hospital encounter of 06/16/22   CBC auto differential   Result Value Ref Range    WBC 6.36 3.90 - 12.70 K/uL    RBC 3.24 (L) 4.60 - 6.20 M/uL    Hemoglobin 6.1 (L) 14.0 - 18.0 g/dL    Hematocrit 21.3 (L) 40.0 - 54.0 %    MCV 66 (L) 82 - 98 fL    MCH 18.8 (L) 27.0 - 31.0 pg    MCHC 28.6 (L) 32.0 - 36.0 g/dL    RDW 29.8 (H) 11.5 - 14.5 %    Platelets 430 150 - 450 K/uL    MPV SEE COMMENT 9.2 - 12.9 fL     Immature Granulocytes 0.3 0.0 - 0.5 %    Gran # (ANC) 4.7 1.8 - 7.7 K/uL    Immature Grans (Abs) 0.02 0.00 - 0.04 K/uL    Lymph # 0.7 (L) 1.0 - 4.8 K/uL    Mono # 0.5 0.3 - 1.0 K/uL    Eos # 0.3 0.0 - 0.5 K/uL    Baso # 0.10 0.00 - 0.20 K/uL    nRBC 0 0 /100 WBC    Gran % 73.5 (H) 38.0 - 73.0 %    Lymph % 11.0 (L) 18.0 - 48.0 %    Mono % 8.3 4.0 - 15.0 %    Eosinophil % 5.3 0.0 - 8.0 %    Basophil % 1.6 0.0 - 1.9 %    Aniso Moderate     Poly Occasional     Hypo Moderate     Ovalocytes Occasional     Target Cells Moderate     Schistocytes Present     Differential Method Automated    Comprehensive metabolic panel   Result Value Ref Range    Sodium 139 136 - 145 mmol/L    Potassium 4.1 3.5 - 5.1 mmol/L    Chloride 108 95 - 110 mmol/L    CO2 24 23 - 29 mmol/L    Glucose 80 70 - 110 mg/dL    BUN 18 8 - 23 mg/dL    Creatinine 0.7 0.5 - 1.4 mg/dL    Calcium 8.8 8.7 - 10.5 mg/dL    Total Protein 6.9 6.0 - 8.4 g/dL    Albumin 3.7 3.5 - 5.2 g/dL    Total Bilirubin 0.9 0.1 - 1.0 mg/dL    Alkaline Phosphatase 38 (L) 55 - 135 U/L    AST 17 10 - 40 U/L    ALT 8 (L) 10 - 44 U/L    Anion Gap 7 (L) 8 - 16 mmol/L    eGFR if African American >60 >60 mL/min/1.73 m^2    eGFR if non African American >60 >60 mL/min/1.73 m^2   Troponin I #1   Result Value Ref Range    Troponin I <0.006 0.000 - 0.026 ng/mL   BNP   Result Value Ref Range     (H) 0 - 99 pg/mL   Type & Screen   Result Value Ref Range    Group & Rh A POS     Indirect Xavi POS (A)    Antibody identification   Result Value Ref Range    Antibody ID POS    Prepare RBC 1 Unit   Result Value Ref Range    UNIT NUMBER [id number]     Product Code [id number]     DISPENSE STATUS ISSUED     CODING SYSTEM [id number]     Unit Blood Type Code 9500     Unit Blood Type O NEG     Unit Expiration [id number]        Imaging Results:  Imaging Results          CT Hip Without Contrast Right (Final result)  Result time 06/16/22 13:08:34    Final result by Irwin Bey MD (06/16/22 13:08:34)                  Impression:      1.  Negative for acute process involving the visualized osseous structures.    2.  Intermediate density collection within the subcutaneous soft tissues lateral to the greater trochanter measuring up to 8.6 cm in length, most likely representing a hematoma.  Less likely could this be infectious in nature.  Surrounding edema noted.  Clinical correlation is advised.    3.  Large right hydrocele.    4.  Marked degenerative changes of the lower lumbar spine.  Cystic degenerative changes of the right hip joint.  Ankylosis of the sacroiliac joint.  Nonemergent findings as described above.    All CT scans at this facility are performed  using dose modulation techniques as appropriate to performed exam including the following:  automated exposure control; adjustment of mA and/or kV according to the patients size (this includes techniques or standardized protocols for targeted exams where dose is matched to indication/reason for exam: i.e. extremities or head);  iterative reconstruction technique.      Electronically signed by: Irwin Bey MD  Date:    06/16/2022  Time:    13:08             Narrative:    EXAMINATION:  CT HIP WITHOUT CONTRAST RIGHT    CLINICAL HISTORY:  Soft tissue infection suspected, hip, X-ray done;    TECHNIQUE:  Axial images through the right hip were obtained without the use of IV contrast.  Sagittal and coronal reconstructions are provided for review.    COMPARISON:  Plain film performed earlier the same day    FINDINGS:  Negative for evidence of fractures involving the visualized osseous structures.  Cystic degenerative changes of the right hip joint noted.  Moderate degenerative changes of the pubic symphysis with calcifications of the cartilage noted.  Ankylosis of the right sacroiliac joint.  Marked degenerative changes of the lower lumbar spine especially L5/S1 disc.    There is an intermediate density subcutaneous collection in the lateral hip soft tissues  measuring 8.6 cm in greatest length, with surrounding edema.  This overlies the greater trochanter.  No other abnormal fluid collections are seen.    Large right hydrocele.  Vascular calcifications without aneurysmal change.  Bowel-gas pattern appears normal.                               X-Ray Hip 2 or 3 views Right (with Pelvis when performed) (Final result)  Result time 06/16/22 11:28:58    Final result by Irwin Bey MD (06/16/22 11:28:58)                 Impression:      1.  Negative for acute process.    2.  Incidental findings as noted above      Electronically signed by: Irwin Bey MD  Date:    06/16/2022  Time:    11:28             Narrative:    EXAMINATION:  XR HIP WITH PELVIS WHEN PERFORMED, 2 OR 3  VIEWS RIGHT    CLINICAL HISTORY:  Unspecified injury of right hip, initial encounter    TECHNIQUE:  AP view of the pelvis and frog leg lateral view of the right hip were performed.    COMPARISON:  None    FINDINGS:  The femoral heads are well centered on the acetabula.  Hip joints are well maintained.  Mild degenerative changes of the hip joints.    Moderate degenerative changes of the lower lumbar spine.  Mild degenerative changes of the pelvis.    Negative for fracture or dislocation.  Normal bowel gas pattern.  Vascular calcifications without aneurysmal change.  Postoperative changes overlie the pelvis.                               X-Ray Chest AP Portable (Final result)  Result time 06/16/22 11:31:56    Final result by Irwin Bey MD (06/16/22 11:31:56)                 Impression:      1.  Increasing density in the left lung apex in this patient with known pulmonary opacities in the left lung apex.  Superimposed infection could have this appearance.  Treatment for presumed pneumonia and follow-up x-rays in 3-4 weeks recommended.  If this area does not improve after treatment, then further characterization with a repeat chest CT scan is recommended.    2.  Stable findings as noted  above.      Electronically signed by: Irwin Bey MD  Date:    06/16/2022  Time:    11:31             Narrative:    EXAMINATION:  XR CHEST AP PORTABLE    CLINICAL HISTORY:  shortness of breath;    COMPARISON:  Chest CT scan from March 23, 2022    FINDINGS:  Increasing density in the left lung apex superimposed on some chronic changes seen on most recent chest CT scan.  Patchy ground-glass opacities in the left greater than right lung bases again seen.  The lungs are otherwise free of new pulmonary opacities.  The cardiac silhouette size is normal. The trachea is midline and the mediastinal width is normal. Negative for effusion or pneumothorax.  Pulmonary vasculature is normal. Negative for osseous abnormalities. Tortuous aorta with calcifications of the aortic knob.  There are degenerative changes of the spine and both shoulder girdles.                                 The EKG was ordered, reviewed, and independently interpreted by the ED provider.  Interpretation time: 11:10:54  Rate: 71 BPM  Rhythm: normal sinus rhythm  Interpretation: No acute abnormalities. No STEMI.           The Emergency Provider reviewed the vital signs and test results, which are outlined above.     ED Discussion     3:56 PM: Reassessed pt at this time. Discussed with pt all pertinent ED information and results. Discussed pt dx and plan of tx. Gave pt all f/u and return to the ED instructions. All questions and concerns were addressed at this time. Pt expresses understanding of information and instructions, and is comfortable with plan to discharge. Pt is stable for discharge.    I discussed with patient and/or family/caretaker that evaluation in the ED does not suggest any emergent or life threatening medical conditions requiring immediate intervention beyond what was provided in the ED, and I believe patient is safe for discharge.  Regardless, an unremarkable evaluation in the ED does not preclude the development or presence of a  serious of life threatening condition. As such, patient was instructed to return immediately for any worsening or change in current symptoms.         Medical Decision Making:   Clinical Tests:   Lab Tests: Ordered and Reviewed  Radiological Study: Ordered and Reviewed  Medical Tests: Ordered and Reviewed           ED Medication(s):  Medications   0.9%  NaCl infusion (for blood administration) (has no administration in time range)   furosemide injection 40 mg (40 mg Intravenous Given 6/16/22 1301)       New Prescriptions    No medications on file        Follow-up Information     Vivian Ch MD. Schedule an appointment as soon as possible for a visit in 2 days.    Specialty: Family Medicine  Why: Return to the Emergency Room, If symptoms worsen  Contact information:  [address] [facility] [address]  [phone]                             Scribe Attestation:   Scribe #1: I performed the above scribed service and the documentation accurately describes the services I performed. I attest to the accuracy of the note.     Attending:   Physician Attestation Statement for Scribe #1: I, Kasey Naylor MD, personally performed the services described in this documentation, as scribed by Yris Osman, in my presence, and it is both accurate and complete.           Clinical Impression       ICD-10-CM ICD-9-CM   1. Acute on chronic anemia  D64.9 285.9   2. Shortness of breath  R06.02 786.05   3. Hip injury, right, initial encounter  S79.911A 959.6   4. Traumatic hematoma of right hip, initial encounter  S70.01XA 924.01   5. Chronic anticoagulation  Z79.01 V58.61   6. Chronic heart failure, unspecified heart failure type  I50.9 428.9       Disposition:   Disposition: Discharged  Condition: Stable       Kasey Naylor MD  06/16/22 3235

## 2022-06-17 LAB — PATHOLOGIST INTERPRETATION AB/XM: NORMAL

## 2022-06-19 NOTE — PROGRESS NOTES
Subjective:      Patient ID: Richy Douglas is a 81 y.o. male.    Chief Complaint: Iron deficiency anemia      HPI:  MR. Douglas is an 81-year-old male with a PMHx that includes prostate cancer, HTN, GERD, aortic aneurysm. He presents today for follow-up of iron deficiency anemia. He was recently referred to ED by cardiologist 05/05/22 after lab work revealed a H & H of 6.2/22.2 and he c/o black tarry stools. he subsequently received 1U PRBC and was discharged home. Per review of the medical record, pt has had + cologuard testing and it was recommended that he be evaluated by GI in the past but he declined. Today, pt c/o 6/10 left knee pain. He states he continues to work 12 hours shifts everyday at [employer]. Pt states he is scheduled see GI later this month. Denies n/v/d/c, cp, sob, abnormal bleeding, syncopal episodes.           Social History     Socioeconomic History    Marital status:      Spouse name: jenn     Number of children: 6   Tobacco Use    Smoking status: Current Every Day Smoker     Packs/day: 1.00     Years: 68.00     Pack years: 68.00     Types: Cigarettes     Start date: 1/1/1954    Smokeless tobacco: Never Used   Substance and Sexual Activity    Alcohol use: Yes     Comment: reports only drinks red wine when games are on    Drug use: Never    Sexual activity: Yes     Partners: Female     Social Determinants of Health     Financial Resource Strain: Low Risk     Difficulty of Paying Living Expenses: Not very hard   Food Insecurity: Food Insecurity Present    Worried About Running Out of Food in the Last Year: Often true    Ran Out of Food in the Last Year: Patient refused   Transportation Needs: No Transportation Needs    Lack of Transportation (Medical): No    Lack of Transportation (Non-Medical): No   Physical Activity: Unknown    Days of Exercise per Week: 5 days   Stress: No Stress Concern Present    Feeling of Stress : Not at all   Social Connections:  Unknown    Frequency of Communication with Friends and Family: More than three times a week    Frequency of Social Gatherings with Friends and Family: More than three times a week    Active Member of Clubs or Organizations: No    Attends Club or Organization Meetings: Never    Marital Status:    Housing Stability: Unknown    Unable to Pay for Housing in the Last Year: Patient refused    Unstable Housing in the Last Year: Patient refused       Family History   Problem Relation Age of Onset    Diabetes Mother     Peripheral vascular disease Mother        Past Surgical History:   Procedure Laterality Date    CATHETERIZATION OF BOTH LEFT AND RIGHT HEART N/A 1/13/2022    Procedure: CATHETERIZATION, HEART, BOTH LEFT AND RIGHT;  Surgeon: Janneth Tovar MD;  Location: [facility] CATH LAB;  Service: Cardiology;  Laterality: N/A;  poss cx    COLONOSCOPY      CORONARY ANGIOGRAPHY N/A 1/13/2022    Procedure: ANGIOGRAM, CORONARY ARTERY;  Surgeon: Janneth Tovar MD;  Location: [facility] CATH LAB;  Service: Cardiology;  Laterality: N/A;    PROSTATE SURGERY      SPINE SURGERY         Past Medical History:   Diagnosis Date    Anemia of chronic disease     Aortic aneurysm     Atrial fibrillation with RVR 9/24/2021    Chronic systolic congestive heart failure 8/31/2017    Emphysema of lung 8/31/2017    GERD (gastroesophageal reflux disease)     Hypertension     Microcytic anemia 11/24/2020    Other hyperlipidemia 4/27/2021    Prostate cancer        Review of Systems   Constitutional: Positive for fatigue. Negative for activity change, appetite change, chills, fever and unexpected weight change.   HENT: Negative for hearing loss, mouth sores, nosebleeds, sore throat, tinnitus, trouble swallowing and voice change.    Eyes: Negative for visual disturbance.   Respiratory: Negative for cough, chest tightness and wheezing.    Cardiovascular: Negative for chest pain, palpitations and leg swelling.    Gastrointestinal: Negative for abdominal pain, anal bleeding, blood in stool, constipation, diarrhea, nausea and vomiting.   Genitourinary: Negative for frequency, hematuria and testicular pain.   Musculoskeletal: Positive for arthralgias and joint swelling. Negative for back pain, gait problem and neck pain.   Skin: Negative for color change, pallor, rash and wound.   Allergic/Immunologic: Negative for immunocompromised state.   Neurological: Negative for seizures, syncope, weakness, numbness and headaches.   Hematological: Negative for adenopathy. Does not bruise/bleed easily.   Psychiatric/Behavioral: Negative for agitation, confusion, decreased concentration, hallucinations and sleep disturbance. The patient is not nervous/anxious.        Medication List with Changes/Refills   Current Medications    ALBUTEROL (PROVENTIL) 2.5 MG /3 ML (0.083 %) NEBULIZER SOLUTION    Take 3 mLs (2.5 mg total) by nebulization every 4 to 6 hours as needed for Wheezing or Shortness of Breath.    ALBUTEROL (PROVENTIL/VENTOLIN HFA) 90 MCG/ACTUATION INHALER    Inhale 1-2 puffs into the lungs every 6 (six) hours as needed for Wheezing or Shortness of Breath. Rescue    APIXABAN (ELIQUIS) 5 MG TAB    Take 1 tablet (5 mg total) by mouth 2 (two) times daily.    ATORVASTATIN (LIPITOR) 10 MG TABLET    Take 1 tablet (10 mg total) by mouth once daily.    FUROSEMIDE (LASIX) 20 MG TABLET    Take 1 tablet (20 mg total) by mouth once daily. Do not take if blood pressure is low, feeling dry/dizzy/lightheaded.    METOPROLOL SUCCINATE (TOPROL-XL) 50 MG 24 HR TABLET    Take 1 tablet (50 mg total) by mouth 2 (two) times daily.    PANTOPRAZOLE (PROTONIX) 40 MG TABLET    Take 1 tablet (40 mg total) by mouth once daily.    SACUBITRIL-VALSARTAN (ENTRESTO) 49-51 MG PER TABLET    Take 1 tablet by mouth 2 (two) times daily.    TIOTROPIUM (SPIRIVA) 18 MCG INHALATION CAPSULE    Inhale 1 capsule (18 mcg total) into the lungs once daily. Controller         Objective:     Vitals:    22 0909   BP: (!) 131/54   Pulse: 73   Resp: 16   Temp: 98 °F (36.7 °C)       Physical Exam  Vitals reviewed.   Constitutional:       General: He is not in acute distress.     Appearance: He is well-developed.   HENT:      Head: Normocephalic and atraumatic.      Right Ear: External ear normal.      Left Ear: External ear normal.      Mouth/Throat:      Pharynx: No oropharyngeal exudate.   Eyes:      General: No scleral icterus.        Right eye: No discharge.         Left eye: No discharge.      Conjunctiva/sclera: Conjunctivae normal.      Pupils: Pupils are equal, round, and reactive to light.   Neck:      Thyroid: No thyromegaly.   Cardiovascular:      Rate and Rhythm: Normal rate and regular rhythm.      Heart sounds: Normal heart sounds. No murmur heard.  Pulmonary:      Effort: Pulmonary effort is normal. No respiratory distress.      Breath sounds: Normal breath sounds. No wheezing.   Chest:      Chest wall: No tenderness.   Breasts:      Right: No supraclavicular adenopathy.      Left: No supraclavicular adenopathy.       Abdominal:      General: Bowel sounds are normal. There is no distension.      Palpations: Abdomen is soft. There is no mass.      Tenderness: There is no abdominal tenderness. There is no rebound.   Musculoskeletal:         General: No tenderness. Normal range of motion.      Cervical back: Normal range of motion and neck supple.      Left lower le+ Edema present.   Lymphadenopathy:      Cervical: No cervical adenopathy.      Right cervical: No superficial cervical adenopathy.     Left cervical: No superficial cervical adenopathy.      Upper Body:      Right upper body: No supraclavicular or pectoral adenopathy.      Left upper body: No supraclavicular or pectoral adenopathy.   Skin:     General: Skin is warm and dry.      Capillary Refill: Capillary refill takes 2 to 3 seconds.      Coloration: Skin is not pale.      Findings: No erythema or rash.    Neurological:      Mental Status: He is alert and oriented to person, place, and time.      Cranial Nerves: No cranial nerve deficit.      Sensory: No sensory deficit.   Psychiatric:         Behavior: Behavior normal.         Judgment: Judgment normal.         Lab Results   Component Value Date    WBC 6.78 05/12/2022    HGB 7.6 (L) 05/12/2022    HCT 26.7 (L) 05/12/2022    MCV 68 (L) 05/12/2022     05/12/2022       Lab Results   Component Value Date     05/12/2022    K 4.2 05/12/2022     05/12/2022    CO2 24 05/12/2022    BUN 21 05/12/2022    CREATININE 0.8 05/12/2022    CALCIUM 9.0 05/12/2022    ANIONGAP 7 (L) 05/12/2022    ESTGFRAFRICA >60 05/12/2022    EGFRNONAA >60 05/12/2022     Lab Results   Component Value Date    ALT 10 05/12/2022    AST 14 05/12/2022    ALKPHOS 33 (L) 05/12/2022    BILITOT 0.6 05/12/2022       Assessment/Plan:     Problem List Items Addressed This Visit        Oncology    Iron deficiency anemia due to chronic blood loss     No labs prior to visit will have pt scheduled for CBC, CMP, and iron studies today  -orders to follow pending lab results    Update:  Hgb 7.6g/dl, pt asymptomatic  Saturated iron 4%; Ferritin 14    Plan for IV injectafer x 2 doses  Follow-up in 6-8 weeks post with cbc, cmp, and iron studies    Pt scheduled with GI for 05/23/2022                 JOSÉ Kwok  Hematology/Oncology     No

## 2022-06-23 NOTE — PROGRESS NOTES
Subjective:       Patient ID: Richy Douglas is a 81 y.o. male.    Chief Complaint:   1. Iron deficiency anemia due to chronic blood loss       Current Treatment:  FERRLECIT (FERRIC GLUCONATE) QW     Treatment History:    HPI: This is a 81-year-old male with medical history significant for prostate cancer, hypertension, GERD, hyperlipidemia, emphysema, afib with RVR, CHF, and aortic aneurysm who was referred to us by Dr. Ch for evaluation and management of microcytic anemia.     Recent study showed KEN, s/p IV iron therapy with improvement in hgb and iron status. Cologuard test was noted to be positive. We recommended GI eval; he declined.      He was recently referred to ED by cardiologist 5/5/22 after lab work revealed a H & H of 6.2/22.2 and he c/o black tarry stools. he subsequently received 1U PRBC and was discharged home.  He was scheduled to receive IV Injectafer but did not present for his infusion.     His primary Hematologist/Oncologist is Dr. Street.    Interval History: Patient presents for follow up on labs. He was scheduled to receive IV iron but did not present for his infusion. He presents alone and reports fatigue, weakness, SANCHEZ, and dizziness. Explained to him that anemia can cause those symptoms; he verbalizes understanding. He is scheduled for EGD/colonoscopy on 6/30/2022. Reviewed labs with patient: H&H 7.9 & 27.4. Iron studies reveal iron deficiency with iron level 25, TIBC 457, and saturated iron 5%. CMP stable. Discussed with him that he is iron deficient and needs IV iron supplementation. He is amenable to receiving IV iron.     Social History     Socioeconomic History    Marital status:      Spouse name: jenn     Number of children: 6   Tobacco Use    Smoking status: Current Every Day Smoker     Packs/day: 1.00     Years: 68.00     Pack years: 68.00     Types: Cigarettes     Start date: 1/1/1954    Smokeless tobacco: Never Used   Substance and Sexual Activity    Alcohol  use: Yes     Comment: reports only drinks red wine when games are on    Drug use: Never    Sexual activity: Yes     Partners: Female     Social Determinants of Health     Financial Resource Strain: Low Risk     Difficulty of Paying Living Expenses: Not very hard   Food Insecurity: Food Insecurity Present    Worried About Running Out of Food in the Last Year: Often true    Ran Out of Food in the Last Year: Patient refused   Transportation Needs: No Transportation Needs    Lack of Transportation (Medical): No    Lack of Transportation (Non-Medical): No   Physical Activity: Unknown    Days of Exercise per Week: 5 days   Stress: No Stress Concern Present    Feeling of Stress : Not at all   Social Connections: Unknown    Frequency of Communication with Friends and Family: More than three times a week    Frequency of Social Gatherings with Friends and Family: More than three times a week    Active Member of Clubs or Organizations: No    Attends Club or Organization Meetings: Never    Marital Status:    Housing Stability: Unknown    Unable to Pay for Housing in the Last Year: Patient refused    Unstable Housing in the Last Year: Patient refused     Past Medical History:   Diagnosis Date    Anemia of chronic disease     Aortic aneurysm     Atrial fibrillation with RVR 9/24/2021    Chronic systolic congestive heart failure 8/31/2017    Emphysema of lung 8/31/2017    GERD (gastroesophageal reflux disease)     Hypertension     Microcytic anemia 11/24/2020    Other hyperlipidemia 4/27/2021    Prostate cancer      Family History   Problem Relation Age of Onset    Diabetes Mother     Peripheral vascular disease Mother      Past Surgical History:   Procedure Laterality Date    CATHETERIZATION OF BOTH LEFT AND RIGHT HEART N/A 1/13/2022    Procedure: CATHETERIZATION, HEART, BOTH LEFT AND RIGHT;  Surgeon: Janneth Tovar MD;  Location: [facility] CATH LAB;  Service: Cardiology;  Laterality: N/A;  poss  cx    COLONOSCOPY      CORONARY ANGIOGRAPHY N/A 1/13/2022    Procedure: ANGIOGRAM, CORONARY ARTERY;  Surgeon: Janneth Tovar MD;  Location: [facility] CATH LAB;  Service: Cardiology;  Laterality: N/A;    PROSTATE SURGERY      SPINE SURGERY       Review of Systems   Constitutional: Positive for fatigue.   HENT: Negative.    Eyes: Negative.    Respiratory: Positive for shortness of breath (with exertion).    Cardiovascular: Negative.    Gastrointestinal: Negative.    Endocrine: Negative.    Genitourinary: Negative.    Musculoskeletal: Negative.    Skin: Negative.    Allergic/Immunologic: Negative.    Neurological: Positive for dizziness and weakness.   Hematological: Negative.    Psychiatric/Behavioral: Negative.        Medication List with Changes/Refills   Current Medications    APIXABAN (ELIQUIS) 5 MG TAB    Take 1 tablet (5 mg total) by mouth 2 (two) times daily.    FUROSEMIDE (LASIX) 20 MG TABLET    Take 1 tablet (20 mg total) by mouth once daily. Do not take if blood pressure is low, feeling dry/dizzy/lightheaded.    SACUBITRIL-VALSARTAN (ENTRESTO)  MG PER TABLET    Take 1 tablet by mouth 2 (two) times daily.     Objective:     Vitals:    06/27/22 1253   BP: (!) 113/55   Pulse: 78   Temp: 97.6 °F (36.4 °C)     Lab Results   Component Value Date    WBC 6.32 06/24/2022    HGB 7.9 (L) 06/24/2022    HCT 27.4 (L) 06/24/2022    MCV 69 (L) 06/24/2022     06/24/2022     CMP  Sodium   Date Value Ref Range Status   06/24/2022 138 136 - 145 mmol/L Final     Potassium   Date Value Ref Range Status   06/24/2022 4.5 3.5 - 5.1 mmol/L Final     Chloride   Date Value Ref Range Status   06/24/2022 107 95 - 110 mmol/L Final     CO2   Date Value Ref Range Status   06/24/2022 23 23 - 29 mmol/L Final     Glucose   Date Value Ref Range Status   06/24/2022 93 70 - 110 mg/dL Final     BUN   Date Value Ref Range Status   06/24/2022 16 8 - 23 mg/dL Final     Creatinine   Date Value Ref Range Status   06/24/2022 0.8 0.5 - 1.4  mg/dL Final     Calcium   Date Value Ref Range Status   06/24/2022 9.0 8.7 - 10.5 mg/dL Final     Total Protein   Date Value Ref Range Status   06/24/2022 7.1 6.0 - 8.4 g/dL Final     Albumin   Date Value Ref Range Status   06/24/2022 3.9 3.5 - 5.2 g/dL Final     Total Bilirubin   Date Value Ref Range Status   06/24/2022 0.6 0.1 - 1.0 mg/dL Final     Comment:     For infants and newborns, interpretation of results should be based  on gestational age, weight and in agreement with clinical  observations.    Premature Infant recommended reference ranges:  Up to 24 hours.............<8.0 mg/dL  Up to 48 hours............<12.0 mg/dL  3-5 days..................<15.0 mg/dL  6-29 days.................<15.0 mg/dL       Alkaline Phosphatase   Date Value Ref Range Status   06/24/2022 37 (L) 55 - 135 U/L Final     AST   Date Value Ref Range Status   06/24/2022 16 10 - 40 U/L Final     ALT   Date Value Ref Range Status   06/24/2022 11 10 - 44 U/L Final     Anion Gap   Date Value Ref Range Status   06/24/2022 8 8 - 16 mmol/L Final     eGFR if    Date Value Ref Range Status   06/24/2022 >60 >60 mL/min/1.73 m^2 Final     eGFR if non    Date Value Ref Range Status   06/24/2022 >60 >60 mL/min/1.73 m^2 Final     Comment:     Calculation used to obtain the estimated glomerular filtration  rate (eGFR) is the CKD-EPI equation.        Lab Results   Component Value Date    IRON 25 (L) 06/24/2022    TIBC 457 (H) 06/24/2022    FERRITIN 23 06/24/2022     Physical Exam  Vitals reviewed.   Constitutional:       Appearance: Normal appearance.   HENT:      Head: Normocephalic.      Mouth/Throat:      Comments: Wearing mask    Eyes:      Pupils: Pupils are equal, round, and reactive to light.      Comments: Glasses     Cardiovascular:      Rate and Rhythm: Normal rate and regular rhythm.      Heart sounds: Normal heart sounds.   Pulmonary:      Effort: Pulmonary effort is normal.      Breath sounds: Normal breath  sounds.   Abdominal:      General: Bowel sounds are normal.      Palpations: Abdomen is soft.      Comments: rounded     Genitourinary:     Comments: deferred    Musculoskeletal:         General: Normal range of motion.      Cervical back: Normal range of motion and neck supple.   Skin:     General: Skin is warm and dry.      Comments: Right toenail dark, thick, & loose   Neurological:      Mental Status: He is oriented to person, place, and time.   Psychiatric:         Behavior: Behavior normal.         Thought Content: Thought content normal.          (1) Restricted in physically strenuous activity, ambulatory and able to do work of light nature  Assessment:     Problem List Items Addressed This Visit        Oncology    Iron deficiency anemia due to chronic blood loss - Primary     Microcytic anemia is likely due to iron deficiency.  Iron studies performed in November of 2020 showed low iron saturation, serum iron levels.  Status post 2 course of IV iron therapy with improvement Hgb and iron.      Initially, recommended colonoscopy however patient declined. Cologuard test returned positive.  Was recently seen in the hospital due to symptomatic anemia was transfused with 2 units of packed red blood cell.  Most recent CBC from 02/18/2022 showed hemoglobin 9.2 per dL, hematocrit 31.2% with microcytosis.     Iron studies from 12/10/2021 showed severe iron deficiency with iron saturation of 3% and ferritin level of 13 nanograms/cc.  He received 2 courses of IV iron therapy with improvement in ferritin level to 118 nanograms/cc.  Unfortunately, most recent iron studies from 02/18/2022 showed decreasing ferritin level 30 nanograms/cc, indication possible GI bleed.     Reiterated importance following with GI for endoscopic evaluation               Plan:     Iron deficiency anemia due to chronic blood loss    Labs reviewed.   Ok to proceed with Ferrlecit x 2 doses 1 week apart.  Patient is scheduled for endoscopies on  6/30/2022.  Follow up in 8 weeks with iron profile, ferritin, CBC and Comprehensive Metabolic Panel.    I will review assessment/plan with collaborating physician Dr. Street.    SULEMA Martin

## 2022-06-24 ENCOUNTER — TELEPHONE (OUTPATIENT)
Dept: PRIMARY CARE CLINIC | Facility: CLINIC | Age: 82
End: 2022-06-24
Payer: MEDICARE

## 2022-06-24 ENCOUNTER — LAB VISIT (OUTPATIENT)
Dept: LAB | Facility: HOSPITAL | Age: 82
End: 2022-06-24
Attending: INTERNAL MEDICINE
Payer: COMMERCIAL

## 2022-06-24 ENCOUNTER — TELEPHONE (OUTPATIENT)
Dept: PREADMISSION TESTING | Facility: HOSPITAL | Age: 82
End: 2022-06-24
Payer: MEDICARE

## 2022-06-24 DIAGNOSIS — D50.0 IRON DEFICIENCY ANEMIA DUE TO CHRONIC BLOOD LOSS: ICD-10-CM

## 2022-06-24 LAB
ALBUMIN SERPL BCP-MCNC: 3.9 G/DL (ref 3.5–5.2)
ALP SERPL-CCNC: 37 U/L (ref 55–135)
ALT SERPL W/O P-5'-P-CCNC: 11 U/L (ref 10–44)
ANION GAP SERPL CALC-SCNC: 8 MMOL/L (ref 8–16)
ANISOCYTOSIS BLD QL SMEAR: ABNORMAL
AST SERPL-CCNC: 16 U/L (ref 10–40)
BASOPHILS # BLD AUTO: 0.14 K/UL (ref 0–0.2)
BASOPHILS NFR BLD: 2.2 % (ref 0–1.9)
BILIRUB SERPL-MCNC: 0.6 MG/DL (ref 0.1–1)
BUN SERPL-MCNC: 16 MG/DL (ref 8–23)
CALCIUM SERPL-MCNC: 9 MG/DL (ref 8.7–10.5)
CHLORIDE SERPL-SCNC: 107 MMOL/L (ref 95–110)
CO2 SERPL-SCNC: 23 MMOL/L (ref 23–29)
CREAT SERPL-MCNC: 0.8 MG/DL (ref 0.5–1.4)
DIFFERENTIAL METHOD: ABNORMAL
EOSINOPHIL # BLD AUTO: 0.5 K/UL (ref 0–0.5)
EOSINOPHIL NFR BLD: 8.4 % (ref 0–8)
ERYTHROCYTE [DISTWIDTH] IN BLOOD BY AUTOMATED COUNT: 31.1 % (ref 11.5–14.5)
EST. GFR  (AFRICAN AMERICAN): >60 ML/MIN/1.73 M^2
EST. GFR  (NON AFRICAN AMERICAN): >60 ML/MIN/1.73 M^2
FERRITIN SERPL-MCNC: 23 NG/ML (ref 20–300)
GLUCOSE SERPL-MCNC: 93 MG/DL (ref 70–110)
HCT VFR BLD AUTO: 27.4 % (ref 40–54)
HGB BLD-MCNC: 7.9 G/DL (ref 14–18)
HYPOCHROMIA BLD QL SMEAR: ABNORMAL
IMM GRANULOCYTES # BLD AUTO: 0.02 K/UL (ref 0–0.04)
IMM GRANULOCYTES NFR BLD AUTO: 0.3 % (ref 0–0.5)
IRON SERPL-MCNC: 25 UG/DL (ref 45–160)
LYMPHOCYTES # BLD AUTO: 0.9 K/UL (ref 1–4.8)
LYMPHOCYTES NFR BLD: 14.6 % (ref 18–48)
MCH RBC QN AUTO: 20 PG (ref 27–31)
MCHC RBC AUTO-ENTMCNC: 28.8 G/DL (ref 32–36)
MCV RBC AUTO: 69 FL (ref 82–98)
MONOCYTES # BLD AUTO: 0.5 K/UL (ref 0.3–1)
MONOCYTES NFR BLD: 7.6 % (ref 4–15)
NEUTROPHILS # BLD AUTO: 4.2 K/UL (ref 1.8–7.7)
NEUTROPHILS NFR BLD: 66.9 % (ref 38–73)
NRBC BLD-RTO: 0 /100 WBC
PLATELET # BLD AUTO: 432 K/UL (ref 150–450)
PMV BLD AUTO: ABNORMAL FL (ref 9.2–12.9)
POIKILOCYTOSIS BLD QL SMEAR: SLIGHT
POTASSIUM SERPL-SCNC: 4.5 MMOL/L (ref 3.5–5.1)
PROT SERPL-MCNC: 7.1 G/DL (ref 6–8.4)
RBC # BLD AUTO: 3.95 M/UL (ref 4.6–6.2)
SATURATED IRON: 5 % (ref 20–50)
SCHISTOCYTES BLD QL SMEAR: PRESENT
SODIUM SERPL-SCNC: 138 MMOL/L (ref 136–145)
TARGETS BLD QL SMEAR: ABNORMAL
TOTAL IRON BINDING CAPACITY: 457 UG/DL (ref 250–450)
TRANSFERRIN SERPL-MCNC: 309 MG/DL (ref 200–375)
WBC # BLD AUTO: 6.32 K/UL (ref 3.9–12.7)

## 2022-06-24 PROCEDURE — 36415 COLL VENOUS BLD VENIPUNCTURE: CPT | Performed by: INTERNAL MEDICINE

## 2022-06-24 PROCEDURE — 82728 ASSAY OF FERRITIN: CPT | Performed by: INTERNAL MEDICINE

## 2022-06-24 PROCEDURE — 84466 ASSAY OF TRANSFERRIN: CPT | Performed by: INTERNAL MEDICINE

## 2022-06-24 PROCEDURE — 85025 COMPLETE CBC W/AUTO DIFF WBC: CPT | Performed by: INTERNAL MEDICINE

## 2022-06-24 PROCEDURE — 80053 COMPREHEN METABOLIC PANEL: CPT | Performed by: INTERNAL MEDICINE

## 2022-06-24 RX ORDER — SODIUM PICOSULFATE, MAGNESIUM OXIDE, AND ANHYDROUS CITRIC ACID 10; 3.5; 12 MG/160ML; G/160ML; G/160ML
160 LIQUID ORAL EVERY 12 HOURS
Qty: 320 ML | Refills: 0 | Status: ON HOLD | OUTPATIENT
Start: 2022-06-24 | End: 2022-06-30 | Stop reason: HOSPADM

## 2022-06-24 NOTE — TELEPHONE ENCOUNTER
Please advise if patient may hold Eliquis 2  days prior to endoscopy procedure on 06/30/2022. Upon your approval, our team will inform patient of medication clearance prior to procedure.

## 2022-06-24 NOTE — TELEPHONE ENCOUNTER
Pt stopped into clinic today to ask about his prep for his colonoscopy and EGD scheduled for the 30th. He stated that no one has notified him of where to get this and when to get this. I do not see that anything has been sent in. I do see message that they are awaiting to find out if he can hold his blood thinner. Notified pt of this. He asked that someone call him today to let him know about the prep. Pharmacy is [facility]. If you can not reach him. Please call his daughter. Daksha [phone]

## 2022-06-24 NOTE — PROGRESS NOTES
Procedure  instructions reviewed with daughter per pt's request. Place, time, nothing to eat or drink after 6 am dose of prep, no chewing gum or sucking on candy, take BP medication with sip of water 2 hours after 6 am dose of prep, have someone to drive them home and prep instructions reviewed with daughter. Daughter verbalized understanding.

## 2022-06-25 NOTE — ASSESSMENT & PLAN NOTE
Microcytic anemia is likely due to iron deficiency.  Iron studies performed in November of 2020 showed low iron saturation, serum iron levels.  Status post 2 course of IV iron therapy with improvement Hgb and iron.      Initially, recommended colonoscopy however patient declined. Cologuard test returned positive.  Was recently seen in the hospital due to symptomatic anemia was transfused with 2 units of packed red blood cell.  Most recent CBC from 02/18/2022 showed hemoglobin 9.2 per dL, hematocrit 31.2% with microcytosis.     Iron studies from 12/10/2021 showed severe iron deficiency with iron saturation of 3% and ferritin level of 13 nanograms/cc.  He received 2 courses of IV iron therapy with improvement in ferritin level to 118 nanograms/cc.  Unfortunately, most recent iron studies from 02/18/2022 showed decreasing ferritin level 30 nanograms/cc, indication possible GI bleed.     Reiterated importance following with GI for endoscopic evaluation

## 2022-06-27 ENCOUNTER — LAB VISIT (OUTPATIENT)
Dept: LAB | Facility: HOSPITAL | Age: 82
End: 2022-06-27
Attending: FAMILY MEDICINE
Payer: MEDICARE

## 2022-06-27 ENCOUNTER — OFFICE VISIT (OUTPATIENT)
Dept: HEMATOLOGY/ONCOLOGY | Facility: CLINIC | Age: 82
End: 2022-06-27
Payer: COMMERCIAL

## 2022-06-27 VITALS
DIASTOLIC BLOOD PRESSURE: 55 MMHG | OXYGEN SATURATION: 98 % | TEMPERATURE: 98 F | HEIGHT: 69 IN | HEART RATE: 78 BPM | BODY MASS INDEX: 22.96 KG/M2 | SYSTOLIC BLOOD PRESSURE: 113 MMHG | WEIGHT: 155 LBS

## 2022-06-27 DIAGNOSIS — D50.0 IRON DEFICIENCY ANEMIA DUE TO CHRONIC BLOOD LOSS: Primary | ICD-10-CM

## 2022-06-27 DIAGNOSIS — D50.9 IRON DEFICIENCY ANEMIA, UNSPECIFIED IRON DEFICIENCY ANEMIA TYPE: ICD-10-CM

## 2022-06-27 LAB
ANISOCYTOSIS BLD QL SMEAR: SLIGHT
BASOPHILS # BLD AUTO: 0.12 K/UL (ref 0–0.2)
BASOPHILS NFR BLD: 1.9 % (ref 0–1.9)
DACRYOCYTES BLD QL SMEAR: ABNORMAL
DIFFERENTIAL METHOD: ABNORMAL
EOSINOPHIL # BLD AUTO: 0.4 K/UL (ref 0–0.5)
EOSINOPHIL NFR BLD: 6.1 % (ref 0–8)
ERYTHROCYTE [DISTWIDTH] IN BLOOD BY AUTOMATED COUNT: 31.8 % (ref 11.5–14.5)
HCT VFR BLD AUTO: 29.3 % (ref 40–54)
HGB BLD-MCNC: 8.2 G/DL (ref 14–18)
HYPOCHROMIA BLD QL SMEAR: ABNORMAL
IMM GRANULOCYTES # BLD AUTO: 0.01 K/UL (ref 0–0.04)
IMM GRANULOCYTES NFR BLD AUTO: 0.2 % (ref 0–0.5)
LYMPHOCYTES # BLD AUTO: 0.8 K/UL (ref 1–4.8)
LYMPHOCYTES NFR BLD: 12.4 % (ref 18–48)
MCH RBC QN AUTO: 20.3 PG (ref 27–31)
MCHC RBC AUTO-ENTMCNC: 28 G/DL (ref 32–36)
MCV RBC AUTO: 73 FL (ref 82–98)
MONOCYTES # BLD AUTO: 0.6 K/UL (ref 0.3–1)
MONOCYTES NFR BLD: 9.8 % (ref 4–15)
NEUTROPHILS # BLD AUTO: 4.3 K/UL (ref 1.8–7.7)
NEUTROPHILS NFR BLD: 69.6 % (ref 38–73)
NRBC BLD-RTO: 0 /100 WBC
OVALOCYTES BLD QL SMEAR: ABNORMAL
PLATELET # BLD AUTO: 329 K/UL (ref 150–450)
PLATELET BLD QL SMEAR: ABNORMAL
PMV BLD AUTO: ABNORMAL FL (ref 9.2–12.9)
POIKILOCYTOSIS BLD QL SMEAR: SLIGHT
POLYCHROMASIA BLD QL SMEAR: ABNORMAL
RBC # BLD AUTO: 4.03 M/UL (ref 4.6–6.2)
SCHISTOCYTES BLD QL SMEAR: ABNORMAL
SCHISTOCYTES BLD QL SMEAR: PRESENT
SPHEROCYTES BLD QL SMEAR: ABNORMAL
TARGETS BLD QL SMEAR: ABNORMAL
WBC # BLD AUTO: 6.22 K/UL (ref 3.9–12.7)
WBC TOXIC VACUOLES BLD QL SMEAR: PRESENT

## 2022-06-27 PROCEDURE — 1160F RVW MEDS BY RX/DR IN RCRD: CPT | Mod: CPTII,S$GLB,, | Performed by: NURSE PRACTITIONER

## 2022-06-27 PROCEDURE — 1160F PR REVIEW ALL MEDS BY PRESCRIBER/CLIN PHARMACIST DOCUMENTED: ICD-10-PCS | Mod: CPTII,S$GLB,, | Performed by: NURSE PRACTITIONER

## 2022-06-27 PROCEDURE — 3288F PR FALLS RISK ASSESSMENT DOCUMENTED: ICD-10-PCS | Mod: CPTII,S$GLB,, | Performed by: NURSE PRACTITIONER

## 2022-06-27 PROCEDURE — 36415 COLL VENOUS BLD VENIPUNCTURE: CPT | Performed by: NURSE PRACTITIONER

## 2022-06-27 PROCEDURE — 3074F SYST BP LT 130 MM HG: CPT | Mod: CPTII,S$GLB,, | Performed by: NURSE PRACTITIONER

## 2022-06-27 PROCEDURE — 3078F DIAST BP <80 MM HG: CPT | Mod: CPTII,S$GLB,, | Performed by: NURSE PRACTITIONER

## 2022-06-27 PROCEDURE — 1159F PR MEDICATION LIST DOCUMENTED IN MEDICAL RECORD: ICD-10-PCS | Mod: CPTII,S$GLB,, | Performed by: NURSE PRACTITIONER

## 2022-06-27 PROCEDURE — 1125F PR PAIN SEVERITY QUANTIFIED, PAIN PRESENT: ICD-10-PCS | Mod: CPTII,S$GLB,, | Performed by: NURSE PRACTITIONER

## 2022-06-27 PROCEDURE — 1157F PR ADVANCE CARE PLAN OR EQUIV PRESENT IN MEDICAL RECORD: ICD-10-PCS | Mod: CPTII,S$GLB,, | Performed by: NURSE PRACTITIONER

## 2022-06-27 PROCEDURE — 3288F FALL RISK ASSESSMENT DOCD: CPT | Mod: CPTII,S$GLB,, | Performed by: NURSE PRACTITIONER

## 2022-06-27 PROCEDURE — 99214 PR OFFICE/OUTPT VISIT, EST, LEVL IV, 30-39 MIN: ICD-10-PCS | Mod: S$GLB,,, | Performed by: NURSE PRACTITIONER

## 2022-06-27 PROCEDURE — 1101F PT FALLS ASSESS-DOCD LE1/YR: CPT | Mod: CPTII,S$GLB,, | Performed by: NURSE PRACTITIONER

## 2022-06-27 PROCEDURE — 3078F PR MOST RECENT DIASTOLIC BLOOD PRESSURE < 80 MM HG: ICD-10-PCS | Mod: CPTII,S$GLB,, | Performed by: NURSE PRACTITIONER

## 2022-06-27 PROCEDURE — 99999 PR PBB SHADOW E&M-EST. PATIENT-LVL III: ICD-10-PCS | Mod: PBBFAC,,, | Performed by: NURSE PRACTITIONER

## 2022-06-27 PROCEDURE — 85025 COMPLETE CBC W/AUTO DIFF WBC: CPT | Performed by: NURSE PRACTITIONER

## 2022-06-27 PROCEDURE — 99214 OFFICE O/P EST MOD 30 MIN: CPT | Mod: S$GLB,,, | Performed by: NURSE PRACTITIONER

## 2022-06-27 PROCEDURE — 1157F ADVNC CARE PLAN IN RCRD: CPT | Mod: CPTII,S$GLB,, | Performed by: NURSE PRACTITIONER

## 2022-06-27 PROCEDURE — 1159F MED LIST DOCD IN RCRD: CPT | Mod: CPTII,S$GLB,, | Performed by: NURSE PRACTITIONER

## 2022-06-27 PROCEDURE — 99999 PR PBB SHADOW E&M-EST. PATIENT-LVL III: CPT | Mod: PBBFAC,,, | Performed by: NURSE PRACTITIONER

## 2022-06-27 PROCEDURE — 1125F AMNT PAIN NOTED PAIN PRSNT: CPT | Mod: CPTII,S$GLB,, | Performed by: NURSE PRACTITIONER

## 2022-06-27 PROCEDURE — 1101F PR PT FALLS ASSESS DOC 0-1 FALLS W/OUT INJ PAST YR: ICD-10-PCS | Mod: CPTII,S$GLB,, | Performed by: NURSE PRACTITIONER

## 2022-06-27 PROCEDURE — 3074F PR MOST RECENT SYSTOLIC BLOOD PRESSURE < 130 MM HG: ICD-10-PCS | Mod: CPTII,S$GLB,, | Performed by: NURSE PRACTITIONER

## 2022-06-28 ENCOUNTER — INFUSION (OUTPATIENT)
Dept: INFUSION THERAPY | Facility: HOSPITAL | Age: 82
End: 2022-06-28
Attending: INTERNAL MEDICINE
Payer: COMMERCIAL

## 2022-06-28 VITALS
HEART RATE: 69 BPM | SYSTOLIC BLOOD PRESSURE: 138 MMHG | DIASTOLIC BLOOD PRESSURE: 54 MMHG | TEMPERATURE: 98 F | OXYGEN SATURATION: 98 % | RESPIRATION RATE: 16 BRPM

## 2022-06-28 DIAGNOSIS — D63.8 ANEMIA OF CHRONIC DISEASE: Primary | ICD-10-CM

## 2022-06-28 PROCEDURE — 96365 THER/PROPH/DIAG IV INF INIT: CPT

## 2022-06-28 PROCEDURE — 25000003 PHARM REV CODE 250: Performed by: INTERNAL MEDICINE

## 2022-06-28 PROCEDURE — A4216 STERILE WATER/SALINE, 10 ML: HCPCS | Performed by: INTERNAL MEDICINE

## 2022-06-28 PROCEDURE — 63600175 PHARM REV CODE 636 W HCPCS: Performed by: INTERNAL MEDICINE

## 2022-06-28 RX ORDER — EPINEPHRINE 0.3 MG/.3ML
0.3 INJECTION SUBCUTANEOUS ONCE AS NEEDED
Status: CANCELLED | OUTPATIENT
Start: 2022-06-29

## 2022-06-28 RX ORDER — SODIUM CHLORIDE 0.9 % (FLUSH) 0.9 %
10 SYRINGE (ML) INJECTION
Status: DISCONTINUED | OUTPATIENT
Start: 2022-06-28 | End: 2022-06-28 | Stop reason: HOSPADM

## 2022-06-28 RX ORDER — DIPHENHYDRAMINE HYDROCHLORIDE 50 MG/ML
50 INJECTION INTRAMUSCULAR; INTRAVENOUS ONCE AS NEEDED
Status: CANCELLED | OUTPATIENT
Start: 2022-06-29

## 2022-06-28 RX ORDER — SODIUM CHLORIDE 0.9 % (FLUSH) 0.9 %
10 SYRINGE (ML) INJECTION
Status: CANCELLED | OUTPATIENT
Start: 2022-06-29

## 2022-06-28 RX ORDER — METHYLPREDNISOLONE SOD SUCC 125 MG
125 VIAL (EA) INJECTION ONCE AS NEEDED
Status: CANCELLED | OUTPATIENT
Start: 2022-06-29

## 2022-06-28 RX ORDER — HEPARIN 100 UNIT/ML
500 SYRINGE INTRAVENOUS
Status: CANCELLED | OUTPATIENT
Start: 2022-06-29

## 2022-06-28 RX ADMIN — Medication 10 ML: at 01:06

## 2022-06-28 RX ADMIN — SODIUM CHLORIDE 125 MG: 9 INJECTION, SOLUTION INTRAVENOUS at 01:06

## 2022-06-28 NOTE — NURSING
Ferrlecit 125 mg q week  Last dose- 6/22/21      Recent labs? 6/27/22  Last Hem/Onc provider visit- Seen by VIVI Marin on 6/27/22     Premeds-none     Ferrlecit 125 mg administered IV at a 60 minute rate per orders; see MAR and vitals for more details. Tolerated well without adverse events, discharged and ambulatory out of clinic.

## 2022-06-28 NOTE — PLAN OF CARE
Plan of care reviewed with patient. Discussed if there are any new or ongoing concerns. Denies.   Problem: Adult Inpatient Plan of Care  Goal: Plan of Care Review  Outcome: Ongoing, Progressing  Flowsheets (Taken 6/28/2022 1319)  Plan of Care Reviewed With: patient  Goal: Absence of Hospital-Acquired Illness or Injury  Outcome: Ongoing, Progressing  Intervention: Identify and Manage Fall Risk  Flowsheets (Taken 6/28/2022 1319)  Safety Promotion/Fall Prevention: in recliner, wheels locked  Goal: Optimal Comfort and Wellbeing  Outcome: Ongoing, Progressing  Intervention: Provide Person-Centered Care  Flowsheets (Taken 6/28/2022 1319)  Trust Relationship/Rapport:   care explained   questions encouraged   choices provided   reassurance provided   emotional support provided   thoughts/feelings acknowledged   empathic listening provided   questions answered

## 2022-06-30 ENCOUNTER — ANESTHESIA (OUTPATIENT)
Dept: ENDOSCOPY | Facility: HOSPITAL | Age: 82
End: 2022-06-30
Payer: COMMERCIAL

## 2022-06-30 ENCOUNTER — HOSPITAL ENCOUNTER (OUTPATIENT)
Facility: HOSPITAL | Age: 82
Discharge: HOME | End: 2022-06-30
Attending: INTERNAL MEDICINE | Admitting: INTERNAL MEDICINE
Payer: COMMERCIAL

## 2022-06-30 ENCOUNTER — ANESTHESIA EVENT (OUTPATIENT)
Dept: ENDOSCOPY | Facility: HOSPITAL | Age: 82
End: 2022-06-30
Payer: COMMERCIAL

## 2022-06-30 DIAGNOSIS — K55.21 AVM (ARTERIOVENOUS MALFORMATION) OF SMALL BOWEL, ACQUIRED WITH HEMORRHAGE: Primary | ICD-10-CM

## 2022-06-30 PROCEDURE — 37000008 HC ANESTHESIA 1ST 15 MINUTES: Performed by: INTERNAL MEDICINE

## 2022-06-30 PROCEDURE — 43239 PR EGD, FLEX, W/BIOPSY, SGL/MULTI: ICD-10-PCS | Mod: ,,, | Performed by: INTERNAL MEDICINE

## 2022-06-30 PROCEDURE — 88342 CHG IMMUNOCYTOCHEMISTRY: ICD-10-PCS | Mod: 26,,, | Performed by: PATHOLOGY

## 2022-06-30 PROCEDURE — 27201012 HC FORCEPS, HOT/COLD, DISP: Performed by: INTERNAL MEDICINE

## 2022-06-30 PROCEDURE — 88305 TISSUE EXAM BY PATHOLOGIST: CPT | Mod: 26,,, | Performed by: PATHOLOGY

## 2022-06-30 PROCEDURE — 43255 PR EGD, FLEX, W/CTRL BLEED, ANY METHOD: ICD-10-PCS | Mod: 59,,, | Performed by: INTERNAL MEDICINE

## 2022-06-30 PROCEDURE — 88305 TISSUE EXAM BY PATHOLOGIST: ICD-10-PCS | Mod: 26,,, | Performed by: PATHOLOGY

## 2022-06-30 PROCEDURE — 43255 EGD CONTROL BLEEDING ANY: CPT | Mod: 59,,, | Performed by: INTERNAL MEDICINE

## 2022-06-30 PROCEDURE — 88342 IMHCHEM/IMCYTCHM 1ST ANTB: CPT | Performed by: PATHOLOGY

## 2022-06-30 PROCEDURE — 37000009 HC ANESTHESIA EA ADD 15 MINS: Performed by: INTERNAL MEDICINE

## 2022-06-30 PROCEDURE — 88305 TISSUE EXAM BY PATHOLOGIST: CPT | Performed by: PATHOLOGY

## 2022-06-30 PROCEDURE — 43239 EGD BIOPSY SINGLE/MULTIPLE: CPT | Performed by: INTERNAL MEDICINE

## 2022-06-30 PROCEDURE — 63600175 PHARM REV CODE 636 W HCPCS: Performed by: NURSE ANESTHETIST, CERTIFIED REGISTERED

## 2022-06-30 PROCEDURE — 43239 EGD BIOPSY SINGLE/MULTIPLE: CPT | Mod: ,,, | Performed by: INTERNAL MEDICINE

## 2022-06-30 PROCEDURE — 88342 IMHCHEM/IMCYTCHM 1ST ANTB: CPT | Mod: 26,,, | Performed by: PATHOLOGY

## 2022-06-30 PROCEDURE — 27202087 HC PROBE, APC: Performed by: INTERNAL MEDICINE

## 2022-06-30 PROCEDURE — 43255 EGD CONTROL BLEEDING ANY: CPT | Performed by: INTERNAL MEDICINE

## 2022-06-30 RX ORDER — SYRING-NEEDL,DISP,INSUL,0.3 ML 29 G X1/2"
296 SYRINGE, EMPTY DISPOSABLE MISCELLANEOUS ONCE
Qty: 296 ML | Refills: 0 | Status: SHIPPED | OUTPATIENT
Start: 2022-06-30 | End: 2022-07-01

## 2022-06-30 RX ORDER — PROPOFOL 10 MG/ML
INJECTION, EMULSION INTRAVENOUS
Status: DISCONTINUED | OUTPATIENT
Start: 2022-06-30 | End: 2022-06-30

## 2022-06-30 RX ORDER — SODIUM CHLORIDE 9 MG/ML
INJECTION, SOLUTION INTRAVENOUS CONTINUOUS
Status: DISCONTINUED | OUTPATIENT
Start: 2022-06-30 | End: 2022-06-30 | Stop reason: HOSPADM

## 2022-06-30 RX ORDER — POLYETHYLENE GLYCOL 3350, SODIUM SULFATE ANHYDROUS, SODIUM BICARBONATE, SODIUM CHLORIDE, POTASSIUM CHLORIDE 236; 22.74; 6.74; 5.86; 2.97 G/4L; G/4L; G/4L; G/4L; G/4L
POWDER, FOR SOLUTION ORAL ONCE
Qty: 4000 ML | Refills: 0 | Status: SHIPPED | OUTPATIENT
Start: 2022-06-30 | End: 2022-07-01

## 2022-06-30 RX ORDER — SODIUM CHLORIDE, SODIUM LACTATE, POTASSIUM CHLORIDE, CALCIUM CHLORIDE 600; 310; 30; 20 MG/100ML; MG/100ML; MG/100ML; MG/100ML
INJECTION, SOLUTION INTRAVENOUS CONTINUOUS PRN
Status: DISCONTINUED | OUTPATIENT
Start: 2022-06-30 | End: 2022-06-30

## 2022-06-30 RX ORDER — PANTOPRAZOLE SODIUM 40 MG/1
40 TABLET, DELAYED RELEASE ORAL DAILY
Qty: 90 TABLET | Refills: 3 | Status: SHIPPED | OUTPATIENT
Start: 2022-06-30 | End: 2023-02-11

## 2022-06-30 RX ADMIN — PROPOFOL 50 MG: 10 INJECTION, EMULSION INTRAVENOUS at 01:06

## 2022-06-30 RX ADMIN — SODIUM CHLORIDE, SODIUM LACTATE, POTASSIUM CHLORIDE, AND CALCIUM CHLORIDE: 600; 310; 30; 20 INJECTION, SOLUTION INTRAVENOUS at 01:06

## 2022-06-30 NOTE — ANESTHESIA POSTPROCEDURE EVALUATION
Anesthesia Post Evaluation    Patient: Richy Douglas    Procedure(s) Performed: Procedure(s) (LRB):  EGD (ESOPHAGOGASTRODUODENOSCOPY) (N/A)  COLONOSCOPY (N/A)    Final Anesthesia Type: MAC      Patient location during evaluation: GI PACU  Patient participation: No - Unable to Participate, Sedation  Level of consciousness: responds to stimulation  Post-procedure vital signs: reviewed and stable  Pain management: adequate  Airway patency: patent    PONV status at discharge: No PONV  Anesthetic complications: no      Cardiovascular status: blood pressure returned to baseline  Respiratory status: unassisted and room air  Hydration status: euvolemic  Follow-up not needed.          Vitals Value Taken Time   /61 06/30/22 1123   Temp 36.5 °C (97.7 °F) 06/30/22 1123   Pulse 52 06/30/22 1123   Resp 18 06/30/22 1123   SpO2 96 % 06/30/22 1123         No case tracking events are documented in the log.      Pain/John Score: No data recorded

## 2022-06-30 NOTE — PROVATION PATIENT INSTRUCTIONS
Discharge Summary/Instructions after an Endoscopic Procedure  Patient Name: Richy Douglas  Patient MRN: [id number]  Patient YOB: 1940  Thursday, June 30, 2022 Sandra Perez MD  Dear patient,  As a result of recent federal legislation (The Federal Cures Act), you may   receive lab or pathology results from your procedure in your [facility]   account before your physician is able to contact you. Your physician or   their representative will relay the results to you with their   recommendations at their soonest availability.  Thank you,  RESTRICTIONS:  During your procedure today, you received medications for sedation.  These   medications may affect your judgment, balance and coordination.  Therefore,   for 24 hours, you have the following restrictions:   - DO NOT drive a car, operate machinery, make legal/financial decisions,   sign important papers or drink alcohol.    ACTIVITY:  Today: no heavy lifting, straining or running due to procedural   sedation/anesthesia.  The following day: return to full activity including work.  DIET:  Eat and drink normally unless instructed otherwise.     TREATMENT FOR COMMON SIDE EFFECTS:  - Mild abdominal pain, nausea, belching, bloating or excessive gas:  rest,   eat lightly and use a heating pad.  - Sore Throat: treat with throat lozenges and/or gargle with warm salt   water.  - Because air was used during the procedure, expelling large amounts of air   from your rectum or belching is normal.  - If a bowel prep was taken, you may not have a bowel movement for 1-3 days.    This is normal.  SYMPTOMS TO WATCH FOR AND REPORT TO YOUR PHYSICIAN:  1. Abdominal pain or bloating, other than gas cramps.  2. Chest pain.  3. Back pain.  4. Signs of infection such as: chills or fever occurring within 24 hours   after the procedure.  5. Rectal bleeding, which would show as bright red, maroon, or black stools.   (A tablespoon of blood from the rectum is not serious, especially if    hemorrhoids are present.)  6. Vomiting.  7. Weakness or dizziness.  GO DIRECTLY TO THE NEAREST EMERGENCY ROOM IF YOU HAVE ANY OF THE FOLLOWING:      Difficulty breathing              Chills and/or fever over 101 F   Persistent vomiting and/or vomiting blood   Severe abdominal pain   Severe chest pain   Black, tarry stools   Bleeding- more than one tablespoon   Any other symptom or condition that you feel may need urgent attention  Your doctor recommends these additional instructions:  If any biopsies were taken, your doctors clinic will contact you in 1 to 2   weeks with any results.  - Discharge patient to home (with escort).   - Continue present medications.   - Resume Eliquis (apixaban) at prior dose in 7 days.  Refer to referring   physician for further adjustment of therapy. Discussed with Dr. Garces   today.  - Resume previous diet.   - Perform a colonoscopy next week if possible. Prep today was poor.   - May need video capsule endoscopy given today's findings.  For questions, problems or results please call your physician Sandra Perez MD at Work:  [phone]  If you have any questions about the above instructions, call the GI   department at [phone] or call the endoscopy unit at [phone]   from 7am until 3 pm.  [facility] [place], EMERGENCY ROOM PHONE NUMBER:   [phone]  IF A COMPLICATION OR EMERGENCY SITUATION ARISES AND YOU ARE UNABLE TO REACH   YOUR PHYSICIAN - GO DIRECTLY TO THE EMERGENCY ROOM.  I have read or have had read to me these discharge instructions for my   procedure and have received a written copy.  I understand these   instructions and will follow-up with my physician if I have any questions.     __________________________________       _____________________________________  Nurse Signature                                          Patient/Designated   Responsible Party Signature  MD Sandra Lam MD  6/30/2022 1:57:57 PM  This  report has been verified and signed electronically.  Dear patient,  As a result of recent federal legislation (The Federal Cures Act), you may   receive lab or pathology results from your procedure in your [facility]   account before your physician is able to contact you. Your physician or   their representative will relay the results to you with their   recommendations at their soonest availability.  Thank you,  PROVATION

## 2022-06-30 NOTE — TRANSFER OF CARE
"Anesthesia Transfer of Care Note    Patient: Richy Douglas    Procedure(s) Performed: Procedure(s) (LRB):  EGD (ESOPHAGOGASTRODUODENOSCOPY) (N/A)  COLONOSCOPY (N/A)    Anesthesia PACU Handoff    Last vitals:   Visit Vitals  /61 (BP Location: Left arm, Patient Position: Lying)   Pulse (!) 52   Temp 36.5 °C (97.7 °F) (Temporal)   Resp 18   Ht 5' 9" (1.753 m)   Wt 72.1 kg (159 lb)   SpO2 96%   BMI 23.48 kg/m²     "

## 2022-06-30 NOTE — PLAN OF CARE
Dr kowalski came to bedside and discussed findings. NO N/V,  no abdominal pain, no GI bleeding, and vitals stable.  Pt discharged from unit.

## 2022-06-30 NOTE — H&P
PRE PROCEDURE H&P    Patient Name: Richy Douglas  MRN: [id number]  : 1940  Date of Procedure:  2022  Referring Physician: Maria Guadalupe Riddle NP  Primary Physician: Vivian Ch MD  Procedure Physician: Sandra Perez MD       Planned Procedure: Colonoscopy and EGD  Diagnosis: iron deficiency anemia     Chief Complaint: Same as above    HPI: Patient is an 81 y.o. male is here for the above. Seen by Ms. Maria Guadalupe Venkatesh last month for KEN. He has a history of chronic anemia, afib, chronic systolic heart failure, emphysema. Recent echo 2022 shows EF 30%. PA pressures 93mmHg. Takes Eliquis twice a day. He is unsure when he took this medication. We have called his wife and she reports he took it yesterday. Daughter who manages his medicines is not reachable.     Addendum: daughter reports patient has not taken Eliquis in 1 week. Medication pills reviewed with patient and he confirmed.    Last colonoscopy: unknown  Family history: none  Anticoagulation: Eliquis, stopped reportedly 22    Past Medical History:   Past Medical History:   Diagnosis Date    Anemia of chronic disease     Aortic aneurysm     Atrial fibrillation with RVR 2021    Chronic systolic congestive heart failure 2017    Emphysema of lung 2017    GERD (gastroesophageal reflux disease)     Hypertension     Microcytic anemia 2020    Other hyperlipidemia 2021    Prostate cancer         Past Surgical History:  Past Surgical History:   Procedure Laterality Date    CATHETERIZATION OF BOTH LEFT AND RIGHT HEART N/A 2022    Procedure: CATHETERIZATION, HEART, BOTH LEFT AND RIGHT;  Surgeon: Janneth Tovar MD;  Location: [facility];  Service: Cardiology;  Laterality: N/A;  poss cx    COLONOSCOPY      CORONARY ANGIOGRAPHY N/A 2022    Procedure: ANGIOGRAM, CORONARY ARTERY;  Surgeon: Janneth Tovar MD;  Location: [facility];  Service: Cardiology;  Laterality: N/A;    PROSTATE SURGERY       SPINE SURGERY          Home Medications:  Prior to Admission medications    Medication Sig Start Date End Date Taking? Authorizing Provider   apixaban (ELIQUIS) 5 mg Tab Take 1 tablet (5 mg total) by mouth 2 (two) times daily. 22   Uriel Garces MD   CLENPIQ 10 mg-3.5 gram -12 gram/160 mL Soln Take 160 mLs by mouth every 12 (twelve) hours. for 2 doses 22  Obed Chiang PA-C   furosemide (LASIX) 20 MG tablet Take 1 tablet (20 mg total) by mouth once daily. Do not take if blood pressure is low, feeling dry/dizzy/lightheaded. 3/17/22 6/20/22  Uriel Garces MD   sacubitriL-valsartan (ENTRESTO)  mg per tablet Take 1 tablet by mouth 2 (two) times daily. 22   Uriel Garces MD        Allergies:  Review of patient's allergies indicates:   Allergen Reactions    Fish containing products     Iodine and iodide containing products     Shellfish containing products         Social History:   Social History     Socioeconomic History    Marital status:      Spouse name: jenn     Number of children: 6   Tobacco Use    Smoking status: Former Smoker     Packs/day: 1.00     Years: 68.00     Pack years: 68.00     Types: Cigarettes     Start date: 1954     Quit date: 2022     Years since quittin.0    Smokeless tobacco: Never Used   Substance and Sexual Activity    Alcohol use: Yes     Comment: reports only drinks red wine when games are on    Drug use: Never    Sexual activity: Yes     Partners: Female     Social Determinants of Health     Financial Resource Strain: Low Risk     Difficulty of Paying Living Expenses: Not very hard   Food Insecurity: Food Insecurity Present    Worried About Running Out of Food in the Last Year: Often true    Ran Out of Food in the Last Year: Patient refused   Transportation Needs: No Transportation Needs    Lack of Transportation (Medical): No    Lack of Transportation (Non-Medical): No   Physical Activity: Unknown    Days of Exercise per  "Week: 5 days   Stress: No Stress Concern Present    Feeling of Stress : Not at all   Social Connections: Unknown    Frequency of Communication with Friends and Family: More than three times a week    Frequency of Social Gatherings with Friends and Family: More than three times a week    Active Member of Clubs or Organizations: No    Attends Club or Organization Meetings: Never    Marital Status:    Housing Stability: Unknown    Unable to Pay for Housing in the Last Year: Patient refused    Unstable Housing in the Last Year: Patient refused       Family History:  Family History   Problem Relation Age of Onset    Diabetes Mother     Peripheral vascular disease Mother        ROS: No acute cardiac events, no acute respiratory complaints.     Physical Exam (all patients):    /61 (BP Location: Left arm, Patient Position: Lying)   Pulse (!) 52   Temp 97.7 °F (36.5 °C) (Temporal)   Resp 18   Ht 5' 9" (1.753 m)   Wt 72.1 kg (159 lb)   SpO2 96%   BMI 23.48 kg/m²   Lungs: Clear to auscultation bilaterally, respirations unlabored  Heart: Irregular rate and rhythm, S1 and S2 normal, no obvious murmurs  Abdomen:         Soft, non-tender, bowel sounds normal, no masses, no organomegaly    Lab Results   Component Value Date    WBC 6.22 06/27/2022    MCV 73 (L) 06/27/2022    RDW 31.8 (H) 06/27/2022     06/27/2022    INR 1.4 (H) 09/24/2021    GLU 93 06/24/2022    BUN 16 06/24/2022     06/24/2022    K 4.5 06/24/2022     06/24/2022        SEDATION PLAN: per anesthesia      History reviewed, vital signs satisfactory, cardiopulmonary status satisfactory, sedation options, risks and plans have been discussed with the patient  All their questions were answered and the patient agrees to the sedation procedures as planned and the patient is deemed an appropriate candidate for the sedation as planned.    The risks, benefits and alternatives of the procedure were discussed with the patient in " detail. This discussion was had in the presence of endoscopy staff. The risks include, risks of adverse reaction to sedation requiring the use of reversal agents, bleeding requiring blood transfusion, perforation requiring surgical intervention and technical failure. Other risks include aspiration leading to respiratory distress and respiratory failure resulting in endotracheal intubation and mechanical ventilation including death. If anesthesia is being utilized for this procedure, it is up to the anesthesiologist to determine airway safety including elective endotracheal intubation. Questions were answered, they agree to proceed. There was no language barriers.      Procedure explained to patient, informed consent obtained and placed in chart.    Sandra Perez  6/30/2022  12:13 PM

## 2022-06-30 NOTE — ANESTHESIA PREPROCEDURE EVALUATION
"                                                                                                             06/30/2022  Richy Douglas is a 81 y.o., male.      Pre-op Assessment    I have reviewed the Patient Summary Reports.     I have reviewed the Nursing Notes. I have reviewed the NPO Status.   I have reviewed the Medications.     Review of Systems  Anesthesia Hx:  History of prior surgery of interest to airway management or planning: Denies Family Hx of Anesthesia complications.   Denies Personal Hx of Anesthesia complications.   Social:  Former Smoker    Hematology/Oncology:         -- Anemia: Hematology Comments: + eliquis- last taken "yesterday"   Cardiovascular:   Exercise tolerance: poor Hypertension CHF SANCHEZ ECG has been reviewed. AAA    sinus rythm   Abnormal ECG   When compared with ECG of 12-JAN-2022 14:31,     · The pre-procedure left ventricular end diastolic pressure was 13.  · The estimated blood loss was none.  · The coronary arteries were normal..  · The filling pressures on the right and left were normal.     The procedure log was documented by Documenter: Jenna Cantu, RN and verified by Janneth Tovar MD.     Date: 1/13/2022  Time: 5:22 PM       Junctional rhythm has replaced Sinus rhythm   Vent. rate has decreased BY  67 BPM   Nonspecific T wave abnormality now evident in Anterior leads   Confirmed by DEVAUGHN MUNIZ, GILBERTO ([id number]) on 6/16/2022 10:12:19 PM     Referred By: MORE SIM           Confirmed By:GILBERTO CANTOR MD    Specimen Collected: 06/16/22 11:10 Last Resulted: 06/16/22 22:12      · The left ventricle is moderately enlarged with eccentric hypertrophy and moderately decreased systolic function.  · Severe left atrial enlargement.  · The estimated ejection fraction is 30%.  · Grade II left ventricular diastolic dysfunction.  · Moderate right ventricular enlargement with moderately reduced right ventricular systolic function.  · Mild right atrial enlargement.  · Moderate aortic " regurgitation.  · Mild mitral regurgitation.  · Mild to moderate tricuspid regurgitation.  · Mild pulmonic regurgitation.  · Intermediate central venous pressure (8 mmHg).  · The estimated PA systolic pressure is 93 mmHg.  · There is pulmonary hypertension.      Functional Capacity low / < 4 METS  Valvular Heart Disease: Aortic Regurgitation (AR), moderate, Mitral Regurgitation (MR), moderate   Congestive Heart Failure (CHF) , Acute Congestive Heart Failure, acute on chronic, on chronic Rx, no recent change Hypertension  Other Cardiac Conditions, Pulmonary Hypertension PASP >90 mmHg on echo; Bilateral atrial enlargement  Pulmonary:   COPD Shortness of breath    Hepatic/GI:   GERD    Psych:   Psychiatric History          Physical Exam  General: Well nourished, Cooperative, Alert and Oriented    Airway:  Mallampati: II   Mouth Opening: Normal  TM Distance: Normal  Tongue: Normal  Neck ROM: Normal ROM    Dental:  Intact    Chest/Lungs:  Normal Respiratory Rate    Heart:  Rate: Normal  Rhythm: Regular Rhythm  Sounds: Normal    Abdomen:  Normal, Soft        Anesthesia Plan  Type of Anesthesia, risks & benefits discussed:    Anesthesia Type: MAC  Intra-op Monitoring Plan: Standard ASA Monitors  Induction:  IV  Informed Consent: Informed consent signed with the Patient and all parties understand the risks and agree with anesthesia plan.  All questions answered. Patient consented to blood products? Yes  ASA Score: 3    Ready For Surgery From Anesthesia Perspective.     .

## 2022-06-30 NOTE — ANESTHESIA RELEASE NOTE
"Anesthesia Release from PACU Note    Patient: Richy Douglas    Procedure(s) Performed: Procedure(s) (LRB):  EGD (ESOPHAGOGASTRODUODENOSCOPY) (N/A)  COLONOSCOPY (N/A)    Anesthesia type: MAC    Post pain: Adequate analgesia    Post assessment: no apparent anesthetic complications    Last Vitals:   Visit Vitals  /61 (BP Location: Left arm, Patient Position: Lying)   Pulse (!) 52   Temp 36.5 °C (97.7 °F) (Temporal)   Resp 18   Ht 5' 9" (1.753 m)   Wt 72.1 kg (159 lb)   SpO2 96%   BMI 23.48 kg/m²       Post vital signs: stable    Level of consciousness: awake    Nausea/Vomiting: no nausea/no vomiting    Complications: none    Airway Patency: patent    Respiratory: unassisted    Cardiovascular: stable and blood pressure at baseline    Hydration: euvolemic  "

## 2022-07-01 ENCOUNTER — ANESTHESIA (OUTPATIENT)
Dept: ENDOSCOPY | Facility: HOSPITAL | Age: 82
End: 2022-07-01
Payer: COMMERCIAL

## 2022-07-01 ENCOUNTER — ANESTHESIA EVENT (OUTPATIENT)
Dept: ENDOSCOPY | Facility: HOSPITAL | Age: 82
End: 2022-07-01
Payer: COMMERCIAL

## 2022-07-01 ENCOUNTER — HOSPITAL ENCOUNTER (OUTPATIENT)
Facility: HOSPITAL | Age: 82
Discharge: HOME | End: 2022-07-01
Attending: INTERNAL MEDICINE | Admitting: INTERNAL MEDICINE
Payer: COMMERCIAL

## 2022-07-01 DIAGNOSIS — K31.811 AVM (ARTERIOVENOUS MALFORMATION) OF DUODENUM, ACQUIRED WITH HEMORRHAGE: Primary | ICD-10-CM

## 2022-07-01 DIAGNOSIS — D64.9 ANEMIA: ICD-10-CM

## 2022-07-01 PROBLEM — D62 ACUTE BLOOD LOSS ANEMIA: Status: ACTIVE | Noted: 2022-07-01

## 2022-07-01 PROCEDURE — 25000003 PHARM REV CODE 250: Performed by: NURSE ANESTHETIST, CERTIFIED REGISTERED

## 2022-07-01 PROCEDURE — 63600175 PHARM REV CODE 636 W HCPCS: Performed by: NURSE ANESTHETIST, CERTIFIED REGISTERED

## 2022-07-01 PROCEDURE — 45385 COLONOSCOPY W/LESION REMOVAL: CPT | Mod: ,,, | Performed by: INTERNAL MEDICINE

## 2022-07-01 PROCEDURE — 88305 TISSUE EXAM BY PATHOLOGIST: CPT | Performed by: PATHOLOGY

## 2022-07-01 PROCEDURE — 88305 TISSUE EXAM BY PATHOLOGIST: CPT | Mod: 26,,, | Performed by: PATHOLOGY

## 2022-07-01 PROCEDURE — 45385 COLONOSCOPY W/LESION REMOVAL: CPT | Performed by: INTERNAL MEDICINE

## 2022-07-01 PROCEDURE — 37000009 HC ANESTHESIA EA ADD 15 MINS: Performed by: INTERNAL MEDICINE

## 2022-07-01 PROCEDURE — 37000008 HC ANESTHESIA 1ST 15 MINUTES: Performed by: INTERNAL MEDICINE

## 2022-07-01 PROCEDURE — 88305 TISSUE EXAM BY PATHOLOGIST: ICD-10-PCS | Mod: 26,,, | Performed by: PATHOLOGY

## 2022-07-01 PROCEDURE — 45385 PR COLONOSCOPY,REMV LESN,SNARE: ICD-10-PCS | Mod: ,,, | Performed by: INTERNAL MEDICINE

## 2022-07-01 PROCEDURE — 27201089 HC SNARE, DISP (ANY): Performed by: INTERNAL MEDICINE

## 2022-07-01 RX ORDER — PROPOFOL 10 MG/ML
VIAL (ML) INTRAVENOUS
Status: DISCONTINUED | OUTPATIENT
Start: 2022-07-01 | End: 2022-07-01

## 2022-07-01 RX ORDER — LIDOCAINE HYDROCHLORIDE 10 MG/ML
INJECTION, SOLUTION EPIDURAL; INFILTRATION; INTRACAUDAL; PERINEURAL
Status: DISCONTINUED | OUTPATIENT
Start: 2022-07-01 | End: 2022-07-01

## 2022-07-01 RX ADMIN — LIDOCAINE HYDROCHLORIDE 50 MG: 10 INJECTION, SOLUTION EPIDURAL; INFILTRATION; INTRACAUDAL; PERINEURAL at 08:07

## 2022-07-01 RX ADMIN — PROPOFOL 60 MG: 10 INJECTION, EMULSION INTRAVENOUS at 08:07

## 2022-07-01 RX ADMIN — PROPOFOL 20 MG: 10 INJECTION, EMULSION INTRAVENOUS at 08:07

## 2022-07-01 RX ADMIN — PROPOFOL 30 MG: 10 INJECTION, EMULSION INTRAVENOUS at 08:07

## 2022-07-01 RX ADMIN — SODIUM CHLORIDE, SODIUM LACTATE, POTASSIUM CHLORIDE, AND CALCIUM CHLORIDE: .6; .31; .03; .02 INJECTION, SOLUTION INTRAVENOUS at 07:07

## 2022-07-01 NOTE — DISCHARGE SUMMARY
[facility] - Endoscopy (Hospital)  Discharge Note  Short Stay    Procedure(s) (LRB):  COLONOSCOPY (N/A)    OUTCOME: Patient tolerated treatment/procedure well without complication and is now ready for discharge.    DISPOSITION: Home or Self Care    FINAL DIAGNOSIS:  Acute blood loss anemia    FOLLOWUP: With primary care provider    DISCHARGE INSTRUCTIONS:  No discharge procedures on file.

## 2022-07-01 NOTE — PLAN OF CARE
Dr Christian  at  to discuss findings. VSS. No  Pain, no GI bleeding. Pt to be discharged from unit.

## 2022-07-01 NOTE — ANESTHESIA POSTPROCEDURE EVALUATION
Anesthesia Post Evaluation    Patient: Richy Douglas    Procedure(s) Performed: Procedure(s) (LRB):  COLONOSCOPY (N/A)    Final Anesthesia Type: MAC      Patient location during evaluation: PACU  Patient participation: Yes- Able to Participate  Level of consciousness: awake and alert  Post-procedure vital signs: reviewed and stable  Pain management: adequate  Airway patency: patent    PONV status at discharge: No PONV  Anesthetic complications: no      Cardiovascular status: blood pressure returned to baseline  Respiratory status: unassisted  Hydration status: euvolemic  Follow-up not needed.          Vitals Value Taken Time   /45 07/01/22 0820   Temp 36.4 °C (97.5 °F) 07/01/22 0820   Pulse 68 07/01/22 0820   Resp 20 07/01/22 0820   SpO2 100 % 07/01/22 0820         No case tracking events are documented in the log.      Pain/John Score: John Score: 10 (6/30/2022  2:00 PM)

## 2022-07-01 NOTE — ANESTHESIA RELEASE NOTE
Anesthesia Release from PACU Note    Patient: Richy Douglas    Procedure(s) Performed: Procedure(s) (LRB):  COLONOSCOPY (N/A)    Anesthesia type: MAC    Post pain: Adequate analgesia    Post assessment: no apparent anesthetic complications    Last Vitals:   Visit Vitals  BP (!) 155/63 (BP Location: Left arm, Patient Position: Sitting)   Pulse 67   Temp 36.5 °C (97.7 °F) (Temporal)   Resp 20   SpO2 98%       Post vital signs: stable    Level of consciousness: awake    Nausea/Vomiting: no nausea/no vomiting    Complications: none    Airway Patency: patent    Respiratory: unassisted    Cardiovascular: stable and blood pressure at baseline    Hydration: euvolemic

## 2022-07-01 NOTE — H&P
Short Stay Endoscopy History and Physical    PCP - Vivian Ch MD  Referring Physician - Woodrow Christian MD  [address] [place]  [place] [address]    Procedure - Colonoscopy  ASA - per anesthesia  Mallampati - per anesthesia  History of Anesthesia problems - no  Family history Anesthesia problems -  no   Plan of anesthesia - General    HPI  81 y.o. male  Reason for procedure: Anemia under evaluation    EGD--multiple angiectasia. APC done        ROS:  Constitutional: No fevers, chills, No weight loss  CV: No chest pain  Pulm: No cough, No shortness of breath  GI: see HPI    Medical History:  has a past medical history of Anemia of chronic disease, Aortic aneurysm, Atrial fibrillation with RVR (9/24/2021), Chronic systolic congestive heart failure (8/31/2017), Emphysema of lung (8/31/2017), GERD (gastroesophageal reflux disease), Hypertension, Microcytic anemia (11/24/2020), Other hyperlipidemia (4/27/2021), and Prostate cancer.    Surgical History:  has a past surgical history that includes Prostate surgery; Spine surgery; Colonoscopy; Catheterization of both left and right heart (N/A, 1/13/2022); and Coronary angiography (N/A, 1/13/2022).    Family History: family history includes Diabetes in his mother; Peripheral vascular disease in his mother..    Social History:  reports that he quit smoking about 3 weeks ago. His smoking use included cigarettes. He started smoking about 68 years ago. He has a 68.00 pack-year smoking history. He has never used smokeless tobacco. He reports current alcohol use. He reports that he does not use drugs.    Review of patient's allergies indicates:   Allergen Reactions    Fish containing products     Iodine and iodide containing products     Shellfish containing products        Medications:   Medications Prior to Admission   Medication Sig Dispense Refill Last Dose    apixaban (ELIQUIS) 5 mg Tab Take 1 tablet (5 mg total) by mouth 2 (two) times daily. 180 tablet 1  Past Week at Unknown time    magnesium citrate solution Take 296 mLs by mouth once. for 1 dose 296 mL 0 Past Week at Unknown time    pantoprazole (PROTONIX) 40 MG tablet Take 1 tablet (40 mg total) by mouth once daily. 90 tablet 3 Past Week at Unknown time    polyethylene glycol (GOLYTELY) 236-22.74-6.74 -5.86 gram suspension Take 4,000 mLs by mouth once. for 1 dose 4000 mL 0 Past Week at Unknown time    sacubitriL-valsartan (ENTRESTO)  mg per tablet Take 1 tablet by mouth 2 (two) times daily. 60 tablet 3 Past Week at Unknown time    furosemide (LASIX) 20 MG tablet Take 1 tablet (20 mg total) by mouth once daily. Do not take if blood pressure is low, feeling dry/dizzy/lightheaded. 90 tablet 0        Physical Exam:    Vital Signs: There were no vitals filed for this visit.    General Appearance: Well appearing in no acute distress  Abdomen: Soft, non tender, non distended with normal bowel sounds, no masses    Labs:  Lab Results   Component Value Date    WBC 6.22 06/27/2022    HGB 8.2 (L) 06/27/2022    HCT 29.3 (L) 06/27/2022     06/27/2022    CHOL 144 09/17/2020    TRIG 45 09/17/2020    HDL 61 09/17/2020    ALT 11 06/24/2022    AST 16 06/24/2022     06/24/2022    K 4.5 06/24/2022     06/24/2022    CREATININE 0.8 06/24/2022    BUN 16 06/24/2022    CO2 23 06/24/2022    TSH 0.870 11/24/2020    INR 1.4 (H) 09/24/2021       I have explained the risks and benefits of this endoscopic procedure to the patient including but not limited to bleeding, inflammation, infection, perforation, and death.      Woodrow Christian MD

## 2022-07-01 NOTE — PROVATION PATIENT INSTRUCTIONS
Discharge Summary/Instructions after an Endoscopic Procedure  Patient Name: Richy Douglas  Patient MRN: [id number]  Patient YOB: 1940  Friday, July 1, 2022 Woodrow Christian MD  Dear patient,  As a result of recent federal legislation (The Federal Cures Act), you may   receive lab or pathology results from your procedure in your [facility]   account before your physician is able to contact you. Your physician or   their representative will relay the results to you with their   recommendations at their soonest availability.  Thank you,  RESTRICTIONS:  During your procedure today, you received medications for sedation.  These   medications may affect your judgment, balance and coordination.  Therefore,   for 24 hours, you have the following restrictions:   - DO NOT drive a car, operate machinery, make legal/financial decisions,   sign important papers or drink alcohol.    ACTIVITY:  Today: no heavy lifting, straining or running due to procedural   sedation/anesthesia.  The following day: return to full activity including work.  DIET:  Eat and drink normally unless instructed otherwise.     TREATMENT FOR COMMON SIDE EFFECTS:  - Mild abdominal pain, nausea, belching, bloating or excessive gas:  rest,   eat lightly and use a heating pad.  - Sore Throat: treat with throat lozenges and/or gargle with warm salt   water.  - Because air was used during the procedure, expelling large amounts of air   from your rectum or belching is normal.  - If a bowel prep was taken, you may not have a bowel movement for 1-3 days.    This is normal.  SYMPTOMS TO WATCH FOR AND REPORT TO YOUR PHYSICIAN:  1. Abdominal pain or bloating, other than gas cramps.  2. Chest pain.  3. Back pain.  4. Signs of infection such as: chills or fever occurring within 24 hours   after the procedure.  5. Rectal bleeding, which would show as bright red, maroon, or black stools.   (A tablespoon of blood from the rectum is not serious, especially  if   hemorrhoids are present.)  6. Vomiting.  7. Weakness or dizziness.  GO DIRECTLY TO THE NEAREST EMERGENCY ROOM IF YOU HAVE ANY OF THE FOLLOWING:      Difficulty breathing              Chills and/or fever over 101 F   Persistent vomiting and/or vomiting blood   Severe abdominal pain   Severe chest pain   Black, tarry stools   Bleeding- more than one tablespoon   Any other symptom or condition that you feel may need urgent attention  Your doctor recommends these additional instructions:  If any biopsies were taken, your doctors clinic will contact you in 1 to 2   weeks with any results.  - Discharge patient to home (via wheelchair).   - Resume previous diet.   - Continue present medications.   - Await pathology results.   - Return to referring physician (date not yet determined).   - The findings and recommendations were discussed with the patient.   - Discharge patient to home (via wheelchair).   - No repeat colonoscopy due to current age (66 years or older).  For questions, problems or results please call your physician Woodrow Christian MD at Work:  [phone]  If you have any questions about the above instructions, call the GI   department at [phone] or call the endoscopy unit at [phone]   from 7am until 3 pm.  [facility] [place], EMERGENCY ROOM PHONE NUMBER:   [phone]  IF A COMPLICATION OR EMERGENCY SITUATION ARISES AND YOU ARE UNABLE TO REACH   YOUR PHYSICIAN - GO DIRECTLY TO THE EMERGENCY ROOM.  I have read or have had read to me these discharge instructions for my   procedure and have received a written copy.  I understand these   instructions and will follow-up with my physician if I have any questions.     __________________________________       _____________________________________  Nurse Signature                                          Patient/Designated   Responsible Party Signature  MD Woodrow Green MD  7/1/2022 8:21:57  AM  This report has been verified and signed electronically.  Dear patient,  As a result of recent federal legislation (The Federal Cures Act), you may   receive lab or pathology results from your procedure in your [facility]   account before your physician is able to contact you. Your physician or   their representative will relay the results to you with their   recommendations at their soonest availability.  Thank you,  PROVATION

## 2022-07-01 NOTE — TRANSFER OF CARE
Anesthesia Transfer of Care Note    Patient: Richy Douglas    Procedure(s) Performed: Procedure(s) (LRB):  COLONOSCOPY (N/A)    Patient location: PACU    Anesthesia Type: MAC    Transport from OR: Transported from OR on room air with adequate spontaneous ventilation    Post pain: adequate analgesia    Post assessment: no apparent anesthetic complications    Post vital signs: stable    Level of consciousness: awake    Nausea/Vomiting: no nausea/vomiting    Complications: none    Transfer of care protocol was followed      Last vitals:   Visit Vitals  BP (!) 155/63 (BP Location: Left arm, Patient Position: Sitting)   Pulse 67   Temp 36.5 °C (97.7 °F) (Temporal)   Resp 20   SpO2 98%

## 2022-07-02 DIAGNOSIS — K31.811 AVM (ARTERIOVENOUS MALFORMATION) OF DUODENUM, ACQUIRED WITH HEMORRHAGE: Primary | ICD-10-CM

## 2022-07-05 ENCOUNTER — INFUSION (OUTPATIENT)
Dept: INFUSION THERAPY | Facility: HOSPITAL | Age: 82
End: 2022-07-05
Attending: INTERNAL MEDICINE
Payer: MEDICARE

## 2022-07-05 VITALS
DIASTOLIC BLOOD PRESSURE: 77 MMHG | HEART RATE: 76 BPM | TEMPERATURE: 97 F | SYSTOLIC BLOOD PRESSURE: 117 MMHG | OXYGEN SATURATION: 98 % | WEIGHT: 159 LBS | HEIGHT: 69 IN | BODY MASS INDEX: 23.55 KG/M2 | RESPIRATION RATE: 18 BRPM

## 2022-07-05 VITALS
RESPIRATION RATE: 18 BRPM | OXYGEN SATURATION: 99 % | HEART RATE: 82 BPM | SYSTOLIC BLOOD PRESSURE: 120 MMHG | DIASTOLIC BLOOD PRESSURE: 49 MMHG | DIASTOLIC BLOOD PRESSURE: 56 MMHG | SYSTOLIC BLOOD PRESSURE: 115 MMHG | TEMPERATURE: 98 F | TEMPERATURE: 98 F | OXYGEN SATURATION: 95 % | RESPIRATION RATE: 18 BRPM | HEART RATE: 66 BPM

## 2022-07-05 DIAGNOSIS — D63.8 ANEMIA OF CHRONIC DISEASE: Primary | ICD-10-CM

## 2022-07-05 PROCEDURE — 63600175 PHARM REV CODE 636 W HCPCS: Performed by: INTERNAL MEDICINE

## 2022-07-05 PROCEDURE — 96365 THER/PROPH/DIAG IV INF INIT: CPT

## 2022-07-05 PROCEDURE — 25000003 PHARM REV CODE 250: Performed by: INTERNAL MEDICINE

## 2022-07-05 RX ORDER — SODIUM CHLORIDE 0.9 % (FLUSH) 0.9 %
10 SYRINGE (ML) INJECTION
Status: CANCELLED | OUTPATIENT
Start: 2022-07-06

## 2022-07-05 RX ORDER — DIPHENHYDRAMINE HYDROCHLORIDE 50 MG/ML
50 INJECTION INTRAMUSCULAR; INTRAVENOUS ONCE AS NEEDED
Status: CANCELLED | OUTPATIENT
Start: 2022-07-06

## 2022-07-05 RX ORDER — METHYLPREDNISOLONE SOD SUCC 125 MG
125 VIAL (EA) INJECTION ONCE AS NEEDED
Status: CANCELLED | OUTPATIENT
Start: 2022-07-06

## 2022-07-05 RX ORDER — HEPARIN 100 UNIT/ML
500 SYRINGE INTRAVENOUS
Status: CANCELLED | OUTPATIENT
Start: 2022-07-06

## 2022-07-05 RX ORDER — EPINEPHRINE 0.3 MG/.3ML
0.3 INJECTION SUBCUTANEOUS ONCE AS NEEDED
Status: CANCELLED | OUTPATIENT
Start: 2022-07-06

## 2022-07-05 RX ADMIN — SODIUM CHLORIDE 125 MG: 9 INJECTION, SOLUTION INTRAVENOUS at 01:07

## 2022-07-05 NOTE — PLAN OF CARE
Discussed plan of care with pt. Addressed any and ongoing concerns. Pt denies   Problem: Adult Inpatient Plan of Care  Goal: Plan of Care Review  Outcome: Ongoing, Progressing  Flowsheets (Taken 7/5/2022 1605)  Plan of Care Reviewed With: patient  Goal: Patient-Specific Goal (Individualized)  Outcome: Ongoing, Progressing  Goal: Absence of Hospital-Acquired Illness or Injury  Outcome: Ongoing, Progressing  Intervention: Identify and Manage Fall Risk  Flowsheets (Taken 7/5/2022 1605)  Safety Promotion/Fall Prevention:   in recliner, wheels locked   room near unit station  Intervention: Prevent Infection  Flowsheets (Taken 7/5/2022 1605)  Infection Prevention:   hand hygiene promoted   equipment surfaces disinfected  Goal: Optimal Comfort and Wellbeing  Outcome: Ongoing, Progressing  Intervention: Monitor Pain and Promote Comfort  Flowsheets (Taken 7/5/2022 1605)  Pain Management Interventions:   warm blanket provided   quiet environment facilitated  Intervention: Provide Person-Centered Care  Flowsheets (Taken 7/5/2022 1605)  Trust Relationship/Rapport:   care explained   reassurance provided   thoughts/feelings acknowledged   choices provided   emotional support provided   questions answered   empathic listening provided   questions encouraged

## 2022-07-05 NOTE — LETTER
July 5, 2022      [facility] 4th Fl  [address]  [place] [address]  Phone: [phone]  Fax: [phone]       Patient: Richy Douglas   YOB: 1940  Date of Visit: 07/05/2022    To Whom It May Concern:    Richard Douglas  was at [facility] on 07/05/2022. Hemay return to work/school on 7/5/2022 with no restrictions. If you have any questions or concerns, or if I can be of further assistance, please do not hesitate to contact me.    Sincerely,    Thuy Brown LPN

## 2022-07-06 ENCOUNTER — PATIENT MESSAGE (OUTPATIENT)
Dept: GASTROENTEROLOGY | Facility: CLINIC | Age: 82
End: 2022-07-06
Payer: MEDICARE

## 2022-07-06 LAB
FINAL PATHOLOGIC DIAGNOSIS: NORMAL
GROSS: NORMAL
Lab: NORMAL

## 2022-07-07 ENCOUNTER — PATIENT MESSAGE (OUTPATIENT)
Dept: GASTROENTEROLOGY | Facility: CLINIC | Age: 82
End: 2022-07-07
Payer: MEDICARE

## 2022-07-07 LAB
FINAL PATHOLOGIC DIAGNOSIS: NORMAL
GROSS: NORMAL
Lab: NORMAL
MICROSCOPIC EXAM: NORMAL

## 2022-07-12 ENCOUNTER — PATIENT MESSAGE (OUTPATIENT)
Dept: PHARMACY | Facility: CLINIC | Age: 82
End: 2022-07-12
Payer: MEDICARE

## 2022-07-17 NOTE — PROGRESS NOTES
"AN staff: please inform patient that biopsies of small bowel and gastric show no reasons why he has anemia. There is a small focus of "intestinal metaplasia". This is a condition in which the tissue changes and in a small percentage of patients can advance into a more serious condition in people of  descent or those who have a active H. Pylroi infection. Mr. Douglas as none of these risk factors therefore no repeat endoscopy is recommended. His anemia is likely related to chronic illness. No further endoscopic evaluation is recommended. "

## 2022-07-18 ENCOUNTER — PATIENT MESSAGE (OUTPATIENT)
Dept: PHARMACY | Facility: CLINIC | Age: 82
End: 2022-07-18
Payer: MEDICARE

## 2022-07-18 ENCOUNTER — PATIENT MESSAGE (OUTPATIENT)
Dept: GASTROENTEROLOGY | Facility: CLINIC | Age: 82
End: 2022-07-18
Payer: MEDICARE

## 2022-07-26 NOTE — PROGRESS NOTES
Subjective:    Patient ID:  Richy Douglas is a 81 y.o. male who presents for evaluation of Atrial Fibrillation      81 yoM referred for ICD consideration. He has NICM with EF <35% on serial echos for the past 6+ months. Normal NM and QRS. He also has severe pHTN. He had an episode of short RP tachycardia 1/22 as well as AF 9/24/21. He is on eliquis for CVA prophylaxis. He has required serial transfusion for anemia before and after eliquis initiation. I see no other AF episodes outside the event 9/24/21 while he was septic.     Echo 5/22:  · The left ventricle is moderately enlarged with eccentric hypertrophy and moderately decreased systolic function.  · Severe left atrial enlargement.  · The estimated ejection fraction is 30%.  · Grade II left ventricular diastolic dysfunction.  · Moderate right ventricular enlargement with moderately reduced right ventricular systolic function.  · Mild right atrial enlargement.  · Moderate aortic regurgitation.  · Mild mitral regurgitation.  · Mild to moderate tricuspid regurgitation.  · Mild pulmonic regurgitation.  · Intermediate central venous pressure (8 mmHg).  · The estimated PA systolic pressure is 93 mmHg.  · There is pulmonary hypertension.    LHC/RHC 1/22:  · The pre-procedure left ventricular end diastolic pressure was 13.  · The estimated blood loss was none.  · The coronary arteries were normal..  · The filling pressures on the right and left were normal.      Past Medical History:  No date: Anemia of chronic disease  No date: Aortic aneurysm  9/24/2021: Atrial fibrillation with RVR  8/31/2017: Chronic systolic congestive heart failure  8/31/2017: Emphysema of lung  No date: GERD (gastroesophageal reflux disease)  No date: Hypertension  11/24/2020: Microcytic anemia  4/27/2021: Other hyperlipidemia  No date: Prostate cancer    Past Surgical History:  1/13/2022: CATHETERIZATION OF BOTH LEFT AND RIGHT HEART; N/A      Comment:  Procedure: CATHETERIZATION, HEART, BOTH  LEFT AND RIGHT;                Surgeon: Janneth Tovar MD;  Location: [facility] CATH LAB;                Service: Cardiology;  Laterality: N/A;  poss cx  No date: COLONOSCOPY  2022: COLONOSCOPY; N/A      Comment:  Procedure: COLONOSCOPY;  Surgeon: Sandra Perez MD;               Location: [facility] ENDO;  Service: Endoscopy;  Laterality:                N/A;  2022: COLONOSCOPY; N/A      Comment:  Procedure: COLONOSCOPY;  Surgeon: Woodrow Christian MD;  Location: [facility] ENDO;  Service: Endoscopy;                 Laterality: N/A;  2022: CORONARY ANGIOGRAPHY; N/A      Comment:  Procedure: ANGIOGRAM, CORONARY ARTERY;  Surgeon: Janneth Tovar MD;  Location: [facility] CATH LAB;  Service:                Cardiology;  Laterality: N/A;  2022: ESOPHAGOGASTRODUODENOSCOPY; N/A      Comment:  Procedure: EGD (ESOPHAGOGASTRODUODENOSCOPY);  Surgeon:                Sandra Perez MD;  Location: [facility];  Service:                Endoscopy;  Laterality: N/A;  No date: PROSTATE SURGERY  No date: SPINE SURGERY    Social History    Socioeconomic History      Marital status:       Spouse name: jenn       Number of children: 6    Tobacco Use      Smoking status: Former Smoker        Packs/day: 1.00        Years: 68.00        Pack years: 68        Types: Cigarettes        Start date: 1954        Quit date: 2022        Years since quittin.1      Smokeless tobacco: Never Used    Substance and Sexual Activity      Alcohol use: Yes        Comment: reports only drinks red wine when games are on      Drug use: Never      Sexual activity: Yes        Partners: Female    Social Determinants of Health  Financial Resource Strain: Low Risk       Difficulty of Paying Living Expenses: Not very hard  Food Insecurity: Food Insecurity Present      Worried About Running Out of Food in the Last Year: Often true      Ran Out of Food in the Last Year: Patient refused  Transportation Needs:  No Transportation Needs      Lack of Transportation (Medical): No      Lack of Transportation (Non-Medical): No  Physical Activity: Unknown      Days of Exercise per Week: 5 days  Stress: No Stress Concern Present      Feeling of Stress : Not at all  Social Connections: Unknown      Frequency of Communication with Friends and Family: More than three times a week      Frequency of Social Gatherings with Friends and Family: More than three times a week      Active Member of Clubs or Organizations: No      Attends Club or Organization Meetings: Never      Marital Status:   Housing Stability: Unknown      Unable to Pay for Housing in the Last Year: Patient refused      Unstable Housing in the Last Year: Patient refused    Review of patient's family history indicates:  Problem: Diabetes      Relation: Mother          Age of Onset: (Not Specified)  Problem: Peripheral vascular disease      Relation: Mother          Age of Onset: (Not Specified)      Current Outpatient Medications:  apixaban (ELIQUIS) 5 mg Tab, Take 1 tablet (5 mg total) by mouth 2 (two) times daily., Disp: 180 tablet, Rfl: 1  furosemide (LASIX) 20 MG tablet, Take 1 tablet (20 mg total) by mouth once daily. Do not take if blood pressure is low, feeling dry/dizzy/lightheaded., Disp: 90 tablet, Rfl: 0  pantoprazole (PROTONIX) 40 MG tablet, Take 1 tablet (40 mg total) by mouth once daily., Disp: 90 tablet, Rfl: 3  sacubitriL-valsartan (ENTRESTO)  mg per tablet, Take 1 tablet by mouth 2 (two) times daily., Disp: 60 tablet, Rfl: 3    No current facility-administered medications for this visit.          Review of Systems   Constitutional: Negative.   HENT: Negative.    Eyes: Negative.    Cardiovascular: Negative for chest pain, dyspnea on exertion, leg swelling, near-syncope, palpitations and syncope.   Respiratory: Negative.  Negative for shortness of breath.    Endocrine: Negative.    Hematologic/Lymphatic: Negative.    Skin: Negative.     Musculoskeletal: Negative.    Gastrointestinal: Negative.    Genitourinary: Negative.    Neurological: Negative.  Negative for dizziness and light-headedness.   Psychiatric/Behavioral: Negative.    Allergic/Immunologic: Negative.         Objective:    Physical Exam  Vitals and nursing note reviewed.   Constitutional:       General: He is not in acute distress.     Appearance: He is well-developed. He is not diaphoretic.   HENT:      Head: Normocephalic and atraumatic.      Nose: Nose normal.   Eyes:      General: No scleral icterus.     Conjunctiva/sclera: Conjunctivae normal.   Neck:      Thyroid: No thyromegaly.      Vascular: No JVD.   Cardiovascular:      Rate and Rhythm: Normal rate and regular rhythm.      Heart sounds: S1 normal and S2 normal. No murmur heard.    No friction rub. No gallop. No S3 or S4 sounds.   Pulmonary:      Effort: Pulmonary effort is normal. No respiratory distress.      Breath sounds: Normal breath sounds. No stridor. No wheezing or rales.   Chest:      Chest wall: No tenderness.   Abdominal:      General: Bowel sounds are normal. There is no distension.      Palpations: Abdomen is soft. There is no mass.      Tenderness: There is no abdominal tenderness. There is no rebound.   Genitourinary:     Comments: Deferred  Musculoskeletal:         General: No tenderness or deformity. Normal range of motion.      Cervical back: Normal range of motion and neck supple.   Lymphadenopathy:      Cervical: No cervical adenopathy.   Skin:     General: Skin is warm and dry.      Coloration: Skin is not pale.      Findings: No erythema or rash.   Neurological:      Mental Status: He is alert and oriented to person, place, and time.      Motor: No abnormal muscle tone.      Coordination: Coordination normal.   Psychiatric:         Behavior: Behavior normal.         Thought Content: Thought content normal.         Judgment: Judgment normal.           Assessment:       1. AVM (arteriovenous malformation)  of small bowel, acquired with hemorrhage    2. Chronic combined systolic and diastolic congestive heart failure, NYHA class 3    3. Atherosclerosis of abdominal aorta    4. Chronic systolic congestive heart failure    5. Paroxysmal atrial fibrillation         Plan:       81 yoM with several cardiac and arrhythmia issues.     First, he has NICM with EF <35%.  He meets criteria for ICD. I had a discussion with him and he does not want to pursue ICd at this time.   Second, He has AF and is on OAC. His lone AF ECG was in the setting of pneumonia an sepsis 9/24/21. If this is the lone reason he is on OAC, I would stop the eliquis given his bleeding events.   Third, he had a short RP tachycardia 1/22. 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

## 2022-07-27 ENCOUNTER — TELEPHONE (OUTPATIENT)
Dept: CARDIOLOGY | Facility: CLINIC | Age: 82
End: 2022-07-27
Payer: MEDICARE

## 2022-07-27 ENCOUNTER — OFFICE VISIT (OUTPATIENT)
Dept: CARDIOLOGY | Facility: CLINIC | Age: 82
End: 2022-07-27
Payer: COMMERCIAL

## 2022-07-27 VITALS
SYSTOLIC BLOOD PRESSURE: 122 MMHG | BODY MASS INDEX: 23.71 KG/M2 | HEIGHT: 69 IN | OXYGEN SATURATION: 94 % | WEIGHT: 160.06 LBS | DIASTOLIC BLOOD PRESSURE: 60 MMHG | HEART RATE: 85 BPM

## 2022-07-27 DIAGNOSIS — I50.42 CHRONIC COMBINED SYSTOLIC AND DIASTOLIC CONGESTIVE HEART FAILURE, NYHA CLASS 3: ICD-10-CM

## 2022-07-27 DIAGNOSIS — K55.21 AVM (ARTERIOVENOUS MALFORMATION) OF SMALL BOWEL, ACQUIRED WITH HEMORRHAGE: Primary | ICD-10-CM

## 2022-07-27 DIAGNOSIS — I48.0 PAROXYSMAL ATRIAL FIBRILLATION: ICD-10-CM

## 2022-07-27 DIAGNOSIS — I70.0 ATHEROSCLEROSIS OF ABDOMINAL AORTA: ICD-10-CM

## 2022-07-27 DIAGNOSIS — I50.22 CHRONIC SYSTOLIC CONGESTIVE HEART FAILURE: ICD-10-CM

## 2022-07-27 PROCEDURE — 1125F PR PAIN SEVERITY QUANTIFIED, PAIN PRESENT: ICD-10-PCS | Mod: CPTII,S$GLB,, | Performed by: INTERNAL MEDICINE

## 2022-07-27 PROCEDURE — 3078F DIAST BP <80 MM HG: CPT | Mod: CPTII,S$GLB,, | Performed by: INTERNAL MEDICINE

## 2022-07-27 PROCEDURE — 1160F PR REVIEW ALL MEDS BY PRESCRIBER/CLIN PHARMACIST DOCUMENTED: ICD-10-PCS | Mod: CPTII,S$GLB,, | Performed by: INTERNAL MEDICINE

## 2022-07-27 PROCEDURE — 1125F AMNT PAIN NOTED PAIN PRSNT: CPT | Mod: CPTII,S$GLB,, | Performed by: INTERNAL MEDICINE

## 2022-07-27 PROCEDURE — 1157F ADVNC CARE PLAN IN RCRD: CPT | Mod: CPTII,S$GLB,, | Performed by: INTERNAL MEDICINE

## 2022-07-27 PROCEDURE — 3078F PR MOST RECENT DIASTOLIC BLOOD PRESSURE < 80 MM HG: ICD-10-PCS | Mod: CPTII,S$GLB,, | Performed by: INTERNAL MEDICINE

## 2022-07-27 PROCEDURE — 1100F PR PT FALLS ASSESS DOC 2+ FALLS/FALL W/INJURY/YR: ICD-10-PCS | Mod: CPTII,S$GLB,, | Performed by: INTERNAL MEDICINE

## 2022-07-27 PROCEDURE — 3074F PR MOST RECENT SYSTOLIC BLOOD PRESSURE < 130 MM HG: ICD-10-PCS | Mod: CPTII,S$GLB,, | Performed by: INTERNAL MEDICINE

## 2022-07-27 PROCEDURE — 99999 PR PBB SHADOW E&M-EST. PATIENT-LVL IV: CPT | Mod: PBBFAC,,, | Performed by: INTERNAL MEDICINE

## 2022-07-27 PROCEDURE — 99999 PR PBB SHADOW E&M-EST. PATIENT-LVL IV: ICD-10-PCS | Mod: PBBFAC,,, | Performed by: INTERNAL MEDICINE

## 2022-07-27 PROCEDURE — 3288F PR FALLS RISK ASSESSMENT DOCUMENTED: ICD-10-PCS | Mod: CPTII,S$GLB,, | Performed by: INTERNAL MEDICINE

## 2022-07-27 PROCEDURE — 1159F MED LIST DOCD IN RCRD: CPT | Mod: CPTII,S$GLB,, | Performed by: INTERNAL MEDICINE

## 2022-07-27 PROCEDURE — 1160F RVW MEDS BY RX/DR IN RCRD: CPT | Mod: CPTII,S$GLB,, | Performed by: INTERNAL MEDICINE

## 2022-07-27 PROCEDURE — 99205 OFFICE O/P NEW HI 60 MIN: CPT | Mod: S$GLB,,, | Performed by: INTERNAL MEDICINE

## 2022-07-27 PROCEDURE — 1159F PR MEDICATION LIST DOCUMENTED IN MEDICAL RECORD: ICD-10-PCS | Mod: CPTII,S$GLB,, | Performed by: INTERNAL MEDICINE

## 2022-07-27 PROCEDURE — 1100F PTFALLS ASSESS-DOCD GE2>/YR: CPT | Mod: CPTII,S$GLB,, | Performed by: INTERNAL MEDICINE

## 2022-07-27 PROCEDURE — 3074F SYST BP LT 130 MM HG: CPT | Mod: CPTII,S$GLB,, | Performed by: INTERNAL MEDICINE

## 2022-07-27 PROCEDURE — 99205 PR OFFICE/OUTPT VISIT, NEW, LEVL V, 60-74 MIN: ICD-10-PCS | Mod: S$GLB,,, | Performed by: INTERNAL MEDICINE

## 2022-07-27 PROCEDURE — 3288F FALL RISK ASSESSMENT DOCD: CPT | Mod: CPTII,S$GLB,, | Performed by: INTERNAL MEDICINE

## 2022-07-27 PROCEDURE — 1157F PR ADVANCE CARE PLAN OR EQUIV PRESENT IN MEDICAL RECORD: ICD-10-PCS | Mod: CPTII,S$GLB,, | Performed by: INTERNAL MEDICINE

## 2022-07-27 NOTE — Clinical Note
I saw this patient today and am considering stopping his eliquis. He had AF in the setting of pneumonia and sepsis. I do not see any other instances of AF or other indications for OAC. If that is the case, I would just stop his eliquis given his anemia and need for transfusions. I am happy to let him know if you agree.  Thanks,MB

## 2022-07-27 NOTE — LETTER
July 27, 2022      [facility]  [address] [place] [address] , 4TH FLOOR  [place] [address]  Phone: [phone]  Fax: [phone]       Patient: Richy Douglas   YOB: 1940  Date of Visit: 07/27/2022    To Whom It May Concern:    Richard Douglas  was at [facility] on 07/27/2022. The patient may return to work with no restrictions. If you have any questions or concerns, or if I can be of further assistance, please do not hesitate to contact me.    Sincerely,    Eric Christianson MD

## 2022-07-27 NOTE — TELEPHONE ENCOUNTER
----- Message from Eric Christianson MD sent at 7/27/2022  3:59 PM CDT -----  Fouzia, can you let Mr Douglas know that he can stop his eliquis. thanks  ----- Message -----  From: Uriel Garces MD  Sent: 7/27/2022   1:31 PM CDT  To: Eric Christianson MD    Ok I agree with you on that makes sense. OK if you can let him know I am sure he will be happy with that. I will see him in follow up and see if he wants/needs ablation in future. Thanks,    - PM  ----- Message -----  From: Eric Christianson MD  Sent: 7/27/2022  12:39 PM CDT  To: Uriel Garces MD    I saw this patient today and am considering stopping his eliquis. He had AF in the setting of pneumonia and sepsis. I do not see any other instances of AF or other indications for OAC. If that is the case, I would just stop his eliquis given his anemia and need for transfusions. I am happy to let him know if you agree.    Thanks,MB

## 2022-07-28 ENCOUNTER — OFFICE VISIT (OUTPATIENT)
Dept: CARDIOLOGY | Facility: CLINIC | Age: 82
End: 2022-07-28
Payer: COMMERCIAL

## 2022-07-28 VITALS
SYSTOLIC BLOOD PRESSURE: 114 MMHG | HEART RATE: 82 BPM | HEIGHT: 69 IN | OXYGEN SATURATION: 98 % | DIASTOLIC BLOOD PRESSURE: 56 MMHG | BODY MASS INDEX: 25.47 KG/M2 | WEIGHT: 171.94 LBS

## 2022-07-28 DIAGNOSIS — I27.20 PULMONARY HYPERTENSION: ICD-10-CM

## 2022-07-28 DIAGNOSIS — I50.22 CHRONIC SYSTOLIC CONGESTIVE HEART FAILURE: ICD-10-CM

## 2022-07-28 DIAGNOSIS — I70.0 ATHEROSCLEROSIS OF ABDOMINAL AORTA: ICD-10-CM

## 2022-07-28 DIAGNOSIS — R06.09 DOE (DYSPNEA ON EXERTION): ICD-10-CM

## 2022-07-28 DIAGNOSIS — I48.0 PAF (PAROXYSMAL ATRIAL FIBRILLATION): ICD-10-CM

## 2022-07-28 DIAGNOSIS — I48.0 PAROXYSMAL ATRIAL FIBRILLATION: ICD-10-CM

## 2022-07-28 DIAGNOSIS — K55.21 AVM (ARTERIOVENOUS MALFORMATION) OF SMALL BOWEL, ACQUIRED WITH HEMORRHAGE: ICD-10-CM

## 2022-07-28 DIAGNOSIS — R60.0 LOCALIZED EDEMA: ICD-10-CM

## 2022-07-28 DIAGNOSIS — D63.8 ANEMIA OF CHRONIC DISEASE: ICD-10-CM

## 2022-07-28 DIAGNOSIS — I35.1 NONRHEUMATIC AORTIC VALVE INSUFFICIENCY: ICD-10-CM

## 2022-07-28 DIAGNOSIS — J43.9 PULMONARY EMPHYSEMA, UNSPECIFIED EMPHYSEMA TYPE: ICD-10-CM

## 2022-07-28 DIAGNOSIS — I73.9 CLAUDICATION: ICD-10-CM

## 2022-07-28 DIAGNOSIS — I50.42 CHRONIC COMBINED SYSTOLIC AND DIASTOLIC CONGESTIVE HEART FAILURE, NYHA CLASS 3: Primary | ICD-10-CM

## 2022-07-28 PROCEDURE — 99214 PR OFFICE/OUTPT VISIT, EST, LEVL IV, 30-39 MIN: ICD-10-PCS | Mod: S$GLB,,, | Performed by: INTERNAL MEDICINE

## 2022-07-28 PROCEDURE — 3074F SYST BP LT 130 MM HG: CPT | Mod: CPTII,S$GLB,, | Performed by: INTERNAL MEDICINE

## 2022-07-28 PROCEDURE — 1157F ADVNC CARE PLAN IN RCRD: CPT | Mod: CPTII,S$GLB,, | Performed by: INTERNAL MEDICINE

## 2022-07-28 PROCEDURE — 1159F PR MEDICATION LIST DOCUMENTED IN MEDICAL RECORD: ICD-10-PCS | Mod: CPTII,S$GLB,, | Performed by: INTERNAL MEDICINE

## 2022-07-28 PROCEDURE — 3074F PR MOST RECENT SYSTOLIC BLOOD PRESSURE < 130 MM HG: ICD-10-PCS | Mod: CPTII,S$GLB,, | Performed by: INTERNAL MEDICINE

## 2022-07-28 PROCEDURE — 1160F PR REVIEW ALL MEDS BY PRESCRIBER/CLIN PHARMACIST DOCUMENTED: ICD-10-PCS | Mod: CPTII,S$GLB,, | Performed by: INTERNAL MEDICINE

## 2022-07-28 PROCEDURE — 1157F PR ADVANCE CARE PLAN OR EQUIV PRESENT IN MEDICAL RECORD: ICD-10-PCS | Mod: CPTII,S$GLB,, | Performed by: INTERNAL MEDICINE

## 2022-07-28 PROCEDURE — 1160F RVW MEDS BY RX/DR IN RCRD: CPT | Mod: CPTII,S$GLB,, | Performed by: INTERNAL MEDICINE

## 2022-07-28 PROCEDURE — 99214 OFFICE O/P EST MOD 30 MIN: CPT | Mod: S$GLB,,, | Performed by: INTERNAL MEDICINE

## 2022-07-28 PROCEDURE — 1101F PR PT FALLS ASSESS DOC 0-1 FALLS W/OUT INJ PAST YR: ICD-10-PCS | Mod: CPTII,S$GLB,, | Performed by: INTERNAL MEDICINE

## 2022-07-28 PROCEDURE — 99999 PR PBB SHADOW E&M-EST. PATIENT-LVL III: ICD-10-PCS | Mod: PBBFAC,,, | Performed by: INTERNAL MEDICINE

## 2022-07-28 PROCEDURE — 3288F PR FALLS RISK ASSESSMENT DOCUMENTED: ICD-10-PCS | Mod: CPTII,S$GLB,, | Performed by: INTERNAL MEDICINE

## 2022-07-28 PROCEDURE — 3288F FALL RISK ASSESSMENT DOCD: CPT | Mod: CPTII,S$GLB,, | Performed by: INTERNAL MEDICINE

## 2022-07-28 PROCEDURE — 1159F MED LIST DOCD IN RCRD: CPT | Mod: CPTII,S$GLB,, | Performed by: INTERNAL MEDICINE

## 2022-07-28 PROCEDURE — 1101F PT FALLS ASSESS-DOCD LE1/YR: CPT | Mod: CPTII,S$GLB,, | Performed by: INTERNAL MEDICINE

## 2022-07-28 PROCEDURE — 3078F PR MOST RECENT DIASTOLIC BLOOD PRESSURE < 80 MM HG: ICD-10-PCS | Mod: CPTII,S$GLB,, | Performed by: INTERNAL MEDICINE

## 2022-07-28 PROCEDURE — 3078F DIAST BP <80 MM HG: CPT | Mod: CPTII,S$GLB,, | Performed by: INTERNAL MEDICINE

## 2022-07-28 PROCEDURE — 99999 PR PBB SHADOW E&M-EST. PATIENT-LVL III: CPT | Mod: PBBFAC,,, | Performed by: INTERNAL MEDICINE

## 2022-07-28 RX ORDER — SACUBITRIL AND VALSARTAN 97; 103 MG/1; MG/1
1 TABLET, FILM COATED ORAL 2 TIMES DAILY
Qty: 180 TABLET | Refills: 1 | Status: SHIPPED | OUTPATIENT
Start: 2022-07-28 | End: 2023-02-01 | Stop reason: SDUPTHER

## 2022-07-28 RX ORDER — FUROSEMIDE 20 MG/1
20 TABLET ORAL DAILY
Qty: 90 TABLET | Refills: 0 | Status: SHIPPED | OUTPATIENT
Start: 2022-07-28 | End: 2023-02-01 | Stop reason: SDUPTHER

## 2022-07-28 NOTE — PROGRESS NOTES
"Subjective:   Patient ID:  Richy Douglas is a 81 y.o. male who presents for cardiac consult of No chief complaint on file.    Referring Physician:    Vivian Ch MD [id number] [place] [address]      Reason for consult: CHF, AI    HPI  The patient came in today for cardiac consult of No chief complaint on file.      Richy Douglas is a 81 y.o. 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     4/27/21  He had ER eval last month - Emergency Department for evaluation of SOB which onset gradually x 1 week ago. Pt reports sustaining a rib fracture 1 week ago and states his "breathing got bad". Pt reports smoking "only when football games are on". Symptoms are constant and moderate in severity. No mitigating or exacerbating factors reported. Associated sxs include cough. Pt also notes L foot swelling and L knee swelling that has been intermittent for a few months.     BNP was 1258.     He had CT chest -    Moderate to severe vascular calcification seen involving the aorta.  Coronary artery calcifications are also noted.  There is no pericardial effusion. No enlarged mediastinal, hilar or axillary lymph nodes are identified.    He feels more dyspnea but also CP from rib fracture.     8/3/21  Nuclear stress neg, EF 45%. BNP 1074- Told pt BNP is significantly elevated due to extra fluid, if feeling more shortness of breath or edema can double up fluid pills for 2-3 days and monitor pressures,     Ref Range & Units 2 mo ago 5 mo ago 8 mo ago    BNP 0 - 99 pg/mL 1,047 High   1,258 High  CM  202 High      He has been having some tingling/numbess in L arm.      Hosp stay Sept 2021    ED today with a chief complaint of worsening SOB that started the day prior. Associated symptoms included malaise, productive cough, fever, and pleuritic chest discomfort. Patient denied any associated kevin chest pain, " nausea, vomiting, diaphoresis, palpitations, near syncope, or syncope. Initial workup in ED revealed leukocytosis (WBC 18,000), anemia (H/H 8.8/28.1), and BNP of 929. CXR showed findings consistent with PNA. EKG showed new onset afib with RVR and patient was subsequently admitted for further evaluation and treatment. Cardiology consulted to assist with management. Patient seen and examined today in ED. Still endorses SOB along with pleuritic chest discomfort. No other CV complaints. No prior history of afib. He reports compliance with his medications. Followed on OP basis by Dr. Garces. Chart reviewed. Troponin x 1 negative. Echo 4/21 showed normal EF. MPI stress test 5/21 negative for ischemia.      Hospital Course:   9/25/21-Patient seen and examined today, resting in bed. Feeling much better, SOB improved. Still has some hoarseness. No chest pain. Remains in afib, but HR controlled. Cardizem switched to po and Eliquis initiated. Labs stable. Will need to monitor H/H on AC.  9/26/21-Patient seen and examined today, lying in bed. SOB continues to improve. Still has occasional cough. No chest pain. HR controlled. BC + for gram negative rods. H/H stable.  9/27/21- patient in bed resting. Has no complaints this morning. NSR 68 bpm.. BC +gram negative rods. Labs stable. No abnormal bleeding on Eliquis. cardizem gtt switched to PO  180mg daily   9/28/2021--Patient seen and examined in room, sitting on side of bed. Feels today. Remains in SR. Ok to discharge home from cards standpoint. Will need OP Cards follow up with Dr Garces next week.     12/9/21  Pt had hosp stay in Sept had new onset AFib. He converted to NSR with cardizem drip and treatment of sepsis PNA. BP and HR well controlled today.     Jan 2022 Hospital Course:   1/10/22- pt comfortable on 2L via NC current smoker physical exam consistent with COPD  1/11/22- CT scan of the chest reveals severe emphysematous lungs with bully scattered throughout all lung  fields bilaterally.  Consolidation left upper and middle jose lobes consistent with pneumonia.  No masses. Echo reveals new decline in EF to 25% pt reported chest pain on admission  1/12/22- pt in Afib coronary angiogram postponed. Rate controlled on metoprolol. Daily drinker started on librium  1/13/22- pt underwent LHC today with Dr. Tovar result was clean coronaries. EF documented at 50% although transthoracic echo EF was 25%  1/14 patient reports improvement in sx. Tolerated lhc procedure well. After discussing with cards, ok to d/c. Patient will need to discuss initiation of entresto on o/p basis. Continue losartan, bb, eliquis and lasix. New rxs delivered to bedside.    1/18/22  He presents post hosp stay, neg cath. ECHO with dec EF 25%,EF low normal on LHC.  BP is low normal, pulse 40s. He has been drinking daily for a while but has quit now.     3/17/22  He had recent pulm eval last month and then heme/onc eval, he will need EGD/Csope to rule out GIB. BP and HR stable today. He remains busy with recycling.     5/4/22  Repeat ECHO pending/not completed yet. BP is low normal. HR 80s. He had more swelling L leg/knee.     6/16/22  ECHO in May 2022 with EF 30%, mod RV function decrease, grade 2 DD, mild to mod TR, pulm HTN PASP 93 mmHg, mod AI. LE arterial u/s with distal occlusions/monophasic waveforms, normal BLE MARIBEL. LE venous u/s with b/l baker's cysts and chronic right small saphenous vein thrombus.   PT went to ER after last visit for Hgb 6, was transfused and had f/u with hemeonc and GI. Has upcoming EGD, told elevated CV risk but can proceed and can hold Eliquis 2 days prior.   BP today 140s/70s. HR 80s. 165 lbs.    He fell June 1st, has a large expending hematoma with more pain.     7/28/22  Pt had recent eval with Dr. Christianson who discussed since AFib only once episode post PNA/sepsis can stop Eliquis. Pt also has short RP tachycardia 1/22 for which Dr. Christianson rec ablation but patient wants to defer  now.     He is s/p EGD - - Three non-bleeding angioectasias in the stomach.                          Treated with argon plasma coagulation (APC).                          - Multiple recently bleeding angioectasias in the                          duodenum. Treated with argon plasma coagulation                          (APC).    He is s/p Cscope - had a polyp no bleeding. He feels well now, his wife is undergoing further heart issues.     Patient feels no PND, no palpitation, no dizziness, no syncope, no CNS symptoms.    Patient has fairly good exercise tolerance. He does recycling, separation.     Patient is compliant with medications.    FH - mother - DM, PVD, tobacco    Results for orders placed during the hospital encounter of 05/20/22    Echo    Interpretation Summary  · The left ventricle is moderately enlarged with eccentric hypertrophy and moderately decreased systolic function.  · Severe left atrial enlargement.  · The estimated ejection fraction is 30%.  · Grade II left ventricular diastolic dysfunction.  · Moderate right ventricular enlargement with moderately reduced right ventricular systolic function.  · Mild right atrial enlargement.  · Moderate aortic regurgitation.  · Mild mitral regurgitation.  · Mild to moderate tricuspid regurgitation.  · Mild pulmonic regurgitation.  · Intermediate central venous pressure (8 mmHg).  · The estimated PA systolic pressure is 93 mmHg.  · There is pulmonary hypertension.      LE arterial u/s 5/2022  Conclusion    · Normal BLE MARIBEL with distal occlusions noted with moderate to severe plaque/blockage  · Right distal PT/PER arteries are occluded  · Left distal PT with monophasic waveforms, L GUILLE occluded      Conclusion 5/2022    · A Baker's cyst measuring 1.67 cm x 3.77 cm was seen in the right extremity.  · A Baker's cyst measuring 1.53 cm x 2.78 cm was seen in the left extremity.  · There is no evidence of a left lower extremity DVT.  · The right smaller saphenous vein  is abnormal. A chronic thrombus is present.          Results for orders placed during the hospital encounter of 01/09/22    Cardiac catheterization    Conclusion  · The pre-procedure left ventricular end diastolic pressure was 13.  · The estimated blood loss was none.  · The coronary arteries were normal..  · The filling pressures on the right and left were normal.    The procedure log was documented by Documenter: Jenna Cantu, RN and verified by Janneth Tovar MD.    Date: 1/13/2022  Time: 5:22 PM          Past Medical History:   Diagnosis Date    Anemia of chronic disease     Aortic aneurysm     Atrial fibrillation with RVR 9/24/2021    Chronic systolic congestive heart failure 8/31/2017    Emphysema of lung 8/31/2017    GERD (gastroesophageal reflux disease)     Hypertension     Microcytic anemia 11/24/2020    Other hyperlipidemia 4/27/2021    Prostate cancer        Past Surgical History:   Procedure Laterality Date    CATHETERIZATION OF BOTH LEFT AND RIGHT HEART N/A 1/13/2022    Procedure: CATHETERIZATION, HEART, BOTH LEFT AND RIGHT;  Surgeon: Janneth Tovar MD;  Location: [facility] CATH LAB;  Service: Cardiology;  Laterality: N/A;  poss cx    COLONOSCOPY      COLONOSCOPY N/A 6/30/2022    Procedure: COLONOSCOPY;  Surgeon: Sandra Perez MD;  Location: [facility] ENDO;  Service: Endoscopy;  Laterality: N/A;    COLONOSCOPY N/A 7/1/2022    Procedure: COLONOSCOPY;  Surgeon: Woodrow Christian MD;  Location: [facility];  Service: Endoscopy;  Laterality: N/A;    CORONARY ANGIOGRAPHY N/A 1/13/2022    Procedure: ANGIOGRAM, CORONARY ARTERY;  Surgeon: Janneth Tovar MD;  Location: [facility] CATH LAB;  Service: Cardiology;  Laterality: N/A;    ESOPHAGOGASTRODUODENOSCOPY N/A 6/30/2022    Procedure: EGD (ESOPHAGOGASTRODUODENOSCOPY);  Surgeon: Sandra Perez MD;  Location: [facility];  Service: Endoscopy;  Laterality: N/A;    PROSTATE SURGERY      SPINE SURGERY         Social History     Tobacco Use     Smoking status: Former Smoker     Packs/day: 1.00     Years: 68.00     Pack years: 68.00     Types: Cigarettes     Start date: 1954     Quit date: 2022     Years since quittin.1    Smokeless tobacco: Never Used   Substance Use Topics    Alcohol use: Yes     Comment: reports only drinks red wine when games are on    Drug use: Never       Family History   Problem Relation Age of Onset    Diabetes Mother     Peripheral vascular disease Mother        Patient's Medications   New Prescriptions    No medications on file   Previous Medications    APIXABAN (ELIQUIS) 5 MG TAB    Take 1 tablet (5 mg total) by mouth 2 (two) times daily.    FUROSEMIDE (LASIX) 20 MG TABLET    Take 1 tablet (20 mg total) by mouth once daily. Do not take if blood pressure is low, feeling dry/dizzy/lightheaded.    PANTOPRAZOLE (PROTONIX) 40 MG TABLET    Take 1 tablet (40 mg total) by mouth once daily.    SACUBITRIL-VALSARTAN (ENTRESTO)  MG PER TABLET    Take 1 tablet by mouth 2 (two) times daily.   Modified Medications    No medications on file   Discontinued Medications    No medications on file       Review of Systems   Constitutional: Positive for malaise/fatigue.   HENT: Negative.    Eyes: Negative.    Respiratory: Positive for shortness of breath.    Cardiovascular: Positive for chest pain.   Gastrointestinal: Negative.    Genitourinary: Negative.    Musculoskeletal: Negative.    Skin: Negative.    Neurological: Negative.    Endo/Heme/Allergies: Negative.    Psychiatric/Behavioral: Negative.    All 12 systems otherwise negative.      Wt Readings from Last 3 Encounters:   22 72.6 kg (160 lb 0.9 oz)   22 72.1 kg (159 lb)   22 70.3 kg (154 lb 15.7 oz)     Temp Readings from Last 3 Encounters:   22 98.2 °F (36.8 °C)   22 97.5 °F (36.4 °C) (Temporal)   22 97 °F (36.1 °C)     BP Readings from Last 3 Encounters:   22 122/60   22 (!) 115/49   22 (!) 120/56     Pulse Readings  from Last 3 Encounters:   07/27/22 85   07/05/22 82   07/01/22 66       There were no vitals taken for this visit.    Objective:   Physical Exam  Vitals and nursing note reviewed.   Constitutional:       General: He is not in acute distress.     Appearance: He is well-developed. He is not diaphoretic.   HENT:      Head: Normocephalic and atraumatic.      Nose: Nose normal.   Eyes:      General: No scleral icterus.     Conjunctiva/sclera: Conjunctivae normal.   Neck:      Thyroid: No thyromegaly.      Vascular: No JVD.   Cardiovascular:      Rate and Rhythm: Normal rate and regular rhythm.      Heart sounds: S1 normal and S2 normal. No murmur heard.    No friction rub. No gallop. No S3 or S4 sounds.   Pulmonary:      Effort: Pulmonary effort is normal. No respiratory distress.      Breath sounds: Normal breath sounds. No stridor. No wheezing or rales.   Chest:      Chest wall: No tenderness.   Abdominal:      General: Bowel sounds are normal. There is no distension.      Palpations: Abdomen is soft. There is no mass.      Tenderness: There is no abdominal tenderness. There is no rebound.   Genitourinary:     Comments: Deferred  Musculoskeletal:         General: No tenderness or deformity. Normal range of motion.      Cervical back: Normal range of motion and neck supple.   Lymphadenopathy:      Cervical: No cervical adenopathy.   Skin:     General: Skin is warm and dry.      Coloration: Skin is not pale.      Findings: No erythema or rash.   Neurological:      Mental Status: He is alert and oriented to person, place, and time.      Motor: No abnormal muscle tone.      Coordination: Coordination normal.   Psychiatric:         Behavior: Behavior normal.         Thought Content: Thought content normal.         Judgment: Judgment normal.         Lab Results   Component Value Date     06/24/2022    K 4.5 06/24/2022     06/24/2022    CO2 23 06/24/2022    BUN 16 06/24/2022    CREATININE 0.8 06/24/2022    GLU 93  06/24/2022    MG 2.1 05/04/2022    AST 16 06/24/2022    ALT 11 06/24/2022    ALBUMIN 3.9 06/24/2022    PROT 7.1 06/24/2022    BILITOT 0.6 06/24/2022    WBC 6.22 06/27/2022    HGB 8.2 (L) 06/27/2022    HCT 29.3 (L) 06/27/2022    MCV 73 (L) 06/27/2022     06/27/2022    INR 1.4 (H) 09/24/2021    TSH 0.870 11/24/2020    CHOL 144 09/17/2020    HDL 61 09/17/2020    LDLCALC 74.0 09/17/2020    TRIG 45 09/17/2020     (H) 06/16/2022     Assessment:      1. Chronic combined systolic and diastolic congestive heart failure, NYHA class 3    2. AVM (arteriovenous malformation) of small bowel, acquired with hemorrhage    3. Atherosclerosis of abdominal aorta    4. Paroxysmal atrial fibrillation    5. Chronic systolic congestive heart failure    6. Localized edema    7. Pulmonary emphysema, unspecified emphysema type    8. PAF (paroxysmal atrial fibrillation)    9. Claudication    10. SANCHEZ (dyspnea on exertion)    11. Nonrheumatic aortic valve insufficiency    12. Pulmonary hypertension    13. Anemia of chronic disease        Plan:   1. BIV failure EF 30%, AI with aortic atherosc;  BNP 1047 -- 969 --> 1844 --> 615 --> 952  - ECHO in May 2022 with EF 30%, mod RV function decrease, grade 2 DD, mild to mod TR, pulm HTN PASP 93 mmHg, mod AI.   - R/LHC normal coronaries 1/2022  - cont lasix daily and extra PRN  - cont Entresto to max dose   - f/u EP discuss ICD/CRT    2. Emphysema with dyspnea, pulm HTN  - cont tx PCP/Pulm    3. HTN with aortic root moderately dilated with AI - Aortic root 4.6-4.9 cm --> 4.55  - titrate meds  - cont to monitor     4. HLD with aortic athero and Coronary artery calcifications  - cont statin     5. PAF, diagnosed in setting of sepsis/PNA, had SVT in hosp - sec to alc withdrawal  - cont BB; stop Anticoag  - [place] AF, Dr. Christianson discussed with pt to stop anticoag sec risk of bleeding    6. AVM/Symptomatic anemia s/p PRBC  - angioectasias noted in recent EGD s/p APC  - off Eliquis now  - f/u  heme/onc    7. PVD, Leg Edema, pain with claudication   - LE arterial u/s with distal occlusions/monophasic waveforms, normal BLE MARIBEL.   - LE venous u/s with b/l baker's cysts and chronic right small saphenous vein thrombus     8. Fall with hematoma in right hip  - f/u pain management for knee pain/baker's cysts    Thank you for allowing me to participate in this patient's care. Please do not hesitate to contact me with any questions or concerns. Consult note has been forwarded to the referral physician.

## 2022-08-08 ENCOUNTER — PES CALL (OUTPATIENT)
Dept: ADMINISTRATIVE | Facility: CLINIC | Age: 82
End: 2022-08-08
Payer: COMMERCIAL

## 2022-08-09 ENCOUNTER — PATIENT MESSAGE (OUTPATIENT)
Dept: PHARMACY | Facility: CLINIC | Age: 82
End: 2022-08-09
Payer: COMMERCIAL

## 2022-08-11 ENCOUNTER — LAB VISIT (OUTPATIENT)
Dept: LAB | Facility: HOSPITAL | Age: 82
End: 2022-08-11
Attending: FAMILY MEDICINE
Payer: MEDICARE

## 2022-08-11 ENCOUNTER — TELEPHONE (OUTPATIENT)
Dept: HEMATOLOGY/ONCOLOGY | Facility: CLINIC | Age: 82
End: 2022-08-11
Payer: COMMERCIAL

## 2022-08-11 DIAGNOSIS — D50.0 IRON DEFICIENCY ANEMIA DUE TO CHRONIC BLOOD LOSS: ICD-10-CM

## 2022-08-11 DIAGNOSIS — D50.0 IRON DEFICIENCY ANEMIA DUE TO CHRONIC BLOOD LOSS: Primary | ICD-10-CM

## 2022-08-11 LAB
ERYTHROCYTE [DISTWIDTH] IN BLOOD BY AUTOMATED COUNT: 30.6 % (ref 11.5–14.5)
HCT VFR BLD AUTO: 31.1 % (ref 40–54)
HGB BLD-MCNC: 9.4 G/DL (ref 14–18)
IMM GRANULOCYTES # BLD AUTO: 0.02 K/UL (ref 0–0.04)
MCH RBC QN AUTO: 21.9 PG (ref 27–31)
MCHC RBC AUTO-ENTMCNC: 30.2 G/DL (ref 32–36)
MCV RBC AUTO: 73 FL (ref 82–98)
NEUTROPHILS # BLD AUTO: 3.5 K/UL (ref 1.8–7.7)
PLATELET # BLD AUTO: 335 K/UL (ref 150–450)
PMV BLD AUTO: ABNORMAL FL (ref 9.2–12.9)
RBC # BLD AUTO: 4.29 M/UL (ref 4.6–6.2)
WBC # BLD AUTO: 5.9 K/UL (ref 3.9–12.7)

## 2022-08-11 PROCEDURE — 85027 COMPLETE CBC AUTOMATED: CPT | Performed by: NURSE PRACTITIONER

## 2022-08-11 PROCEDURE — 36415 COLL VENOUS BLD VENIPUNCTURE: CPT | Performed by: NURSE PRACTITIONER

## 2022-08-11 NOTE — TELEPHONE ENCOUNTER
Maggy from [facility] lab reports that patient was in there for lab draw and there was a mix up with the draw. They will not be able to get the Chemistry's.  Patient will have to have a redraw.  Will schedule patient for lab draw before clinic appt.

## 2022-08-15 ENCOUNTER — LAB VISIT (OUTPATIENT)
Dept: LAB | Facility: HOSPITAL | Age: 82
End: 2022-08-15
Attending: INTERNAL MEDICINE
Payer: MEDICARE

## 2022-08-15 DIAGNOSIS — D50.0 IRON DEFICIENCY ANEMIA DUE TO CHRONIC BLOOD LOSS: ICD-10-CM

## 2022-08-15 LAB
ALBUMIN SERPL BCP-MCNC: 3.8 G/DL (ref 3.5–5.2)
ALP SERPL-CCNC: 35 U/L (ref 55–135)
ALT SERPL W/O P-5'-P-CCNC: 8 U/L (ref 10–44)
ANION GAP SERPL CALC-SCNC: 6 MMOL/L (ref 8–16)
ANISOCYTOSIS BLD QL SMEAR: ABNORMAL
AST SERPL-CCNC: 18 U/L (ref 10–40)
BASOPHILS # BLD AUTO: 0.08 K/UL (ref 0–0.2)
BASOPHILS NFR BLD: 1.3 % (ref 0–1.9)
BILIRUB SERPL-MCNC: 0.7 MG/DL (ref 0.1–1)
BUN SERPL-MCNC: 11 MG/DL (ref 8–23)
CALCIUM SERPL-MCNC: 9.2 MG/DL (ref 8.7–10.5)
CHLORIDE SERPL-SCNC: 107 MMOL/L (ref 95–110)
CO2 SERPL-SCNC: 28 MMOL/L (ref 23–29)
CREAT SERPL-MCNC: 0.8 MG/DL (ref 0.5–1.4)
DACRYOCYTES BLD QL SMEAR: ABNORMAL
DIFFERENTIAL METHOD: ABNORMAL
EOSINOPHIL # BLD AUTO: 0.5 K/UL (ref 0–0.5)
EOSINOPHIL NFR BLD: 8.3 % (ref 0–8)
ERYTHROCYTE [DISTWIDTH] IN BLOOD BY AUTOMATED COUNT: 29.2 % (ref 11.5–14.5)
EST. GFR  (NO RACE VARIABLE): >60 ML/MIN/1.73 M^2
GIANT PLATELETS BLD QL SMEAR: PRESENT
GLUCOSE SERPL-MCNC: 95 MG/DL (ref 70–110)
HCT VFR BLD AUTO: 30 % (ref 40–54)
HGB BLD-MCNC: 9 G/DL (ref 14–18)
HYPOCHROMIA BLD QL SMEAR: ABNORMAL
IMM GRANULOCYTES # BLD AUTO: 0.02 K/UL (ref 0–0.04)
IMM GRANULOCYTES NFR BLD AUTO: 0.3 % (ref 0–0.5)
LYMPHOCYTES # BLD AUTO: 0.8 K/UL (ref 1–4.8)
LYMPHOCYTES NFR BLD: 13.3 % (ref 18–48)
MCH RBC QN AUTO: 21.9 PG (ref 27–31)
MCHC RBC AUTO-ENTMCNC: 30 G/DL (ref 32–36)
MCV RBC AUTO: 73 FL (ref 82–98)
MONOCYTES # BLD AUTO: 0.6 K/UL (ref 0.3–1)
MONOCYTES NFR BLD: 9.6 % (ref 4–15)
NEUTROPHILS # BLD AUTO: 4 K/UL (ref 1.8–7.7)
NEUTROPHILS NFR BLD: 67.2 % (ref 38–73)
NRBC BLD-RTO: 0 /100 WBC
OVALOCYTES BLD QL SMEAR: ABNORMAL
PLATELET # BLD AUTO: 326 K/UL (ref 150–450)
PLATELET BLD QL SMEAR: ABNORMAL
PMV BLD AUTO: ABNORMAL FL (ref 9.2–12.9)
POIKILOCYTOSIS BLD QL SMEAR: SLIGHT
POTASSIUM SERPL-SCNC: 4.1 MMOL/L (ref 3.5–5.1)
PROT SERPL-MCNC: 7 G/DL (ref 6–8.4)
RBC # BLD AUTO: 4.11 M/UL (ref 4.6–6.2)
SODIUM SERPL-SCNC: 141 MMOL/L (ref 136–145)
TARGETS BLD QL SMEAR: ABNORMAL
WBC # BLD AUTO: 5.93 K/UL (ref 3.9–12.7)

## 2022-08-15 PROCEDURE — 36415 COLL VENOUS BLD VENIPUNCTURE: CPT | Performed by: NURSE PRACTITIONER

## 2022-08-15 PROCEDURE — 80053 COMPREHEN METABOLIC PANEL: CPT | Performed by: NURSE PRACTITIONER

## 2022-08-15 PROCEDURE — 85025 COMPLETE CBC W/AUTO DIFF WBC: CPT | Performed by: NURSE PRACTITIONER

## 2022-08-15 PROCEDURE — 84466 ASSAY OF TRANSFERRIN: CPT | Performed by: NURSE PRACTITIONER

## 2022-08-15 PROCEDURE — 82728 ASSAY OF FERRITIN: CPT | Performed by: NURSE PRACTITIONER

## 2022-08-16 LAB
FERRITIN SERPL-MCNC: 30 NG/ML (ref 20–300)
IRON SERPL-MCNC: 45 UG/DL (ref 45–160)
SATURATED IRON: 11 % (ref 20–50)
TOTAL IRON BINDING CAPACITY: 414 UG/DL (ref 250–450)
TRANSFERRIN SERPL-MCNC: 280 MG/DL (ref 200–375)

## 2022-08-25 ENCOUNTER — TELEPHONE (OUTPATIENT)
Dept: PAIN MEDICINE | Facility: CLINIC | Age: 82
End: 2022-08-25
Payer: COMMERCIAL

## 2022-08-25 NOTE — TELEPHONE ENCOUNTER
Reached out to patient to reschedule appointment because the provider will be in procedures. No answer. Left message on patients voice mail to call back at earliest convenience to schedule yamilet Mann  Medical

## 2022-09-06 ENCOUNTER — TELEPHONE (OUTPATIENT)
Dept: HEMATOLOGY/ONCOLOGY | Facility: CLINIC | Age: 82
End: 2022-09-06
Payer: MEDICARE

## 2022-09-08 ENCOUNTER — TELEPHONE (OUTPATIENT)
Dept: PAIN MEDICINE | Facility: CLINIC | Age: 82
End: 2022-09-08
Payer: MEDICARE

## 2022-09-08 ENCOUNTER — HOSPITAL ENCOUNTER (OUTPATIENT)
Dept: RADIOLOGY | Facility: HOSPITAL | Age: 82
Discharge: HOME | End: 2022-09-08
Attending: INTERNAL MEDICINE
Payer: COMMERCIAL

## 2022-09-08 DIAGNOSIS — R05.9 COUGH: ICD-10-CM

## 2022-09-08 DIAGNOSIS — R91.1 SOLITARY PULMONARY NODULE: ICD-10-CM

## 2022-09-08 PROCEDURE — 71250 CT THORAX DX C-: CPT | Mod: TC

## 2022-09-08 PROCEDURE — 71250 CT THORAX DX C-: CPT | Mod: 26,,, | Performed by: RADIOLOGY

## 2022-09-08 PROCEDURE — 71250 CT CHEST WITHOUT CONTRAST: ICD-10-PCS | Mod: 26,,, | Performed by: RADIOLOGY

## 2022-09-08 NOTE — TELEPHONE ENCOUNTER
Reached out to patient to reschedule appointment because the provider will be in procedures.  Apt has been made . All questions answered.     Elmer Mann  Medical

## 2022-09-27 NOTE — PROGRESS NOTES
New Patient Chronic Pain Note (Initial Visit)    Referring Physician: Uriel Garces MD    PCP: Vivian Ch MD    Chief Complaint: No chief complaint on file.       SUBJECTIVE:    Richy Douglas is a 81 y.o. male with past medical history significant for alcohol dependence, lung emphysema, aortic aneurysm, atrial fibrillation, systolic congestive heart failure, hypertension, hyperlipidemia, anemia of chronic disease, GERD, nicotine dependence who presents to the clinic for the evaluation of bilateral knee pain.  Patient reports knee pain is in present since April 2022 without inciting accident injury or trauma.  Today patient reports pain is rated a 7/10 and is constant.  Patient reports pain in the popliteal fossa as well as pain which radiates distally to the dorsum of the foot in L4 distribution.  Patient reports associated superficial swelling along the suprapatellar aspect of bilateral knees.  Pain is exacerbated with knee flexion, extension, moving from sitting to standing and with ambulation.  Patient does endorse associated weakness in the lower extremities associated with his pain.  Of note patient reports prior lower back surgery [facility] with improvement in lower back and radicular pain.  Patient has not tried membrane stabilizing agents or interventions at this time.  Patient has tried physician directed physical therapy exercises at home without significant relief.  Patient also reports right big toe pain with onychomycosis and big toe nail falling off.    Patient reports significant motor weakness.  Patient denies night fever/night sweats, urinary incontinence, bowel incontinence, significant weight loss, and loss of sensations.      Pain Disability Index Review:     Last 3 PDI Scores 9/28/2022   Pain Disability Index (PDI) 42       Non-Pharmacologic Treatments:  Physical Therapy/Home Exercise: yes  Ice/Heat:no  TENS: no  Acupuncture: no  Massage: no  Chiropractic: no    Other:  no      Pain Medications:  - Adjuvant Medications: Neurontin (Gabapentin)    Pain Procedures:   None    Past Medical History:   Diagnosis Date    Anemia of chronic disease     Aortic aneurysm     Atrial fibrillation with RVR 9/24/2021    Chronic systolic congestive heart failure 8/31/2017    Emphysema of lung 8/31/2017    GERD (gastroesophageal reflux disease)     Hypertension     Microcytic anemia 11/24/2020    Other hyperlipidemia 4/27/2021    Prostate cancer      Past Surgical History:   Procedure Laterality Date    CATHETERIZATION OF BOTH LEFT AND RIGHT HEART N/A 1/13/2022    Procedure: CATHETERIZATION, HEART, BOTH LEFT AND RIGHT;  Surgeon: Janneth Tovar MD;  Location: [facility] CATH LAB;  Service: Cardiology;  Laterality: N/A;  poss cx    COLONOSCOPY      COLONOSCOPY N/A 6/30/2022    Procedure: COLONOSCOPY;  Surgeon: Sandra Perez MD;  Location: [facility] ENDO;  Service: Endoscopy;  Laterality: N/A;    COLONOSCOPY N/A 7/1/2022    Procedure: COLONOSCOPY;  Surgeon: Woodrow Christian MD;  Location: [facility] ENDO;  Service: Endoscopy;  Laterality: N/A;    CORONARY ANGIOGRAPHY N/A 1/13/2022    Procedure: ANGIOGRAM, CORONARY ARTERY;  Surgeon: Janneth Tovar MD;  Location: [facility] CATH LAB;  Service: Cardiology;  Laterality: N/A;    ESOPHAGOGASTRODUODENOSCOPY N/A 6/30/2022    Procedure: EGD (ESOPHAGOGASTRODUODENOSCOPY);  Surgeon: Sandra Perez MD;  Location: [facility];  Service: Endoscopy;  Laterality: N/A;    PROSTATE SURGERY      SPINE SURGERY       Review of patient's allergies indicates:   Allergen Reactions    Fish containing products     Iodine and iodide containing products     Shellfish containing products        Current Outpatient Medications   Medication Sig    pantoprazole (PROTONIX) 40 MG tablet Take 1 tablet (40 mg total) by mouth once daily.    sacubitriL-valsartan (ENTRESTO)  mg per tablet Take 1 tablet by mouth 2 (two) times daily.    furosemide (LASIX) 20 MG tablet  "Take 1 tablet (20 mg total) by mouth once daily. Do not take if blood pressure is low, feeling dry/dizzy/lightheaded.    gabapentin (NEURONTIN) 100 MG capsule Take 1 cap QHS x 1 week, then increase to 2 caps QHS x 1 week, then 3 caps QHS thereafter     No current facility-administered medications for this visit.       Review of Systems     GENERAL:  No weight loss, malaise or fevers.  HEENT:   No recent changes in vision or hearing  NECK:  Negative for lumps, no difficulty with swallowing.  RESPIRATORY:  Negative for cough, wheezing or shortness of breath, patient denies any recent URI.  CARDIOVASCULAR:  Negative for chest pain or palpitations.  GI:  Negative for abdominal discomfort, blood in stools or black stools or change in bowel habits.  MUSCULOSKELETAL:  See HPI.  SKIN:  Negative for lesions, rash, and itching.  PSYCH:  No mood disorder or recent psychosocial stressors.   HEMATOLOGY/LYMPHOLOGY:  Negative for prolonged bleeding, bruising easily or swollen nodes.    NEURO:   No history of syncope, paralysis, seizures or tremors.  All other reviewed and negative other than HPI.    OBJECTIVE:    BP (!) 128/59   Pulse 78   Resp 17   Ht 5' 9" (1.753 m)   Wt 77.6 kg (171 lb) Comment: pt reported  BMI 25.25 kg/m²       Physical Exam    GENERAL: Well appearing, in no acute distress, alert and oriented x3.  PSYCH:  Mood and affect appropriate.  SKIN: Skin color, texture, turgor normal, no rashes or lesions.  HEAD/FACE:  Normocephalic, atraumatic. Cranial nerves grossly intact.    CV: RRR with palpation of the radial artery.  PULM: No evidence of respiratory difficulty, symmetric chest rise.  GI:  Soft and non-tender.    BACK:  Positive shopping cart sign.  Well-healed 6 in lower lumbar vertical midline incision.  Straight leg raising in the sitting and supine positions is negative to radicular pain. No pain to palpation over the facet joints of the lumbar spine or spinous processes. Normal range of motion without " pain reproduction.  EXTREMITIES: Peripheral joint ROM is reduced with pain.  No deformities, edema, or skin discoloration. Good capillary refill.  MUSCULOSKELETAL: Able to stand on heels & toes.   hip, and knee provocative maneuvers are negative.  There is no pain with palpation over the sacroiliac joints bilaterally.  Gaenslen's, Distraction/Compression and  FABERs test is negative.  Facet loading test is negative bilaterally.   Bilateral upper and lower extremity strength is normal and symmetric.  No atrophy or tone abnormalities are noted.    RIGHT Lower extremity: Hip flexion 5/5, Hip Abduction 5/5, Hip Adduction 5/5, Knee extension 5/5, Knee flexion 5/5, Ankle dorsiflexion5/5, Extensor hallucis longus 5/5, Ankle plantarflexion 5/5  LEFT Lower extremity:  Hip flexion 5/5, Hip Abduction 5/5,Hip Adduction 5/5, Knee extension 5/5, Knee flexion 5/5, Ankle dorsiflexion 5/5, Extensor hallucis longus 5/5, Ankle plantarflexion 5/5  -Normal testing knee (patellar) jerk and ankle (achilles) jerk    NEURO: Bilateral upper and lower extremity coordination and muscle stretch reflexes are physiologic and symmetric. No loss of sensation is noted.    Knee Exam:  bilateral    Erythema: Absent  Deformity/Swelling: Present  Effusion: Absent  Chronic Bony Changes: Present  Creptius: Present     milddiffuse tenderness palpation of the mediolateral joint lines and patella     ROM: full range with pain     Strength is 5/5 bilateral     Meniscus   Christiano:  Medial - negative Lateral - negative    Stability Lachman: normal  PCL-Posterior Drawer: normal MCL - Valgus: normal  LCL - Varus: normal    Patella   Patellar apprehension: negative  Patellar Tracking: normal     Neurovascular intact    GAIT:  Antalgic    Imaging:   None in system        ASSESSMENT: 81 y.o. year old male with knee and leg pain, consistent with     1. Primary osteoarthritis of both knees  X-Ray Knee AP Standing Bilateral    IR Aspiration Injection Large Joint W FL     IR Aspiration Injection Large Joint W FL    Ambulatory referral/consult to Physical/Occupational Therapy    Case Request-RAD/Other Procedure Area: Bilateral intraarticular knee injection with local      2. Knee pain, unspecified chronicity, unspecified laterality  Ambulatory referral/consult to Pain Clinic      3. History of back surgery  X-Ray Lumbar Complete Including Flex And Ext    Ambulatory referral/consult to Physical/Occupational Therapy      4. Pain around toenail, right foot  Ambulatory referral/consult to Podiatry            PLAN:   - Interventions:  Schedule for bilateral intra-articular knee injection with corticosteroid to see if this helps with bilateral knee pain.  Explained the risks and benefits of the procedure in detail with the patient today in clinic along with alternative treatment options, and the patient elected to pursue the intervention at this time.      - Anticoagulation use: no no anticoagulation       report:  Reviewed and consistent with medication use as prescribed.    - Medications:  --  We have discussed starting gabapentin.  Patient will increase medication according to the following algorithm.  We have reviewed potential side effects of this medication including daytime somnolence, weight gain and peripheral edema    Gabapentin Titration:  Week 1: 100 mg q.h.s.  Week 2: 200 mg q.h.s.  Week 3+: 300 mg q.h.s.      - Therapy:   We discussed initiating physical therapy to help manage the patient/s painful condition. The patient was counseled that muscle strengthening will improve the long term prognosis in regards to pain and may also help increase range of motion and mobility. They were told that one of the goals of physical therapy is that they learn how to do the exercises so that they can do them independently at home daily upon completion. The patient's questions were answered and they were agreeable to this course. A referral for physical therapy was provided to the  patient.      - Imaging: Reviewed available imaging with patient and answered any questions they had regarding study.  Bilateral knee and lumbar x-ray to better evaluate pain and weakness    - Consults/Referrals:  Podiatry: for evaluation and treatment recommendations bilateral feet    - Records: Obtain old records from outside physicians and imaging:  Delia Siddiqui General:  Lumbar spine surgery    - Follow up visit: return to clinic in 4-6 weeks      The above plan and management options were discussed at length with patient. Patient is in agreement with the above and verbalized understanding.    - I discussed the goals of interventional chronic pain management with the patient on today's visit. We discussed a multimodal and systematic approach to pain.  This includes diagnostic and therapeutic injections, adjuvant pharmacologic treatment, physical therapy, and at times psychiatry.  I emphasized the importance of regular exercise, core strengthening and stretching, diet and weight loss as a cornerstone of long-term pain management.    - This condition does not require this patient to take time off of work, and the primary goal of our Pain Management services is to improve the patient's functional capacity.  - Patient Questions: Answered all of the patient's questions regarding diagnoses, therapy, treatment and next steps        Devon Grant MD  Interventional Pain Management  [facility]    Disclaimer:  This note was prepared using voice recognition system and is likely to have sound alike errors that may have been overlooked even after proof reading.  Please call me with any questions

## 2022-09-27 NOTE — H&P (VIEW-ONLY)
New Patient Chronic Pain Note (Initial Visit)    Referring Physician: Uriel Garces MD    PCP: Vivian Ch MD    Chief Complaint: No chief complaint on file.       SUBJECTIVE:    Richy Douglas is a 81 y.o. male with past medical history significant for alcohol dependence, lung emphysema, aortic aneurysm, atrial fibrillation, systolic congestive heart failure, hypertension, hyperlipidemia, anemia of chronic disease, GERD, nicotine dependence who presents to the clinic for the evaluation of bilateral knee pain.  Patient reports knee pain is in present since April 2022 without inciting accident injury or trauma.  Today patient reports pain is rated a 7/10 and is constant.  Patient reports pain in the popliteal fossa as well as pain which radiates distally to the dorsum of the foot in L4 distribution.  Patient reports associated superficial swelling along the suprapatellar aspect of bilateral knees.  Pain is exacerbated with knee flexion, extension, moving from sitting to standing and with ambulation.  Patient does endorse associated weakness in the lower extremities associated with his pain.  Of note patient reports prior lower back surgery [facility] with improvement in lower back and radicular pain.  Patient has not tried membrane stabilizing agents or interventions at this time.  Patient has tried physician directed physical therapy exercises at home without significant relief.  Patient also reports right big toe pain with onychomycosis and big toe nail falling off.    Patient reports significant motor weakness.  Patient denies night fever/night sweats, urinary incontinence, bowel incontinence, significant weight loss, and loss of sensations.      Pain Disability Index Review:     Last 3 PDI Scores 9/28/2022   Pain Disability Index (PDI) 42       Non-Pharmacologic Treatments:  Physical Therapy/Home Exercise: yes  Ice/Heat:no  TENS: no  Acupuncture: no  Massage: no  Chiropractic: no    Other:  no      Pain Medications:  - Adjuvant Medications: Neurontin (Gabapentin)    Pain Procedures:   None    Past Medical History:   Diagnosis Date    Anemia of chronic disease     Aortic aneurysm     Atrial fibrillation with RVR 9/24/2021    Chronic systolic congestive heart failure 8/31/2017    Emphysema of lung 8/31/2017    GERD (gastroesophageal reflux disease)     Hypertension     Microcytic anemia 11/24/2020    Other hyperlipidemia 4/27/2021    Prostate cancer      Past Surgical History:   Procedure Laterality Date    CATHETERIZATION OF BOTH LEFT AND RIGHT HEART N/A 1/13/2022    Procedure: CATHETERIZATION, HEART, BOTH LEFT AND RIGHT;  Surgeon: Janneth Tovar MD;  Location: [facility] CATH LAB;  Service: Cardiology;  Laterality: N/A;  poss cx    COLONOSCOPY      COLONOSCOPY N/A 6/30/2022    Procedure: COLONOSCOPY;  Surgeon: Sandra Perez MD;  Location: [facility] ENDO;  Service: Endoscopy;  Laterality: N/A;    COLONOSCOPY N/A 7/1/2022    Procedure: COLONOSCOPY;  Surgeon: Woodrow Christian MD;  Location: [facility] ENDO;  Service: Endoscopy;  Laterality: N/A;    CORONARY ANGIOGRAPHY N/A 1/13/2022    Procedure: ANGIOGRAM, CORONARY ARTERY;  Surgeon: Janneth Tovar MD;  Location: [facility] CATH LAB;  Service: Cardiology;  Laterality: N/A;    ESOPHAGOGASTRODUODENOSCOPY N/A 6/30/2022    Procedure: EGD (ESOPHAGOGASTRODUODENOSCOPY);  Surgeon: Sandra Perez MD;  Location: [facility];  Service: Endoscopy;  Laterality: N/A;    PROSTATE SURGERY      SPINE SURGERY       Review of patient's allergies indicates:   Allergen Reactions    Fish containing products     Iodine and iodide containing products     Shellfish containing products        Current Outpatient Medications   Medication Sig    pantoprazole (PROTONIX) 40 MG tablet Take 1 tablet (40 mg total) by mouth once daily.    sacubitriL-valsartan (ENTRESTO)  mg per tablet Take 1 tablet by mouth 2 (two) times daily.    furosemide (LASIX) 20 MG tablet  "Take 1 tablet (20 mg total) by mouth once daily. Do not take if blood pressure is low, feeling dry/dizzy/lightheaded.    gabapentin (NEURONTIN) 100 MG capsule Take 1 cap QHS x 1 week, then increase to 2 caps QHS x 1 week, then 3 caps QHS thereafter     No current facility-administered medications for this visit.       Review of Systems     GENERAL:  No weight loss, malaise or fevers.  HEENT:   No recent changes in vision or hearing  NECK:  Negative for lumps, no difficulty with swallowing.  RESPIRATORY:  Negative for cough, wheezing or shortness of breath, patient denies any recent URI.  CARDIOVASCULAR:  Negative for chest pain or palpitations.  GI:  Negative for abdominal discomfort, blood in stools or black stools or change in bowel habits.  MUSCULOSKELETAL:  See HPI.  SKIN:  Negative for lesions, rash, and itching.  PSYCH:  No mood disorder or recent psychosocial stressors.   HEMATOLOGY/LYMPHOLOGY:  Negative for prolonged bleeding, bruising easily or swollen nodes.    NEURO:   No history of syncope, paralysis, seizures or tremors.  All other reviewed and negative other than HPI.    OBJECTIVE:    BP (!) 128/59   Pulse 78   Resp 17   Ht 5' 9" (1.753 m)   Wt 77.6 kg (171 lb) Comment: pt reported  BMI 25.25 kg/m²       Physical Exam    GENERAL: Well appearing, in no acute distress, alert and oriented x3.  PSYCH:  Mood and affect appropriate.  SKIN: Skin color, texture, turgor normal, no rashes or lesions.  HEAD/FACE:  Normocephalic, atraumatic. Cranial nerves grossly intact.    CV: RRR with palpation of the radial artery.  PULM: No evidence of respiratory difficulty, symmetric chest rise.  GI:  Soft and non-tender.    BACK:  Positive shopping cart sign.  Well-healed 6 in lower lumbar vertical midline incision.  Straight leg raising in the sitting and supine positions is negative to radicular pain. No pain to palpation over the facet joints of the lumbar spine or spinous processes. Normal range of motion without " pain reproduction.  EXTREMITIES: Peripheral joint ROM is reduced with pain.  No deformities, edema, or skin discoloration. Good capillary refill.  MUSCULOSKELETAL: Able to stand on heels & toes.   hip, and knee provocative maneuvers are negative.  There is no pain with palpation over the sacroiliac joints bilaterally.  Gaenslen's, Distraction/Compression and  FABERs test is negative.  Facet loading test is negative bilaterally.   Bilateral upper and lower extremity strength is normal and symmetric.  No atrophy or tone abnormalities are noted.    RIGHT Lower extremity: Hip flexion 5/5, Hip Abduction 5/5, Hip Adduction 5/5, Knee extension 5/5, Knee flexion 5/5, Ankle dorsiflexion5/5, Extensor hallucis longus 5/5, Ankle plantarflexion 5/5  LEFT Lower extremity:  Hip flexion 5/5, Hip Abduction 5/5,Hip Adduction 5/5, Knee extension 5/5, Knee flexion 5/5, Ankle dorsiflexion 5/5, Extensor hallucis longus 5/5, Ankle plantarflexion 5/5  -Normal testing knee (patellar) jerk and ankle (achilles) jerk    NEURO: Bilateral upper and lower extremity coordination and muscle stretch reflexes are physiologic and symmetric. No loss of sensation is noted.    Knee Exam:  bilateral    Erythema: Absent  Deformity/Swelling: Present  Effusion: Absent  Chronic Bony Changes: Present  Creptius: Present     milddiffuse tenderness palpation of the mediolateral joint lines and patella     ROM: full range with pain     Strength is 5/5 bilateral     Meniscus   Christiano:  Medial - negative Lateral - negative    Stability Lachman: normal  PCL-Posterior Drawer: normal MCL - Valgus: normal  LCL - Varus: normal    Patella   Patellar apprehension: negative  Patellar Tracking: normal     Neurovascular intact    GAIT:  Antalgic    Imaging:   None in system        ASSESSMENT: 81 y.o. year old male with knee and leg pain, consistent with     1. Primary osteoarthritis of both knees  X-Ray Knee AP Standing Bilateral    IR Aspiration Injection Large Joint W FL     IR Aspiration Injection Large Joint W FL    Ambulatory referral/consult to Physical/Occupational Therapy    Case Request-RAD/Other Procedure Area: Bilateral intraarticular knee injection with local      2. Knee pain, unspecified chronicity, unspecified laterality  Ambulatory referral/consult to Pain Clinic      3. History of back surgery  X-Ray Lumbar Complete Including Flex And Ext    Ambulatory referral/consult to Physical/Occupational Therapy      4. Pain around toenail, right foot  Ambulatory referral/consult to Podiatry            PLAN:   - Interventions:  Schedule for bilateral intra-articular knee injection with corticosteroid to see if this helps with bilateral knee pain.  Explained the risks and benefits of the procedure in detail with the patient today in clinic along with alternative treatment options, and the patient elected to pursue the intervention at this time.      - Anticoagulation use: no no anticoagulation       report:  Reviewed and consistent with medication use as prescribed.    - Medications:  --  We have discussed starting gabapentin.  Patient will increase medication according to the following algorithm.  We have reviewed potential side effects of this medication including daytime somnolence, weight gain and peripheral edema    Gabapentin Titration:  Week 1: 100 mg q.h.s.  Week 2: 200 mg q.h.s.  Week 3+: 300 mg q.h.s.      - Therapy:   We discussed initiating physical therapy to help manage the patient/s painful condition. The patient was counseled that muscle strengthening will improve the long term prognosis in regards to pain and may also help increase range of motion and mobility. They were told that one of the goals of physical therapy is that they learn how to do the exercises so that they can do them independently at home daily upon completion. The patient's questions were answered and they were agreeable to this course. A referral for physical therapy was provided to the  patient.      - Imaging: Reviewed available imaging with patient and answered any questions they had regarding study.  Bilateral knee and lumbar x-ray to better evaluate pain and weakness    - Consults/Referrals:  Podiatry: for evaluation and treatment recommendations bilateral feet    - Records: Obtain old records from outside physicians and imaging:  Delia Siddiqui General:  Lumbar spine surgery    - Follow up visit: return to clinic in 4-6 weeks      The above plan and management options were discussed at length with patient. Patient is in agreement with the above and verbalized understanding.    - I discussed the goals of interventional chronic pain management with the patient on today's visit. We discussed a multimodal and systematic approach to pain.  This includes diagnostic and therapeutic injections, adjuvant pharmacologic treatment, physical therapy, and at times psychiatry.  I emphasized the importance of regular exercise, core strengthening and stretching, diet and weight loss as a cornerstone of long-term pain management.    - This condition does not require this patient to take time off of work, and the primary goal of our Pain Management services is to improve the patient's functional capacity.  - Patient Questions: Answered all of the patient's questions regarding diagnoses, therapy, treatment and next steps        Devon Grant MD  Interventional Pain Management  [facility]    Disclaimer:  This note was prepared using voice recognition system and is likely to have sound alike errors that may have been overlooked even after proof reading.  Please call me with any questions

## 2022-09-28 ENCOUNTER — OFFICE VISIT (OUTPATIENT)
Dept: PAIN MEDICINE | Facility: CLINIC | Age: 82
End: 2022-09-28
Payer: COMMERCIAL

## 2022-09-28 ENCOUNTER — HOSPITAL ENCOUNTER (OUTPATIENT)
Dept: RADIOLOGY | Facility: HOSPITAL | Age: 82
Discharge: HOME | End: 2022-09-28
Attending: ANESTHESIOLOGY
Payer: COMMERCIAL

## 2022-09-28 ENCOUNTER — CLINICAL SUPPORT (OUTPATIENT)
Dept: PULMONOLOGY | Facility: CLINIC | Age: 82
End: 2022-09-28
Payer: COMMERCIAL

## 2022-09-28 ENCOUNTER — OFFICE VISIT (OUTPATIENT)
Dept: PULMONOLOGY | Facility: CLINIC | Age: 82
End: 2022-09-28
Payer: COMMERCIAL

## 2022-09-28 VITALS
BODY MASS INDEX: 22.85 KG/M2 | BODY MASS INDEX: 22.85 KG/M2 | WEIGHT: 154.31 LBS | RESPIRATION RATE: 18 BRPM | WEIGHT: 154.31 LBS | HEIGHT: 69 IN | OXYGEN SATURATION: 96 % | HEART RATE: 96 BPM | DIASTOLIC BLOOD PRESSURE: 67 MMHG | HEIGHT: 69 IN | SYSTOLIC BLOOD PRESSURE: 127 MMHG

## 2022-09-28 VITALS
BODY MASS INDEX: 25.33 KG/M2 | HEART RATE: 78 BPM | DIASTOLIC BLOOD PRESSURE: 59 MMHG | WEIGHT: 171 LBS | SYSTOLIC BLOOD PRESSURE: 128 MMHG | HEIGHT: 69 IN | RESPIRATION RATE: 17 BRPM

## 2022-09-28 DIAGNOSIS — J43.1 PANLOBULAR EMPHYSEMA: ICD-10-CM

## 2022-09-28 DIAGNOSIS — L03.032 INFECTED NAILBED OF TOE, LEFT: ICD-10-CM

## 2022-09-28 DIAGNOSIS — M17.0 PRIMARY OSTEOARTHRITIS OF BOTH KNEES: Primary | ICD-10-CM

## 2022-09-28 DIAGNOSIS — Z98.890 HISTORY OF BACK SURGERY: ICD-10-CM

## 2022-09-28 DIAGNOSIS — R09.02 EXERCISE HYPOXEMIA: ICD-10-CM

## 2022-09-28 DIAGNOSIS — J43.1 PANLOBULAR EMPHYSEMA: Primary | ICD-10-CM

## 2022-09-28 DIAGNOSIS — M17.0 PRIMARY OSTEOARTHRITIS OF BOTH KNEES: ICD-10-CM

## 2022-09-28 DIAGNOSIS — M25.569 KNEE PAIN, UNSPECIFIED CHRONICITY, UNSPECIFIED LATERALITY: ICD-10-CM

## 2022-09-28 DIAGNOSIS — R91.1 SOLITARY PULMONARY NODULE: ICD-10-CM

## 2022-09-28 DIAGNOSIS — M79.674 PAIN AROUND TOENAIL, RIGHT FOOT: ICD-10-CM

## 2022-09-28 PROCEDURE — 99999 PR PBB SHADOW E&M-EST. PATIENT-LVL IV: CPT | Mod: PBBFAC,,, | Performed by: INTERNAL MEDICINE

## 2022-09-28 PROCEDURE — 1101F PT FALLS ASSESS-DOCD LE1/YR: CPT | Mod: CPTII,S$GLB,, | Performed by: INTERNAL MEDICINE

## 2022-09-28 PROCEDURE — 3078F DIAST BP <80 MM HG: CPT | Mod: CPTII,S$GLB,, | Performed by: INTERNAL MEDICINE

## 2022-09-28 PROCEDURE — 3074F SYST BP LT 130 MM HG: CPT | Mod: CPTII,S$GLB,, | Performed by: ANESTHESIOLOGY

## 2022-09-28 PROCEDURE — 99999 PR PBB SHADOW E&M-EST. PATIENT-LVL II: ICD-10-PCS | Mod: PBBFAC,,,

## 2022-09-28 PROCEDURE — 1157F ADVNC CARE PLAN IN RCRD: CPT | Mod: CPTII,S$GLB,, | Performed by: INTERNAL MEDICINE

## 2022-09-28 PROCEDURE — 72114 X-RAY EXAM L-S SPINE BENDING: CPT | Mod: 26,,, | Performed by: RADIOLOGY

## 2022-09-28 PROCEDURE — 3288F PR FALLS RISK ASSESSMENT DOCUMENTED: ICD-10-PCS | Mod: CPTII,S$GLB,, | Performed by: ANESTHESIOLOGY

## 2022-09-28 PROCEDURE — 1157F PR ADVANCE CARE PLAN OR EQUIV PRESENT IN MEDICAL RECORD: ICD-10-PCS | Mod: CPTII,S$GLB,, | Performed by: ANESTHESIOLOGY

## 2022-09-28 PROCEDURE — 99214 OFFICE O/P EST MOD 30 MIN: CPT | Mod: 25,S$GLB,, | Performed by: INTERNAL MEDICINE

## 2022-09-28 PROCEDURE — 1159F PR MEDICATION LIST DOCUMENTED IN MEDICAL RECORD: ICD-10-PCS | Mod: CPTII,S$GLB,, | Performed by: ANESTHESIOLOGY

## 2022-09-28 PROCEDURE — 99999 PR PBB SHADOW E&M-EST. PATIENT-LVL V: ICD-10-PCS | Mod: PBBFAC,,, | Performed by: ANESTHESIOLOGY

## 2022-09-28 PROCEDURE — 3074F SYST BP LT 130 MM HG: CPT | Mod: CPTII,S$GLB,, | Performed by: INTERNAL MEDICINE

## 2022-09-28 PROCEDURE — 3074F PR MOST RECENT SYSTOLIC BLOOD PRESSURE < 130 MM HG: ICD-10-PCS | Mod: CPTII,S$GLB,, | Performed by: ANESTHESIOLOGY

## 2022-09-28 PROCEDURE — 1101F PR PT FALLS ASSESS DOC 0-1 FALLS W/OUT INJ PAST YR: ICD-10-PCS | Mod: CPTII,S$GLB,, | Performed by: ANESTHESIOLOGY

## 2022-09-28 PROCEDURE — 73565 X-RAY EXAM OF KNEES: CPT | Mod: TC

## 2022-09-28 PROCEDURE — 99204 PR OFFICE/OUTPT VISIT, NEW, LEVL IV, 45-59 MIN: ICD-10-PCS | Mod: S$GLB,,, | Performed by: ANESTHESIOLOGY

## 2022-09-28 PROCEDURE — 1159F MED LIST DOCD IN RCRD: CPT | Mod: CPTII,S$GLB,, | Performed by: ANESTHESIOLOGY

## 2022-09-28 PROCEDURE — 3288F FALL RISK ASSESSMENT DOCD: CPT | Mod: CPTII,S$GLB,, | Performed by: INTERNAL MEDICINE

## 2022-09-28 PROCEDURE — 3074F PR MOST RECENT SYSTOLIC BLOOD PRESSURE < 130 MM HG: ICD-10-PCS | Mod: CPTII,S$GLB,, | Performed by: INTERNAL MEDICINE

## 2022-09-28 PROCEDURE — 3078F PR MOST RECENT DIASTOLIC BLOOD PRESSURE < 80 MM HG: ICD-10-PCS | Mod: CPTII,S$GLB,, | Performed by: ANESTHESIOLOGY

## 2022-09-28 PROCEDURE — 94618 PULMONARY STRESS TESTING: ICD-10-PCS | Mod: S$GLB,,, | Performed by: INTERNAL MEDICINE

## 2022-09-28 PROCEDURE — 1157F ADVNC CARE PLAN IN RCRD: CPT | Mod: CPTII,S$GLB,, | Performed by: ANESTHESIOLOGY

## 2022-09-28 PROCEDURE — 94618 PULMONARY STRESS TESTING: CPT | Mod: S$GLB,,, | Performed by: INTERNAL MEDICINE

## 2022-09-28 PROCEDURE — 99999 PR PBB SHADOW E&M-EST. PATIENT-LVL II: CPT | Mod: PBBFAC,,,

## 2022-09-28 PROCEDURE — 1159F MED LIST DOCD IN RCRD: CPT | Mod: CPTII,S$GLB,, | Performed by: INTERNAL MEDICINE

## 2022-09-28 PROCEDURE — 1101F PR PT FALLS ASSESS DOC 0-1 FALLS W/OUT INJ PAST YR: ICD-10-PCS | Mod: CPTII,S$GLB,, | Performed by: INTERNAL MEDICINE

## 2022-09-28 PROCEDURE — 3078F DIAST BP <80 MM HG: CPT | Mod: CPTII,S$GLB,, | Performed by: ANESTHESIOLOGY

## 2022-09-28 PROCEDURE — 99999 PR PBB SHADOW E&M-EST. PATIENT-LVL IV: ICD-10-PCS | Mod: PBBFAC,,, | Performed by: INTERNAL MEDICINE

## 2022-09-28 PROCEDURE — 3288F PR FALLS RISK ASSESSMENT DOCUMENTED: ICD-10-PCS | Mod: CPTII,S$GLB,, | Performed by: INTERNAL MEDICINE

## 2022-09-28 PROCEDURE — 1101F PT FALLS ASSESS-DOCD LE1/YR: CPT | Mod: CPTII,S$GLB,, | Performed by: ANESTHESIOLOGY

## 2022-09-28 PROCEDURE — 1125F PR PAIN SEVERITY QUANTIFIED, PAIN PRESENT: ICD-10-PCS | Mod: CPTII,S$GLB,, | Performed by: ANESTHESIOLOGY

## 2022-09-28 PROCEDURE — 99999 PR PBB SHADOW E&M-EST. PATIENT-LVL V: CPT | Mod: PBBFAC,,, | Performed by: ANESTHESIOLOGY

## 2022-09-28 PROCEDURE — 73565 X-RAY EXAM OF KNEES: CPT | Mod: 26,,, | Performed by: RADIOLOGY

## 2022-09-28 PROCEDURE — 72114 XR LUMBAR SPINE 5 VIEW WITH FLEX AND EXT: ICD-10-PCS | Mod: 26,,, | Performed by: RADIOLOGY

## 2022-09-28 PROCEDURE — 73565 XR KNEE AP STANDING BILATERAL: ICD-10-PCS | Mod: 26,,, | Performed by: RADIOLOGY

## 2022-09-28 PROCEDURE — 3288F FALL RISK ASSESSMENT DOCD: CPT | Mod: CPTII,S$GLB,, | Performed by: ANESTHESIOLOGY

## 2022-09-28 PROCEDURE — 99204 OFFICE O/P NEW MOD 45 MIN: CPT | Mod: S$GLB,,, | Performed by: ANESTHESIOLOGY

## 2022-09-28 PROCEDURE — 1159F PR MEDICATION LIST DOCUMENTED IN MEDICAL RECORD: ICD-10-PCS | Mod: CPTII,S$GLB,, | Performed by: INTERNAL MEDICINE

## 2022-09-28 PROCEDURE — 1160F RVW MEDS BY RX/DR IN RCRD: CPT | Mod: CPTII,S$GLB,, | Performed by: INTERNAL MEDICINE

## 2022-09-28 PROCEDURE — 1160F PR REVIEW ALL MEDS BY PRESCRIBER/CLIN PHARMACIST DOCUMENTED: ICD-10-PCS | Mod: CPTII,S$GLB,, | Performed by: INTERNAL MEDICINE

## 2022-09-28 PROCEDURE — 1157F PR ADVANCE CARE PLAN OR EQUIV PRESENT IN MEDICAL RECORD: ICD-10-PCS | Mod: CPTII,S$GLB,, | Performed by: INTERNAL MEDICINE

## 2022-09-28 PROCEDURE — 1125F AMNT PAIN NOTED PAIN PRSNT: CPT | Mod: CPTII,S$GLB,, | Performed by: ANESTHESIOLOGY

## 2022-09-28 PROCEDURE — 3078F PR MOST RECENT DIASTOLIC BLOOD PRESSURE < 80 MM HG: ICD-10-PCS | Mod: CPTII,S$GLB,, | Performed by: INTERNAL MEDICINE

## 2022-09-28 PROCEDURE — 72114 X-RAY EXAM L-S SPINE BENDING: CPT | Mod: TC

## 2022-09-28 PROCEDURE — 99214 PR OFFICE/OUTPT VISIT, EST, LEVL IV, 30-39 MIN: ICD-10-PCS | Mod: 25,S$GLB,, | Performed by: INTERNAL MEDICINE

## 2022-09-28 RX ORDER — GABAPENTIN 100 MG/1
CAPSULE ORAL
Qty: 90 CAPSULE | Refills: 1 | Status: SHIPPED | OUTPATIENT
Start: 2022-09-28 | End: 2022-11-16

## 2022-09-28 RX ORDER — ALBUTEROL SULFATE 90 UG/1
2 AEROSOL, METERED RESPIRATORY (INHALATION) EVERY 4 HOURS PRN
Qty: 8.5 G | Refills: 11 | Status: SHIPPED | OUTPATIENT
Start: 2022-09-28 | End: 2023-03-28 | Stop reason: SDUPTHER

## 2022-09-28 NOTE — PROGRESS NOTES
Subjective:       Patient ID: Richy Douglas is a 81 y.o. male.    Chief Complaint:   HPI COPD  He presents for evaluation and treatment of COPD. The patient is not currently have symptoms / an exacerbation. The patient has COPD for approximately 11 years. Symptoms in previous episodes have included dyspnea, cough, wheezing and fatigue, and typically last 2 weeks. Previous episodes have been exacerbated by any exercise. Current treatment includes albuterol inhaler, which generally provides some relief of symptoms.   He uses 1 pillows at night. Patient currently is not on home oxygen therapy.. The patient is having no constitutional symptoms, denying fever, chills, anorexia, or weight loss. The patient has been hospitalized for this condition before. He has a history of 68 pack years. The patient is experiencing exercise intolerance (difficulty climbing 1 flights of stairs).      Past Medical History:   Diagnosis Date    Anemia of chronic disease     Aortic aneurysm     Atrial fibrillation with RVR 9/24/2021    Chronic systolic congestive heart failure 8/31/2017    Emphysema of lung 8/31/2017    GERD (gastroesophageal reflux disease)     Hypertension     Microcytic anemia 11/24/2020    Other hyperlipidemia 4/27/2021    Prostate cancer      Past Surgical History:   Procedure Laterality Date    CATHETERIZATION OF BOTH LEFT AND RIGHT HEART N/A 1/13/2022    Procedure: CATHETERIZATION, HEART, BOTH LEFT AND RIGHT;  Surgeon: Janneth Tovar MD;  Location: [facility] CATH LAB;  Service: Cardiology;  Laterality: N/A;  poss cx    COLONOSCOPY      COLONOSCOPY N/A 6/30/2022    Procedure: COLONOSCOPY;  Surgeon: Sandra Perez MD;  Location: [facility] ENDO;  Service: Endoscopy;  Laterality: N/A;    COLONOSCOPY N/A 7/1/2022    Procedure: COLONOSCOPY;  Surgeon: Woodrow Christian MD;  Location: [facility] ENDO;  Service: Endoscopy;  Laterality: N/A;    CORONARY ANGIOGRAPHY N/A 1/13/2022    Procedure: ANGIOGRAM, CORONARY ARTERY;  Surgeon:  Janneth Tovar MD;  Location: [facility] CATH LAB;  Service: Cardiology;  Laterality: N/A;    ESOPHAGOGASTRODUODENOSCOPY N/A 2022    Procedure: EGD (ESOPHAGOGASTRODUODENOSCOPY);  Surgeon: Sandra Perez MD;  Location: [facility] ENDO;  Service: Endoscopy;  Laterality: N/A;    PROSTATE SURGERY      SPINE SURGERY       Social History     Socioeconomic History    Marital status:      Spouse name: jenn     Number of children: 6   Tobacco Use    Smoking status: Former     Packs/day: 1.00     Years: 68.00     Pack years: 68.00     Types: Cigarettes     Start date: 1954     Quit date: 2022     Years since quittin.3    Smokeless tobacco: Never   Substance and Sexual Activity    Alcohol use: Yes     Comment: reports only drinks red wine when games are on    Drug use: Never    Sexual activity: Yes     Partners: Female     Social Determinants of Health     Financial Resource Strain: Low Risk     Difficulty of Paying Living Expenses: Not very hard   Food Insecurity: Food Insecurity Present    Worried About Running Out of Food in the Last Year: Often true    Ran Out of Food in the Last Year: Patient refused   Transportation Needs: No Transportation Needs    Lack of Transportation (Medical): No    Lack of Transportation (Non-Medical): No   Physical Activity: Unknown    Days of Exercise per Week: 5 days   Stress: No Stress Concern Present    Feeling of Stress : Not at all   Social Connections: Unknown    Frequency of Communication with Friends and Family: More than three times a week    Frequency of Social Gatherings with Friends and Family: More than three times a week    Active Member of Clubs or Organizations: No    Attends Club or Organization Meetings: Never    Marital Status:    Housing Stability: Unknown    Unable to Pay for Housing in the Last Year: Patient refused    Unstable Housing in the Last Year: Patient refused     @FAM@  Review of Systems   Constitutional:  Positive for fatigue.  "Negative for fever.   HENT:  Positive for postnasal drip, rhinorrhea and congestion.    Eyes:  Negative for redness and itching.   Respiratory:  Positive for cough, sputum production, shortness of breath, dyspnea on extertion, use of rescue inhaler and Paroxysmal Nocturnal Dyspnea.    Cardiovascular:  Negative for chest pain, palpitations and leg swelling.   Genitourinary:  Negative for difficulty urinating and hematuria.   Endocrine:  Negative for cold intolerance and heat intolerance.    Skin:  Negative for rash.   Gastrointestinal:  Negative for nausea and abdominal pain.   Neurological:  Negative for dizziness, syncope, weakness and light-headedness.   Hematological:  Negative for adenopathy. Does not bruise/bleed easily.   Psychiatric/Behavioral:  Negative for sleep disturbance. The patient is not nervous/anxious.      Objective:      /67   Pulse 96   Resp 18   Ht 5' 9" (1.753 m)   Wt 70 kg (154 lb 5.2 oz)   SpO2 96%   BMI 22.79 kg/m²   Physical Exam  Vitals and nursing note reviewed.   Constitutional:       Appearance: He is well-developed.   HENT:      Head: Normocephalic and atraumatic.      Nose: Congestion present.   Eyes:      Conjunctiva/sclera: Conjunctivae normal.      Pupils: Pupils are equal, round, and reactive to light.   Neck:      Thyroid: No thyromegaly.      Vascular: No JVD.      Trachea: No tracheal deviation.   Cardiovascular:      Rate and Rhythm: Normal rate and regular rhythm.      Heart sounds: No murmur heard.  Pulmonary:      Breath sounds: Examination of the right-lower field reveals wheezing. Examination of the left-lower field reveals wheezing. Decreased breath sounds, wheezing and rhonchi present. No rales.   Abdominal:      General: Bowel sounds are normal.      Palpations: Abdomen is soft.   Musculoskeletal:         General: No tenderness. Normal range of motion.      Cervical back: Neck supple.   Lymphadenopathy:      Cervical: No cervical adenopathy.   Skin:     " General: Skin is warm and dry.      Comments: Nailbeds in feet discolored, coming off   Neurological:      Mental Status: He is alert and oriented to person, place, and time.     Personal Diagnostic Review  Chest X-Ray: I personally reviewed the films and findings are:, air trapping/emphysema  X-Ray Lumbar Complete Including Flex And Ext  Narrative: EXAMINATION:  XR LUMBAR SPINE 5 VIEW WITH FLEX AND EXT    CLINICAL HISTORY:  Other specified postprocedural states    TECHNIQUE:  Five views of the lumbar spine plus flexion extension views were performed.    COMPARISON:  None.    FINDINGS:  Vertebral body heights maintained.  Trace anterolisthesis of L4 on L5 and retrolisthesis L1 on L2.  No significant change in alignment on extension or flexion..  Multilevel degenerative disc height loss, most severe L2-3.  Multilevel facet arthropathy and osteophyte changes.  No definite pars defects..  Atherosclerotic vascular calcification.  No acute abnormality.  Impression: Multilevel prominent degenerative findings.    Electronically signed by: Abram Chao MD  Date:    09/28/2022  Time:    08:12  X-Ray Knee AP Standing Bilateral  Narrative: EXAMINATION:  XR KNEE AP STANDING BILATERAL    CLINICAL HISTORY:  Bilateral primary osteoarthritis of knee    TECHNIQUE:  Frontal standing view    COMPARISON:  None    FINDINGS:  Bilateral arthritic changes present including moderate to severe right knee lateral compartment and left knee medial compartment joint space narrowing.  Bilateral chondrocalcinosis noted.  No acute abnormality appreciated.  Impression: As above    Electronically signed by: Abram Chao MD  Date:    09/28/2022  Time:    08:10    Pulmonary function tests:  na  Pulmonary Studies Review 9/28/2022 9/28/2022 9/28/2022 7/28/2022 7/27/2022 7/5/2022 7/5/2022   SpO2 96 - - 98 94 99 99   Ordering Provider -  - - - - -   Interpreting Provider -  - - - - -   Performing nurse/tech/RT - MILTON Rojo, ANURAG - - -  - -   Diagnosis - (No Data) - - - - -   Height 69 69 69 69 69 - -   Weight 2469.15 2469.15 2736 2751.34 2560.86 - -   BMI (Calculated) 22.8 22.8 25.2 25.4 23.6 - -   Predicted Distance 296.95 296.95 283.49 282.37 292.46 - -   Patient Race -  - - - - -   6MWT Status - completed without stopping - - - - -   Patient Reported - Dyspnea;Leg pain - - - - -   Was O2 used? - No - - - - -   6MW Distance walked (feet) - 825 - - - - -   Distance walked (meters) - 251.46 - - - - -   Did patient stop? - No - - - - -   Type of assistive device(s) used? - no assistive devices - - - - -   Is extra documentation required for this patient? - Yes - - - - -   Oxygen Saturation - 98 - - - - -   Supplemental Oxygen - Room Air - - - - -   Heart Rate - 81 - - - - -   Blood Pressure - 122/56 - - - - -   Lupillo Dyspnea Rating  - nothing at all - - - - -   Oxygen Saturation - 96 - - - - -   Supplemental Oxygen - Room Air - - - - -   Heart Rate - 96 - - - - -   Blood Pressure - 127/67 - - - - -   Lupillo Dyspnea Rating  - very heavy - - - - -   Recovery Time (seconds) - 240 - - - - -   Oxygen Saturation - 99 - - - - -   Supplemental Oxygen - Room Air - - - - -   Heart Rate - 82 - - - - -   Blood Pressure - 139/62 - - - - -   Lupillo Dyspnea Rating  - light - - - - -   Is procedure ready for interpretation? - Yes - - - - -   Did the patient stop or pause? - No - - - - -   Total Time Walked (Calculated) - 360 - - - - -   Total Laps Walked - 4 - - - - -   Final Partial Lap Distance (feet) - 25 - - - - -   Total Distance Feet (Calculated) - 825 - - - - -   Total Distance Meters (Calculated) - 251.46 - - - - -   Predicted Distance Meters (Calculated) 488.2 488.2 474.89 474.12 483.63 - -   Percentage of Predicted (Calculated) - 51.51 - - - - -   Peak VO2 (Calculated) - 11.52 - - - - -   Mets - 3.29 - - - - -   Has The Patient Had a Previous Six Minute Walk Test? - No - - - - -   Oxygen Qualification? - No - - - - -     No flowsheet data  "found.      [facility]  Six Minute Walk      SUMMARY      Ordering Provider:    Interpreting Provider:   Performing nurse/tech/RT: MILTON Rojo RRT  Diagnosis:  (Panlobular Emphysema and Exercise Hypoxemia)  Height: 5' 9" (175.3 cm)  Weight: 70 kg (154 lb 5.2 oz)  BMI (Calculated): 22.8              Patient Race:                                                                 Phase Oxygen Assessment Supplemental O2 Heart   Rate Blood Pressure L[place] Dyspnea Scale Rating   Resting 98 % Room Air 81 bpm 122/56 0   Exercise             Minute             1 96 % Room Air 97 bpm       2 95 % Room Air 88 bpm       3 95 % Room Air 89 bpm       4 96 % Room Air 92 bpm       5 97 % Room Air 95 bpm       6  96 % Room Air 96 bpm 127/67 7-8   Recovery             Minute             1 96 % Room Air 91 bpm       2 97 % Room Air 90 bpm       3 99 % Room Air 87 bpm       4 99 % Room Air 82 bpm 139/62 2      Six Minute Walk Summary  6MWT Status: completed without stopping  Patient Reported: Dyspnea, Leg pain (Knee pain)         Interpretation:  Did the patient stop or pause?: No  Total Time Walked (Calculated): 360 seconds  Final Partial Lap Distance (feet): 25 feet  Total Distance Meters (Calculated): 251.46 meters  Predicted Distance Meters (Calculated): 488.2 meters  Percentage of Predicted (Calculated): 51.51  Peak VO2 (Calculated): 11.52  Mets: 3.29  Has The Patient Had a Previous Six Minute Walk Test?: No     Previous 6MWT Results  Has The Patient Had a Previous Six Minute Walk Test?: No             Progress Notes        Assessment:       1. Panlobular emphysema    2. Infected nailbed of toe, left    3. Solitary pulmonary nodule        Solitary pulmonary nodule  F/u CT in one year    Emphysema of lung  Pulmonary Function Studies on return    Outpatient Encounter Medications as of 9/28/2022   Medication Sig Dispense Refill    albuterol (PROVENTIL/VENTOLIN HFA) 90 mcg/actuation " inhaler Inhale 2 puffs into the lungs every 4 (four) hours as needed for Wheezing or Shortness of Breath. 18 g 11    furosemide (LASIX) 20 MG tablet Take 1 tablet (20 mg total) by mouth once daily. Do not take if blood pressure is low, feeling dry/dizzy/lightheaded. 90 tablet 0    gabapentin (NEURONTIN) 100 MG capsule Take 1 capsule by mouth every night at bedtime x 1 week, then increase to 2 capsules by mouth every night at bedtime x 1 week, then 3 capsules by mouth every night at bedtime thereafter 90 capsule 1    pantoprazole (PROTONIX) 40 MG tablet Take 1 tablet (40 mg total) by mouth once daily. 90 tablet 3    sacubitriL-valsartan (ENTRESTO)  mg per tablet Take 1 tablet by mouth 2 (two) times daily. 180 tablet 1     No facility-administered encounter medications on file as of 9/28/2022.     Orders Placed This Encounter   Procedures    CT Chest Without Contrast     Standing Status:   Future     Standing Expiration Date:   9/28/2023     Order Specific Question:   May the Radiologist modify the order per protocol to meet the clinical needs of the patient?     Answer:   Yes    Ambulatory referral/consult to Podiatry     Standing Status:   Future     Standing Expiration Date:   10/28/2023     Referral Priority:   Routine     Referral Type:   Consultation     Referral Reason:   Specialty Services Required     Requested Specialty:   Podiatry     Number of Visits Requested:   1    Complete PFT with bronchodilator     Standing Status:   Future     Standing Expiration Date:   3/28/2024     Order Specific Question:   Release to patient     Answer:   Immediate     Plan:       Requested Prescriptions     Signed Prescriptions Disp Refills    albuterol (PROVENTIL/VENTOLIN HFA) 90 mcg/actuation inhaler 18 g 11     Sig: Inhale 2 puffs into the lungs every 4 (four) hours as needed for Wheezing or Shortness of Breath.     Panlobular emphysema  -     Complete PFT with bronchodilator; Future; Expected date: 09/28/2022  -      albuterol (PROVENTIL/VENTOLIN HFA) 90 mcg/actuation inhaler; Inhale 2 puffs into the lungs every 4 (four) hours as needed for Wheezing or Shortness of Breath.  Dispense: 18 g; Refill: 11    Infected nailbed of toe, left  -     Ambulatory referral/consult to Podiatry; Future; Expected date: 10/05/2022    Solitary pulmonary nodule  -     CT Chest Without Contrast; Future; Expected date: 09/28/2022    Follow up in about 1 year (around 9/28/2023) for Review CT/PET - on return visit, PFT on return.    MEDICAL DECISION MAKING: Moderate to high complexity.  Overall, the multiple problems listed are of moderate to high severity that may impact quality of life and activities of daily living. Side effects of medications, treatment plan as well as options and alternatives reviewed and discussed with patient. There was counseling of patient concerning these issues.    Total time spent in counseling and coordination of care - 35  minutes of total time spent on the encounter, which includes face to face time and non-face to face time preparing to see the patient (eg, review of tests), Obtaining and/or reviewing separately obtained history, Documenting clinical information in the electronic or other health record, Independently interpreting results (not separately reported) and communicating results to the patient/family/caregiver, or Care coordination (not separately reported).    Time was used in discussion of prognosis, risks, benefits of treatment, instructions and compliance with regimen . Discussion with other physicians and/or health care providers - home health or for use of durable medical equipment (oxygen, nebulizers, CPAP, BiPAP) occurred.

## 2022-09-28 NOTE — PROCEDURES
"[facility] [place]  Six Minute Walk     SUMMARY     Ordering Provider:    Interpreting Provider:   Performing nurse/tech/RT: MILTON Rojo RRT  Diagnosis:  (Panlobular Emphysema and Exercise Hypoxemia)  Height: 5' 9" (175.3 cm)  Weight: 70 kg (154 lb 5.2 oz)  BMI (Calculated): 22.8   Patient Race:             Phase Oxygen Assessment Supplemental O2 Heart   Rate Blood Pressure Lupillo Dyspnea Scale Rating   Resting 98 % Room Air 81 bpm 122/56 0   Exercise        Minute        1 96 % Room Air 97 bpm     2 95 % Room Air 88 bpm     3 95 % Room Air 89 bpm     4 96 % Room Air 92 bpm     5 97 % Room Air 95 bpm     6  96 % Room Air 96 bpm 127/67 7-8   Recovery        Minute        1 96 % Room Air 91 bpm     2 97 % Room Air 90 bpm     3 99 % Room Air 87 bpm     4 99 % Room Air 82 bpm 139/62 2     Six Minute Walk Summary  6MWT Status: completed without stopping  Patient Reported: Dyspnea, Leg pain (Knee pain)     Interpretation:  Did the patient stop or pause?: No                                         Total Time Walked (Calculated): 360 seconds  Final Partial Lap Distance (feet): 25 feet  Total Distance Meters (Calculated): 251.46 meters  Predicted Distance Meters (Calculated): 488.2 meters  Percentage of Predicted (Calculated): 51.51  Peak VO2 (Calculated): 11.52  Mets: 3.29  Has The Patient Had a Previous Six Minute Walk Test?: No       Previous 6MWT Results  Has The Patient Had a Previous Six Minute Walk Test?: No      Interpretation:  Total distance walked in six minutes is moderately reduced indicating a reduction in overall  functional capacity. Oxygen desaturation did not meet criteria for supplemental oxygen prescription.    Clinical correlation suggested.    [] Mild exercise-induced hypoxemia described as an arterial oxygen saturation of 93-95% (or 3-4% less than at rest),  [] Moderate exercise-induced hypoxemia as 89-93%  [] Severe exercise induced hypoxemia as < 89% O2 " saturation.  Medicare Criteria for oxygen prescription comments:   When arterial oxygen saturation is at or below 88% during exercise on room air (severe exercise induced hypoxemia) then the patient falls under Medicare Group 1 criteria for supplemental oxygen prescription.  Details about Medicare Group Criteria coverage can be found at http://www.cms.[facility].gov/manuals/downloads/   Michael Jackson MD

## 2022-10-03 ENCOUNTER — OFFICE VISIT (OUTPATIENT)
Dept: PODIATRY | Facility: CLINIC | Age: 82
End: 2022-10-03
Payer: COMMERCIAL

## 2022-10-03 VITALS — WEIGHT: 154.31 LBS | BODY MASS INDEX: 22.85 KG/M2 | HEIGHT: 69 IN

## 2022-10-03 DIAGNOSIS — Z71.89 COMPLEX CARE COORDINATION: ICD-10-CM

## 2022-10-03 DIAGNOSIS — G62.1 ALCOHOLIC PERIPHERAL NEUROPATHY: Primary | ICD-10-CM

## 2022-10-03 DIAGNOSIS — L85.3 DRY SKIN DERMATITIS: ICD-10-CM

## 2022-10-03 PROCEDURE — 1157F ADVNC CARE PLAN IN RCRD: CPT | Mod: CPTII,S$GLB,, | Performed by: PODIATRIST

## 2022-10-03 PROCEDURE — 1159F PR MEDICATION LIST DOCUMENTED IN MEDICAL RECORD: ICD-10-PCS | Mod: CPTII,S$GLB,, | Performed by: PODIATRIST

## 2022-10-03 PROCEDURE — 1101F PT FALLS ASSESS-DOCD LE1/YR: CPT | Mod: CPTII,S$GLB,, | Performed by: PODIATRIST

## 2022-10-03 PROCEDURE — 3288F FALL RISK ASSESSMENT DOCD: CPT | Mod: CPTII,S$GLB,, | Performed by: PODIATRIST

## 2022-10-03 PROCEDURE — 1160F PR REVIEW ALL MEDS BY PRESCRIBER/CLIN PHARMACIST DOCUMENTED: ICD-10-PCS | Mod: CPTII,S$GLB,, | Performed by: PODIATRIST

## 2022-10-03 PROCEDURE — 1160F RVW MEDS BY RX/DR IN RCRD: CPT | Mod: CPTII,S$GLB,, | Performed by: PODIATRIST

## 2022-10-03 PROCEDURE — 1125F AMNT PAIN NOTED PAIN PRSNT: CPT | Mod: CPTII,S$GLB,, | Performed by: PODIATRIST

## 2022-10-03 PROCEDURE — 99203 OFFICE O/P NEW LOW 30 MIN: CPT | Mod: S$GLB,,, | Performed by: PODIATRIST

## 2022-10-03 PROCEDURE — 3288F PR FALLS RISK ASSESSMENT DOCUMENTED: ICD-10-PCS | Mod: CPTII,S$GLB,, | Performed by: PODIATRIST

## 2022-10-03 PROCEDURE — 1157F PR ADVANCE CARE PLAN OR EQUIV PRESENT IN MEDICAL RECORD: ICD-10-PCS | Mod: CPTII,S$GLB,, | Performed by: PODIATRIST

## 2022-10-03 PROCEDURE — 1101F PR PT FALLS ASSESS DOC 0-1 FALLS W/OUT INJ PAST YR: ICD-10-PCS | Mod: CPTII,S$GLB,, | Performed by: PODIATRIST

## 2022-10-03 PROCEDURE — 99999 PR PBB SHADOW E&M-EST. PATIENT-LVL III: ICD-10-PCS | Mod: PBBFAC,,, | Performed by: PODIATRIST

## 2022-10-03 PROCEDURE — 1125F PR PAIN SEVERITY QUANTIFIED, PAIN PRESENT: ICD-10-PCS | Mod: CPTII,S$GLB,, | Performed by: PODIATRIST

## 2022-10-03 PROCEDURE — 1159F MED LIST DOCD IN RCRD: CPT | Mod: CPTII,S$GLB,, | Performed by: PODIATRIST

## 2022-10-03 PROCEDURE — 99203 PR OFFICE/OUTPT VISIT, NEW, LEVL III, 30-44 MIN: ICD-10-PCS | Mod: S$GLB,,, | Performed by: PODIATRIST

## 2022-10-03 PROCEDURE — 99999 PR PBB SHADOW E&M-EST. PATIENT-LVL III: CPT | Mod: PBBFAC,,, | Performed by: PODIATRIST

## 2022-10-03 RX ORDER — TRIAMCINOLONE ACETONIDE 5 MG/G
CREAM TOPICAL 2 TIMES DAILY
Qty: 450 G | Refills: 0 | Status: SHIPPED | OUTPATIENT
Start: 2022-10-03 | End: 2023-02-11

## 2022-10-07 ENCOUNTER — PES CALL (OUTPATIENT)
Dept: ADMINISTRATIVE | Facility: CLINIC | Age: 82
End: 2022-10-07
Payer: COMMERCIAL

## 2022-10-13 NOTE — PROGRESS NOTES
Subjective:     Patient ID: Richy Douglas is a 81 y.o. male.    Chief Complaint: Nail Problem (Pt c/o right hallux toenail loose, pt c/o BL ankle pain 7/10, non-diabetic pt wears tennis  shoes, PCP Dr. Ch last seen 1-31-22) and Ankle Pain    Richy is a 81 y.o. male who presents to the podiatry clinic  with complaint of bilateral ankle pain. Onset of the symptoms was about a month ago. Precipitating event: none known. Current symptoms include: ability to bear weight, but with some pain. Aggravating factors: standing and walking. Symptoms have been intermittent. Patients rates pain 7/10 on pain scale.    Patient Active Problem List   Diagnosis    Anemia of chronic disease    Chronic systolic congestive heart failure    Emphysema of lung    Hypertension    History of lower gastrointestinal bleeding    Iron deficiency anemia due to chronic blood loss    Nicotine abuse    Nonrheumatic aortic valve insufficiency    Calcified granuloma of lung    Shortness of breath    Atherosclerosis of abdominal aorta    Other hyperlipidemia    Atrial fibrillation    Bacteremia    Alcohol dependence    AVM (arteriovenous malformation) of small bowel, acquired with hemorrhage    Acute blood loss anemia    Solitary pulmonary nodule       Medication List with Changes/Refills   New Medications    TRIAMCINOLONE ACETONIDE 0.5% (KENALOG) 0.5 % CREA    Apply topically 2 (two) times daily. for 14 days   Current Medications    ALBUTEROL (PROVENTIL/VENTOLIN HFA) 90 MCG/ACTUATION INHALER    Inhale 2 puffs into the lungs every 4 (four) hours as needed for Wheezing or Shortness of Breath.    FUROSEMIDE (LASIX) 20 MG TABLET    Take 1 tablet (20 mg total) by mouth once daily. Do not take if blood pressure is low, feeling dry/dizzy/lightheaded.    GABAPENTIN (NEURONTIN) 100 MG CAPSULE    Take 1 capsule by mouth every night at bedtime x 1 week, then increase to 2 capsules by mouth every night at bedtime x 1 week, then 3 capsules by mouth every night at  bedtime thereafter    PANTOPRAZOLE (PROTONIX) 40 MG TABLET    Take 1 tablet (40 mg total) by mouth once daily.    SACUBITRIL-VALSARTAN (ENTRESTO)  MG PER TABLET    Take 1 tablet by mouth 2 (two) times daily.       Review of patient's allergies indicates:   Allergen Reactions    Fish containing products     Iodine and iodide containing products     Shellfish containing products        Past Surgical History:   Procedure Laterality Date    CATHETERIZATION OF BOTH LEFT AND RIGHT HEART N/A 2022    Procedure: CATHETERIZATION, HEART, BOTH LEFT AND RIGHT;  Surgeon: Janneth Tovar MD;  Location: [facility] CATH LAB;  Service: Cardiology;  Laterality: N/A;  poss cx    COLONOSCOPY      COLONOSCOPY N/A 2022    Procedure: COLONOSCOPY;  Surgeon: Sandra Perez MD;  Location: [facility] ENDO;  Service: Endoscopy;  Laterality: N/A;    COLONOSCOPY N/A 2022    Procedure: COLONOSCOPY;  Surgeon: Woodrow Christian MD;  Location: [facility] ENDO;  Service: Endoscopy;  Laterality: N/A;    CORONARY ANGIOGRAPHY N/A 2022    Procedure: ANGIOGRAM, CORONARY ARTERY;  Surgeon: Janneth Tovar MD;  Location: [facility] CATH LAB;  Service: Cardiology;  Laterality: N/A;    ESOPHAGOGASTRODUODENOSCOPY N/A 2022    Procedure: EGD (ESOPHAGOGASTRODUODENOSCOPY);  Surgeon: Sandra Perez MD;  Location: [facility];  Service: Endoscopy;  Laterality: N/A;    PROSTATE SURGERY      SPINE SURGERY         Family History   Problem Relation Age of Onset    Diabetes Mother     Peripheral vascular disease Mother        Social History     Socioeconomic History    Marital status:      Spouse name: jenn     Number of children: 6   Tobacco Use    Smoking status: Some Days     Packs/day: 1.00     Years: 68.00     Pack years: 68.00     Types: Cigarettes     Start date: 1954     Last attempt to quit: 2022     Years since quittin.3    Smokeless tobacco: Never   Substance and Sexual Activity    Alcohol use: Yes     Comment: reports  "only drinks red wine when games are on    Drug use: Never    Sexual activity: Yes     Partners: Female     Social Determinants of Health     Financial Resource Strain: Low Risk     Difficulty of Paying Living Expenses: Not very hard   Food Insecurity: Food Insecurity Present    Worried About Running Out of Food in the Last Year: Often true    Ran Out of Food in the Last Year: Patient refused   Transportation Needs: No Transportation Needs    Lack of Transportation (Medical): No    Lack of Transportation (Non-Medical): No   Physical Activity: Unknown    Days of Exercise per Week: 5 days   Stress: No Stress Concern Present    Feeling of Stress : Not at all   Social Connections: Unknown    Frequency of Communication with Friends and Family: More than three times a week    Frequency of Social Gatherings with Friends and Family: More than three times a week    Active Member of Clubs or Organizations: No    Attends Club or Organization Meetings: Never    Marital Status:    Housing Stability: Unknown    Unable to Pay for Housing in the Last Year: Patient refused    Unstable Housing in the Last Year: Patient refused       Vitals:    10/03/22 1432   Weight: 70 kg (154 lb 5.2 oz)   Height: 5' 9" (1.753 m)   PainSc:   7         Review of Systems   Constitutional:  Negative for chills and fever.   Respiratory:  Negative for shortness of breath.    Cardiovascular:  Negative for chest pain, palpitations, orthopnea, claudication and leg swelling.   Gastrointestinal:  Negative for diarrhea, nausea and vomiting.   Musculoskeletal:  Negative for joint pain.   Skin:  Negative for rash.   Neurological:  Negative for dizziness, tingling, sensory change, focal weakness and weakness.   Psychiatric/Behavioral: Negative.             Objective:       PHYSICAL EXAM: Apperance: Alert and orient in no distress,well developed, and with good attention to grooming and body habits  Patient presents ambulating in tennis shoes  Lower Extremity " Physical Exam:  VASCULAR: Dorsalis pedis pulses 1/4 bilateral and Posterior Tibial pulses 1/4 bilateral. Capillary fill time <blanched bilateral. Mild edema observed bilateral. Varicosities absent bilateral. Skin temperature of the lower extremities is warm to warm, proximal to distal. Hair growth dim bilateral.  DERMATOLOGICAL: No skin rashes, subcutaneous nodules, lesions, or ulcers observed bilateral. Dry skin noted to bilateral lower extremities.  NEUROLOGICAL: Light touch, sharp-dull, proprioception all present and equal bilaterally.    MUSCULOSKELETAL: Muscle strength is 5/5 for foot inverters, everters, plantarflexors, and dorsiflexors. Muscle tone is normal. (--) pain on palpation of bilateral ankles.         Assessment:       ICD-10-CM ICD-9-CM   1. Alcoholic peripheral neuropathy  G62.1 357.5   2. Dry skin dermatitis  L85.3 692.89       Plan:   Alcoholic peripheral neuropathy  -     Ambulatory referral/consult to Podiatry    Dry skin dermatitis  -     triamcinolone acetonide 0.5% (KENALOG) 0.5 % Crea; Apply topically 2 (two) times daily. for 14 days  Dispense: 450 g; Refill: 0    I counseled the patient on his conditions, regarding findings of my examination, my impressions, and usual treatment plan.   Prescription written for Kenalog 0.5% Cream.   Discussed with patient treatments for arthritis/neuropathy consisting of topical or oral medication.  Recommendations given for over-the-counter medicine such as Two Old Goats and/or Capsaicin.  The patient and I reviewed the types of shoes he should be wearing, my recommendation includes generally the best time of the day for a shoe fitting is the afternoon, shoes with a wide toe box, very good cushion, and tennis shoes with removable inner soles.The patient and I reviewed my recommendations for over-the-counter orthotic inserts.   Counseled patient on daily foot inspections and proper shoe gear.  Patient to return ad needed.        Nick Adams,  DPM [facility]

## 2022-10-14 NOTE — PRE-PROCEDURE INSTRUCTIONS
Attempted to PAT patient for procedure on 10.21 with Dr. russell. No answer. M with return phone number. No return call at this time.

## 2022-10-19 NOTE — PRE-PROCEDURE INSTRUCTIONS
Spoke with patient regarding procedure scheduled on 10.21     Arrival time 1040     Has patient been sick with fever or on antibiotics within the last 7 days? No     Does the patient have any open wounds, sores or rashes? No     Does the patient have any recent fractures? no     Has patient received a vaccination within the last 7 days? No     Received the COVID vaccination?      Has the patient stopped all medications as directed? na      Does patient have a pacemaker and or defibrillator? no     Does the patient have a ride to and from procedure and someone reliable to remain with patient? crow      Is the patient diabetic? no     Does the patient have sleep apnea? Or use O2 at home? no     Is the patient receiving sedation? yes     Is the patient instructed to remain NPO beginning at midnight the night before their procedure? yes     Procedure location confirmed with patient? Yes     Covid- Denies signs/symptoms. Instructed to notify PAT/MD if any changes.

## 2022-10-21 ENCOUNTER — HOSPITAL ENCOUNTER (OUTPATIENT)
Facility: HOSPITAL | Age: 82
Discharge: HOME | End: 2022-10-21
Attending: ANESTHESIOLOGY | Admitting: ANESTHESIOLOGY
Payer: COMMERCIAL

## 2022-10-21 VITALS
BODY MASS INDEX: 23.64 KG/M2 | DIASTOLIC BLOOD PRESSURE: 63 MMHG | TEMPERATURE: 97 F | WEIGHT: 159.63 LBS | HEIGHT: 69 IN | OXYGEN SATURATION: 99 % | RESPIRATION RATE: 16 BRPM | HEART RATE: 74 BPM | SYSTOLIC BLOOD PRESSURE: 137 MMHG

## 2022-10-21 DIAGNOSIS — M17.9 KNEE OSTEOARTHRITIS: ICD-10-CM

## 2022-10-21 PROCEDURE — 63600175 PHARM REV CODE 636 W HCPCS: Performed by: ANESTHESIOLOGY

## 2022-10-21 PROCEDURE — 20610 DRAIN/INJ JOINT/BURSA W/O US: CPT | Mod: 50,,, | Performed by: ANESTHESIOLOGY

## 2022-10-21 PROCEDURE — 77002 NEEDLE LOCALIZATION BY XRAY: CPT | Mod: 26,,, | Performed by: ANESTHESIOLOGY

## 2022-10-21 PROCEDURE — 25000003 PHARM REV CODE 250: Performed by: ANESTHESIOLOGY

## 2022-10-21 PROCEDURE — 25500020 PHARM REV CODE 255: Performed by: ANESTHESIOLOGY

## 2022-10-21 PROCEDURE — 20610 PR DRAIN/INJECT LARGE JOINT/BURSA: ICD-10-PCS | Mod: 50,,, | Performed by: ANESTHESIOLOGY

## 2022-10-21 PROCEDURE — 77002 PR FLUOROSCOPIC GUIDANCE NEEDLE PLACEMENT: ICD-10-PCS | Mod: 26,,, | Performed by: ANESTHESIOLOGY

## 2022-10-21 PROCEDURE — 20610 DRAIN/INJ JOINT/BURSA W/O US: CPT | Mod: 50 | Performed by: ANESTHESIOLOGY

## 2022-10-21 PROCEDURE — A9585 GADOBUTROL INJECTION: HCPCS | Performed by: ANESTHESIOLOGY

## 2022-10-21 RX ORDER — SODIUM BICARBONATE 1 MEQ/ML
SYRINGE (ML) INTRAVENOUS
Status: DISCONTINUED | OUTPATIENT
Start: 2022-10-21 | End: 2022-10-21 | Stop reason: HOSPADM

## 2022-10-21 RX ORDER — TRIAMCINOLONE ACETONIDE 40 MG/ML
INJECTION, SUSPENSION INTRA-ARTICULAR; INTRAMUSCULAR
Status: DISCONTINUED | OUTPATIENT
Start: 2022-10-21 | End: 2022-10-21 | Stop reason: HOSPADM

## 2022-10-21 RX ORDER — BUPIVACAINE HYDROCHLORIDE 2.5 MG/ML
INJECTION, SOLUTION EPIDURAL; INFILTRATION; INTRACAUDAL
Status: DISCONTINUED | OUTPATIENT
Start: 2022-10-21 | End: 2022-10-21 | Stop reason: HOSPADM

## 2022-10-21 RX ORDER — GADOBUTROL 604.72 MG/ML
INJECTION INTRAVENOUS
Status: DISCONTINUED | OUTPATIENT
Start: 2022-10-21 | End: 2022-10-21 | Stop reason: HOSPADM

## 2022-10-21 NOTE — DISCHARGE INSTRUCTIONS

## 2022-10-21 NOTE — DISCHARGE SUMMARY
Discharge Note  Short Stay      SUMMARY     Admit Date: 10/21/2022    Attending Physician: Devon Grant MD        Discharge Physician: Devon Grant MD        Discharge Date: 10/21/2022 12:03 PM    Procedure(s) (LRB):  Bilateral intraarticular knee injection with local ALLERGIC TO IODINE (Bilateral)    Final Diagnosis: Primary osteoarthritis of both knees [M17.0]    Disposition: Home or self care    Patient Instructions:   Current Discharge Medication List        CONTINUE these medications which have NOT CHANGED    Details   furosemide (LASIX) 20 MG tablet Take 1 tablet (20 mg total) by mouth once daily. Do not take if blood pressure is low, feeling dry/dizzy/lightheaded.  Qty: 90 tablet, Refills: 0    Comments: Please bring meds to patient's bedside prior to discharge      gabapentin (NEURONTIN) 100 MG capsule Take 1 capsule by mouth every night at bedtime x 1 week, then increase to 2 capsules by mouth every night at bedtime x 1 week, then 3 capsules by mouth every night at bedtime thereafter  Qty: 90 capsule, Refills: 1      pantoprazole (PROTONIX) 40 MG tablet Take 1 tablet (40 mg total) by mouth once daily.  Qty: 90 tablet, Refills: 3      sacubitriL-valsartan (ENTRESTO)  mg per tablet Take 1 tablet by mouth 2 (two) times daily.  Qty: 180 tablet, Refills: 1    Associated Diagnoses: Chronic combined systolic and diastolic congestive heart failure, NYHA class 3      albuterol (PROVENTIL/VENTOLIN HFA) 90 mcg/actuation inhaler Inhale 2 puffs into the lungs every 4 (four) hours as needed for Wheezing or Shortness of Breath.  Qty: 8.5 g, Refills: 11    Comments: Dispense formulary  Associated Diagnoses: Panlobular emphysema      triamcinolone acetonide 0.5% (KENALOG) 0.5 % Crea Apply topically 2 (two) times daily. for 14 days  Qty: 450 g, Refills: 0    Associated Diagnoses: Dry skin dermatitis                 Discharge Diagnosis: Primary osteoarthritis of both knees [M17.0]  Condition on Discharge: Stable with  no complications to procedure   Diet on Discharge: Same as before.  Activity: as per instruction sheet.  Discharge to: Home with a responsible adult.  Follow up: 2-4 weeks       Please call the office at [phone] if you experience any weakness or loss of sensation, fever > 101.5, pain uncontrolled with oral medications, persistent nausea/vomiting/or diarrhea, redness or drainage from the incisions, or any other worrisome concerns. If physician on call was not reached or could not communicate with our office for any reason please go to the nearest emergency department

## 2022-10-21 NOTE — OP NOTE
Procedure Note:     bilateral Knee steroid injection under fluoroscopy    10/21/2022                                 Surgeon: Devon Grant MD       Assistant: None     Pre-Op Diagnosis:  bilateral Primary osteoarthritis of both knees [M17.0]    Post-Op Diagnosis: Primary osteoarthritis of both knees [M17.0]     EBL: None     Complications: None     Specimens: None     Description of procedure:     After written consent was obtained, patient placed in supine position.  The area over the  lateral aspect of the bilateral  knee(s) joint prepped with chlorhexidine.  The area was draped in the usual sterile fashion.  Approximately 5 mL 1% lidocaine was infiltrated into the skin overlying the predetermined entry point. A 22 gauge spinal needle was then advanced under fluoroscopy in the AP views into the knee joint. After negative aspiration there was injection of 1 mL radio opaque contrast dye to confirm needle location within the joint, and no evidence of intravascular spread. This was followed by injection of 6 mL of 0.25% bupivacaine +40mg of triamcinolone into the joint space. Displacement of the contrast indicated that the medication went into the area of the joint space. Needle was withdrawn and a sterile band-aid applied to the skin.     Patient tolerated the procedure well, and was reporting improvement of pain symptoms after the injection.  She was discharged from the clinic in stable condition.

## 2022-11-01 DIAGNOSIS — I48.0 PAROXYSMAL ATRIAL FIBRILLATION: ICD-10-CM

## 2022-11-01 DIAGNOSIS — I50.42 CHRONIC COMBINED SYSTOLIC AND DIASTOLIC CONGESTIVE HEART FAILURE, NYHA CLASS 3: Primary | ICD-10-CM

## 2022-11-02 ENCOUNTER — HOSPITAL ENCOUNTER (OUTPATIENT)
Dept: CARDIOLOGY | Facility: HOSPITAL | Age: 82
Discharge: HOME | End: 2022-11-02
Attending: INTERNAL MEDICINE
Payer: COMMERCIAL

## 2022-11-02 ENCOUNTER — OFFICE VISIT (OUTPATIENT)
Dept: CARDIOLOGY | Facility: CLINIC | Age: 82
End: 2022-11-02
Payer: COMMERCIAL

## 2022-11-02 VITALS
BODY MASS INDEX: 23.12 KG/M2 | DIASTOLIC BLOOD PRESSURE: 50 MMHG | WEIGHT: 156.06 LBS | HEART RATE: 55 BPM | OXYGEN SATURATION: 97 % | HEIGHT: 69 IN | SYSTOLIC BLOOD PRESSURE: 118 MMHG

## 2022-11-02 DIAGNOSIS — I50.42 CHRONIC COMBINED SYSTOLIC AND DIASTOLIC CONGESTIVE HEART FAILURE, NYHA CLASS 3: ICD-10-CM

## 2022-11-02 DIAGNOSIS — I48.0 PAROXYSMAL ATRIAL FIBRILLATION: ICD-10-CM

## 2022-11-02 DIAGNOSIS — I70.0 ATHEROSCLEROSIS OF ABDOMINAL AORTA: ICD-10-CM

## 2022-11-02 DIAGNOSIS — I50.22 CHRONIC SYSTOLIC CONGESTIVE HEART FAILURE: Primary | ICD-10-CM

## 2022-11-02 PROCEDURE — 99214 PR OFFICE/OUTPT VISIT, EST, LEVL IV, 30-39 MIN: ICD-10-PCS | Mod: S$GLB,,, | Performed by: INTERNAL MEDICINE

## 2022-11-02 PROCEDURE — 93005 ELECTROCARDIOGRAM TRACING: CPT

## 2022-11-02 PROCEDURE — 99213 OFFICE O/P EST LOW 20 MIN: CPT | Mod: PBBFAC | Performed by: INTERNAL MEDICINE

## 2022-11-02 PROCEDURE — 99999 PR PBB SHADOW E&M-EST. PATIENT-LVL III: CPT | Mod: PBBFAC,,, | Performed by: INTERNAL MEDICINE

## 2022-11-02 PROCEDURE — 93010 EKG 12-LEAD: ICD-10-PCS | Mod: ,,, | Performed by: INTERNAL MEDICINE

## 2022-11-02 PROCEDURE — 99214 OFFICE O/P EST MOD 30 MIN: CPT | Mod: S$GLB,,, | Performed by: INTERNAL MEDICINE

## 2022-11-02 PROCEDURE — 93010 ELECTROCARDIOGRAM REPORT: CPT | Mod: ,,, | Performed by: INTERNAL MEDICINE

## 2022-11-02 PROCEDURE — 99999 PR PBB SHADOW E&M-EST. PATIENT-LVL III: ICD-10-PCS | Mod: PBBFAC,,, | Performed by: INTERNAL MEDICINE

## 2022-11-02 NOTE — Clinical Note
Cliff Ch and Rudy I am seeing Mr Douglas for consideration of ICD implantation. He was under the impression I was seeing him for knee pain. His left knee is more painful now than prior to his injection a few weeks ago. It is not hot or tender. He is not on an anticoagulant. No other signs or symptoms of infection.   FYI- He does not want to pursue ICD implantation so I do not have any plans for procedures at this time.   Thank you, Eric Christianson

## 2022-11-02 NOTE — PROGRESS NOTES
Subjective:    Patient ID:  Richy Douglas is a 82 y.o. male who presents for follow-up of Shortness of Breath and Leg Swelling      82 yoM referred for ICD consideration.     7/22: He has NICM with EF <35% on serial echos for the past 6+ months. Normal VT and QRS. He also has severe pHTN. He had an episode of short RP tachycardia 1/22 as well as AF 9/24/21. He is on eliquis for CVA prophylaxis. He has required serial transfusion for anemia before and after eliquis initiation. I see no other AF episodes outside the event 9/24/21 while he was septic.     Interval history: Eliquis stopped at the last visit. He deferred ICD and ablation. He is under the impression that he is here for swollen knees. He continues to decline invasive cardiac procedures.     Echo 5/22:  · The left ventricle is moderately enlarged with eccentric hypertrophy and moderately decreased systolic function.  · Severe left atrial enlargement.  · The estimated ejection fraction is 30%.  · Grade II left ventricular diastolic dysfunction.  · Moderate right ventricular enlargement with moderately reduced right ventricular systolic function.  · Mild right atrial enlargement.  · Moderate aortic regurgitation.  · Mild mitral regurgitation.  · Mild to moderate tricuspid regurgitation.  · Mild pulmonic regurgitation.  · Intermediate central venous pressure (8 mmHg).  · The estimated PA systolic pressure is 93 mmHg.  · There is pulmonary hypertension.    LHC/RHC 1/22:  · The pre-procedure left ventricular end diastolic pressure was 13.  · The estimated blood loss was none.  · The coronary arteries were normal..  · The filling pressures on the right and left were normal.    Past Medical History:  No date: Anemia of chronic disease  No date: Aortic aneurysm  09/24/2021: Atrial fibrillation with RVR  08/31/2017: Chronic systolic congestive heart failure  08/31/2017: Emphysema of lung  No date: GERD (gastroesophageal reflux disease)  No date:  Hypertension  10/21/2022: Knee osteoarthritis  11/24/2020: Microcytic anemia  04/27/2021: Other hyperlipidemia  2022: Personal history of colonic polyps  No date: Prostate cancer    Past Surgical History:  1/13/2022: CATHETERIZATION OF BOTH LEFT AND RIGHT HEART; N/A      Comment:  Procedure: CATHETERIZATION, HEART, BOTH LEFT AND RIGHT;                Surgeon: Janneth Tovar MD;  Location: [facility] CATH LAB;                Service: Cardiology;  Laterality: N/A;  poss cx  No date: COLONOSCOPY  6/30/2022: COLONOSCOPY; N/A      Comment:  Procedure: COLONOSCOPY;  Surgeon: Sandra Perez MD;               Location: [facility];  Service: Endoscopy;  Laterality:                N/A;  7/1/2022: COLONOSCOPY; N/A      Comment:  Procedure: COLONOSCOPY;  Surgeon: Woodrow Christian MD;  Location: [facility];  Service: Endoscopy;                 Laterality: N/A;  1/13/2022: CORONARY ANGIOGRAPHY; N/A      Comment:  Procedure: ANGIOGRAM, CORONARY ARTERY;  Surgeon: Janneth Tovar MD;  Location: [facility] CATH LAB;  Service:                Cardiology;  Laterality: N/A;  6/30/2022: ESOPHAGOGASTRODUODENOSCOPY; N/A      Comment:  Procedure: EGD (ESOPHAGOGASTRODUODENOSCOPY);  Surgeon:                Sandra Perez MD;  Location: [facility];  Service:                Endoscopy;  Laterality: N/A;  10/21/2022: INJECTION OF JOINT; Bilateral      Comment:  Procedure: Bilateral intraarticular knee injection with                local ALLERGIC TO IODINE;  Surgeon: Devon Grant MD;                 Location: [facility];  Service: Pain Management;                 Laterality: Bilateral;  No date: PROSTATE SURGERY  No date: SPINE SURGERY    Social History    Socioeconomic History      Marital status:       Spouse name: jenn       Number of children: 6    Tobacco Use      Smoking status: Some Days        Packs/day: 1.00        Years: 68.00        Pack years: 68        Types: Cigarettes        Start date:  1954        Last attempt to quit: 2022        Years since quittin.3      Smokeless tobacco: Never    Substance and Sexual Activity      Alcohol use: Yes        Comment: reports only drinks red wine when games are on      Drug use: Never      Sexual activity: Yes        Partners: Female    Social Determinants of Health  Financial Resource Strain: Low Risk       Difficulty of Paying Living Expenses: Not very hard  Food Insecurity: Food Insecurity Present      Worried About Running Out of Food in the Last Year: Often true      Ran Out of Food in the Last Year: Patient refused  Transportation Needs: No Transportation Needs      Lack of Transportation (Medical): No      Lack of Transportation (Non-Medical): No  Physical Activity: Unknown      Days of Exercise per Week: 5 days  Stress: No Stress Concern Present      Feeling of Stress : Not at all  Social Connections: Unknown      Frequency of Communication with Friends and Family: More than three times a week      Frequency of Social Gatherings with Friends and Family: More than three times a week      Active Member of Clubs or Organizations: No      Attends Club or Organization Meetings: Never      Marital Status:   Housing Stability: Unknown      Unable to Pay for Housing in the Last Year: Patient refused      Unstable Housing in the Last Year: Patient refused    Review of patient's family history indicates:    Current Outpatient Medications:  albuterol (PROVENTIL/VENTOLIN HFA) 90 mcg/actuation inhaler, Inhale 2 puffs into the lungs every 4 (four) hours as needed for Wheezing or Shortness of Breath., Disp: 8.5 g, Rfl: 11  furosemide (LASIX) 20 MG tablet, Take 1 tablet (20 mg total) by mouth once daily. Do not take if blood pressure is low, feeling dry/dizzy/lightheaded., Disp: 90 tablet, Rfl: 0  gabapentin (NEURONTIN) 100 MG capsule, Take 1 capsule by mouth every night at bedtime x 1 week, then increase to 2 capsules by mouth every night at bedtime x  1 week, then 3 capsules by mouth every night at bedtime thereafter, Disp: 90 capsule, Rfl: 1  pantoprazole (PROTONIX) 40 MG tablet, Take 1 tablet (40 mg total) by mouth once daily., Disp: 90 tablet, Rfl: 3  sacubitriL-valsartan (ENTRESTO)  mg per tablet, Take 1 tablet by mouth 2 (two) times daily., Disp: 180 tablet, Rfl: 1  triamcinolone acetonide 0.5% (KENALOG) 0.5 % Crea, Apply topically 2 (two) times daily. for 14 days, Disp: 450 g, Rfl: 0    No current facility-administered medications for this visit.            Review of Systems   Constitutional: Negative.   HENT: Negative.     Eyes: Negative.    Cardiovascular:  Negative for chest pain, dyspnea on exertion, leg swelling, near-syncope, palpitations and syncope.   Respiratory: Negative.  Negative for shortness of breath.    Endocrine: Negative.    Hematologic/Lymphatic: Negative.    Skin: Negative.    Musculoskeletal:  Positive for joint pain.   Gastrointestinal: Negative.    Genitourinary: Negative.    Neurological: Negative.  Negative for dizziness and light-headedness.   Psychiatric/Behavioral: Negative.     Allergic/Immunologic: Negative.       Objective:    Physical Exam  Vitals and nursing note reviewed.   Constitutional:       General: He is not in acute distress.     Appearance: He is well-developed. He is not diaphoretic.   HENT:      Head: Normocephalic and atraumatic.      Nose: Nose normal.   Eyes:      General: No scleral icterus.     Conjunctiva/sclera: Conjunctivae normal.   Neck:      Thyroid: No thyromegaly.      Vascular: No JVD.   Cardiovascular:      Rate and Rhythm: Normal rate and regular rhythm.      Heart sounds: S1 normal and S2 normal. No murmur heard.    No friction rub. No gallop. No S3 or S4 sounds.   Pulmonary:      Effort: Pulmonary effort is normal. No respiratory distress.      Breath sounds: Normal breath sounds. No stridor. No wheezing or rales.   Chest:      Chest wall: No tenderness.   Abdominal:      General: Bowel  sounds are normal. There is no distension.      Palpations: Abdomen is soft. There is no mass.      Tenderness: There is no abdominal tenderness. There is no rebound.   Genitourinary:     Comments: Deferred  Musculoskeletal:         General: No tenderness or deformity. Normal range of motion.      Cervical back: Normal range of motion and neck supple.   Lymphadenopathy:      Cervical: No cervical adenopathy.   Skin:     General: Skin is warm and dry.      Coloration: Skin is not pale.      Findings: No erythema or rash.   Neurological:      Mental Status: He is alert and oriented to person, place, and time.      Motor: No abnormal muscle tone.      Coordination: Coordination normal.   Psychiatric:         Behavior: Behavior normal.         Thought Content: Thought content normal.         Judgment: Judgment normal.   ECG: NSR nl ME, QRS, QTc      Assessment:       1. Chronic systolic congestive heart failure    2. Atherosclerosis of abdominal aorta    3. Paroxysmal atrial fibrillation         Plan:         82 yoM here for reassessment of cardiac procedures. He does not want to pursue ICD or ablation. He is more concerned about his L knee. No signs or symptoms of infection. Will alert his PCP and pain medicine physician. RTC as needed

## 2022-11-02 NOTE — LETTER
November 2, 2022      [facility]  [address] [place] [address] , [address]TH FLOOR  [place] [address]  Phone: [phone]  Fax: [phone]       Patient: Richy Douglas   YOB: 1940  Date of Visit: 11/02/2022    To Whom It May Concern:    Richard Douglas  was at [facility] on 11/02/2022. The patient may return to work on 11/02/2022 restrictions. If you have any questions or concerns, or if I can be of further assistance, please do not hesitate to contact me.    Sincerely,    Sil Wheeler LPN

## 2022-11-16 ENCOUNTER — OFFICE VISIT (OUTPATIENT)
Dept: PAIN MEDICINE | Facility: CLINIC | Age: 82
End: 2022-11-16
Payer: COMMERCIAL

## 2022-11-16 VITALS
BODY MASS INDEX: 23.12 KG/M2 | WEIGHT: 156.06 LBS | SYSTOLIC BLOOD PRESSURE: 130 MMHG | HEART RATE: 137 BPM | RESPIRATION RATE: 17 BRPM | DIASTOLIC BLOOD PRESSURE: 82 MMHG | HEIGHT: 69 IN

## 2022-11-16 DIAGNOSIS — M17.0 PRIMARY OSTEOARTHRITIS OF BOTH KNEES: Primary | ICD-10-CM

## 2022-11-16 PROCEDURE — 99999 PR PBB SHADOW E&M-EST. PATIENT-LVL III: CPT | Mod: PBBFAC,,, | Performed by: PHYSICIAN ASSISTANT

## 2022-11-16 PROCEDURE — 99214 OFFICE O/P EST MOD 30 MIN: CPT | Mod: S$PBB,,, | Performed by: PHYSICIAN ASSISTANT

## 2022-11-16 PROCEDURE — 99214 PR OFFICE/OUTPT VISIT, EST, LEVL IV, 30-39 MIN: ICD-10-PCS | Mod: S$PBB,,, | Performed by: PHYSICIAN ASSISTANT

## 2022-11-16 PROCEDURE — 99999 PR PBB SHADOW E&M-EST. PATIENT-LVL III: ICD-10-PCS | Mod: PBBFAC,,, | Performed by: PHYSICIAN ASSISTANT

## 2022-11-16 PROCEDURE — 99213 OFFICE O/P EST LOW 20 MIN: CPT | Mod: PBBFAC | Performed by: PHYSICIAN ASSISTANT

## 2022-11-16 RX ORDER — GABAPENTIN 300 MG/1
300 CAPSULE ORAL NIGHTLY
Qty: 30 CAPSULE | Refills: 2 | Status: SHIPPED | OUTPATIENT
Start: 2022-11-16 | End: 2023-02-11

## 2023-01-11 ENCOUNTER — OFFICE VISIT (OUTPATIENT)
Dept: PAIN MEDICINE | Facility: CLINIC | Age: 83
End: 2023-01-11
Payer: COMMERCIAL

## 2023-01-11 VITALS
SYSTOLIC BLOOD PRESSURE: 107 MMHG | HEIGHT: 69 IN | BODY MASS INDEX: 23.51 KG/M2 | DIASTOLIC BLOOD PRESSURE: 54 MMHG | HEART RATE: 84 BPM | WEIGHT: 158.75 LBS

## 2023-01-11 DIAGNOSIS — M17.0 PRIMARY OSTEOARTHRITIS OF BOTH KNEES: Primary | ICD-10-CM

## 2023-01-11 PROCEDURE — 99999 PR PBB SHADOW E&M-EST. PATIENT-LVL III: CPT | Mod: PBBFAC,,, | Performed by: PHYSICIAN ASSISTANT

## 2023-01-11 PROCEDURE — 99214 PR OFFICE/OUTPT VISIT, EST, LEVL IV, 30-39 MIN: ICD-10-PCS | Mod: S$GLB,,, | Performed by: PHYSICIAN ASSISTANT

## 2023-01-11 PROCEDURE — 99214 OFFICE O/P EST MOD 30 MIN: CPT | Mod: S$GLB,,, | Performed by: PHYSICIAN ASSISTANT

## 2023-01-11 PROCEDURE — 99213 OFFICE O/P EST LOW 20 MIN: CPT | Mod: PBBFAC | Performed by: PHYSICIAN ASSISTANT

## 2023-01-11 PROCEDURE — 99999 PR PBB SHADOW E&M-EST. PATIENT-LVL III: ICD-10-PCS | Mod: PBBFAC,,, | Performed by: PHYSICIAN ASSISTANT

## 2023-01-11 NOTE — H&P (VIEW-ONLY)
Est Patient Chronic Pain Note (Follow up Visit)    Referring Physician: Meghan Escobar,*    PCP: Vivian Ch MD    Chief Complaint:   Chief Complaint   Patient presents with    Knee Pain     bilateral          SUBJECTIVE:    Interval History (1/11/2023):  Richy Douglas presents today for follow-up visit.  Patient was last seen on 11/16/2022. At that visit, the plan was to repeat knee injections as needed. Last injection bilateral IA knee injection with steroid on 10/21/22 with 60% relief. Patient reports pain as 9/10 today. He reports achy pain in both knees, left worse than right with intermittent swelling. He continues to work outside the home and also performs a majority of the household chores.      Interval History (11/16/2022): Richy Douglas presents today for follow-up visit.  he underwent bilateral IA knee injection with steroid on 10/21/22.  The patient reports that he is/was better following the procedure.  he reports 60% pain relief.  The changes lasted 4 weeks so far.  The changes have continued through this visit, though he notes some diminished relief in his left knee. He still works and reports the knee becomes swollen and gives out if he is up for too long.  Patient reports pain as 8/10 today. Since last visit, he has followed up with podiatry for ankle pain and right big toe pain with onychomycosis and big toe nail falling off. He also reports intermittent swelling of his left leg/ankle and intermittent shortness of breath. History of CHF, followed by cardiology, most recent visit 11/02/2022    X-ray lumbar spine 09/28/2022  FINDINGS:  Vertebral body heights maintained.  Trace anterolisthesis of L4 on L5 and retrolisthesis L1 on L2.  No significant change in alignment on extension or flexion..  Multilevel degenerative disc height loss, most severe L2-3.  Multilevel facet arthropathy and osteophyte changes.  No definite pars defects..  Atherosclerotic vascular calcification.  No acute  abnormality.     Impression:     Multilevel prominent degenerative findings.    X-ray knee bilateral 09/28/2022  FINDINGS:  Bilateral arthritic changes present including moderate to severe right knee lateral compartment and left knee medial compartment joint space narrowing.  Bilateral chondrocalcinosis noted.  No acute abnormality appreciated.     Initial HPI 09/28/2022  Richy Douglas is a 82 y.o. male with past medical history significant for alcohol dependence, lung emphysema, aortic aneurysm, atrial fibrillation, systolic congestive heart failure, hypertension, hyperlipidemia, anemia of chronic disease, GERD, nicotine dependence who presents to the clinic for the evaluation of bilateral knee pain.  Patient reports knee pain is in present since April 2022 without inciting accident injury or trauma.  Today patient reports pain is rated a 7/10 and is constant.  Patient reports pain in the popliteal fossa as well as pain which radiates distally to the dorsum of the foot in L4 distribution.  Patient reports associated superficial swelling along the suprapatellar aspect of bilateral knees.  Pain is exacerbated with knee flexion, extension, moving from sitting to standing and with ambulation.  Patient does endorse associated weakness in the lower extremities associated with his pain.  Of note patient reports prior lower back surgery [facility] with improvement in lower back and radicular pain.  Patient has not tried membrane stabilizing agents or interventions at this time.  Patient has tried physician directed physical therapy exercises at home without significant relief.  Patient also reports right big toe pain with onychomycosis and big toe nail falling off.    Patient reports significant motor weakness.  Patient denies night fever/night sweats, urinary incontinence, bowel incontinence, significant weight loss, and loss of sensations.      Pain Disability Index Review:     Last 3 PDI Scores 9/28/2022   Pain  Disability Index (PDI) 42       Non-Pharmacologic Treatments:  Physical Therapy/Home Exercise: yes  Ice/Heat:no  TENS: no  Acupuncture: no  Massage: no  Chiropractic: no    Other: no      Pain Medications:  - Adjuvant Medications: Neurontin (Gabapentin)    Pain Procedures:   -bilateral IA knee injection with steroid on 10/21/22 with 60% relief    Past Medical History:   Diagnosis Date    Anemia of chronic disease     Aortic aneurysm     Atrial fibrillation with RVR 09/24/2021    Chronic systolic congestive heart failure 08/31/2017    Emphysema of lung 08/31/2017    GERD (gastroesophageal reflux disease)     Hypertension     Knee osteoarthritis 10/21/2022    Microcytic anemia 11/24/2020    Other hyperlipidemia 04/27/2021    Personal history of colonic polyps 2022    Prostate cancer      Past Surgical History:   Procedure Laterality Date    CATHETERIZATION OF BOTH LEFT AND RIGHT HEART N/A 1/13/2022    Procedure: CATHETERIZATION, HEART, BOTH LEFT AND RIGHT;  Surgeon: Janneth Tovar MD;  Location: [facility] CATH LAB;  Service: Cardiology;  Laterality: N/A;  poss cx    COLONOSCOPY      COLONOSCOPY N/A 6/30/2022    Procedure: COLONOSCOPY;  Surgeon: Sandra Perez MD;  Location: [facility];  Service: Endoscopy;  Laterality: N/A;    COLONOSCOPY N/A 7/1/2022    Procedure: COLONOSCOPY;  Surgeon: Woodrow Christian MD;  Location: [facility];  Service: Endoscopy;  Laterality: N/A;    CORONARY ANGIOGRAPHY N/A 1/13/2022    Procedure: ANGIOGRAM, CORONARY ARTERY;  Surgeon: Janneth Tovar MD;  Location: [facility] CATH LAB;  Service: Cardiology;  Laterality: N/A;    ESOPHAGOGASTRODUODENOSCOPY N/A 6/30/2022    Procedure: EGD (ESOPHAGOGASTRODUODENOSCOPY);  Surgeon: Sandra Perez MD;  Location: [facility];  Service: Endoscopy;  Laterality: N/A;    INJECTION OF JOINT Bilateral 10/21/2022    Procedure: Bilateral intraarticular knee injection with local ALLERGIC TO IODINE;  Surgeon: Devon Grant MD;  Location: [facility];   "Service: Pain Management;  Laterality: Bilateral;    PROSTATE SURGERY      SPINE SURGERY       Review of patient's allergies indicates:   Allergen Reactions    Fish containing products     Iodine and iodide containing products     Shellfish containing products        Current Outpatient Medications   Medication Sig    albuterol (PROVENTIL/VENTOLIN HFA) 90 mcg/actuation inhaler Inhale 2 puffs into the lungs every 4 (four) hours as needed for Wheezing or Shortness of Breath.    furosemide (LASIX) 20 MG tablet Take 1 tablet (20 mg total) by mouth once daily. Do not take if blood pressure is low, feeling dry/dizzy/lightheaded.    gabapentin (NEURONTIN) 300 MG capsule Take 1 capsule (300 mg total) by mouth every evening.    pantoprazole (PROTONIX) 40 MG tablet Take 1 tablet (40 mg total) by mouth once daily.    sacubitriL-valsartan (ENTRESTO)  mg per tablet Take 1 tablet by mouth 2 (two) times daily.    triamcinolone acetonide 0.5% (KENALOG) 0.5 % Crea Apply topically 2 (two) times daily. for 14 days     No current facility-administered medications for this visit.       Review of Systems     GENERAL:  No weight loss, malaise or fevers.  HEENT:   No recent changes in vision or hearing  NECK:  Negative for lumps, no difficulty with swallowing.  RESPIRATORY:  Negative for cough, wheezing or shortness of breath, patient denies any recent URI.  CARDIOVASCULAR:  Negative for chest pain or palpitations.  GI:  Negative for abdominal discomfort, blood in stools or black stools or change in bowel habits.  MUSCULOSKELETAL:  See HPI.  SKIN:  Negative for lesions, rash, and itching.  PSYCH:  No mood disorder or recent psychosocial stressors.   HEMATOLOGY/LYMPHOLOGY:  Negative for prolonged bleeding, bruising easily or swollen nodes.    NEURO:   No history of syncope, paralysis, seizures or tremors.  All other reviewed and negative other than HPI.    OBJECTIVE:    BP (!) 107/54   Pulse 84   Ht 5' 9" (1.753 m)   Wt 72 kg (158 " lb 11.7 oz)   BMI 23.44 kg/m²       Physical Exam    GENERAL: Well appearing, in no acute distress, alert and oriented x3.  PSYCH:  Mood and affect appropriate.  SKIN: Skin color, texture, turgor normal, no rashes or lesions.  HEAD/FACE:  Normocephalic, atraumatic. Cranial nerves grossly intact.    CV: RRR with palpation of the radial artery.  PULM: No evidence of respiratory difficulty, symmetric chest rise.  GI:  Soft and non-tender.    BACK:  Positive shopping cart sign.  Well-healed 6 in lower lumbar vertical midline incision.  Straight leg raising in the sitting and supine positions is negative to radicular pain. No pain to palpation over the facet joints of the lumbar spine or spinous processes. Normal range of motion without pain reproduction.  EXTREMITIES: Peripheral joint ROM is reduced with pain.  No deformities, edema, or skin discoloration. Good capillary refill.  MUSCULOSKELETAL: Able to stand on heels & toes.   hip, and knee provocative maneuvers are negative.  There is no pain with palpation over the sacroiliac joints bilaterally.  Gaenslen's, Distraction/Compression and  FABERs test is negative.  Facet loading test is negative bilaterally.   Bilateral upper and lower extremity strength is normal and symmetric.  No atrophy or tone abnormalities are noted.    RIGHT Lower extremity: Hip flexion 5/5, Hip Abduction 5/5, Hip Adduction 5/5, Knee extension 5/5, Knee flexion 5/5, Ankle dorsiflexion5/5, Extensor hallucis longus 5/5, Ankle plantarflexion 5/5  LEFT Lower extremity:  Hip flexion 5/5, Hip Abduction 5/5,Hip Adduction 5/5, Knee extension 5/5, Knee flexion 5/5, Ankle dorsiflexion 5/5, Extensor hallucis longus 5/5, Ankle plantarflexion 5/5  -Normal testing knee (patellar) jerk and ankle (achilles) jerk    NEURO: Bilateral upper and lower extremity coordination and muscle stretch reflexes are physiologic and symmetric. No loss of sensation is noted.    Knee Exam:  bilateral    Erythema:  Absent  Deformity/Swelling: Present  Effusion: Absent  Chronic Bony Changes: Present  Creptius: Present     milddiffuse tenderness palpation of the mediolateral joint lines and patella     ROM: full range with pain     Strength is 5/5 bilateral   Non-pitting edema to left ankle, mild swelling to posterior aspect of left knee     Neurovascular intact    GAIT:  utilizing wheelchair today    Imaging:   X-ray lumbar spine 09/28/2022  FINDINGS:  Vertebral body heights maintained.  Trace anterolisthesis of L4 on L5 and retrolisthesis L1 on L2.  No significant change in alignment on extension or flexion..  Multilevel degenerative disc height loss, most severe L2-3.  Multilevel facet arthropathy and osteophyte changes.  No definite pars defects..  Atherosclerotic vascular calcification.  No acute abnormality.     Impression:     Multilevel prominent degenerative findings.    X-ray knee bilateral 09/28/2022  FINDINGS:  Bilateral arthritic changes present including moderate to severe right knee lateral compartment and left knee medial compartment joint space narrowing.  Bilateral chondrocalcinosis noted.  No acute abnormality appreciated.        ASSESSMENT: 82 y.o. year old male with knee and leg pain, consistent with     1. Primary osteoarthritis of both knees  IR Aspiration Injection Large Joint W FL    IR Aspiration Injection Large Joint W FL    Case Request-RAD/Other Procedure Area: Bilateral IA knee joint injection with steroid RN IV Sedation                PLAN:   - Interventions:  Schedule repeat bilateral IA knee injection with steroid as patient reports previous offered significant relief  -s/p bilateral IA knee injection with steroid on 10/21/22 with 60% relief     - Anticoagulation use: no no anticoagulation       report:  Reviewed and consistent with medication use as prescribed.    - Medications:  --  We have discussed continuing gabapentin 300 mg QHS.  Patient will increase medication according to the  following algorithm.  We have reviewed potential side effects of this medication including daytime somnolence, weight gain and peripheral edema      - Therapy:   We discussed initiating physical therapy to help manage the patient/s painful condition. The patient was counseled that muscle strengthening will improve the long term prognosis in regards to pain and may also help increase range of motion and mobility. They were told that one of the goals of physical therapy is that they learn how to do the exercises so that they can do them independently at home daily upon completion. The patient's questions were answered and they were agreeable to this course. A referral for physical therapy was provided to the patient.      - Imaging: Reviewed bilateral knee and lumbar x-ray with patient and answered any questions they had regarding study.      - Consults/Referrals:  Podiatry- continue follow up as needed    - Records: Obtain old records from outside physicians and imaging:  [facility]:  Lumbar spine surgery    - Follow up visit: return to clinic in 4 weeks after injection      The above plan and management options were discussed at length with patient. Patient is in agreement with the above and verbalized understanding.    - I discussed the goals of interventional chronic pain management with the patient on today's visit. We discussed a multimodal and systematic approach to pain.  This includes diagnostic and therapeutic injections, adjuvant pharmacologic treatment, physical therapy, and at times psychiatry.  I emphasized the importance of regular exercise, core strengthening and stretching, diet and weight loss as a cornerstone of long-term pain management.    - This condition does not require this patient to take time off of work, and the primary goal of our Pain Management services is to improve the patient's functional capacity.  - Patient Questions: Answered all of the patient's questions regarding  diagnoses, therapy, treatment and next steps        Meghan Escobar PA-C  Interventional Pain Management  [facility]    Disclaimer:  This note was prepared using voice recognition system and is likely to have sound alike errors that may have been overlooked even after proof reading.  Please call me with any questions

## 2023-01-11 NOTE — PROGRESS NOTES
Est Patient Chronic Pain Note (Follow up Visit)    Referring Physician: Meghan Escobar,*    PCP: Vivian Ch MD    Chief Complaint:   Chief Complaint   Patient presents with    Knee Pain     bilateral          SUBJECTIVE:    Interval History (1/11/2023):  Richy Douglas presents today for follow-up visit.  Patient was last seen on 11/16/2022. At that visit, the plan was to repeat knee injections as needed. Last injection bilateral IA knee injection with steroid on 10/21/22 with 60% relief. Patient reports pain as 9/10 today. He reports achy pain in both knees, left worse than right with intermittent swelling. He continues to work outside the home and also performs a majority of the household chores.      Interval History (11/16/2022): Richy Douglas presents today for follow-up visit.  he underwent bilateral IA knee injection with steroid on 10/21/22.  The patient reports that he is/was better following the procedure.  he reports 60% pain relief.  The changes lasted 4 weeks so far.  The changes have continued through this visit, though he notes some diminished relief in his left knee. He still works and reports the knee becomes swollen and gives out if he is up for too long.  Patient reports pain as 8/10 today. Since last visit, he has followed up with podiatry for ankle pain and right big toe pain with onychomycosis and big toe nail falling off. He also reports intermittent swelling of his left leg/ankle and intermittent shortness of breath. History of CHF, followed by cardiology, most recent visit 11/02/2022    X-ray lumbar spine 09/28/2022  FINDINGS:  Vertebral body heights maintained.  Trace anterolisthesis of L4 on L5 and retrolisthesis L1 on L2.  No significant change in alignment on extension or flexion..  Multilevel degenerative disc height loss, most severe L2-3.  Multilevel facet arthropathy and osteophyte changes.  No definite pars defects..  Atherosclerotic vascular calcification.  No acute  abnormality.     Impression:     Multilevel prominent degenerative findings.    X-ray knee bilateral 09/28/2022  FINDINGS:  Bilateral arthritic changes present including moderate to severe right knee lateral compartment and left knee medial compartment joint space narrowing.  Bilateral chondrocalcinosis noted.  No acute abnormality appreciated.     Initial HPI 09/28/2022  Richy Douglas is a 82 y.o. male with past medical history significant for alcohol dependence, lung emphysema, aortic aneurysm, atrial fibrillation, systolic congestive heart failure, hypertension, hyperlipidemia, anemia of chronic disease, GERD, nicotine dependence who presents to the clinic for the evaluation of bilateral knee pain.  Patient reports knee pain is in present since April 2022 without inciting accident injury or trauma.  Today patient reports pain is rated a 7/10 and is constant.  Patient reports pain in the popliteal fossa as well as pain which radiates distally to the dorsum of the foot in L4 distribution.  Patient reports associated superficial swelling along the suprapatellar aspect of bilateral knees.  Pain is exacerbated with knee flexion, extension, moving from sitting to standing and with ambulation.  Patient does endorse associated weakness in the lower extremities associated with his pain.  Of note patient reports prior lower back surgery [facility] with improvement in lower back and radicular pain.  Patient has not tried membrane stabilizing agents or interventions at this time.  Patient has tried physician directed physical therapy exercises at home without significant relief.  Patient also reports right big toe pain with onychomycosis and big toe nail falling off.    Patient reports significant motor weakness.  Patient denies night fever/night sweats, urinary incontinence, bowel incontinence, significant weight loss, and loss of sensations.      Pain Disability Index Review:     Last 3 PDI Scores 9/28/2022   Pain  Disability Index (PDI) 42       Non-Pharmacologic Treatments:  Physical Therapy/Home Exercise: yes  Ice/Heat:no  TENS: no  Acupuncture: no  Massage: no  Chiropractic: no    Other: no      Pain Medications:  - Adjuvant Medications: Neurontin (Gabapentin)    Pain Procedures:   -bilateral IA knee injection with steroid on 10/21/22 with 60% relief    Past Medical History:   Diagnosis Date    Anemia of chronic disease     Aortic aneurysm     Atrial fibrillation with RVR 09/24/2021    Chronic systolic congestive heart failure 08/31/2017    Emphysema of lung 08/31/2017    GERD (gastroesophageal reflux disease)     Hypertension     Knee osteoarthritis 10/21/2022    Microcytic anemia 11/24/2020    Other hyperlipidemia 04/27/2021    Personal history of colonic polyps 2022    Prostate cancer      Past Surgical History:   Procedure Laterality Date    CATHETERIZATION OF BOTH LEFT AND RIGHT HEART N/A 1/13/2022    Procedure: CATHETERIZATION, HEART, BOTH LEFT AND RIGHT;  Surgeon: Janneth Tovar MD;  Location: [facility] CATH LAB;  Service: Cardiology;  Laterality: N/A;  poss cx    COLONOSCOPY      COLONOSCOPY N/A 6/30/2022    Procedure: COLONOSCOPY;  Surgeon: Sandra Perez MD;  Location: [facility];  Service: Endoscopy;  Laterality: N/A;    COLONOSCOPY N/A 7/1/2022    Procedure: COLONOSCOPY;  Surgeon: Woodrow Christian MD;  Location: [facility];  Service: Endoscopy;  Laterality: N/A;    CORONARY ANGIOGRAPHY N/A 1/13/2022    Procedure: ANGIOGRAM, CORONARY ARTERY;  Surgeon: Janneth Tovar MD;  Location: [facility] CATH LAB;  Service: Cardiology;  Laterality: N/A;    ESOPHAGOGASTRODUODENOSCOPY N/A 6/30/2022    Procedure: EGD (ESOPHAGOGASTRODUODENOSCOPY);  Surgeon: Sandra Perez MD;  Location: [facility];  Service: Endoscopy;  Laterality: N/A;    INJECTION OF JOINT Bilateral 10/21/2022    Procedure: Bilateral intraarticular knee injection with local ALLERGIC TO IODINE;  Surgeon: Devon Grant MD;  Location: [facility];   "Service: Pain Management;  Laterality: Bilateral;    PROSTATE SURGERY      SPINE SURGERY       Review of patient's allergies indicates:   Allergen Reactions    Fish containing products     Iodine and iodide containing products     Shellfish containing products        Current Outpatient Medications   Medication Sig    albuterol (PROVENTIL/VENTOLIN HFA) 90 mcg/actuation inhaler Inhale 2 puffs into the lungs every 4 (four) hours as needed for Wheezing or Shortness of Breath.    furosemide (LASIX) 20 MG tablet Take 1 tablet (20 mg total) by mouth once daily. Do not take if blood pressure is low, feeling dry/dizzy/lightheaded.    gabapentin (NEURONTIN) 300 MG capsule Take 1 capsule (300 mg total) by mouth every evening.    pantoprazole (PROTONIX) 40 MG tablet Take 1 tablet (40 mg total) by mouth once daily.    sacubitriL-valsartan (ENTRESTO)  mg per tablet Take 1 tablet by mouth 2 (two) times daily.    triamcinolone acetonide 0.5% (KENALOG) 0.5 % Crea Apply topically 2 (two) times daily. for 14 days     No current facility-administered medications for this visit.       Review of Systems     GENERAL:  No weight loss, malaise or fevers.  HEENT:   No recent changes in vision or hearing  NECK:  Negative for lumps, no difficulty with swallowing.  RESPIRATORY:  Negative for cough, wheezing or shortness of breath, patient denies any recent URI.  CARDIOVASCULAR:  Negative for chest pain or palpitations.  GI:  Negative for abdominal discomfort, blood in stools or black stools or change in bowel habits.  MUSCULOSKELETAL:  See HPI.  SKIN:  Negative for lesions, rash, and itching.  PSYCH:  No mood disorder or recent psychosocial stressors.   HEMATOLOGY/LYMPHOLOGY:  Negative for prolonged bleeding, bruising easily or swollen nodes.    NEURO:   No history of syncope, paralysis, seizures or tremors.  All other reviewed and negative other than HPI.    OBJECTIVE:    BP (!) 107/54   Pulse 84   Ht 5' 9" (1.753 m)   Wt 72 kg (158 " lb 11.7 oz)   BMI 23.44 kg/m²       Physical Exam    GENERAL: Well appearing, in no acute distress, alert and oriented x3.  PSYCH:  Mood and affect appropriate.  SKIN: Skin color, texture, turgor normal, no rashes or lesions.  HEAD/FACE:  Normocephalic, atraumatic. Cranial nerves grossly intact.    CV: RRR with palpation of the radial artery.  PULM: No evidence of respiratory difficulty, symmetric chest rise.  GI:  Soft and non-tender.    BACK:  Positive shopping cart sign.  Well-healed 6 in lower lumbar vertical midline incision.  Straight leg raising in the sitting and supine positions is negative to radicular pain. No pain to palpation over the facet joints of the lumbar spine or spinous processes. Normal range of motion without pain reproduction.  EXTREMITIES: Peripheral joint ROM is reduced with pain.  No deformities, edema, or skin discoloration. Good capillary refill.  MUSCULOSKELETAL: Able to stand on heels & toes.   hip, and knee provocative maneuvers are negative.  There is no pain with palpation over the sacroiliac joints bilaterally.  Gaenslen's, Distraction/Compression and  FABERs test is negative.  Facet loading test is negative bilaterally.   Bilateral upper and lower extremity strength is normal and symmetric.  No atrophy or tone abnormalities are noted.    RIGHT Lower extremity: Hip flexion 5/5, Hip Abduction 5/5, Hip Adduction 5/5, Knee extension 5/5, Knee flexion 5/5, Ankle dorsiflexion5/5, Extensor hallucis longus 5/5, Ankle plantarflexion 5/5  LEFT Lower extremity:  Hip flexion 5/5, Hip Abduction 5/5,Hip Adduction 5/5, Knee extension 5/5, Knee flexion 5/5, Ankle dorsiflexion 5/5, Extensor hallucis longus 5/5, Ankle plantarflexion 5/5  -Normal testing knee (patellar) jerk and ankle (achilles) jerk    NEURO: Bilateral upper and lower extremity coordination and muscle stretch reflexes are physiologic and symmetric. No loss of sensation is noted.    Knee Exam:  bilateral    Erythema:  Absent  Deformity/Swelling: Present  Effusion: Absent  Chronic Bony Changes: Present  Creptius: Present     milddiffuse tenderness palpation of the mediolateral joint lines and patella     ROM: full range with pain     Strength is 5/5 bilateral   Non-pitting edema to left ankle, mild swelling to posterior aspect of left knee     Neurovascular intact    GAIT:  utilizing wheelchair today    Imaging:   X-ray lumbar spine 09/28/2022  FINDINGS:  Vertebral body heights maintained.  Trace anterolisthesis of L4 on L5 and retrolisthesis L1 on L2.  No significant change in alignment on extension or flexion..  Multilevel degenerative disc height loss, most severe L2-3.  Multilevel facet arthropathy and osteophyte changes.  No definite pars defects..  Atherosclerotic vascular calcification.  No acute abnormality.     Impression:     Multilevel prominent degenerative findings.    X-ray knee bilateral 09/28/2022  FINDINGS:  Bilateral arthritic changes present including moderate to severe right knee lateral compartment and left knee medial compartment joint space narrowing.  Bilateral chondrocalcinosis noted.  No acute abnormality appreciated.        ASSESSMENT: 82 y.o. year old male with knee and leg pain, consistent with     1. Primary osteoarthritis of both knees  IR Aspiration Injection Large Joint W FL    IR Aspiration Injection Large Joint W FL    Case Request-RAD/Other Procedure Area: Bilateral IA knee joint injection with steroid RN IV Sedation                PLAN:   - Interventions:  Schedule repeat bilateral IA knee injection with steroid as patient reports previous offered significant relief  -s/p bilateral IA knee injection with steroid on 10/21/22 with 60% relief     - Anticoagulation use: no no anticoagulation       report:  Reviewed and consistent with medication use as prescribed.    - Medications:  --  We have discussed continuing gabapentin 300 mg QHS.  Patient will increase medication according to the  following algorithm.  We have reviewed potential side effects of this medication including daytime somnolence, weight gain and peripheral edema      - Therapy:   We discussed initiating physical therapy to help manage the patient/s painful condition. The patient was counseled that muscle strengthening will improve the long term prognosis in regards to pain and may also help increase range of motion and mobility. They were told that one of the goals of physical therapy is that they learn how to do the exercises so that they can do them independently at home daily upon completion. The patient's questions were answered and they were agreeable to this course. A referral for physical therapy was provided to the patient.      - Imaging: Reviewed bilateral knee and lumbar x-ray with patient and answered any questions they had regarding study.      - Consults/Referrals:  Podiatry- continue follow up as needed    - Records: Obtain old records from outside physicians and imaging:  [facility]:  Lumbar spine surgery    - Follow up visit: return to clinic in 4 weeks after injection      The above plan and management options were discussed at length with patient. Patient is in agreement with the above and verbalized understanding.    - I discussed the goals of interventional chronic pain management with the patient on today's visit. We discussed a multimodal and systematic approach to pain.  This includes diagnostic and therapeutic injections, adjuvant pharmacologic treatment, physical therapy, and at times psychiatry.  I emphasized the importance of regular exercise, core strengthening and stretching, diet and weight loss as a cornerstone of long-term pain management.    - This condition does not require this patient to take time off of work, and the primary goal of our Pain Management services is to improve the patient's functional capacity.  - Patient Questions: Answered all of the patient's questions regarding  diagnoses, therapy, treatment and next steps        Meghan Escobar PA-C  Interventional Pain Management  [facility]    Disclaimer:  This note was prepared using voice recognition system and is likely to have sound alike errors that may have been overlooked even after proof reading.  Please call me with any questions

## 2023-01-24 NOTE — PRE-PROCEDURE INSTRUCTIONS
Attempted to PAT patient for procedure on 1.31 with Dr. russell. No answer. M with return phone number. No return call at this time.

## 2023-01-30 NOTE — PRE-PROCEDURE INSTRUCTIONS
Spoke with patientS WIFE regarding procedure scheduled on 1.31    Arrival time 0830    Has patient been sick with fever or on antibiotics within the last 7 days? No    Does the patient have any open wounds, sores or rashes? No    Does the patient have any recent fractures? no    Has patient received a vaccination within the last 7 days? No    Received the COVID vaccination? yes    Has the patient stopped all medications as directed? NA    Does patient have a pacemaker and or defibrillator? no    Does the patient have a ride to and from procedure and someone reliable to remain with patient? DAUGHTER    Is the patient diabetic? no    Does the patient have sleep apnea? Or use O2 at home? No and no     Is the patient receiving sedation? yes    Is the patient instructed to remain NPO beginning at midnight the night before their procedure? yes    Procedure location confirmed with patient? Yes    Covid- Denies signs/symptoms. Instructed to notify PAT/MD if any changes.

## 2023-01-31 ENCOUNTER — HOSPITAL ENCOUNTER (OUTPATIENT)
Facility: HOSPITAL | Age: 83
Discharge: HOME | End: 2023-01-31
Attending: ANESTHESIOLOGY | Admitting: ANESTHESIOLOGY
Payer: COMMERCIAL

## 2023-01-31 VITALS
TEMPERATURE: 98 F | HEIGHT: 69 IN | HEART RATE: 82 BPM | BODY MASS INDEX: 23.88 KG/M2 | WEIGHT: 161.25 LBS | RESPIRATION RATE: 16 BRPM | OXYGEN SATURATION: 96 % | DIASTOLIC BLOOD PRESSURE: 70 MMHG | SYSTOLIC BLOOD PRESSURE: 152 MMHG

## 2023-01-31 DIAGNOSIS — M17.9 KNEE OSTEOARTHRITIS: ICD-10-CM

## 2023-01-31 PROBLEM — M17.0 PRIMARY OSTEOARTHRITIS OF BOTH KNEES: Status: ACTIVE | Noted: 2023-01-31

## 2023-01-31 PROCEDURE — 25500020 PHARM REV CODE 255: Performed by: ANESTHESIOLOGY

## 2023-01-31 PROCEDURE — A9585 GADOBUTROL INJECTION: HCPCS | Performed by: ANESTHESIOLOGY

## 2023-01-31 PROCEDURE — 77002 PR FLUOROSCOPIC GUIDANCE NEEDLE PLACEMENT: ICD-10-PCS | Mod: 26,,, | Performed by: ANESTHESIOLOGY

## 2023-01-31 PROCEDURE — 20610 DRAIN/INJ JOINT/BURSA W/O US: CPT | Mod: 50,,, | Performed by: ANESTHESIOLOGY

## 2023-01-31 PROCEDURE — 20610 PR DRAIN/INJECT LARGE JOINT/BURSA: ICD-10-PCS | Mod: 50,,, | Performed by: ANESTHESIOLOGY

## 2023-01-31 PROCEDURE — 20610 DRAIN/INJ JOINT/BURSA W/O US: CPT | Mod: 50 | Performed by: ANESTHESIOLOGY

## 2023-01-31 PROCEDURE — 25000242 PHARM REV CODE 250 ALT 637 W/ HCPCS: Performed by: ANESTHESIOLOGY

## 2023-01-31 PROCEDURE — 63600175 PHARM REV CODE 636 W HCPCS: Performed by: ANESTHESIOLOGY

## 2023-01-31 PROCEDURE — 77002 NEEDLE LOCALIZATION BY XRAY: CPT | Mod: 26,,, | Performed by: ANESTHESIOLOGY

## 2023-01-31 PROCEDURE — 94640 AIRWAY INHALATION TREATMENT: CPT

## 2023-01-31 PROCEDURE — 25000003 PHARM REV CODE 250: Performed by: ANESTHESIOLOGY

## 2023-01-31 RX ORDER — GADOBUTROL 604.72 MG/ML
INJECTION INTRAVENOUS
Status: DISCONTINUED | OUTPATIENT
Start: 2023-01-31 | End: 2023-01-31 | Stop reason: HOSPADM

## 2023-01-31 RX ORDER — BUPIVACAINE HYDROCHLORIDE 5 MG/ML
INJECTION, SOLUTION EPIDURAL; INTRACAUDAL
Status: DISCONTINUED | OUTPATIENT
Start: 2023-01-31 | End: 2023-01-31 | Stop reason: HOSPADM

## 2023-01-31 RX ORDER — TRIAMCINOLONE ACETONIDE 40 MG/ML
INJECTION, SUSPENSION INTRA-ARTICULAR; INTRAMUSCULAR
Status: DISCONTINUED | OUTPATIENT
Start: 2023-01-31 | End: 2023-01-31 | Stop reason: HOSPADM

## 2023-01-31 RX ORDER — IPRATROPIUM BROMIDE AND ALBUTEROL SULFATE 2.5; .5 MG/3ML; MG/3ML
3 SOLUTION RESPIRATORY (INHALATION) ONCE
Status: COMPLETED | OUTPATIENT
Start: 2023-01-31 | End: 2023-01-31

## 2023-01-31 RX ORDER — INDOMETHACIN 25 MG/1
CAPSULE ORAL
Status: DISCONTINUED | OUTPATIENT
Start: 2023-01-31 | End: 2023-01-31 | Stop reason: HOSPADM

## 2023-01-31 RX ADMIN — IPRATROPIUM BROMIDE AND ALBUTEROL SULFATE 3 ML: .5; 3 SOLUTION RESPIRATORY (INHALATION) at 08:01

## 2023-01-31 NOTE — PLAN OF CARE
Patient prepped for his procedure. Informed Dr. Grant of patient's SOB and wheezing breath sounds. She orders a breathing treatment to be administered after his procedure.

## 2023-01-31 NOTE — INTERVAL H&P NOTE
The patient has been examined and the H&P has been reviewed:    I concur with the findings and no changes have occurred since H&P was written.    Procedure risks, benefits and alternative options discussed and understood by patient/family.          Active Hospital Problems    Diagnosis  POA    Primary osteoarthritis of both knees [M17.0]  Yes      Resolved Hospital Problems   No resolved problems to display.

## 2023-01-31 NOTE — DISCHARGE SUMMARY
Discharge Note  Short Stay      SUMMARY     Admit Date: 1/31/2023    Attending Physician: Devon Grant MD        Discharge Physician: Devon Grant MD        Discharge Date: 1/31/2023 8:50 AM    Procedure(s) (LRB):  Bilateral IA knee joint injection with steroid RN IV Sedation (Bilateral)    Final Diagnosis: Primary osteoarthritis of both knees [M17.0]    Disposition: Home or self care    Patient Instructions:   Current Discharge Medication List        CONTINUE these medications which have NOT CHANGED    Details   albuterol (PROVENTIL/VENTOLIN HFA) 90 mcg/actuation inhaler Inhale 2 puffs into the lungs every 4 (four) hours as needed for Wheezing or Shortness of Breath.  Qty: 8.5 g, Refills: 11    Comments: Dispense formulary  Associated Diagnoses: Panlobular emphysema      pantoprazole (PROTONIX) 40 MG tablet Take 1 tablet (40 mg total) by mouth once daily.  Qty: 90 tablet, Refills: 3      sacubitriL-valsartan (ENTRESTO)  mg per tablet Take 1 tablet by mouth 2 (two) times daily.  Qty: 180 tablet, Refills: 1    Associated Diagnoses: Chronic combined systolic and diastolic congestive heart failure, NYHA class 3      furosemide (LASIX) 20 MG tablet Take 1 tablet (20 mg total) by mouth once daily. Do not take if blood pressure is low, feeling dry/dizzy/lightheaded.  Qty: 90 tablet, Refills: 0    Comments: Please bring meds to patient's bedside prior to discharge      gabapentin (NEURONTIN) 300 MG capsule Take 1 capsule (300 mg total) by mouth every evening.  Qty: 30 capsule, Refills: 2      triamcinolone acetonide 0.5% (KENALOG) 0.5 % Crea Apply topically 2 (two) times daily. for 14 days  Qty: 450 g, Refills: 0    Associated Diagnoses: Dry skin dermatitis                 Discharge Diagnosis: Primary osteoarthritis of both knees [M17.0]  Condition on Discharge: Stable with no complications to procedure   Diet on Discharge: Same as before.  Activity: as per instruction sheet.  Discharge to: Home with a responsible  adult. Spoke with patient's daughter Re; refill of Lasix and FU with cardiologist re: HF. Patient given duoneb prior to discharge 2/2 wheezing. Patient and his daughter express understanding re: prompt FU for SOB and signs of fluid overload.   Follow up: 2-4 weeks       Please call the office at [phone] if you experience any weakness or loss of sensation, fever > 101.5, pain uncontrolled with oral medications, persistent nausea/vomiting/or diarrhea, redness or drainage from the incisions, or any other worrisome concerns. If physician on call was not reached or could not communicate with our office for any reason please go to the nearest emergency department

## 2023-01-31 NOTE — DISCHARGE INSTRUCTIONS

## 2023-01-31 NOTE — OP NOTE
Procedure Note:     bilateral Knee steroid injection under fluoroscopy    01/31/2023                                 Surgeon: Devon Grant MD       Assistant: None     Pre-Op Diagnosis:  bilateral Primary osteoarthritis of both knees [M17.0]    Post-Op Diagnosis: Primary osteoarthritis of both knees [M17.0]     EBL: None     Complications: None     Specimens: None     Description of procedure:     After written consent was obtained, patient placed in supine position.  The area over the  lateral aspect of the bilateral  knee(s) joint prepped with chlorhexidine.  The area was draped in the usual sterile fashion.  Approximately 5 mL 1% lidocaine was infiltrated into the skin overlying the predetermined entry point. A 22 gauge spinal needle was then advanced under fluoroscopy in the AP views into the knee joint. After negative aspiration there was injection of 1 mL radio opaque contrast dye to confirm needle location within the joint, and no evidence of intravascular spread. This was followed by injection of 5 mL of 0.25% bupivacaine +40mg of triamcinolone into the joint space. Displacement of the contrast indicated that the medication went into the area of the joint space. Needle was withdrawn and a sterile band-aid applied to the skin.     Patient tolerated the procedure well, and was reporting improvement of pain symptoms after the injection.  He was discharged from the clinic in stable condition.

## 2023-02-01 ENCOUNTER — OFFICE VISIT (OUTPATIENT)
Dept: CARDIOLOGY | Facility: CLINIC | Age: 83
End: 2023-02-01
Payer: COMMERCIAL

## 2023-02-01 ENCOUNTER — LAB VISIT (OUTPATIENT)
Dept: LAB | Facility: HOSPITAL | Age: 83
End: 2023-02-01
Attending: INTERNAL MEDICINE
Payer: COMMERCIAL

## 2023-02-01 ENCOUNTER — TELEPHONE (OUTPATIENT)
Dept: CARDIOLOGY | Facility: CLINIC | Age: 83
End: 2023-02-01
Payer: COMMERCIAL

## 2023-02-01 VITALS
BODY MASS INDEX: 22.31 KG/M2 | OXYGEN SATURATION: 92 % | SYSTOLIC BLOOD PRESSURE: 132 MMHG | WEIGHT: 159.38 LBS | HEIGHT: 71 IN | HEART RATE: 85 BPM | DIASTOLIC BLOOD PRESSURE: 72 MMHG

## 2023-02-01 DIAGNOSIS — I50.22 CHRONIC SYSTOLIC CONGESTIVE HEART FAILURE: ICD-10-CM

## 2023-02-01 DIAGNOSIS — D50.0 IRON DEFICIENCY ANEMIA DUE TO CHRONIC BLOOD LOSS: ICD-10-CM

## 2023-02-01 DIAGNOSIS — M25.569 KNEE PAIN, UNSPECIFIED CHRONICITY, UNSPECIFIED LATERALITY: ICD-10-CM

## 2023-02-01 DIAGNOSIS — I48.0 PAF (PAROXYSMAL ATRIAL FIBRILLATION): ICD-10-CM

## 2023-02-01 DIAGNOSIS — J43.9 PULMONARY EMPHYSEMA, UNSPECIFIED EMPHYSEMA TYPE: ICD-10-CM

## 2023-02-01 DIAGNOSIS — I35.1 NONRHEUMATIC AORTIC VALVE INSUFFICIENCY: ICD-10-CM

## 2023-02-01 DIAGNOSIS — Z87.19 HISTORY OF LOWER GASTROINTESTINAL BLEEDING: ICD-10-CM

## 2023-02-01 DIAGNOSIS — R06.09 DOE (DYSPNEA ON EXERTION): ICD-10-CM

## 2023-02-01 DIAGNOSIS — R60.0 LOCALIZED EDEMA: ICD-10-CM

## 2023-02-01 DIAGNOSIS — I50.42 CHRONIC COMBINED SYSTOLIC AND DIASTOLIC CONGESTIVE HEART FAILURE, NYHA CLASS 3: ICD-10-CM

## 2023-02-01 DIAGNOSIS — K55.21 AVM (ARTERIOVENOUS MALFORMATION) OF SMALL BOWEL, ACQUIRED WITH HEMORRHAGE: ICD-10-CM

## 2023-02-01 DIAGNOSIS — I48.0 PAROXYSMAL ATRIAL FIBRILLATION: ICD-10-CM

## 2023-02-01 DIAGNOSIS — I73.9 CLAUDICATION: ICD-10-CM

## 2023-02-01 DIAGNOSIS — I70.0 ATHEROSCLEROSIS OF ABDOMINAL AORTA: ICD-10-CM

## 2023-02-01 DIAGNOSIS — I50.42 CHRONIC COMBINED SYSTOLIC AND DIASTOLIC CONGESTIVE HEART FAILURE, NYHA CLASS 3: Primary | ICD-10-CM

## 2023-02-01 LAB
ANION GAP SERPL CALC-SCNC: 14 MMOL/L (ref 8–16)
BNP SERPL-MCNC: 3005 PG/ML (ref 0–99)
BUN SERPL-MCNC: 21 MG/DL (ref 8–23)
CALCIUM SERPL-MCNC: 9.8 MG/DL (ref 8.7–10.5)
CHLORIDE SERPL-SCNC: 102 MMOL/L (ref 95–110)
CO2 SERPL-SCNC: 24 MMOL/L (ref 23–29)
CREAT SERPL-MCNC: 0.8 MG/DL (ref 0.5–1.4)
EST. GFR  (NO RACE VARIABLE): >60 ML/MIN/1.73 M^2
FERRITIN SERPL-MCNC: 18 NG/ML (ref 20–300)
GLUCOSE SERPL-MCNC: 87 MG/DL (ref 70–110)
MAGNESIUM SERPL-MCNC: 2.1 MG/DL (ref 1.6–2.6)
POTASSIUM SERPL-SCNC: 3.6 MMOL/L (ref 3.5–5.1)
SODIUM SERPL-SCNC: 140 MMOL/L (ref 136–145)

## 2023-02-01 PROCEDURE — 99214 PR OFFICE/OUTPT VISIT, EST, LEVL IV, 30-39 MIN: ICD-10-PCS | Mod: S$GLB,,, | Performed by: INTERNAL MEDICINE

## 2023-02-01 PROCEDURE — 83880 ASSAY OF NATRIURETIC PEPTIDE: CPT | Performed by: INTERNAL MEDICINE

## 2023-02-01 PROCEDURE — 84466 ASSAY OF TRANSFERRIN: CPT | Performed by: INTERNAL MEDICINE

## 2023-02-01 PROCEDURE — 99214 OFFICE O/P EST MOD 30 MIN: CPT | Mod: PBBFAC | Performed by: INTERNAL MEDICINE

## 2023-02-01 PROCEDURE — 36415 COLL VENOUS BLD VENIPUNCTURE: CPT | Performed by: INTERNAL MEDICINE

## 2023-02-01 PROCEDURE — 83735 ASSAY OF MAGNESIUM: CPT | Performed by: INTERNAL MEDICINE

## 2023-02-01 PROCEDURE — 99214 OFFICE O/P EST MOD 30 MIN: CPT | Mod: S$GLB,,, | Performed by: INTERNAL MEDICINE

## 2023-02-01 PROCEDURE — 80048 BASIC METABOLIC PNL TOTAL CA: CPT | Performed by: INTERNAL MEDICINE

## 2023-02-01 PROCEDURE — 99999 PR PBB SHADOW E&M-EST. PATIENT-LVL IV: CPT | Mod: PBBFAC,,, | Performed by: INTERNAL MEDICINE

## 2023-02-01 PROCEDURE — 99999 PR PBB SHADOW E&M-EST. PATIENT-LVL IV: ICD-10-PCS | Mod: PBBFAC,,, | Performed by: INTERNAL MEDICINE

## 2023-02-01 PROCEDURE — 82728 ASSAY OF FERRITIN: CPT | Performed by: INTERNAL MEDICINE

## 2023-02-01 RX ORDER — FUROSEMIDE 20 MG/1
20 TABLET ORAL DAILY
Qty: 90 TABLET | Refills: 0 | Status: SHIPPED | OUTPATIENT
Start: 2023-02-01 | End: 2023-02-02 | Stop reason: SDUPTHER

## 2023-02-01 RX ORDER — SACUBITRIL AND VALSARTAN 97; 103 MG/1; MG/1
1 TABLET, FILM COATED ORAL 2 TIMES DAILY
Qty: 180 TABLET | Refills: 1 | Status: SHIPPED | OUTPATIENT
Start: 2023-02-01 | End: 2023-08-16 | Stop reason: SDUPTHER

## 2023-02-01 NOTE — TELEPHONE ENCOUNTER
Contacted pt about his work excuse because he left because it was taking to long. 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 Advised that I can put him excuse on the portal or he can return to the  and  from them. He states not to worry about it he has to get to work pb

## 2023-02-01 NOTE — PROGRESS NOTES
"Subjective:   Patient ID:  Richy Douglas is a 82 y.o. male who presents for cardiac consult of Congestive Heart Failure    Referring Physician:    Vivian Ch MD [id number]] [address] [place] [address]      Reason for consult: CHF, AI    HPI  The patient came in today for cardiac consult of Congestive Heart Failure      Richy Douglas is a 82 y.o. 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     4/27/21  He had ER eval last month - Emergency Department for evaluation of SOB which onset gradually x 1 week ago. Pt reports sustaining a rib fracture 1 week ago and states his "breathing got bad". Pt reports smoking "only when football games are on". Symptoms are constant and moderate in severity. No mitigating or exacerbating factors reported. Associated sxs include cough. Pt also notes L foot swelling and L knee swelling that has been intermittent for a few months.     BNP was 1258.     He had CT chest -    Moderate to severe vascular calcification seen involving the aorta.  Coronary artery calcifications are also noted.  There is no pericardial effusion. No enlarged mediastinal, hilar or axillary lymph nodes are identified.    He feels more dyspnea but also CP from rib fracture.     8/3/21  Nuclear stress neg, EF 45%. BNP 1074- Told pt BNP is significantly elevated due to extra fluid, if feeling more shortness of breath or edema can double up fluid pills for 2-3 days and monitor pressures,     Ref Range & Units 2 mo ago 5 mo ago 8 mo ago    BNP 0 - 99 pg/mL 1,047 High   1,258 High  CM  202 High      He has been having some tingling/numbess in L arm.      Hosp stay Sept 2021    ED today with a chief complaint of worsening SOB that started the day prior. Associated symptoms included malaise, productive cough, fever, and pleuritic chest discomfort. Patient denied any associated kevin chest pain, nausea, " vomiting, diaphoresis, palpitations, near syncope, or syncope. Initial workup in ED revealed leukocytosis (WBC 18,000), anemia (H/H 8.8/28.1), and BNP of 929. CXR showed findings consistent with PNA. EKG showed new onset afib with RVR and patient was subsequently admitted for further evaluation and treatment. Cardiology consulted to assist with management. Patient seen and examined today in ED. Still endorses SOB along with pleuritic chest discomfort. No other CV complaints. No prior history of afib. He reports compliance with his medications. Followed on OP basis by Dr. Garces. Chart reviewed. Troponin x 1 negative. Echo 4/21 showed normal EF. MPI stress test 5/21 negative for ischemia.      Hospital Course:   9/25/21-Patient seen and examined today, resting in bed. Feeling much better, SOB improved. Still has some hoarseness. No chest pain. Remains in afib, but HR controlled. Cardizem switched to po and Eliquis initiated. Labs stable. Will need to monitor H/H on AC.  9/26/21-Patient seen and examined today, lying in bed. SOB continues to improve. Still has occasional cough. No chest pain. HR controlled. BC + for gram negative rods. H/H stable.  9/27/21- patient in bed resting. Has no complaints this morning. NSR 68 bpm.. BC +gram negative rods. Labs stable. No abnormal bleeding on Eliquis. cardizem gtt switched to PO  180mg daily   9/28/2021--Patient seen and examined in room, sitting on side of bed. Feels today. Remains in SR. Ok to discharge home from cards standpoint. Will need OP Cards follow up with Dr Garces next week.     12/9/21  Pt had hosp stay in Sept had new onset AFib. He converted to NSR with cardizem drip and treatment of sepsis PNA. BP and HR well controlled today.     Jan 2022 Hospital Course:   1/10/22- pt comfortable on 2L via NC current smoker physical exam consistent with COPD  1/11/22- CT scan of the chest reveals severe emphysematous lungs with bully scattered throughout all lung fields  bilaterally.  Consolidation left upper and middle jose lobes consistent with pneumonia.  No masses. Echo reveals new decline in EF to 25% pt reported chest pain on admission  1/12/22- pt in Afib coronary angiogram postponed. Rate controlled on metoprolol. Daily drinker started on librium  1/13/22- pt underwent LHC today with Dr. Tovar result was clean coronaries. EF documented at 50% although transthoracic echo EF was 25%  1/14 patient reports improvement in sx. Tolerated lhc procedure well. After discussing with cards, ok to d/c. Patient will need to discuss initiation of entresto on o/p basis. Continue losartan, bb, eliquis and lasix. New rxs delivered to bedside.    1/18/22  He presents post hosp stay, neg cath. ECHO with dec EF 25%,EF low normal on LHC.  BP is low normal, pulse 40s. He has been drinking daily for a while but has quit now.     3/17/22  He had recent pulm eval last month and then heme/onc eval, he will need EGD/Csope to rule out GIB. BP and HR stable today. He remains busy with recycling.     5/4/22  Repeat ECHO pending/not completed yet. BP is low normal. HR 80s. He had more swelling L leg/knee.     6/16/22  ECHO in May 2022 with EF 30%, mod RV function decrease, grade 2 DD, mild to mod TR, pulm HTN PASP 93 mmHg, mod AI. LE arterial u/s with distal occlusions/monophasic waveforms, normal BLE MARIBEL. LE venous u/s with b/l baker's cysts and chronic right small saphenous vein thrombus.   PT went to ER after last visit for Hgb 6, was transfused and had f/u with hemeonc and GI. Has upcoming EGD, told elevated CV risk but can proceed and can hold Eliquis 2 days prior.   BP today 140s/70s. HR 80s. 165 lbs.    He fell June 1st, has a large expending hematoma with more pain.     7/28/22  Pt had recent eval with Dr. Christianson who discussed since AFib only once episode post PNA/sepsis can stop Eliquis. Pt also has short RP tachycardia 1/22 for which Dr. Christianson rec ablation but patient wants to defer now.      He is s/p EGD - - Three non-bleeding angioectasias in the stomach.                          Treated with argon plasma coagulation (APC).                          - Multiple recently bleeding angioectasias in the                          duodenum. Treated with argon plasma coagulation                          (APC).    He is s/p Cscope - had a polyp no bleeding. He feels well now, his wife is undergoing further heart issues.     2/1/23  He had b/l kneei injections yest with Dr. Grant feels good now. BP and Hr well controlled. BMI 22 - 159 lbs.     Patient feels no PND, no palpitation, no dizziness, no syncope, no CNS symptoms.    Patient has fairly good exercise tolerance. He does recycling, separation.     Patient is compliant with medications.    FH - mother - DM, PVD, tobacco    Results for orders placed during the hospital encounter of 05/20/22    Echo    Interpretation Summary  · The left ventricle is moderately enlarged with eccentric hypertrophy and moderately decreased systolic function.  · Severe left atrial enlargement.  · The estimated ejection fraction is 30%.  · Grade II left ventricular diastolic dysfunction.  · Moderate right ventricular enlargement with moderately reduced right ventricular systolic function.  · Mild right atrial enlargement.  · Moderate aortic regurgitation.  · Mild mitral regurgitation.  · Mild to moderate tricuspid regurgitation.  · Mild pulmonic regurgitation.  · Intermediate central venous pressure (8 mmHg).  · The estimated PA systolic pressure is 93 mmHg.  · There is pulmonary hypertension.      LE arterial u/s 5/2022  Conclusion    Normal BLE MARIBEL with distal occlusions noted with moderate to severe plaque/blockage  Right distal PT/PER arteries are occluded  Left distal PT with monophasic waveforms, L GUILLE occluded      Conclusion 5/2022    A Baker's cyst measuring 1.67 cm x 3.77 cm was seen in the right extremity.  A Baker's cyst measuring 1.53 cm x 2.78 cm was seen in the  left extremity.  There is no evidence of a left lower extremity DVT.  The right smaller saphenous vein is abnormal. A chronic thrombus is present.          Results for orders placed during the hospital encounter of 01/09/22    Cardiac catheterization    Conclusion  · The pre-procedure left ventricular end diastolic pressure was 13.  · The estimated blood loss was none.  · The coronary arteries were normal..  · The filling pressures on the right and left were normal.    The procedure log was documented by Documenter: Jenna Cantu, RN and verified by Janneth Tovar MD.    Date: 1/13/2022  Time: 5:22 PM          Past Medical History:   Diagnosis Date    Anemia of chronic disease     Aortic aneurysm     Atrial fibrillation with RVR 09/24/2021    Chronic systolic congestive heart failure 08/31/2017    Emphysema of lung 08/31/2017    GERD (gastroesophageal reflux disease)     Hypertension     Knee osteoarthritis 10/21/2022    Microcytic anemia 11/24/2020    Other hyperlipidemia 04/27/2021    Personal history of colonic polyps 2022    Prostate cancer        Past Surgical History:   Procedure Laterality Date    CATHETERIZATION OF BOTH LEFT AND RIGHT HEART N/A 1/13/2022    Procedure: CATHETERIZATION, HEART, BOTH LEFT AND RIGHT;  Surgeon: Janneth Tovar MD;  Location: [facility] CATH LAB;  Service: Cardiology;  Laterality: N/A;  poss cx    COLONOSCOPY      COLONOSCOPY N/A 6/30/2022    Procedure: COLONOSCOPY;  Surgeon: Sandra Perez MD;  Location: [facility] ENDO;  Service: Endoscopy;  Laterality: N/A;    COLONOSCOPY N/A 7/1/2022    Procedure: COLONOSCOPY;  Surgeon: Woodrow Christian MD;  Location: [facility];  Service: Endoscopy;  Laterality: N/A;    CORONARY ANGIOGRAPHY N/A 1/13/2022    Procedure: ANGIOGRAM, CORONARY ARTERY;  Surgeon: Janneth Tovar MD;  Location: [facility] CATH LAB;  Service: Cardiology;  Laterality: N/A;    ESOPHAGOGASTRODUODENOSCOPY N/A 6/30/2022    Procedure: EGD (ESOPHAGOGASTRODUODENOSCOPY);  Surgeon:  Sandra Perez MD;  Location: [facility] ENDO;  Service: Endoscopy;  Laterality: N/A;    INJECTION OF JOINT Bilateral 10/21/2022    Procedure: Bilateral intraarticular knee injection with local ALLERGIC TO IODINE;  Surgeon: Devon Grant MD;  Location: [facility] PAIN MGT;  Service: Pain Management;  Laterality: Bilateral;    INJECTION OF JOINT Bilateral 2023    Procedure: Bilateral IA knee joint injection with steroid RN IV Sedation;  Surgeon: Devon Grant MD;  Location: [facility] PAIN MGT;  Service: Pain Management;  Laterality: Bilateral;    PROSTATE SURGERY      SPINE SURGERY         Social History     Tobacco Use    Smoking status: Some Days     Packs/day: 1.00     Years: 68.00     Pack years: 68.00     Types: Cigarettes     Start date: 1954     Last attempt to quit: 2022     Years since quittin.6    Smokeless tobacco: Never   Substance Use Topics    Alcohol use: Yes     Comment: reports only drinks red wine when games are on    Drug use: Never       Family History   Problem Relation Age of Onset    Diabetes Mother     Peripheral vascular disease Mother        Patient's Medications   New Prescriptions    No medications on file   Previous Medications    ALBUTEROL (PROVENTIL/VENTOLIN HFA) 90 MCG/ACTUATION INHALER    Inhale 2 puffs into the lungs every 4 (four) hours as needed for Wheezing or Shortness of Breath.    FUROSEMIDE (LASIX) 20 MG TABLET    Take 1 tablet (20 mg total) by mouth once daily. Do not take if blood pressure is low, feeling dry/dizzy/lightheaded.    GABAPENTIN (NEURONTIN) 300 MG CAPSULE    Take 1 capsule (300 mg total) by mouth every evening.    PANTOPRAZOLE (PROTONIX) 40 MG TABLET    Take 1 tablet (40 mg total) by mouth once daily.    SACUBITRIL-VALSARTAN (ENTRESTO)  MG PER TABLET    Take 1 tablet by mouth 2 (two) times daily.    TRIAMCINOLONE ACETONIDE 0.5% (KENALOG) 0.5 % CREA    Apply topically 2 (two) times daily. for 14 days   Modified Medications    No medications on file  "  Discontinued Medications    No medications on file       Review of Systems   Constitutional:  Positive for malaise/fatigue.   HENT: Negative.     Eyes: Negative.    Respiratory:  Positive for shortness of breath.    Cardiovascular:  Positive for chest pain.   Gastrointestinal: Negative.    Genitourinary: Negative.    Musculoskeletal: Negative.    Skin: Negative.    Neurological: Negative.    Endo/Heme/Allergies: Negative.    Psychiatric/Behavioral: Negative.     All 12 systems otherwise negative.    Wt Readings from Last 3 Encounters:   02/01/23 72.3 kg (159 lb 6.3 oz)   01/31/23 73.1 kg (161 lb 4.3 oz)   01/11/23 72 kg (158 lb 11.7 oz)     Temp Readings from Last 3 Encounters:   01/31/23 97.9 °F (36.6 °C) (Temporal)   10/21/22 97.1 °F (36.2 °C) (Temporal)   07/05/22 98.2 °F (36.8 °C)     BP Readings from Last 3 Encounters:   02/01/23 132/72   01/31/23 (!) 152/70   01/11/23 (!) 107/54     Pulse Readings from Last 3 Encounters:   02/01/23 85   01/31/23 82   01/11/23 84       /72   Pulse 85   Ht 5' 11" (1.803 m)   Wt 72.3 kg (159 lb 6.3 oz)   SpO2 (!) 92%   BMI 22.23 kg/m²     Objective:   Physical Exam  Vitals and nursing note reviewed.   Constitutional:       General: He is not in acute distress.     Appearance: He is well-developed. He is not diaphoretic.   HENT:      Head: Normocephalic and atraumatic.      Nose: Nose normal.   Eyes:      General: No scleral icterus.     Conjunctiva/sclera: Conjunctivae normal.   Neck:      Thyroid: No thyromegaly.      Vascular: No JVD.   Cardiovascular:      Rate and Rhythm: Normal rate and regular rhythm.      Heart sounds: S1 normal and S2 normal. No murmur heard.    No friction rub. No gallop. No S3 or S4 sounds.   Pulmonary:      Effort: Pulmonary effort is normal. No respiratory distress.      Breath sounds: Normal breath sounds. No stridor. No wheezing or rales.   Chest:      Chest wall: No tenderness.   Abdominal:      General: Bowel sounds are normal. There " is no distension.      Palpations: Abdomen is soft. There is no mass.      Tenderness: There is no abdominal tenderness. There is no rebound.   Genitourinary:     Comments: Deferred  Musculoskeletal:         General: No tenderness or deformity. Normal range of motion.      Cervical back: Normal range of motion and neck supple.   Lymphadenopathy:      Cervical: No cervical adenopathy.   Skin:     General: Skin is warm and dry.      Coloration: Skin is not pale.      Findings: No erythema or rash.   Neurological:      Mental Status: He is alert and oriented to person, place, and time.      Motor: No abnormal muscle tone.      Coordination: Coordination normal.   Psychiatric:         Behavior: Behavior normal.         Thought Content: Thought content normal.         Judgment: Judgment normal.       Lab Results   Component Value Date     08/15/2022    K 4.1 08/15/2022     08/15/2022    CO2 28 08/15/2022    BUN 11 08/15/2022    CREATININE 0.8 08/15/2022    GLU 95 08/15/2022    MG 2.1 05/04/2022    AST 18 08/15/2022    ALT 8 (L) 08/15/2022    ALBUMIN 3.8 08/15/2022    PROT 7.0 08/15/2022    BILITOT 0.7 08/15/2022    WBC 5.93 08/15/2022    HGB 9.0 (L) 08/15/2022    HCT 30.0 (L) 08/15/2022    MCV 73 (L) 08/15/2022     08/15/2022    INR 1.4 (H) 09/24/2021    TSH 0.870 11/24/2020    CHOL 144 09/17/2020    HDL 61 09/17/2020    LDLCALC 74.0 09/17/2020    TRIG 45 09/17/2020     (H) 06/16/2022     Assessment:      1. Chronic combined systolic and diastolic congestive heart failure, NYHA class 3    2. Paroxysmal atrial fibrillation    3. Chronic systolic congestive heart failure    4. Atherosclerosis of abdominal aorta    5. AVM (arteriovenous malformation) of small bowel, acquired with hemorrhage    6. Localized edema    7. Pulmonary emphysema, unspecified emphysema type    8. PAF (paroxysmal atrial fibrillation)    9. Claudication    10. Nonrheumatic aortic valve insufficiency    11. SANCHEZ (dyspnea on  exertion)    12. Knee pain, unspecified chronicity, unspecified laterality    13. History of lower gastrointestinal bleeding    14. Iron deficiency anemia due to chronic blood loss          Plan:   1. BIV failure EF 30%, AI with aortic atherosc;  BNP 1047 -- 969 --> 1844 --> 615 --> 952  - ECHO in May 2022 with EF 30%, mod RV function decrease, grade 2 DD, mild to mod TR, pulm HTN PASP 93 mmHg, mod AI.   - R/LHC normal coronaries 1/2022  - cont lasix daily and extra PRN  - cont Entresto to max dose   - f/u EP discuss ICD/CRT - pt saw Dr. Christianson does not want it now  - repeat labs     2. Emphysema with dyspnea, pulm HTN; still smokes   - cont tx PCP/Pulm    3. HTN with aortic root moderately dilated with AI - Aortic root 4.6-4.9 cm --> 4.55  - titrate meds  - cont to monitor     4. HLD with aortic athero and Coronary artery calcifications  - cont statin     5. PAF, diagnosed in setting of sepsis/PNA, had SVT in hosp - sec to alc withdrawal  - cont BB; stop Anticoag  - Lone AF, Dr. Christianson discussed with pt to stop anticoag sec risk of bleeding    6. AVM/Symptomatic anemia s/p PRBC with fe def  - angioectasias noted in recent EGD s/p APC; order iron levels   - off Eliquis now  - f/u heme/onc    7. PVD, Leg Edema, pain with claudication   - LE arterial u/s with distal occlusions/monophasic waveforms, normal BLE MARIBEL.   - LE venous u/s with b/l baker's cysts and chronic right small saphenous vein thrombus     8. Fall with hematoma in right hip  - f/u pain management for knee pain/baker's cysts  - s/p knee injections     Thank you for allowing me to participate in this patient's care. Please do not hesitate to contact me with any questions or concerns. Consult note has been forwarded to the referral physician.

## 2023-02-02 ENCOUNTER — TELEPHONE (OUTPATIENT)
Dept: CARDIOLOGY | Facility: CLINIC | Age: 83
End: 2023-02-02
Payer: COMMERCIAL

## 2023-02-02 LAB
IRON SERPL-MCNC: 30 UG/DL (ref 45–160)
SATURATED IRON: 6 % (ref 20–50)
TOTAL IRON BINDING CAPACITY: 500 UG/DL (ref 250–450)
TRANSFERRIN SERPL-MCNC: 338 MG/DL (ref 200–375)

## 2023-02-02 RX ORDER — FUROSEMIDE 40 MG/1
40 TABLET ORAL DAILY
Qty: 90 TABLET | Refills: 1 | Status: SHIPPED | OUTPATIENT
Start: 2023-02-02 | End: 2023-05-09

## 2023-02-02 RX ORDER — POTASSIUM CHLORIDE 750 MG/1
10 TABLET, EXTENDED RELEASE ORAL DAILY
Qty: 90 TABLET | Refills: 1 | Status: SHIPPED | OUTPATIENT
Start: 2023-02-02 | End: 2023-11-09 | Stop reason: SDUPTHER

## 2023-02-02 NOTE — TELEPHONE ENCOUNTER
Spoke with pt daughter in regards to         Please contact the patient and let them know that their results reveal elevated BNP due to extra fluid, increase lasix to 40mg daily along with low dose potassium, iron levels are very low - needs to follow up with heme/onc asap to discuss IV iron therapy vs more oral iron. Per Malur           Pt daughter verbalized understanding with no questions or concerns

## 2023-02-06 ENCOUNTER — NURSE TRIAGE (OUTPATIENT)
Dept: ADMINISTRATIVE | Facility: CLINIC | Age: 83
End: 2023-02-06
Payer: COMMERCIAL

## 2023-02-06 ENCOUNTER — HOSPITAL ENCOUNTER (EMERGENCY)
Facility: HOSPITAL | Age: 83
Discharge: LEFT BEFORE DISCHARGE | End: 2023-02-06
Payer: COMMERCIAL

## 2023-02-06 VITALS
RESPIRATION RATE: 20 BRPM | HEART RATE: 96 BPM | OXYGEN SATURATION: 95 % | BODY MASS INDEX: 23.22 KG/M2 | DIASTOLIC BLOOD PRESSURE: 49 MMHG | TEMPERATURE: 99 F | HEIGHT: 71 IN | WEIGHT: 165.88 LBS | SYSTOLIC BLOOD PRESSURE: 105 MMHG

## 2023-02-06 DIAGNOSIS — M25.569 KNEE PAIN: ICD-10-CM

## 2023-02-06 LAB
HCV AB SERPL QL IA: NEGATIVE
HEP C VIRUS HOLD SPECIMEN: NORMAL
HIV 1+2 AB+HIV1 P24 AG SERPL QL IA: NEGATIVE

## 2023-02-06 PROCEDURE — 25000003 PHARM REV CODE 250: Performed by: NURSE PRACTITIONER

## 2023-02-06 PROCEDURE — 99283 EMERGENCY DEPT VISIT LOW MDM: CPT | Mod: 25

## 2023-02-06 PROCEDURE — 87389 HIV-1 AG W/HIV-1&-2 AB AG IA: CPT | Performed by: EMERGENCY MEDICINE

## 2023-02-06 PROCEDURE — 86803 HEPATITIS C AB TEST: CPT | Performed by: EMERGENCY MEDICINE

## 2023-02-06 RX ORDER — HYDROCODONE BITARTRATE AND ACETAMINOPHEN 5; 325 MG/1; MG/1
1 TABLET ORAL
Status: COMPLETED | OUTPATIENT
Start: 2023-02-06 | End: 2023-02-06

## 2023-02-06 RX ADMIN — HYDROCODONE BITARTRATE AND ACETAMINOPHEN 1 TABLET: 5; 325 TABLET ORAL at 03:02

## 2023-02-06 NOTE — ED NOTES
Pain medication not administered as patient is driving himself home. He attempt to find a ride and let staff know if he is able to.

## 2023-02-06 NOTE — TELEPHONE ENCOUNTER
Pt called for knee pain. Pt said that he said he had  knees injected and the pain is worse pt said that they are swollen along with his ankles as well. Pt triaged and care advice is to go to the ED and pt said that he is at work right now but will go in the am. Pt stressed to care dispo to now and he said ok he will go early in the am. Pt to call back as needed.I will route message to MD as well        Reason for Disposition   Swollen knee joint and fever    Additional Information   Negative: Sounds like a life-threatening emergency to the triager    Protocols used: Knee Pain-A-[place]

## 2023-02-06 NOTE — FIRST PROVIDER EVALUATION
"Medical screening examination initiated.  I have conducted a focused provider triage encounter, findings are as follows:    Brief history of present illness:   82-year-old male with complaint of continued bilateral knee pain and swelling since bilateral knee injections since January 31st.    Vitals:    02/06/23 1347   BP: (!) 105/49   BP Location: Right arm   Patient Position: Sitting   Pulse: 96   Resp: 20   Temp: 99.1 °F (37.3 °C)   TempSrc: Oral   SpO2: 95%   Weight: 75.3 kg (165 lb 14.3 oz)   Height: 5' 11" (1.803 m)       Pertinent physical exam:  bilateral knee swelling, no erythema  "

## 2023-02-11 ENCOUNTER — HOSPITAL ENCOUNTER (INPATIENT)
Facility: HOSPITAL | Age: 83
LOS: 1 days | Discharge: HOME | DRG: 177 | End: 2023-02-14
Attending: EMERGENCY MEDICINE | Admitting: INTERNAL MEDICINE
Payer: COMMERCIAL

## 2023-02-11 DIAGNOSIS — U07.1 COVID: ICD-10-CM

## 2023-02-11 DIAGNOSIS — R06.02 SHORTNESS OF BREATH: ICD-10-CM

## 2023-02-11 DIAGNOSIS — R07.9 CHEST PAIN, UNSPECIFIED TYPE: ICD-10-CM

## 2023-02-11 DIAGNOSIS — R06.02 SOB (SHORTNESS OF BREATH): Primary | ICD-10-CM

## 2023-02-11 DIAGNOSIS — I50.22 CHRONIC SYSTOLIC CONGESTIVE HEART FAILURE: ICD-10-CM

## 2023-02-11 DIAGNOSIS — I50.9 HEART FAILURE: ICD-10-CM

## 2023-02-11 DIAGNOSIS — R07.9 CHEST PAIN: ICD-10-CM

## 2023-02-11 PROBLEM — E87.6 HYPOKALEMIA: Status: ACTIVE | Noted: 2023-02-11

## 2023-02-11 LAB
ACANTHOCYTES BLD QL SMEAR: PRESENT
ALBUMIN SERPL BCP-MCNC: 3.2 G/DL (ref 3.5–5.2)
ALP SERPL-CCNC: 55 U/L (ref 55–135)
ALT SERPL W/O P-5'-P-CCNC: 20 U/L (ref 10–44)
ANION GAP SERPL CALC-SCNC: 17 MMOL/L (ref 8–16)
ANISOCYTOSIS BLD QL SMEAR: ABNORMAL
AST SERPL-CCNC: 23 U/L (ref 10–40)
BASOPHILS # BLD AUTO: 0.03 K/UL (ref 0–0.2)
BASOPHILS NFR BLD: 0.4 % (ref 0–1.9)
BILIRUB SERPL-MCNC: 1.3 MG/DL (ref 0.1–1)
BNP SERPL-MCNC: 1881 PG/ML (ref 0–99)
BUN SERPL-MCNC: 15 MG/DL (ref 8–23)
CALCIUM SERPL-MCNC: 8.8 MG/DL (ref 8.7–10.5)
CHLORIDE SERPL-SCNC: 101 MMOL/L (ref 95–110)
CO2 SERPL-SCNC: 24 MMOL/L (ref 23–29)
CREAT SERPL-MCNC: 0.9 MG/DL (ref 0.5–1.4)
CRP SERPL-MCNC: 140 MG/L (ref 0–8.2)
D DIMER PPP IA.FEU-MCNC: 0.61 MG/L FEU
DIFFERENTIAL METHOD: ABNORMAL
EOSINOPHIL # BLD AUTO: 0 K/UL (ref 0–0.5)
EOSINOPHIL NFR BLD: 0.4 % (ref 0–8)
ERYTHROCYTE [DISTWIDTH] IN BLOOD BY AUTOMATED COUNT: 26.2 % (ref 11.5–14.5)
EST. GFR  (NO RACE VARIABLE): >60 ML/MIN/1.73 M^2
FERRITIN SERPL-MCNC: 101 NG/ML (ref 20–300)
GIANT PLATELETS BLD QL SMEAR: PRESENT
GLUCOSE SERPL-MCNC: 92 MG/DL (ref 70–110)
HCT VFR BLD AUTO: 28.5 % (ref 40–54)
HGB BLD-MCNC: 8.8 G/DL (ref 14–18)
HYPOCHROMIA BLD QL SMEAR: ABNORMAL
IMM GRANULOCYTES # BLD AUTO: 0.05 K/UL (ref 0–0.04)
IMM GRANULOCYTES NFR BLD AUTO: 0.6 % (ref 0–0.5)
INFLUENZA A, MOLECULAR: NEGATIVE
INFLUENZA B, MOLECULAR: NEGATIVE
LYMPHOCYTES # BLD AUTO: 0.6 K/UL (ref 1–4.8)
LYMPHOCYTES NFR BLD: 7.2 % (ref 18–48)
MAGNESIUM SERPL-MCNC: 1.7 MG/DL (ref 1.6–2.6)
MCH RBC QN AUTO: 20.6 PG (ref 27–31)
MCHC RBC AUTO-ENTMCNC: 30.9 G/DL (ref 32–36)
MCV RBC AUTO: 67 FL (ref 82–98)
MONOCYTES # BLD AUTO: 0.6 K/UL (ref 0.3–1)
MONOCYTES NFR BLD: 7.2 % (ref 4–15)
NEUTROPHILS # BLD AUTO: 6.9 K/UL (ref 1.8–7.7)
NEUTROPHILS NFR BLD: 84.2 % (ref 38–73)
NRBC BLD-RTO: 0 /100 WBC
OVALOCYTES BLD QL SMEAR: ABNORMAL
PLATELET # BLD AUTO: 258 K/UL (ref 150–450)
PLATELET BLD QL SMEAR: ABNORMAL
PMV BLD AUTO: ABNORMAL FL (ref 9.2–12.9)
POIKILOCYTOSIS BLD QL SMEAR: ABNORMAL
POTASSIUM SERPL-SCNC: 2.8 MMOL/L (ref 3.5–5.1)
PROT SERPL-MCNC: 6.5 G/DL (ref 6–8.4)
RBC # BLD AUTO: 4.28 M/UL (ref 4.6–6.2)
SARS-COV-2 RDRP RESP QL NAA+PROBE: POSITIVE
SCHISTOCYTES BLD QL SMEAR: ABNORMAL
SODIUM SERPL-SCNC: 142 MMOL/L (ref 136–145)
SPECIMEN SOURCE: NORMAL
TARGETS BLD QL SMEAR: ABNORMAL
TROPONIN I SERPL DL<=0.01 NG/ML-MCNC: 0.02 NG/ML (ref 0–0.03)
TROPONIN I SERPL DL<=0.01 NG/ML-MCNC: 0.02 NG/ML (ref 0–0.03)
WBC # BLD AUTO: 8.18 K/UL (ref 3.9–12.7)

## 2023-02-11 PROCEDURE — 96372 THER/PROPH/DIAG INJ SC/IM: CPT | Performed by: INTERNAL MEDICINE

## 2023-02-11 PROCEDURE — 86140 C-REACTIVE PROTEIN: CPT | Performed by: EMERGENCY MEDICINE

## 2023-02-11 PROCEDURE — 85025 COMPLETE CBC W/AUTO DIFF WBC: CPT | Performed by: EMERGENCY MEDICINE

## 2023-02-11 PROCEDURE — 99285 EMERGENCY DEPT VISIT HI MDM: CPT | Mod: 25

## 2023-02-11 PROCEDURE — G0378 HOSPITAL OBSERVATION PER HR: HCPCS

## 2023-02-11 PROCEDURE — U0002 COVID-19 LAB TEST NON-CDC: HCPCS | Performed by: EMERGENCY MEDICINE

## 2023-02-11 PROCEDURE — 25000003 PHARM REV CODE 250: Performed by: INTERNAL MEDICINE

## 2023-02-11 PROCEDURE — 63600175 PHARM REV CODE 636 W HCPCS: Performed by: INTERNAL MEDICINE

## 2023-02-11 PROCEDURE — 94761 N-INVAS EAR/PLS OXIMETRY MLT: CPT

## 2023-02-11 PROCEDURE — 84484 ASSAY OF TROPONIN QUANT: CPT | Mod: 91 | Performed by: INTERNAL MEDICINE

## 2023-02-11 PROCEDURE — 27000221 HC OXYGEN, UP TO 24 HOURS

## 2023-02-11 PROCEDURE — 25000242 PHARM REV CODE 250 ALT 637 W/ HCPCS: Performed by: INTERNAL MEDICINE

## 2023-02-11 PROCEDURE — 94640 AIRWAY INHALATION TREATMENT: CPT

## 2023-02-11 PROCEDURE — 93005 ELECTROCARDIOGRAM TRACING: CPT

## 2023-02-11 PROCEDURE — 36415 COLL VENOUS BLD VENIPUNCTURE: CPT | Performed by: EMERGENCY MEDICINE

## 2023-02-11 PROCEDURE — 93010 EKG 12-LEAD: ICD-10-PCS | Mod: ,,, | Performed by: INTERNAL MEDICINE

## 2023-02-11 PROCEDURE — 25000003 PHARM REV CODE 250: Performed by: EMERGENCY MEDICINE

## 2023-02-11 PROCEDURE — 25000242 PHARM REV CODE 250 ALT 637 W/ HCPCS: Performed by: STUDENT IN AN ORGANIZED HEALTH CARE EDUCATION/TRAINING PROGRAM

## 2023-02-11 PROCEDURE — 83735 ASSAY OF MAGNESIUM: CPT | Performed by: INTERNAL MEDICINE

## 2023-02-11 PROCEDURE — 99900035 HC TECH TIME PER 15 MIN (STAT)

## 2023-02-11 PROCEDURE — 94640 AIRWAY INHALATION TREATMENT: CPT | Mod: XB

## 2023-02-11 PROCEDURE — 87502 INFLUENZA DNA AMP PROBE: CPT | Performed by: EMERGENCY MEDICINE

## 2023-02-11 PROCEDURE — 84484 ASSAY OF TROPONIN QUANT: CPT | Performed by: EMERGENCY MEDICINE

## 2023-02-11 PROCEDURE — 83880 ASSAY OF NATRIURETIC PEPTIDE: CPT | Performed by: EMERGENCY MEDICINE

## 2023-02-11 PROCEDURE — 82728 ASSAY OF FERRITIN: CPT | Performed by: EMERGENCY MEDICINE

## 2023-02-11 PROCEDURE — 93010 ELECTROCARDIOGRAM REPORT: CPT | Mod: ,,, | Performed by: INTERNAL MEDICINE

## 2023-02-11 PROCEDURE — 85379 FIBRIN DEGRADATION QUANT: CPT | Performed by: EMERGENCY MEDICINE

## 2023-02-11 PROCEDURE — 80053 COMPREHEN METABOLIC PANEL: CPT | Performed by: EMERGENCY MEDICINE

## 2023-02-11 PROCEDURE — 96375 TX/PRO/DX INJ NEW DRUG ADDON: CPT

## 2023-02-11 RX ORDER — BUDESONIDE 0.5 MG/2ML
0.5 INHALANT ORAL EVERY 12 HOURS
Status: DISCONTINUED | OUTPATIENT
Start: 2023-02-11 | End: 2023-02-11 | Stop reason: CLARIF

## 2023-02-11 RX ORDER — ALBUTEROL SULFATE 90 UG/1
2 AEROSOL, METERED RESPIRATORY (INHALATION) EVERY 8 HOURS
Status: DISCONTINUED | OUTPATIENT
Start: 2023-02-12 | End: 2023-02-14 | Stop reason: HOSPADM

## 2023-02-11 RX ORDER — NALOXONE HCL 0.4 MG/ML
0.4 VIAL (ML) INJECTION
Status: DISCONTINUED | OUTPATIENT
Start: 2023-02-11 | End: 2023-02-14 | Stop reason: HOSPADM

## 2023-02-11 RX ORDER — ACETAMINOPHEN 325 MG/1
650 TABLET ORAL EVERY 4 HOURS PRN
Status: DISCONTINUED | OUTPATIENT
Start: 2023-02-11 | End: 2023-02-14 | Stop reason: HOSPADM

## 2023-02-11 RX ORDER — ALBUTEROL SULFATE 90 UG/1
2 AEROSOL, METERED RESPIRATORY (INHALATION) EVERY 8 HOURS
Status: DISCONTINUED | OUTPATIENT
Start: 2023-02-11 | End: 2023-02-11

## 2023-02-11 RX ORDER — LANOLIN ALCOHOL/MO/W.PET/CERES
800 CREAM (GRAM) TOPICAL
Status: DISCONTINUED | OUTPATIENT
Start: 2023-02-11 | End: 2023-02-14 | Stop reason: HOSPADM

## 2023-02-11 RX ORDER — POTASSIUM CHLORIDE 750 MG/1
10 TABLET, EXTENDED RELEASE ORAL DAILY
Status: DISCONTINUED | OUTPATIENT
Start: 2023-02-11 | End: 2023-02-12

## 2023-02-11 RX ORDER — MAG HYDROX/ALUMINUM HYD/SIMETH 200-200-20
30 SUSPENSION, ORAL (FINAL DOSE FORM) ORAL 4 TIMES DAILY PRN
Status: DISCONTINUED | OUTPATIENT
Start: 2023-02-11 | End: 2023-02-14 | Stop reason: HOSPADM

## 2023-02-11 RX ORDER — ONDANSETRON 2 MG/ML
4 INJECTION INTRAMUSCULAR; INTRAVENOUS EVERY 8 HOURS PRN
Status: DISCONTINUED | OUTPATIENT
Start: 2023-02-11 | End: 2023-02-14 | Stop reason: HOSPADM

## 2023-02-11 RX ORDER — SIMETHICONE 80 MG
1 TABLET,CHEWABLE ORAL 4 TIMES DAILY PRN
Status: DISCONTINUED | OUTPATIENT
Start: 2023-02-11 | End: 2023-02-14 | Stop reason: HOSPADM

## 2023-02-11 RX ORDER — ENOXAPARIN SODIUM 100 MG/ML
1 INJECTION SUBCUTANEOUS
Status: DISCONTINUED | OUTPATIENT
Start: 2023-02-11 | End: 2023-02-12

## 2023-02-11 RX ORDER — SODIUM CHLORIDE 0.9 % (FLUSH) 0.9 %
10 SYRINGE (ML) INJECTION EVERY 12 HOURS PRN
Status: DISCONTINUED | OUTPATIENT
Start: 2023-02-11 | End: 2023-02-14 | Stop reason: HOSPADM

## 2023-02-11 RX ORDER — FUROSEMIDE 10 MG/ML
20 INJECTION INTRAMUSCULAR; INTRAVENOUS ONCE
Status: COMPLETED | OUTPATIENT
Start: 2023-02-11 | End: 2023-02-11

## 2023-02-11 RX ORDER — BISACODYL 5 MG
5 TABLET, DELAYED RELEASE (ENTERIC COATED) ORAL DAILY PRN
Status: DISCONTINUED | OUTPATIENT
Start: 2023-02-11 | End: 2023-02-14 | Stop reason: HOSPADM

## 2023-02-11 RX ORDER — FLUTICASONE FUROATE AND VILANTEROL 100; 25 UG/1; UG/1
1 POWDER RESPIRATORY (INHALATION) DAILY
Status: DISCONTINUED | OUTPATIENT
Start: 2023-02-11 | End: 2023-02-14 | Stop reason: HOSPADM

## 2023-02-11 RX ORDER — ENOXAPARIN SODIUM 100 MG/ML
40 INJECTION SUBCUTANEOUS EVERY 24 HOURS
Status: DISCONTINUED | OUTPATIENT
Start: 2023-02-11 | End: 2023-02-11

## 2023-02-11 RX ORDER — POTASSIUM CHLORIDE 20 MEQ/1
40 TABLET, EXTENDED RELEASE ORAL
Status: COMPLETED | OUTPATIENT
Start: 2023-02-11 | End: 2023-02-11

## 2023-02-11 RX ADMIN — SACUBITRIL AND VALSARTAN 2 TABLET: 49; 51 TABLET, FILM COATED ORAL at 09:02

## 2023-02-11 RX ADMIN — FUROSEMIDE 20 MG: 10 INJECTION, SOLUTION INTRAMUSCULAR; INTRAVENOUS at 11:02

## 2023-02-11 RX ADMIN — ALBUTEROL SULFATE 2 PUFF: 108 INHALANT RESPIRATORY (INHALATION) at 01:02

## 2023-02-11 RX ADMIN — POTASSIUM CHLORIDE 40 MEQ: 1500 TABLET, EXTENDED RELEASE ORAL at 11:02

## 2023-02-11 RX ADMIN — SACUBITRIL AND VALSARTAN 2 TABLET: 49; 51 TABLET, FILM COATED ORAL at 11:02

## 2023-02-11 RX ADMIN — FLUTICASONE FUROATE AND VILANTEROL TRIFENATATE 1 PUFF: 100; 25 POWDER RESPIRATORY (INHALATION) at 08:02

## 2023-02-11 RX ADMIN — POTASSIUM CHLORIDE 10 MEQ: 750 TABLET, EXTENDED RELEASE ORAL at 11:02

## 2023-02-11 RX ADMIN — ENOXAPARIN SODIUM 70 MG: 80 INJECTION SUBCUTANEOUS at 05:02

## 2023-02-11 NOTE — SUBJECTIVE & OBJECTIVE
Past Medical History:   Diagnosis Date    Anemia of chronic disease     Aortic aneurysm     Atrial fibrillation with RVR 09/24/2021    Chronic systolic congestive heart failure 08/31/2017    Emphysema of lung 08/31/2017    GERD (gastroesophageal reflux disease)     Hypertension     Knee osteoarthritis 10/21/2022    Microcytic anemia 11/24/2020    Other hyperlipidemia 04/27/2021    Personal history of colonic polyps 2022    Prostate cancer        Past Surgical History:   Procedure Laterality Date    CATHETERIZATION OF BOTH LEFT AND RIGHT HEART N/A 1/13/2022    Procedure: CATHETERIZATION, HEART, BOTH LEFT AND RIGHT;  Surgeon: Janneth Tovar MD;  Location: [facility] CATH LAB;  Service: Cardiology;  Laterality: N/A;  poss cx    COLONOSCOPY      COLONOSCOPY N/A 6/30/2022    Procedure: COLONOSCOPY;  Surgeon: Sandra Perez MD;  Location: [facility] ENDO;  Service: Endoscopy;  Laterality: N/A;    COLONOSCOPY N/A 7/1/2022    Procedure: COLONOSCOPY;  Surgeon: oWodrow Christian MD;  Location: [facility];  Service: Endoscopy;  Laterality: N/A;    CORONARY ANGIOGRAPHY N/A 1/13/2022    Procedure: ANGIOGRAM, CORONARY ARTERY;  Surgeon: Janneth Tovar MD;  Location: [facility] CATH LAB;  Service: Cardiology;  Laterality: N/A;    ESOPHAGOGASTRODUODENOSCOPY N/A 6/30/2022    Procedure: EGD (ESOPHAGOGASTRODUODENOSCOPY);  Surgeon: Sandra Perez MD;  Location: [facility];  Service: Endoscopy;  Laterality: N/A;    INJECTION OF JOINT Bilateral 10/21/2022    Procedure: Bilateral intraarticular knee injection with local ALLERGIC TO IODINE;  Surgeon: Devon Grant MD;  Location: [facility] PAIN MGT;  Service: Pain Management;  Laterality: Bilateral;    INJECTION OF JOINT Bilateral 1/31/2023    Procedure: Bilateral IA knee joint injection with steroid RN IV Sedation;  Surgeon: Devon Grant MD;  Location: [facility] PAIN MGT;  Service: Pain Management;  Laterality: Bilateral;    PROSTATE SURGERY      SPINE SURGERY         Review of patient's allergies  indicates:   Allergen Reactions    Fish containing products     Iodine and iodide containing products     Shellfish containing products        No current facility-administered medications on file prior to encounter.     Current Outpatient Medications on File Prior to Encounter   Medication Sig    albuterol (PROVENTIL/VENTOLIN HFA) 90 mcg/actuation inhaler Inhale 2 puffs into the lungs every 4 (four) hours as needed for Wheezing or Shortness of Breath.    furosemide (LASIX) 40 MG tablet Take 1 tablet (40 mg total) by mouth once daily. Do not take if blood pressure is low, feeling dry/dizzy/lightheaded.    gabapentin (NEURONTIN) 300 MG capsule Take 1 capsule (300 mg total) by mouth every evening.    pantoprazole (PROTONIX) 40 MG tablet Take 1 tablet (40 mg total) by mouth once daily.    potassium chloride SA (K-DUR,KLOR-CON M) 10 MEQ tablet Take 1 tablet (10 mEq total) by mouth once daily.    sacubitriL-valsartan (ENTRESTO)  mg per tablet Take 1 tablet by mouth 2 (two) times daily.    triamcinolone acetonide 0.5% (KENALOG) 0.5 % Crea Apply topically 2 (two) times daily. for 14 days (Patient not taking: Reported on 2023)     Family History       Problem Relation (Age of Onset)    Diabetes Mother    Peripheral vascular disease Mother          Tobacco Use    Smoking status: Some Days     Packs/day: 1.00     Years: 68.00     Pack years: 68.00     Types: Cigarettes     Start date: 1954     Last attempt to quit: 2022     Years since quittin.6    Smokeless tobacco: Never   Substance and Sexual Activity    Alcohol use: Yes     Comment: reports only drinks red wine when games are on    Drug use: Never    Sexual activity: Yes     Partners: Female     Review of Systems   Constitutional:  Negative for chills and fever.   Respiratory:  Positive for cough and shortness of breath.    Cardiovascular:  Positive for chest pain and leg swelling. Negative for palpitations.   All other systems reviewed and are  negative.  Objective:     Vital Signs (Most Recent):  Temp: 97.9 °F (36.6 °C) (02/11/23 0917)  Pulse: 75 (02/11/23 1000)  Resp: (!) 22 (02/11/23 1000)  BP: (!) 116/57 (02/11/23 1000)  SpO2: 96 % (02/11/23 1000)   Vital Signs (24h Range):  Temp:  [97.9 °F (36.6 °C)] 97.9 °F (36.6 °C)  Pulse:  [75-83] 75  Resp:  [22-30] 22  SpO2:  [96 %-98 %] 96 %  BP: ()/(49-58) 116/57     Weight: 69.3 kg (152 lb 10.7 oz)  Body mass index is 21.29 kg/m².    Physical Exam  HENT:      Head: Normocephalic and atraumatic.   Cardiovascular:      Rate and Rhythm: Normal rate and regular rhythm.      Heart sounds: No murmur heard.  Pulmonary:      Effort: Pulmonary effort is normal. No respiratory distress.      Breath sounds: Normal breath sounds. No wheezing.   Abdominal:      General: Bowel sounds are normal. There is no distension.      Palpations: Abdomen is soft.      Tenderness: There is no abdominal tenderness.   Musculoskeletal:         General: Swelling present.      Right lower leg: Edema present.      Left lower leg: Edema present.   Skin:     General: Skin is warm and dry.   Neurological:      Mental Status: He is alert and oriented to person, place, and time. Mental status is at baseline.           Significant Labs: All pertinent labs within the past 24 hours have been reviewed.  CBC:   Recent Labs   Lab 02/11/23  0940   WBC 8.18   HGB 8.8*   HCT 28.5*        CMP:   Recent Labs   Lab 02/11/23  0940      K 2.8*      CO2 24   GLU 92   BUN 15   CREATININE 0.9   CALCIUM 8.8   PROT 6.5   ALBUMIN 3.2*   BILITOT 1.3*   ALKPHOS 55   AST 23   ALT 20   ANIONGAP 17*     Cardiac Markers:   Recent Labs   Lab 02/11/23  0940   BNP 1,881*     Troponin:   Recent Labs   Lab 02/11/23  0940   TROPONINI 0.017       Significant Imaging: I have reviewed all pertinent imaging results/findings within the past 24 hours.

## 2023-02-11 NOTE — ED PROVIDER NOTES
SCRIBE #1 NOTE: I, My Jayro, am scribing for, and in the presence of, Ziyad Nugent Jr., MD. I have scribed the entire note.       History     Chief Complaint   Patient presents with    Shortness of Breath     Hx of CHF reports shortness of breath, states he was admitted recently and has been short of breath since discharge. Also reports chest pain.     Review of patient's allergies indicates:   Allergen Reactions    Fish containing products     Iodine and iodide containing products     Shellfish containing products          History of Present Illness     HPI    2/11/2023, 9:37 AM  History obtained from the patient      History of Present Illness: Richy Douglas is a 82 y.o. male patient with a PMHx of HTN, atrial fibrillation, prostate cancer and HLD who presents to the Emergency Department for evaluation of SOB which onset last night. Symptoms are constant and moderate in severity. No mitigating or exacerbating factors reported. Associated sxs include cough and CP. Patient denies any fever, chills, congestion, back pain, weakness, dysuria, and all other sxs at this time. No Prior Tx. No further complaints or concerns at this time.       Arrival mode: Personal vehicle      PCP: Vivian Ch MD        Past Medical History:  Past Medical History:   Diagnosis Date    Anemia of chronic disease     Aortic aneurysm     Atrial fibrillation with RVR 09/24/2021    Chronic systolic congestive heart failure 08/31/2017    Emphysema of lung 08/31/2017    GERD (gastroesophageal reflux disease)     Hypertension     Knee osteoarthritis 10/21/2022    Microcytic anemia 11/24/2020    Other hyperlipidemia 04/27/2021    Personal history of colonic polyps 2022    Prostate cancer        Past Surgical History:  Past Surgical History:   Procedure Laterality Date    CATHETERIZATION OF BOTH LEFT AND RIGHT HEART N/A 1/13/2022    Procedure: CATHETERIZATION, HEART, BOTH LEFT AND RIGHT;  Surgeon: Janneth Tovar MD;  Location: [facility] CATH LAB;   Service: Cardiology;  Laterality: N/A;  poss cx    COLONOSCOPY      COLONOSCOPY N/A 2022    Procedure: COLONOSCOPY;  Surgeon: Sandra Perez MD;  Location: [facility] ENDO;  Service: Endoscopy;  Laterality: N/A;    COLONOSCOPY N/A 2022    Procedure: COLONOSCOPY;  Surgeon: Woodrow Christian MD;  Location: [facility] ENDO;  Service: Endoscopy;  Laterality: N/A;    CORONARY ANGIOGRAPHY N/A 2022    Procedure: ANGIOGRAM, CORONARY ARTERY;  Surgeon: Janneth Tovar MD;  Location: [facility] CATH LAB;  Service: Cardiology;  Laterality: N/A;    ESOPHAGOGASTRODUODENOSCOPY N/A 2022    Procedure: EGD (ESOPHAGOGASTRODUODENOSCOPY);  Surgeon: Sandra Perez MD;  Location: [facility] ENDO;  Service: Endoscopy;  Laterality: N/A;    INJECTION OF JOINT Bilateral 10/21/2022    Procedure: Bilateral intraarticular knee injection with local ALLERGIC TO IODINE;  Surgeon: Devon Grant MD;  Location: [facility] PAIN MGT;  Service: Pain Management;  Laterality: Bilateral;    INJECTION OF JOINT Bilateral 2023    Procedure: Bilateral IA knee joint injection with steroid RN IV Sedation;  Surgeon: Devon Grant MD;  Location: [facility] PAIN MGT;  Service: Pain Management;  Laterality: Bilateral;    PROSTATE SURGERY      SPINE SURGERY           Family History:  Family History   Problem Relation Age of Onset    Diabetes Mother     Peripheral vascular disease Mother        Social History:  Social History     Tobacco Use    Smoking status: Some Days     Packs/day: 1.00     Years: 68.00     Pack years: 68.00     Types: Cigarettes     Start date: 1954     Last attempt to quit: 2022     Years since quittin.6    Smokeless tobacco: Never   Substance and Sexual Activity    Alcohol use: Yes     Comment: reports only drinks red wine when games are on    Drug use: Never    Sexual activity: Yes     Partners: Female        Review of Systems     Review of Systems   Constitutional:  Negative for chills and fever.   HENT:  Negative for congestion  "and sore throat.    Respiratory:  Positive for cough and shortness of breath.    Cardiovascular:  Positive for chest pain.   Gastrointestinal:  Negative for diarrhea, nausea and vomiting.   Genitourinary:  Negative for dysuria.   Musculoskeletal:  Negative for back pain.   Skin:  Negative for rash.   Neurological:  Negative for weakness.   Hematological:  Does not bruise/bleed easily.   All other systems reviewed and are negative.     Physical Exam     Initial Vitals [02/11/23 0917]   BP Pulse Resp Temp SpO2   (!) 93/49 83 (!) 30 97.9 °F (36.6 °C) 98 %      MAP       --          Physical Exam  Nursing Notes and Vital Signs Reviewed.  Constitutional: Patient is in no acute distress. Well-developed and well-nourished.  Head: Atraumatic. Normocephalic.  Eyes: PERRL. EOM intact. Conjunctivae are not pale. No scleral icterus.  ENT: Mucous membranes are moist. Oropharynx is clear and symmetric.    Neck: Supple. Full ROM. No lymphadenopathy.  Cardiovascular: Regular rate. Regular rhythm. No murmurs, rubs, or gallops. Distal pulses are 2+ and symmetric.  Pulmonary/Chest: No respiratory distress. Clear to auscultation bilaterally. Wheezing.  Abdominal: Soft and non-distended.  There is no tenderness.  No rebound, guarding, or rigidity. Good bowel sounds.  Genitourinary: No CVA tenderness  Musculoskeletal: Moves all extremities. No obvious deformities. No edema. No calf tenderness.  Skin: Warm and dry.  Neurological:  Alert, awake, and appropriate.  Normal speech.  No acute focal neurological deficits are appreciated.  Psychiatric: Normal affect. Good eye contact. Appropriate in content.     ED Course   Procedures  ED Vital Signs:  Vitals:    02/11/23 0917 02/11/23 0925 02/11/23 0941 02/11/23 1000   BP: (!) 93/49  (!) 117/58 (!) 116/57   Pulse: 83  76 75   Resp: (!) 30  (!) 22 (!) 22   Temp: 97.9 °F (36.6 °C)      TempSrc: Oral      SpO2: 98%  96% 96%   Weight:  69.3 kg (152 lb 10.7 oz)     Height: 5' 11" (1.803 m)       " 02/11/23 1308 02/11/23 1351   BP:  (!) 118/56   Pulse: 73    Resp: 18    Temp:     TempSrc:     SpO2: 97%    Weight:     Height:         Abnormal Lab Results:  Labs Reviewed   CBC W/ AUTO DIFFERENTIAL - Abnormal; Notable for the following components:       Result Value    RBC 4.28 (*)     Hemoglobin 8.8 (*)     Hematocrit 28.5 (*)     MCV 67 (*)     MCH 20.6 (*)     MCHC 30.9 (*)     RDW 26.2 (*)     Immature Granulocytes 0.6 (*)     Immature Grans (Abs) 0.05 (*)     Lymph # 0.6 (*)     Gran % 84.2 (*)     Lymph % 7.2 (*)     All other components within normal limits   COMPREHENSIVE METABOLIC PANEL - Abnormal; Notable for the following components:    Potassium 2.8 (*)     Albumin 3.2 (*)     Total Bilirubin 1.3 (*)     Anion Gap 17 (*)     All other components within normal limits   B-TYPE NATRIURETIC PEPTIDE - Abnormal; Notable for the following components:    BNP 1,881 (*)     All other components within normal limits   SARS-COV-2 RNA AMPLIFICATION, QUAL - Abnormal; Notable for the following components:    SARS-CoV-2 RNA, Amplification, Qual Positive (*)     All other components within normal limits   D DIMER, QUANTITATIVE - Abnormal; Notable for the following components:    D-Dimer 0.61 (*)     All other components within normal limits   C-REACTIVE PROTEIN - Abnormal; Notable for the following components:    .0 (*)     All other components within normal limits   INFLUENZA A & B BY MOLECULAR   TROPONIN I   D DIMER, QUANTITATIVE   FERRITIN   C-REACTIVE PROTEIN   TROPONIN I   FERRITIN   MAGNESIUM        All Lab Results:  Results for orders placed or performed during the hospital encounter of 02/11/23   Influenza A & B by Molecular    Specimen: Nasopharyngeal Swab   Result Value Ref Range    Influenza A, Molecular Negative Negative    Influenza B, Molecular Negative Negative    Flu A & B Source Nasal swab    CBC auto differential   Result Value Ref Range    WBC 8.18 3.90 - 12.70 K/uL    RBC 4.28 (L) 4.60 -  6.20 M/uL    Hemoglobin 8.8 (L) 14.0 - 18.0 g/dL    Hematocrit 28.5 (L) 40.0 - 54.0 %    MCV 67 (L) 82 - 98 fL    MCH 20.6 (L) 27.0 - 31.0 pg    MCHC 30.9 (L) 32.0 - 36.0 g/dL    RDW 26.2 (H) 11.5 - 14.5 %    Platelets 258 150 - 450 K/uL    MPV SEE COMMENT 9.2 - 12.9 fL    Immature Granulocytes 0.6 (H) 0.0 - 0.5 %    Gran # (ANC) 6.9 1.8 - 7.7 K/uL    Immature Grans (Abs) 0.05 (H) 0.00 - 0.04 K/uL    Lymph # 0.6 (L) 1.0 - 4.8 K/uL    Mono # 0.6 0.3 - 1.0 K/uL    Eos # 0.0 0.0 - 0.5 K/uL    Baso # 0.03 0.00 - 0.20 K/uL    nRBC 0 0 /100 WBC    Gran % 84.2 (H) 38.0 - 73.0 %    Lymph % 7.2 (L) 18.0 - 48.0 %    Mono % 7.2 4.0 - 15.0 %    Eosinophil % 0.4 0.0 - 8.0 %    Basophil % 0.4 0.0 - 1.9 %    Platelet Estimate Appears normal     Aniso Moderate     Poik Moderate     Hypo Moderate     Ovalocytes Occasional     Target Cells Occasional     Acanthocytes Present     Large/Giant Platelets Present     Fragmented Cells Occasional     Differential Method Automated    Comprehensive metabolic panel   Result Value Ref Range    Sodium 142 136 - 145 mmol/L    Potassium 2.8 (L) 3.5 - 5.1 mmol/L    Chloride 101 95 - 110 mmol/L    CO2 24 23 - 29 mmol/L    Glucose 92 70 - 110 mg/dL    BUN 15 8 - 23 mg/dL    Creatinine 0.9 0.5 - 1.4 mg/dL    Calcium 8.8 8.7 - 10.5 mg/dL    Total Protein 6.5 6.0 - 8.4 g/dL    Albumin 3.2 (L) 3.5 - 5.2 g/dL    Total Bilirubin 1.3 (H) 0.1 - 1.0 mg/dL    Alkaline Phosphatase 55 55 - 135 U/L    AST 23 10 - 40 U/L    ALT 20 10 - 44 U/L    Anion Gap 17 (H) 8 - 16 mmol/L    eGFR >60 >60 mL/min/1.73 m^2   Troponin I   Result Value Ref Range    Troponin I 0.017 0.000 - 0.026 ng/mL   Brain natriuretic peptide   Result Value Ref Range    BNP 1,881 (H) 0 - 99 pg/mL   COVID-19 Rapid Screening   Result Value Ref Range    SARS-CoV-2 RNA, Amplification, Qual Positive (A) Negative   D-Dimer, Quantitative   Result Value Ref Range    D-Dimer 0.61 (H) <0.50 mg/L FEU   C-Reactive Protein   Result Value Ref Range    CRP  140.0 (H) 0.0 - 8.2 mg/L        Imaging Results:  Imaging Results              X-Ray Chest AP Portable (Final result)  Result time 02/11/23 09:56:10      Final result by Jimmie Dwyer MD (02/11/23 09:56:10)                   Impression:      No acute findings.      Electronically signed by: Jimmie Dwyer MD  Date:    02/11/2023  Time:    09:56               Narrative:    EXAMINATION:  XR CHEST AP PORTABLE    CLINICAL HISTORY:  Shortness of breath    TECHNIQUE:  AP view of the chest was performed.    COMPARISON:  06/16/2022    FINDINGS:  Emphysema.  Left apical pleuroparenchymal scarring with associated volume loss.  Aortic atherosclerosis.    No pneumothorax.  No acute osseous findings demonstrated.                                       The EKG was ordered, reviewed, and independently interpreted by the ED provider.  Interpretation time: 09:19  Rate: 85 BPM  Rhythm: Sinus rhythm with frequent premature ventricular complexes.   Interpretation: Left axis deviation. Minimal voltage criteria for LVH, may be normal variant (Opp product). Nonspecific T wave abnormality. No STEMI.             The Emergency Provider reviewed the vital signs and test results, which are outlined above.     ED Discussion       10:39 AM: Discussed case with Charmaine Brown MD (Hospital Medicine). Dr. Charmaine Brown MD agrees with current care and management of pt and accepts admission.   Admitting Service: Hospital Medicine  Admitting Physician: Dr. Charmaine Brown MD  Admit to: Obs     10:39 AM: Re-evaluated pt. I have discussed test results, shared treatment plan, and the need for admission with patient and family at bedside. Pt and family express understanding at this time and agree with all information. All questions answered. Pt and family have no further questions or concerns at this time. Pt is ready for admit.        Medical Decision Making:   Initial Assessment:   82-year-old male presents with shortness of breath and chest  pain that started last night.  Denies any fever chills or any major cough.  Denies the pain is radiating.  He was fully worked up cardiac markers are negative patient has positive for COVID he will be admitted to hospital medicine service for chest pain rule out.  Clinical Tests:   Lab Tests: Ordered and Reviewed  Radiological Study: Ordered and Reviewed  Medical Tests: Ordered and Reviewed         ED Medication(s):  Medications   sodium chloride 0.9% flush 10 mL (has no administration in time range)   naloxone 0.4 mg/mL injection 0.4 mg (has no administration in time range)   potassium bicarbonate disintegrating tablet 50 mEq (has no administration in time range)   potassium bicarbonate disintegrating tablet 60 mEq (has no administration in time range)   potassium bicarbonate disintegrating tablet 35 mEq (has no administration in time range)   magnesium oxide tablet 800 mg (has no administration in time range)   magnesium oxide tablet 800 mg (has no administration in time range)   acetaminophen tablet 650 mg (has no administration in time range)   ondansetron injection 4 mg (has no administration in time range)   aluminum-magnesium hydroxide-simethicone 200-200-20 mg/5 mL suspension 30 mL (has no administration in time range)   simethicone chewable tablet 80 mg (has no administration in time range)   bisacodyL EC tablet 5 mg (has no administration in time range)   enoxaparin injection 40 mg (has no administration in time range)   albuterol inhaler 2 puff (2 puffs Inhalation Given 2/11/23 1308)   potassium chloride SA CR tablet 10 mEq (10 mEq Oral Given 2/11/23 1135)   sacubitriL-valsartan 49-51 mg per tablet 2 tablet (2 tablets Oral Given 2/11/23 1135)   potassium chloride SA CR tablet 40 mEq (40 mEq Oral Given 2/11/23 1135)   furosemide injection 20 mg (20 mg Intravenous Given 2/11/23 1135)       New Prescriptions    No medications on file               Scribe Attestation:   Scribe #1: I performed the above  scribed service and the documentation accurately describes the services I performed. I attest to the accuracy of the note.     Attending:   Physician Attestation Statement for Scribe #1: I, Ziyad Nugent Jr., MD, personally performed the services described in this documentation, as scribed by My Jayro, in my presence, and it is both accurate and complete.           Clinical Impression       ICD-10-CM ICD-9-CM   1. SOB (shortness of breath)  R06.02 786.05   2. Chest pain  R07.9 786.50   3. Shortness of breath  R06.02 786.05   4. Chest pain, unspecified type  R07.9 786.50   5. COVID  U07.1 079.89       Disposition:   Disposition: Placed in Observation  Condition: Fair      Ziyad Nugent Jr., MD  02/11/23 1403       Ziyad Nugent Jr., MD  02/11/23 1405

## 2023-02-11 NOTE — HPI
The patient is 81 yo male with past medical history atrial fibrillation, HFrEF, emphysema, GERD, hypertension, osteoarthritis and prostate cancer who presented with shortness of breath and chest pain. He reports shortness of breath started last night and has progressed. He felt like he could not breathe prior to presentation. He has cough. He reports multiple family members have viral illness. He has intermittent chest pain. He also reports BLE swelling. Initial troponin negative. COVID positive. Hospital medicine consulted. Patient placed in observation.      1. Chronic combined systolic and diastolic congestive heart failure, NYHA class 3    2. Paroxysmal atrial fibrillation    3. Chronic systolic congestive heart failure    4. Atherosclerosis of abdominal aorta    5. AVM (arteriovenous malformation) of small bowel, acquired with hemorrhage    6. Localized edema    7. Pulmonary emphysema, unspecified emphysema type    8. PAF (paroxysmal atrial fibrillation)    9. Claudication    10. Nonrheumatic aortic valve insufficiency    11. SANCHEZ (dyspnea on exertion)    12. Knee pain, unspecified chronicity, unspecified laterality    13. History of lower gastrointestinal bleeding    14. Iron deficiency anemia due to chronic blood loss

## 2023-02-11 NOTE — ED NOTES
Pt resting in ER stretcher, aaox4, rr e/u, NAD noted. Pt remains on cardiac monitor with vss noted. Oxygen via n/c in place.  Bed low and locked, call light in reach, side rails up x2P Pt verbalized understanding of status and POC; denies further needs. Will continue to monitor.

## 2023-02-11 NOTE — PHARMACY MED REC
"Admission Medication History     The home medication history was taken by Bon Guzman.    You may go to "Admission" then "Reconcile Home Medications" tabs to review and/or act upon these items.     The home medication list has been updated by the Pharmacy department.   Please read ALL comments highlighted in yellow.   Please address this information as you see fit.    Feel free to contact us if you have any questions or require assistance.      The medications listed below were removed from the home medication list. Please reorder if appropriate:  Patient reports no longer taking the following medication(s):  GABAPENTIN 300 MG CAPSULE  PANTOPRAZOLE 40 MG TABLET  TRIAMCINOLONE ACETONIDE 0.5 % TOPICAL CREAM    Medications listed below were obtained from: Patient/family, Medications brought from home, Analytic software- [employer], and Patient's pharmacy  (Not in a hospital admission)      Potential issues to be addressed PRIOR TO DISCHARGE: NONE    Bon Guzman, CPPratik-Adv  Pharmacy Technician Specialist-Medication History  [facility] [phone]  Secure chat preferred     Current Outpatient Medications on File Prior to Encounter   Medication Sig Dispense Refill Last Dose    albuterol (PROVENTIL/VENTOLIN HFA) 90 mcg/actuation inhaler Inhale 2 puffs into the lungs every 4 (four) hours as needed for Wheezing or Shortness of Breath. 8.5 g 11 2/10/2023    furosemide (LASIX) 40 MG tablet Take 1 tablet (40 mg total) by mouth once daily. Do not take if blood pressure is low, feeling dry/dizzy/lightheaded. 90 tablet 1 2/10/2023    potassium chloride SA (K-DUR,KLOR-CON M) 10 MEQ tablet Take 1 tablet (10 mEq total) by mouth once daily. 90 tablet 1 2/10/2023    sacubitriL-valsartan (ENTRESTO)  mg per tablet Take 1 tablet by mouth 2 (two) times daily. 180 tablet 1 2/10/2023    [DISCONTINUED] gabapentin (NEURONTIN) 300 MG capsule Take 1 capsule (300 mg total) by mouth every evening. 30 capsule 2     [DISCONTINUED] " pantoprazole (PROTONIX) 40 MG tablet Take 1 tablet (40 mg total) by mouth once daily. 90 tablet 3     [DISCONTINUED] triamcinolone acetonide 0.5% (KENALOG) 0.5 % Crea Apply topically 2 (two) times daily. for 14 days (Patient not taking: Reported on 2/1/2023) 450 g 0                            .

## 2023-02-11 NOTE — ASSESSMENT & PLAN NOTE
Patient is identified as High risk for severe complications of COVID 19 based on COVID risk score of 7   Initiate standard COVID protocols; COVID-19 testing ,Infection Control notification  and isolation- respiratory, contact and droplet per protocol    Diagnostics: (leukopenia, hyponatremia, hyperferritinemia, elevated troponin, elevated d-dimer, age, and comorbidities are significant predictors of poor clinical outcome)  CBC, CMP, Ferritin, CRP and Portable CXR    Management: Maintain oxygen saturations 92-96% via Nasal Cannula 2 LPM and monitor with continuous/intermittent pulse oximetry. , Inhaled bronchodilators as needed for shortness of breath. and Continuous cardiac monitoring.

## 2023-02-11 NOTE — ASSESSMENT & PLAN NOTE
Patient is identified as having Systolic (HFrEF) heart failure that is Acute on chronic. CHF is currently uncontrolled due to Continued edema of extremities. Latest ECHO performed and demonstrates- Results for orders placed during the hospital encounter of 05/20/22    Echo    Interpretation Summary  · The left ventricle is moderately enlarged with eccentric hypertrophy and moderately decreased systolic function.  · Severe left atrial enlargement.  · The estimated ejection fraction is 30%.  · Grade II left ventricular diastolic dysfunction.  · Moderate right ventricular enlargement with moderately reduced right ventricular systolic function.  · Mild right atrial enlargement.  · Moderate aortic regurgitation.  · Mild mitral regurgitation.  · Mild to moderate tricuspid regurgitation.  · Mild pulmonic regurgitation.  · Intermediate central venous pressure (8 mmHg).  · The estimated PA systolic pressure is 93 mmHg.  · There is pulmonary hypertension.  . Continue ACE/ARB and Furosemide and monitor clinical status closely. Monitor on telemetry. Patient is off CHF pathway.  Monitor strict Is&Os and daily weights.  Place on fluid restriction of 1.5 L. Continue to stress to patient importance of self efficacy and  on diet for CHF. Last BNP reviewed- and noted below   Recent Labs   Lab 02/11/23  0940   BNP 1,881*   .

## 2023-02-11 NOTE — H&P
[facility] - Emergency Dept.  [facility] Medicine  History & Physical    Patient Name: Richy Douglas  MRN: [id number]  Patient Class: OP- Observation  Admission Date: 2/11/2023  Attending Physician: Charmaine Brown MD   Primary Care Provider: Vivian Ch MD         Patient information was obtained from patient, past medical records and ER records.     Subjective:     Principal Problem:<principal problem not specified>    Chief Complaint:   Chief Complaint   Patient presents with    Shortness of Breath     Hx of CHF reports shortness of breath, states he was admitted recently and has been short of breath since discharge. Also reports chest pain.        HPI: The patient is 83 yo male with past medical history atrial fibrillation, HFrEF, emphysema, GERD, hypertension, osteoarthritis and prostate cancer who presented with shortness of breath and chest pain. He reports shortness of breath started last night and has progressed. He felt like he could not breathe prior to presentation. He has cough. He reports multiple family members have viral illness. He has intermittent chest pain. He also reports BLE swelling. Initial troponin negative. COVID positive. Hospital medicine consulted. Patient placed in observation.      Past Medical History:   Diagnosis Date    Anemia of chronic disease     Aortic aneurysm     Atrial fibrillation with RVR 09/24/2021    Chronic systolic congestive heart failure 08/31/2017    Emphysema of lung 08/31/2017    GERD (gastroesophageal reflux disease)     Hypertension     Knee osteoarthritis 10/21/2022    Microcytic anemia 11/24/2020    Other hyperlipidemia 04/27/2021    Personal history of colonic polyps 2022    Prostate cancer        Past Surgical History:   Procedure Laterality Date    CATHETERIZATION OF BOTH LEFT AND RIGHT HEART N/A 1/13/2022    Procedure: CATHETERIZATION, HEART, BOTH LEFT AND RIGHT;  Surgeon: Janneth Tovar MD;  Location: [facility] CATH LAB;  Service: Cardiology;  Laterality:  N/A;  poss cx    COLONOSCOPY      COLONOSCOPY N/A 6/30/2022    Procedure: COLONOSCOPY;  Surgeon: Sandra Perez MD;  Location: [facility] ENDO;  Service: Endoscopy;  Laterality: N/A;    COLONOSCOPY N/A 7/1/2022    Procedure: COLONOSCOPY;  Surgeon: Woodrow Christian MD;  Location: [facility] ENDO;  Service: Endoscopy;  Laterality: N/A;    CORONARY ANGIOGRAPHY N/A 1/13/2022    Procedure: ANGIOGRAM, CORONARY ARTERY;  Surgeon: Janneth Tovar MD;  Location: [facility] CATH LAB;  Service: Cardiology;  Laterality: N/A;    ESOPHAGOGASTRODUODENOSCOPY N/A 6/30/2022    Procedure: EGD (ESOPHAGOGASTRODUODENOSCOPY);  Surgeon: Sandra Perez MD;  Location: [facility] ENDO;  Service: Endoscopy;  Laterality: N/A;    INJECTION OF JOINT Bilateral 10/21/2022    Procedure: Bilateral intraarticular knee injection with local ALLERGIC TO IODINE;  Surgeon: Devon Grant MD;  Location: [facility] PAIN MGT;  Service: Pain Management;  Laterality: Bilateral;    INJECTION OF JOINT Bilateral 1/31/2023    Procedure: Bilateral IA knee joint injection with steroid RN IV Sedation;  Surgeon: Devon Grant MD;  Location: [facility];  Service: Pain Management;  Laterality: Bilateral;    PROSTATE SURGERY      SPINE SURGERY         Review of patient's allergies indicates:   Allergen Reactions    Fish containing products     Iodine and iodide containing products     Shellfish containing products        No current facility-administered medications on file prior to encounter.     Current Outpatient Medications on File Prior to Encounter   Medication Sig    albuterol (PROVENTIL/VENTOLIN HFA) 90 mcg/actuation inhaler Inhale 2 puffs into the lungs every 4 (four) hours as needed for Wheezing or Shortness of Breath.    furosemide (LASIX) 40 MG tablet Take 1 tablet (40 mg total) by mouth once daily. Do not take if blood pressure is low, feeling dry/dizzy/lightheaded.    gabapentin (NEURONTIN) 300 MG capsule Take 1 capsule (300 mg total) by mouth every evening.     pantoprazole (PROTONIX) 40 MG tablet Take 1 tablet (40 mg total) by mouth once daily.    potassium chloride SA (K-DUR,KLOR-CON M) 10 MEQ tablet Take 1 tablet (10 mEq total) by mouth once daily.    sacubitriL-valsartan (ENTRESTO)  mg per tablet Take 1 tablet by mouth 2 (two) times daily.    triamcinolone acetonide 0.5% (KENALOG) 0.5 % Crea Apply topically 2 (two) times daily. for 14 days (Patient not taking: Reported on 2023)     Family History       Problem Relation (Age of Onset)    Diabetes Mother    Peripheral vascular disease Mother          Tobacco Use    Smoking status: Some Days     Packs/day: 1.00     Years: 68.00     Pack years: 68.00     Types: Cigarettes     Start date: 1954     Last attempt to quit: 2022     Years since quittin.6    Smokeless tobacco: Never   Substance and Sexual Activity    Alcohol use: Yes     Comment: reports only drinks red wine when games are on    Drug use: Never    Sexual activity: Yes     Partners: Female     Review of Systems   Constitutional:  Negative for chills and fever.   Respiratory:  Positive for cough and shortness of breath.    Cardiovascular:  Positive for chest pain and leg swelling. Negative for palpitations.   All other systems reviewed and are negative.  Objective:     Vital Signs (Most Recent):  Temp: 97.9 °F (36.6 °C) (23 0917)  Pulse: 75 (23 1000)  Resp: (!) 22 (23 1000)  BP: (!) 116/57 (23 1000)  SpO2: 96 % (23 1000)   Vital Signs (24h Range):  Temp:  [97.9 °F (36.6 °C)] 97.9 °F (36.6 °C)  Pulse:  [75-83] 75  Resp:  [22-30] 22  SpO2:  [96 %-98 %] 96 %  BP: ()/(49-58) 116/57     Weight: 69.3 kg (152 lb 10.7 oz)  Body mass index is 21.29 kg/m².    Physical Exam  HENT:      Head: Normocephalic and atraumatic.   Cardiovascular:      Rate and Rhythm: Normal rate and regular rhythm.      Heart sounds: No murmur heard.  Pulmonary:      Effort: Pulmonary effort is normal. No respiratory distress.      Breath  sounds: Normal breath sounds. No wheezing.   Abdominal:      General: Bowel sounds are normal. There is no distension.      Palpations: Abdomen is soft.      Tenderness: There is no abdominal tenderness.   Musculoskeletal:         General: Swelling present.      Right lower leg: Edema present.      Left lower leg: Edema present.   Skin:     General: Skin is warm and dry.   Neurological:      Mental Status: He is alert and oriented to person, place, and time. Mental status is at baseline.           Significant Labs: All pertinent labs within the past 24 hours have been reviewed.  CBC:   Recent Labs   Lab 02/11/23  0940   WBC 8.18   HGB 8.8*   HCT 28.5*        CMP:   Recent Labs   Lab 02/11/23  0940      K 2.8*      CO2 24   GLU 92   BUN 15   CREATININE 0.9   CALCIUM 8.8   PROT 6.5   ALBUMIN 3.2*   BILITOT 1.3*   ALKPHOS 55   AST 23   ALT 20   ANIONGAP 17*     Cardiac Markers:   Recent Labs   Lab 02/11/23  0940   BNP 1,881*     Troponin:   Recent Labs   Lab 02/11/23  0940   TROPONINI 0.017       Significant Imaging: I have reviewed all pertinent imaging results/findings within the past 24 hours.    Assessment/Plan:     COVID-19  Patient is identified as High risk for severe complications of COVID 19 based on COVID risk score of 7   Initiate standard COVID protocols; COVID-19 testing ,Infection Control notification  and isolation- respiratory, contact and droplet per protocol    Diagnostics: (leukopenia, hyponatremia, hyperferritinemia, elevated troponin, elevated d-dimer, age, and comorbidities are significant predictors of poor clinical outcome)  CBC, CMP, Ferritin, CRP and Portable CXR    Management: Maintain oxygen saturations 92-96% via Nasal Cannula 2 LPM and monitor with continuous/intermittent pulse oximetry. , Inhaled bronchodilators as needed for shortness of breath. and Continuous cardiac monitoring.    Chest pain  Trend troponin  May need cardiology consult if troponin  elevated      Shortness of breath  Likely multifactorial  Give dose of IV lasix  Schedule albuterol inhaler  Supplemental oxygen      Chronic systolic congestive heart failure  Patient is identified as having Systolic (HFrEF) heart failure that is Acute on chronic. CHF is currently uncontrolled due to Continued edema of extremities. Latest ECHO performed and demonstrates- Results for orders placed during the hospital encounter of 05/20/22    Echo    Interpretation Summary  · The left ventricle is moderately enlarged with eccentric hypertrophy and moderately decreased systolic function.  · Severe left atrial enlargement.  · The estimated ejection fraction is 30%.  · Grade II left ventricular diastolic dysfunction.  · Moderate right ventricular enlargement with moderately reduced right ventricular systolic function.  · Mild right atrial enlargement.  · Moderate aortic regurgitation.  · Mild mitral regurgitation.  · Mild to moderate tricuspid regurgitation.  · Mild pulmonic regurgitation.  · Intermediate central venous pressure (8 mmHg).  · The estimated PA systolic pressure is 93 mmHg.  · There is pulmonary hypertension.  . Continue ACE/ARB and Furosemide and monitor clinical status closely. Monitor on telemetry. Patient is off CHF pathway.  Monitor strict Is&Os and daily weights.  Place on fluid restriction of 1.5 L. Continue to stress to patient importance of self efficacy and  on diet for CHF. Last BNP reviewed- and noted below   Recent Labs   Lab 02/11/23  0940   BNP 1,881*   .      Hypokalemia      Replace potassium      Check magnesium      Repeat labs in am    VTE Risk Mitigation (From admission, onward)           Ordered     enoxaparin injection 40 mg  Daily         02/11/23 1056     IP VTE HIGH RISK PATIENT  Once         02/11/23 1056     Place sequential compression device  Until discontinued         02/11/23 1056                       Charmaine Brown MD  Department of Hospital Medicine   [facility] -  Emergency Dept.

## 2023-02-12 LAB
ALBUMIN SERPL BCP-MCNC: 2.8 G/DL (ref 3.5–5.2)
ALP SERPL-CCNC: 49 U/L (ref 55–135)
ALT SERPL W/O P-5'-P-CCNC: 16 U/L (ref 10–44)
ANION GAP SERPL CALC-SCNC: 12 MMOL/L (ref 8–16)
AORTIC ROOT ANNULUS: 4.69 CM
ASCENDING AORTA: 4.62 CM
AST SERPL-CCNC: 21 U/L (ref 10–40)
AV INDEX (PROSTH): 0.63
AV MEAN GRADIENT: 7 MMHG
AV PEAK GRADIENT: 12 MMHG
AV REGURGITATION PRESSURE HALF TIME: 397.37 MS
AV VALVE AREA: 3.09 CM2
AV VELOCITY RATIO: 0.75
BASOPHILS # BLD AUTO: 0.02 K/UL (ref 0–0.2)
BASOPHILS NFR BLD: 0.2 % (ref 0–1.9)
BILIRUB SERPL-MCNC: 1 MG/DL (ref 0.1–1)
BSA FOR ECHO PROCEDURE: 1.86 M2
BUN SERPL-MCNC: 14 MG/DL (ref 8–23)
CALCIUM SERPL-MCNC: 8.7 MG/DL (ref 8.7–10.5)
CHLORIDE SERPL-SCNC: 102 MMOL/L (ref 95–110)
CO2 SERPL-SCNC: 28 MMOL/L (ref 23–29)
CREAT SERPL-MCNC: 0.7 MG/DL (ref 0.5–1.4)
CV ECHO LV RWT: 0.52 CM
DIFFERENTIAL METHOD: ABNORMAL
DOP CALC AO PEAK VEL: 1.76 M/S
DOP CALC AO VTI: 32 CM
DOP CALC LVOT AREA: 4.9 CM2
DOP CALC LVOT DIAMETER: 2.49 CM
DOP CALC LVOT PEAK VEL: 1.32 M/S
DOP CALC LVOT STROKE VOLUME: 98.8 CM3
DOP CALC RVOT PEAK VEL: 0.97 M/S
DOP CALC RVOT VTI: 15.9 CM
DOP CALCLVOT PEAK VEL VTI: 20.3 CM
E WAVE DECELERATION TIME: 253.2 MSEC
E/A RATIO: 2.17
E/E' RATIO: 11.69 M/S
ECHO LV POSTERIOR WALL: 1.45 CM (ref 0.6–1.1)
EJECTION FRACTION: 30 %
EOSINOPHIL # BLD AUTO: 0.1 K/UL (ref 0–0.5)
EOSINOPHIL NFR BLD: 1.2 % (ref 0–8)
ERYTHROCYTE [DISTWIDTH] IN BLOOD BY AUTOMATED COUNT: 26.7 % (ref 11.5–14.5)
EST. GFR  (NO RACE VARIABLE): >60 ML/MIN/1.73 M^2
FRACTIONAL SHORTENING: 16 % (ref 28–44)
GLUCOSE SERPL-MCNC: 107 MG/DL (ref 70–110)
HCT VFR BLD AUTO: 27.5 % (ref 40–54)
HGB BLD-MCNC: 8.1 G/DL (ref 14–18)
IMM GRANULOCYTES # BLD AUTO: 0.06 K/UL (ref 0–0.04)
IMM GRANULOCYTES NFR BLD AUTO: 0.6 % (ref 0–0.5)
INTERVENTRICULAR SEPTUM: 1.67 CM (ref 0.6–1.1)
IVC DIAMETER: 25 CM
IVRT: 45.67 MSEC
LA MAJOR: 7.51 CM
LA MINOR: 6.33 CM
LA WIDTH: 4.9 CM
LEFT ATRIUM SIZE: 4.04 CM
LEFT ATRIUM VOLUME INDEX: 61.5 ML/M2
LEFT ATRIUM VOLUME: 115.59 CM3
LEFT INTERNAL DIMENSION IN SYSTOLE: 4.72 CM (ref 2.1–4)
LEFT VENTRICLE DIASTOLIC VOLUME INDEX: 81.27 ML/M2
LEFT VENTRICLE DIASTOLIC VOLUME: 152.78 ML
LEFT VENTRICLE MASS INDEX: 215 G/M2
LEFT VENTRICLE SYSTOLIC VOLUME INDEX: 55 ML/M2
LEFT VENTRICLE SYSTOLIC VOLUME: 103.44 ML
LEFT VENTRICULAR INTERNAL DIMENSION IN DIASTOLE: 5.59 CM (ref 3.5–6)
LEFT VENTRICULAR MASS: 405.03 G
LV LATERAL E/E' RATIO: 10.86 M/S
LV SEPTAL E/E' RATIO: 12.67 M/S
LVOT MG: 3.87 MMHG
LVOT MV: 0.95 CM/S
LYMPHOCYTES # BLD AUTO: 0.5 K/UL (ref 1–4.8)
LYMPHOCYTES NFR BLD: 5.2 % (ref 18–48)
MCH RBC QN AUTO: 20.1 PG (ref 27–31)
MCHC RBC AUTO-ENTMCNC: 29.5 G/DL (ref 32–36)
MCV RBC AUTO: 68 FL (ref 82–98)
MONOCYTES # BLD AUTO: 0.5 K/UL (ref 0.3–1)
MONOCYTES NFR BLD: 4.8 % (ref 4–15)
MV PEAK A VEL: 0.35 M/S
MV PEAK E VEL: 0.76 M/S
MV STENOSIS PRESSURE HALF TIME: 73.43 MS
MV VALVE AREA P 1/2 METHOD: 3 CM2
NEUTROPHILS # BLD AUTO: 8.3 K/UL (ref 1.8–7.7)
NEUTROPHILS NFR BLD: 88 % (ref 38–73)
NRBC BLD-RTO: 0 /100 WBC
PISA AR MAX VEL: 3.73 M/S
PISA TR MAX VEL: 4.03 M/S
PLATELET # BLD AUTO: 215 K/UL (ref 150–450)
PMV BLD AUTO: ABNORMAL FL (ref 9.2–12.9)
POTASSIUM SERPL-SCNC: 3.3 MMOL/L (ref 3.5–5.1)
PROT SERPL-MCNC: 5.9 G/DL (ref 6–8.4)
PV MEAN GRADIENT: 2.55 MMHG
RA MAJOR: 5.95 CM
RA PRESSURE: 15 MMHG
RA WIDTH: 5.68 CM
RBC # BLD AUTO: 4.03 M/UL (ref 4.6–6.2)
RIGHT VENTRICULAR END-DIASTOLIC DIMENSION: 4.5 CM
SINUS: 4.84 CM
SODIUM SERPL-SCNC: 142 MMOL/L (ref 136–145)
STJ: 4.89 CM
TDI LATERAL: 0.07 M/S
TDI SEPTAL: 0.06 M/S
TDI: 0.07 M/S
TR MAX PG: 65 MMHG
TV REST PULMONARY ARTERY PRESSURE: 80 MMHG
WBC # BLD AUTO: 9.45 K/UL (ref 3.9–12.7)

## 2023-02-12 PROCEDURE — 36415 COLL VENOUS BLD VENIPUNCTURE: CPT | Performed by: INTERNAL MEDICINE

## 2023-02-12 PROCEDURE — 94640 AIRWAY INHALATION TREATMENT: CPT | Mod: XB

## 2023-02-12 PROCEDURE — 99223 PR INITIAL HOSPITAL CARE,LEVL III: ICD-10-PCS | Mod: 25,,, | Performed by: INTERNAL MEDICINE

## 2023-02-12 PROCEDURE — 80053 COMPREHEN METABOLIC PANEL: CPT | Performed by: INTERNAL MEDICINE

## 2023-02-12 PROCEDURE — 63600175 PHARM REV CODE 636 W HCPCS: Performed by: STUDENT IN AN ORGANIZED HEALTH CARE EDUCATION/TRAINING PROGRAM

## 2023-02-12 PROCEDURE — 85025 COMPLETE CBC W/AUTO DIFF WBC: CPT | Performed by: INTERNAL MEDICINE

## 2023-02-12 PROCEDURE — 99900035 HC TECH TIME PER 15 MIN (STAT)

## 2023-02-12 PROCEDURE — G0378 HOSPITAL OBSERVATION PER HR: HCPCS

## 2023-02-12 PROCEDURE — 63600175 PHARM REV CODE 636 W HCPCS: Performed by: INTERNAL MEDICINE

## 2023-02-12 PROCEDURE — 25000003 PHARM REV CODE 250: Performed by: INTERNAL MEDICINE

## 2023-02-12 PROCEDURE — 99223 1ST HOSP IP/OBS HIGH 75: CPT | Mod: 25,,, | Performed by: INTERNAL MEDICINE

## 2023-02-12 PROCEDURE — 96376 TX/PRO/DX INJ SAME DRUG ADON: CPT

## 2023-02-12 PROCEDURE — 96372 THER/PROPH/DIAG INJ SC/IM: CPT | Performed by: INTERNAL MEDICINE

## 2023-02-12 PROCEDURE — 25000003 PHARM REV CODE 250: Performed by: STUDENT IN AN ORGANIZED HEALTH CARE EDUCATION/TRAINING PROGRAM

## 2023-02-12 RX ORDER — POTASSIUM CHLORIDE 20 MEQ/1
40 TABLET, EXTENDED RELEASE ORAL ONCE
Status: COMPLETED | OUTPATIENT
Start: 2023-02-12 | End: 2023-02-12

## 2023-02-12 RX ORDER — FUROSEMIDE 10 MG/ML
20 INJECTION INTRAMUSCULAR; INTRAVENOUS ONCE
Status: COMPLETED | OUTPATIENT
Start: 2023-02-12 | End: 2023-02-12

## 2023-02-12 RX ADMIN — ALBUTEROL SULFATE 2 PUFF: 90 AEROSOL, METERED RESPIRATORY (INHALATION) at 12:02

## 2023-02-12 RX ADMIN — ALBUTEROL SULFATE 2 PUFF: 90 AEROSOL, METERED RESPIRATORY (INHALATION) at 08:02

## 2023-02-12 RX ADMIN — POTASSIUM CHLORIDE 40 MEQ: 1500 TABLET, EXTENDED RELEASE ORAL at 05:02

## 2023-02-12 RX ADMIN — FLUTICASONE FUROATE AND VILANTEROL TRIFENATATE 1 PUFF: 100; 25 POWDER RESPIRATORY (INHALATION) at 08:02

## 2023-02-12 RX ADMIN — FUROSEMIDE 20 MG: 10 INJECTION, SOLUTION INTRAMUSCULAR; INTRAVENOUS at 09:02

## 2023-02-12 RX ADMIN — ENOXAPARIN SODIUM 70 MG: 80 INJECTION SUBCUTANEOUS at 05:02

## 2023-02-12 RX ADMIN — ACETAMINOPHEN 650 MG: 325 TABLET ORAL at 09:02

## 2023-02-12 RX ADMIN — POTASSIUM CHLORIDE 40 MEQ: 1500 TABLET, EXTENDED RELEASE ORAL at 09:02

## 2023-02-12 RX ADMIN — ALBUTEROL SULFATE 2 PUFF: 90 AEROSOL, METERED RESPIRATORY (INHALATION) at 05:02

## 2023-02-12 RX ADMIN — DEXAMETHASONE 6 MG: 4 TABLET ORAL at 11:02

## 2023-02-12 RX ADMIN — REMDESIVIR 200 MG: 100 INJECTION, POWDER, LYOPHILIZED, FOR SOLUTION INTRAVENOUS at 05:02

## 2023-02-12 NOTE — HOSPITAL COURSE
Presented with shortness of breath/chest tightness/chest pain on 02/11 likely secondary to COVID, CHF exacerbation, COPD exacerbation ; received Lasix IV in ED   Follow up on echo, chest x-ray, troponins, cardiology follow up   Not on oxygen at home, currently on 2 L nasal cannula, on steroids, remdesivir, neb therapy;  Tropx3 -ve;   US LE -ve for DVT  Echo showed EF of 30%;       R/LHC normal coronaries 1/2022  Follows with cardiologist Dr. Garces, patient saw Dr. Christianson to discuss on ICD/CRT, does not want it now as per documentation;     2/13: VQ scan high prob for PE however could be artifact given sever COPD. LE US negative for DVT. D dimer only 0.6 which is WNL given age. 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 Currently anemic. Vital signs stable on room air. Will not start AC at this time. Will confirm PE with CTA. Hx of shellfish and possibly iodine allergy. Will premedicate. CTA in the am.   2/14: CTA negative for PE. Home O2 eval performed and patient did not qualify. He was discharged home with prednisone and breathing treatments. Instructed to f/u with pulm outpatient.

## 2023-02-12 NOTE — NURSING
Received lying in bed in side lying position awake and alert watching tv. Assessment per flowsheet. Denies any pain or discomfort at this time. Bed low, call light within reach, bed alarm on. Will continue to monitor.

## 2023-02-12 NOTE — ASSESSMENT & PLAN NOTE
Patient is identified as High risk for severe complications of COVID 19 based on COVID risk score of 7   Initiate standard COVID protocols; COVID-19 testing ,Infection Control notification  and isolation- respiratory, contact and droplet per protocol    Diagnostics: (leukopenia, hyponatremia, hyperferritinemia, elevated troponin, elevated d-dimer, age, and comorbidities are significant predictors of poor clinical outcome)      Management: Maintain oxygen saturations 92-96% via Nasal Cannula 2 LPM and monitor with continuous/intermittent pulse oximetry. , Inhaled bronchodilators as needed for shortness of breath. and Continuous cardiac monitoring.    Steroids, remdesivir, ambulatory home oxygen assessment close to discharge

## 2023-02-12 NOTE — CONSULTS
[facility] ([facility]  Cardiology  Consult Note    Patient Name: Richy Douglas  MRN: [id number]  Admission Date: 2/11/2023  Hospital Length of Stay: 0 days  Code Status: Full Code   Attending Provider: Kelly Young, *   Consulting Provider: Ricardo Crum MD  Primary Care Physician: Vivian Ch MD  Principal Problem:Chest pain    Patient information was obtained from patient and ER records.     Inpatient consult to Cardiology  Consult performed by: Ricardo Crum MD  Consult ordered by: Kelly Young MD        Subjective:         HPI:      81 yo male, cardiology consult for CHFrEF decompensation    PMH NICM, BI V failure EF 30%, moderate AI, GERD,afib HTN, pulm HTN, PVD, emphysema, aortic aneurysm, FE def anemia  F/u cardiologist Dr. Garces. Not OAC due to h/o GI bleeding and no BB due to emphysema  Admitted for SOB and + covid 19 HGB 8.1, cr 0.7, Troponin negative and BNP 1881,  CXR emphysema  Ekg NSR and PACs  Echo in   The left ventricle is normal in size with concentric hypertrophy and moderately decreased systolic function.  Severe left atrial enlargement.  Trivial pericardial effusion.  Mild tricuspid regurgitation.  Severe right atrial enlargement.  The estimated ejection fraction is 30%.  There is severe left ventricular global hypokinesis.  Mild right ventricular enlargement with mildly to moderately reduced right ventricular systolic function.  Moderate aortic regurgitation.  Elevated central venous pressure (15 mmHg).  The estimated PA systolic pressure is 80 mmHg.  There is pulmonary hypertension.  The ascending aorta is moderately dilated.  The aortic root is moderately dilated.  Atrial fibrillation observed.           Past Medical History:   Diagnosis Date    Anemia of chronic disease     Aortic aneurysm     Atrial fibrillation with RVR 09/24/2021    Chronic systolic congestive heart failure 08/31/2017    Emphysema of lung 08/31/2017    GERD (gastroesophageal reflux disease)      Hypertension     Knee osteoarthritis 10/21/2022    Microcytic anemia 11/24/2020    Other hyperlipidemia 04/27/2021    Personal history of colonic polyps 2022    Prostate cancer        Past Surgical History:   Procedure Laterality Date    CATHETERIZATION OF BOTH LEFT AND RIGHT HEART N/A 1/13/2022    Procedure: CATHETERIZATION, HEART, BOTH LEFT AND RIGHT;  Surgeon: Janneth Tovar MD;  Location: [facility] CATH LAB;  Service: Cardiology;  Laterality: N/A;  poss cx    COLONOSCOPY      COLONOSCOPY N/A 6/30/2022    Procedure: COLONOSCOPY;  Surgeon: Sandra Perez MD;  Location: [facility] ENDO;  Service: Endoscopy;  Laterality: N/A;    COLONOSCOPY N/A 7/1/2022    Procedure: COLONOSCOPY;  Surgeon: Woodrow Christian MD;  Location: [facility] ENDO;  Service: Endoscopy;  Laterality: N/A;    CORONARY ANGIOGRAPHY N/A 1/13/2022    Procedure: ANGIOGRAM, CORONARY ARTERY;  Surgeon: Janenth Tovar MD;  Location: [facility] CATH LAB;  Service: Cardiology;  Laterality: N/A;    ESOPHAGOGASTRODUODENOSCOPY N/A 6/30/2022    Procedure: EGD (ESOPHAGOGASTRODUODENOSCOPY);  Surgeon: Sandra Perez MD;  Location: [facility];  Service: Endoscopy;  Laterality: N/A;    INJECTION OF JOINT Bilateral 10/21/2022    Procedure: Bilateral intraarticular knee injection with local ALLERGIC TO IODINE;  Surgeon: Devon Grant MD;  Location: [facility] PAIN MGT;  Service: Pain Management;  Laterality: Bilateral;    INJECTION OF JOINT Bilateral 1/31/2023    Procedure: Bilateral IA knee joint injection with steroid RN IV Sedation;  Surgeon: Devon Grant MD;  Location: [facility] PAIN MGT;  Service: Pain Management;  Laterality: Bilateral;    PROSTATE SURGERY      SPINE SURGERY         Review of patient's allergies indicates:   Allergen Reactions    Fish containing products     Iodine and iodide containing products     Shellfish containing products        No current facility-administered medications on file prior to encounter.     Current Outpatient Medications on File  Prior to Encounter   Medication Sig    albuterol (PROVENTIL/VENTOLIN HFA) 90 mcg/actuation inhaler Inhale 2 puffs into the lungs every 4 (four) hours as needed for Wheezing or Shortness of Breath.    furosemide (LASIX) 40 MG tablet Take 1 tablet (40 mg total) by mouth once daily. Do not take if blood pressure is low, feeling dry/dizzy/lightheaded.    potassium chloride SA (K-DUR,KLOR-CON M) 10 MEQ tablet Take 1 tablet (10 mEq total) by mouth once daily.    sacubitriL-valsartan (ENTRESTO)  mg per tablet Take 1 tablet by mouth 2 (two) times daily.     Family History       Problem Relation (Age of Onset)    Diabetes Mother    Peripheral vascular disease Mother          Tobacco Use    Smoking status: Some Days     Packs/day: 1.00     Years: 68.00     Pack years: 68.00     Types: Cigarettes     Start date: 1954     Last attempt to quit: 2022     Years since quittin.6    Smokeless tobacco: Never   Substance and Sexual Activity    Alcohol use: Yes     Comment: reports only drinks red wine when games are on    Drug use: Never    Sexual activity: Yes     Partners: Female     ROS  Objective:     Vital Signs (Most Recent):  Temp: 98.2 °F (36.8 °C) (23 1208)  Pulse: 89 (23 1242)  Resp: 18 (23 1208)  BP: (!) 97/51 (23 1242)  SpO2: 98 % (23 1209)   Vital Signs (24h Range):  Temp:  [98.2 °F (36.8 °C)-99.3 °F (37.4 °C)] 98.2 °F (36.8 °C)  Pulse:  [] 89  Resp:  [16-20] 18  SpO2:  [94 %-98 %] 98 %  BP: ()/(46-57) 97/51     Weight: 68.9 kg (152 lb)  Body mass index is 21.2 kg/m².    SpO2: 98 %       No intake or output data in the 24 hours ending 23 1557    Lines/Drains/Airways       Peripheral Intravenous Line  Duration                  Peripheral IV - Single Lumen 23 Right Antecubital 1 day                    Physical Exam    Significant Labs: ABG: No results for input(s): PH, PCO2, HCO3, POCSATURATED, BE in the last 48 hours., Blood Culture: No results for  input(s): LABBLOO in the last 48 hours., BMP:   Recent Labs   Lab 02/11/23  0940 02/11/23  1601 02/12/23  0452   GLU 92  --  107     --  142   K 2.8*  --  3.3*     --  102   CO2 24  --  28   BUN 15  --  14   CREATININE 0.9  --  0.7   CALCIUM 8.8  --  8.7   MG  --  1.7  --    , CMP   Recent Labs   Lab 02/11/23  0940 02/12/23  0452    142   K 2.8* 3.3*    102   CO2 24 28   GLU 92 107   BUN 15 14   CREATININE 0.9 0.7   CALCIUM 8.8 8.7   PROT 6.5 5.9*   ALBUMIN 3.2* 2.8*   BILITOT 1.3* 1.0   ALKPHOS 55 49*   AST 23 21   ALT 20 16   ANIONGAP 17* 12   , CBC   Recent Labs   Lab 02/11/23  0940 02/12/23  0452   WBC 8.18 9.45   HGB 8.8* 8.1*   HCT 28.5* 27.5*    215   , INR No results for input(s): INR, PROTIME in the last 48 hours., Lipid Panel No results for input(s): CHOL, HDL, LDLCALC, TRIG, CHOLHDL in the last 48 hours., and Troponin   Recent Labs   Lab 02/11/23  0940 02/11/23  1601   TROPONINI 0.017 0.021       Significant Imaging: EKG: .     Assessment and Plan:     * Chest pain  NON CARDIAC CHEST PAIN    COVID-19  Rx PER     Chronic systolic congestive heart failure  Patient is identified as having Systolic (HFrEF) heart failure that is Acute on chronic. CHF is currently uncontrolled due to Weight gain of 3 pounds. Latest ECHO performed and demonstrates- Results for orders placed during the hospital encounter of 02/11/23    Echo    Interpretation Summary  · The left ventricle is normal in size with concentric hypertrophy and moderately decreased systolic function.  · Severe left atrial enlargement.  · Trivial pericardial effusion.  · Mild tricuspid regurgitation.  · Severe right atrial enlargement.  · The estimated ejection fraction is 30%.  · There is severe left ventricular global hypokinesis.  · Mild right ventricular enlargement with mildly to moderately reduced right ventricular systolic function.  · Moderate aortic regurgitation.  · Elevated central venous pressure (15 mmHg).  ·  The estimated PA systolic pressure is 80 mmHg.  · There is pulmonary hypertension.  · The ascending aorta is moderately dilated.  · The aortic root is moderately dilated.  · Atrial fibrillation observed.  . Continue Furosemide and monitor clinical status closely. Monitor on telemetry. Patient is on CHF pathway.  Monitor strict Is&Os and daily weights.  Place on fluid restriction of 1.5 L. Continue to stress to patient importance of self efficacy and  on diet for CHF. Last BNP reviewed- and noted below   Recent Labs   Lab 02/11/23  0940   BNP 1,881*   .  Due to COVID 19 infection, no pt interview and physical examine  The case was discussed with HM team and the chart reviewed    CONTINUE DIURESIS 20 TO 40 MG IVP DAILY PER BP AND Cr LEVEL  OK TO HOLD ENTRESTO NOW    Paf VRC  NO OAC DUE TO H/P GI BLEEDING     VTE Risk Mitigation (From admission, onward)           Ordered     IP VTE HIGH RISK PATIENT  Once         02/11/23 1056     Place sequential compression device  Until discontinued         02/11/23 1056                    Thank you for your consult. I will sign off. Please contact us if you have any additional questions.    Ricardo Crum MD  Cardiology   [facility] - Tele[facility] ([facility])

## 2023-02-12 NOTE — SUBJECTIVE & OBJECTIVE
Interval History:     Denied acute issues overnight, stated improvement in shortness of breath, lower extremity swelling; denied chest pain, palpitations.    Tropx3 -ve;   US LE -ve for DVT  Still wheezing,  Ordered 1 time Lasix 20 IV  On 2 L nasal cannula at the time of examination,  Episodes of hypotension, withheld Entresto.    Echo showed ejection fraction of 30%, mild pericardial effusion, episodes of hypotension, follow up with Cardiology      Review of Systems    Constitutional:  Negative for chills and fever.   Respiratory:  Positive for cough and shortness of breath.    Cardiovascular:  denied chest pain;  Negative for palpitations.   All other systems reviewed and are negative.      Objective:     Vital Signs (Most Recent):  Temp: 98.2 °F (36.8 °C) (02/12/23 1208)  Pulse: 89 (02/12/23 1242)  Resp: 18 (02/12/23 1208)  BP: (!) 97/51 (02/12/23 1242)  SpO2: 98 % (02/12/23 1209)   Vital Signs (24h Range):  Temp:  [98.2 °F (36.8 °C)-99.3 °F (37.4 °C)] 98.2 °F (36.8 °C)  Pulse:  [] 89  Resp:  [16-20] 18  SpO2:  [94 %-98 %] 98 %  BP: ()/(46-57) 97/51     Weight: 68.9 kg (152 lb)  Body mass index is 21.2 kg/m².  No intake or output data in the 24 hours ending 02/12/23 1515   Physical Exam    HENT:      Head: Normocephalic and atraumatic.   Cardiovascular:      Rate and Rhythm: Normal rate and regular rhythm.      Heart sounds: No murmur heard.  Pulmonary:      Effort: Pulmonary effort is normal. No respiratory distress.      Breath sounds: Normal breath sounds. No wheezing.   Abdominal:      General: Bowel sounds are normal. There is no distension.      Palpations: Abdomen is soft.      Tenderness: There is no abdominal tenderness.   Musculoskeletal:         edema improved  Skin:     General: Skin is warm and dry.   Neurological:      Mental Status: He is alert and oriented to person, place, and time. Mental status is at baseline.       Significant Labs: All pertinent labs within the past 24 hours have  been reviewed.  CBC:   Recent Labs   Lab 02/11/23  0940 02/12/23 0452   WBC 8.18 9.45   HGB 8.8* 8.1*   HCT 28.5* 27.5*    215     CMP:   Recent Labs   Lab 02/11/23 0940 02/12/23 0452    142   K 2.8* 3.3*    102   CO2 24 28   GLU 92 107   BUN 15 14   CREATININE 0.9 0.7   CALCIUM 8.8 8.7   PROT 6.5 5.9*   ALBUMIN 3.2* 2.8*   BILITOT 1.3* 1.0   ALKPHOS 55 49*   AST 23 21   ALT 20 16   ANIONGAP 17* 12       Significant Imaging:     Imaging Results               NM Lung Scan Ventilation Perfusion (Final result)  Result time 02/11/23 15:50:47      Final result by Karlos Boewr MD (02/11/23 15:50:47)                   Impression:      This represents a high probability of pulmonary embolism, however this may be artifactual related to severe emphysema and scarring in the lung apices.  Consider correlation with CT angiography when clinically appropriate.    This report was flagged in Epic as abnormal.      Electronically signed by: Karlos Bower  Date:    02/11/2023  Time:    15:50               Narrative:    EXAMINATION:  NM LUNG VENTILATION AND PERFUSION IMAGING    CLINICAL HISTORY:  Pulmonary embolism (PE) suspected, positive D-dimer;    TECHNIQUE:  IV administration of 5 mCi of [id number]-MAA, multiple images of the thorax were obtained in various projections.    COMPARISON:  Multiple priors.    FINDINGS:  Perfusion: Large perfusion defect most notable on right lateral image and anterior and posterior images in the right upper lobe greater than 2 segments concerning for pulmonary embolism.  Smaller areas of perfusion defect in the left lung predominantly apical.    CXR right lung apex emphysema.                                       X-Ray Chest AP Portable (Final result)  Result time 02/11/23 09:56:10      Final result by Jimmie Dwyer MD (02/11/23 09:56:10)                   Impression:      No acute findings.      Electronically signed by: Jimmie Dwyer MD  Date:    02/11/2023  Time:    09:56                Narrative:    EXAMINATION:  XR CHEST AP PORTABLE    CLINICAL HISTORY:  Shortness of breath    TECHNIQUE:  AP view of the chest was performed.    COMPARISON:  06/16/2022    FINDINGS:  Emphysema.  Left apical pleuroparenchymal scarring with associated volume loss.  Aortic atherosclerosis.    No pneumothorax.  No acute osseous findings demonstrated.

## 2023-02-12 NOTE — ASSESSMENT & PLAN NOTE
Patient is identified as having Systolic (HFrEF) heart failure that is Acute on chronic. CHF is currently uncontrolled due to Weight gain of 3 pounds. Latest ECHO performed and demonstrates- Results for orders placed during the hospital encounter of 02/11/23    Echo    Interpretation Summary  · The left ventricle is normal in size with concentric hypertrophy and moderately decreased systolic function.  · Severe left atrial enlargement.  · Trivial pericardial effusion.  · Mild tricuspid regurgitation.  · Severe right atrial enlargement.  · The estimated ejection fraction is 30%.  · There is severe left ventricular global hypokinesis.  · Mild right ventricular enlargement with mildly to moderately reduced right ventricular systolic function.  · Moderate aortic regurgitation.  · Elevated central venous pressure (15 mmHg).  · The estimated PA systolic pressure is 80 mmHg.  · There is pulmonary hypertension.  · The ascending aorta is moderately dilated.  · The aortic root is moderately dilated.  · Atrial fibrillation observed.  . Continue Furosemide and monitor clinical status closely. Monitor on telemetry. Patient is on CHF pathway.  Monitor strict Is&Os and daily weights.  Place on fluid restriction of 1.5 L. Continue to stress to patient importance of self efficacy and  on diet for CHF. Last BNP reviewed- and noted below   Recent Labs   Lab 02/11/23  0940   BNP 1,881*   .  Due to COVID 19 infection, no pt interview and physical examine  The case was discussed with HM team and the chart reviewed    CONTINUE DIURESIS 20 TO 40 MG IVP DAILY PER BP AND Cr LEVEL  OK TO HOLD ENTRESTO NOW

## 2023-02-12 NOTE — ASSESSMENT & PLAN NOTE
Patient is identified as having Systolic (HFrEF) heart failure that is Acute on chronic. CHF is currently uncontrolled due to Continued edema of extremities. Latest ECHO performed and demonstrates- Results for orders placed during the hospital encounter of 05/20/22    Echo    Interpretation Summary  · The left ventricle is moderately enlarged with eccentric hypertrophy and moderately decreased systolic function.  · Severe left atrial enlargement.  · The estimated ejection fraction is 30%.  · Grade II left ventricular diastolic dysfunction.  · Moderate right ventricular enlargement with moderately reduced right ventricular systolic function.  · Mild right atrial enlargement.  · Moderate aortic regurgitation.  · Mild mitral regurgitation.  · Mild to moderate tricuspid regurgitation.  · Mild pulmonic regurgitation.  · Intermediate central venous pressure (8 mmHg).  · The estimated PA systolic pressure is 93 mmHg.  · There is pulmonary hypertension.  . Monitor on telemetry. Patient is off CHF pathway.  Monitor strict Is&Os and daily weights.  Place on fluid restriction of 1.5 L. Continue to stress to patient importance of self efficacy and  on diet for CHF. Last BNP reviewed- and noted below   Recent Labs   Lab 02/11/23  0940   BNP 1,881*   .  Echo as of 2/12/23:      The left ventricle is normal in size with concentric hypertrophy and moderately decreased systolic function.   Severe left atrial enlargement.   Trivial pericardial effusion.   Mild tricuspid regurgitation.   Severe right atrial enlargement.   The estimated ejection fraction is 30%.   There is severe left ventricular global hypokinesis.   Mild right ventricular enlargement with mildly to moderately reduced right ventricular systolic function.   Moderate aortic regurgitation.    2/12  Denied chest pain   Troponin x3 negative   Received Lasix 20 IV once  Stated compliance with medications at home  /[facility] normal coronaries 1/2022  Follows with  cardiologist Dr. Garces, patient saw Dr. Christianson to discuss on ICD/CRT, does not want it now as per documentation;   Cardiology follow up

## 2023-02-12 NOTE — SUBJECTIVE & OBJECTIVE
Past Medical History:   Diagnosis Date    Anemia of chronic disease     Aortic aneurysm     Atrial fibrillation with RVR 09/24/2021    Chronic systolic congestive heart failure 08/31/2017    Emphysema of lung 08/31/2017    GERD (gastroesophageal reflux disease)     Hypertension     Knee osteoarthritis 10/21/2022    Microcytic anemia 11/24/2020    Other hyperlipidemia 04/27/2021    Personal history of colonic polyps 2022    Prostate cancer        Past Surgical History:   Procedure Laterality Date    CATHETERIZATION OF BOTH LEFT AND RIGHT HEART N/A 1/13/2022    Procedure: CATHETERIZATION, HEART, BOTH LEFT AND RIGHT;  Surgeon: Janneth Tovar MD;  Location: [facility] CATH LAB;  Service: Cardiology;  Laterality: N/A;  poss cx    COLONOSCOPY      COLONOSCOPY N/A 6/30/2022    Procedure: COLONOSCOPY;  Surgeon: Sandra Perez MD;  Location: [facility] ENDO;  Service: Endoscopy;  Laterality: N/A;    COLONOSCOPY N/A 7/1/2022    Procedure: COLONOSCOPY;  Surgeon: Woodrow Christian MD;  Location: [facility];  Service: Endoscopy;  Laterality: N/A;    CORONARY ANGIOGRAPHY N/A 1/13/2022    Procedure: ANGIOGRAM, CORONARY ARTERY;  Surgeon: Janneth Tovar MD;  Location: [facility] CATH LAB;  Service: Cardiology;  Laterality: N/A;    ESOPHAGOGASTRODUODENOSCOPY N/A 6/30/2022    Procedure: EGD (ESOPHAGOGASTRODUODENOSCOPY);  Surgeon: Sandra Perez MD;  Location: [facility];  Service: Endoscopy;  Laterality: N/A;    INJECTION OF JOINT Bilateral 10/21/2022    Procedure: Bilateral intraarticular knee injection with local ALLERGIC TO IODINE;  Surgeon: Devon Grant MD;  Location: [facility] PAIN MGT;  Service: Pain Management;  Laterality: Bilateral;    INJECTION OF JOINT Bilateral 1/31/2023    Procedure: Bilateral IA knee joint injection with steroid RN IV Sedation;  Surgeon: Devon Grant MD;  Location: [facility] PAIN MGT;  Service: Pain Management;  Laterality: Bilateral;    PROSTATE SURGERY      SPINE SURGERY         Review of patient's allergies  indicates:   Allergen Reactions    Fish containing products     Iodine and iodide containing products     Shellfish containing products        No current facility-administered medications on file prior to encounter.     Current Outpatient Medications on File Prior to Encounter   Medication Sig    albuterol (PROVENTIL/VENTOLIN HFA) 90 mcg/actuation inhaler Inhale 2 puffs into the lungs every 4 (four) hours as needed for Wheezing or Shortness of Breath.    furosemide (LASIX) 40 MG tablet Take 1 tablet (40 mg total) by mouth once daily. Do not take if blood pressure is low, feeling dry/dizzy/lightheaded.    potassium chloride SA (K-DUR,KLOR-CON M) 10 MEQ tablet Take 1 tablet (10 mEq total) by mouth once daily.    sacubitriL-valsartan (ENTRESTO)  mg per tablet Take 1 tablet by mouth 2 (two) times daily.     Family History       Problem Relation (Age of Onset)    Diabetes Mother    Peripheral vascular disease Mother          Tobacco Use    Smoking status: Some Days     Packs/day: 1.00     Years: 68.00     Pack years: 68.00     Types: Cigarettes     Start date: 1954     Last attempt to quit: 2022     Years since quittin.6    Smokeless tobacco: Never   Substance and Sexual Activity    Alcohol use: Yes     Comment: reports only drinks red wine when games are on    Drug use: Never    Sexual activity: Yes     Partners: Female     ROS  Objective:     Vital Signs (Most Recent):  Temp: 98.2 °F (36.8 °C) (23 1208)  Pulse: 89 (23 1242)  Resp: 18 (23 1208)  BP: (!) 97/51 (23 1242)  SpO2: 98 % (23 1209)   Vital Signs (24h Range):  Temp:  [98.2 °F (36.8 °C)-99.3 °F (37.4 °C)] 98.2 °F (36.8 °C)  Pulse:  [] 89  Resp:  [16-20] 18  SpO2:  [94 %-98 %] 98 %  BP: ()/(46-57) 97/51     Weight: 68.9 kg (152 lb)  Body mass index is 21.2 kg/m².    SpO2: 98 %       No intake or output data in the 24 hours ending 23 1557    Lines/Drains/Airways       Peripheral Intravenous Line   Duration                  Peripheral IV - Single Lumen 02/11/23 Right Antecubital 1 day                    Physical Exam    Significant Labs: ABG: No results for input(s): PH, PCO2, HCO3, POCSATURATED, BE in the last 48 hours., Blood Culture: No results for input(s): LABBLOO in the last 48 hours., BMP:   Recent Labs   Lab 02/11/23  0940 02/11/23  1601 02/12/23  0452   GLU 92  --  107     --  142   K 2.8*  --  3.3*     --  102   CO2 24  --  28   BUN 15  --  14   CREATININE 0.9  --  0.7   CALCIUM 8.8  --  8.7   MG  --  1.7  --    , CMP   Recent Labs   Lab 02/11/23  0940 02/12/23  0452    142   K 2.8* 3.3*    102   CO2 24 28   GLU 92 107   BUN 15 14   CREATININE 0.9 0.7   CALCIUM 8.8 8.7   PROT 6.5 5.9*   ALBUMIN 3.2* 2.8*   BILITOT 1.3* 1.0   ALKPHOS 55 49*   AST 23 21   ALT 20 16   ANIONGAP 17* 12   , CBC   Recent Labs   Lab 02/11/23  0940 02/12/23  0452   WBC 8.18 9.45   HGB 8.8* 8.1*   HCT 28.5* 27.5*    215   , INR No results for input(s): INR, PROTIME in the last 48 hours., Lipid Panel No results for input(s): CHOL, HDL, LDLCALC, TRIG, CHOLHDL in the last 48 hours., and Troponin   Recent Labs   Lab 02/11/23  0940 02/11/23  1601   TROPONINI 0.017 0.021       Significant Imaging: EKG: .

## 2023-02-12 NOTE — HPI
83 yo male, cardiology consult for CHFrEF decompensation    PMH NICM, BI V failure EF 30%, moderate AI, GERD,afib HTN, pulm HTN, PVD, emphysema, aortic aneurysm, FE def anemia  F/u cardiologist Dr. Garces. Not OAC due to h/o GI bleeding and no BB due to emphysema  Admitted for SOB and + covid 19 HGB 8.1, cr 0.7, Troponin negative and BNP 1881,  CXR emphysema  Ekg NSR and PACs  Echo in   The left ventricle is normal in size with concentric hypertrophy and moderately decreased systolic function.  Severe left atrial enlargement.  Trivial pericardial effusion.  Mild tricuspid regurgitation.  Severe right atrial enlargement.  The estimated ejection fraction is 30%.  There is severe left ventricular global hypokinesis.  Mild right ventricular enlargement with mildly to moderately reduced right ventricular systolic function.  Moderate aortic regurgitation.  Elevated central venous pressure (15 mmHg).  The estimated PA systolic pressure is 80 mmHg.  There is pulmonary hypertension.  The ascending aorta is moderately dilated.  The aortic root is moderately dilated.  Atrial fibrillation observed.

## 2023-02-12 NOTE — PROGRESS NOTES
[facility] - Telemetry ([facility])  [facility] Medicine  Progress Note    Patient Name: Richy Douglas  MRN: [id number]  Patient Class: OP- Observation   Admission Date: 2/11/2023  Length of Stay: 0 days  Attending Physician: Kelly Young, *  Primary Care Provider: Vivian hC MD        Subjective:     Principal Problem:Chest pain        HPI:  The patient is 81 yo male with past medical history atrial fibrillation, HFrEF, emphysema, GERD, hypertension, osteoarthritis and prostate cancer who presented with shortness of breath and chest pain. He reports shortness of breath started last night and has progressed. He felt like he could not breathe prior to presentation. He has cough. He reports multiple family members have viral illness. He has intermittent chest pain. He also reports BLE swelling. Initial troponin negative. COVID positive. Hospital medicine consulted. Patient placed in observation.                Overview/Hospital Course:  Presented with shortness of breath/chest tightness/chest pain on 02/11 likely secondary to COVID, CHF exacerbation, COPD exacerbation ; received Lasix IV in ED   Follow up on echo, chest x-ray, troponins, cardiology follow up   Not on oxygen at home, currently on 2 L nasal cannula, on steroids, remdesivir, neb therapy;  Tropx3 -ve;   US LE -ve for DVT  Echo showed EF of 30%;       R/[facility] normal coronaries 1/2022  Follows with cardiologist Dr. Garces, patient saw Dr. Christianson to discuss on ICD/CRT, does not want it now as per documentation;           Interval History:     Denied acute issues overnight, stated improvement in shortness of breath, lower extremity swelling; denied chest pain, palpitations.    Tropx3 -ve;   US LE -ve for DVT  Still wheezing,  Ordered 1 time Lasix 20 IV  On 2 L nasal cannula at the time of examination,  Episodes of hypotension, withheld Entresto.    Echo showed ejection fraction of 30%, mild pericardial effusion, episodes of hypotension, follow up with  Cardiology      Review of Systems    Constitutional:  Negative for chills and fever.   Respiratory:  Positive for cough and shortness of breath.    Cardiovascular:  denied chest pain;  Negative for palpitations.   All other systems reviewed and are negative.      Objective:     Vital Signs (Most Recent):  Temp: 98.2 °F (36.8 °C) (02/12/23 1208)  Pulse: 89 (02/12/23 1242)  Resp: 18 (02/12/23 1208)  BP: (!) 97/51 (02/12/23 1242)  SpO2: 98 % (02/12/23 1209)   Vital Signs (24h Range):  Temp:  [98.2 °F (36.8 °C)-99.3 °F (37.4 °C)] 98.2 °F (36.8 °C)  Pulse:  [] 89  Resp:  [16-20] 18  SpO2:  [94 %-98 %] 98 %  BP: ()/(46-57) 97/51     Weight: 68.9 kg (152 lb)  Body mass index is 21.2 kg/m².  No intake or output data in the 24 hours ending 02/12/23 1515   Physical Exam    HENT:      Head: Normocephalic and atraumatic.   Cardiovascular:      Rate and Rhythm: Normal rate and regular rhythm.      Heart sounds: No murmur heard.  Pulmonary:      Effort: Pulmonary effort is normal. No respiratory distress.      Breath sounds: Normal breath sounds. No wheezing.   Abdominal:      General: Bowel sounds are normal. There is no distension.      Palpations: Abdomen is soft.      Tenderness: There is no abdominal tenderness.   Musculoskeletal:         edema improved  Skin:     General: Skin is warm and dry.   Neurological:      Mental Status: He is alert and oriented to person, place, and time. Mental status is at baseline.       Significant Labs: All pertinent labs within the past 24 hours have been reviewed.  CBC:   Recent Labs   Lab 02/11/23  0940 02/12/23  0452   WBC 8.18 9.45   HGB 8.8* 8.1*   HCT 28.5* 27.5*    215     CMP:   Recent Labs   Lab 02/11/23  0940 02/12/23  0452    142   K 2.8* 3.3*    102   CO2 24 28   GLU 92 107   BUN 15 14   CREATININE 0.9 0.7   CALCIUM 8.8 8.7   PROT 6.5 5.9*   ALBUMIN 3.2* 2.8*   BILITOT 1.3* 1.0   ALKPHOS 55 49*   AST 23 21   ALT 20 16   ANIONGAP 17* 12        Significant Imaging:     Imaging Results               NM Lung Scan Ventilation Perfusion (Final result)  Result time 02/11/23 15:50:47      Final result by Karlos Bower MD (02/11/23 15:50:47)                   Impression:      This represents a high probability of pulmonary embolism, however this may be artifactual related to severe emphysema and scarring in the lung apices.  Consider correlation with CT angiography when clinically appropriate.    This report was flagged in Epic as abnormal.      Electronically signed by: Karlos Bower  Date:    02/11/2023  Time:    15:50               Narrative:    EXAMINATION:  NM LUNG VENTILATION AND PERFUSION IMAGING    CLINICAL HISTORY:  Pulmonary embolism (PE) suspected, positive D-dimer;    TECHNIQUE:  IV administration of 5 mCi of Tc-99m-MAA, multiple images of the thorax were obtained in various projections.    COMPARISON:  Multiple priors.    FINDINGS:  Perfusion: Large perfusion defect most notable on right lateral image and anterior and posterior images in the right upper lobe greater than 2 segments concerning for pulmonary embolism.  Smaller areas of perfusion defect in the left lung predominantly apical.    CXR right lung apex emphysema.                                       X-Ray Chest AP Portable (Final result)  Result time 02/11/23 09:56:10      Final result by Jimmie Dwyer MD (02/11/23 09:56:10)                   Impression:      No acute findings.      Electronically signed by: Jimmie Dwyer MD  Date:    02/11/2023  Time:    09:56               Narrative:    EXAMINATION:  XR CHEST AP PORTABLE    CLINICAL HISTORY:  Shortness of breath    TECHNIQUE:  AP view of the chest was performed.    COMPARISON:  06/16/2022    FINDINGS:  Emphysema.  Left apical pleuroparenchymal scarring with associated volume loss.  Aortic atherosclerosis.    No pneumothorax.  No acute osseous findings demonstrated.                                         Assessment/Plan:      *  Chest pain  2/12   Denied chest pain   trops x3 -ve;        Hypokalemia  Replace potassium  Check magnesium   Repeat labs in am      COVID-19  Patient is identified as High risk for severe complications of COVID 19 based on COVID risk score of 7   Initiate standard COVID protocols; COVID-19 testing ,Infection Control notification  and isolation- respiratory, contact and droplet per protocol    Diagnostics: (leukopenia, hyponatremia, hyperferritinemia, elevated troponin, elevated d-dimer, age, and comorbidities are significant predictors of poor clinical outcome)      Management: Maintain oxygen saturations 92-96% via Nasal Cannula 2 LPM and monitor with continuous/intermittent pulse oximetry. , Inhaled bronchodilators as needed for shortness of breath. and Continuous cardiac monitoring.    Steroids, remdesivir, ambulatory home oxygen assessment close to discharge      Shortness of breath  Likely multifactorial  Give dose of IV lasix  Schedule albuterol inhaler  Supplemental oxygen      Chronic systolic congestive heart failure  Patient is identified as having Systolic (HFrEF) heart failure that is Acute on chronic. CHF is currently uncontrolled due to Continued edema of extremities. Latest ECHO performed and demonstrates- Results for orders placed during the hospital encounter of 05/20/22    Echo    Interpretation Summary  · The left ventricle is moderately enlarged with eccentric hypertrophy and moderately decreased systolic function.  · Severe left atrial enlargement.  · The estimated ejection fraction is 30%.  · Grade II left ventricular diastolic dysfunction.  · Moderate right ventricular enlargement with moderately reduced right ventricular systolic function.  · Mild right atrial enlargement.  · Moderate aortic regurgitation.  · Mild mitral regurgitation.  · Mild to moderate tricuspid regurgitation.  · Mild pulmonic regurgitation.  · Intermediate central venous pressure (8 mmHg).  · The estimated PA systolic  pressure is 93 mmHg.  · There is pulmonary hypertension.  . Monitor on telemetry. Patient is off CHF pathway.  Monitor strict Is&Os and daily weights.  Place on fluid restriction of 1.5 L. Continue to stress to patient importance of self efficacy and  on diet for CHF. Last BNP reviewed- and noted below   Recent Labs   Lab 02/11/23  0940   BNP 1,881*   .  Echo as of 2/12/23:     The left ventricle is normal in size with concentric hypertrophy and moderately decreased systolic function.  Severe left atrial enlargement.  Trivial pericardial effusion.  Mild tricuspid regurgitation.  Severe right atrial enlargement.  The estimated ejection fraction is 30%.  There is severe left ventricular global hypokinesis.  Mild right ventricular enlargement with mildly to moderately reduced right ventricular systolic function.  Moderate aortic regurgitation.    2/12  Denied chest pain   Troponin x3 negative   Received Lasix 20 IV once  Stated compliance with medications at home  R/[facility] normal coronaries 1/2022  Follows with cardiologist Dr. Garces, patient saw Dr. Christianson to discuss on ICD/CRT, does not want it now as per documentation;   Cardiology follow up   Lasix as tolerated by blood pressure      Paroxysmal AFib   Not on anticoagulant secondary to history of AV malformations with GI bleed     Peripheral vascular disease   - LE arterial u/s with distal occlusions/monophasic waveforms, normal BLE MARIBEL.   - No evidence of deep venous thrombosis in either lower extremity.     Bilateral popliteal fossa cysts.    VTE Risk Mitigation (From admission, onward)           Ordered     IP VTE HIGH RISK PATIENT  Once         02/11/23 1056     Place sequential compression device  Until discontinued         02/11/23 1056                    Discharge Planning   MEGAN:      Code Status: Full Code   Is the patient medically ready for discharge?:     Reason for patient still in hospital (select all that apply):  Monitor clinical improvement,  cardiology follow up                        Kelly Young MD  Department of Hospital Medicine   [facility] - Telemetry ([facility])

## 2023-02-13 ENCOUNTER — CLINICAL SUPPORT (OUTPATIENT)
Dept: SMOKING CESSATION | Facility: CLINIC | Age: 83
End: 2023-02-13
Payer: MEDICARE

## 2023-02-13 DIAGNOSIS — F17.200 NEEDS SMOKING CESSATION EDUCATION: ICD-10-CM

## 2023-02-13 DIAGNOSIS — F17.200 NICOTINE DEPENDENCE WITH CURRENT USE: Primary | ICD-10-CM

## 2023-02-13 PROBLEM — E87.6 HYPOKALEMIA: Status: RESOLVED | Noted: 2023-02-11 | Resolved: 2023-02-13

## 2023-02-13 LAB
ALBUMIN SERPL BCP-MCNC: 2.8 G/DL (ref 3.5–5.2)
ALP SERPL-CCNC: 53 U/L (ref 55–135)
ALT SERPL W/O P-5'-P-CCNC: 14 U/L (ref 10–44)
ANION GAP SERPL CALC-SCNC: 10 MMOL/L (ref 8–16)
AST SERPL-CCNC: 18 U/L (ref 10–40)
BASOPHILS # BLD AUTO: 0 K/UL (ref 0–0.2)
BASOPHILS NFR BLD: 0 % (ref 0–1.9)
BILIRUB SERPL-MCNC: 0.6 MG/DL (ref 0.1–1)
BUN SERPL-MCNC: 14 MG/DL (ref 8–23)
CALCIUM SERPL-MCNC: 9.2 MG/DL (ref 8.7–10.5)
CHLORIDE SERPL-SCNC: 104 MMOL/L (ref 95–110)
CO2 SERPL-SCNC: 27 MMOL/L (ref 23–29)
CREAT SERPL-MCNC: 0.7 MG/DL (ref 0.5–1.4)
DIFFERENTIAL METHOD: ABNORMAL
EOSINOPHIL # BLD AUTO: 0 K/UL (ref 0–0.5)
EOSINOPHIL NFR BLD: 0 % (ref 0–8)
ERYTHROCYTE [DISTWIDTH] IN BLOOD BY AUTOMATED COUNT: 26.7 % (ref 11.5–14.5)
EST. GFR  (NO RACE VARIABLE): >60 ML/MIN/1.73 M^2
GLUCOSE SERPL-MCNC: 138 MG/DL (ref 70–110)
HCT VFR BLD AUTO: 28.7 % (ref 40–54)
HGB BLD-MCNC: 8.6 G/DL (ref 14–18)
IMM GRANULOCYTES # BLD AUTO: 0.01 K/UL (ref 0–0.04)
IMM GRANULOCYTES NFR BLD AUTO: 0.3 % (ref 0–0.5)
IRON SERPL-MCNC: 13 UG/DL (ref 45–160)
LYMPHOCYTES # BLD AUTO: 0.3 K/UL (ref 1–4.8)
LYMPHOCYTES NFR BLD: 6.5 % (ref 18–48)
MAGNESIUM SERPL-MCNC: 1.9 MG/DL (ref 1.6–2.6)
MCH RBC QN AUTO: 20.5 PG (ref 27–31)
MCHC RBC AUTO-ENTMCNC: 30 G/DL (ref 32–36)
MCV RBC AUTO: 69 FL (ref 82–98)
MONOCYTES # BLD AUTO: 0.2 K/UL (ref 0.3–1)
MONOCYTES NFR BLD: 4.3 % (ref 4–15)
NEUTROPHILS # BLD AUTO: 3.5 K/UL (ref 1.8–7.7)
NEUTROPHILS NFR BLD: 88.9 % (ref 38–73)
NRBC BLD-RTO: 0 /100 WBC
PLATELET # BLD AUTO: 215 K/UL (ref 150–450)
PMV BLD AUTO: ABNORMAL FL (ref 9.2–12.9)
POTASSIUM SERPL-SCNC: 4.3 MMOL/L (ref 3.5–5.1)
PROT SERPL-MCNC: 6.2 G/DL (ref 6–8.4)
RBC # BLD AUTO: 4.19 M/UL (ref 4.6–6.2)
SATURATED IRON: 4 % (ref 20–50)
SODIUM SERPL-SCNC: 141 MMOL/L (ref 136–145)
TOTAL IRON BINDING CAPACITY: 312 UG/DL (ref 250–450)
TRANSFERRIN SERPL-MCNC: 211 MG/DL (ref 200–375)
WBC # BLD AUTO: 3.97 K/UL (ref 3.9–12.7)

## 2023-02-13 PROCEDURE — 96365 THER/PROPH/DIAG IV INF INIT: CPT

## 2023-02-13 PROCEDURE — 97530 THERAPEUTIC ACTIVITIES: CPT

## 2023-02-13 PROCEDURE — 99406 BEHAV CHNG SMOKING 3-10 MIN: CPT | Mod: S$GLB,,,

## 2023-02-13 PROCEDURE — 25000003 PHARM REV CODE 250: Performed by: FAMILY MEDICINE

## 2023-02-13 PROCEDURE — 83735 ASSAY OF MAGNESIUM: CPT | Performed by: STUDENT IN AN ORGANIZED HEALTH CARE EDUCATION/TRAINING PROGRAM

## 2023-02-13 PROCEDURE — 97162 PT EVAL MOD COMPLEX 30 MIN: CPT

## 2023-02-13 PROCEDURE — G0378 HOSPITAL OBSERVATION PER HR: HCPCS

## 2023-02-13 PROCEDURE — 96376 TX/PRO/DX INJ SAME DRUG ADON: CPT

## 2023-02-13 PROCEDURE — 63600175 PHARM REV CODE 636 W HCPCS: Performed by: FAMILY MEDICINE

## 2023-02-13 PROCEDURE — 63600175 PHARM REV CODE 636 W HCPCS: Mod: TB | Performed by: STUDENT IN AN ORGANIZED HEALTH CARE EDUCATION/TRAINING PROGRAM

## 2023-02-13 PROCEDURE — 36415 COLL VENOUS BLD VENIPUNCTURE: CPT | Performed by: FAMILY MEDICINE

## 2023-02-13 PROCEDURE — 94761 N-INVAS EAR/PLS OXIMETRY MLT: CPT

## 2023-02-13 PROCEDURE — 25000003 PHARM REV CODE 250: Performed by: STUDENT IN AN ORGANIZED HEALTH CARE EDUCATION/TRAINING PROGRAM

## 2023-02-13 PROCEDURE — 36415 COLL VENOUS BLD VENIPUNCTURE: CPT | Performed by: INTERNAL MEDICINE

## 2023-02-13 PROCEDURE — 94640 AIRWAY INHALATION TREATMENT: CPT

## 2023-02-13 PROCEDURE — 27000221 HC OXYGEN, UP TO 24 HOURS

## 2023-02-13 PROCEDURE — 99900035 HC TECH TIME PER 15 MIN (STAT)

## 2023-02-13 PROCEDURE — 99406 PT REFUSED TOBACCO CESSATION: ICD-10-PCS | Mod: S$GLB,,,

## 2023-02-13 PROCEDURE — 85025 COMPLETE CBC W/AUTO DIFF WBC: CPT | Performed by: INTERNAL MEDICINE

## 2023-02-13 PROCEDURE — 97166 OT EVAL MOD COMPLEX 45 MIN: CPT

## 2023-02-13 PROCEDURE — 80053 COMPREHEN METABOLIC PANEL: CPT | Performed by: INTERNAL MEDICINE

## 2023-02-13 PROCEDURE — 84466 ASSAY OF TRANSFERRIN: CPT | Performed by: FAMILY MEDICINE

## 2023-02-13 PROCEDURE — 94640 AIRWAY INHALATION TREATMENT: CPT | Mod: XB

## 2023-02-13 RX ORDER — ASCORBIC ACID 500 MG
500 TABLET ORAL 2 TIMES DAILY
Status: DISCONTINUED | OUTPATIENT
Start: 2023-02-13 | End: 2023-02-14 | Stop reason: HOSPADM

## 2023-02-13 RX ORDER — FUROSEMIDE 10 MG/ML
40 INJECTION INTRAMUSCULAR; INTRAVENOUS DAILY
Status: DISCONTINUED | OUTPATIENT
Start: 2023-02-13 | End: 2023-02-14 | Stop reason: HOSPADM

## 2023-02-13 RX ORDER — PREDNISONE 50 MG/1
50 TABLET ORAL
Status: DISCONTINUED | OUTPATIENT
Start: 2023-02-13 | End: 2023-02-14 | Stop reason: HOSPADM

## 2023-02-13 RX ORDER — DIPHENHYDRAMINE HCL 50 MG
50 CAPSULE ORAL
Status: DISCONTINUED | OUTPATIENT
Start: 2023-02-13 | End: 2023-02-14 | Stop reason: HOSPADM

## 2023-02-13 RX ORDER — ZINC SULFATE 50(220)MG
220 CAPSULE ORAL DAILY
Status: DISCONTINUED | OUTPATIENT
Start: 2023-02-14 | End: 2023-02-14 | Stop reason: HOSPADM

## 2023-02-13 RX ADMIN — FLUTICASONE FUROATE AND VILANTEROL TRIFENATATE 1 PUFF: 100; 25 POWDER RESPIRATORY (INHALATION) at 08:02

## 2023-02-13 RX ADMIN — ALBUTEROL SULFATE 2 PUFF: 90 AEROSOL, METERED RESPIRATORY (INHALATION) at 08:02

## 2023-02-13 RX ADMIN — ALBUTEROL SULFATE 2 PUFF: 90 AEROSOL, METERED RESPIRATORY (INHALATION) at 12:02

## 2023-02-13 RX ADMIN — REMDESIVIR 100 MG: 100 INJECTION, POWDER, LYOPHILIZED, FOR SOLUTION INTRAVENOUS at 09:02

## 2023-02-13 RX ADMIN — OXYCODONE HYDROCHLORIDE AND ACETAMINOPHEN 500 MG: 500 TABLET ORAL at 08:02

## 2023-02-13 RX ADMIN — FUROSEMIDE 40 MG: 10 INJECTION, SOLUTION INTRAMUSCULAR; INTRAVENOUS at 03:02

## 2023-02-13 NOTE — ASSESSMENT & PLAN NOTE
Likely multifactorial  Give dose of IV lasix  Schedule albuterol inhaler  Supplemental oxygen  VQ scan high prob for PE however LE US negative for US  May possibly be artifact from severe COPD  Given hx of gi bleed, will confirm with CTA chest   Vital signs stable  Will not start AC at this time

## 2023-02-13 NOTE — PT/OT/SLP EVAL
Physical Therapy Evaluation    Patient Name:  Richy Douglas   MRN:  [id number]    Recommendations:     Discharge Recommendations: home health PT   Discharge Equipment Recommendations: walker, rolling   Barriers to discharge: None    Assessment:     Richy Douglas is a 82 y.o. male admitted with a medical diagnosis of Chest pain.  He presents with the following impairments/functional limitations: weakness, impaired endurance, impaired functional mobility, gait instability, visual deficits, decreased safety awareness, decreased lower extremity function which limits mobility and increases caregiver burden. Pt able to participate in transfer and minimal gait due to fatigue but very pleasant and well motivated to participate.    Rehab Prognosis: Good; patient would benefit from acute skilled PT services to address these deficits and reach maximum level of function.    Recent Surgery: * No surgery found *      Plan:     During this hospitalization, patient to be seen 2 x/week to address the identified rehab impairments via gait training, therapeutic activities, therapeutic exercises, neuromuscular re-education and progress toward the following goals:    Plan of Care Expires:  02/27/23    Subjective     Chief Complaint: chest pain/COVID  Patient/Family Comments/goals: to go home  Pain/Comfort:  Pain Rating 1: 0/10    Patients cultural, spiritual, Worship conflicts given the current situation: no    Living Environment:  Pt lives in [facility] [facility] with spouse, 2 steps to garage but does not need to navigate steps regularly.  Prior to admission, patients level of function was independent, driving and working for [employer].  Equipment used at home: none.  DME owned (not currently used): none.  Upon discharge, patient will have assistance from family but will benefit from 24 hr SPV/A.    Objective:     Communicated with nursing, unable to perform chart review due to epic system failure prior to session.  Nsg cleared for EOB/OOB  mobility. Patient found supine with peripheral IV, telemetry, oxygen  upon PT entry to room.    General Precautions: Standard, fall, droplet, airborne, contact  Orthopedic Precautions:N/A   Braces: N/A  Respiratory Status: Nasal cannula    Exams:  Cognitive Exam:  Patient is oriented to Person, Place, Time, and Situation  Gross Motor Coordination:  WFL  RLE ROM: WFL  RLE Strength: WFL  LLE ROM: WFL  LLE Strength: WFL  Grossly deconditioned    Functional Mobility:  Bed Mobility:     Rolling Right: contact guard assistance  Scooting: contact guard assistance  Supine to Sit: contact guard assistance  Tolerated sitting EOB x 7-10 mins with cues for safety awareness and upright posture  Transfers:     Sit to Stand:  minimum assistance with hand-held assist  Bed to Chair: minimum assistance with  hand-held assist  using  Stand Pivot  Gait: 3ft min A and HHA, flexed posture, wide FAMILIA, shuffled steps, unsteadiness on feet  Balance: poor plus dynamic standing balance      AM-PAC 6 CLICK MOBILITY  Total Score:16       Treatment & Education:  Educated pt on benefits of consistent participation in PT services to meet functional goals. Educated pt on seated therex to promote strength and joint mobility. Exercises included AP, LAQ, marching x 10-15 reps. Educated to perform exercises intermittently throughout day to tolerance. Educated pt on importance of sitting OOB to promote endurance and overall activity tolerance. Educated pt on call don't fall policy and use of call button to alert nursing staff of needs.  Pt expressed understanding.     Patient left up in chair with all lines intact, call button in reach, chair alarm on, and nursing notified.    GOALS:   Multidisciplinary Problems       Physical Therapy Goals          Problem: Physical Therapy    Goal Priority Disciplines Outcome Goal Variances Interventions   Physical Therapy Goal     PT, PT/OT      Description: Pt will perform bed mobility independently in order to  participate in EOB activity.  Pt will perform transfers independently in order to participate in OOB activity.   Pt will ambulate 150ft mod I with LRAD in order to participate in daily tasks.                         History:     Past Medical History:   Diagnosis Date    Anemia of chronic disease     Aortic aneurysm     Atrial fibrillation with RVR 09/24/2021    Chronic systolic congestive heart failure 08/31/2017    Emphysema of lung 08/31/2017    GERD (gastroesophageal reflux disease)     Hypertension     Knee osteoarthritis 10/21/2022    Microcytic anemia 11/24/2020    Other hyperlipidemia 04/27/2021    Personal history of colonic polyps 2022    Prostate cancer        Past Surgical History:   Procedure Laterality Date    CATHETERIZATION OF BOTH LEFT AND RIGHT HEART N/A 1/13/2022    Procedure: CATHETERIZATION, HEART, BOTH LEFT AND RIGHT;  Surgeon: Janneth Tovar MD;  Location: [facility] CATH LAB;  Service: Cardiology;  Laterality: N/A;  poss cx    COLONOSCOPY      COLONOSCOPY N/A 6/30/2022    Procedure: COLONOSCOPY;  Surgeon: Sandra Perez MD;  Location: [facility] ENDO;  Service: Endoscopy;  Laterality: N/A;    COLONOSCOPY N/A 7/1/2022    Procedure: COLONOSCOPY;  Surgeon: Woodrow Christian MD;  Location: [facility];  Service: Endoscopy;  Laterality: N/A;    CORONARY ANGIOGRAPHY N/A 1/13/2022    Procedure: ANGIOGRAM, CORONARY ARTERY;  Surgeon: Janneth Tovar MD;  Location: [facility] CATH LAB;  Service: Cardiology;  Laterality: N/A;    ESOPHAGOGASTRODUODENOSCOPY N/A 6/30/2022    Procedure: EGD (ESOPHAGOGASTRODUODENOSCOPY);  Surgeon: Sandra Perez MD;  Location: [facility];  Service: Endoscopy;  Laterality: N/A;    INJECTION OF JOINT Bilateral 10/21/2022    Procedure: Bilateral intraarticular knee injection with local ALLERGIC TO IODINE;  Surgeon: Devon Grant MD;  Location: [facility];  Service: Pain Management;  Laterality: Bilateral;    INJECTION OF JOINT Bilateral 1/31/2023    Procedure: Bilateral IA knee  joint injection with steroid RN IV Sedation;  Surgeon: Devon Grant MD;  Location: [facility];  Service: Pain Management;  Laterality: Bilateral;    PROSTATE SURGERY      SPINE SURGERY         Time Tracking:     PT Received On: 02/13/23  PT Start Time: 1015     PT Stop Time: 1040  PT Total Time (min): 25 min     Billable Minutes: Evaluation 10 and Gait Training 15      02/13/2023

## 2023-02-13 NOTE — PROGRESS NOTES
[facility] Medicine  Progress Note    Patient Name: Richy Douglas  MRN: [id number]  Patient Class: OP- Observation   Admission Date: 2/11/2023  Length of Stay: 0 days  Attending Physician: Grant Samaniego MD  Primary Care Provider: Vivian Ch MD        Subjective:     Principal Problem:Chest pain        HPI:  The patient is 81 yo male with past medical history atrial fibrillation, HFrEF, emphysema, GERD, hypertension, osteoarthritis and prostate cancer who presented with shortness of breath and chest pain. He reports shortness of breath started last night and has progressed. He felt like he could not breathe prior to presentation. He has cough. He reports multiple family members have viral illness. He has intermittent chest pain. He also reports BLE swelling. Initial troponin negative. COVID positive. Hospital medicine consulted. Patient placed in observation.      1. Chronic combined systolic and diastolic congestive heart failure, NYHA class 3    2. Paroxysmal atrial fibrillation    3. Chronic systolic congestive heart failure    4. Atherosclerosis of abdominal aorta    5. AVM (arteriovenous malformation) of small bowel, acquired with hemorrhage    6. Localized edema    7. Pulmonary emphysema, unspecified emphysema type    8. PAF (paroxysmal atrial fibrillation)    9. Claudication    10. Nonrheumatic aortic valve insufficiency    11. SANCHEZ (dyspnea on exertion)    12. Knee pain, unspecified chronicity, unspecified laterality    13. History of lower gastrointestinal bleeding    14. Iron deficiency anemia due to chronic blood loss              Overview/Hospital Course:  Presented with shortness of breath/chest tightness/chest pain on 02/11 likely secondary to COVID, CHF exacerbation, COPD exacerbation ; received Lasix IV in ED   Follow up on echo, chest x-ray, troponins, cardiology follow up   Not on oxygen at home, currently on 2 L nasal cannula, on steroids, remdesivir, neb therapy;  Tropx3  -ve;   US LE -ve for DVT  Echo showed EF of 30%;       R/[facility] normal coronaries 1/2022  Follows with cardiologist Dr. Garces, patient saw Dr. Christianson to discuss on ICD/CRT, does not want it now as per documentation;     2/13: VQ scan high prob for PE however could be artifact given sever COPD. LE US negative for DVT. D dimer only 0.6 which is WNL given age. 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 Currently anemic. Vital signs stable on room air. Will not start AC at this time. Will confirm PE with CTA. Hx of shellfish and possibly iodine allergy. Will premedicate. CTA in the am.       Interval History: Patient denies any chest pain. Does complain of cough and sob.     Review of Systems   Constitutional:  Negative for fatigue and fever.   HENT:  Negative for sinus pressure.    Eyes:  Negative for visual disturbance.   Respiratory:  Positive for cough and shortness of breath.    Cardiovascular:  Negative for chest pain, palpitations and leg swelling.   Gastrointestinal:  Negative for nausea and vomiting.   Genitourinary:  Negative for difficulty urinating.   Musculoskeletal:  Negative for back pain.   Skin:  Negative for rash.   Neurological:  Negative for headaches.   Psychiatric/Behavioral:  Negative for confusion.    Objective:     Vital Signs (Most Recent):  Temp: 97.9 °F (36.6 °C) (02/13/23 1226)  Pulse: 80 (02/13/23 1226)  Resp: 18 (02/13/23 1226)  BP: (!) 121/56 (02/13/23 1226)  SpO2: 95 % (02/13/23 0956)   Vital Signs (24h Range):  Temp:  [97.3 °F (36.3 °C)-98.9 °F (37.2 °C)] 97.9 °F (36.6 °C)  Pulse:  [68-83] 80  Resp:  [16-21] 18  SpO2:  [93 %-98 %] 95 %  BP: ()/(54-64) 121/56     Weight: 68.9 kg (152 lb)  Body mass index is 21.2 kg/m².    Intake/Output Summary (Last 24 hours) at 2/13/2023 1516  Last data filed at 2/13/2023 1457  Gross per 24 hour   Intake --   Output 600 ml   Net -600 ml      Physical Exam  Constitutional:       General: He is not in acute distress.     Appearance: He is  well-developed. He is not diaphoretic.   HENT:      Head: Normocephalic and atraumatic.   Eyes:      Pupils: Pupils are equal, round, and reactive to light.   Cardiovascular:      Rate and Rhythm: Normal rate and regular rhythm.      Heart sounds: Normal heart sounds. No murmur heard.    No friction rub. No gallop.   Pulmonary:      Effort: Pulmonary effort is normal. No respiratory distress.      Breath sounds: No stridor. Wheezing present. No rales.   Abdominal:      General: Bowel sounds are normal. There is no distension.      Palpations: Abdomen is soft. There is no mass.      Tenderness: There is no abdominal tenderness. There is no guarding.   Skin:     General: Skin is warm.      Findings: No erythema.   Neurological:      Mental Status: He is alert and oriented to person, place, and time.       Significant Labs: All pertinent labs within the past 24 hours have been reviewed.    Significant Imaging: I have reviewed all pertinent imaging results/findings within the past 24 hours.        Assessment/Plan:      * Chest pain  Denies any chest pain  Possibly due to hypoxia and covid  Will diurese per cards recs       COVID-19  Patient is identified as High risk for severe complications of COVID 19 based on COVID risk score of 7   Initiate standard COVID protocols; COVID-19 testing ,Infection Control notification  and isolation- respiratory, contact and droplet per protocol    Diagnostics: (leukopenia, hyponatremia, hyperferritinemia, elevated troponin, elevated d-dimer, age, and comorbidities are significant predictors of poor clinical outcome)      Management: Maintain oxygen saturations 92-96% via Nasal Cannula 2 LPM and monitor with continuous/intermittent pulse oximetry. , Inhaled bronchodilators as needed for shortness of breath. and Continuous cardiac monitoring.    Steroids, remdesivir, ambulatory home oxygen assessment close to discharge      Shortness of breath  Likely multifactorial  Give dose of IV  lasix  Schedule albuterol inhaler  Supplemental oxygen  VQ scan high prob for PE however LE US negative for US  May possibly be artifact from severe COPD  Given hx of gi bleed, will confirm with CTA chest   Vital signs stable  Will not start AC at this time    Emphysema of lung  Cont inhalers  Albuterol q8h   Wean O2 as tolerated  Home O2 eval prior to dc       Chronic systolic congestive heart failure  Patient is identified as having Systolic (HFrEF) heart failure that is Acute on chronic. CHF is currently uncontrolled due to Continued edema of extremities. Latest ECHO performed and demonstrates- Results for orders placed during the hospital encounter of 05/20/22    Echo    Interpretation Summary  · The left ventricle is moderately enlarged with eccentric hypertrophy and moderately decreased systolic function.  · Severe left atrial enlargement.  · The estimated ejection fraction is 30%.  · Grade II left ventricular diastolic dysfunction.  · Moderate right ventricular enlargement with moderately reduced right ventricular systolic function.  · Mild right atrial enlargement.  · Moderate aortic regurgitation.  · Mild mitral regurgitation.  · Mild to moderate tricuspid regurgitation.  · Mild pulmonic regurgitation.  · Intermediate central venous pressure (8 mmHg).  · The estimated PA systolic pressure is 93 mmHg.  · There is pulmonary hypertension.  . Monitor on telemetry. Patient is off CHF pathway.  Monitor strict Is&Os and daily weights.  Place on fluid restriction of 1.5 L. Continue to stress to patient importance of self efficacy and  on diet for CHF. Last BNP reviewed- and noted below   Recent Labs   Lab 02/11/23  0940   BNP 1,881*   .  Echo as of 2/12/23:      The left ventricle is normal in size with concentric hypertrophy and moderately decreased systolic function.   Severe left atrial enlargement.   Trivial pericardial effusion.   Mild tricuspid regurgitation.   Severe right atrial  enlargement.   The estimated ejection fraction is 30%.   There is severe left ventricular global hypokinesis.   Mild right ventricular enlargement with mildly to moderately reduced right ventricular systolic function.   Moderate aortic regurgitation.    2/12  Denied chest pain   Troponin x3 negative   Received Lasix 20 IV once  Stated compliance with medications at home  R/[facility] normal coronaries 1/2022  Follows with cardiologist Dr. Garces, patient saw Dr. Christianson to discuss on ICD/CRT, does not want it now as per documentation;   Cardiology follow up     2/13:  Lasix 40mg IV today   Cont to monitor       Anemia of chronic disease  Microcytic anemia noted  Will check iron studies         VTE Risk Mitigation (From admission, onward)         Ordered     IP VTE HIGH RISK PATIENT  Once         02/11/23 1056     Place sequential compression device  Until discontinued         02/11/23 1056                Discharge Planning   MEGAN:      Code Status: Full Code   Is the patient medically ready for discharge?:     Reason for patient still in hospital (select all that apply): Patient trending condition  Discharge Plan A: Home                  Grant Samaniego MD  Department of Hospital Medicine   [facility] - Telemetry ([facility])

## 2023-02-13 NOTE — PLAN OF CARE
"Neurologic Followup    Chief Complaint   Patient presents with   â¢ Neurologic Problem     Chronic Migraine       SUBJECTIVE:  Evi Cruz is a 48year oldffemale here for a followup of her headaches. Her last visit was on 5-24-17. Patient is having about one headache a week. She states in the last 2 months, she had 8 headaches. She had an occipital nerve block by Dr. Brenda Davies on 6-1-17 and states that this helped temporarily decrease the frequency of the headaches. She still has some kind of headache every day but it is not like it used to be. The headache is located in the left temporal parietal area. She likes to take a nap when she has a headache. Her sleep is ""not good. \""  She takes melatonin and Tylenol PM to help her sleep at night. Lamotrigine caused her feet to peel and rash. She has side effects to Gabapentin and Topiramate. Metoprolol caused her to get \""sick\"" that was stopped during her last hospitalization. When she stopped divalproex for a while after the last visit her headache did not get any worse. Higher dose of pregabalin reportedly made her too drowsy. She has not tried any TCAs. She denies history of cardiac arrhythmias or palpitations. Current Outpatient Prescriptions   Medication Sig Dispense Refill   â¢ pregabalin (LYRICA) 50 MG capsule Take 1 capsule by mouth 2 times daily. 180 capsule 1   â¢ ammonium lactate (AMLACTIN) 12 % lotion Apply topically as needed for Dry Skin. 400 g 5   â¢ clopidogrel (PLAVIX) 75 MG tablet Take 1 tablet by mouth daily. 90 tablet 3   â¢ isosorbide mononitrate (IMDUR) 30 MG 24 hr tablet Take 1 tablet by mouth daily. 90 tablet 3   â¢ aspirin 81 MG tablet Take 1 tablet by mouth daily. 90 tablet 3   â¢ diphenhydramine-acetaminophen (TYLENOL PM EXTRA STRENGTH)  MG Tab Take 2 tablets by mouth nightly as needed. â¢ polyethylene glycol (MIRALAX) powder Take 17 g by mouth daily. 255 g 12   â¢ Lancets Misc.  Kit Use to test sugars 3 x a day dx: " OT roberto completed. Sup>sit SBA, sit>stand CGA, step>pivot to bedside chair with min HHA. Recommending HHOT and RW at d/c.   "E11.59 1 kit 3   â¢ Cholecalciferol (VITAMIN D) 2000 units capsule Take 1 capsule by mouth daily. 30 capsule 12   â¢ Melatonin 10 MG Tab      â¢ Blood Glucose Monitoring Suppl w/Device Kit Voice meter due to patient blindness Test glucoses 3 times daily. DX code:E11.39, E11.40, E11.59 1 kit 0   â¢ blood glucose test strip Test blood sugar 3 times daily - strips for voice meter due to patient blindness Test glucoses 3 times daily. DX code:E11.39, E11.40, E11.59 150 strip 3   â¢ insulin glargine (LANTUS SOLOSTAR) 100 UNIT/ML pen-injector 45u am and 45 u pm 15 mL 3   â¢ pantoprazole (PROTONIX) 40 MG tablet Take 1 tablet by mouth daily. 90 tablet 4   â¢ divalproex (DEPAKOTE ER) 500 MG 24 hr ER tablet Take 1 tablet by mouth 2 times daily. 56 tablet 2   â¢ amLODIPine (NORVASC) 5 MG tablet Take 1 tablet by mouth daily. 90 tablet 4   â¢ NOVOLOG FLEXPEN 100 UNIT/ML pen-injector 18 units before meals Plus Sliding scale 1:30>150 15 mL 3   â¢ valsartan-hydrochlorothiazide (DIOVAN-HCT) 320-25 MG per tablet Take 1 tablet by mouth daily. 90 tablet 1   â¢ atorvastatin (LIPITOR) 40 MG tablet Take 1 tablet by mouth daily. 90 tablet 1   â¢ FLUoxetine (PROZAC) 20 MG capsule Take 1 capsule by mouth daily. 90 capsule 1   â¢ timolol (TIMOPTIC) 0.5 % ophthalmic solution Place 1 drop into both eyes daily. 1 Bottle 6   â¢ Insulin Pen Needle (B-D U/F PEN NEEDLE 5/16"") 31G X 8 MM Misc 2 times daily. With Levemir FlexPen 100 each 3   â¢ buprenorphine (BUTRANS) 10 MCG/HR patch Place 1 patch onto the skin every 7 days. Indications: not taking      â¢ Insulin Syringe 31G X 5/16\"" 0.5 ML Misc Use as directed with insulin 100 each 3   â¢ DISPENSE Pen needles 100 each 0     No current facility-administered medications for this visit.         OBJECTIVE:     Vital signs:    Visit Vitals  /68 (BP [place]: [facility] [place] Position: Sitting, Cuff Size: Large Adult)   Pulse 80   Ht 5' 2\"" (1.575 m)   Wt 81.3 kg   BMI 32.79 kg/mÂ²     General: Well developed and nourished, " endomorphic black woman    Behavior and Cognition: well dressed with good hygiene, pleasant, cooperative, alert, attentive, appropriately oriented to person, place and time, normal psychomotor activity, no gross abnormal movements, fluent and spontaneous speech with intact comprehension, mood is euthymic, affect is reactive with intact prosody, thoughts are related and circumstantial, memory, intellect and insight are adequate based on conversation. Cranial nerves: Conjugate gazes. Symmetric face. Motor/ Cerebellar:  No gross weakness, ataxia or tremors. Gait:  Steady walk using a blind cane.     Labs:      Component      Latest Ref Rng & Units 6/28/2017   WBC      4.2 - 11.0 K/mcL 4.3   RBC      4.00 - 5.20 mil/mcL 4.72   HGB      12.0 - 15.5 g/dL 12.9   HCT      36.0 - 46.5 % 39.7   MCV      78.0 - 100.0 fl 84.1   MCH      26.0 - 34.0 pg 27.3   MCHC      32.0 - 36.5 g/dL 32.5   RDW-CV      11.0 - 15.0 % 12.5   PLT      140 - 450 K/mcL 161   DIFFERENTIAL TYPE       AUTOMATED DIFFERENTIAL   Neutrophil      % 68   LYMPH      % 27   MONO      % 4   EOSIN      % 1   BASO      % 0   Absolute Neutrophil      1.8 - 7.7 K/mcL 2.9   Absolute Lymph      1.0 - 4.0 K/mcL 1.2   Absolute Mono      0.3 - 0.9 K/mcL 0.2 (L)   Absolute Eos      0.1 - 0.5 K/mcL 0.0 (L)   Absolute Baso      0.0 - 0.3 K/mcL 0.0   Fasting Status      hrs 0   Sodium      135 - 145 mmol/L 133 (L)   Potassium      3.4 - 5.1 mmol/L 4.4   Chloride      98 - 107 mmol/L 96 (L)   CO2      21 - 32 mmol/L 31   ANION GAP      10 - 20 mmol/L 10   Glucose      65 - 99 mg/dL 564 (HH)   BUN      6 - 20 mg/dL 18   Creatinine      0.51 - 0.95 mg/dL 0.85   GFR Estimate,        >90   GFR Estimate, Non African American       78   BUN/CREATININE RATIO      7 - 25 21   CALCIUM      8.4 - 10.2 mg/dL 9.2   TOTAL BILIRUBIN      0.2 - 1.0 mg/dL 0.2   AST/SGOT      <38 Units/L 15   ALT/SGPT      <79 Units/L 29   ALK PHOSPHATASE      45 - 117 Units/L 94 "  TOTAL PROTEIN      6.4 - 8.2 g/dL 7.4   Albumin      3.6 - 5.1 g/dL 3.7   GLOBULIN      2.0 - 4.0 g/dL 3.7   A/G Ratio, Serum      1.0 - 2.4 1.0   MICROALBUMIN,UA (TTL)      mg/dL 5.56   CREATININE, URINE (TOTAL)      mg/dL 34.60   MICROALBUMIN/CREAT      <30 mcg/mg 160.7 (H)   GLYCOHEMOGLOBIN A1C      4.5 - 5.6 % 15.1 (H)   TSH      0.350 - 5.000 mcUnits/mL 0.325 (L)   VITAMIND, 25 HYDROXY      30.0 - 100.0 ng/mL 30.7   DIRECT BILIRUBIN      0.0 - 0.2 mg/dL 0.1   VALPROIC ACID      50 - 125 mcg/mL 10 (L)   FASTING STATUS      hrs 0   CHOLESTEROL      <200 mg/dL 199   CALCULATED LDL      <130 mg/dL 131 (H)   HDL      >49 mg/dL 39 (L)   TRIGLYCERIDE      <150 mg/dL 146   CALCULATED NON HDL      mg/dL 160   CHOL/HDL      <4.5 5.1 (H)     EKG on 1-13-15 showed ""normal sinus rhythm, left anterior fascicular block. RI of 182, QTc of 459 ms.\""    EKG from 2014 showed \""normal sinus rhythm, left atrial enlargement, left axis deviation, possible right ventricular conduction delay, cannot rule out anterior infarct.  and RI of 176 ms.\""       Assessment     Problem/ symptom list:   -Headaches    Impression:  She continues to have impairing headaches. Diagnoses:   (G43.709) Chronic migraine without aura without status migrainosus, not intractable  (primary encounter diagnosis)  (M54.81) Occipital neuralgia of left side    Plan/ recommendations:  -Obtain new EKG. If normal QTc interval, consider amitriptyline for treatment of headache  -Does not want to consider any injections such as Botox or occipital nerve block at this time      Return in about 2 months (around 9/30/2017) for headache. Total of 20 minutes was spent face-to-face of which 15 minutes in counseling and coordination of care. Patient was educated about her likely diagnosis, diagnostic tests, treatments, and potential side effects and interactions of the medications.   Patient voiced understanding and agreement with the above treatment " gina.    Azul Pacheco. Edwige Peña M.D. Neurologist    On 7/31/2017, IDarell scribed the services personally performed by Pavan Blanco MD.    The documentation recorded by the scribe accurately and completely reflects the service(s) I personally performed and the decisions made by me.

## 2023-02-13 NOTE — SUBJECTIVE & OBJECTIVE
Interval History: Patient denies any chest pain. Does complain of cough and sob.     Review of Systems   Constitutional:  Negative for fatigue and fever.   HENT:  Negative for sinus pressure.    Eyes:  Negative for visual disturbance.   Respiratory:  Positive for cough and shortness of breath.    Cardiovascular:  Negative for chest pain, palpitations and leg swelling.   Gastrointestinal:  Negative for nausea and vomiting.   Genitourinary:  Negative for difficulty urinating.   Musculoskeletal:  Negative for back pain.   Skin:  Negative for rash.   Neurological:  Negative for headaches.   Psychiatric/Behavioral:  Negative for confusion.    Objective:     Vital Signs (Most Recent):  Temp: 97.9 °F (36.6 °C) (02/13/23 1226)  Pulse: 80 (02/13/23 1226)  Resp: 18 (02/13/23 1226)  BP: (!) 121/56 (02/13/23 1226)  SpO2: 95 % (02/13/23 0956)   Vital Signs (24h Range):  Temp:  [97.3 °F (36.3 °C)-98.9 °F (37.2 °C)] 97.9 °F (36.6 °C)  Pulse:  [68-83] 80  Resp:  [16-21] 18  SpO2:  [93 %-98 %] 95 %  BP: ()/(54-64) 121/56     Weight: 68.9 kg (152 lb)  Body mass index is 21.2 kg/m².    Intake/Output Summary (Last 24 hours) at 2/13/2023 1516  Last data filed at 2/13/2023 1457  Gross per 24 hour   Intake --   Output 600 ml   Net -600 ml      Physical Exam  Constitutional:       General: He is not in acute distress.     Appearance: He is well-developed. He is not diaphoretic.   HENT:      Head: Normocephalic and atraumatic.   Eyes:      Pupils: Pupils are equal, round, and reactive to light.   Cardiovascular:      Rate and Rhythm: Normal rate and regular rhythm.      Heart sounds: Normal heart sounds. No murmur heard.    No friction rub. No gallop.   Pulmonary:      Effort: Pulmonary effort is normal. No respiratory distress.      Breath sounds: No stridor. Wheezing present. No rales.   Abdominal:      General: Bowel sounds are normal. There is no distension.      Palpations: Abdomen is soft. There is no mass.      Tenderness:  There is no abdominal tenderness. There is no guarding.   Skin:     General: Skin is warm.      Findings: No erythema.   Neurological:      Mental Status: He is alert and oriented to person, place, and time.       Significant Labs: All pertinent labs within the past 24 hours have been reviewed.    Significant Imaging: I have reviewed all pertinent imaging results/findings within the past 24 hours.

## 2023-02-13 NOTE — PLAN OF CARE
[facility] - Telemetry (Hospital)  Discharge Assessment    Primary Care Provider: Vivian Ch MD     Discharge Assessment (most recent)       BRIEF DISCHARGE ASSESSMENT - 02/13/23 1030          Discharge Planning    Assessment Type Discharge Planning Brief Assessment     Resource/Environmental Concerns none     Support Systems Spouse/significant other     Equipment Currently Used at Home none     Current Living Arrangements home     Patient/Family Anticipates Transition to home     Patient/Family Anticipated Services at Transition none     DME Needed Upon Discharge  none     Discharge Plan A Home     Discharge Plan B Home Health                   Family reports independent with adls including driving.

## 2023-02-13 NOTE — ASSESSMENT & PLAN NOTE
Patient is identified as having Systolic (HFrEF) heart failure that is Acute on chronic. CHF is currently uncontrolled due to Continued edema of extremities. Latest ECHO performed and demonstrates- Results for orders placed during the hospital encounter of 05/20/22    Echo    Interpretation Summary  · The left ventricle is moderately enlarged with eccentric hypertrophy and moderately decreased systolic function.  · Severe left atrial enlargement.  · The estimated ejection fraction is 30%.  · Grade II left ventricular diastolic dysfunction.  · Moderate right ventricular enlargement with moderately reduced right ventricular systolic function.  · Mild right atrial enlargement.  · Moderate aortic regurgitation.  · Mild mitral regurgitation.  · Mild to moderate tricuspid regurgitation.  · Mild pulmonic regurgitation.  · Intermediate central venous pressure (8 mmHg).  · The estimated PA systolic pressure is 93 mmHg.  · There is pulmonary hypertension.  . Monitor on telemetry. Patient is off CHF pathway.  Monitor strict Is&Os and daily weights.  Place on fluid restriction of 1.5 L. Continue to stress to patient importance of self efficacy and  on diet for CHF. Last BNP reviewed- and noted below   Recent Labs   Lab 02/11/23  0940   BNP 1,881*   .  Echo as of 2/12/23:      The left ventricle is normal in size with concentric hypertrophy and moderately decreased systolic function.   Severe left atrial enlargement.   Trivial pericardial effusion.   Mild tricuspid regurgitation.   Severe right atrial enlargement.   The estimated ejection fraction is 30%.   There is severe left ventricular global hypokinesis.   Mild right ventricular enlargement with mildly to moderately reduced right ventricular systolic function.   Moderate aortic regurgitation.    2/12  Denied chest pain   Troponin x3 negative   Received Lasix 20 IV once  Stated compliance with medications at home  /[facility] normal coronaries 1/2022  Follows with  cardiologist Dr. Garces, patient saw Dr. Christianson to discuss on ICD/CRT, does not want it now as per documentation;   Cardiology follow up     2/13:  Lasix 40mg IV today   Cont to monitor

## 2023-02-13 NOTE — PT/OT/SLP EVAL
"Occupational Therapy Evaluation and Treatment    Name: Richy Douglas  MRN: [id number]  Admitting Diagnosis: Chest pain  Recent Surgery: * No surgery found *      Recommendations:     Discharge Recommendations: home health OT (24/7 SPV and A)  Level of Assistance Recommended: 24 hours light assistance  Discharge Equipment Recommendations: walker, rolling, shower chair  Barriers to discharge: None    Assessment:     Richy Douglas is a 82 y.o. male with a medical diagnosis of Chest pain. He presents with performance deficits affecting function including weakness, impaired endurance, impaired self care skills, impaired functional mobility, impaired balance, impaired cardiopulmonary response to activity, decreased safety awareness.    Rehab Prognosis: Good; patient would benefit from acute OT services to address these deficits and reach maximum level of function.    Plan:     Patient to be seen 2 x/week to address the above listed problems via self-care/home management, therapeutic activities, therapeutic exercises  Plan of Care Expires: 02/27/23  Plan of Care Reviewed with: patient    Subjective     Chief Complaint: Reported "To be honest with you, that's the most I have done in days."  Patient Comments/Goals: get stronger  Pain/Comfort:  Pain Rating 1: 0/10    Social History:  Living Environment: Patient lives with their spouse in a single story house with number of outside stair(s): 0 . Reports having step down den that he does not have to go in.  Prior Level of Function: Prior to admission, patient was independent with ADLs and community distance ambulation.  Roles and Routines: Patient was driving and working prior to admission.  Equipment Used at Home: none  DME owned (not currently used): none  Assistance Upon Discharge: family    Objective:     Communicated with nurseOlivia, prior to session. Patient found supine with peripheral IV, telemetry, oxygen upon OT entry to room.    General Precautions: Standard, " airborne, contact, droplet, fall   Orthopedic Precautions:N/A   Braces: N/A  Respiratory Status: Nasal cannula, flow 2 L/min    Occupational Performance    Gait belt applied - Yes    Bed Mobility:  Supine to sit from right side of bed with stand by assistance    Functional Mobility/Transfers:  Sit <> Stand Transfer with contact guard assistance with no assistive device  Bed <> Chair Transfer using Step Transfer technique with minimum assistance with hand-held assist  Functional Mobility: held this date due to significant SOB with transfer to chair. Will progress as able    Activities of Daily Living:  Grooming: set up assistance .  Lower Body Dressing: minimum assistance donned socks while seated EOB. Min A to complete due to significant SOB after prolonged attempts to complete.    Cognitive/Visual Perceptual:  Cognitive/Psychosocial Skills:    -       Oriented to: Person, Place, Time, Situation  -       Follows Commands/attention:Follows two-step commands    Physical Exam:  Balance:    -       Sitting: stand by assistance  -       Standing: minimum assistance  Upper Extremity Range of Motion:    -       Right Upper Extremity: WFL  -       Left Upper Extremity: WFL  Upper Extremity Strength:    -       Right Upper Extremity: Deficits: grossly 4/5  -       Left Upper Extremity: Deficits: grossly 4/5   Strength:    -       Right Upper Extremity: Deficits: fair  -       Left Upper Extremity: Deficits: fair    AMPAC 6 Click ADL:  AMPAC Total Score: 19    Treatment & Education:  Therapist provided facilitation and instruction of proper body mechanics, energy conservation, and fall prevention strategies during tasks listed above.  Patient educated on role of OT, POC and goals for therapy  Patient educated on importance of OOB activities with staff member assistance and sitting OOB majority of the day.   Encouraged completion of B UE AROM therex throughout the day to increase functional strength and activity tolerance  needed for ADL completion.    Patient left up in chair with all lines intact, call button in reach, and chair alarm on.    GOALS:   Multidisciplinary Problems       Occupational Therapy Goals          Problem: Occupational Therapy    Goal Priority Disciplines Outcome Interventions   Occupational Therapy Goal     OT, PT/OT     Description: Goals to be met by: 2/27/23     Patient will increase functional independence with ADLs by performing:    Toileting from toilet with Modified [place] for hygiene and clothing management.   Toilet transfer to toilet with Modified [place].  Increased functional strength in B UE grossly by 1/2 MM grade.                         History:     Past Medical History:   Diagnosis Date    Anemia of chronic disease     Aortic aneurysm     Atrial fibrillation with RVR 09/24/2021    Chronic systolic congestive heart failure 08/31/2017    Emphysema of lung 08/31/2017    GERD (gastroesophageal reflux disease)     Hypertension     Knee osteoarthritis 10/21/2022    Microcytic anemia 11/24/2020    Other hyperlipidemia 04/27/2021    Personal history of colonic polyps 2022    Prostate cancer          Past Surgical History:   Procedure Laterality Date    CATHETERIZATION OF BOTH LEFT AND RIGHT HEART N/A 1/13/2022    Procedure: CATHETERIZATION, HEART, BOTH LEFT AND RIGHT;  Surgeon: Janneth Tovar MD;  Location: [facility] CATH LAB;  Service: Cardiology;  Laterality: N/A;  poss cx    COLONOSCOPY      COLONOSCOPY N/A 6/30/2022    Procedure: COLONOSCOPY;  Surgeon: Sandra Perez MD;  Location: [facility] ENDO;  Service: Endoscopy;  Laterality: N/A;    COLONOSCOPY N/A 7/1/2022    Procedure: COLONOSCOPY;  Surgeon: Woodrow Christian MD;  Location: [facility] ENDO;  Service: Endoscopy;  Laterality: N/A;    CORONARY ANGIOGRAPHY N/A 1/13/2022    Procedure: ANGIOGRAM, CORONARY ARTERY;  Surgeon: Janneth Tovar MD;  Location: [facility] CATH LAB;  Service: Cardiology;  Laterality: N/A;    ESOPHAGOGASTRODUODENOSCOPY  N/A 6/30/2022    Procedure: EGD (ESOPHAGOGASTRODUODENOSCOPY);  Surgeon: Sandra Perez MD;  Location: [facility];  Service: Endoscopy;  Laterality: N/A;    INJECTION OF JOINT Bilateral 10/21/2022    Procedure: Bilateral intraarticular knee injection with local ALLERGIC TO IODINE;  Surgeon: Devon Grant MD;  Location: [facility] PAIN T;  Service: Pain Management;  Laterality: Bilateral;    INJECTION OF JOINT Bilateral 1/31/2023    Procedure: Bilateral IA knee joint injection with steroid RN IV Sedation;  Surgeon: Devon Grant MD;  [facility];  Service: Pain Management;  Laterality: Bilateral;    PROSTATE SURGERY      SPINE SURGERY         Time Tracking:     OT Date of Treatment: 02/13/23  OT Start Time: 1010  OT Stop Time: 1035  OT Total Time (min): 25 min    Billable Minutes: Evaluation 15 and Therapeutic Activity 10    2/13/2023

## 2023-02-13 NOTE — PLAN OF CARE
EVAL AND TX COMPLETED: facilitated bed mobility with CGA, transfers with min A. Ambulated 3 ft min A with RW. Recommend HHPT with RW, 24 hr SPV and A

## 2023-02-14 VITALS
HEIGHT: 71 IN | RESPIRATION RATE: 20 BRPM | OXYGEN SATURATION: 95 % | SYSTOLIC BLOOD PRESSURE: 120 MMHG | DIASTOLIC BLOOD PRESSURE: 55 MMHG | WEIGHT: 156.06 LBS | BODY MASS INDEX: 21.85 KG/M2 | TEMPERATURE: 98 F | HEART RATE: 89 BPM

## 2023-02-14 DIAGNOSIS — J43.9 EMPHYSEMA OF LUNG: Primary | ICD-10-CM

## 2023-02-14 LAB
ALBUMIN SERPL BCP-MCNC: 2.9 G/DL (ref 3.5–5.2)
ALP SERPL-CCNC: 53 U/L (ref 55–135)
ALT SERPL W/O P-5'-P-CCNC: 17 U/L (ref 10–44)
ANION GAP SERPL CALC-SCNC: 12 MMOL/L (ref 8–16)
AST SERPL-CCNC: 17 U/L (ref 10–40)
BASOPHILS # BLD AUTO: 0.02 K/UL (ref 0–0.2)
BASOPHILS NFR BLD: 0.2 % (ref 0–1.9)
BILIRUB SERPL-MCNC: 0.6 MG/DL (ref 0.1–1)
BUN SERPL-MCNC: 16 MG/DL (ref 8–23)
CALCIUM SERPL-MCNC: 9 MG/DL (ref 8.7–10.5)
CHLORIDE SERPL-SCNC: 102 MMOL/L (ref 95–110)
CO2 SERPL-SCNC: 28 MMOL/L (ref 23–29)
CREAT SERPL-MCNC: 0.6 MG/DL (ref 0.5–1.4)
DIFFERENTIAL METHOD: ABNORMAL
EOSINOPHIL # BLD AUTO: 0.2 K/UL (ref 0–0.5)
EOSINOPHIL NFR BLD: 1.9 % (ref 0–8)
ERYTHROCYTE [DISTWIDTH] IN BLOOD BY AUTOMATED COUNT: 26.1 % (ref 11.5–14.5)
EST. GFR  (NO RACE VARIABLE): >60 ML/MIN/1.73 M^2
GLUCOSE SERPL-MCNC: 90 MG/DL (ref 70–110)
HCT VFR BLD AUTO: 28.1 % (ref 40–54)
HGB BLD-MCNC: 8.5 G/DL (ref 14–18)
IMM GRANULOCYTES # BLD AUTO: 0.09 K/UL (ref 0–0.04)
IMM GRANULOCYTES NFR BLD AUTO: 0.7 % (ref 0–0.5)
LYMPHOCYTES # BLD AUTO: 0.5 K/UL (ref 1–4.8)
LYMPHOCYTES NFR BLD: 3.8 % (ref 18–48)
MCH RBC QN AUTO: 20.5 PG (ref 27–31)
MCHC RBC AUTO-ENTMCNC: 30.2 G/DL (ref 32–36)
MCV RBC AUTO: 68 FL (ref 82–98)
MONOCYTES # BLD AUTO: 0.7 K/UL (ref 0.3–1)
MONOCYTES NFR BLD: 5.5 % (ref 4–15)
NEUTROPHILS # BLD AUTO: 10.8 K/UL (ref 1.8–7.7)
NEUTROPHILS NFR BLD: 87.9 % (ref 38–73)
NRBC BLD-RTO: 0 /100 WBC
PLATELET # BLD AUTO: 212 K/UL (ref 150–450)
PMV BLD AUTO: ABNORMAL FL (ref 9.2–12.9)
POTASSIUM SERPL-SCNC: 4.5 MMOL/L (ref 3.5–5.1)
PROT SERPL-MCNC: 6.2 G/DL (ref 6–8.4)
RBC # BLD AUTO: 4.15 M/UL (ref 4.6–6.2)
SODIUM SERPL-SCNC: 142 MMOL/L (ref 136–145)
WBC # BLD AUTO: 12.27 K/UL (ref 3.9–12.7)

## 2023-02-14 PROCEDURE — 97530 THERAPEUTIC ACTIVITIES: CPT

## 2023-02-14 PROCEDURE — 25000003 PHARM REV CODE 250: Performed by: FAMILY MEDICINE

## 2023-02-14 PROCEDURE — 96375 TX/PRO/DX INJ NEW DRUG ADDON: CPT

## 2023-02-14 PROCEDURE — 94640 AIRWAY INHALATION TREATMENT: CPT

## 2023-02-14 PROCEDURE — 21400001 HC TELEMETRY ROOM

## 2023-02-14 PROCEDURE — 36415 COLL VENOUS BLD VENIPUNCTURE: CPT | Performed by: INTERNAL MEDICINE

## 2023-02-14 PROCEDURE — 96376 TX/PRO/DX INJ SAME DRUG ADON: CPT

## 2023-02-14 PROCEDURE — 96374 THER/PROPH/DIAG INJ IV PUSH: CPT | Mod: 59

## 2023-02-14 PROCEDURE — 97530 THERAPEUTIC ACTIVITIES: CPT | Mod: CQ

## 2023-02-14 PROCEDURE — 25500020 PHARM REV CODE 255: Performed by: FAMILY MEDICINE

## 2023-02-14 PROCEDURE — 94618 PULMONARY STRESS TESTING: CPT

## 2023-02-14 PROCEDURE — 27000207 HC ISOLATION

## 2023-02-14 PROCEDURE — 27000221 HC OXYGEN, UP TO 24 HOURS

## 2023-02-14 PROCEDURE — 99900035 HC TECH TIME PER 15 MIN (STAT)

## 2023-02-14 PROCEDURE — 97110 THERAPEUTIC EXERCISES: CPT

## 2023-02-14 PROCEDURE — 97116 GAIT TRAINING THERAPY: CPT | Mod: CQ

## 2023-02-14 PROCEDURE — 63600175 PHARM REV CODE 636 W HCPCS: Mod: TB | Performed by: STUDENT IN AN ORGANIZED HEALTH CARE EDUCATION/TRAINING PROGRAM

## 2023-02-14 PROCEDURE — 63600175 PHARM REV CODE 636 W HCPCS: Performed by: FAMILY MEDICINE

## 2023-02-14 PROCEDURE — 94761 N-INVAS EAR/PLS OXIMETRY MLT: CPT

## 2023-02-14 PROCEDURE — 80053 COMPREHEN METABOLIC PANEL: CPT | Performed by: INTERNAL MEDICINE

## 2023-02-14 PROCEDURE — 25000003 PHARM REV CODE 250: Performed by: STUDENT IN AN ORGANIZED HEALTH CARE EDUCATION/TRAINING PROGRAM

## 2023-02-14 PROCEDURE — 85025 COMPLETE CBC W/AUTO DIFF WBC: CPT | Performed by: INTERNAL MEDICINE

## 2023-02-14 RX ORDER — DIPHENHYDRAMINE HYDROCHLORIDE 50 MG/ML
50 INJECTION INTRAMUSCULAR; INTRAVENOUS ONCE
Status: COMPLETED | OUTPATIENT
Start: 2023-02-14 | End: 2023-02-14

## 2023-02-14 RX ORDER — LANOLIN ALCOHOL/MO/W.PET/CERES
1 CREAM (GRAM) TOPICAL DAILY
Status: DISCONTINUED | OUTPATIENT
Start: 2023-02-14 | End: 2023-02-14 | Stop reason: HOSPADM

## 2023-02-14 RX ORDER — METHYLPREDNISOLONE SOD SUCC 125 MG
125 VIAL (EA) INJECTION ONCE
Status: COMPLETED | OUTPATIENT
Start: 2023-02-14 | End: 2023-02-14

## 2023-02-14 RX ORDER — PREDNISONE 20 MG/1
20 TABLET ORAL 2 TIMES DAILY
Qty: 10 TABLET | Refills: 0 | Status: SHIPPED | OUTPATIENT
Start: 2023-02-14 | End: 2023-02-19

## 2023-02-14 RX ORDER — FERROUS SULFATE 325(65) MG
325 TABLET, DELAYED RELEASE (ENTERIC COATED) ORAL DAILY
Qty: 30 TABLET | Refills: 1 | Status: SHIPPED | OUTPATIENT
Start: 2023-02-14 | End: 2024-01-03 | Stop reason: SDUPTHER

## 2023-02-14 RX ADMIN — FLUTICASONE FUROATE AND VILANTEROL TRIFENATATE 1 PUFF: 100; 25 POWDER RESPIRATORY (INHALATION) at 08:02

## 2023-02-14 RX ADMIN — ALBUTEROL SULFATE 2 PUFF: 90 AEROSOL, METERED RESPIRATORY (INHALATION) at 03:02

## 2023-02-14 RX ADMIN — REMDESIVIR 100 MG: 100 INJECTION, POWDER, LYOPHILIZED, FOR SOLUTION INTRAVENOUS at 12:02

## 2023-02-14 RX ADMIN — PREDNISONE 50 MG: 50 TABLET ORAL at 08:02

## 2023-02-14 RX ADMIN — DIPHENHYDRAMINE HYDROCHLORIDE 50 MG: 50 INJECTION, SOLUTION INTRAMUSCULAR; INTRAVENOUS at 08:02

## 2023-02-14 RX ADMIN — FERROUS SULFATE TAB 325 MG (65 MG ELEMENTAL FE) 1 EACH: 325 (65 FE) TAB at 08:02

## 2023-02-14 RX ADMIN — OXYCODONE HYDROCHLORIDE AND ACETAMINOPHEN 500 MG: 500 TABLET ORAL at 08:02

## 2023-02-14 RX ADMIN — METHYLPREDNISOLONE SODIUM SUCCINATE 125 MG: 125 INJECTION, POWDER, FOR SOLUTION INTRAMUSCULAR; INTRAVENOUS at 08:02

## 2023-02-14 RX ADMIN — IRON SUCROSE 200 MG: 20 INJECTION, SOLUTION INTRAVENOUS at 08:02

## 2023-02-14 RX ADMIN — ALBUTEROL SULFATE 2 PUFF: 90 AEROSOL, METERED RESPIRATORY (INHALATION) at 12:02

## 2023-02-14 RX ADMIN — ALBUTEROL SULFATE 2 PUFF: 90 AEROSOL, METERED RESPIRATORY (INHALATION) at 08:02

## 2023-02-14 RX ADMIN — ZINC SULFATE 220 MG (50 MG) CAPSULE 220 MG: CAPSULE at 08:02

## 2023-02-14 RX ADMIN — IOHEXOL 100 ML: 350 INJECTION, SOLUTION INTRAVENOUS at 10:02

## 2023-02-14 RX ADMIN — FUROSEMIDE 40 MG: 10 INJECTION, SOLUTION INTRAMUSCULAR; INTRAVENOUS at 08:02

## 2023-02-14 NOTE — PT/OT/SLP PROGRESS
Occupational Therapy   Treatment    Name: Richy Douglas  MRN: [id number]  Admitting Diagnosis:  Chest pain       Recommendations:     Discharge Recommendations: home health OT  Discharge Equipment Recommendations:  walker, rolling  Barriers to discharge:  None    Assessment:     Richy Douglas is a 82 y.o. male with a medical diagnosis of Chest pain.  He presents with the following performance deficits affecting function are weakness, impaired endurance, impaired self care skills, impaired functional mobility, gait instability, impaired balance, decreased lower extremity function, decreased safety awareness, impaired cardiopulmonary response to activity.     Rehab Prognosis:  Good; patient would benefit from acute skilled OT services to address these deficits and reach maximum level of function.       Plan:     Patient to be seen 2 x/week to address the above listed problems via self-care/home management, therapeutic activities, therapeutic exercises  Plan of Care Expires: 02/27/23  Plan of Care Reviewed with: patient    Subjective     Pain/Comfort:  Pain Rating 1: 0/10    Objective:     Communicated with: Nurse and epic chart review prior to session.  Patient found supine with telemetry, peripheral IV, oxygen upon OT entry to room.    General Precautions: Standard, fall, airborne, contact, droplet    Orthopedic Precautions:N/A  Braces: N/A  Respiratory Status: Nasal cannula, flow 2 L/min     Occupational Performance:     Bed Mobility:    Patient completed Rolling/Turning to Right with modified independence  Patient completed Scooting/Bridging with modified independence  Patient completed Supine to Sit with modified independence     Functional Mobility/Transfers:  Patient completed Sit <> Stand Transfer with supervision  with  no assistive device   Functional Mobility: Patient completed x60ft functional mobility with CGA and HHA to increase dynamic standing balance and activity tolerance needed for ADL completion.    Stand pivot t/f to EOB with CGA and HHA.     Activities of Daily Living:  Lower Body Dressing: minimum assistance adjusted socks    AMPAC 6 Click ADL: 21    Treatment & Education:  Pt with no SOB today during tx.  Pt educated on and performed x10 reps BUE AROM therex EOB:  Shoulder flexion  Elbow flexion/ext  Digit flexion/ext  Educated on techniques to use to increase independence and decrease fall risk with functional transfers. Educated on importance of OOB activity and calling for A to transfer back to bed/meet needs. Encouraged completion of B UE AROM therex throughout the day to tolerance to increase functional strength and activity tolerance. Patient stated understanding and in agreement with POC.     Patient left sitting edge of bed with all lines intact, call button in reach, bed alarm on, and nurse notified    GOALS:   Multidisciplinary Problems       Occupational Therapy Goals          Problem: Occupational Therapy    Goal Priority Disciplines Outcome Interventions   Occupational Therapy Goal     OT, PT/OT Ongoing, Progressing    Description: Goals to be met by: 2/27/23     Patient will increase functional independence with ADLs by performing:    Toileting from toilet with Modified [place] for hygiene and clothing management.   Toilet transfer to toilet with Modified [place].  Increased functional strength in B UE grossly by 1/2 MM grade.                         Time Tracking:     OT Date of Treatment: 02/14/23  OT Start Time: 1420  OT Stop Time: 1445  OT Total Time (min): 25 min    Billable Minutes:Therapeutic Activity 15  Therapeutic Exercise 10    OT/BILLIE: OSCAR Nuñez, OT     2/14/2023

## 2023-02-14 NOTE — PLAN OF CARE
Home Oxygen Evaluation - [facility] - Cardiopulmonary Department      Date Performed: 2/14/2023      1) Patient's Home O2 Sat on room air, while at rest: Room Air SpO2 At Rest: 91 %        If O2 sats on room air at rest are 88% or below, patient qualifies.  Document O2 liter flow needed in Step 2.  If O2 sats are 89% or above, complete Step 3.        2)  If patient is not ambulated and O2 sats are <88%, what is the O2 liter flow required to meet ordered saturation?      If O2 sats on room air while exercising remain 89% or above patient does not qualify, no further testing needed Document N/A in step 3. If O2 sats on room air while exercising are 88% or below, continue to Step 4.    3) Patient's O2 Sat on room air while exercising: Room Air SpO2 During Ambulation: 95 %        4) Patient's O2 Sat while exercising on O2:   at           (Must show improvement from #4 for patients to qualify)

## 2023-02-14 NOTE — PLAN OF CARE
[facility] - Telemetry (Hospital)  Discharge Final Note    Primary Care Provider: Vivian Ch MD    Expected Discharge Date: 2/14/2023    Final Discharge Note (most recent)       Final Note - 02/14/23 1632          Final Note    Assessment Type Final Discharge Note     Anticipated Discharge Disposition Home or Self Care     Hospital Resources/Appts/Education Provided Appointments scheduled by Navigator/Coordinator;Appointments scheduled and added to AVS        Post-Acute Status    Discharge Delays None known at this time                     Important Message from Medicare             Contact Info       Vivian Ch MD   Specialty: Family Medicine   Relationship: PCP - General    [address] [facility]  [place] [address]   Phone: [phone]       Next Steps: Follow up in 1 week(s)

## 2023-02-14 NOTE — PT/OT/SLP PROGRESS
Physical Therapy  Treatment    Richy Douglas   MRN: [id number]   Admitting Diagnosis: Chest pain    PT Received On: 02/14/23  PT Start Time: 1420     PT Stop Time: 1450    PT Total Time (min): 30 min       Billable Minutes:  Gait Training 10 and Therapeutic Activity 20    Treatment Type: Treatment  PT/PTA: PTA     PTA Visit Number: 1       General Precautions: Standard, airborne, contact, droplet, fall  Orthopedic Precautions: N/A  Braces: N/A  Respiratory Status: Nasal cannula, flow 2 L/min    Spiritual, Cultural Beliefs, Lutheran Practices, Values that Affect Care: no    Subjective:  Communicated with patient's nurse and completed Epic chart review prior to session.  Patient agreed to PT session.     Pain/Comfort  Pain Rating 1: 0/10  Pain Rating Post-Intervention 1: 0/10    Objective:   Patient found with: telemetry, oxygen, peripheral IV    Supine > sit EOB: Modified Independent    STS from EOB No AD: SPV    60ft w/ HHAx2 CGA    Stand pivot T/F to EOB w/ HHAx2: CGA    Completed x10 reps AROM TE to BLE: LAQ, Hip Flex, AP   Intermittent cues given as needed to maintain correct form during repetitions    Patient requesting to remain sitting EOB instead of sitting up in chair or returning to supine.     Educated patient on importance of increased tolerance to upright position and direct impact on CV endurance and strength. Patient encouraged to sit up in chair/ EOB, for a minimum of 2 consecutive hours, 3x per day. Encouraged patient to perform AROM TE to BLE throughout the day within all available planes of motion. Re enforced importance of utilizing call light to meet needs in room and not attempt to get up without staff assistance. Patient verbalized understanding and agreed to comply.     AM-PAC 6 CLICK MOBILITY  How much help from another person does this patient currently need?   1 = Unable, Total/Dependent Assistance  2 = A lot, Maximum/Moderate Assistance  3 = A little, Minimum/Contact Guard/Supervision  4 =  None, Modified [place]/Independent    Turning over in bed (including adjusting bedclothes, sheets and blankets)?: 4  Sitting down on and standing up from a chair with arms (e.g., wheelchair, bedside commode, etc.): 4  Moving from lying on back to sitting on the side of the bed?: 4  Moving to and from a bed to a chair (including a wheelchair)?: 3  Need to walk in hospital room?: 3  Climbing 3-5 steps with a railing?: 1  Basic Mobility Total Score: 19    AM-PAC Raw Score CMS G-Code Modifier Level of Impairment Assistance   6 % Total / Unable   7 - 9 CM 80 - 100% Maximal Assist   10 - 14 CL 60 - 80% Moderate Assist   15 - 19 CK 40 - 60% Moderate Assist   20 - 22 CJ 20 - 40% Minimal Assist   23 CI 1-20% SBA / CGA   24 CH 0% Independent/ Mod I     Patient left sitting edge of bed with call button in reach.    Assessment:  Richy Douglas is a 82 y.o. male with a medical diagnosis of Chest pain and presents with overall decline in functional mobility. Patient would continue to benefit from skilled PT to address functional limitations listed below in order to return to PLOF/decrease caregiver burden.      Rehab identified problem list/impairments: weakness, impaired endurance, impaired functional mobility, gait instability, decreased safety awareness, decreased lower extremity function, impaired cardiopulmonary response to activity    Rehab potential is fair.    Activity tolerance: Fair    Discharge recommendations: home health PT      Barriers to discharge:      Equipment recommendations: walker, rolling     GOALS:   Multidisciplinary Problems       Physical Therapy Goals          Problem: Physical Therapy    Goal Priority Disciplines Outcome Goal Variances Interventions   Physical Therapy Goal     PT, PT/OT      Description: Pt will perform bed mobility independently in order to participate in EOB activity.  Pt will perform transfers independently in order to participate in OOB activity.   Pt will ambulate  150ft mod I with LRAD in order to participate in daily tasks.                         PLAN:    Patient to be seen 3 x/week to address the above listed problems via gait training, therapeutic activities, therapeutic exercises  Plan of Care expires: 02/27/23  Plan of Care reviewed with: patient         02/14/2023

## 2023-02-14 NOTE — PLAN OF CARE
Continue OT POC.  Mod (I) for bed mobility. SPV for sit>stand with no AD. CGA for ambulation 60ft with HHA.

## 2023-02-15 ENCOUNTER — NURSE TRIAGE (OUTPATIENT)
Dept: ADMINISTRATIVE | Facility: CLINIC | Age: 83
End: 2023-02-15
Payer: COMMERCIAL

## 2023-02-15 ENCOUNTER — OUTPATIENT CASE MANAGEMENT (OUTPATIENT)
Dept: ADMINISTRATIVE | Facility: OTHER | Age: 83
End: 2023-02-15
Payer: COMMERCIAL

## 2023-02-15 ENCOUNTER — PATIENT MESSAGE (OUTPATIENT)
Dept: ADMINISTRATIVE | Facility: CLINIC | Age: 83
End: 2023-02-15
Payer: COMMERCIAL

## 2023-02-15 ENCOUNTER — PATIENT OUTREACH (OUTPATIENT)
Dept: ADMINISTRATIVE | Facility: CLINIC | Age: 83
End: 2023-02-15
Payer: COMMERCIAL

## 2023-02-15 NOTE — TELEPHONE ENCOUNTER
Da message sent to Pt for enrollment into cc program.      1st attempt made to contact Pt for enrollment.    Spoken to family member and she said that he is not available and went back to work today. Explained to her that he may still be considered contagious at this point and needs to isolate himself. Family member said he went back to work and will not participate in program.    Pt removed from cc program and placed into hsm.    Reason for Disposition   Message left with person in household    Protocols used: No Contact or Duplicate Contact Call-A-OH

## 2023-02-15 NOTE — DISCHARGE SUMMARY
'[facility] - Telemetry ([facility])  [facility]  Discharge Summary      Patient Name: Richy Douglas  MRN: [id number]  [facility]: [id number]  Patient Class: IP- Inpatient   Admission Date: 2/11/2023  Hospital Length of Stay: 1 days  Discharge Date and Time: 2/14/2023  5:45 PM  Attending Physician: No att. providers found   Discharging Provider: Grant Samaniego MD  Primary Care Provider: Vivian Ch MD    Primary Care Team: Networked reference to record PCT     HPI:   The patient is 81 yo male with past medical history atrial fibrillation, HFrEF, emphysema, GERD, hypertension, osteoarthritis and prostate cancer who presented with shortness of breath and chest pain. He reports shortness of breath started last night and has progressed. He felt like he could not breathe prior to presentation. He has cough. He reports multiple family members have viral illness. He has intermittent chest pain. He also reports BLE swelling. Initial troponin negative. COVID positive. Hospital medicine consulted. Patient placed in observation.      1. Chronic combined systolic and diastolic congestive heart failure, NYHA class 3    2. Paroxysmal atrial fibrillation    3. Chronic systolic congestive heart failure    4. Atherosclerosis of abdominal aorta    5. AVM (arteriovenous malformation) of small bowel, acquired with hemorrhage    6. Localized edema    7. Pulmonary emphysema, unspecified emphysema type    8. PAF (paroxysmal atrial fibrillation)    9. Claudication    10. Nonrheumatic aortic valve insufficiency    11. SANCHEZ (dyspnea on exertion)    12. Knee pain, unspecified chronicity, unspecified laterality    13. History of lower gastrointestinal bleeding    14. Iron deficiency anemia due to chronic blood loss              * No surgery found *      Hospital Course:   Presented with shortness of breath/chest tightness/chest pain on 02/11 likely secondary to COVID, CHF exacerbation, COPD exacerbation ; received Lasix IV in ED   Follow up on echo, chest  x-ray, troponins, cardiology follow up   Not on oxygen at home, currently on 2 L nasal cannula, on steroids, remdesivir, neb therapy;  Tropx3 -ve;   US LE -ve for DVT  Echo showed EF of 30%;       R/C normal coronaries 1/2022  Follows with cardiologist Dr. Garces, patient saw Dr. Christianson to discuss on ICD/CRT, does not want it now as per documentation;     2/13: VQ scan high prob for PE however could be artifact given sever COPD. LE US negative for DVT. D dimer only 0.6 which is WNL given age. 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 Currently anemic. Vital signs stable on room air. Will not start AC at this time. Will confirm PE with CTA. Hx of shellfish and possibly iodine allergy. Will premedicate. CTA in the am.   2/14: CTA negative for PE. Home O2 eval performed and patient did not qualify. He was discharged home with prednisone and breathing treatments. Instructed to f/u with pulm outpatient.        Goals of Care Treatment Preferences:  Code Status: Full Code      Consults:   Consults (From admission, onward)        Status Ordering Provider     Inpatient consult to Cardiology  Once        Provider:  Ricardo Crum MD    Completed ROLANDO ALATORRE          No new Assessment & Plan notes have been filed under this hospital service since the last note was generated.  Service: Hospital Medicine    Final Active Diagnoses:    Diagnosis Date Noted POA    PRINCIPAL PROBLEM:  Chest pain [R07.9] 02/11/2023 Yes    COVID-19 [U07.1] 02/11/2023 Yes    Chronic systolic congestive heart failure [I50.22] 08/31/2017 Yes    Shortness of breath [R06.02] 08/31/2017 Yes    Emphysema of lung [J43.9] 08/31/2017 Yes    Anemia of chronic disease [D63.8] 04/03/2017 Yes      Problems Resolved During this Admission:    Diagnosis Date Noted Date Resolved POA    Hypokalemia [E87.6] 02/11/2023 02/13/2023 Yes       Discharged Condition: good    Disposition: Home or Self Care    Follow Up:   Follow-up Information     Vivian Ch,  MD Follow up in 1 week(s).    Specialty: Family Medicine  Contact information:  [id number] [facility]  [place] [address] [phone]                       Patient Instructions:      Ambulatory referral/consult to Outpatient Case Management   Referral Priority: Routine Referral Type: Consultation   Referral Reason: Specialty Services Required   Number of Visits Requested: 1     COVID-19 Surveillance Program     Order Specific Question Answer Comments   Does patient have a smartphone? Yes    Does patient have the [facility] on their smartphone? No    While in surveillance program, will patient be using home oxygen? No        Significant Diagnostic Studies: Labs:   BMP:   Recent Labs   Lab 02/14/23  0435   GLU 90      K 4.5      CO2 28   BUN 16   CREATININE 0.6   CALCIUM 9.0    and CBC   Recent Labs   Lab 02/14/23  0435   WBC 12.27   HGB 8.5*   HCT 28.1*          Pending Diagnostic Studies:     None         Medications:  Reconciled Home Medications:      Medication List      START taking these medications    ferrous sulfate 325 (65 FE) MG EC tablet  Take 1 tablet (325 mg total) by mouth once daily.     predniSONE 20 MG tablet  Commonly known as: DELTASONE  Take 1 tablet (20 mg total) by mouth 2 (two) times daily. for 5 days        CONTINUE taking these medications    albuterol 90 mcg/actuation inhaler  Commonly known as: PROVENTIL/VENTOLIN HFA  Inhale 2 puffs into the lungs every 4 (four) hours as needed for Wheezing or Shortness of Breath.     ENTRESTO  mg per tablet  Generic drug: sacubitriL-valsartan  Take 1 tablet by mouth 2 (two) times daily.     furosemide 40 MG tablet  Commonly known as: LASIX  Take 1 tablet (40 mg total) by mouth once daily. Do not take if blood pressure is low, feeling dry/dizzy/lightheaded.     potassium chloride SA 10 MEQ tablet  Commonly known as: K-DUR,KLOR-CON M  Take 1 tablet (10 mEq total) by mouth once daily.            Indwelling Lines/Drains at time  of discharge:   Lines/Drains/Airways     None                 Time spent on the discharge of patient: 36 minutes         Grant Samaniego MD  Department of Hospital Medicine  [facility] - Telemetry ([facility])

## 2023-02-15 NOTE — TELEPHONE ENCOUNTER
"[employer]" message sent.    1st attempt made to contact Pt for enrollment.    VM left for Pt.    Another attempt will be conducted to reach Pt for enrollment into cc program.    Encounter routed to PCP/staff.   Reason for Disposition   Message left on unidentified voice mail. Phone number verified.    Protocols used: No Contact or Duplicate Contact Call-A-OH

## 2023-02-15 NOTE — PROGRESS NOTES
C3 nurse spoke with Richy Douglas for a TCC post hospital discharge follow up call. The patient has a scheduled HOSFU appointment with Tree Pineda on 02/23/2023 @ 1100.

## 2023-02-15 NOTE — PROGRESS NOTES
Outpatient Care Management  Patient Does Not Consent    Patient: Richy Douglas  MRN:  [id number]  Date of Service:  2/15/2023  Completed by:  Meredith Segal RN    Chief Complaint   Patient presents with    OPCM Chart Review    OPCM Enrollment Call    Case Closure     2-15-23       Patient Summary     Program:  OPCM High Risk     Consent Received:  Decline            Spoke to wife Britany who states pt returned to work today and works 6am-6pm M-F and is unable to be reached during the day. Discussed with Rachana Lopez and will close case at this time.

## 2023-02-17 ENCOUNTER — TELEPHONE (OUTPATIENT)
Dept: CARDIOLOGY | Facility: CLINIC | Age: 83
End: 2023-02-17
Payer: COMMERCIAL

## 2023-02-17 NOTE — TELEPHONE ENCOUNTER
[facility]) hospital discharge 48-72 hour phone follow up completed.     Most Recent Hospital Discharge Date: 2/14/2023  Last admission Diagnosis/chief complaint: CP    TCC nurse Navigator spoke with the patient.       Pt reports the following:  []  Shortness of Breath with Activity  []  Shortness of Breath at rest   []  Fatigue  []  Edema   [] Chest pain or tightness  [] Weight Increase since discharge  [x] None of the above    Watch for these Signs and Symptoms: If any of these occur, contact [facility] immediately:   Increase in shortness of breath with movement   Increase in swelling in your legs and ankles   Weight gain of more than 3 pounds in a day or 5 pounds in 3 days.   Difficulty breathing when you are lying down   Worsening fatigue or tiredness   Stomach bloating, a full feeling or a loss of appetite   Increased coughing--especially when you are lying down      Pt educated on follow-up plan while in [facility] program to include:   Week 1 -  F/u appt with EFE Jackson on 3/1/2023 verified with pt.   Week 2-5 - In person/ Virtual/ phone call check ins    Week 5-7 - Pt will discharge from [facility] and transition to longterm care provider (Cardiology/PCP/ Advanced Heart Failure).      Patient active on myChart? yes      Pt given the following contact information for ease of communication: [phone] (Mon-Fri, 8a-5p), pt did not write down the contact number, informed he knows how to get in touch with us.     Unable to finish 48 hour discharge call due to pt rushing to get off the phone, pt will need full CHF education at follow up clinic visit.    Will follow up with pt at first clinic visit and HF nurse navigator available for pt questions, issues or concerns.

## 2023-02-21 ENCOUNTER — PATIENT MESSAGE (OUTPATIENT)
Dept: PHARMACY | Facility: CLINIC | Age: 83
End: 2023-02-21
Payer: COMMERCIAL

## 2023-02-22 ENCOUNTER — PATIENT OUTREACH (OUTPATIENT)
Dept: ADMINISTRATIVE | Facility: HOSPITAL | Age: 83
End: 2023-02-22
Payer: COMMERCIAL

## 2023-02-23 ENCOUNTER — OFFICE VISIT (OUTPATIENT)
Dept: PRIMARY CARE CLINIC | Facility: CLINIC | Age: 83
End: 2023-02-23
Payer: MEDICARE

## 2023-02-23 ENCOUNTER — PES CALL (OUTPATIENT)
Dept: ADMINISTRATIVE | Facility: OTHER | Age: 83
End: 2023-02-23
Payer: COMMERCIAL

## 2023-02-23 VITALS
HEIGHT: 71 IN | SYSTOLIC BLOOD PRESSURE: 120 MMHG | OXYGEN SATURATION: 96 % | HEART RATE: 87 BPM | TEMPERATURE: 98 F | BODY MASS INDEX: 22.23 KG/M2 | WEIGHT: 158.75 LBS | DIASTOLIC BLOOD PRESSURE: 70 MMHG

## 2023-02-23 DIAGNOSIS — J43.9 PULMONARY EMPHYSEMA, UNSPECIFIED EMPHYSEMA TYPE: ICD-10-CM

## 2023-02-23 DIAGNOSIS — D63.8 ANEMIA OF CHRONIC DISEASE: ICD-10-CM

## 2023-02-23 DIAGNOSIS — Z72.0 NICOTINE ABUSE: ICD-10-CM

## 2023-02-23 DIAGNOSIS — Z09 HOSPITAL DISCHARGE FOLLOW-UP: Primary | ICD-10-CM

## 2023-02-23 DIAGNOSIS — R41.3 MEMORY LOSS: ICD-10-CM

## 2023-02-23 DIAGNOSIS — I50.22 CHRONIC SYSTOLIC CONGESTIVE HEART FAILURE: ICD-10-CM

## 2023-02-23 DIAGNOSIS — R06.01 ORTHOPNEA: ICD-10-CM

## 2023-02-23 DIAGNOSIS — I10 PRIMARY HYPERTENSION: ICD-10-CM

## 2023-02-23 PROCEDURE — 1160F PR REVIEW ALL MEDS BY PRESCRIBER/CLIN PHARMACIST DOCUMENTED: ICD-10-PCS | Mod: CPTII,S$PBB,, | Performed by: NURSE PRACTITIONER

## 2023-02-23 PROCEDURE — 3074F PR MOST RECENT SYSTOLIC BLOOD PRESSURE < 130 MM HG: ICD-10-PCS | Mod: CPTII,S$PBB,, | Performed by: NURSE PRACTITIONER

## 2023-02-23 PROCEDURE — 99214 PR OFFICE/OUTPT VISIT, EST, LEVL IV, 30-39 MIN: ICD-10-PCS | Mod: S$PBB,25,, | Performed by: NURSE PRACTITIONER

## 2023-02-23 PROCEDURE — 1157F PR ADVANCE CARE PLAN OR EQUIV PRESENT IN MEDICAL RECORD: ICD-10-PCS | Mod: CPTII,S$PBB,, | Performed by: NURSE PRACTITIONER

## 2023-02-23 PROCEDURE — 3074F SYST BP LT 130 MM HG: CPT | Mod: CPTII,S$PBB,, | Performed by: NURSE PRACTITIONER

## 2023-02-23 PROCEDURE — 1157F ADVNC CARE PLAN IN RCRD: CPT | Mod: CPTII,S$PBB,, | Performed by: NURSE PRACTITIONER

## 2023-02-23 PROCEDURE — 99999 PR PBB SHADOW E&M-EST. PATIENT-LVL V: CPT | Mod: PBBFAC,,, | Performed by: NURSE PRACTITIONER

## 2023-02-23 PROCEDURE — 99214 OFFICE O/P EST MOD 30 MIN: CPT | Mod: S$PBB,25,, | Performed by: NURSE PRACTITIONER

## 2023-02-23 PROCEDURE — 1160F RVW MEDS BY RX/DR IN RCRD: CPT | Mod: CPTII,S$PBB,, | Performed by: NURSE PRACTITIONER

## 2023-02-23 PROCEDURE — 99999 PR PBB SHADOW E&M-EST. PATIENT-LVL V: ICD-10-PCS | Mod: PBBFAC,,, | Performed by: NURSE PRACTITIONER

## 2023-02-23 PROCEDURE — 99406 PR TOBACCO USE CESSATION INTERMEDIATE 3-10 MINUTES: ICD-10-PCS | Mod: S$PBB,,, | Performed by: NURSE PRACTITIONER

## 2023-02-23 PROCEDURE — 1159F PR MEDICATION LIST DOCUMENTED IN MEDICAL RECORD: ICD-10-PCS | Mod: CPTII,S$PBB,, | Performed by: NURSE PRACTITIONER

## 2023-02-23 PROCEDURE — 99406 BEHAV CHNG SMOKING 3-10 MIN: CPT | Mod: S$PBB,,, | Performed by: NURSE PRACTITIONER

## 2023-02-23 PROCEDURE — 3078F PR MOST RECENT DIASTOLIC BLOOD PRESSURE < 80 MM HG: ICD-10-PCS | Mod: CPTII,S$PBB,, | Performed by: NURSE PRACTITIONER

## 2023-02-23 PROCEDURE — 3078F DIAST BP <80 MM HG: CPT | Mod: CPTII,S$PBB,, | Performed by: NURSE PRACTITIONER

## 2023-02-23 PROCEDURE — 1159F MED LIST DOCD IN RCRD: CPT | Mod: CPTII,S$PBB,, | Performed by: NURSE PRACTITIONER

## 2023-02-23 NOTE — LETTER
February 23, 2023      [facility]  [address] [address]  [place] [address]  Phone: [phone]  Fax: [phone]       Patient: Richy Douglas   YOB: 1940  Date of Visit: 02/23/2023    To Whom It May Concern:    Richard Douglas  was at [facility] on 02/23/2023. The patient may return to work/school on 02/23/2023 with no restrictions. If you have any questions or concerns, or if I can be of further assistance, please do not hesitate to contact me.    Sincerely,    Cindi Abbott MA

## 2023-02-23 NOTE — PROGRESS NOTES
HPI     Chief Complaint  Chief Complaint   Patient presents with    Follow-up     COVID f/u       HPI  Richy Douglas is a 82 y.o. male with multiple medical diagnoses as listed in the medical history and problem list that presents for Hospital discharge follow-up. This patient is new to me.      Hospital discharge follow-up: Covid positive approx a week ago that prompt a hospital stay x 3 days. At the time he was experiencing SOB with decreased O2. Patient and daughter reports continued, stable orthopnea when ambulating short distances that is relieved with rest. Today vitals are stable. O2 is 96% on room air. Denies difficulty breathing, chest discomfort/pain or palpitations. Hx of Emphysema. Has resumed all prescribed medications.  Visits with Cardiology 03/01/2023 and 03/10/2023. Continued nicotine abuse. Drinks ETOH occasionally. Non-compliant with compression socks. Labs stable/returned to baseline prior to discharge from hospital stay.     History     PAST MEDICAL HISTORY:  Past Medical History:   Diagnosis Date    Anemia of chronic disease     Aortic aneurysm     Atrial fibrillation with RVR 09/24/2021    Chronic systolic congestive heart failure 08/31/2017    Emphysema of lung 08/31/2017    GERD (gastroesophageal reflux disease)     Hypertension     Knee osteoarthritis 10/21/2022    Microcytic anemia 11/24/2020    Other hyperlipidemia 04/27/2021    Personal history of colonic polyps 2022    Prostate cancer        PAST SURGICAL HISTORY:  Past Surgical History:   Procedure Laterality Date    CATHETERIZATION OF BOTH LEFT AND RIGHT HEART N/A 1/13/2022    Procedure: CATHETERIZATION, HEART, BOTH LEFT AND RIGHT;  Surgeon: Janneth Tovar MD;  Location: [facility] CATH LAB;  Service: Cardiology;  Laterality: N/A;  poss cx    COLONOSCOPY      COLONOSCOPY N/A 6/30/2022    Procedure: COLONOSCOPY;  Surgeon: Sandra Perez MD;  Location: [facility] ENDO;  Service: Endoscopy;  Laterality: N/A;    COLONOSCOPY N/A 7/1/2022     Procedure: COLONOSCOPY;  Surgeon: Woodrow Christian MD;  Location: [facility] ENDO;  Service: Endoscopy;  Laterality: N/A;    CORONARY ANGIOGRAPHY N/A 2022    Procedure: ANGIOGRAM, CORONARY ARTERY;  Surgeon: Janneth Tovar MD;  Location: [facility] CATH LAB;  Service: Cardiology;  Laterality: N/A;    ESOPHAGOGASTRODUODENOSCOPY N/A 2022    Procedure: EGD (ESOPHAGOGASTRODUODENOSCOPY);  Surgeon: Sandra Perez MD;  Location: [facility] ENDO;  Service: Endoscopy;  Laterality: N/A;    INJECTION OF JOINT Bilateral 10/21/2022    Procedure: Bilateral intraarticular knee injection with local ALLERGIC TO IODINE;  Surgeon: Devon Grant MD;  Location: [facility] [place];  Service: Pain Management;  Laterality: Bilateral;    INJECTION OF JOINT Bilateral 2023    Procedure: Bilateral IA knee joint injection with steroid RN IV Sedation;  Surgeon: Devon Grant MD;  Location: [facility] PAIN [facility];  Service: Pain Management;  Laterality: Bilateral;    PROSTATE SURGERY      SPINE SURGERY         SOCIAL HISTORY:  Social History     Socioeconomic History    Marital status:      Spouse name: jenn     Number of children: 6   Tobacco Use    Smoking status: Every Day     Packs/day: 1.00     Years: 68.00     Pack years: 68.00     Types: Cigarettes     Start date: 1954     Last attempt to quit: 2022     Years since quittin.7    Smokeless tobacco: Never   Substance and Sexual Activity    Alcohol use: Yes     Comment: reports only drinks red wine when games are on    Drug use: Never    Sexual activity: Yes     Partners: Female       FAMILY HISTORY:  Family History   Problem Relation Age of Onset    Diabetes Mother     Peripheral vascular disease Mother        ALLERGIES AND MEDICATIONS: updated and reviewed.  Review of patient's allergies indicates:   Allergen Reactions    Fish containing products     Iodine and iodide containing products     Shellfish containing products      Current Outpatient Medications   Medication Sig  "Dispense Refill    albuterol (PROVENTIL/VENTOLIN HFA) 90 mcg/actuation inhaler Inhale 2 puffs into the lungs every 4 (four) hours as needed for Wheezing or Shortness of Breath. 8.5 g 11    ferrous sulfate 325 (65 FE) MG EC tablet Take 1 tablet (325 mg total) by mouth once daily. 30 tablet 1    furosemide (LASIX) 40 MG tablet Take 1 tablet (40 mg total) by mouth once daily. Do not take if blood pressure is low, feeling dry/dizzy/lightheaded. 90 tablet 1    potassium chloride SA (K-DUR,KLOR-CON M) 10 MEQ tablet Take 1 tablet (10 mEq total) by mouth once daily. 90 tablet 1    sacubitriL-valsartan (ENTRESTO)  mg per tablet Take 1 tablet by mouth 2 (two) times daily. 180 tablet 1     No current facility-administered medications for this visit.       Exam     ROS  Review of Systems   Constitutional:  Negative for appetite change, chills, fatigue and fever.   HENT:  Negative for congestion, ear pain, postnasal drip, rhinorrhea, sneezing, sore throat and tinnitus.    Respiratory:  Negative for cough and shortness of breath.    Cardiovascular:  Negative for chest pain and palpitations.   Gastrointestinal:  Negative for abdominal pain, constipation, diarrhea, nausea and vomiting.   Genitourinary:  Negative for difficulty urinating and dysuria.   Musculoskeletal:  Negative for arthralgias.   Neurological:  Negative for headaches.   Psychiatric/Behavioral:  Negative for sleep disturbance.          Physical Exam  Vitals:    02/23/23 1358   BP: 120/70   Pulse: 87   Temp: 98.4 °F (36.9 °C)   SpO2: 96%   Weight: 72 kg (158 lb 11.7 oz)   Height: 5' 11" (1.803 m)    Body mass index is 22.14 kg/m².  Weight: 72 kg (158 lb 11.7 oz)   Height: 5' 11" (180.3 cm)   Physical Exam  Constitutional:       General: He is not in acute distress.     Appearance: Normal appearance. He is well-developed.   HENT:      Head: Normocephalic and atraumatic.      Right Ear: External ear normal.      Left Ear: External ear normal.      Nose: Nose " normal.      Mouth/Throat:      Pharynx: No oropharyngeal exudate.   Eyes:      Pupils: Pupils are equal, round, and reactive to light.   Cardiovascular:      Rate and Rhythm: Normal rate and regular rhythm.      Heart sounds: No murmur heard.    No friction rub. No gallop.   Pulmonary:      Effort: Pulmonary effort is normal. No respiratory distress.      Breath sounds: Normal breath sounds. No wheezing.   Abdominal:      General: Bowel sounds are normal.      Palpations: Abdomen is soft.   Musculoskeletal:      Cervical back: Neck supple.   Lymphadenopathy:      Cervical: No cervical adenopathy.   Skin:     General: Skin is warm and dry.   Neurological:      Mental Status: He is alert and oriented to person, place, and time. Mental status is at baseline.         Health Maintenance         Date Due Completion Date    Pneumococcal Vaccines (Age 65+) (1 - PCV) Never done ---    TETANUS VACCINE Never done ---    Shingles Vaccine (1 of 2) Never done ---    COVID-19 Vaccine (4 - Booster for Pfizer series) 02/21/2022 12/27/2021    Influenza Vaccine (1) 06/30/2023 (Originally 9/1/2022) ---    Lipid Panel 09/17/2025 9/17/2020            Assessment & Plan     Assessment & Plan  Problem List Items Addressed This Visit          Pulmonary    Emphysema of lung    Relevant Orders    Ambulatory referral/consult to Pulmonology       Cardiac/Vascular    Chronic systolic congestive heart failure  -Stable; Monitor  - Followed by Cardiology    Hypertension  -The current medical regimen is effective;  continue present plan and medications.        Oncology    Anemia of chronic disease  - Stable; Monitor       Other    Nicotine abuse  -We discussed smoking cessation for approx 3-10 minutes    Relevant Orders    Ambulatory referral/consult to Smoking Cessation Program     Other Visit Diagnoses       Hospital discharge follow-up    -  Primary  - We discussed current disease processes management, safety and compliance with upcoming appts in  other to avoid re-hospitalization    Orthopnea        Relevant Orders    Ambulatory referral/consult to Pulmonology    Memory loss      - Patient and daughter request referral to [facility] given newly reported forgetfulness, repeated questions and memory fog              Health Maintenance reviewed: Deferred per patient     Follow-up: 06/2023 for Chronic conditions    Transitional Care Note    Family and/or Caretaker present at visit?  Yes.  Diagnostic tests reviewed/disposition: No diagnosic tests pending after this hospitalization.  Disease/illness education: Yes   Home health/community services discussion/referrals: Patient does not have home health established from hospital visit.  They do not need home health.  If needed, we will set up home health for the patient.   Establishment or re-establishment of referral orders for community resources: No other necessary community resources.   Discussion with other health care providers: No discussion with other health care providers necessary.

## 2023-02-23 NOTE — Clinical Note
Dr. Ch,  Mr. Douglas needs a referral place to  [facility] for Memory Loss. Daughter wants him evaluated for possible dementia. He agrees.   Tree Pineda NP

## 2023-03-01 ENCOUNTER — OFFICE VISIT (OUTPATIENT)
Dept: PULMONOLOGY | Facility: CLINIC | Age: 83
End: 2023-03-01
Payer: COMMERCIAL

## 2023-03-01 ENCOUNTER — OFFICE VISIT (OUTPATIENT)
Dept: CARDIOLOGY | Facility: CLINIC | Age: 83
End: 2023-03-01
Payer: COMMERCIAL

## 2023-03-01 VITALS
WEIGHT: 156.06 LBS | HEIGHT: 71 IN | DIASTOLIC BLOOD PRESSURE: 64 MMHG | RESPIRATION RATE: 16 BRPM | HEART RATE: 81 BPM | BODY MASS INDEX: 21.85 KG/M2 | SYSTOLIC BLOOD PRESSURE: 130 MMHG | OXYGEN SATURATION: 93 %

## 2023-03-01 VITALS
HEIGHT: 71 IN | SYSTOLIC BLOOD PRESSURE: 130 MMHG | BODY MASS INDEX: 21.85 KG/M2 | HEART RATE: 88 BPM | DIASTOLIC BLOOD PRESSURE: 60 MMHG | WEIGHT: 156.06 LBS

## 2023-03-01 DIAGNOSIS — I50.22 CHRONIC SYSTOLIC CONGESTIVE HEART FAILURE: Primary | ICD-10-CM

## 2023-03-01 DIAGNOSIS — J18.9 PNEUMONIA OF LEFT UPPER LOBE DUE TO INFECTIOUS ORGANISM: Primary | ICD-10-CM

## 2023-03-01 DIAGNOSIS — J43.1 PANLOBULAR EMPHYSEMA: ICD-10-CM

## 2023-03-01 DIAGNOSIS — J43.9 PULMONARY EMPHYSEMA, UNSPECIFIED EMPHYSEMA TYPE: ICD-10-CM

## 2023-03-01 DIAGNOSIS — R06.01 ORTHOPNEA: ICD-10-CM

## 2023-03-01 DIAGNOSIS — G62.1 ALCOHOLIC PERIPHERAL NEUROPATHY: ICD-10-CM

## 2023-03-01 DIAGNOSIS — J44.1 COPD EXACERBATION: ICD-10-CM

## 2023-03-01 PROCEDURE — 1157F ADVNC CARE PLAN IN RCRD: CPT | Mod: CPTII,S$GLB,, | Performed by: INTERNAL MEDICINE

## 2023-03-01 PROCEDURE — 3078F DIAST BP <80 MM HG: CPT | Mod: CPTII,S$GLB,, | Performed by: INTERNAL MEDICINE

## 2023-03-01 PROCEDURE — 99999 PR PBB SHADOW E&M-EST. PATIENT-LVL III: CPT | Mod: PBBFAC,,, | Performed by: INTERNAL MEDICINE

## 2023-03-01 PROCEDURE — 1160F PR REVIEW ALL MEDS BY PRESCRIBER/CLIN PHARMACIST DOCUMENTED: ICD-10-PCS | Mod: CPTII,S$GLB,, | Performed by: INTERNAL MEDICINE

## 2023-03-01 PROCEDURE — 3075F PR MOST RECENT SYSTOLIC BLOOD PRESS GE 130-139MM HG: ICD-10-PCS | Mod: CPTII,S$GLB,, | Performed by: INTERNAL MEDICINE

## 2023-03-01 PROCEDURE — 3078F PR MOST RECENT DIASTOLIC BLOOD PRESSURE < 80 MM HG: ICD-10-PCS | Mod: CPTII,S$GLB,, | Performed by: INTERNAL MEDICINE

## 2023-03-01 PROCEDURE — 99214 PR OFFICE/OUTPT VISIT, EST, LEVL IV, 30-39 MIN: ICD-10-PCS | Mod: S$GLB,,, | Performed by: PHYSICIAN ASSISTANT

## 2023-03-01 PROCEDURE — 1159F MED LIST DOCD IN RCRD: CPT | Mod: CPTII,S$GLB,, | Performed by: INTERNAL MEDICINE

## 2023-03-01 PROCEDURE — 3075F SYST BP GE 130 - 139MM HG: CPT | Mod: CPTII,S$GLB,, | Performed by: INTERNAL MEDICINE

## 2023-03-01 PROCEDURE — 99999 PR PBB SHADOW E&M-EST. PATIENT-LVL IV: CPT | Mod: PBBFAC,,, | Performed by: PHYSICIAN ASSISTANT

## 2023-03-01 PROCEDURE — 99215 OFFICE O/P EST HI 40 MIN: CPT | Mod: S$GLB,,, | Performed by: INTERNAL MEDICINE

## 2023-03-01 PROCEDURE — 1111F PR DISCHARGE MEDS RECONCILED W/ CURRENT OUTPATIENT MED LIST: ICD-10-PCS | Mod: CPTII,S$GLB,, | Performed by: INTERNAL MEDICINE

## 2023-03-01 PROCEDURE — 99214 OFFICE O/P EST MOD 30 MIN: CPT | Mod: S$GLB,,, | Performed by: PHYSICIAN ASSISTANT

## 2023-03-01 PROCEDURE — 1159F PR MEDICATION LIST DOCUMENTED IN MEDICAL RECORD: ICD-10-PCS | Mod: CPTII,S$GLB,, | Performed by: INTERNAL MEDICINE

## 2023-03-01 PROCEDURE — 99999 PR PBB SHADOW E&M-EST. PATIENT-LVL III: ICD-10-PCS | Mod: PBBFAC,,, | Performed by: INTERNAL MEDICINE

## 2023-03-01 PROCEDURE — 99215 PR OFFICE/OUTPT VISIT, EST, LEVL V, 40-54 MIN: ICD-10-PCS | Mod: S$GLB,,, | Performed by: INTERNAL MEDICINE

## 2023-03-01 PROCEDURE — 99214 OFFICE O/P EST MOD 30 MIN: CPT | Mod: PBBFAC | Performed by: PHYSICIAN ASSISTANT

## 2023-03-01 PROCEDURE — 99999 PR PBB SHADOW E&M-EST. PATIENT-LVL IV: ICD-10-PCS | Mod: PBBFAC,,, | Performed by: PHYSICIAN ASSISTANT

## 2023-03-01 PROCEDURE — 1111F DSCHRG MED/CURRENT MED MERGE: CPT | Mod: CPTII,S$GLB,, | Performed by: INTERNAL MEDICINE

## 2023-03-01 PROCEDURE — 1160F RVW MEDS BY RX/DR IN RCRD: CPT | Mod: CPTII,S$GLB,, | Performed by: INTERNAL MEDICINE

## 2023-03-01 PROCEDURE — 1157F PR ADVANCE CARE PLAN OR EQUIV PRESENT IN MEDICAL RECORD: ICD-10-PCS | Mod: CPTII,S$GLB,, | Performed by: INTERNAL MEDICINE

## 2023-03-01 RX ORDER — PREDNISONE 20 MG/1
TABLET ORAL
Qty: 20 TABLET | Refills: 0 | Status: SHIPPED | OUTPATIENT
Start: 2023-03-01 | End: 2023-08-30

## 2023-03-01 RX ORDER — DOXYCYCLINE 100 MG/1
100 CAPSULE ORAL EVERY 12 HOURS
Qty: 42 CAPSULE | Refills: 0 | Status: SHIPPED | OUTPATIENT
Start: 2023-03-01 | End: 2023-03-23

## 2023-03-01 RX ORDER — METOPROLOL SUCCINATE 25 MG/1
12.5 TABLET, EXTENDED RELEASE ORAL DAILY
Qty: 30 TABLET | Refills: 6 | Status: SHIPPED | OUTPATIENT
Start: 2023-03-01 | End: 2024-01-04

## 2023-03-01 RX ORDER — ALBUTEROL SULFATE 0.83 MG/ML
2.5 SOLUTION RESPIRATORY (INHALATION)
Qty: 360 ML | Refills: 11 | Status: SHIPPED | OUTPATIENT
Start: 2023-03-01 | End: 2024-03-19 | Stop reason: SDUPTHER

## 2023-03-01 NOTE — PROGRESS NOTES
Subjective:     Patient ID: Richy Douglas is a 82 y.o. male.    Chief Complaint:  left upper lobe pneumonia     HPI    COPD  He presents for evaluation and treatment of COPD. The patient is  currently have symptoms / an exacerbation. The patient has COPD for approximately 11 - 12 years. Symptoms include cough with sputum production  have included dyspnea, cough, wheezing and fatigue, and typically last 2 weeks. Previous episodes have been exacerbated by any exercise. Current treatment includes albuterol inhaler, which generally provides some relief of symptoms.   He uses 1 pillows at night. Patient currently is not on home oxygen therapy.. The patient is having no constitutional symptoms, denying fever, chills, anorexia, or weight loss. The patient has been hospitalized for this condition before. He has a history of 68 pack years. The patient is experiencing exercise intolerance (difficulty climbing 1 flights of stairs).  Still smoking      RECENT HOSPITALIZATION - CT ascan perfromed:          [facility] - Telemetry ([facility])  [facility] Medicine  Discharge Summary        Patient Name: Richy Douglas  MRN: [id number]  IRAJ: [id number]  Patient Class: IP- Inpatient   Admission Date: 2/11/2023  Hospital Length of Stay: 1 days  Discharge Date and Time: 2/14/2023  5:45 PM  Attending Physician: No att. providers found   Discharging Provider: Grant Samaniego MD  Primary Care Provider: Vivian Ch MD     Primary Care Team: Networked reference to record PCT      HPI:   The patient is 81 yo male with past medical history atrial fibrillation, HFrEF, emphysema, GERD, hypertension, osteoarthritis and prostate cancer who presented with shortness of breath and chest pain. He reports shortness of breath started last night and has progressed. He felt like he could not breathe prior to presentation. He has cough. He reports multiple family members have viral illness. He has intermittent chest pain. He also reports BLE swelling. Initial troponin  negative. COVID positive. Hospital medicine consulted. Patient placed in observation.        1. Chronic combined systolic and diastolic congestive heart failure, NYHA class 3    2. Paroxysmal atrial fibrillation    3. Chronic systolic congestive heart failure    4. Atherosclerosis of abdominal aorta    5. AVM (arteriovenous malformation) of small bowel, acquired with hemorrhage    6. Localized edema    7. Pulmonary emphysema, unspecified emphysema type    8. PAF (paroxysmal atrial fibrillation)    9. Claudication    10. Nonrheumatic aortic valve insufficiency    11. SANCHEZ (dyspnea on exertion)    12. Knee pain, unspecified chronicity, unspecified laterality    13. History of lower gastrointestinal bleeding    14. Iron deficiency anemia due to chronic blood loss                   * No surgery found *       Hospital Course:   Presented with shortness of breath/chest tightness/chest pain on 02/11 likely secondary to COVID, CHF exacerbation, COPD exacerbation ; received Lasix IV in ED   Follow up on echo, chest x-ray, troponins, cardiology follow up   Not on oxygen at home, currently on 2 L nasal cannula, on steroids, remdesivir, neb therapy;  Tropx3 -ve;   US LE -ve for DVT  Echo showed EF of 30%;         R/LHC normal coronaries 1/2022  Follows with cardiologist Dr. Garces, patient saw Dr. Christianson to discuss on ICD/CRT, does not want it now as per documentation;      2/13: VQ scan high prob for PE however could be artifact given sever COPD. LE US negative for DVT. D dimer only 0.6 which is WNL given age. 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 Currently anemic. Vital signs stable on room air. Will not start AC at this time. Will confirm PE with CTA. Hx of shellfish and possibly iodine allergy. Will premedicate. CTA in the am.   2/14: CTA negative for PE. Home O2 eval performed and patient did not qualify. He was discharged home with prednisone and breathing treatments. Instructed to f/u with pulm outpatient.          Goals of Care Treatment Preferences:  Code Status: Full Code        Consults:          Consults (From admission, onward)         Status Ordering Provider       Inpatient consult to Cardiology  Once        Provider:  Ricardo Crum MD    Completed ROLANDO ALATORRE             No new Assessment & Plan notes have been filed under this hospital service since the last note was generated.  Service: Hospital Medicine            Final Active Diagnoses:     Diagnosis Date Noted POA    PRINCIPAL PROBLEM:  Chest pain [R07.9] 02/11/2023 Yes    COVID-19 [U07.1] 02/11/2023 Yes    Chronic systolic congestive heart failure [I50.22] 08/31/2017 Yes    Shortness of breath [R06.02] 08/31/2017 Yes    Emphysema of lung [J43.9] 08/31/2017 Yes    Anemia of chronic disease [D63.8] 04/03/2017 Yes       Problems Resolved During this Admission:     Diagnosis Date Noted Date Resolved POA    Hypokalemia [E87.6] 02/11/2023 02/13/2023 Yes         Discharged Condition: good     Disposition: Home or Self Care     Follow Up:        Follow-up Information      Vivian Ch MD Follow up in 1 week(s).    Specialty: Family Medicine  Contact information:  [address] [facility] [address] [phone]                             Patient Instructions:           Ambulatory referral/consult to Outpatient Case Management   Referral Priority: Routine Referral Type: Consultation   Referral Reason: Specialty Services Required   Number of Visits Requested: 1      COVID-19 Surveillance Program      Order Specific Question Answer Comments   Does patient have a smartphone? Yes     Does patient have the [facility] on their smartphone? No     While in surveillance program, will patient be using home oxygen? No           Significant Diagnostic Studies: Labs:   BMP:       Recent Labs   Lab 02/14/23  0435   GLU 90      K 4.5      CO2 28   BUN 16   CREATININE 0.6   CALCIUM 9.0    and CBC       Recent Labs   Lab 02/14/23  0435   WBC 12.27    HGB 8.5*   HCT 28.1*                Pending Diagnostic Studies:      None          Medications:  Reconciled Home Medications:       Medication List       START taking these medications    ferrous sulfate 325 (65 FE) MG EC tablet  Take 1 tablet (325 mg total) by mouth once daily.      predniSONE 20 MG tablet  Commonly known as: DELTASONE  Take 1 tablet (20 mg total) by mouth 2 (two) times daily. for 5 days          CONTINUE taking these medications    albuterol 90 mcg/actuation inhaler  Commonly known as: PROVENTIL/VENTOLIN HFA  Inhale 2 puffs into the lungs every 4 (four) hours as needed for Wheezing or Shortness of Breath.      ENTRESTO  mg per tablet  Generic drug: sacubitriL-valsartan  Take 1 tablet by mouth 2 (two) times daily.      furosemide 40 MG tablet  Commonly known as: LASIX  Take 1 tablet (40 mg total) by mouth once daily. Do not take if blood pressure is low, feeling dry/dizzy/lightheaded.      potassium chloride SA 10 MEQ tablet  Commonly known as: K-DUR,KLOR-CON M  Take 1 tablet (10 mEq total) by mouth once daily.                Indwelling Lines/Drains at time of discharge:       Lines/Drains/Airways      None                      Time spent on the discharge of patient: 36 minutes           Grant Samaniego MD  Department of Hospital Medicine  [facility] ([facility])          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    Anemia of chronic disease     Aortic aneurysm     Atrial fibrillation with RVR 09/24/2021    Chronic systolic congestive heart failure 08/31/2017    Emphysema of lung 08/31/2017    GERD (gastroesophageal reflux disease)     Hypertension     Knee osteoarthritis 10/21/2022    Microcytic anemia 11/24/2020    Other hyperlipidemia 04/27/2021    Personal history of colonic polyps 2022    Prostate cancer      Past Surgical History:   Procedure Laterality Date    CATHETERIZATION OF BOTH LEFT AND RIGHT HEART N/A 1/13/2022    Procedure:  CATHETERIZATION, HEART, BOTH LEFT AND RIGHT;  Surgeon: Janneth Tovar MD;  Location: [facility] CATH LAB;  Service: Cardiology;  Laterality: N/A;  poss cx    COLONOSCOPY      COLONOSCOPY N/A 6/30/2022    Procedure: COLONOSCOPY;  Surgeon: Sandra Perez MD;  Location: [facility] ENDO;  Service: Endoscopy;  Laterality: N/A;    COLONOSCOPY N/A 7/1/2022    Procedure: COLONOSCOPY;  Surgeon: Woodrow Christian MD;  Location: [facility] ENDO;  Service: Endoscopy;  Laterality: N/A;    CORONARY ANGIOGRAPHY N/A 1/13/2022    Procedure: ANGIOGRAM, CORONARY ARTERY;  Surgeon: Janneth Tovar MD;  Location: [facility] CATH LAB;  Service: Cardiology;  Laterality: N/A;    ESOPHAGOGASTRODUODENOSCOPY N/A 6/30/2022    Procedure: EGD (ESOPHAGOGASTRODUODENOSCOPY);  Surgeon: Sandra Perez MD;  Location: [facility] ENDO;  Service: Endoscopy;  Laterality: N/A;    INJECTION OF JOINT Bilateral 10/21/2022    Procedure: Bilateral intraarticular knee injection with local ALLERGIC TO IODINE;  Surgeon: Devon Grant MD;  Location: [facility] PAIN MGT;  Service: Pain Management;  Laterality: Bilateral;    INJECTION OF JOINT Bilateral 1/31/2023    Procedure: Bilateral IA knee joint injection with steroid RN IV Sedation;  Surgeon: Devon Grant MD;  Location: [facility] PAIN MGT;  Service: Pain Management;  Laterality: Bilateral;    PROSTATE SURGERY      SPINE SURGERY       Review of patient's allergies indicates:   Allergen Reactions    Fish containing products     Iodine and iodide containing products     Shellfish containing products      Current Outpatient Medications on File Prior to Visit   Medication Sig Dispense Refill    albuterol (PROVENTIL/VENTOLIN HFA) 90 mcg/actuation inhaler Inhale 2 puffs into the lungs every 4 (four) hours as needed for Wheezing or Shortness of Breath. 8.5 g 11    ferrous sulfate 325 (65 FE) MG EC tablet Take 1 tablet (325 mg total) by mouth once daily. 30 tablet 1    furosemide (LASIX) 40 MG tablet Take 1 tablet (40 mg total) by mouth once  daily. Do not take if blood pressure is low, feeling dry/dizzy/lightheaded. 90 tablet 1    metoprolol succinate (TOPROL-XL) 25 MG 24 hr tablet Take 0.5 tablets (12.5 mg total) by mouth once daily. 30 tablet 6    potassium chloride SA (K-DUR,KLOR-CON M) 10 MEQ tablet Take 1 tablet (10 mEq total) by mouth once daily. 90 tablet 1    sacubitriL-valsartan (ENTRESTO)  mg per tablet Take 1 tablet by mouth 2 (two) times daily. 180 tablet 1     No current facility-administered medications on file prior to visit.     Social History     Socioeconomic History    Marital status:      Spouse name: jenn     Number of children: 6   Tobacco Use    Smoking status: Every Day     Packs/day: 1.00     Years: 68.00     Pack years: 68.00     Types: Cigarettes     Start date: 1954     Last attempt to quit: 2022     Years since quittin.7    Smokeless tobacco: Never   Substance and Sexual Activity    Alcohol use: Yes     Comment: reports only drinks red wine when games are on    Drug use: Never    Sexual activity: Yes     Partners: Female     Family History   Problem Relation Age of Onset    Diabetes Mother     Peripheral vascular disease Mother        Review of Systems   Constitutional:  Positive for fatigue. Negative for fever.   HENT:  Positive for postnasal drip, rhinorrhea and congestion.    Eyes:  Negative for redness and itching.   Respiratory:  Positive for cough, sputum production, shortness of breath, dyspnea on extertion, use of rescue inhaler and Paroxysmal Nocturnal Dyspnea.    Cardiovascular:  Negative for chest pain, palpitations and leg swelling.   Genitourinary:  Negative for difficulty urinating and hematuria.   Endocrine:  Negative for cold intolerance and heat intolerance.    Skin:  Negative for rash.   Gastrointestinal:  Negative for nausea and abdominal pain.   Neurological:  Negative for dizziness, syncope, weakness and light-headedness.   Hematological:  Negative for adenopathy. Does not  "bruise/bleed easily.   Psychiatric/Behavioral:  Negative for sleep disturbance. The patient is not nervous/anxious.      Objective:      /64   Pulse 81   Resp 16   Ht 5' 11" (1.803 m)   Wt 70.8 kg (156 lb 1.4 oz)   SpO2 (!) 93%   BMI 21.77 kg/m²   Physical Exam  Vitals and nursing note reviewed.   Constitutional:       Appearance: He is well-developed. He is ill-appearing.   HENT:      Head: Normocephalic and atraumatic.      Nose: Nose normal.   Eyes:      Conjunctiva/sclera: Conjunctivae normal.      Pupils: Pupils are equal, round, and reactive to light.   Neck:      Thyroid: No thyromegaly.      Vascular: No JVD.      Trachea: No tracheal deviation.   Cardiovascular:      Rate and Rhythm: Normal rate and regular rhythm.      Heart sounds: No murmur heard.  Pulmonary:      Breath sounds: Examination of the right-lower field reveals wheezing. Examination of the left-lower field reveals wheezing. Decreased breath sounds, wheezing and rales present. No rhonchi.   Abdominal:      General: Bowel sounds are normal.      Palpations: Abdomen is soft.   Musculoskeletal:         General: No tenderness. Normal range of motion.      Cervical back: Neck supple.   Lymphadenopathy:      Cervical: No cervical adenopathy.   Skin:     General: Skin is warm and dry.   Neurological:      Mental Status: He is alert and oriented to person, place, and time.     Personal Diagnostic Review  CT of chest - left upper lobe pneumonia and emphysema    Pulmonary Studies Review 3/1/2023   SpO2 93   Ordering Provider -   Interpreting Provider -   Performing nurse/tech/RT -   Diagnosis -   Height 71   Weight 2497.37   BMI (Calculated) 21.8   Predicted Distance 295.62   Patient Race -   6MWT Status -   Patient Reported -   Was O2 used? -   6MW Distance walked (feet) -   Distance walked (meters) -   Did patient stop? -   Type of assistive device(s) used? -   Is extra documentation required for this patient? -   Oxygen Saturation - "   Supplemental Oxygen -   Heart Rate -   Blood Pressure -   [place] Dyspnea Rating  -   Oxygen Saturation -   Supplemental Oxygen -   Heart Rate -   Blood Pressure -   [place] Dyspnea Rating  -   Recovery Time (seconds) -   Oxygen Saturation -   Supplemental Oxygen -   Heart Rate -   Blood Pressure -   [place] Dyspnea Rating  -   Is procedure ready for interpretation? -   Did the patient stop or pause? -   Total Time Walked (Calculated) -   Total Laps Walked -   Final Partial Lap Distance (feet) -   Total Distance Feet (Calculated) -   Total Distance Meters (Calculated) -   Predicted Distance Meters (Calculated) 519.62   Percentage of Predicted (Calculated) -   Peak VO2 (Calculated) -   Mets -   Has The Patient Had a Previous Six Minute Walk Test? -   Oxygen Qualification? -       CTA Chest Non-Coronary (PE Studies)  Narrative: EXAMINATION:  CTA CHEST NON CORONARY (PE STUDIES)    CLINICAL HISTORY:  Pulmonary embolism (PE) suspected, neg D-dimer;    TECHNIQUE:  Standard chest CT PE protocol was performed with IV contrast and 3D MIP reformats.  100 mL of Omnipaque 350 contrast material was used for this examination.    COMPARISON:  A CT examination of the chest performed on 09/08/2022.    FINDINGS:  There is mild cardiomegaly.  There is a moderate amount of atherosclerosis in the aorta.  There is no pulmonary embolus visualized.  There are marked emphysematous changes in both lungs.  There has been slight interval worsening of the appearance of the left upper lobe.  There are irregular shaped areas of increased density scattered throughout the left upper lobe.  There has been interval development of a tiny left pleural effusion.  There is no pneumothorax.  Impression: 1. There is no pulmonary embolus visualized.  2. There has been slight interval worsening of the appearance of the left upper lobe. There are irregular shaped areas of increased density scattered throughout the left upper lobe.  If additional imaging evaluation  is clinically indicated, I recommend consideration of a PET-CT examination  3. There has been interval development of a tiny left pleural effusion.  4. There are marked emphysematous changes in both lungs.  5. There is mild cardiomegaly.  6. There is a moderate amount of atherosclerosis in the aorta.  All CT scans at this facility use dose modulation, iterative reconstruction, and/or weight base dosing when appropriate to reduce radiation dose when appropriate to reduce radiation dose to as low as reasonably achievable.    Electronically signed by: Joshua Milian MD  Date:    02/14/2023  Time:    10:52      Office Spirometry Results:     No flowsheet data found.  Pulmonary Studies Review 3/1/2023   SpO2 93   Ordering Provider -   Interpreting Provider -   Performing nurse/tech/RT -   Diagnosis -   Height 71   Weight 2497.37   BMI (Calculated) 21.8   Predicted Distance 295.62   Patient Race -   6MWT Status -   Patient Reported -   Was O2 used? -   6MW Distance walked (feet) -   Distance walked (meters) -   Did patient stop? -   Type of assistive device(s) used? -   Is extra documentation required for this patient? -   Oxygen Saturation -   Supplemental Oxygen -   Heart Rate -   Blood Pressure -   [place] Dyspnea Rating  -   Oxygen Saturation -   Supplemental Oxygen -   Heart Rate -   Blood Pressure -   [place] Dyspnea Rating  -   Recovery Time (seconds) -   Oxygen Saturation -   Supplemental Oxygen -   Heart Rate -   Blood Pressure -   [place] Dyspnea Rating  -   Is procedure ready for interpretation? -   Did the patient stop or pause? -   Total Time Walked (Calculated) -   Total Laps Walked -   Final Partial Lap Distance (feet) -   Total Distance Feet (Calculated) -   Total Distance Meters (Calculated) -   Predicted Distance Meters (Calculated) 519.62   Percentage of Predicted (Calculated) -   Peak VO2 (Calculated) -   Mets -   Has The Patient Had a Previous Six Minute Walk Test? -   Oxygen Qualification? -          Assessment:       Pneumonia of left upper lobe due to infectious organism  Trail of jet neb and prolonged antibiotics (3 weeks)   If no clearance then consider bronchoscopy / PET    Pneumonia of left upper lobe due to infectious organism    Panlobular emphysema  -     albuterol (PROVENTIL) 2.5 mg /3 mL (0.083 %) nebulizer solution; Take 3 mLs (2.5 mg total) by nebulization every 4 to 6 hours as needed for Wheezing or Shortness of Breath.  Dispense: 360 mL; Refill: 11  -     doxycycline (MONODOX) 100 MG capsule; Take 1 capsule (100 mg total) by mouth every 12 (twelve) hours. for 21 days  Dispense: 42 capsule; Refill: 0    Orthopnea  -     Ambulatory referral/consult to Pulmonology    Pulmonary emphysema, unspecified emphysema type  -     Ambulatory referral/consult to Pulmonology  -     albuterol (PROVENTIL) 2.5 mg /3 mL (0.083 %) nebulizer solution; Take 3 mLs (2.5 mg total) by nebulization every 4 to 6 hours as needed for Wheezing or Shortness of Breath.  Dispense: 360 mL; Refill: 11  -     doxycycline (MONODOX) 100 MG capsule; Take 1 capsule (100 mg total) by mouth every 12 (twelve) hours. for 21 days  Dispense: 42 capsule; Refill: 0    COPD exacerbation  -     predniSONE (DELTASONE) 20 MG tablet; Prednisone 60 mg/ day for 3 days, 40 mg/day for 3 days,20 mg/ day for 3 days, (1/2 tablet )10 mg a day for 3 days.  Dispense: 20 tablet; Refill: 0    Alcoholic peripheral neuropathy          Outpatient Encounter Medications as of 3/1/2023   Medication Sig Dispense Refill    albuterol (PROVENTIL/VENTOLIN HFA) 90 mcg/actuation inhaler Inhale 2 puffs into the lungs every 4 (four) hours as needed for Wheezing or Shortness of Breath. 8.5 g 11    ferrous sulfate 325 (65 FE) MG EC tablet Take 1 tablet (325 mg total) by mouth once daily. 30 tablet 1    furosemide (LASIX) 40 MG tablet Take 1 tablet (40 mg total) by mouth once daily. Do not take if blood pressure is low, feeling dry/dizzy/lightheaded. 90 tablet 1    metoprolol  succinate (TOPROL-XL) 25 MG 24 hr tablet Take 0.5 tablets (12.5 mg total) by mouth once daily. 30 tablet 6    potassium chloride SA (K-DUR,KLOR-CON M) 10 MEQ tablet Take 1 tablet (10 mEq total) by mouth once daily. 90 tablet 1    sacubitriL-valsartan (ENTRESTO)  mg per tablet Take 1 tablet by mouth 2 (two) times daily. 180 tablet 1    albuterol (PROVENTIL) 2.5 mg /3 mL (0.083 %) nebulizer solution Take 3 mLs (2.5 mg total) by nebulization every 4 to 6 hours as needed for Wheezing or Shortness of Breath. 360 mL 11    doxycycline (MONODOX) 100 MG capsule Take 1 capsule (100 mg total) by mouth every 12 (twelve) hours. for 21 days 42 capsule 0    predniSONE (DELTASONE) 20 MG tablet Prednisone 60 mg/ day for 3 days, 40 mg/day for 3 days,20 mg/ day for 3 days, (1/2 tablet )10 mg a day for 3 days. 20 tablet 0     No facility-administered encounter medications on file as of 3/1/2023.     Plan:       Requested Prescriptions     Signed Prescriptions Disp Refills    albuterol (PROVENTIL) 2.5 mg /3 mL (0.083 %) nebulizer solution 360 mL 11     Sig: Take 3 mLs (2.5 mg total) by nebulization every 4 to 6 hours as needed for Wheezing or Shortness of Breath.    doxycycline (MONODOX) 100 MG capsule 42 capsule 0     Sig: Take 1 capsule (100 mg total) by mouth every 12 (twelve) hours. for 21 days    predniSONE (DELTASONE) 20 MG tablet 20 tablet 0     Sig: Prednisone 60 mg/ day for 3 days, 40 mg/day for 3 days,20 mg/ day for 3 days, (1/2 tablet )10 mg a day for 3 days.     Problem List Items Addressed This Visit       Alcoholic peripheral neuropathy    Emphysema of lung    Relevant Medications    albuterol (PROVENTIL) 2.5 mg /3 mL (0.083 %) nebulizer solution    doxycycline (MONODOX) 100 MG capsule    Pneumonia of left upper lobe due to infectious organism - Primary    Current Assessment & Plan     Trail of jet neb and prolonged antibiotics (3 weeks)   If no clearance then consider bronchoscopy / PET          Other Visit  Diagnoses       Orthopnea        COPD exacerbation        Relevant Medications    predniSONE (DELTASONE) 20 MG tablet               Follow up in about 3 weeks (around 3/22/2023) for Review CT/PET - on return visit, Review progress.    MEDICAL DECISION MAKING: Moderate to high complexity.  Overall, the multiple problems listed are of moderate to high severity that may impact quality of life and activities of daily living. Side effects of medications, treatment plan as well as options and alternatives reviewed and discussed with patient. There was counseling of patient concerning these issues.    Total time spent in counseling and coordination of care - 45  minutes of total time spent on the encounter, which includes face to face time and non-face to face time preparing to see the patient (eg, review of tests), Obtaining and/or reviewing separately obtained history, Documenting clinical information in the electronic or other health record, Independently interpreting results (not separately reported) and communicating results to the patient/family/caregiver, or Care coordination (not separately reported).    Time was used in discussion of prognosis, risks, benefits of treatment, instructions and compliance with regimen . Discussion with other physicians and/or health care providers - home health or for use of durable medical equipment (oxygen, nebulizers, CPAP, BiPAP) occurred.

## 2023-03-01 NOTE — PROGRESS NOTES
[facility] Provider Note (Initial Clinic) Consult Note    Date of original referral: 2/15/2023  Age: 82 y.o.  Gender: male  Ethnicity: Not  or /a   Number of admissions for CHF within the preceding year: 1  Duration of CHF: 5 yrs  Type of Congestive Heart Failure: Systolic   Etiology: Non-ischemic    Social history: none  Enrolled in Infusion suite: no    Diagnostic Labs:   EKG - 02/12/2023  CXR - 02/12/2023  ECHO - 02/12/2023  Stress test -   Stress echo - 05/08/2021  Pharmacologic stress -   Cardiac catheterization - 01/13/2022   Cardiac MRI -     Lab Results   Component Value Date     02/14/2023     02/13/2023    K 4.5 02/14/2023    K 4.3 02/13/2023     02/14/2023     02/13/2023    CO2 28 02/14/2023    CO2 27 02/13/2023    GLU 90 02/14/2023     (H) 02/13/2023    BUN 16 02/14/2023    BUN 14 02/13/2023    CREATININE 0.6 02/14/2023    CREATININE 0.7 02/13/2023    CALCIUM 9.0 02/14/2023    CALCIUM 9.2 02/13/2023    PROT 6.2 02/14/2023    PROT 6.2 02/13/2023    ALBUMIN 2.9 (L) 02/14/2023    ALBUMIN 2.8 (L) 02/13/2023    BILITOT 0.6 02/14/2023    BILITOT 0.6 02/13/2023    ALKPHOS 53 (L) 02/14/2023    ALKPHOS 53 (L) 02/13/2023    AST 17 02/14/2023    AST 18 02/13/2023    ALT 17 02/14/2023    ALT 14 02/13/2023    ANIONGAP 12 02/14/2023    ANIONGAP 10 02/13/2023    ESTGFRAFRICA >60 06/24/2022    ESTGFRAFRICA >60 06/16/2022    EGFRNONAA >60 06/24/2022    EGFRNONAA >60 06/16/2022       Lab Results   Component Value Date    WBC 12.27 02/14/2023    WBC 3.97 02/13/2023    RBC 4.15 (L) 02/14/2023    RBC 4.19 (L) 02/13/2023    HGB 8.5 (L) 02/14/2023    HGB 8.6 (L) 02/13/2023    HCT 28.1 (L) 02/14/2023    HCT 28.7 (L) 02/13/2023    MCV 68 (L) 02/14/2023    MCV 69 (L) 02/13/2023    MCH 20.5 (L) 02/14/2023    MCH 20.5 (L) 02/13/2023    MCHC 30.2 (L) 02/14/2023    MCHC 30.0 (L) 02/13/2023    RDW 26.1 (H) 02/14/2023    RDW 26.7 (H) 02/13/2023     02/14/2023     02/13/2023    MPV  SEE COMMENT 02/14/2023    MPV SEE COMMENT 02/13/2023    IMMGR 0.7 (H) 02/14/2023    IMMGR 0.3 02/13/2023    IGABS 0.09 (H) 02/14/2023    IGABS 0.01 02/13/2023    LYMPH 0.5 (L) 02/14/2023    LYMPH 3.8 (L) 02/14/2023    MONO 0.7 02/14/2023    MONO 5.5 02/14/2023    EOS 0.2 02/14/2023    EOS 0.0 02/13/2023    BASO 0.02 02/14/2023    BASO 0.00 02/13/2023    NRBC 0 02/14/2023    NRBC 0 02/13/2023    GRAN 10.8 (H) 02/14/2023    GRAN 87.9 (H) 02/14/2023    EOSINOPHIL 1.9 02/14/2023    EOSINOPHIL 0.0 02/13/2023    BASOPHIL 0.2 02/14/2023    BASOPHIL 0.0 02/13/2023    PLTEST Appears normal 02/11/2023    PLTEST Appears normal 08/15/2022    ANISO Moderate 02/11/2023    ANISO Moderate 08/15/2022    HYPO Moderate 02/11/2023    HYPO Moderate 08/15/2022       Lab Results   Component Value Date    BNP 1,881 (H) 02/11/2023    BNP 3,005 (H) 02/01/2023    MG 1.9 02/13/2023    MG 1.7 02/11/2023    PHOS 2.2 (L) 09/28/2021    PHOS 3.0 09/27/2021    TROPONINI 0.021 02/11/2023    TROPONINI 0.017 02/11/2023    TSH 0.870 11/24/2020       Lab Results   Component Value Date    IRON 13 (L) 02/13/2023    TIBC 312 02/13/2023    FERRITIN 101 02/11/2023    CHOL 144 09/17/2020    TRIG 45 09/17/2020    HDL 61 09/17/2020    LDLCALC 74.0 09/17/2020    CHOLHDL 42.4 09/17/2020    TOTALCHOLEST 2.4 09/17/2020    NONHDLCHOL 83 09/17/2020    COLORU Zayda 03/17/2021    APPEARANCEUA Cloudy (A) 09/17/2020    PHUR 6 03/17/2021    SPECGRAV 1.020 03/17/2021    PROTEINUA Negative 09/17/2020    GLUCUA Negative 09/17/2020    KETONESU neg 03/17/2021    BILIRUBINUA Negative 09/17/2020    OCCULTUA Negative 09/17/2020    NITRITE neg 03/17/2021    LEUKOCYTESUR 2+ (A) 09/17/2020       List all implanted cardiac devices:   Declined icd  Cardiomems: no    Current Outpatient Medications on File Prior to Visit   Medication Sig Dispense Refill    albuterol (PROVENTIL/VENTOLIN HFA) 90 mcg/actuation inhaler Inhale 2 puffs into the lungs every 4 (four) hours as needed for Wheezing  or Shortness of Breath. 8.5 g 11    furosemide (LASIX) 40 MG tablet Take 1 tablet (40 mg total) by mouth once daily. Do not take if blood pressure is low, feeling dry/dizzy/lightheaded. 90 tablet 1    potassium chloride SA (K-DUR,KLOR-CON M) 10 MEQ tablet Take 1 tablet (10 mEq total) by mouth once daily. 90 tablet 1    sacubitriL-valsartan (ENTRESTO)  mg per tablet Take 1 tablet by mouth 2 (two) times daily. 180 tablet 1    ferrous sulfate 325 (65 FE) MG EC tablet Take 1 tablet (325 mg total) by mouth once daily. 30 tablet 1     No current facility-administered medications on file prior to visit.         HPI:  Patient can walk without SOB    Patient sleeps on 2 pillows   Patient wakes up SOB, has to get out of bed, associated cough, sputum none   Palpitations - none   Dizzy, light-headed, pre-syncope or syncope none   Since discharge frequency of performing weights, home weight and weight change   Other information felt pertinent to HPI    The patient is 83 yo male with past medical history atrial fibrillation, HFrEF, emphysema, GERD, hypertension, osteoarthritis and prostate cancer who presented with shortness of breath and chest pain. He reports shortness of breath started last night and has progressed. He felt like he could not breathe prior to presentation. He has cough. He reports multiple family members have viral illness. He has intermittent chest pain. He also reports BLE swelling. Initial troponin negative. COVID positive. Hospital medicine consulted. Patient placed in observation.       Presented with shortness of breath/chest tightness/chest pain on 02/11 likely secondary to COVID, CHF exacerbation, COPD exacerbation ; received Lasix IV in ED   Follow up on echo, chest x-ray, troponins, cardiology follow up   Not on oxygen at home, currently on 2 L nasal cannula, on steroids, remdesivir, neb therapy;  Tropx3 -ve;   US LE -ve for DVT  Echo showed EF of 30%;         R/LHC normal coronaries  1/2022  Follows with cardiologist Dr. Garces, patient saw Dr. Christianson to discuss on ICD/CRT, does not want it now as per documentation;      2/13: VQ scan high prob for PE however could be artifact given sever COPD. LE US negative for DVT. D dimer only 0.6 which is WNL given age. 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 Currently anemic. Vital signs stable on room air. Will not start AC at this time. Will confirm PE with CTA. Hx of shellfish and possibly iodine allergy. Will premedicate. CTA in the am.   2/14: CTA negative for PE. Home O2 eval performed and patient did not qualify. He was discharged home with prednisone and breathing treatments. Instructed to f/u with pulm outpatient.     Presents today for first visit, no issues. Works full time. On max dose entresto. No BB? Mili last note says on BB.    No SOB, PND, orthopnea    Does have slight Le edema, better in am     PHYSICAL:   Vitals:    03/01/23 0916   BP: 130/60   Pulse: 88      Wt Readings from Last 3 Encounters:   03/01/23 70.8 kg (156 lb 1.4 oz)   02/23/23 72 kg (158 lb 11.7 oz)   02/14/23 70.8 kg (156 lb 1.4 oz)       JVD: no   Heart rhythm: regular  Cardiac murmur: No    S3: no  S4: no  Lungs: clear  Liver span: 10 cm:   Hepatojugular reflux: no  Edema: no,      ASSESSMENT: acute on chronic systolic HF    PLAN:      Patient Instructions:   Instruct the patient to notify this clinic if HH, a physician or an advanced care provider wants to change medication one of their HF medications   Activity and Diet restrictions:   Recommend 2-3 gram sodium restriction and 1500cc- 2000cc fluid restriction.  Encourage physical activity with graded exercise program.  Requested patient to weigh themselves daily, and to notify us if their weight increases by more than 3 lbs in 1 day or 5 lbs in 3 days.    Assigned dry weight on home scale: 70 kg  Medication changes (include current dose and changed dose)  Start toprol xl 12.5 nightly  Continue lasix entresto  RTC 4  weeks  Does not want EP/ICD

## 2023-03-01 NOTE — ASSESSMENT & PLAN NOTE
Trail of jet neb and prolonged antibiotics (3 weeks)   If no clearance then consider bronchoscopy / PET

## 2023-03-03 ENCOUNTER — TELEPHONE (OUTPATIENT)
Dept: PAIN MEDICINE | Facility: CLINIC | Age: 83
End: 2023-03-03
Payer: COMMERCIAL

## 2023-03-07 ENCOUNTER — TELEPHONE (OUTPATIENT)
Dept: PRIMARY CARE CLINIC | Facility: CLINIC | Age: 83
End: 2023-03-07
Payer: MEDICARE

## 2023-03-07 ENCOUNTER — PATIENT MESSAGE (OUTPATIENT)
Dept: PULMONOLOGY | Facility: CLINIC | Age: 83
End: 2023-03-07
Payer: MEDICARE

## 2023-03-07 NOTE — TELEPHONE ENCOUNTER
Called and spoke with daughter. She stated no one had gave her a call from the Neuro center, and they had a bad episode this morning. I gave her the number to the Neuro center, and she will give a call.

## 2023-03-07 NOTE — TELEPHONE ENCOUNTER
----- Message from Yara Esparza sent at 3/7/2023  8:17 AM CST -----  Contact: daughter([phone]  Daughter is calling to consult with nurse regarding a referral for her father. Please call her back at [phone]. Thanks/ar

## 2023-03-28 ENCOUNTER — CLINICAL SUPPORT (OUTPATIENT)
Dept: PULMONOLOGY | Facility: CLINIC | Age: 83
End: 2023-03-28
Payer: COMMERCIAL

## 2023-03-28 ENCOUNTER — HOSPITAL ENCOUNTER (OUTPATIENT)
Dept: RADIOLOGY | Facility: HOSPITAL | Age: 83
Discharge: HOME | End: 2023-03-28
Attending: INTERNAL MEDICINE
Payer: COMMERCIAL

## 2023-03-28 ENCOUNTER — OFFICE VISIT (OUTPATIENT)
Dept: PULMONOLOGY | Facility: CLINIC | Age: 83
End: 2023-03-28
Payer: COMMERCIAL

## 2023-03-28 VITALS
DIASTOLIC BLOOD PRESSURE: 56 MMHG | BODY MASS INDEX: 23.06 KG/M2 | BODY MASS INDEX: 23.06 KG/M2 | SYSTOLIC BLOOD PRESSURE: 108 MMHG | OXYGEN SATURATION: 94 % | HEIGHT: 71 IN | HEIGHT: 71 IN | WEIGHT: 164.69 LBS | HEART RATE: 81 BPM | RESPIRATION RATE: 18 BRPM | WEIGHT: 164.69 LBS

## 2023-03-28 DIAGNOSIS — R91.1 SOLITARY PULMONARY NODULE: ICD-10-CM

## 2023-03-28 DIAGNOSIS — J43.9 PULMONARY EMPHYSEMA, UNSPECIFIED EMPHYSEMA TYPE: Primary | ICD-10-CM

## 2023-03-28 DIAGNOSIS — F17.211 NICOTINE DEPENDENCE, CIGARETTES, IN REMISSION: ICD-10-CM

## 2023-03-28 DIAGNOSIS — J43.9 PULMONARY EMPHYSEMA, UNSPECIFIED EMPHYSEMA TYPE: ICD-10-CM

## 2023-03-28 DIAGNOSIS — J43.1 PANLOBULAR EMPHYSEMA: ICD-10-CM

## 2023-03-28 DIAGNOSIS — R09.02 EXERCISE HYPOXEMIA: Primary | ICD-10-CM

## 2023-03-28 PROCEDURE — 1159F PR MEDICATION LIST DOCUMENTED IN MEDICAL RECORD: ICD-10-PCS | Mod: CPTII,S$GLB,, | Performed by: INTERNAL MEDICINE

## 2023-03-28 PROCEDURE — 99214 PR OFFICE/OUTPT VISIT, EST, LEVL IV, 30-39 MIN: ICD-10-PCS | Mod: 25,S$GLB,, | Performed by: INTERNAL MEDICINE

## 2023-03-28 PROCEDURE — 71250 CT THORAX DX C-: CPT | Mod: 26,,, | Performed by: RADIOLOGY

## 2023-03-28 PROCEDURE — 1160F RVW MEDS BY RX/DR IN RCRD: CPT | Mod: CPTII,S$GLB,, | Performed by: INTERNAL MEDICINE

## 2023-03-28 PROCEDURE — 99999 PR PBB SHADOW E&M-EST. PATIENT-LVL II: ICD-10-PCS | Mod: PBBFAC,,,

## 2023-03-28 PROCEDURE — 3074F PR MOST RECENT SYSTOLIC BLOOD PRESSURE < 130 MM HG: ICD-10-PCS | Mod: CPTII,S$GLB,, | Performed by: INTERNAL MEDICINE

## 2023-03-28 PROCEDURE — 99999 PR PBB SHADOW E&M-EST. PATIENT-LVL II: CPT | Mod: PBBFAC,,,

## 2023-03-28 PROCEDURE — 1101F PT FALLS ASSESS-DOCD LE1/YR: CPT | Mod: CPTII,S$GLB,, | Performed by: INTERNAL MEDICINE

## 2023-03-28 PROCEDURE — 99999 PR PBB SHADOW E&M-EST. PATIENT-LVL IV: ICD-10-PCS | Mod: PBBFAC,,, | Performed by: INTERNAL MEDICINE

## 2023-03-28 PROCEDURE — 3074F SYST BP LT 130 MM HG: CPT | Mod: CPTII,S$GLB,, | Performed by: INTERNAL MEDICINE

## 2023-03-28 PROCEDURE — 71250 CT CHEST WITHOUT CONTRAST: ICD-10-PCS | Mod: 26,,, | Performed by: RADIOLOGY

## 2023-03-28 PROCEDURE — 1125F AMNT PAIN NOTED PAIN PRSNT: CPT | Mod: CPTII,S$GLB,, | Performed by: INTERNAL MEDICINE

## 2023-03-28 PROCEDURE — 3288F PR FALLS RISK ASSESSMENT DOCUMENTED: ICD-10-PCS | Mod: CPTII,S$GLB,, | Performed by: INTERNAL MEDICINE

## 2023-03-28 PROCEDURE — 71250 CT THORAX DX C-: CPT | Mod: TC

## 2023-03-28 PROCEDURE — 99214 OFFICE O/P EST MOD 30 MIN: CPT | Mod: 25,S$GLB,, | Performed by: INTERNAL MEDICINE

## 2023-03-28 PROCEDURE — 1125F PR PAIN SEVERITY QUANTIFIED, PAIN PRESENT: ICD-10-PCS | Mod: CPTII,S$GLB,, | Performed by: INTERNAL MEDICINE

## 2023-03-28 PROCEDURE — 94618 PULMONARY STRESS TESTING: CPT | Mod: S$GLB,,, | Performed by: INTERNAL MEDICINE

## 2023-03-28 PROCEDURE — 3288F FALL RISK ASSESSMENT DOCD: CPT | Mod: CPTII,S$GLB,, | Performed by: INTERNAL MEDICINE

## 2023-03-28 PROCEDURE — 3078F PR MOST RECENT DIASTOLIC BLOOD PRESSURE < 80 MM HG: ICD-10-PCS | Mod: CPTII,S$GLB,, | Performed by: INTERNAL MEDICINE

## 2023-03-28 PROCEDURE — 94618 PULMONARY STRESS TESTING: ICD-10-PCS | Mod: S$GLB,,, | Performed by: INTERNAL MEDICINE

## 2023-03-28 PROCEDURE — 1157F ADVNC CARE PLAN IN RCRD: CPT | Mod: CPTII,S$GLB,, | Performed by: INTERNAL MEDICINE

## 2023-03-28 PROCEDURE — 99999 PR PBB SHADOW E&M-EST. PATIENT-LVL IV: CPT | Mod: PBBFAC,,, | Performed by: INTERNAL MEDICINE

## 2023-03-28 PROCEDURE — 1157F PR ADVANCE CARE PLAN OR EQUIV PRESENT IN MEDICAL RECORD: ICD-10-PCS | Mod: CPTII,S$GLB,, | Performed by: INTERNAL MEDICINE

## 2023-03-28 PROCEDURE — 3078F DIAST BP <80 MM HG: CPT | Mod: CPTII,S$GLB,, | Performed by: INTERNAL MEDICINE

## 2023-03-28 PROCEDURE — 1159F MED LIST DOCD IN RCRD: CPT | Mod: CPTII,S$GLB,, | Performed by: INTERNAL MEDICINE

## 2023-03-28 PROCEDURE — 1160F PR REVIEW ALL MEDS BY PRESCRIBER/CLIN PHARMACIST DOCUMENTED: ICD-10-PCS | Mod: CPTII,S$GLB,, | Performed by: INTERNAL MEDICINE

## 2023-03-28 PROCEDURE — 1101F PR PT FALLS ASSESS DOC 0-1 FALLS W/OUT INJ PAST YR: ICD-10-PCS | Mod: CPTII,S$GLB,, | Performed by: INTERNAL MEDICINE

## 2023-03-28 RX ORDER — FLUTICASONE FUROATE, UMECLIDINIUM BROMIDE AND VILANTEROL TRIFENATATE 200; 62.5; 25 UG/1; UG/1; UG/1
1 POWDER RESPIRATORY (INHALATION) DAILY
Qty: 60 EACH | Refills: 11 | Status: SHIPPED | OUTPATIENT
Start: 2023-03-28 | End: 2024-03-19 | Stop reason: SDUPTHER

## 2023-03-28 RX ORDER — ALBUTEROL SULFATE 90 UG/1
2 AEROSOL, METERED RESPIRATORY (INHALATION) EVERY 4 HOURS PRN
Qty: 8.5 G | Refills: 11 | Status: SHIPPED | OUTPATIENT
Start: 2023-03-28 | End: 2024-03-19 | Stop reason: SDUPTHER

## 2023-03-28 NOTE — PROCEDURES
"[facility] - Pulmonary Function  Six Minute Walk     SUMMARY     Ordering Provider: Dr. Jackson   Interpreting Provider: Dr. Jackson  Performing nurse/tech/RT: NADJA Pritchard RRT  Diagnosis: Emphysema  Height: 5' 11" (180.3 cm)  Weight: 74.7 kg (164 lb 10.9 oz)  BMI (Calculated): 23   Patient Race:             Phase Oxygen Assessment Supplemental O2 Heart   Rate Blood Pressure [place] Dyspnea Scale Rating   Resting 95 % Room Air 75 bpm 109/56 1   Exercise        Minute        1 95 % Room Air 80 bpm     2 90 % Room Air 82 bpm     3 89 % Room Air 85 bpm     4 87 % Room Air (increased to 2L) 89 bpm     5 90 % 2 L/M 89 bpm     6  92 % 2 L/M 87 bpm 125/57 4   Recovery        Minute        1 98 % 2 L/M 95 bpm     2 100 % 2 L/M 94 bpm     3 100 % Room Air 92 bpm     4 100 % 2 L/M 90 bpm 135/62 1     Six Minute Walk Summary  6MWT Status: completed with stops  Patient Reported: Dyspnea     Interpretation:  Did the patient stop or pause?: Yes  How many times did the patient stop or pause?: 1  Stop Time 1: 160  Restart Time 1: 174  Pause Time 1: 14 seconds                             Total Time Walked (Calculated): 346 seconds  Final Partial Lap Distance (feet): 0 feet  Total Distance Meters (Calculated): 182.88 meters  Predicted Distance Meters (Calculated): 512.76 meters  Percentage of Predicted (Calculated): 35.67  Peak VO2 (Calculated): 9.47  Mets: 2.71  Has The Patient Had a Previous Six Minute Walk Test?: Yes       Previous 6MWT Results  Has The Patient Had a Previous Six Minute Walk Test?: Yes  Date of Previous Test: 09/28/22  Total Time Walked: 360 seconds  Total Distance (meters): 251.46  Predicted Distance (meters): 488.2 meters  Percentage of Predicted: 51.51  Percent Change (Calculated): 0.27      Interpretation:  Total distance walked in six minutes is severely reduced indicating an severe reduction in functional capacity.  There was significant severe oxygen desaturation to 87 % with exercise on room air " (oxygen saturation less than 89%). Patient was exercised with supplemental oxygen as noted above. Clinical correlation suggested.    Patient met criteria for oxygen prescription.  [] Mild exercise-induced hypoxemia described as an arterial oxygen saturation of 93-95% (or 3-4% less than at rest),  [] Moderate exercise-induced hypoxemia as 89-93%  [x] Severe exercise induced hypoxemia as < 89% O2 saturation.  Medicare Criteria for oxygen prescription comments:   When arterial oxygen saturation is at or below 88% during exercise on room air (severe exercise induced hypoxemia) then the patient falls under Medicare Group 1 criteria for supplemental oxygen prescription.  Details about Medicare Group Criteria coverage can be found at http://www.cms.[facility].gov/manuals/downloads/     Michael Jackson MD

## 2023-03-28 NOTE — PROGRESS NOTES
Subjective:     Patient ID: Richy Douglas is a 82 y.o. male.    Chief Complaint:      HPI    COPD  He presents for evaluation and treatment of COPD. The patient is not currently have symptoms / an exacerbation. The patient has COPD for approximately 11 - 12 years. Symptoms include cough with sputum production  have included dyspnea, cough, wheezing and fatigue, and typically last 2 weeks. Previous episodes have been exacerbated by any exercise. Current treatment includes albuterol inhaler, which generally provides some relief of symptoms.   He uses 1 pillows at night. Patient currently is not on home oxygen therapy.. The patient is having no constitutional symptoms, denying fever, chills, anorexia, or weight loss. The patient has been hospitalized for this condition before. He has a history of 68 pack years. The patient is experiencing exercise intolerance (difficulty climbing 1 flights of stairs).  Stopped smoking  Qualifies for oxygen    Lung Nodule  He presents for evaluation and treatment of a lung nodule. The patient reports that the imaging was performed to evaluate symptoms of dyspnea on exertion which have been present for 5 years and are unchanged. Symptoms are exacerbated by exercise and relieved by rest. Other symptoms include non productive cough. He has a history of 68 pack years. The patient has no known exposure to tuberculosis. The patient does not have a history of cancer.      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    Anemia of chronic disease     Aortic aneurysm     Atrial fibrillation with RVR 09/24/2021    Chronic systolic congestive heart failure 08/31/2017    Emphysema of lung 08/31/2017    GERD (gastroesophageal reflux disease)     Hypertension     Knee osteoarthritis 10/21/2022    Microcytic anemia 11/24/2020    Other hyperlipidemia 04/27/2021    Personal history of colonic polyps 2022    Prostate cancer      Past Surgical History:   Procedure  Laterality Date    CATHETERIZATION OF BOTH LEFT AND RIGHT HEART N/A 1/13/2022    Procedure: CATHETERIZATION, HEART, BOTH LEFT AND RIGHT;  Surgeon: Janneth Tovar MD;  Location: [facility] CATH LAB;  Service: Cardiology;  Laterality: N/A;  poss cx    COLONOSCOPY      COLONOSCOPY N/A 6/30/2022    Procedure: COLONOSCOPY;  Surgeon: Sandra Perez MD;  Location: [facility] ENDO;  Service: Endoscopy;  Laterality: N/A;    COLONOSCOPY N/A 7/1/2022    Procedure: COLONOSCOPY;  Surgeon: Woodrow Christian MD;  Location: [facility] ENDO;  Service: Endoscopy;  Laterality: N/A;    CORONARY ANGIOGRAPHY N/A 1/13/2022    Procedure: ANGIOGRAM, CORONARY ARTERY;  Surgeon: Janneth Tovar MD;  Location: [facility] CATH LAB;  Service: Cardiology;  Laterality: N/A;    ESOPHAGOGASTRODUODENOSCOPY N/A 6/30/2022    Procedure: EGD (ESOPHAGOGASTRODUODENOSCOPY);  Surgeon: Sandra Perez MD;  Location: [facility] ENDO;  Service: Endoscopy;  Laterality: N/A;    INJECTION OF JOINT Bilateral 10/21/2022    Procedure: Bilateral intraarticular knee injection with local ALLERGIC TO IODINE;  Surgeon: Devon Grant MD;  Location: [facility] PAIN MGT;  Service: Pain Management;  Laterality: Bilateral;    INJECTION OF JOINT Bilateral 1/31/2023    Procedure: Bilateral IA knee joint injection with steroid RN IV Sedation;  Surgeon: Devon Grant MD;  Location: [facility] PAIN MGT;  Service: Pain Management;  Laterality: Bilateral;    PROSTATE SURGERY      SPINE SURGERY       Review of patient's allergies indicates:   Allergen Reactions    Fish containing products     Iodine and iodide containing products     Shellfish containing products      Current Outpatient Medications on File Prior to Visit   Medication Sig Dispense Refill    albuterol (PROVENTIL) 2.5 mg /3 mL (0.083 %) nebulizer solution Take 3 mLs (2.5 mg total) by nebulization every 4 to 6 hours as needed for Wheezing or Shortness of Breath. 360 mL 11    ferrous sulfate 325 (65 FE) MG EC tablet Take 1 tablet (325 mg total) by  mouth once daily. 30 tablet 1    furosemide (LASIX) 40 MG tablet Take 1 tablet (40 mg total) by mouth once daily. Do not take if blood pressure is low, feeling dry/dizzy/lightheaded. 90 tablet 1    metoprolol succinate (TOPROL-XL) 25 MG 24 hr tablet Take 0.5 tablets (12.5 mg total) by mouth once daily. 30 tablet 6    potassium chloride SA (K-DUR,KLOR-CON M) 10 MEQ tablet Take 1 tablet (10 mEq total) by mouth once daily. 90 tablet 1    predniSONE (DELTASONE) 20 MG tablet Take 3 tablets (60mg) by mouth daily for 3 days THEN take 2 tablets (40mg) by mouth daily for 3 days THEN take 1 tablet (20mg) by mouth daily for 3 days THEN take 1/2 tablet (10mg) by mouth daily for 3 days 20 tablet 0    sacubitriL-valsartan (ENTRESTO)  mg per tablet Take 1 tablet by mouth 2 (two) times daily. 180 tablet 1     No current facility-administered medications on file prior to visit.     Social History     Socioeconomic History    Marital status:      Spouse name: jenn     Number of children: 6   Tobacco Use    Smoking status: Former     Packs/day: 1.00     Years: 68.00     Pack years: 68.00     Types: Cigarettes     Start date: 1954     Quit date: 3/1/2023     Years since quittin.0    Smokeless tobacco: Never   Substance and Sexual Activity    Alcohol use: Yes     Comment: reports only drinks red wine when games are on    Drug use: Never    Sexual activity: Yes     Partners: Female     Family History   Problem Relation Age of Onset    Diabetes Mother     Peripheral vascular disease Mother        Review of Systems   Constitutional:  Positive for fatigue. Negative for fever.   HENT:  Positive for postnasal drip, rhinorrhea and congestion.    Eyes:  Negative for redness and itching.   Respiratory:  Positive for cough, sputum production, shortness of breath, dyspnea on extertion, use of rescue inhaler and Paroxysmal Nocturnal Dyspnea.    Cardiovascular:  Negative for chest pain, palpitations and leg swelling.  "  Genitourinary:  Negative for difficulty urinating and hematuria.   Endocrine:  Negative for cold intolerance and heat intolerance.    Skin:  Negative for rash.   Gastrointestinal:  Negative for nausea and abdominal pain.   Neurological:  Negative for dizziness, syncope, weakness and light-headedness.   Hematological:  Negative for adenopathy. Does not bruise/bleed easily.   Psychiatric/Behavioral:  Negative for sleep disturbance. The patient is not nervous/anxious.      Objective:      BP (!) 108/56   Pulse 81   Resp 18   Ht 5' 11" (1.803 m)   Wt 74.7 kg (164 lb 10.9 oz)   SpO2 (!) 94%   BMI 22.97 kg/m²   Physical Exam  Vitals and nursing note reviewed.   Constitutional:       Appearance: He is well-developed.   HENT:      Head: Normocephalic and atraumatic.      Nose: Congestion and rhinorrhea present.   Eyes:      Conjunctiva/sclera: Conjunctivae normal.      Pupils: Pupils are equal, round, and reactive to light.   Neck:      Thyroid: No thyromegaly.      Vascular: No JVD.      Trachea: No tracheal deviation.   Cardiovascular:      Rate and Rhythm: Normal rate and regular rhythm.      Heart sounds: No murmur heard.  Pulmonary:      Breath sounds: Examination of the right-lower field reveals wheezing. Examination of the left-lower field reveals wheezing. Decreased breath sounds and wheezing present. No rhonchi or rales.   Abdominal:      General: Bowel sounds are normal.      Palpations: Abdomen is soft.   Musculoskeletal:         General: No tenderness. Normal range of motion.      Cervical back: Neck supple.   Lymphadenopathy:      Cervical: No cervical adenopathy.   Skin:     General: Skin is warm and dry.   Neurological:      Mental Status: He is alert and oriented to person, place, and time.     Personal Diagnostic Review  CT stable nodule    Pulmonary Studies Review 3/28/2023   SpO2 -   Ordering Provider Dr. Jackson   Interpreting Provider Dr. Jackson   Performing nurse/tech/RT C. Credeur, RRT "   Diagnosis Emphysema   Height 71   Weight 2634.94   BMI (Calculated) 23   Predicted Distance 288.89   Patient Race    6MWT Status completed with stops   Patient Reported Dyspnea   Was O2 used? Yes   Delivery Method Cannula;Pull Tank   6MW Distance walked (feet) 600   Distance walked (meters) 182.88   Did patient stop? Yes   How many times? 1   Stop Time 1 160   Restart Time 1 174   Did patient restart? Yes   Type of assistive device(s) used? no assistive devices   Is extra documentation required for this patient? Yes   Oxygen Saturation 95   Supplemental Oxygen Room Air   Heart Rate 75   Blood Pressure 109/56   [place] Dyspnea Rating  very light   Oxygen Saturation 92   Supplemental Oxygen 2 L/M   Heart Rate 87   Blood Pressure 125/57   [place] Dyspnea Rating  somewhat heavy   Recovery Time (seconds) 240   Oxygen Saturation 100   Supplemental Oxygen 2 L/M   Heart Rate 90   Blood Pressure 135/62   [place] Dyspnea Rating  very light   Is procedure ready for interpretation? Yes   Did the patient stop or pause? Yes   How many times did the patient stop or pause? 1   Stop Time 1 160   Restart Time 1 174   Pause Time 1 14   Total Time Walked (Calculated) 346   Total Laps Walked 3   Final Partial Lap Distance (feet) 0   Total Distance Feet (Calculated) 600   Total Distance Meters (Calculated) 182.88   Predicted Distance Meters (Calculated) 512.76   Percentage of Predicted (Calculated) 35.67   Peak VO2 (Calculated) 9.47   Mets 2.71   Has The Patient Had a Previous Six Minute Walk Test? Yes   Oxygen Qualification? Yes   Oxygen Saturation 95   Supplemental Oxygen Room Air   Heart Rate 75   Blood Pressure 109/56   [place] Dyspnea Rating  very light   Oxygen Saturation 87   Supplemental Oxygen Room Air   Heart Rate 89   Blood Pressure 125/57   [place] Dyspnea Rating  somewhat heavy   Recovery Time (seconds) 240   Oxygen Saturation 100   Supplemental Oxygen 2 L/M   Heart Rate 90   Blood Pressure 135/62   [place] Dyspnea Rating   very light       CT Chest Without Contrast  Narrative: EXAMINATION:  CT CHEST WITHOUT CONTRAST    CLINICAL HISTORY:  Solitary pulmonary noduleAbnormal xray - lung nodule, < 1 cm, mod-high risk;    COMPARISON:  CT chest September 8, 2022.  CT chest March 26, 2021.    TECHNIQUE:  Axial CT images were obtained of the CHEST.  Iterative reconstruction technique was used. The CT exam was performed using one or more of the following dose reduction techniques- Automated exposure control, adjustment of the mA and/or kV according to patient size, and/or use of iterative reconstructed technique.    FINDINGS:  There is a 5 mm pulmonary nodule in the left upper lobe (series 4, image 250).  Marked emphysematous changes with scarring and fibrosis.  No pneumothorax or significant pleural effusion.    Coronary artery calcifications are present.  Heart is enlarged.  Thoracic aorta is normal in size.  No mediastinal or axillary lymphadenopathy.    No acute or aggressive osseous lesion.    No acute finding in the upper abdomen.  Impression: 1. There is a 5 mm pulmonary nodule in the left upper lobe on a background of marked emphysematous changes with similar scarring and fibrosis when compared to September 2022.  Continued follow-up is recommended.  2. Cardiomegaly.  3. Other findings as above.    Electronically signed by: Reed Garcia  Date:    03/28/2023  Time:    09:54      Office Spirometry Results:     No flowsheet data found.  Pulmonary Studies Review 3/28/2023   SpO2 -   Ordering Provider Dr. Jackson   Interpreting Provider Dr. Jackson   Performing nurse/tech/RT C. Credeur, RRT   Diagnosis Emphysema   Height 71   Weight 2634.94   BMI (Calculated) 23   Predicted Distance 288.89   Patient Race    6MWT Status completed with stops   Patient Reported Dyspnea   Was O2 used? Yes   Delivery Method Cannula;Pull Tank   6MW Distance walked (feet) 600   Distance walked (meters) 182.88   Did patient stop? Yes   How many times?  1   Stop Time 1 160   Restart Time 1 174   Did patient restart? Yes   Type of assistive device(s) used? no assistive devices   Is extra documentation required for this patient? Yes   Oxygen Saturation 95   Supplemental Oxygen Room Air   Heart Rate 75   Blood Pressure 109/56   [place] Dyspnea Rating  very light   Oxygen Saturation 92   Supplemental Oxygen 2 L/M   Heart Rate 87   Blood Pressure 125/57   [place] Dyspnea Rating  somewhat heavy   Recovery Time (seconds) 240   Oxygen Saturation 100   Supplemental Oxygen 2 L/M   Heart Rate 90   Blood Pressure 135/62   [place] Dyspnea Rating  very light   Is procedure ready for interpretation? Yes   Did the patient stop or pause? Yes   How many times did the patient stop or pause? 1   Stop Time 1 160   Restart Time 1 174   Pause Time 1 14   Total Time Walked (Calculated) 346   Total Laps Walked 3   Final Partial Lap Distance (feet) 0   Total Distance Feet (Calculated) 600   Total Distance Meters (Calculated) 182.88   Predicted Distance Meters (Calculated) 512.76   Percentage of Predicted (Calculated) 35.67   Peak VO2 (Calculated) 9.47   Mets 2.71   Has The Patient Had a Previous Six Minute Walk Test? Yes   Oxygen Qualification? Yes   Oxygen Saturation 95   Supplemental Oxygen Room Air   Heart Rate 75   Blood Pressure 109/56   [place] Dyspnea Rating  very light   Oxygen Saturation 87   Supplemental Oxygen Room Air   Heart Rate 89   Blood Pressure 125/57   [place] Dyspnea Rating  somewhat heavy   Recovery Time (seconds) 240   Oxygen Saturation 100   Supplemental Oxygen 2 L/M   Heart Rate 90   Blood Pressure 135/62   [place] Dyspnea Rating  very light         Assessment:            Exercise hypoxemia  -     OXYGEN FOR HOME USE    Solitary pulmonary nodule  -     CT Chest Without Contrast; Future; Expected date: 03/28/2023    Panlobular emphysema  -     OXYGEN FOR HOME USE  -     albuterol (PROVENTIL/VENTOLIN HFA) 90 mcg/actuation inhaler; Inhale 2 puffs into the lungs every 4 (four) hours as  needed for Wheezing or Shortness of Breath.  Dispense: 8.5 g; Refill: 11  -     fluticasone-umeclidin-vilanter (TRELEGY ELLIPTA) 200-62.5-25 mcg inhaler; Inhale 1 puff into the lungs once daily.  Dispense: 60 each; Refill: 11  -     Spirometry with/without bronchodilator; Future; Expected date: 09/28/2023    Nicotine dependence, cigarettes, in remission  -     CT Chest Without Contrast; Future; Expected date: 03/28/2023          Outpatient Encounter Medications as of 3/28/2023   Medication Sig Dispense Refill    albuterol (PROVENTIL) 2.5 mg /3 mL (0.083 %) nebulizer solution Take 3 mLs (2.5 mg total) by nebulization every 4 to 6 hours as needed for Wheezing or Shortness of Breath. 360 mL 11    ferrous sulfate 325 (65 FE) MG EC tablet Take 1 tablet (325 mg total) by mouth once daily. 30 tablet 1    furosemide (LASIX) 40 MG tablet Take 1 tablet (40 mg total) by mouth once daily. Do not take if blood pressure is low, feeling dry/dizzy/lightheaded. 90 tablet 1    metoprolol succinate (TOPROL-XL) 25 MG 24 hr tablet Take 0.5 tablets (12.5 mg total) by mouth once daily. 30 tablet 6    potassium chloride SA (K-DUR,KLOR-CON M) 10 MEQ tablet Take 1 tablet (10 mEq total) by mouth once daily. 90 tablet 1    predniSONE (DELTASONE) 20 MG tablet Take 3 tablets (60mg) by mouth daily for 3 days THEN take 2 tablets (40mg) by mouth daily for 3 days THEN take 1 tablet (20mg) by mouth daily for 3 days THEN take 1/2 tablet (10mg) by mouth daily for 3 days 20 tablet 0    sacubitriL-valsartan (ENTRESTO)  mg per tablet Take 1 tablet by mouth 2 (two) times daily. 180 tablet 1    [DISCONTINUED] albuterol (PROVENTIL/VENTOLIN HFA) 90 mcg/actuation inhaler Inhale 2 puffs into the lungs every 4 (four) hours as needed for Wheezing or Shortness of Breath. 8.5 g 11    albuterol (PROVENTIL/VENTOLIN HFA) 90 mcg/actuation inhaler Inhale 2 puffs into the lungs every 4 (four) hours as needed for Wheezing or Shortness of Breath. 8.5 g 11     [] doxycycline (MONODOX) 100 MG capsule Take 1 capsule (100 mg total) by mouth every 12 (twelve) hours. for 21 days 42 capsule 0    fluticasone-umeclidin-vilanter (TRELEGY ELLIPTA) 200-62.5-25 mcg inhaler Inhale 1 puff into the lungs once daily. 60 each 11     No facility-administered encounter medications on file as of 3/28/2023.     Plan:       Requested Prescriptions     Signed Prescriptions Disp Refills    albuterol (PROVENTIL/VENTOLIN HFA) 90 mcg/actuation inhaler 8.5 g 11     Sig: Inhale 2 puffs into the lungs every 4 (four) hours as needed for Wheezing or Shortness of Breath.    fluticasone-umeclidin-vilanter (TRELEGY ELLIPTA) 200-62.5-25 mcg inhaler 60 each 11     Sig: Inhale 1 puff into the lungs once daily.     Problem List Items Addressed This Visit       Emphysema of lung    Relevant Medications    albuterol (PROVENTIL/VENTOLIN HFA) 90 mcg/actuation inhaler    fluticasone-umeclidin-vilanter (TRELEGY ELLIPTA) 200-62.5-25 mcg inhaler    Other Relevant Orders    OXYGEN FOR HOME USE    Spirometry with/without bronchodilator    Exercise hypoxemia - Primary    Relevant Orders    OXYGEN FOR HOME USE    Solitary pulmonary nodule    Relevant Orders    CT Chest Without Contrast     Other Visit Diagnoses       Nicotine dependence, cigarettes, in remission        Relevant Orders    CT Chest Without Contrast               Follow up in about 1 year (around 3/28/2024) for Review CT/PET - on return visit, Review jodie - on return.    MEDICAL DECISION MAKING: Moderate to high complexity.  Overall, the multiple problems listed are of moderate to high severity that may impact quality of life and activities of daily living. Side effects of medications, treatment plan as well as options and alternatives reviewed and discussed with patient. There was counseling of patient concerning these issues.    Total time spent in counseling and coordination of care - 30  minutes of total time spent on the encounter, which  includes face to face time and non-face to face time preparing to see the patient (eg, review of tests), Obtaining and/or reviewing separately obtained history, Documenting clinical information in the electronic or other health record, Independently interpreting results (not separately reported) and communicating results to the patient/family/caregiver, or Care coordination (not separately reported).    Time was used in discussion of prognosis, risks, benefits of treatment, instructions and compliance with regimen . Discussion with other physicians and/or health care providers - home health or for use of durable medical equipment (oxygen, nebulizers, CPAP, BiPAP) occurred.

## 2023-04-05 ENCOUNTER — TELEPHONE (OUTPATIENT)
Dept: PAIN MEDICINE | Facility: CLINIC | Age: 83
End: 2023-04-05
Payer: MEDICARE

## 2023-04-05 ENCOUNTER — LAB VISIT (OUTPATIENT)
Dept: LAB | Facility: HOSPITAL | Age: 83
End: 2023-04-05
Attending: PHYSICIAN ASSISTANT
Payer: MEDICARE

## 2023-04-05 ENCOUNTER — OFFICE VISIT (OUTPATIENT)
Dept: CARDIOLOGY | Facility: CLINIC | Age: 83
End: 2023-04-05
Payer: COMMERCIAL

## 2023-04-05 VITALS
DIASTOLIC BLOOD PRESSURE: 60 MMHG | HEART RATE: 88 BPM | WEIGHT: 162.06 LBS | SYSTOLIC BLOOD PRESSURE: 130 MMHG | HEIGHT: 71 IN | BODY MASS INDEX: 22.69 KG/M2

## 2023-04-05 DIAGNOSIS — I50.22 CHRONIC SYSTOLIC CONGESTIVE HEART FAILURE: Primary | ICD-10-CM

## 2023-04-05 DIAGNOSIS — I50.22 CHRONIC SYSTOLIC CONGESTIVE HEART FAILURE: ICD-10-CM

## 2023-04-05 DIAGNOSIS — G89.29 CHRONIC PAIN OF LEFT KNEE: ICD-10-CM

## 2023-04-05 DIAGNOSIS — D50.0 IRON DEFICIENCY ANEMIA DUE TO CHRONIC BLOOD LOSS: ICD-10-CM

## 2023-04-05 DIAGNOSIS — M25.562 CHRONIC PAIN OF LEFT KNEE: ICD-10-CM

## 2023-04-05 LAB
ANION GAP SERPL CALC-SCNC: 10 MMOL/L (ref 8–16)
BUN SERPL-MCNC: 17 MG/DL (ref 8–23)
CALCIUM SERPL-MCNC: 9.2 MG/DL (ref 8.7–10.5)
CHLORIDE SERPL-SCNC: 105 MMOL/L (ref 95–110)
CO2 SERPL-SCNC: 24 MMOL/L (ref 23–29)
CREAT SERPL-MCNC: 0.7 MG/DL (ref 0.5–1.4)
EST. GFR  (NO RACE VARIABLE): >60 ML/MIN/1.73 M^2
GLUCOSE SERPL-MCNC: 82 MG/DL (ref 70–110)
POTASSIUM SERPL-SCNC: 4.7 MMOL/L (ref 3.5–5.1)
SODIUM SERPL-SCNC: 139 MMOL/L (ref 136–145)

## 2023-04-05 PROCEDURE — 3078F PR MOST RECENT DIASTOLIC BLOOD PRESSURE < 80 MM HG: ICD-10-PCS | Mod: CPTII,S$GLB,, | Performed by: PHYSICIAN ASSISTANT

## 2023-04-05 PROCEDURE — 1160F RVW MEDS BY RX/DR IN RCRD: CPT | Mod: CPTII,S$GLB,, | Performed by: PHYSICIAN ASSISTANT

## 2023-04-05 PROCEDURE — 1159F PR MEDICATION LIST DOCUMENTED IN MEDICAL RECORD: ICD-10-PCS | Mod: CPTII,S$GLB,, | Performed by: PHYSICIAN ASSISTANT

## 2023-04-05 PROCEDURE — 99999 PR PBB SHADOW E&M-EST. PATIENT-LVL IV: CPT | Mod: PBBFAC,,, | Performed by: PHYSICIAN ASSISTANT

## 2023-04-05 PROCEDURE — 3075F SYST BP GE 130 - 139MM HG: CPT | Mod: CPTII,S$GLB,, | Performed by: PHYSICIAN ASSISTANT

## 2023-04-05 PROCEDURE — 1126F PR PAIN SEVERITY QUANTIFIED, NO PAIN PRESENT: ICD-10-PCS | Mod: CPTII,S$GLB,, | Performed by: PHYSICIAN ASSISTANT

## 2023-04-05 PROCEDURE — 1157F PR ADVANCE CARE PLAN OR EQUIV PRESENT IN MEDICAL RECORD: ICD-10-PCS | Mod: CPTII,S$GLB,, | Performed by: PHYSICIAN ASSISTANT

## 2023-04-05 PROCEDURE — 99213 PR OFFICE/OUTPT VISIT, EST, LEVL III, 20-29 MIN: ICD-10-PCS | Mod: S$GLB,,, | Performed by: PHYSICIAN ASSISTANT

## 2023-04-05 PROCEDURE — 1160F PR REVIEW ALL MEDS BY PRESCRIBER/CLIN PHARMACIST DOCUMENTED: ICD-10-PCS | Mod: CPTII,S$GLB,, | Performed by: PHYSICIAN ASSISTANT

## 2023-04-05 PROCEDURE — 99213 OFFICE O/P EST LOW 20 MIN: CPT | Mod: S$GLB,,, | Performed by: PHYSICIAN ASSISTANT

## 2023-04-05 PROCEDURE — 80048 BASIC METABOLIC PNL TOTAL CA: CPT | Performed by: PHYSICIAN ASSISTANT

## 2023-04-05 PROCEDURE — 1126F AMNT PAIN NOTED NONE PRSNT: CPT | Mod: CPTII,S$GLB,, | Performed by: PHYSICIAN ASSISTANT

## 2023-04-05 PROCEDURE — 3075F PR MOST RECENT SYSTOLIC BLOOD PRESS GE 130-139MM HG: ICD-10-PCS | Mod: CPTII,S$GLB,, | Performed by: PHYSICIAN ASSISTANT

## 2023-04-05 PROCEDURE — 36415 COLL VENOUS BLD VENIPUNCTURE: CPT | Performed by: PHYSICIAN ASSISTANT

## 2023-04-05 PROCEDURE — 83880 ASSAY OF NATRIURETIC PEPTIDE: CPT | Performed by: PHYSICIAN ASSISTANT

## 2023-04-05 PROCEDURE — 1157F ADVNC CARE PLAN IN RCRD: CPT | Mod: CPTII,S$GLB,, | Performed by: PHYSICIAN ASSISTANT

## 2023-04-05 PROCEDURE — 99999 PR PBB SHADOW E&M-EST. PATIENT-LVL IV: ICD-10-PCS | Mod: PBBFAC,,, | Performed by: PHYSICIAN ASSISTANT

## 2023-04-05 PROCEDURE — 1159F MED LIST DOCD IN RCRD: CPT | Mod: CPTII,S$GLB,, | Performed by: PHYSICIAN ASSISTANT

## 2023-04-05 PROCEDURE — 3078F DIAST BP <80 MM HG: CPT | Mod: CPTII,S$GLB,, | Performed by: PHYSICIAN ASSISTANT

## 2023-04-05 NOTE — PROGRESS NOTES
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 Since has had been wotking with some SOB. Has more pain in left knee, has had injections before which seemed to help. 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          PHYSICAL: There were no vitals filed for this visit.   Wt Readings from Last 3 Encounters:   03/28/23 74.7 kg (164 lb 10.9 oz)   03/28/23 74.7 kg (164 lb 10.9 oz)   03/01/23 70.8 kg (156 lb 1.4 oz)       JVD: no   Heart rhythm: regular  Cardiac murmur: No    S3: no  S4: no  Lungs: clear  Liver span: 10 cm:   Hepatojugular reflux: no  Edema: no      ASSESSMENT: acute on chronic systolic HF    PLAN:      Patient Instructions:   Instruct the patient to notify this clinic if HH, a physician or an advanced care provider wants to change medication one of their HF medications   Activity and Diet restrictions:   Recommend 2-3 gram sodium restriction and 1500cc- 2000cc fluid restriction.  Encourage physical activity with graded exercise program.  Requested patient to weigh themselves daily, and to notify us if their weight increases by more than 3 lbs in 1 day or 5 lbs in 3 days.    Assigned dry weight on home scale: 70 kg  Medication changes (include current dose and changed dose)  Start jardiance 10 mg daily  Increase lasix to 80 QPM (after work) for three days then decrease back to 40 QPM  Continue MONTY Arenas  Labs today  RTC 1 month

## 2023-04-06 ENCOUNTER — TELEPHONE (OUTPATIENT)
Dept: PAIN MEDICINE | Facility: CLINIC | Age: 83
End: 2023-04-06
Payer: MEDICARE

## 2023-04-06 LAB — BNP SERPL-MCNC: 2728 PG/ML (ref 0–99)

## 2023-04-14 ENCOUNTER — LAB VISIT (OUTPATIENT)
Dept: LAB | Facility: HOSPITAL | Age: 83
End: 2023-04-14
Attending: NURSE PRACTITIONER
Payer: MEDICARE

## 2023-04-14 ENCOUNTER — OFFICE VISIT (OUTPATIENT)
Dept: NEUROLOGY | Facility: CLINIC | Age: 83
End: 2023-04-14
Payer: COMMERCIAL

## 2023-04-14 ENCOUNTER — PATIENT MESSAGE (OUTPATIENT)
Dept: PULMONOLOGY | Facility: CLINIC | Age: 83
End: 2023-04-14
Payer: MEDICARE

## 2023-04-14 VITALS
SYSTOLIC BLOOD PRESSURE: 127 MMHG | DIASTOLIC BLOOD PRESSURE: 61 MMHG | HEIGHT: 71 IN | HEART RATE: 79 BPM | BODY MASS INDEX: 22.6 KG/M2

## 2023-04-14 DIAGNOSIS — R41.3 MEMORY LOSS: ICD-10-CM

## 2023-04-14 DIAGNOSIS — I10 PRIMARY HYPERTENSION: ICD-10-CM

## 2023-04-14 DIAGNOSIS — J43.9 PULMONARY EMPHYSEMA, UNSPECIFIED EMPHYSEMA TYPE: ICD-10-CM

## 2023-04-14 DIAGNOSIS — R41.3 OTHER AMNESIA: ICD-10-CM

## 2023-04-14 DIAGNOSIS — D63.8 ANEMIA OF CHRONIC DISEASE: ICD-10-CM

## 2023-04-14 DIAGNOSIS — M17.0 PRIMARY OSTEOARTHRITIS OF BOTH KNEES: ICD-10-CM

## 2023-04-14 DIAGNOSIS — G62.1 ALCOHOLIC PERIPHERAL NEUROPATHY: ICD-10-CM

## 2023-04-14 DIAGNOSIS — F10.230 ALCOHOL DEPENDENCE WITH UNCOMPLICATED WITHDRAWAL: ICD-10-CM

## 2023-04-14 DIAGNOSIS — F02.80 MAJOR NEUROCOGNITIVE DISORDER DUE TO MULTIPLE ETIOLOGIES: Primary | ICD-10-CM

## 2023-04-14 DIAGNOSIS — M17.0 OSTEOARTHRITIS OF BOTH KNEES, UNSPECIFIED OSTEOARTHRITIS TYPE: ICD-10-CM

## 2023-04-14 DIAGNOSIS — F02.80 MAJOR NEUROCOGNITIVE DISORDER DUE TO MULTIPLE ETIOLOGIES: ICD-10-CM

## 2023-04-14 DIAGNOSIS — I50.22 CHRONIC SYSTOLIC CONGESTIVE HEART FAILURE: ICD-10-CM

## 2023-04-14 LAB
FOLATE SERPL-MCNC: 12.6 NG/ML (ref 4–24)
HCYS SERPL-SCNC: 10.9 UMOL/L (ref 4–16.5)
HIV 1+2 AB+HIV1 P24 AG SERPL QL IA: NORMAL
TSH SERPL DL<=0.005 MIU/L-ACNC: 1.05 UIU/ML (ref 0.4–4)
VIT B12 SERPL-MCNC: 1011 PG/ML (ref 210–950)

## 2023-04-14 PROCEDURE — 99215 OFFICE O/P EST HI 40 MIN: CPT | Mod: S$GLB,,, | Performed by: NURSE PRACTITIONER

## 2023-04-14 PROCEDURE — 99999 PR PBB SHADOW E&M-EST. PATIENT-LVL V: ICD-10-PCS | Mod: PBBFAC,,, | Performed by: NURSE PRACTITIONER

## 2023-04-14 PROCEDURE — 87389 HIV-1 AG W/HIV-1&-2 AB AG IA: CPT | Performed by: NURSE PRACTITIONER

## 2023-04-14 PROCEDURE — 99215 PR OFFICE/OUTPT VISIT, EST, LEVL V, 40-54 MIN: ICD-10-PCS | Mod: S$GLB,,, | Performed by: NURSE PRACTITIONER

## 2023-04-14 PROCEDURE — 84425 ASSAY OF VITAMIN B-1: CPT | Performed by: NURSE PRACTITIONER

## 2023-04-14 PROCEDURE — 86592 SYPHILIS TEST NON-TREP QUAL: CPT | Performed by: NURSE PRACTITIONER

## 2023-04-14 PROCEDURE — 99417 PROLNG OP E/M EACH 15 MIN: CPT | Mod: S$GLB,,, | Performed by: NURSE PRACTITIONER

## 2023-04-14 PROCEDURE — 99417 PR PROLONGED SVC, OUTPT, W/WO DIRECT PT CONTACT,  EA ADDTL 15 MIN: ICD-10-PCS | Mod: S$GLB,,, | Performed by: NURSE PRACTITIONER

## 2023-04-14 PROCEDURE — 83090 ASSAY OF HOMOCYSTEINE: CPT | Performed by: NURSE PRACTITIONER

## 2023-04-14 PROCEDURE — 82746 ASSAY OF FOLIC ACID SERUM: CPT | Performed by: NURSE PRACTITIONER

## 2023-04-14 PROCEDURE — 99999 PR PBB SHADOW E&M-EST. PATIENT-LVL V: CPT | Mod: PBBFAC,,, | Performed by: NURSE PRACTITIONER

## 2023-04-14 PROCEDURE — 84443 ASSAY THYROID STIM HORMONE: CPT | Performed by: NURSE PRACTITIONER

## 2023-04-14 PROCEDURE — 82607 VITAMIN B-12: CPT | Performed by: NURSE PRACTITIONER

## 2023-04-14 PROCEDURE — 36415 COLL VENOUS BLD VENIPUNCTURE: CPT | Performed by: NURSE PRACTITIONER

## 2023-04-14 PROCEDURE — 99215 OFFICE O/P EST HI 40 MIN: CPT | Mod: PBBFAC | Performed by: NURSE PRACTITIONER

## 2023-04-14 RX ORDER — MEMANTINE HYDROCHLORIDE 5 MG/1
5 TABLET ORAL 2 TIMES DAILY
Qty: 60 TABLET | Refills: 11 | Status: SHIPPED | OUTPATIENT
Start: 2023-04-14 | End: 2024-01-03 | Stop reason: SDUPTHER

## 2023-04-14 NOTE — PROGRESS NOTES
Subjective:       Patient ID: Richy Douglas is a 82 y.o. male.    Chief Complaint: Memory issues           HPI The patient is new to me, patient is here for memory loss evaluation. The patient is accompanied by daughter/caretaker. The patient is presenting with memory changes that began in January 2023. The main problems the patient has are related to progressive short term memory loss, forgetfulness and behavioral changes. The patient began getting lost while driving, he would set out to got to one location and end up in a different but familiar location. The patient family reports him driving and having 3 occurrences of  him getting lost and having to be directed home. The patient would repeat the same question over and over again. 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 He just works on the line  jugs.  The patient excessively forgets where placed certain things at home. He often accuses people of taking things he can't find, he is very  argumentative. Patient is easily agitated. He hollers often. He finds comfort in working and is fearful of having to stop working.  The patient is not forgetting names. He lives at home with spouse. No confusion around and inside the house. Daughter states he has  trouble remembering the date and time. Daughter helps with managing  medications and appointments. Patient is unaware major holidays. He is aware of Political changes. The patient wife handling finances and prepare meals. The patient is still independent with ADLs. No hallucinations or delusions. No seizures. + Behavioral problems. No language problems. No problems handling tools. No history of strokes. No history of headaches. No history of hypothyroidism. Chronic  alcoholism drinks Alberto John Whiskey daily (2 shots per day) drank over 40 years, /smokes cigar daily use.  No history of B12  deficiency.  Alcoholic peripheral Neuropathy, Afib with RVR, Aortic aneurysm, HX of Prostate CA, Prostate surgery, Chronic back and knee pain, History of depression. No history of STDs.  No history of HIV infection. No toxic exposures.  No history of traumatic brain injury. Abnormal limping gait and  instability. No recent falls. No urinary incontinence. No change in sleep and appetite. No family history of dementia.         Review of Systems   Unable to perform ROS: Dementia   Constitutional: Negative.    HENT: Negative.     Eyes: Negative.    Cardiovascular: Negative.    Gastrointestinal: Negative.    Endocrine: Negative.    Musculoskeletal:  Positive for arthralgias, gait problem and joint swelling.   Skin: Negative.    Allergic/Immunologic: Negative.    Hematological: Negative.    Psychiatric/Behavioral: Negative.                 Current Outpatient Medications:     albuterol (PROVENTIL) 2.5 mg /3 mL (0.083 %) nebulizer solution, Take 3 mLs (2.5 mg total) by nebulization every 4 to 6 hours as needed for Wheezing or Shortness of Breath., Disp: 360 mL, Rfl: 11    albuterol (PROVENTIL/VENTOLIN HFA) 90 mcg/actuation inhaler, Inhale 2 puffs into the lungs every 4 (four) hours as needed for Wheezing or Shortness of Breath., Disp: 8.5 g, Rfl: 11    empagliflozin (JARDIANCE) 10 mg tablet, Take 1 tablet (10 mg total) by mouth once daily., Disp: 30 tablet, Rfl: 1    ferrous sulfate 325 (65 FE) MG EC tablet, Take 1 tablet (325 mg total) by mouth once daily., Disp: 30 tablet, Rfl: 1    fluticasone-umeclidin-vilanter (TRELEGY ELLIPTA) 200-62.5-25 mcg inhaler, Inhale 1 puff into the lungs once daily., Disp: 60 each, Rfl: 11    furosemide (LASIX) 40 MG tablet, Take 1 tablet (40 mg total) by mouth once daily. Do not take if blood pressure is low, feeling dry/dizzy/lightheaded., Disp: 90 tablet, Rfl: 1    metoprolol succinate (TOPROL-XL) 25 MG 24 hr tablet, Take 0.5 tablets (12.5 mg total) by mouth once daily., Disp: 30 tablet,  Rfl: 6    potassium chloride SA (K-DUR,KLOR-CON M) 10 MEQ tablet, Take 1 tablet (10 mEq total) by mouth once daily., Disp: 90 tablet, Rfl: 1    predniSONE (DELTASONE) 20 MG tablet, Take 3 tablets (60mg) by mouth daily for 3 days THEN take 2 tablets (40mg) by mouth daily for 3 days THEN take 1 tablet (20mg) by mouth daily for 3 days THEN take 1/2 tablet (10mg) by mouth daily for 3 days, Disp: 20 tablet, Rfl: 0    sacubitriL-valsartan (ENTRESTO)  mg per tablet, Take 1 tablet by mouth 2 (two) times daily., Disp: 180 tablet, Rfl: 1    memantine (NAMENDA) 5 MG Tab, Take 1 tablet (5 mg total) by mouth 2 (two) times daily., Disp: 60 tablet, Rfl: 11  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    Anemia of chronic disease     Aortic aneurysm     Atrial fibrillation with RVR 09/24/2021    Chronic systolic congestive heart failure 08/31/2017    Emphysema of lung 08/31/2017    GERD (gastroesophageal reflux disease)     Hypertension     Knee osteoarthritis 10/21/2022    Major neurocognitive disorder due to multiple etiologies 4/14/2023    Microcytic anemia 11/24/2020    Other hyperlipidemia 04/27/2021    Personal history of colonic polyps 2022    Prostate cancer      Past Surgical History:   Procedure Laterality Date    CATHETERIZATION OF BOTH LEFT AND RIGHT HEART N/A 1/13/2022    Procedure: CATHETERIZATION, HEART, BOTH LEFT AND RIGHT;  Surgeon: Janneth Tovar MD;  Location: [facility] CATH LAB;  Service: Cardiology;  Laterality: N/A;  poss cx    COLONOSCOPY      COLONOSCOPY N/A 6/30/2022    Procedure: COLONOSCOPY;  Surgeon: Sandra Perez MD;  Location: [facility] ENDO;  Service: Endoscopy;  Laterality: N/A;    COLONOSCOPY N/A 7/1/2022    Procedure: COLONOSCOPY;  Surgeon: Woodrow Christian MD;  Location: [facility] ENDO;  Service: Endoscopy;  Laterality: N/A;    CORONARY ANGIOGRAPHY N/A 1/13/2022    Procedure: ANGIOGRAM, CORONARY ARTERY;  Surgeon: Janneth Tovar MD;  Location: [facility] CATH  LAB;  Service: Cardiology;  Laterality: N/A;    ESOPHAGOGASTRODUODENOSCOPY N/A 2022    Procedure: EGD (ESOPHAGOGASTRODUODENOSCOPY);  Surgeon: Sandra Perez MD;  Location: [facility];  Service: Endoscopy;  Laterality: N/A;    INJECTION OF JOINT Bilateral 10/21/2022    Procedure: Bilateral intraarticular knee injection with local ALLERGIC TO IODINE;  Surgeon: Devon Grant MD;  Location: [facility] PAIN MGT;  Service: Pain Management;  Laterality: Bilateral;    INJECTION OF JOINT Bilateral 2023    Procedure: Bilateral IA knee joint injection with steroid RN IV Sedation;  Surgeon: Devon Grant MD;  Location: [facility] PAIN MGT;  Service: Pain Management;  Laterality: Bilateral;    PROSTATE SURGERY      SPINE SURGERY       Social History     Socioeconomic History    Marital status:      Spouse name: jenn     Number of children: 6   Tobacco Use    Smoking status: Former     Packs/day: 1.00     Years: 68.00     Pack years: 68.00     Types: Cigarettes     Start date: 1954     Quit date: 3/1/2023     Years since quittin.1    Smokeless tobacco: Never   Substance and Sexual Activity    Alcohol use: Yes     Comment: reports only drinks red wine when games are on    Drug use: Never    Sexual activity: Yes     Partners: Female             Past/Current Medical/Surgical History, Past/Current Social History, Past/Current Family History and Past/Current Medications were reviewed in detail.        Objective:           VITAL SIGNS WERE REVIEWED      GENERAL APPEARANCE:     The patient looks comfortable, cooperative, labile.    BMI 22.60 KG     No signs of respiratory distress.    Normal breathing pattern.    No dysmorphic features    Normal eye contact.     GENERAL MEDICAL EXAM:    HEENT:  Head is atraumatic normocephalic.     No tender temporal arteries. Fundoscopic  exam showed no disc edema.      Neck and Axillae: No JVD. No visible lesions.    No carotid bruits. No thyromegaly. No  lymphadenopathy.    Cardiopulmonary: No cyanosis. No tachypnea. Normal respiratory effort.    Clear breath sounds.     Gastrointestinal/Urogenital:  No jaundice. No stomas or lesions. No visible hernias. No catheters.     Abdomen is soft non-tender. No masses or organomegaly.    Skin, Hair and Nails: No pathognonomic skin rash. No neurofibromatosis. No visible lesions.No stigmata of autoimmune disease. No clubbing.    Skin is warm and moist. No palpable masses.    Limbs: No varicose veins. Lower extremity visible swelling.    No palpable edema. Pulses are symmetric. Pedal pulses are palpable.      Muskoskeletal: + visible deformities. + visible lesions.    No spine tenderness. + signs of longstanding neuropathy. No dislocations or fractures.            Neurologic Exam     Mental Status   Oriented to person, place, and time.   Follows 3 step commands.   Attention: decreased. Concentration: decreased.   Speech: speech is normal and not slurred   Level of consciousness: alert  Knowledge: inconsistent with education and poor. Unable to perform simple calculations.   Able to name object. Able to read. Able to repeat. Able to write. Abnormal comprehension.     Cranial Nerves     CN II   Visual fields full to confrontation.   Visual acuity: normal  Right visual field deficit: none  Left visual field deficit: none     CN III, IV, VI   Pupils are equal, round, and reactive to light.  Extraocular motions are normal.   Right pupil: Size: 2 mm. Shape: regular. Reactivity: brisk. Consensual response: intact. Accommodation: intact.   Left pupil: Size: 2 mm. Shape: regular. Reactivity: brisk. Consensual response: intact. Accommodation: intact.   CN III: no CN III palsy  CN VI: no CN VI palsy  Nystagmus: none   Diplopia: none  Ophthalmoparesis: none  Upgaze: normal  Downgaze: normal  Conjugate gaze: present  Vestibulo-ocular reflex: present    CN V   Facial sensation intact.   Right facial sensation deficit: none  Left facial  sensation deficit: none    CN VII   Right facial weakness: none  Left facial weakness: none    CN VIII   CN VIII normal.   Hearing: intact  Right Rinne: AC > BC  Left Rinne: AC > BC  Martinez: does not lateralize     CN IX, X   CN IX normal.   CN X normal.   Palate: symmetric    CN XI   CN XI normal.   Right sternocleidomastoid strength: normal  Left sternocleidomastoid strength: normal  Right trapezius strength: normal  Left trapezius strength: normal    CN XII   CN XII normal.   Tongue: not atrophic  Fasciculations: absent  Tongue deviation: none    Motor Exam   Muscle bulk: normal  Overall muscle tone: normal  Right arm tone: normal  Left arm tone: normal  Right arm pronator drift: absent  Left arm pronator drift: absent  Right leg tone: normal  Left leg tone: normal    Strength   Strength 5/5 throughout.   Right neck flexion: 5/5  Left neck flexion: 5/5  Right neck extension: 5/5  Left neck extension: 5/5  Right deltoid: 5/5  Left deltoid: 5/5  Right biceps: 5/5  Left biceps: 5/5  Right triceps: 5/5  Left triceps: 5/5  Right wrist flexion: 5/5  Left wrist flexion: 5/5  Right wrist extension: 5/5  Left wrist extension: 5/5  Right interossei: 5/5  Left interossei: 5/5  Right iliopsoas: 4/5  Left iliopsoas: 5/5  Right quadriceps: 4/5  Left quadriceps: 5/5  Right hamstrin/5  Left hamstrin/5  Right glutei: 4/5  Left glutei: 5/5  Right anterior tibial: 4/5  Left anterior tibial: 5/5  Right posterior tibial: 4/5  Left posterior tibial: 5/5  Right peroneal: 4/5  Left peroneal: 5/5  Right gastroc: 4/5  Left gastroc: 5/5    Sensory Exam   Right arm light touch: decreased from fingers  Left arm light touch: decreased from fingers  Right leg light touch: decreased from ankle  Left leg light touch: decreased from ankle  Right arm vibration: normal  Left arm vibration: normal  Right leg vibration: decreased from toes  Left leg vibration: decreased from toes  Right arm proprioception: normal  Left arm proprioception:  normal  Right leg proprioception: decreased from ankle  Left leg proprioception: decreased from ankle  Right arm pinprick: normal  Left arm pinprick: normal  Right leg pinprick: decreased from ankle  Left leg pinprick: decreased from ankle  Sensory deficit distribution on left: lateral cutaneous thigh  Graphesthesia: abnormal left  Stereognosis: abnormal left    Gait, Coordination, and Reflexes     Gait  Gait: wide-based (Limping gait, right knee instablity)    Coordination   Romberg: negative  Finger to nose coordination: normal  Heel to shin coordination: normal  Tandem walking coordination: normal    Tremor   Resting tremor: absent  Intention tremor: absent  Action tremor: absent    Reflexes   Right brachioradialis: 2+  Left brachioradialis: 2+  Right biceps: 2+  Left biceps: 2+  Right triceps: 2+  Left triceps: 2+  Right patellar: 1+  Left patellar: 1+  Right achilles: 0  Left achilles: 0  Right plantar: normal  Left plantar: normal  Right Gamble: absent  Left Gamble: absent  Right ankle clonus: absent  Left ankle clonus: absent  Right pendular knee jerk: absent  Left pendular knee jerk: absent          JG COGNITIVE ASSESSMENT (MOCA) TOTAL SCORE     MILD DEMENTIA 20-25    Middle school Education     DATE 04-       TOTAL SCORE 20/30       VISUOSPATIAL EXECUTIVE (5) 2       NAMING (3) 3       ATTENTION (6) 2       LANGUAGE (3) 2       ABSTRACTION(2) 2       RECALL (5) 3       ORIENTATION (6) 6           Lab Results   Component Value Date    WBC 12.27 02/14/2023    HGB 8.5 (L) 02/14/2023    HCT 28.1 (L) 02/14/2023    MCV 68 (L) 02/14/2023     02/14/2023     Sodium   Date Value Ref Range Status   04/05/2023 139 136 - 145 mmol/L Final     Potassium   Date Value Ref Range Status   04/05/2023 4.7 3.5 - 5.1 mmol/L Final     Chloride   Date Value Ref Range Status   04/05/2023 105 95 - 110 mmol/L Final     CO2   Date Value Ref Range Status   04/05/2023 24 23 - 29 mmol/L Final     Glucose   Date Value  Ref Range Status   04/05/2023 82 70 - 110 mg/dL Final     BUN   Date Value Ref Range Status   04/05/2023 17 8 - 23 mg/dL Final     Creatinine   Date Value Ref Range Status   04/05/2023 0.7 0.5 - 1.4 mg/dL Final     Calcium   Date Value Ref Range Status   04/05/2023 9.2 8.7 - 10.5 mg/dL Final     Total Protein   Date Value Ref Range Status   02/14/2023 6.2 6.0 - 8.4 g/dL Final     Albumin   Date Value Ref Range Status   02/14/2023 2.9 (L) 3.5 - 5.2 g/dL Final     Total Bilirubin   Date Value Ref Range Status   02/14/2023 0.6 0.1 - 1.0 mg/dL Final     Comment:     For infants and newborns, interpretation of results should be based  on gestational age, weight and in agreement with clinical  observations.    Premature Infant recommended reference ranges:  Up to 24 hours.............<8.0 mg/dL  Up to 48 hours............<12.0 mg/dL  3-5 days..................<15.0 mg/dL  6-29 days.................<15.0 mg/dL       Alkaline Phosphatase   Date Value Ref Range Status   02/14/2023 53 (L) 55 - 135 U/L Final     AST   Date Value Ref Range Status   02/14/2023 17 10 - 40 U/L Final     ALT   Date Value Ref Range Status   02/14/2023 17 10 - 44 U/L Final     Anion Gap   Date Value Ref Range Status   04/05/2023 10 8 - 16 mmol/L Final     eGFR if    Date Value Ref Range Status   06/24/2022 >60 >60 mL/min/1.73 m^2 Final     eGFR if non    Date Value Ref Range Status   06/24/2022 >60 >60 mL/min/1.73 m^2 Final     Comment:     Calculation used to obtain the estimated glomerular filtration  rate (eGFR) is the CKD-EPI equation.        Lab Results   Component Value Date    [id number] 1011 (H) 04/14/2023     Lab Results   Component Value Date    TSH 1.046 04/14/2023   Labs Reviewed:     04-    Thiamine 46 NL, RPR neg, Vitamin B12 1011^, HIV neg, FA 12.6 NL, HC 10.9 NL, TSH 1.046 NL    MRI Brain WO:     05-     No acute intracranial abnormality.     Mild-to-moderate global cortical atrophy  without lobar predominance.     Nonspecific white matter changes likely related to chronic microvascular ischemia, Fazekas score 3.     Mild bilateral paranasal sinus disease.     Findings consistent with Dementia          Reviewed the neuroimaging independently       Assessment:       1. Major neurocognitive disorder due to multiple etiologies    2. Chronic systolic congestive heart failure    3. Alcohol dependence with uncomplicated withdrawal    4. Primary hypertension    5. Memory loss    6. Alcoholic peripheral neuropathy    7. Anemia of chronic disease    8. Pulmonary emphysema, unspecified emphysema type    9. Other amnesia    10. Osteoarthritis of both knees, unspecified osteoarthritis type    11. Primary osteoarthritis of both knees        Plan:          MAJOR NEUROCOGNITIVE DISORDER DUE MULTIPLE ETIOLOGIES/FORGETFULNESS/ALCOHOL DEPENDENCE WITH UNCOMPLICATED WITHDRAWAL/ UNSTEADY GAIT/CHRONIC KNEE PAIN/AFIB WITH RVR/ HX PROSTATE CANCER        EVALUATION     Brain MRI to evaluate the frontal and temporal lobes.    T4, FA,HC, B12, MMA, RPR to evaluate for treatable causes of memory loss.    Comprehensive Neuropsychological Testing.    DriveAble to assess the cognitive aspects of driving.     Explained to the patient and family that medications are intended to slow down the progression and not stop it or reverse the disease.The disease is relentless, progressive and so far cannot be controlled.     I counseled the patient and family that the rate of progression is extremely variable and the average (10 years) is an inaccurate measure.     Will start memantine/Namenda and titrate slowly to 10 mg BID. The side effects include dizziness, seizures and nightmares and discussed with patient and family.    Will avoid Donepezil/Aricept due to cardiac history       SYMPTOMATIC MANAGEMENT         HOME CARE     Falling Down Precautions. Gait is affected by the disease as well.     Avoid driving and access to firearms      Incremental 24/Care     Help with finances and decision making.    Join support group.    Proofing the house and use labeling.    Avoid antihistamines and anticholinergics.    Avoid changing routine.    Use written reminders.    Avoid multitasking.    Healthy diet, exercise (physical and cognitive).    Good sleep hygiene.    For behavioral problems I recommended that family to try some of the following communication tips: Try not to react and stay calm, Don't argue , Do not use logic, Let the patient feel safe, Keep reminding the patient and use photos if necessary, Distract the patient, Search for things to distract the person, then talk about what you found. For example, talk about a photograph or keepsake or even food, Use gentle touching or hugging to show you care, Body language matters, Use a cooling off period if needed, when possible. If safe to do so, give the patient some space or breathing room. Will consider antipsychotic medications as a last resort because of the risk of CAD and CVD.    Recommend reading the book: The 36-Hour Day and there are many online and printed resources to help caregivers as well.     For More Information About Hallucinations, Delusions, and Paranoia in Alzheimer's   [facility] Alzheimer's and related Dementias Education and Referral (ADEAR) Center   1-505.838.6829 (toll-free)   adear@nuris.nih.gov   www.nuris.nih.gov/alzheimers   The National [place] [facility]'s ADEAR Center offers information and free print publications about Alzheimer's disease and related dementias for families, caregivers, and health professionals. ADEAR Center staff answer telephone, email, and written requests and make referrals to local and national resources      PREVENTION OF DELIRIUM       1. Good hydration and avoid electrolyte imbalance  2. Recognize andtreat infections immediately especially UTI.  3. Bladder emptying and prevent constipation.   4. Provide stimulating activities and familiar objects  5.  Use eyeglasses and hearing aids if needed.   6. Use simple and regular communication about people, current place and time  7. Mobility and range-of-motion exercises  8. Reduce noise, lighting and avoid sleep interruptions  9. Non-narcotic pain management.  10.Nondrug treatment for sleep problems or anxiety  11. Avoid antihistamines.  12. Avoid narcotics.  13. Avoid benzodiazepines.       MEDICAL/SURGICAL COMORBIDITIES     All relevant medical comorbidities noted and managed by primary care physician and medical care team.          MISCELLANEOUS MEDICAL PROBLEMS       HEALTHY LIFESTYLE AND PREVENTATIVE CARE    Encouraged the patient to adhere to the age-appropriate health maintenance guidelines including screening tests and vaccinations.     Discussed the overall importance of healthy lifestyle, optimal weight, exercise, healthy diet, good sleep hygiene and avoiding drugs including smoking, alcohol and recreational drugs. The patient verbalized full understanding.       Advised the patient to follow COVID-19 prevention measures.       I spent 160 minutes face to face with the patient    Time spent in counseling and coordination of care including discussions etiology of diagnosis,pathogenesis of diagnosis, prognosis of diagnosis,, diagnostic results, impression and recommendations, diagnostic studies, management, risks and benefits of treatment, instructions of disease self-management, treatment instructions, follow up requirements, patient and family counseling/involvement in care compliance with treatment regimen. All of the patient's questions were answered during this discussion.       Renetta Bruno, MSN NP      Collaborating Provider: Angel Rees MD, FAAN Neurologist/Epileptologist

## 2023-04-14 NOTE — PATIENT INSTRUCTIONS
DriveAble to assess the cognitive aspects of driving.     Explained to the patient and family that medications are intended to slow down the progression and not stop it or reverse the disease.The disease is relentless, progressive and so far cannot be controlled.     I counseled the patient and family that the rate of progression is extremely variable and the average (10 years) is an inaccurate measure.     Will start memantine/Namenda and titrate slowly to 10 mg BID. The side effects include dizziness, seizures and nightmares and discussed with patient and family.    Avoid donepezil/Aricept due to cardiac history       SYMPTOMATIC MANAGEMENT         HOME CARE     Falling Down Precautions. Gait is affected by the disease as well.     Avoid driving and access to firearms     Incremental 24/Care     Help with finances and decision making.    Join support group.    Proofing the house and use labeling.    Avoid antihistamines and anticholinergics.    Avoid changing routine.    Use written reminders.    Avoid multitasking.    Healthy diet, exercise (physical and cognitive).    Good sleep hygiene.    For behavioral problems I recommended that family to try some of the following communication tips: Try not to react and stay calm, Don't argue , Do not use logic, Let the patient feel safe, Keep reminding the patient and use photos if necessary, Distract the patient, Search for things to distract the person, then talk about what you found. For example, talk about a photograph or keepsake or even food, Use gentle touching or hugging to show you care, Body language matters, Use a cooling off period if needed, when possible. If safe to do so, give the patient some space or breathing room. Will consider antipsychotic medications as a last resort because of the risk of CAD and CVD.    Recommend reading the book: The 36-Hour Day and there are many online and printed resources to help caregivers as well.     For More Information  About Hallucinations, Delusions, and Paranoia in Alzheimer's   [facility] Alzheimer's and related Dementias Education and Referral (ADEAR) Center   1-396.531.5870 (toll-free)   adear@nuris.nih.gov   www.nuris.nih.gov/alzheimers   The National [employer] [facility]'s ADEAR Center offers information and free print publications about Alzheimer's disease and related dementias for families, caregivers, and health professionals. ADEAR Center staff answer telephone, email, and written requests and make referrals to local and national resources      PREVENTION OF DELIRIUM       1. Good hydration and avoid electrolyte imbalance  2. Recognize andtreat infections immediately especially UTI.  3. Bladder emptying and prevent constipation.   4. Provide stimulating activities and familiar objects  5. Use eyeglasses and hearing aids if needed.   6. Use simple and regular communication about people, current place and time  7. Mobility and range-of-motion exercises  8. Reduce noise, lighting and avoid sleep interruptions  9. Non-narcotic pain management.  10.Nondrug treatment for sleep problems or anxiety  11. Avoid antihistamines.  12. Avoid narcotics.  13. Avoid benzodiazepines.

## 2023-04-15 LAB — RPR SER QL: NORMAL

## 2023-04-18 LAB — VIT B1 BLD-MCNC: 46 UG/L (ref 38–122)

## 2023-04-18 NOTE — PROGRESS NOTES
Labs Reviewed:     04-    Thiamine 46 NL, RPR neg, Vitamin B12 1011^, HIV neg, FA 12.6 NL, HC 10.9 NL, TSH 1.046 NL    Labs unremarkable

## 2023-04-24 NOTE — H&P (VIEW-ONLY)
Est Patient Chronic Pain Note (Follow up Visit)    Referring Physician: Maria D Sotelo PA    PCP: Vivian Ch MD    Chief Complaint:   Chief Complaint   Patient presents with    Knee Pain          SUBJECTIVE:    Interval History (4/24/2023): Richy Douglas presents today for follow-up visit.  he underwent  bilateral IA knee injection  on 01/31/23.  The patient reports that he is/was better following the procedure.  he reports 50% pain relief.  The changes 2 months before pain returned to baseline. Continues to report achy pain in both knees, left worse than right with intermittent swelling.  Patient reports pain as 10/10 today.    Interval History (1/11/2023):  Richy Douglas presents today for follow-up visit.  Patient was last seen on 11/16/2022. At that visit, the plan was to repeat knee injections as needed. Last injection bilateral IA knee injection with steroid on 10/21/22 with 60% relief. Patient reports pain as 9/10 today. He reports achy pain in both knees, left worse than right with intermittent swelling. He continues to work outside the home and also performs a majority of the household chores.      Interval History (11/16/2022): Richy Douglas presents today for follow-up visit.  he underwent bilateral IA knee injection with steroid on 10/21/22.  The patient reports that he is/was better following the procedure.  he reports 60% pain relief.  The changes lasted 4 weeks so far.  The changes have continued through this visit, though he notes some diminished relief in his left knee. He still works and reports the knee becomes swollen and gives out if he is up for too long.  Patient reports pain as 8/10 today. Since last visit, he has followed up with podiatry for ankle pain and right big toe pain with onychomycosis and big toe nail falling off. He also reports intermittent swelling of his left leg/ankle and intermittent shortness of breath. History of CHF, followed by cardiology, most recent visit  11/02/2022    X-ray lumbar spine 09/28/2022  FINDINGS:  Vertebral body heights maintained.  Trace anterolisthesis of L4 on L5 and retrolisthesis L1 on L2.  No significant change in alignment on extension or flexion..  Multilevel degenerative disc height loss, most severe L2-3.  Multilevel facet arthropathy and osteophyte changes.  No definite pars defects..  Atherosclerotic vascular calcification.  No acute abnormality.     Impression:     Multilevel prominent degenerative findings.    X-ray knee bilateral 09/28/2022  FINDINGS:  Bilateral arthritic changes present including moderate to severe right knee lateral compartment and left knee medial compartment joint space narrowing.  Bilateral chondrocalcinosis noted.  No acute abnormality appreciated.     Initial HPI 09/28/2022  Richy Douglas is a 82 y.o. male with past medical history significant for alcohol dependence, lung emphysema, aortic aneurysm, atrial fibrillation, systolic congestive heart failure, hypertension, hyperlipidemia, anemia of chronic disease, GERD, nicotine dependence who presents to the clinic for the evaluation of bilateral knee pain.  Patient reports knee pain is in present since April 2022 without inciting accident injury or trauma.  Today patient reports pain is rated a 7/10 and is constant.  Patient reports pain in the popliteal fossa as well as pain which radiates distally to the dorsum of the foot in L4 distribution.  Patient reports associated superficial swelling along the suprapatellar aspect of bilateral knees.  Pain is exacerbated with knee flexion, extension, moving from sitting to standing and with ambulation.  Patient does endorse associated weakness in the lower extremities associated with his pain.  Of note patient reports prior lower back surgery [facility] with improvement in lower back and radicular pain.  Patient has not tried membrane stabilizing agents or interventions at this time.  Patient has tried physician  directed physical therapy exercises at home without significant relief.  Patient also reports right big toe pain with onychomycosis and big toe nail falling off.    Patient reports significant motor weakness.  Patient denies night fever/night sweats, urinary incontinence, bowel incontinence, significant weight loss, and loss of sensations.      Pain Disability Index Review:     Last 3 PDI Scores 9/28/2022   Pain Disability Index (PDI) 42       Non-Pharmacologic Treatments:  Physical Therapy/Home Exercise: yes  Ice/Heat:no  TENS: no  Acupuncture: no  Massage: no  Chiropractic: no    Other: no      Pain Medications:  - Adjuvant Medications: Neurontin (Gabapentin)    Pain Procedures:   -bilateral IA knee injection with steroid on 10/21/22 with 60% relief  -bilateral IA knee injection  on 01/31/23 with 50% relief    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    Anemia of chronic disease     Aortic aneurysm     Atrial fibrillation with RVR 09/24/2021    Chronic systolic congestive heart failure 08/31/2017    Emphysema of lung 08/31/2017    GERD (gastroesophageal reflux disease)     Hypertension     Knee osteoarthritis 10/21/2022    Major neurocognitive disorder due to multiple etiologies 4/14/2023    Microcytic anemia 11/24/2020    Other hyperlipidemia 04/27/2021    Personal history of colonic polyps 2022    Prostate cancer      Past Surgical History:   Procedure Laterality Date    CATHETERIZATION OF BOTH LEFT AND RIGHT HEART N/A 1/13/2022    Procedure: CATHETERIZATION, HEART, BOTH LEFT AND RIGHT;  Surgeon: Janneth Tovar MD;  Location: [facility] CATH LAB;  Service: Cardiology;  Laterality: N/A;  poss cx    COLONOSCOPY      COLONOSCOPY N/A 6/30/2022    Procedure: COLONOSCOPY;  Surgeon: Sandra Perez MD;  Location: [facility] ENDO;  Service: Endoscopy;  Laterality: N/A;    COLONOSCOPY N/A 7/1/2022    Procedure: COLONOSCOPY;  Surgeon: Woodrow Christian MD;  Location: [facility];   Service: Endoscopy;  Laterality: N/A;    CORONARY ANGIOGRAPHY N/A 1/13/2022    Procedure: ANGIOGRAM, CORONARY ARTERY;  Surgeon: Janneth Tovar MD;  Location: [facility] CATH LAB;  Service: Cardiology;  Laterality: N/A;    ESOPHAGOGASTRODUODENOSCOPY N/A 6/30/2022    Procedure: EGD (ESOPHAGOGASTRODUODENOSCOPY);  Surgeon: Sandra Perez MD;  Location: [facility] ENDO;  Service: Endoscopy;  Laterality: N/A;    INJECTION OF JOINT Bilateral 10/21/2022    Procedure: Bilateral intraarticular knee injection with local ALLERGIC TO IODINE;  Surgeon: Devon Grant MD;  Location: [facility] PAIN MGT;  Service: Pain Management;  Laterality: Bilateral;    INJECTION OF JOINT Bilateral 1/31/2023    Procedure: Bilateral IA knee joint injection with steroid RN IV Sedation;  Surgeon: Devon Grant MD;  Location: [facility] PAIN MGT;  Service: Pain Management;  Laterality: Bilateral;    PROSTATE SURGERY      SPINE SURGERY       Review of patient's allergies indicates:   Allergen Reactions    Fish containing products     Iodine and iodide containing products     Shellfish containing products        Current Outpatient Medications   Medication Sig    albuterol (PROVENTIL) 2.5 mg /3 mL (0.083 %) nebulizer solution Take 3 mLs (2.5 mg total) by nebulization every 4 to 6 hours as needed for Wheezing or Shortness of Breath.    albuterol (PROVENTIL/VENTOLIN HFA) 90 mcg/actuation inhaler Inhale 2 puffs into the lungs every 4 (four) hours as needed for Wheezing or Shortness of Breath.    empagliflozin (JARDIANCE) 10 mg tablet Take 1 tablet (10 mg total) by mouth once daily.    ferrous sulfate 325 (65 FE) MG EC tablet Take 1 tablet (325 mg total) by mouth once daily.    fluticasone-umeclidin-vilanter (TRELEGY ELLIPTA) 200-62.5-25 mcg inhaler Inhale 1 puff into the lungs once daily.    furosemide (LASIX) 40 MG tablet Take 1 tablet (40 mg total) by mouth once daily. Do not take if blood pressure is low, feeling dry/dizzy/lightheaded.    memantine (NAMENDA) 5 MG Tab  "Take 1 tablet (5 mg total) by mouth 2 (two) times daily.    metoprolol succinate (TOPROL-XL) 25 MG 24 hr tablet Take 0.5 tablets (12.5 mg total) by mouth once daily.    potassium chloride SA (K-DUR,KLOR-CON M) 10 MEQ tablet Take 1 tablet (10 mEq total) by mouth once daily.    predniSONE (DELTASONE) 20 MG tablet Take 3 tablets (60mg) by mouth daily for 3 days THEN take 2 tablets (40mg) by mouth daily for 3 days THEN take 1 tablet (20mg) by mouth daily for 3 days THEN take 1/2 tablet (10mg) by mouth daily for 3 days    sacubitriL-valsartan (ENTRESTO)  mg per tablet Take 1 tablet by mouth 2 (two) times daily.     No current facility-administered medications for this visit.       Review of Systems     GENERAL:  No weight loss, malaise or fevers.  HEENT:   No recent changes in vision or hearing  NECK:  Negative for lumps, no difficulty with swallowing.  RESPIRATORY:  Negative for cough, wheezing or shortness of breath, patient denies any recent URI.  CARDIOVASCULAR:  Negative for chest pain or palpitations.  GI:  Negative for abdominal discomfort, blood in stools or black stools or change in bowel habits.  MUSCULOSKELETAL:  See HPI.  SKIN:  Negative for lesions, rash, and itching.  PSYCH:  No mood disorder or recent psychosocial stressors.   HEMATOLOGY/LYMPHOLOGY:  Negative for prolonged bleeding, bruising easily or swollen nodes.    NEURO:   No history of syncope, paralysis, seizures or tremors.  All other reviewed and negative other than HPI.    OBJECTIVE:    BP (!) 97/42   Pulse 75   Ht 5' 11" (1.803 m)   Wt 73 kg (160 lb 15 oz)   BMI 22.45 kg/m²       Physical Exam    GENERAL: Well appearing, in no acute distress, alert and oriented x3.  PSYCH:  Mood and affect appropriate.  SKIN: Skin color, texture, turgor normal, no rashes or lesions.  HEAD/FACE:  Normocephalic, atraumatic. Cranial nerves grossly intact.    CV: RRR with palpation of the radial artery.  PULM: No evidence of respiratory difficulty, " symmetric chest rise.  GI:  Soft and non-tender.    BACK:  Positive shopping cart sign.  Well-healed 6 in lower lumbar vertical midline incision.  Straight leg raising in the sitting and supine positions is negative to radicular pain. No pain to palpation over the facet joints of the lumbar spine or spinous processes. Normal range of motion without pain reproduction.  EXTREMITIES: Peripheral joint ROM is reduced with pain.  No deformities, edema, or skin discoloration. Good capillary refill.  MUSCULOSKELETAL: Able to stand on heels & toes.   hip, and knee provocative maneuvers are negative.  There is no pain with palpation over the sacroiliac joints bilaterally.  Gaenslen's, Distraction/Compression and  FABERs test is negative.  Facet loading test is negative bilaterally.   Bilateral upper and lower extremity strength is normal and symmetric.  No atrophy or tone abnormalities are noted.    RIGHT Lower extremity: Hip flexion 5/5, Hip Abduction 5/5, Hip Adduction 5/5, Knee extension 5/5, Knee flexion 5/5, Ankle dorsiflexion5/5, Extensor hallucis longus 5/5, Ankle plantarflexion 5/5  LEFT Lower extremity:  Hip flexion 5/5, Hip Abduction 5/5,Hip Adduction 5/5, Knee extension 5/5, Knee flexion 5/5, Ankle dorsiflexion 5/5, Extensor hallucis longus 5/5, Ankle plantarflexion 5/5  -Normal testing knee (patellar) jerk and ankle (achilles) jerk    NEURO: Bilateral upper and lower extremity coordination and muscle stretch reflexes are physiologic and symmetric. No loss of sensation is noted.    Knee Exam:  bilateral    Erythema: Absent  Deformity/Swelling: Present  Effusion: Absent  Chronic Bony Changes: Present  Creptius: Present     milddiffuse tenderness palpation of the mediolateral joint lines and patella     ROM: full range with pain     Strength is 5/5 bilateral   Non-pitting edema to left ankle, mild swelling to posterior aspect of left knee     Neurovascular intact    GAIT:  utilizing wheelchair today    Imaging:    X-ray lumbar spine 09/28/2022  FINDINGS:  Vertebral body heights maintained.  Trace anterolisthesis of L4 on L5 and retrolisthesis L1 on L2.  No significant change in alignment on extension or flexion..  Multilevel degenerative disc height loss, most severe L2-3.  Multilevel facet arthropathy and osteophyte changes.  No definite pars defects..  Atherosclerotic vascular calcification.  No acute abnormality.     Impression:     Multilevel prominent degenerative findings.    X-ray knee bilateral 09/28/2022  FINDINGS:  Bilateral arthritic changes present including moderate to severe right knee lateral compartment and left knee medial compartment joint space narrowing.  Bilateral chondrocalcinosis noted.  No acute abnormality appreciated.        ASSESSMENT: 82 y.o. year old male with knee and leg pain, consistent with     1. Primary osteoarthritis of both knees  methylPREDNISolone acetate injection 40 mg    IR Aspiration Injection Large Joint W FL    IR Aspiration Injection Large Joint W FL    Case Request-RAD/Other Procedure Area: Bilateral IA knee joint injection Synvisc RN IV Sedation      2. Chronic pain of left knee  Ambulatory referral/consult to Pain Clinic                  PLAN:   - Interventions: Schedule for bilateral synvisc injection as previous steroid injection offered <3 months of relief  --bilateral IA knee injection  on 01/31/23 with 50% relief  -s/p bilateral IA knee injection with steroid on 10/21/22 with 60% relief     - Procedure note: An IM injection of (methylPREDNISolone acetate 40 mg) was administered during clinic visit.  This was well tolerated.      - Anticoagulation use: no no anticoagulation       report:  Reviewed and consistent with medication use as prescribed.    - Medications:  --  We have discussed continuing gabapentin 300 mg QHS.  Patient will increase medication according to the following algorithm.  We have reviewed potential side effects of this medication including daytime  somnolence, weight gain and peripheral edema      - Therapy:   Continue activities as tolerated    - Imaging: Reviewed bilateral knee and lumbar x-ray with patient and answered any questions they had regarding study.      - Consults/Referrals:  Podiatry- continue follow up as needed    - Records: Obtain old records from outside physicians and imaging:  Delia Siddiqui General:  Lumbar spine surgery    - Follow up visit: return to clinic in 4 weeks after injection      The above plan and management options were discussed at length with patient. Patient is in agreement with the above and verbalized understanding.    - I discussed the goals of interventional chronic pain management with the patient on today's visit. We discussed a multimodal and systematic approach to pain.  This includes diagnostic and therapeutic injections, adjuvant pharmacologic treatment, physical therapy, and at times psychiatry.  I emphasized the importance of regular exercise, core strengthening and stretching, diet and weight loss as a cornerstone of long-term pain management.    - This condition does not require this patient to take time off of work, and the primary goal of our Pain Management services is to improve the patient's functional capacity.  - Patient Questions: Answered all of the patient's questions regarding diagnoses, therapy, treatment and next steps        Meghan Escobar PA-C  Interventional Pain Management  [facility]    Disclaimer:  This note was prepared using voice recognition system and is likely to have sound alike errors that may have been overlooked even after proof reading.  Please call me with any questions

## 2023-04-24 NOTE — PROGRESS NOTES
Est Patient Chronic Pain Note (Follow up Visit)    Referring Physician: Maria D Sotelo PA    PCP: Vivian Ch MD    Chief Complaint:   Chief Complaint   Patient presents with    Knee Pain          SUBJECTIVE:    Interval History (4/24/2023): Richy Douglas presents today for follow-up visit.  he underwent  bilateral IA knee injection  on 01/31/23.  The patient reports that he is/was better following the procedure.  he reports 50% pain relief.  The changes 2 months before pain returned to baseline. Continues to report achy pain in both knees, left worse than right with intermittent swelling.  Patient reports pain as 10/10 today.    Interval History (1/11/2023):  Richy Douglas presents today for follow-up visit.  Patient was last seen on 11/16/2022. At that visit, the plan was to repeat knee injections as needed. Last injection bilateral IA knee injection with steroid on 10/21/22 with 60% relief. Patient reports pain as 9/10 today. He reports achy pain in both knees, left worse than right with intermittent swelling. He continues to work outside the home and also performs a majority of the household chores.      Interval History (11/16/2022): Richy Douglas presents today for follow-up visit.  he underwent bilateral IA knee injection with steroid on 10/21/22.  The patient reports that he is/was better following the procedure.  he reports 60% pain relief.  The changes lasted 4 weeks so far.  The changes have continued through this visit, though he notes some diminished relief in his left knee. He still works and reports the knee becomes swollen and gives out if he is up for too long.  Patient reports pain as 8/10 today. Since last visit, he has followed up with podiatry for ankle pain and right big toe pain with onychomycosis and big toe nail falling off. He also reports intermittent swelling of his left leg/ankle and intermittent shortness of breath. History of CHF, followed by cardiology, most recent visit  11/02/2022    X-ray lumbar spine 09/28/2022  FINDINGS:  Vertebral body heights maintained.  Trace anterolisthesis of L4 on L5 and retrolisthesis L1 on L2.  No significant change in alignment on extension or flexion..  Multilevel degenerative disc height loss, most severe L2-3.  Multilevel facet arthropathy and osteophyte changes.  No definite pars defects..  Atherosclerotic vascular calcification.  No acute abnormality.     Impression:     Multilevel prominent degenerative findings.    X-ray knee bilateral 09/28/2022  FINDINGS:  Bilateral arthritic changes present including moderate to severe right knee lateral compartment and left knee medial compartment joint space narrowing.  Bilateral chondrocalcinosis noted.  No acute abnormality appreciated.     Initial HPI 09/28/2022  Richy Douglas is a 82 y.o. male with past medical history significant for alcohol dependence, lung emphysema, aortic aneurysm, atrial fibrillation, systolic congestive heart failure, hypertension, hyperlipidemia, anemia of chronic disease, GERD, nicotine dependence who presents to the clinic for the evaluation of bilateral knee pain.  Patient reports knee pain is in present since April 2022 without inciting accident injury or trauma.  Today patient reports pain is rated a 7/10 and is constant.  Patient reports pain in the popliteal fossa as well as pain which radiates distally to the dorsum of the foot in L4 distribution.  Patient reports associated superficial swelling along the suprapatellar aspect of bilateral knees.  Pain is exacerbated with knee flexion, extension, moving from sitting to standing and with ambulation.  Patient does endorse associated weakness in the lower extremities associated with his pain.  Of note patient reports prior lower back surgery [facility] with improvement in lower back and radicular pain.  Patient has not tried membrane stabilizing agents or interventions at this time.  Patient has tried physician  directed physical therapy exercises at home without significant relief.  Patient also reports right big toe pain with onychomycosis and big toe nail falling off.    Patient reports significant motor weakness.  Patient denies night fever/night sweats, urinary incontinence, bowel incontinence, significant weight loss, and loss of sensations.      Pain Disability Index Review:     Last 3 PDI Scores 9/28/2022   Pain Disability Index (PDI) 42       Non-Pharmacologic Treatments:  Physical Therapy/Home Exercise: yes  Ice/Heat:no  TENS: no  Acupuncture: no  Massage: no  Chiropractic: no    Other: no      Pain Medications:  - Adjuvant Medications: Neurontin (Gabapentin)    Pain Procedures:   -bilateral IA knee injection with steroid on 10/21/22 with 60% relief  -bilateral IA knee injection  on 01/31/23 with 50% relief    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    Anemia of chronic disease     Aortic aneurysm     Atrial fibrillation with RVR 09/24/2021    Chronic systolic congestive heart failure 08/31/2017    Emphysema of lung 08/31/2017    GERD (gastroesophageal reflux disease)     Hypertension     Knee osteoarthritis 10/21/2022    Major neurocognitive disorder due to multiple etiologies 4/14/2023    Microcytic anemia 11/24/2020    Other hyperlipidemia 04/27/2021    Personal history of colonic polyps 2022    Prostate cancer      Past Surgical History:   Procedure Laterality Date    CATHETERIZATION OF BOTH LEFT AND RIGHT HEART N/A 1/13/2022    Procedure: CATHETERIZATION, HEART, BOTH LEFT AND RIGHT;  Surgeon: Janneth Tovar MD;  Location: [facility];  Service: Cardiology;  Laterality: N/A;  poss cx    COLONOSCOPY      COLONOSCOPY N/A 6/30/2022    Procedure: COLONOSCOPY;  Surgeon: Sandra Perez MD;  Location: [facility] ENDO;  Service: Endoscopy;  Laterality: N/A;    COLONOSCOPY N/A 7/1/2022    Procedure: COLONOSCOPY;  Surgeon: Woodrow Christian MD;  Location: [facility];   Service: Endoscopy;  Laterality: N/A;    CORONARY ANGIOGRAPHY N/A 1/13/2022    Procedure: ANGIOGRAM, CORONARY ARTERY;  Surgeon: Janneth Tovar MD;  Location: [facility];  Service: Cardiology;  Laterality: N/A;    ESOPHAGOGASTRODUODENOSCOPY N/A 6/30/2022    Procedure: EGD (ESOPHAGOGASTRODUODENOSCOPY);  Surgeon: Sandra Perez MD;  Location: [facility] ENDO;  Service: Endoscopy;  Laterality: N/A;    INJECTION OF JOINT Bilateral 10/21/2022    Procedure: Bilateral intraarticular knee injection with local ALLERGIC TO IODINE;  Surgeon: Devon Grant MD;  Location: [facility]IN [facility]GT;  Service: Pain Management;  Laterality: Bilateral;    INJECTION OF JOINT Bilateral 1/31/2023    Procedure: Bilateral IA knee joint injection with steroid RN IV Sedation;  Surgeon: Devon Grant MD;  Location: [facility];  Service: Pain Management;  Laterality: Bilateral;    PROSTATE SURGERY      SPINE SURGERY       Review of patient's allergies indicates:   Allergen Reactions    Fish containing products     Iodine and iodide containing products     Shellfish containing products        Current Outpatient Medications   Medication Sig    albuterol (PROVENTIL) 2.5 mg /3 mL (0.083 %) nebulizer solution Take 3 mLs (2.5 mg total) by nebulization every 4 to 6 hours as needed for Wheezing or Shortness of Breath.    albuterol (PROVENTIL/VENTOLIN HFA) 90 mcg/actuation inhaler Inhale 2 puffs into the lungs every 4 (four) hours as needed for Wheezing or Shortness of Breath.    empagliflozin (JARDIANCE) 10 mg tablet Take 1 tablet (10 mg total) by mouth once daily.    ferrous sulfate 325 (65 FE) MG EC tablet Take 1 tablet (325 mg total) by mouth once daily.    fluticasone-umeclidin-vilanter (TRELEGY ELLIPTA) 200-62.5-25 mcg inhaler Inhale 1 puff into the lungs once daily.    furosemide (LASIX) 40 MG tablet Take 1 tablet (40 mg total) by mouth once daily. Do not take if blood pressure is low, feeling dry/dizzy/lightheaded.    memantine (NAMENDA) 5 MG Tab  "Take 1 tablet (5 mg total) by mouth 2 (two) times daily.    metoprolol succinate (TOPROL-XL) 25 MG 24 hr tablet Take 0.5 tablets (12.5 mg total) by mouth once daily.    potassium chloride SA (K-DUR,KLOR-CON M) 10 MEQ tablet Take 1 tablet (10 mEq total) by mouth once daily.    predniSONE (DELTASONE) 20 MG tablet Take 3 tablets (60mg) by mouth daily for 3 days THEN take 2 tablets (40mg) by mouth daily for 3 days THEN take 1 tablet (20mg) by mouth daily for 3 days THEN take 1/2 tablet (10mg) by mouth daily for 3 days    sacubitriL-valsartan (ENTRESTO)  mg per tablet Take 1 tablet by mouth 2 (two) times daily.     No current facility-administered medications for this visit.       Review of Systems     GENERAL:  No weight loss, malaise or fevers.  HEENT:   No recent changes in vision or hearing  NECK:  Negative for lumps, no difficulty with swallowing.  RESPIRATORY:  Negative for cough, wheezing or shortness of breath, patient denies any recent URI.  CARDIOVASCULAR:  Negative for chest pain or palpitations.  GI:  Negative for abdominal discomfort, blood in stools or black stools or change in bowel habits.  MUSCULOSKELETAL:  See HPI.  SKIN:  Negative for lesions, rash, and itching.  PSYCH:  No mood disorder or recent psychosocial stressors.   HEMATOLOGY/LYMPHOLOGY:  Negative for prolonged bleeding, bruising easily or swollen nodes.    NEURO:   No history of syncope, paralysis, seizures or tremors.  All other reviewed and negative other than HPI.    OBJECTIVE:    BP (!) 97/42   Pulse 75   Ht 5' 11" (1.803 m)   Wt 73 kg (160 lb 15 oz)   BMI 22.45 kg/m²       Physical Exam    GENERAL: Well appearing, in no acute distress, alert and oriented x3.  PSYCH:  Mood and affect appropriate.  SKIN: Skin color, texture, turgor normal, no rashes or lesions.  HEAD/FACE:  Normocephalic, atraumatic. Cranial nerves grossly intact.    CV: RRR with palpation of the radial artery.  PULM: No evidence of respiratory difficulty, " symmetric chest rise.  GI:  Soft and non-tender.    BACK:  Positive shopping cart sign.  Well-healed 6 in lower lumbar vertical midline incision.  Straight leg raising in the sitting and supine positions is negative to radicular pain. No pain to palpation over the facet joints of the lumbar spine or spinous processes. Normal range of motion without pain reproduction.  EXTREMITIES: Peripheral joint ROM is reduced with pain.  No deformities, edema, or skin discoloration. Good capillary refill.  MUSCULOSKELETAL: Able to stand on heels & toes.   hip, and knee provocative maneuvers are negative.  There is no pain with palpation over the sacroiliac joints bilaterally.  Gaenslen's, Distraction/Compression and  FABERs test is negative.  Facet loading test is negative bilaterally.   Bilateral upper and lower extremity strength is normal and symmetric.  No atrophy or tone abnormalities are noted.    RIGHT Lower extremity: Hip flexion 5/5, Hip Abduction 5/5, Hip Adduction 5/5, Knee extension 5/5, Knee flexion 5/5, Ankle dorsiflexion5/5, Extensor hallucis longus 5/5, Ankle plantarflexion 5/5  LEFT Lower extremity:  Hip flexion 5/5, Hip Abduction 5/5,Hip Adduction 5/5, Knee extension 5/5, Knee flexion 5/5, Ankle dorsiflexion 5/5, Extensor hallucis longus 5/5, Ankle plantarflexion 5/5  -Normal testing knee (patellar) jerk and ankle (achilles) jerk    NEURO: Bilateral upper and lower extremity coordination and muscle stretch reflexes are physiologic and symmetric. No loss of sensation is noted.    Knee Exam:  bilateral    Erythema: Absent  Deformity/Swelling: Present  Effusion: Absent  Chronic Bony Changes: Present  Creptius: Present     milddiffuse tenderness palpation of the mediolateral joint lines and patella     ROM: full range with pain     Strength is 5/5 bilateral   Non-pitting edema to left ankle, mild swelling to posterior aspect of left knee     Neurovascular intact    GAIT:  utilizing wheelchair today    Imaging:    X-ray lumbar spine 09/28/2022  FINDINGS:  Vertebral body heights maintained.  Trace anterolisthesis of L4 on L5 and retrolisthesis L1 on L2.  No significant change in alignment on extension or flexion..  Multilevel degenerative disc height loss, most severe L2-3.  Multilevel facet arthropathy and osteophyte changes.  No definite pars defects..  Atherosclerotic vascular calcification.  No acute abnormality.     Impression:     Multilevel prominent degenerative findings.    X-ray knee bilateral 09/28/2022  FINDINGS:  Bilateral arthritic changes present including moderate to severe right knee lateral compartment and left knee medial compartment joint space narrowing.  Bilateral chondrocalcinosis noted.  No acute abnormality appreciated.        ASSESSMENT: 82 y.o. year old male with knee and leg pain, consistent with     1. Primary osteoarthritis of both knees  methylPREDNISolone acetate injection 40 mg    IR Aspiration Injection Large Joint W FL    IR Aspiration Injection Large Joint W FL    Case Request-RAD/Other Procedure Area: Bilateral IA knee joint injection Synvisc RN IV Sedation      2. Chronic pain of left knee  Ambulatory referral/consult to Pain Clinic                  PLAN:   - Interventions: Schedule for bilateral synvisc injection as previous steroid injection offered <3 months of relief  --bilateral IA knee injection  on 01/31/23 with 50% relief  -s/p bilateral IA knee injection with steroid on 10/21/22 with 60% relief     - Procedure note: An IM injection of (methylPREDNISolone acetate 40 mg) was administered during clinic visit.  This was well tolerated.      - Anticoagulation use: no no anticoagulation       report:  Reviewed and consistent with medication use as prescribed.    - Medications:  --  We have discussed continuing gabapentin 300 mg QHS.  Patient will increase medication according to the following algorithm.  We have reviewed potential side effects of this medication including daytime  somnolence, weight gain and peripheral edema      - Therapy:   Continue activities as tolerated    - Imaging: Reviewed bilateral knee and lumbar x-ray with patient and answered any questions they had regarding study.      - Consults/Referrals:  Podiatry- continue follow up as needed    - Records: Obtain old records from outside physicians and imaging:  Delia Siddiqui General:  Lumbar spine surgery    - Follow up visit: return to clinic in 4 weeks after injection      The above plan and management options were discussed at length with patient. Patient is in agreement with the above and verbalized understanding.    - I discussed the goals of interventional chronic pain management with the patient on today's visit. We discussed a multimodal and systematic approach to pain.  This includes diagnostic and therapeutic injections, adjuvant pharmacologic treatment, physical therapy, and at times psychiatry.  I emphasized the importance of regular exercise, core strengthening and stretching, diet and weight loss as a cornerstone of long-term pain management.    - This condition does not require this patient to take time off of work, and the primary goal of our Pain Management services is to improve the patient's functional capacity.  - Patient Questions: Answered all of the patient's questions regarding diagnoses, therapy, treatment and next steps        Meghan Escobar PA-C  Interventional Pain Management  [facility]    Disclaimer:  This note was prepared using voice recognition system and is likely to have sound alike errors that may have been overlooked even after proof reading.  Please call me with any questions

## 2023-04-25 ENCOUNTER — PATIENT MESSAGE (OUTPATIENT)
Dept: PAIN MEDICINE | Facility: CLINIC | Age: 83
End: 2023-04-25

## 2023-04-25 ENCOUNTER — OFFICE VISIT (OUTPATIENT)
Dept: PAIN MEDICINE | Facility: CLINIC | Age: 83
End: 2023-04-25
Payer: COMMERCIAL

## 2023-04-25 VITALS
WEIGHT: 160.94 LBS | BODY MASS INDEX: 22.53 KG/M2 | SYSTOLIC BLOOD PRESSURE: 97 MMHG | DIASTOLIC BLOOD PRESSURE: 42 MMHG | HEART RATE: 75 BPM | HEIGHT: 71 IN

## 2023-04-25 DIAGNOSIS — M17.0 PRIMARY OSTEOARTHRITIS OF BOTH KNEES: Primary | ICD-10-CM

## 2023-04-25 DIAGNOSIS — M25.562 CHRONIC PAIN OF LEFT KNEE: ICD-10-CM

## 2023-04-25 DIAGNOSIS — G89.29 CHRONIC PAIN OF LEFT KNEE: ICD-10-CM

## 2023-04-25 PROCEDURE — 99999 PR PBB SHADOW E&M-EST. PATIENT-LVL V: CPT | Mod: PBBFAC,,, | Performed by: PHYSICIAN ASSISTANT

## 2023-04-25 PROCEDURE — 1125F AMNT PAIN NOTED PAIN PRSNT: CPT | Mod: CPTII,S$GLB,, | Performed by: PHYSICIAN ASSISTANT

## 2023-04-25 PROCEDURE — 1101F PT FALLS ASSESS-DOCD LE1/YR: CPT | Mod: CPTII,S$GLB,, | Performed by: PHYSICIAN ASSISTANT

## 2023-04-25 PROCEDURE — 96372 THER/PROPH/DIAG INJ SC/IM: CPT | Mod: S$GLB,,, | Performed by: PHYSICIAN ASSISTANT

## 2023-04-25 PROCEDURE — 96372 PR INJECTION,THERAP/PROPH/DIAG2ST, IM OR SUBCUT: ICD-10-PCS | Mod: S$GLB,,, | Performed by: PHYSICIAN ASSISTANT

## 2023-04-25 PROCEDURE — 1159F MED LIST DOCD IN RCRD: CPT | Mod: CPTII,S$GLB,, | Performed by: PHYSICIAN ASSISTANT

## 2023-04-25 PROCEDURE — 3288F FALL RISK ASSESSMENT DOCD: CPT | Mod: CPTII,S$GLB,, | Performed by: PHYSICIAN ASSISTANT

## 2023-04-25 PROCEDURE — 3074F SYST BP LT 130 MM HG: CPT | Mod: CPTII,S$GLB,, | Performed by: PHYSICIAN ASSISTANT

## 2023-04-25 PROCEDURE — 1160F RVW MEDS BY RX/DR IN RCRD: CPT | Mod: CPTII,S$GLB,, | Performed by: PHYSICIAN ASSISTANT

## 2023-04-25 PROCEDURE — 3078F PR MOST RECENT DIASTOLIC BLOOD PRESSURE < 80 MM HG: ICD-10-PCS | Mod: CPTII,S$GLB,, | Performed by: PHYSICIAN ASSISTANT

## 2023-04-25 PROCEDURE — 1159F PR MEDICATION LIST DOCUMENTED IN MEDICAL RECORD: ICD-10-PCS | Mod: CPTII,S$GLB,, | Performed by: PHYSICIAN ASSISTANT

## 2023-04-25 PROCEDURE — 1157F ADVNC CARE PLAN IN RCRD: CPT | Mod: CPTII,S$GLB,, | Performed by: PHYSICIAN ASSISTANT

## 2023-04-25 PROCEDURE — 3078F DIAST BP <80 MM HG: CPT | Mod: CPTII,S$GLB,, | Performed by: PHYSICIAN ASSISTANT

## 2023-04-25 PROCEDURE — 99214 PR OFFICE/OUTPT VISIT, EST, LEVL IV, 30-39 MIN: ICD-10-PCS | Mod: 25,S$GLB,, | Performed by: PHYSICIAN ASSISTANT

## 2023-04-25 PROCEDURE — 1101F PR PT FALLS ASSESS DOC 0-1 FALLS W/OUT INJ PAST YR: ICD-10-PCS | Mod: CPTII,S$GLB,, | Performed by: PHYSICIAN ASSISTANT

## 2023-04-25 PROCEDURE — 1157F PR ADVANCE CARE PLAN OR EQUIV PRESENT IN MEDICAL RECORD: ICD-10-PCS | Mod: CPTII,S$GLB,, | Performed by: PHYSICIAN ASSISTANT

## 2023-04-25 PROCEDURE — 3074F PR MOST RECENT SYSTOLIC BLOOD PRESSURE < 130 MM HG: ICD-10-PCS | Mod: CPTII,S$GLB,, | Performed by: PHYSICIAN ASSISTANT

## 2023-04-25 PROCEDURE — 1160F PR REVIEW ALL MEDS BY PRESCRIBER/CLIN PHARMACIST DOCUMENTED: ICD-10-PCS | Mod: CPTII,S$GLB,, | Performed by: PHYSICIAN ASSISTANT

## 2023-04-25 PROCEDURE — 99214 OFFICE O/P EST MOD 30 MIN: CPT | Mod: 25,S$GLB,, | Performed by: PHYSICIAN ASSISTANT

## 2023-04-25 PROCEDURE — 99999 PR PBB SHADOW E&M-EST. PATIENT-LVL V: ICD-10-PCS | Mod: PBBFAC,,, | Performed by: PHYSICIAN ASSISTANT

## 2023-04-25 PROCEDURE — 3288F PR FALLS RISK ASSESSMENT DOCUMENTED: ICD-10-PCS | Mod: CPTII,S$GLB,, | Performed by: PHYSICIAN ASSISTANT

## 2023-04-25 PROCEDURE — 1125F PR PAIN SEVERITY QUANTIFIED, PAIN PRESENT: ICD-10-PCS | Mod: CPTII,S$GLB,, | Performed by: PHYSICIAN ASSISTANT

## 2023-04-25 RX ORDER — METHYLPREDNISOLONE ACETATE 40 MG/ML
40 INJECTION, SUSPENSION INTRA-ARTICULAR; INTRALESIONAL; INTRAMUSCULAR; SOFT TISSUE
Status: COMPLETED | OUTPATIENT
Start: 2023-04-25 | End: 2023-04-25

## 2023-04-25 RX ADMIN — METHYLPREDNISOLONE ACETATE 40 MG: 40 INJECTION, SUSPENSION INTRA-ARTICULAR; INTRALESIONAL; INTRAMUSCULAR; SOFT TISSUE at 08:04

## 2023-05-03 ENCOUNTER — HOSPITAL ENCOUNTER (OUTPATIENT)
Dept: PULMONOLOGY | Facility: HOSPITAL | Age: 83
Discharge: HOME | End: 2023-05-03
Attending: NURSE PRACTITIONER
Payer: COMMERCIAL

## 2023-05-03 ENCOUNTER — HOSPITAL ENCOUNTER (OUTPATIENT)
Dept: RADIOLOGY | Facility: HOSPITAL | Age: 83
Discharge: HOME | End: 2023-05-03
Attending: NURSE PRACTITIONER
Payer: COMMERCIAL

## 2023-05-03 DIAGNOSIS — J43.9 PULMONARY EMPHYSEMA, UNSPECIFIED EMPHYSEMA TYPE: ICD-10-CM

## 2023-05-03 DIAGNOSIS — F10.230 ALCOHOL DEPENDENCE WITH UNCOMPLICATED WITHDRAWAL: ICD-10-CM

## 2023-05-03 DIAGNOSIS — I50.22 CHRONIC SYSTOLIC CONGESTIVE HEART FAILURE: ICD-10-CM

## 2023-05-03 DIAGNOSIS — F02.80 MAJOR NEUROCOGNITIVE DISORDER DUE TO MULTIPLE ETIOLOGIES: ICD-10-CM

## 2023-05-03 DIAGNOSIS — Z71.89 COMPLEX CARE COORDINATION: ICD-10-CM

## 2023-05-03 DIAGNOSIS — G62.1 ALCOHOLIC PERIPHERAL NEUROPATHY: ICD-10-CM

## 2023-05-03 DIAGNOSIS — R41.3 OTHER AMNESIA: ICD-10-CM

## 2023-05-03 DIAGNOSIS — I10 PRIMARY HYPERTENSION: ICD-10-CM

## 2023-05-03 DIAGNOSIS — R41.3 MEMORY LOSS: ICD-10-CM

## 2023-05-03 DIAGNOSIS — D63.8 ANEMIA OF CHRONIC DISEASE: ICD-10-CM

## 2023-05-03 PROCEDURE — 70551 MRI BRAIN STEM W/O DYE: CPT | Mod: TC

## 2023-05-03 PROCEDURE — 70551 MRI BRAIN WITHOUT CONTRAST: ICD-10-PCS | Mod: 26,,, | Performed by: STUDENT IN AN ORGANIZED HEALTH CARE EDUCATION/TRAINING PROGRAM

## 2023-05-03 PROCEDURE — 70551 MRI BRAIN STEM W/O DYE: CPT | Mod: 26,,, | Performed by: STUDENT IN AN ORGANIZED HEALTH CARE EDUCATION/TRAINING PROGRAM

## 2023-05-05 ENCOUNTER — TELEPHONE (OUTPATIENT)
Dept: NEUROLOGY | Facility: CLINIC | Age: 83
End: 2023-05-05
Payer: MEDICARE

## 2023-05-05 NOTE — PROGRESS NOTES
MRI Brain WO:    05-    No acute intracranial abnormality.     Mild-to-moderate global cortical atrophy without lobar predominance.     Nonspecific white matter changes likely related to chronic microvascular ischemia, Fazekas score 3.     Mild bilateral paranasal sinus disease.    Findings consistent with Dementia

## 2023-05-05 NOTE — TELEPHONE ENCOUNTER
Spoke with wife Britany to give the MRI results. She voiced understanding of results.           ----- Message from Mita Bruno NP sent at 5/5/2023  3:16 PM CDT -----  MRI Brain WO:    05-    No acute intracranial abnormality.     Mild-to-moderate global cortical atrophy without lobar predominance.     Nonspecific white matter changes likely related to chronic microvascular ischemia, Fazekas score 3.     Mild bilateral paranasal sinus disease.    Findings consistent with Dementia

## 2023-05-08 ENCOUNTER — PATIENT MESSAGE (OUTPATIENT)
Dept: PAIN MEDICINE | Facility: HOSPITAL | Age: 83
End: 2023-05-08
Payer: MEDICARE

## 2023-05-08 NOTE — PRE-PROCEDURE INSTRUCTIONS
Spoke with patients daughter regarding procedure scheduled on 5.10     Arrival time 1145     Has patient been sick with fever or on antibiotics within the last 7 days? No     Does the patient have any open wounds, sores or rashes? No     Does the patient have any recent fractures? no     Has patient received a vaccination within the last 7 days? No     Received the COVID vaccination? yes     Has the patient stopped all medications as directed? na     Does patient have a pacemaker and or defibrillator? no     Does the patient have a ride to and from procedure and someone reliable to remain with patient? daughter     Is the patient diabetic? no     Does the patient have sleep apnea? Or use O2 at home? no     Is the patient receiving sedation? Yes     Is the patient instructed to remain NPO beginning at midnight the night before their procedure? yes     Procedure location confirmed with patient? Yes     Covid- Denies signs/symptoms. Instructed to notify PAT/MD if any changes.

## 2023-05-09 ENCOUNTER — OFFICE VISIT (OUTPATIENT)
Dept: CARDIOLOGY | Facility: CLINIC | Age: 83
End: 2023-05-09
Payer: COMMERCIAL

## 2023-05-09 ENCOUNTER — LAB VISIT (OUTPATIENT)
Dept: LAB | Facility: HOSPITAL | Age: 83
End: 2023-05-09
Attending: PHYSICIAN ASSISTANT
Payer: MEDICARE

## 2023-05-09 VITALS — HEIGHT: 71 IN | BODY MASS INDEX: 22.35 KG/M2 | WEIGHT: 159.63 LBS

## 2023-05-09 DIAGNOSIS — I50.22 CHRONIC SYSTOLIC CONGESTIVE HEART FAILURE: ICD-10-CM

## 2023-05-09 DIAGNOSIS — I70.0 ATHEROSCLEROSIS OF ABDOMINAL AORTA: ICD-10-CM

## 2023-05-09 DIAGNOSIS — I50.22 CHRONIC SYSTOLIC CONGESTIVE HEART FAILURE: Primary | ICD-10-CM

## 2023-05-09 LAB
ANION GAP SERPL CALC-SCNC: 2 MMOL/L (ref 8–16)
BNP SERPL-MCNC: 3158 PG/ML (ref 0–99)
BUN SERPL-MCNC: 16 MG/DL (ref 8–23)
CALCIUM SERPL-MCNC: 8.8 MG/DL (ref 8.7–10.5)
CHLORIDE SERPL-SCNC: 109 MMOL/L (ref 95–110)
CO2 SERPL-SCNC: 30 MMOL/L (ref 23–29)
CREAT SERPL-MCNC: 0.7 MG/DL (ref 0.5–1.4)
EST. GFR  (NO RACE VARIABLE): >60 ML/MIN/1.73 M^2
GLUCOSE SERPL-MCNC: 85 MG/DL (ref 70–110)
POTASSIUM SERPL-SCNC: 4.3 MMOL/L (ref 3.5–5.1)
SODIUM SERPL-SCNC: 141 MMOL/L (ref 136–145)

## 2023-05-09 PROCEDURE — 99213 PR OFFICE/OUTPT VISIT, EST, LEVL III, 20-29 MIN: ICD-10-PCS | Mod: S$PBB,,, | Performed by: PHYSICIAN ASSISTANT

## 2023-05-09 PROCEDURE — 83880 ASSAY OF NATRIURETIC PEPTIDE: CPT | Performed by: PHYSICIAN ASSISTANT

## 2023-05-09 PROCEDURE — 99213 OFFICE O/P EST LOW 20 MIN: CPT | Mod: S$PBB,,, | Performed by: PHYSICIAN ASSISTANT

## 2023-05-09 PROCEDURE — 99999 PR PBB SHADOW E&M-EST. PATIENT-LVL III: CPT | Mod: PBBFAC,,, | Performed by: PHYSICIAN ASSISTANT

## 2023-05-09 PROCEDURE — 1157F PR ADVANCE CARE PLAN OR EQUIV PRESENT IN MEDICAL RECORD: ICD-10-PCS | Mod: ,,, | Performed by: PHYSICIAN ASSISTANT

## 2023-05-09 PROCEDURE — 99213 OFFICE O/P EST LOW 20 MIN: CPT | Mod: PBBFAC | Performed by: PHYSICIAN ASSISTANT

## 2023-05-09 PROCEDURE — 36415 COLL VENOUS BLD VENIPUNCTURE: CPT | Performed by: PHYSICIAN ASSISTANT

## 2023-05-09 PROCEDURE — 99999 PR PBB SHADOW E&M-EST. PATIENT-LVL III: ICD-10-PCS | Mod: PBBFAC,,, | Performed by: PHYSICIAN ASSISTANT

## 2023-05-09 PROCEDURE — 1157F ADVNC CARE PLAN IN RCRD: CPT | Mod: ,,, | Performed by: PHYSICIAN ASSISTANT

## 2023-05-09 PROCEDURE — 80048 BASIC METABOLIC PNL TOTAL CA: CPT | Performed by: PHYSICIAN ASSISTANT

## 2023-05-09 RX ORDER — FUROSEMIDE 40 MG/1
40 TABLET ORAL 2 TIMES DAILY
Qty: 90 TABLET | Refills: 3
Start: 2023-05-09 | End: 2023-11-09 | Stop reason: SDUPTHER

## 2023-05-09 NOTE — PROGRESS NOTES
[facility] Provider Note (Follow-up) Consult Note      HPI:  Patient can walk around work   Patient sleeps on 2 pillows   Patient wakes up SOB, has to get out of bed, associated cough, sputum none   Palpitations - none   Dizzy, light-headed, pre-syncope or syncope none   Since discharge frequency of performing weights, home weight and weight change down 2 pounds    Other information felt pertinent to HPI    Since last visit has been seen by neurology and started on namenda, also seeing pain management having knee injections tomorrow. Still working full time. Less SOB, LE edema as last month    Got jardiance and started it last month.     Some SOB, off and on, has to rest    Left leg LE edema, on lasix 40 mg BID     PHYSICAL: There were no vitals filed for this visit.   Wt Readings from Last 3 Encounters:   05/09/23 72.4 kg (159 lb 9.8 oz)   04/25/23 73 kg (160 lb 15 oz)   04/05/23 73.5 kg (162 lb 0.6 oz)       JVD: no,    Heart rhythm: regular  Cardiac murmur: No    S3: no  S4: no  Lungs: clear  Liver span: 10 cm:   Hepatojugular reflux: no  Edema: no,      ASSESSMENT: chronic systolic HF    PLAN:      Patient Instructions:   Instruct the patient to notify this clinic if HH, a physician or an advanced care provider wants to change medication one of their HF medications   Activity and Diet restrictions:   Recommend 2-3 gram sodium restriction and 1500cc- 2000cc fluid restriction.  Encourage physical activity with graded exercise program.  Requested patient to weigh themselves daily, and to notify us if their weight increases by more than 3 lbs in 1 day or 5 lbs in 3 days.    Assigned dry weight on home scale: 72 kg  Medication changes (include current dose and changed dose)  Continue lasix 40 mg BID, more euvolemic on exam  Ok for knee injections tomorrow, physical therapy  Continue ARNi, jardiance and BB  Upcoming labs and date anticipated: RTC 3 months  Labs today

## 2023-05-10 ENCOUNTER — HOSPITAL ENCOUNTER (OUTPATIENT)
Facility: HOSPITAL | Age: 83
Discharge: HOME | End: 2023-05-10
Attending: ANESTHESIOLOGY | Admitting: ANESTHESIOLOGY
Payer: MEDICARE

## 2023-05-10 VITALS
TEMPERATURE: 97 F | BODY MASS INDEX: 22.98 KG/M2 | DIASTOLIC BLOOD PRESSURE: 61 MMHG | HEART RATE: 75 BPM | SYSTOLIC BLOOD PRESSURE: 137 MMHG | OXYGEN SATURATION: 94 % | HEIGHT: 71 IN | WEIGHT: 164.13 LBS | RESPIRATION RATE: 16 BRPM

## 2023-05-10 DIAGNOSIS — M17.9 KNEE OSTEOARTHRITIS: ICD-10-CM

## 2023-05-10 PROCEDURE — A9585 GADOBUTROL INJECTION: HCPCS | Performed by: ANESTHESIOLOGY

## 2023-05-10 PROCEDURE — 20610 DRAIN/INJ JOINT/BURSA W/O US: CPT | Mod: LT | Performed by: ANESTHESIOLOGY

## 2023-05-10 PROCEDURE — 25500020 PHARM REV CODE 255: Performed by: ANESTHESIOLOGY

## 2023-05-10 PROCEDURE — 63600175 PHARM REV CODE 636 W HCPCS: Mod: JZ,JG | Performed by: ANESTHESIOLOGY

## 2023-05-10 PROCEDURE — 20610 DRAIN/INJ JOINT/BURSA W/O US: CPT | Mod: 50,,, | Performed by: ANESTHESIOLOGY

## 2023-05-10 PROCEDURE — 25000003 PHARM REV CODE 250: Performed by: ANESTHESIOLOGY

## 2023-05-10 PROCEDURE — 20610 PR DRAIN/INJECT LARGE JOINT/BURSA: ICD-10-PCS | Mod: 50,,, | Performed by: ANESTHESIOLOGY

## 2023-05-10 RX ORDER — GADOBUTROL 604.72 MG/ML
INJECTION INTRAVENOUS
Status: DISCONTINUED | OUTPATIENT
Start: 2023-05-10 | End: 2023-05-10 | Stop reason: HOSPADM

## 2023-05-10 RX ORDER — SODIUM BICARBONATE 1 MEQ/ML
SYRINGE (ML) INTRAVENOUS
Status: DISCONTINUED | OUTPATIENT
Start: 2023-05-10 | End: 2023-05-10 | Stop reason: HOSPADM

## 2023-05-10 NOTE — DISCHARGE SUMMARY
Discharge Note  Short Stay      SUMMARY     Admit Date: 5/10/2023    Attending Physician: Devon Grant MD        Discharge Physician: Devon Grant MD        Discharge Date: 5/10/2023 12:17 PM    Procedure(s) (LRB):  Bilateral IA knee joint injection Synvisc RN IV Sedation (Bilateral)    Final Diagnosis: Primary osteoarthritis of both knees [M17.0]    Disposition: Home or self care    Patient Instructions:   Current Discharge Medication List        CONTINUE these medications which have NOT CHANGED    Details   metoprolol succinate (TOPROL-XL) 25 MG 24 hr tablet Take 0.5 tablets (12.5 mg total) by mouth once daily.  Qty: 30 tablet, Refills: 6    Comments: .      albuterol (PROVENTIL) 2.5 mg /3 mL (0.083 %) nebulizer solution Take 3 mLs (2.5 mg total) by nebulization every 4 to 6 hours as needed for Wheezing or Shortness of Breath.  Qty: 360 mL, Refills: 11    Comments: ChronicObstructivePulmonaryDiseaseCode:[id number]  Associated Diagnoses: Pulmonary emphysema, unspecified emphysema type; Panlobular emphysema      albuterol (PROVENTIL/VENTOLIN HFA) 90 mcg/actuation inhaler Inhale 2 puffs into the lungs every 4 (four) hours as needed for Wheezing or Shortness of Breath.  Qty: 8.5 g, Refills: 11    Comments: Dispense formulary  Associated Diagnoses: Panlobular emphysema      empagliflozin (JARDIANCE) 10 mg tablet Take 1 tablet (10 mg total) by mouth once daily.  Qty: 30 tablet, Refills: 1      ferrous sulfate 325 (65 FE) MG EC tablet Take 1 tablet (325 mg total) by mouth once daily.  Qty: 30 tablet, Refills: 1      fluticasone-umeclidin-vilanter (TRELEGY ELLIPTA) 200-62.5-25 mcg inhaler Inhale 1 puff into the lungs once daily.  Qty: 60 each, Refills: 11    Associated Diagnoses: Panlobular emphysema      furosemide (LASIX) 40 MG tablet Take 1 tablet (40 mg total) by mouth 2 (two) times a day.  Qty: 90 tablet, Refills: 3      memantine (NAMENDA) 5 MG Tab Take 1 tablet (5 mg total) by mouth 2 (two) times  daily.  Qty: 60 tablet, Refills: 11    Associated Diagnoses: Alcohol dependence with uncomplicated withdrawal; Major neurocognitive disorder due to multiple etiologies      potassium chloride SA (K-DUR,KLOR-CON M) 10 MEQ tablet Take 1 tablet (10 mEq total) by mouth once daily.  Qty: 90 tablet, Refills: 1      predniSONE (DELTASONE) 20 MG tablet Take 3 tablets (60mg) by mouth daily for 3 days THEN take 2 tablets (40mg) by mouth daily for 3 days THEN take 1 tablet (20mg) by mouth daily for 3 days THEN take 1/2 tablet (10mg) by mouth daily for 3 days  Qty: 20 tablet, Refills: 0    Associated Diagnoses: COPD exacerbation      sacubitriL-valsartan (ENTRESTO)  mg per tablet Take 1 tablet by mouth 2 (two) times daily.  Qty: 180 tablet, Refills: 1    Associated Diagnoses: Chronic combined systolic and diastolic congestive heart failure, NYHA class 3                 Discharge Diagnosis: Primary osteoarthritis of both knees [M17.0]  Condition on Discharge: Stable with no complications to procedure   Diet on Discharge: Same as before.  Activity: as per instruction sheet.  Discharge to: Home with a responsible adult.  Follow up: 2-4 weeks       Please call the office at [phone] if you experience any weakness or loss of sensation, fever > 101.5, pain uncontrolled with oral medications, persistent nausea/vomiting/or diarrhea, redness or drainage from the incisions, or any other worrisome concerns. If physician on call was not reached or could not communicate with our office for any reason please go to the nearest emergency department

## 2023-05-10 NOTE — DISCHARGE INSTRUCTIONS

## 2023-05-10 NOTE — OP NOTE
Procedure Note:     bilateral Knee Synvisc 1 injection under fluoroscopy    05/10/2023                                 Surgeon: Devon Grant MD       Assistant: None     Pre-Op Diagnosis:  bilateral Primary osteoarthritis of both knees [M17.0]    Post-Op Diagnosis: Primary osteoarthritis of both knees [M17.0]     EBL: None     Complications: None     Specimens: None     Description of procedure:     After written consent was obtained, patient placed in supine position.  The area over the  lateral aspect of the bilateral  knee(s) joint prepped with chlorhexidine.  The area was draped in the usual sterile fashion.  Approximately 5 mL 1% lidocaine was infiltrated into the skin overlying the predetermined entry point. A 22 gauge spinal needle was then advanced under fluoroscopy in the AP views into the knee joint. After negative aspiration there was injection of 1 mL radio opaque contrast dye to confirm needle location within the joint, and no evidence of intravascular spread. This was followed by injection of 6 mL of sodium hyaluronate into the joint space. Displacement of the contrast indicated that the medication went into the area of the joint space. Needle was withdrawn and a sterile band-aid applied to the skin.     Patient tolerated the procedure well, and was reporting improvement of pain symptoms after the injection.  She was discharged from the clinic in stable condition.

## 2023-05-29 ENCOUNTER — PES CALL (OUTPATIENT)
Dept: ADMINISTRATIVE | Facility: CLINIC | Age: 83
End: 2023-05-29
Payer: MEDICARE

## 2023-06-02 ENCOUNTER — CLINICAL SUPPORT (OUTPATIENT)
Dept: REHABILITATION | Facility: HOSPITAL | Age: 83
End: 2023-06-02
Payer: COMMERCIAL

## 2023-06-02 DIAGNOSIS — R29.898 BILATERAL LEG WEAKNESS: ICD-10-CM

## 2023-06-02 PROCEDURE — 97530 THERAPEUTIC ACTIVITIES: CPT | Mod: PN

## 2023-06-02 PROCEDURE — 97110 THERAPEUTIC EXERCISES: CPT | Mod: PN

## 2023-06-02 PROCEDURE — 97161 PT EVAL LOW COMPLEX 20 MIN: CPT | Mod: PN

## 2023-06-02 PROCEDURE — 97112 NEUROMUSCULAR REEDUCATION: CPT | Mod: PN

## 2023-06-02 NOTE — PLAN OF CARE
"[facility]   Physical Therapy Initial Evaluation      Name: Richy Douglas  [facility] Number: [id number]    Therapy Diagnosis:   Encounter Diagnosis   Name Primary?    Bilateral leg weakness         Physician: Mita Bruno NP    Physician Orders: PT Eval and Treat   Medical Diagnosis from Referral: Osteoarthritis of both knees, unspecified osteoarthritis type [M17.0], Primary osteoarthritis of both knees [M17.0]  Evaluation Date: 6/2/2023  Authorization Period Expiration: 12/31/2023  Plan of Care Expiration: 7/28/2023  Progress Note Due: 7/2/2023  Visit # / Visits authorized: 1/ 1   FOTO: 1/5    Precautions: CHF and cancer     Time In: 12:45 pm  Time Out: 1:30 pm   Total Appointment Time (timed & untimed codes): 45 minutes    Subjective     Date of onset: February 2023     History of current condition - Richy reports:  B knee pain (L>R). That has been present since February with gradual insidious onset. Patient states pain is worst at work where he stands and climbs stairs all day. Patient denies any falls. Patient reports he was treated with injection prior to attending physical therapy, which helped for a while. Patient states both knees will pop intermittently and it is painful. Patient denies numbness/ tingling. Patient also reports history of back pain with previous surgical intervention.     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   Prior Level of Function: Independent   Current Level of Function: limited with stair climbing, standing, bending, lifting and walking    Pain:  Current 9/10, worst 10/10, best 8/10   Location: B knees   Description: Aching and Tight  Aggravating Factors: standing, walking   Easing Factors: injection, medication, heat     Patients goals: patient would like to have have " decreased pain levels to improved tolerance of activities of daily living around home and work     Medical History:   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    Anemia of chronic disease     Aortic aneurysm     Atrial fibrillation with RVR 09/24/2021    Chronic systolic congestive heart failure 08/31/2017    Emphysema of lung 08/31/2017    GERD (gastroesophageal reflux disease)     Hypertension     Knee osteoarthritis 10/21/2022    Major neurocognitive disorder due to multiple etiologies 4/14/2023    Microcytic anemia 11/24/2020    Other hyperlipidemia 04/27/2021    Personal history of colonic polyps 2022    Prostate cancer        Surgical History:   Richy Douglas  has a past surgical history that includes Prostate surgery; Spine surgery; Colonoscopy; Catheterization of both left and right heart (N/A, 1/13/2022); Coronary angiography (N/A, 1/13/2022); Esophagogastroduodenoscopy (N/A, 6/30/2022); Colonoscopy (N/A, 6/30/2022); Colonoscopy (N/A, 7/1/2022); Injection of joint (Bilateral, 10/21/2022); Injection of joint (Bilateral, 1/31/2023); and Injection of joint (Bilateral, 5/10/2023).    Medications:   Richy has a current medication list which includes the following prescription(s): albuterol, albuterol, empagliflozin, ferrous sulfate, trelegy ellipta, furosemide, memantine, metoprolol succinate, potassium chloride sa, prednisone, and entresto.    Allergies:   Review of patient's allergies indicates:   Allergen Reactions    Fish containing products     Iodine and iodide containing products     Shellfish containing products         Objective        RANGE OF MOTION:    Lumbar  (% of motion available)  Right  (spine) Left   Pain/Dysfunction with Movement Goal   Lumbar Flexion  100% --- Pulling felt in low back and hamstrings  ---   Lumbar Extension  0% --- Pain in back 25%   Lumbar Side Bending  20% 20% --- 75%     Knee AROM  (Degrees) Right Left Pain/Dysfunction with  "Movement Goal   Knee Flexion (135) 108 100 Pain at end range R, pain with whole range L 115 B    Knee Extension (0) Lacking 5 degrees Lacking 12 degrees Pain with Overpressure B  Lacking < 5 B      STRENGTH:    L/E MMT Right Left Pain/Dysfunction with Movement Goal   Hip Flexion  4/5 4/5  5/5 B   Hip Abduction  3+/5 3/5 Pain in low back and knees  4/5 B   Knee Extension 4/5 4-/5 Pain in L knee 5/5 B   Knee Flexion 4-/5 4-/5 Pain in L knee 5/5 B       MUSCLE LENGTH:     Muscle Tested  Right Left    Quadriceps decreased decreased   Hamstrings  decreased decreased   Gastrocnemius  decreased decreased     Sensation:  Sensation is intact to light touch in B lower extremities     Palpation: Increased tone and tenderness noted with palpation of bilateral  lumbar paraspinals and distal hamstrings . Increased tenderness noted with palpation of bilateral  knee joint lines .     Posture:  Pt presents with postural abnormalities which include: forward head, rounded shoulders , and increased thoracic kyphosis     Gait Analysis: The patient ambulated with the following assistive device: none; the pt presents with the following gait abnormalities: bradykinetic and decreased B foot clearance, trunk flexion posture, and slight limp on L        Movement Analysis Observations noted   Sit to stand Weight shift away from L lower extremity, B upper extremity use and decreased trunk extension and quad engagement       Limitation/Restriction for FOTO Knee Survey    Therapist reviewed FOTO scores for Richy Douglas on 6/2/2023.   FOTO documents entered into [employer] - see Media section.    Limitation Score: 57%         Treatment     Total Treatment time (time-based codes) separate from Evaluation: 25 minutes     Richy received the treatments listed below:      therapeutic exercises to develop strength, endurance, ROM, flexibility, and posture for 8 minutes including:    Hamstring stretch 3 x 20" B   LAQ x 10 B     neuromuscular re-education " "activities to improve: Balance, Coordination, Kinesthetic, Sense, Proprioception, and Posture for 8 minutes. The following activities were included:    Quad set 2 x 10 5" holds B   Bridges 2 x 10     therapeutic activities to improve functional performance for 9  minutes, including:    Patient education and demonstration of effective terms of safe daily HEP performance       Patient Education and Home Exercises     Education provided:   - plan of care explanation and expectations   - HEP compliance importance  - exercises justification and reasoning  - how building muscle support for joints will help with pain and function    Written Home Exercises Provided: yes. Exercises were reviewed and Richy was able to demonstrate them prior to the end of the session.  Richy demonstrated good  understanding of the education provided. See EMR under Patient Instructions for exercises provided during therapy sessions.    Assessment     Richy is a 82 y.o. male referred to outpatient Physical Therapy with a medical diagnosis of bilateral knee osteoarthritis. Patient presents with limited with joint mobility, flexibility, strength, endurance, pain and function. Limitations are affecting patient's function around home, at work and around community.     Patient prognosis is Good.   Patient will benefit from skilled outpatient Physical Therapy to address the deficits stated above and in the chart below, provide patient /family education, and to maximize patientt's level of independence.     Plan of care discussed with patient: Yes  Patient's spiritual, cultural and educational needs considered and patient is agreeable to the plan of care and goals as stated below:   Anticipated Barriers for therapy: occupation and co-morbidities     Medical Necessity is demonstrated by the following  History  Co-morbidities and personal factors that may impact the plan of care [] LOW: no personal factors / co-morbidities  [x] MODERATE: 1-2 personal " factors / co-morbidities  [] HIGH: 3+ personal factors / co-morbidities    Moderate / High Support Documentation: see co-morbidities as listed above in medical history     Examination  Body Structures and Functions, activity limitations and participation restrictions that may impact the plan of care [x] LOW: addressing 1-2 elements  [] MODERATE: 3+ elements  [] HIGH: 4+ elements (please support below)    Moderate / High Support Documentation: n/a     Clinical Presentation [x] LOW: stable  [] MODERATE: Evolving  [] HIGH: Unstable     Decision Making/ Complexity Score: low       Goals:    Short Term Goals:  4 weeks Progress   6/2/2023   Pain: Pt will demonstrate improved pain by reports of less than or equal to 8/10 worst pain on the verbal rating scale in order to progress toward maximal functional ability and improve QOL. PC   HEP: Patient will demonstrate independence with HEP in order to progress toward functional independence. PC     Long Term Goals:  8 weeks Progress  6/2/2023   Pain: Pt will demonstrate improved pain by reports of less than or equal to 5/10 worst pain on the verbal rating scale in order to progress toward maximal functional ability and improve QOL.   PC   Function: Patient will demonstrate improved function as indicated by a functional limitation score of less than or equal to 45 out of 100 on FOTO. PC   Mobility: Patient will improve AROM to stated goals, listed in objective measures above, in order to return to maximal functional potential and improve quality of life. PC   Strength: Patient will improve strength to stated goals, listed in objective measures above, in order to improve functional independence and quality of life. PC   Stair Climbing: Patient will demonstrate improved tolerance of stair climbing with performance of 12 steps up/down with step through method and 5/10 pain or less. PC   GOALS KEY:   PC= progressing/continue; PM= partially met;        DC= discontinue  Plan     Plan  of care Certification: 6/2/2023 to 7/28/2023.    Outpatient Physical Therapy 1 times weekly for 8 weeks to include the following interventions: Cervical/Lumbar Traction, Electrical Stimulation with or without FDN, Gait Training, Manual Therapy, Moist Heat/ Ice, Neuromuscular Re-ed, Orthotic Management and Training, Patient Education, Self Care, Therapeutic Activities, Therapeutic Exercise, Ultrasound, and Dry Needling .     Shauna Aguirre, PT

## 2023-06-02 NOTE — H&P (VIEW-ONLY)
Est Patient Chronic Pain Note (Follow up Visit)  PCP: Vivian Ch MD    Chief Complaint:   Chief Complaint   Patient presents with    Knee Pain     bilateral          SUBJECTIVE:  Interval History (6/6/2023): Richy Douglas presents today for follow-up visit.  he underwent bilateral synvisc injection  on 5/10/23.  The patient reports that he is/was better following the procedure, but  he reports only 30% pain relief.  The changes lasted 4 weeks so far.  The changes have continued through this visit.  Patient reports pain as 8/10 today. Continues to report achy pain in both knees, left worse than right with intermittent swelling. He has now tried physical therapy, steroid injections, and gel injections without relief. Unable to take NSAIDs secondary to cardiac history.    Interval History (4/24/2023): Richy Douglas presents today for follow-up visit.  he underwent  bilateral IA knee injection  on 01/31/23.  The patient reports that he is/was better following the procedure.  he reports 50% pain relief.  The changes 2 months before pain returned to baseline. Continues to report achy pain in both knees, left worse than right with intermittent swelling.  Patient reports pain as 10/10 today.    Interval History (1/11/2023):  Richy Douglas presents today for follow-up visit.  Patient was last seen on 11/16/2022. At that visit, the plan was to repeat knee injections as needed. Last injection bilateral IA knee injection with steroid on 10/21/22 with 60% relief. Patient reports pain as 9/10 today. He reports achy pain in both knees, left worse than right with intermittent swelling. He continues to work outside the home and also performs a majority of the household chores.      Interval History (11/16/2022): Richy Douglas presents today for follow-up visit.  he underwent bilateral IA knee injection with steroid on 10/21/22.  The patient reports that he is/was better following the procedure.  he reports 60% pain relief.  The  changes lasted 4 weeks so far.  The changes have continued through this visit, though he notes some diminished relief in his left knee. He still works and reports the knee becomes swollen and gives out if he is up for too long.  Patient reports pain as 8/10 today. Since last visit, he has followed up with podiatry for ankle pain and right big toe pain with onychomycosis and big toe nail falling off. He also reports intermittent swelling of his left leg/ankle and intermittent shortness of breath. History of CHF, followed by cardiology, most recent visit 11/02/2022    X-ray lumbar spine 09/28/2022  FINDINGS:  Vertebral body heights maintained.  Trace anterolisthesis of L4 on L5 and retrolisthesis L1 on L2.  No significant change in alignment on extension or flexion..  Multilevel degenerative disc height loss, most severe L2-3.  Multilevel facet arthropathy and osteophyte changes.  No definite pars defects..  Atherosclerotic vascular calcification.  No acute abnormality.     Impression:     Multilevel prominent degenerative findings.    X-ray knee bilateral 09/28/2022  FINDINGS:  Bilateral arthritic changes present including moderate to severe right knee lateral compartment and left knee medial compartment joint space narrowing.  Bilateral chondrocalcinosis noted.  No acute abnormality appreciated.     Initial HPI 09/28/2022  Richy Douglas is a 82 y.o. male with past medical history significant for alcohol dependence, lung emphysema, aortic aneurysm, atrial fibrillation, systolic congestive heart failure, hypertension, hyperlipidemia, anemia of chronic disease, GERD, nicotine dependence who presents to the clinic for the evaluation of bilateral knee pain.  Patient reports knee pain is in present since April 2022 without inciting accident injury or trauma.  Today patient reports pain is rated a 7/10 and is constant.  Patient reports pain in the popliteal fossa as well as pain which radiates distally to the dorsum of the  foot in L4 distribution.  Patient reports associated superficial swelling along the suprapatellar aspect of bilateral knees.  Pain is exacerbated with knee flexion, extension, moving from sitting to standing and with ambulation.  Patient does endorse associated weakness in the lower extremities associated with his pain.  Of note patient reports prior lower back surgery [facility] with improvement in lower back and radicular pain.  Patient has not tried membrane stabilizing agents or interventions at this time.  Patient has tried physician directed physical therapy exercises at home without significant relief.  Patient also reports right big toe pain with onychomycosis and big toe nail falling off.    Patient reports significant motor weakness.  Patient denies night fever/night sweats, urinary incontinence, bowel incontinence, significant weight loss, and loss of sensations.      Pain Disability Index Review:     Last 3 PDI Scores 9/28/2022   Pain Disability Index (PDI) 42       Non-Pharmacologic Treatments:  Physical Therapy/Home Exercise: yes  Ice/Heat:no  TENS: no  Acupuncture: no  Massage: no  Chiropractic: no    Other: no      Pain Medications:  - Adjuvant Medications: Neurontin (Gabapentin)    Pain Procedures:   -bilateral IA knee injection with steroid on 10/21/22 with 60% relief  -bilateral IA knee injection  on 01/31/23 with 50% relief  -bilateral synvisc injection  on 5/10/23 with 30% relief    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    Anemia of chronic disease     Aortic aneurysm     Atrial fibrillation with RVR 09/24/2021    Chronic systolic congestive heart failure 08/31/2017    Emphysema of lung 08/31/2017    GERD (gastroesophageal reflux disease)     Hypertension     Knee osteoarthritis 10/21/2022    Major neurocognitive disorder due to multiple etiologies 4/14/2023    Microcytic anemia 11/24/2020    Other hyperlipidemia 04/27/2021    Personal  history of colonic polyps 2022    Prostate cancer      Past Surgical History:   Procedure Laterality Date    CATHETERIZATION OF BOTH LEFT AND RIGHT HEART N/A 1/13/2022    Procedure: CATHETERIZATION, HEART, BOTH LEFT AND RIGHT;  Surgeon: Janneth Tovar MD;  Location: [facility] CATH LAB;  Service: Cardiology;  Laterality: N/A;  poss cx    COLONOSCOPY      COLONOSCOPY N/A 6/30/2022    Procedure: COLONOSCOPY;  Surgeon: Sandra Perez MD;  Location: [facility] ENDO;  Service: Endoscopy;  Laterality: N/A;    COLONOSCOPY N/A 7/1/2022    Procedure: COLONOSCOPY;  Surgeon: Woodrow Christian MD;  Location: [facility] ENDO;  Service: Endoscopy;  Laterality: N/A;    CORONARY ANGIOGRAPHY N/A 1/13/2022    Procedure: ANGIOGRAM, CORONARY ARTERY;  Surgeon: Janneth Tovar MD;  Location: [facility] CATH LAB;  Service: Cardiology;  Laterality: N/A;    ESOPHAGOGASTRODUODENOSCOPY N/A 6/30/2022    Procedure: EGD (ESOPHAGOGASTRODUODENOSCOPY);  Surgeon: Sandra Perez MD;  Location: [facility] ENDO;  Service: Endoscopy;  Laterality: N/A;    INJECTION OF JOINT Bilateral 10/21/2022    Procedure: Bilateral intraarticular knee injection with local ALLERGIC TO IODINE;  Surgeon: Devon Grant MD;  Location: [facility] PAIN MGT;  Service: Pain Management;  Laterality: Bilateral;    INJECTION OF JOINT Bilateral 1/31/2023    Procedure: Bilateral IA knee joint injection with steroid RN IV Sedation;  Surgeon: Devon Grant MD;  Location: [facility] PAIN MGT;  Service: Pain Management;  Laterality: Bilateral;    INJECTION OF JOINT Bilateral 5/10/2023    Procedure: Bilateral IA knee joint injection Synvisc RN IV Sedation;  Surgeon: Devon Grant MD;  Location: [facility] PAIN MGT;  Service: Pain Management;  Laterality: Bilateral;    PROSTATE SURGERY      SPINE SURGERY       Review of patient's allergies indicates:   Allergen Reactions    Fish containing products     Iodine and iodide containing products     Shellfish containing products        Current Outpatient Medications    Medication Sig    albuterol (PROVENTIL) 2.5 mg /3 mL (0.083 %) nebulizer solution Take 3 mLs (2.5 mg total) by nebulization every 4 to 6 hours as needed for Wheezing or Shortness of Breath.    albuterol (PROVENTIL/VENTOLIN HFA) 90 mcg/actuation inhaler Inhale 2 puffs into the lungs every 4 (four) hours as needed for Wheezing or Shortness of Breath.    empagliflozin (JARDIANCE) 10 mg tablet Take 1 tablet (10 mg total) by mouth once daily.    ferrous sulfate 325 (65 FE) MG EC tablet Take 1 tablet (325 mg total) by mouth once daily.    fluticasone-umeclidin-vilanter (TRELEGY ELLIPTA) 200-62.5-25 mcg inhaler Inhale 1 puff into the lungs once daily.    furosemide (LASIX) 40 MG tablet Take 1 tablet (40 mg total) by mouth 2 (two) times a day.    memantine (NAMENDA) 5 MG Tab Take 1 tablet (5 mg total) by mouth 2 (two) times daily.    metoprolol succinate (TOPROL-XL) 25 MG 24 hr tablet Take 0.5 tablets (12.5 mg total) by mouth once daily.    potassium chloride SA (K-DUR,KLOR-CON M) 10 MEQ tablet Take 1 tablet (10 mEq total) by mouth once daily.    predniSONE (DELTASONE) 20 MG tablet Take 3 tablets (60mg) by mouth daily for 3 days THEN take 2 tablets (40mg) by mouth daily for 3 days THEN take 1 tablet (20mg) by mouth daily for 3 days THEN take 1/2 tablet (10mg) by mouth daily for 3 days    sacubitriL-valsartan (ENTRESTO)  mg per tablet Take 1 tablet by mouth 2 (two) times daily.     No current facility-administered medications for this visit.       Review of Systems     GENERAL:  No weight loss, malaise or fevers.  HEENT:   No recent changes in vision or hearing  NECK:  Negative for lumps, no difficulty with swallowing.  RESPIRATORY:  Negative for cough, wheezing or shortness of breath, patient denies any recent URI.  CARDIOVASCULAR:  Negative for chest pain or palpitations.  GI:  Negative for abdominal discomfort, blood in stools or black stools or change in bowel habits.  MUSCULOSKELETAL:  See HPI.  SKIN:  Negative  "for lesions, rash, and itching.  PSYCH:  No mood disorder or recent psychosocial stressors.   HEMATOLOGY/LYMPHOLOGY:  Negative for prolonged bleeding, bruising easily or swollen nodes.    NEURO:   No history of syncope, paralysis, seizures or tremors.  All other reviewed and negative other than HPI.    OBJECTIVE:    BP (!) 110/55 (BP Location: Right arm, Patient Position: Sitting)   Pulse 88   Ht 5' 11" (1.803 m)   Wt 72.3 kg (159 lb 8 oz)   BMI 22.25 kg/m²       Physical Exam    GENERAL: Well appearing, in no acute distress, alert and oriented x3.  PSYCH:  Mood and affect appropriate.  SKIN: Skin color, texture, turgor normal, no rashes or lesions.  HEAD/FACE:  Normocephalic, atraumatic. Cranial nerves grossly intact.    CV: RRR with palpation of the radial artery.  PULM: No evidence of respiratory difficulty, symmetric chest rise.  GI:  Soft and non-tender.    BACK:  Positive shopping cart sign.  Well-healed 6 in lower lumbar vertical midline incision.  Straight leg raising in the sitting and supine positions is negative to radicular pain. No pain to palpation over the facet joints of the lumbar spine or spinous processes. Normal range of motion without pain reproduction.  EXTREMITIES: Peripheral joint ROM is reduced with pain.  No deformities, edema, or skin discoloration. Good capillary refill.  MUSCULOSKELETAL: Able to stand on heels & toes.   hip, and knee provocative maneuvers are negative.  There is no pain with palpation over the sacroiliac joints bilaterally.  Gaenslen's, Distraction/Compression and  FABERs test is negative.  Facet loading test is negative bilaterally.   Bilateral upper and lower extremity strength is normal and symmetric.  No atrophy or tone abnormalities are noted.    RIGHT Lower extremity: Hip flexion 5/5, Hip Abduction 5/5, Hip Adduction 5/5, Knee extension 5/5, Knee flexion 5/5, Ankle dorsiflexion5/5, Extensor hallucis longus 5/5, Ankle plantarflexion 5/5  LEFT Lower extremity:  " Hip flexion 5/5, Hip Abduction 5/5,Hip Adduction 5/5, Knee extension 5/5, Knee flexion 5/5, Ankle dorsiflexion 5/5, Extensor hallucis longus 5/5, Ankle plantarflexion 5/5  -Normal testing knee (patellar) jerk and ankle (achilles) jerk    NEURO: Bilateral upper and lower extremity coordination and muscle stretch reflexes are physiologic and symmetric. No loss of sensation is noted.    Knee Exam:  bilateral    Erythema: Absent  Deformity/Swelling: Present  Effusion: Absent  Chronic Bony Changes: Present  Creptius: Present     milddiffuse tenderness palpation of the mediolateral joint lines and patella     ROM: full range with pain     Strength is 5/5 bilateral  Bilateral Non-pitting edema to lower extremities, left >> right     Neurovascular intact    GAIT:  Antalgic    Imaging:   X-ray lumbar spine 09/28/2022  FINDINGS:  Vertebral body heights maintained.  Trace anterolisthesis of L4 on L5 and retrolisthesis L1 on L2.  No significant change in alignment on extension or flexion..  Multilevel degenerative disc height loss, most severe L2-3.  Multilevel facet arthropathy and osteophyte changes.  No definite pars defects..  Atherosclerotic vascular calcification.  No acute abnormality.     Impression:     Multilevel prominent degenerative findings.    X-ray knee bilateral 09/28/2022  FINDINGS:  Bilateral arthritic changes present including moderate to severe right knee lateral compartment and left knee medial compartment joint space narrowing.  Bilateral chondrocalcinosis noted.  No acute abnormality appreciated.        ASSESSMENT: 82 y.o. year old male with knee and leg pain, consistent with     1. Primary osteoarthritis of both knees  IR Peripheral Nerve Injection    Case Request-RAD/Other Procedure Area: Bilateral Genicular nerve block RN IV Sedation    IR Peripheral Nerve Injection                    PLAN:   - Interventions: Schedule for bilateral genicular injections for chronic knee pain as he has failed physical  therapy, steroid injections, and gel injections without relief. Unable to take NSAIDs secondary to cardiac history.  --bilateral synvisc injection  on 5/10/23 with 30% relief  --bilateral IA knee injection  on 01/31/23 with 50% relief  -s/p bilateral IA knee injection with steroid on 10/21/22 with 60% relief     - Procedure note: An IM injection of (methylPREDNISolone acetate 40 mg) was administered during clinic visit.  This was well tolerated.      - Anticoagulation use: no no anticoagulation       report:  Reviewed and consistent with medication use as prescribed.    - Medications:    - Therapy:   Continue activities as tolerated    - Imaging: Reviewed bilateral knee and lumbar x-ray with patient and answered any questions they had regarding study.      - Consults/Referrals:  Podiatry- continue follow up as needed    - Records: Obtain old records from outside physicians and imaging:  [facility]:  Lumbar spine surgery    - Follow up visit: return to clinic in 4 weeks after injection      The above plan and management options were discussed at length with patient. Patient is in agreement with the above and verbalized understanding.    - I discussed the goals of interventional chronic pain management with the patient on today's visit. We discussed a multimodal and systematic approach to pain.  This includes diagnostic and therapeutic injections, adjuvant pharmacologic treatment, physical therapy, and at times psychiatry.  I emphasized the importance of regular exercise, core strengthening and stretching, diet and weight loss as a cornerstone of long-term pain management.    - This condition does not require this patient to take time off of work, and the primary goal of our Pain Management services is to improve the patient's functional capacity.  - Patient Questions: Answered all of the patient's questions regarding diagnoses, therapy, treatment and next steps        Meghan Escobar  ZAIRE  Interventional Pain Management  [facility]    Disclaimer:  This note was prepared using voice recognition system and is likely to have sound alike errors that may have been overlooked even after proof reading.  Please call me with any questions

## 2023-06-02 NOTE — PROGRESS NOTES
Est Patient Chronic Pain Note (Follow up Visit)  PCP: Vivian Ch MD    Chief Complaint:   Chief Complaint   Patient presents with    Knee Pain     bilateral          SUBJECTIVE:  Interval History (6/6/2023): Richy Douglas presents today for follow-up visit.  he underwent bilateral synvisc injection  on 5/10/23.  The patient reports that he is/was better following the procedure, but  he reports only 30% pain relief.  The changes lasted 4 weeks so far.  The changes have continued through this visit.  Patient reports pain as 8/10 today. Continues to report achy pain in both knees, left worse than right with intermittent swelling. He has now tried physical therapy, steroid injections, and gel injections without relief. Unable to take NSAIDs secondary to cardiac history.    Interval History (4/24/2023): Richy Douglas presents today for follow-up visit.  he underwent  bilateral IA knee injection  on 01/31/23.  The patient reports that he is/was better following the procedure.  he reports 50% pain relief.  The changes 2 months before pain returned to baseline. Continues to report achy pain in both knees, left worse than right with intermittent swelling.  Patient reports pain as 10/10 today.    Interval History (1/11/2023):  Richy Douglas presents today for follow-up visit.  Patient was last seen on 11/16/2022. At that visit, the plan was to repeat knee injections as needed. Last injection bilateral IA knee injection with steroid on 10/21/22 with 60% relief. Patient reports pain as 9/10 today. He reports achy pain in both knees, left worse than right with intermittent swelling. He continues to work outside the home and also performs a majority of the household chores.      Interval History (11/16/2022): Richy Douglas presents today for follow-up visit.  he underwent bilateral IA knee injection with steroid on 10/21/22.  The patient reports that he is/was better following the procedure.  he reports 60% pain relief.  The  changes lasted 4 weeks so far.  The changes have continued through this visit, though he notes some diminished relief in his left knee. He still works and reports the knee becomes swollen and gives out if he is up for too long.  Patient reports pain as 8/10 today. Since last visit, he has followed up with podiatry for ankle pain and right big toe pain with onychomycosis and big toe nail falling off. He also reports intermittent swelling of his left leg/ankle and intermittent shortness of breath. History of CHF, followed by cardiology, most recent visit 11/02/2022    X-ray lumbar spine 09/28/2022  FINDINGS:  Vertebral body heights maintained.  Trace anterolisthesis of L4 on L5 and retrolisthesis L1 on L2.  No significant change in alignment on extension or flexion..  Multilevel degenerative disc height loss, most severe L2-3.  Multilevel facet arthropathy and osteophyte changes.  No definite pars defects..  Atherosclerotic vascular calcification.  No acute abnormality.     Impression:     Multilevel prominent degenerative findings.    X-ray knee bilateral 09/28/2022  FINDINGS:  Bilateral arthritic changes present including moderate to severe right knee lateral compartment and left knee medial compartment joint space narrowing.  Bilateral chondrocalcinosis noted.  No acute abnormality appreciated.     Initial HPI 09/28/2022  Richy Douglas is a 82 y.o. male with past medical history significant for alcohol dependence, lung emphysema, aortic aneurysm, atrial fibrillation, systolic congestive heart failure, hypertension, hyperlipidemia, anemia of chronic disease, GERD, nicotine dependence who presents to the clinic for the evaluation of bilateral knee pain.  Patient reports knee pain is in present since April 2022 without inciting accident injury or trauma.  Today patient reports pain is rated a 7/10 and is constant.  Patient reports pain in the popliteal fossa as well as pain which radiates distally to the dorsum of the  foot in L4 distribution.  Patient reports associated superficial swelling along the suprapatellar aspect of bilateral knees.  Pain is exacerbated with knee flexion, extension, moving from sitting to standing and with ambulation.  Patient does endorse associated weakness in the lower extremities associated with his pain.  Of note patient reports prior lower back surgery [facility] with improvement in lower back and radicular pain.  Patient has not tried membrane stabilizing agents or interventions at this time.  Patient has tried physician directed physical therapy exercises at home without significant relief.  Patient also reports right big toe pain with onychomycosis and big toe nail falling off.    Patient reports significant motor weakness.  Patient denies night fever/night sweats, urinary incontinence, bowel incontinence, significant weight loss, and loss of sensations.      Pain Disability Index Review:     Last 3 PDI Scores 9/28/2022   Pain Disability Index (PDI) 42       Non-Pharmacologic Treatments:  Physical Therapy/Home Exercise: yes  Ice/Heat:no  TENS: no  Acupuncture: no  Massage: no  Chiropractic: no    Other: no      Pain Medications:  - Adjuvant Medications: Neurontin (Gabapentin)    Pain Procedures:   -bilateral IA knee injection with steroid on 10/21/22 with 60% relief  -bilateral IA knee injection  on 01/31/23 with 50% relief  -bilateral synvisc injection  on 5/10/23 with 30% relief    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    Anemia of chronic disease     Aortic aneurysm     Atrial fibrillation with RVR 09/24/2021    Chronic systolic congestive heart failure 08/31/2017    Emphysema of lung 08/31/2017    GERD (gastroesophageal reflux disease)     Hypertension     Knee osteoarthritis 10/21/2022    Major neurocognitive disorder due to multiple etiologies 4/14/2023    Microcytic anemia 11/24/2020    Other hyperlipidemia 04/27/2021    Personal  history of colonic polyps 2022    Prostate cancer      Past Surgical History:   Procedure Laterality Date    CATHETERIZATION OF BOTH LEFT AND RIGHT HEART N/A 1/13/2022    Procedure: CATHETERIZATION, HEART, BOTH LEFT AND RIGHT;  Surgeon: Janneth Tovar MD;  Location: [facility] CATH LAB;  Service: Cardiology;  Laterality: N/A;  poss cx    COLONOSCOPY      COLONOSCOPY N/A 6/30/2022    Procedure: COLONOSCOPY;  Surgeon: Sandra Perez MD;  Location: [facility] ENDO;  Service: Endoscopy;  Laterality: N/A;    COLONOSCOPY N/A 7/1/2022    Procedure: COLONOSCOPY;  Surgeon: Woodrow Christian MD;  Location: [facility] ENDO;  Service: Endoscopy;  Laterality: N/A;    CORONARY ANGIOGRAPHY N/A 1/13/2022    Procedure: ANGIOGRAM, CORONARY ARTERY;  Surgeon: Janneth Tovar MD;  Location: [facility] CATH LAB;  Service: Cardiology;  Laterality: N/A;    ESOPHAGOGASTRODUODENOSCOPY N/A 6/30/2022    Procedure: EGD (ESOPHAGOGASTRODUODENOSCOPY);  Surgeon: Sandra Perez MD;  Location: [facility] ENDO;  Service: Endoscopy;  Laterality: N/A;    INJECTION OF JOINT Bilateral 10/21/2022    Procedure: Bilateral intraarticular knee injection with local ALLERGIC TO IODINE;  Surgeon: Devon Grant MD;  Location: [facility] PAIN MGT;  Service: Pain Management;  Laterality: Bilateral;    INJECTION OF JOINT Bilateral 1/31/2023    Procedure: Bilateral IA knee joint injection with steroid RN IV Sedation;  Surgeon: Devon Grant MD;  Location: [facility] PAIN MGT;  Service: Pain Management;  Laterality: Bilateral;    INJECTION OF JOINT Bilateral 5/10/2023    Procedure: Bilateral IA knee joint injection Synvisc RN IV Sedation;  Surgeon: Devon Grant MD;  Location: [facility] PAIN MGT;  Service: Pain Management;  Laterality: Bilateral;    PROSTATE SURGERY      SPINE SURGERY       Review of patient's allergies indicates:   Allergen Reactions    Fish containing products     Iodine and iodide containing products     Shellfish containing products        Current Outpatient Medications    Medication Sig    albuterol (PROVENTIL) 2.5 mg /3 mL (0.083 %) nebulizer solution Take 3 mLs (2.5 mg total) by nebulization every 4 to 6 hours as needed for Wheezing or Shortness of Breath.    albuterol (PROVENTIL/VENTOLIN HFA) 90 mcg/actuation inhaler Inhale 2 puffs into the lungs every 4 (four) hours as needed for Wheezing or Shortness of Breath.    empagliflozin (JARDIANCE) 10 mg tablet Take 1 tablet (10 mg total) by mouth once daily.    ferrous sulfate 325 (65 FE) MG EC tablet Take 1 tablet (325 mg total) by mouth once daily.    fluticasone-umeclidin-vilanter (TRELEGY ELLIPTA) 200-62.5-25 mcg inhaler Inhale 1 puff into the lungs once daily.    furosemide (LASIX) 40 MG tablet Take 1 tablet (40 mg total) by mouth 2 (two) times a day.    memantine (NAMENDA) 5 MG Tab Take 1 tablet (5 mg total) by mouth 2 (two) times daily.    metoprolol succinate (TOPROL-XL) 25 MG 24 hr tablet Take 0.5 tablets (12.5 mg total) by mouth once daily.    potassium chloride SA (K-DUR,KLOR-CON M) 10 MEQ tablet Take 1 tablet (10 mEq total) by mouth once daily.    predniSONE (DELTASONE) 20 MG tablet Take 3 tablets (60mg) by mouth daily for 3 days THEN take 2 tablets (40mg) by mouth daily for 3 days THEN take 1 tablet (20mg) by mouth daily for 3 days THEN take 1/2 tablet (10mg) by mouth daily for 3 days    sacubitriL-valsartan (ENTRESTO)  mg per tablet Take 1 tablet by mouth 2 (two) times daily.     No current facility-administered medications for this visit.       Review of Systems     GENERAL:  No weight loss, malaise or fevers.  HEENT:   No recent changes in vision or hearing  NECK:  Negative for lumps, no difficulty with swallowing.  RESPIRATORY:  Negative for cough, wheezing or shortness of breath, patient denies any recent URI.  CARDIOVASCULAR:  Negative for chest pain or palpitations.  GI:  Negative for abdominal discomfort, blood in stools or black stools or change in bowel habits.  MUSCULOSKELETAL:  See HPI.  SKIN:  Negative  "for lesions, rash, and itching.  PSYCH:  No mood disorder or recent psychosocial stressors.   HEMATOLOGY/LYMPHOLOGY:  Negative for prolonged bleeding, bruising easily or swollen nodes.    NEURO:   No history of syncope, paralysis, seizures or tremors.  All other reviewed and negative other than HPI.    OBJECTIVE:    BP (!) 110/55 (BP Location: Right arm, Patient Position: Sitting)   Pulse 88   Ht 5' 11" (1.803 m)   Wt 72.3 kg (159 lb 8 oz)   BMI 22.25 kg/m²       Physical Exam    GENERAL: Well appearing, in no acute distress, alert and oriented x3.  PSYCH:  Mood and affect appropriate.  SKIN: Skin color, texture, turgor normal, no rashes or lesions.  HEAD/FACE:  Normocephalic, atraumatic. Cranial nerves grossly intact.    CV: RRR with palpation of the radial artery.  PULM: No evidence of respiratory difficulty, symmetric chest rise.  GI:  Soft and non-tender.    BACK:  Positive shopping cart sign.  Well-healed 6 in lower lumbar vertical midline incision.  Straight leg raising in the sitting and supine positions is negative to radicular pain. No pain to palpation over the facet joints of the lumbar spine or spinous processes. Normal range of motion without pain reproduction.  EXTREMITIES: Peripheral joint ROM is reduced with pain.  No deformities, edema, or skin discoloration. Good capillary refill.  MUSCULOSKELETAL: Able to stand on heels & toes.   hip, and knee provocative maneuvers are negative.  There is no pain with palpation over the sacroiliac joints bilaterally.  Gaenslen's, Distraction/Compression and  FABERs test is negative.  Facet loading test is negative bilaterally.   Bilateral upper and lower extremity strength is normal and symmetric.  No atrophy or tone abnormalities are noted.    RIGHT Lower extremity: Hip flexion 5/5, Hip Abduction 5/5, Hip Adduction 5/5, Knee extension 5/5, Knee flexion 5/5, Ankle dorsiflexion5/5, Extensor hallucis longus 5/5, Ankle plantarflexion 5/5  LEFT Lower extremity:  " Hip flexion 5/5, Hip Abduction 5/5,Hip Adduction 5/5, Knee extension 5/5, Knee flexion 5/5, Ankle dorsiflexion 5/5, Extensor hallucis longus 5/5, Ankle plantarflexion 5/5  -Normal testing knee (patellar) jerk and ankle (achilles) jerk    NEURO: Bilateral upper and lower extremity coordination and muscle stretch reflexes are physiologic and symmetric. No loss of sensation is noted.    Knee Exam:  bilateral    Erythema: Absent  Deformity/Swelling: Present  Effusion: Absent  Chronic Bony Changes: Present  Creptius: Present     milddiffuse tenderness palpation of the mediolateral joint lines and patella     ROM: full range with pain     Strength is 5/5 bilateral  Bilateral Non-pitting edema to lower extremities, left >> right     Neurovascular intact    GAIT:  Antalgic    Imaging:   X-ray lumbar spine 09/28/2022  FINDINGS:  Vertebral body heights maintained.  Trace anterolisthesis of L4 on L5 and retrolisthesis L1 on L2.  No significant change in alignment on extension or flexion..  Multilevel degenerative disc height loss, most severe L2-3.  Multilevel facet arthropathy and osteophyte changes.  No definite pars defects..  Atherosclerotic vascular calcification.  No acute abnormality.     Impression:     Multilevel prominent degenerative findings.    X-ray knee bilateral 09/28/2022  FINDINGS:  Bilateral arthritic changes present including moderate to severe right knee lateral compartment and left knee medial compartment joint space narrowing.  Bilateral chondrocalcinosis noted.  No acute abnormality appreciated.        ASSESSMENT: 82 y.o. year old male with knee and leg pain, consistent with     1. Primary osteoarthritis of both knees  IR Peripheral Nerve Injection    Case Request-RAD/Other Procedure Area: Bilateral Genicular nerve block RN IV Sedation    IR Peripheral Nerve Injection                    PLAN:   - Interventions: Schedule for bilateral genicular injections for chronic knee pain as he has failed physical  therapy, steroid injections, and gel injections without relief. Unable to take NSAIDs secondary to cardiac history.  --bilateral synvisc injection  on 5/10/23 with 30% relief  --bilateral IA knee injection  on 01/31/23 with 50% relief  -s/p bilateral IA knee injection with steroid on 10/21/22 with 60% relief     - Procedure note: An IM injection of (methylPREDNISolone acetate 40 mg) was administered during clinic visit.  This was well tolerated.      - Anticoagulation use: no no anticoagulation       report:  Reviewed and consistent with medication use as prescribed.    - Medications:    - Therapy:   Continue activities as tolerated    - Imaging: Reviewed bilateral knee and lumbar x-ray with patient and answered any questions they had regarding study.      - Consults/Referrals:  Podiatry- continue follow up as needed    - Records: Obtain old records from outside physicians and imaging:  [facility]:  Lumbar spine surgery    - Follow up visit: return to clinic in 4 weeks after injection      The above plan and management options were discussed at length with patient. Patient is in agreement with the above and verbalized understanding.    - I discussed the goals of interventional chronic pain management with the patient on today's visit. We discussed a multimodal and systematic approach to pain.  This includes diagnostic and therapeutic injections, adjuvant pharmacologic treatment, physical therapy, and at times psychiatry.  I emphasized the importance of regular exercise, core strengthening and stretching, diet and weight loss as a cornerstone of long-term pain management.    - This condition does not require this patient to take time off of work, and the primary goal of our Pain Management services is to improve the patient's functional capacity.  - Patient Questions: Answered all of the patient's questions regarding diagnoses, therapy, treatment and next steps        Meghan Escobar  ZAIRE  Interventional Pain Management  [facility]    Disclaimer:  This note was prepared using voice recognition system and is likely to have sound alike errors that may have been overlooked even after proof reading.  Please call me with any questions

## 2023-06-05 PROBLEM — J18.9 PNEUMONIA OF LEFT UPPER LOBE DUE TO INFECTIOUS ORGANISM: Status: RESOLVED | Noted: 2021-09-24 | Resolved: 2023-06-05

## 2023-06-06 ENCOUNTER — OFFICE VISIT (OUTPATIENT)
Dept: PAIN MEDICINE | Facility: CLINIC | Age: 83
End: 2023-06-06
Payer: COMMERCIAL

## 2023-06-06 VITALS
HEIGHT: 71 IN | DIASTOLIC BLOOD PRESSURE: 55 MMHG | SYSTOLIC BLOOD PRESSURE: 110 MMHG | WEIGHT: 159.5 LBS | BODY MASS INDEX: 22.33 KG/M2 | HEART RATE: 88 BPM

## 2023-06-06 DIAGNOSIS — M17.0 PRIMARY OSTEOARTHRITIS OF BOTH KNEES: Primary | ICD-10-CM

## 2023-06-06 PROCEDURE — 96372 THER/PROPH/DIAG INJ SC/IM: CPT | Mod: S$GLB,,, | Performed by: PHYSICIAN ASSISTANT

## 2023-06-06 PROCEDURE — 96372 PR INJECTION,THERAP/PROPH/DIAG2ST, IM OR SUBCUT: ICD-10-PCS | Mod: S$GLB,,, | Performed by: PHYSICIAN ASSISTANT

## 2023-06-06 PROCEDURE — 99214 PR OFFICE/OUTPT VISIT, EST, LEVL IV, 30-39 MIN: ICD-10-PCS | Mod: 25,S$GLB,, | Performed by: PHYSICIAN ASSISTANT

## 2023-06-06 PROCEDURE — 99214 OFFICE O/P EST MOD 30 MIN: CPT | Mod: PBBFAC | Performed by: PHYSICIAN ASSISTANT

## 2023-06-06 PROCEDURE — 96372 THER/PROPH/DIAG INJ SC/IM: CPT | Mod: PBBFAC

## 2023-06-06 PROCEDURE — 99999 PR PBB SHADOW E&M-EST. PATIENT-LVL IV: CPT | Mod: PBBFAC,,, | Performed by: PHYSICIAN ASSISTANT

## 2023-06-06 PROCEDURE — 99999 PR PBB SHADOW E&M-EST. PATIENT-LVL IV: ICD-10-PCS | Mod: PBBFAC,,, | Performed by: PHYSICIAN ASSISTANT

## 2023-06-06 PROCEDURE — 99214 OFFICE O/P EST MOD 30 MIN: CPT | Mod: 25,S$GLB,, | Performed by: PHYSICIAN ASSISTANT

## 2023-06-06 RX ORDER — METHYLPREDNISOLONE ACETATE 40 MG/ML
40 INJECTION, SUSPENSION INTRA-ARTICULAR; INTRALESIONAL; INTRAMUSCULAR; SOFT TISSUE
Status: COMPLETED | OUTPATIENT
Start: 2023-06-06 | End: 2023-06-06

## 2023-06-06 RX ADMIN — METHYLPREDNISOLONE ACETATE 40 MG: 40 INJECTION, SUSPENSION INTRA-ARTICULAR; INTRALESIONAL; INTRAMUSCULAR; SOFT TISSUE at 02:06

## 2023-06-07 ENCOUNTER — TELEPHONE (OUTPATIENT)
Dept: PAIN MEDICINE | Facility: CLINIC | Age: 83
End: 2023-06-07
Payer: MEDICARE

## 2023-06-07 ENCOUNTER — OFFICE VISIT (OUTPATIENT)
Dept: PRIMARY CARE CLINIC | Facility: CLINIC | Age: 83
End: 2023-06-07
Payer: COMMERCIAL

## 2023-06-07 VITALS
HEIGHT: 71 IN | WEIGHT: 164 LBS | BODY MASS INDEX: 22.96 KG/M2 | HEART RATE: 78 BPM | SYSTOLIC BLOOD PRESSURE: 114 MMHG | OXYGEN SATURATION: 93 % | DIASTOLIC BLOOD PRESSURE: 54 MMHG | TEMPERATURE: 99 F

## 2023-06-07 DIAGNOSIS — D50.0 IRON DEFICIENCY ANEMIA DUE TO CHRONIC BLOOD LOSS: ICD-10-CM

## 2023-06-07 DIAGNOSIS — I50.22 CHRONIC SYSTOLIC CONGESTIVE HEART FAILURE: Primary | ICD-10-CM

## 2023-06-07 DIAGNOSIS — D46.1 IDIOPATHIC REFRACTORY ANEMIA: ICD-10-CM

## 2023-06-07 DIAGNOSIS — I70.0 ATHEROSCLEROSIS OF ABDOMINAL AORTA: ICD-10-CM

## 2023-06-07 DIAGNOSIS — R41.3 MEMORY LOSS: ICD-10-CM

## 2023-06-07 DIAGNOSIS — R29.898 BILATERAL LEG WEAKNESS: ICD-10-CM

## 2023-06-07 DIAGNOSIS — I10 PRIMARY HYPERTENSION: ICD-10-CM

## 2023-06-07 DIAGNOSIS — M17.0 OSTEOARTHRITIS OF BOTH KNEES, UNSPECIFIED OSTEOARTHRITIS TYPE: ICD-10-CM

## 2023-06-07 DIAGNOSIS — E78.49 OTHER HYPERLIPIDEMIA: ICD-10-CM

## 2023-06-07 DIAGNOSIS — J43.9 PULMONARY EMPHYSEMA, UNSPECIFIED EMPHYSEMA TYPE: ICD-10-CM

## 2023-06-07 DIAGNOSIS — F02.80 MAJOR NEUROCOGNITIVE DISORDER DUE TO MULTIPLE ETIOLOGIES: ICD-10-CM

## 2023-06-07 DIAGNOSIS — R60.0 BILATERAL LEG EDEMA: ICD-10-CM

## 2023-06-07 DIAGNOSIS — D63.8 ANEMIA OF CHRONIC DISEASE: ICD-10-CM

## 2023-06-07 PROCEDURE — 99999 PR PBB SHADOW E&M-EST. PATIENT-LVL IV: ICD-10-PCS | Mod: PBBFAC,,, | Performed by: FAMILY MEDICINE

## 2023-06-07 PROCEDURE — 99214 PR OFFICE/OUTPT VISIT, EST, LEVL IV, 30-39 MIN: ICD-10-PCS | Mod: S$GLB,,, | Performed by: FAMILY MEDICINE

## 2023-06-07 PROCEDURE — 99214 OFFICE O/P EST MOD 30 MIN: CPT | Mod: PBBFAC,PN | Performed by: FAMILY MEDICINE

## 2023-06-07 PROCEDURE — 99999 PR PBB SHADOW E&M-EST. PATIENT-LVL IV: CPT | Mod: PBBFAC,,, | Performed by: FAMILY MEDICINE

## 2023-06-07 PROCEDURE — 99214 OFFICE O/P EST MOD 30 MIN: CPT | Mod: S$GLB,,, | Performed by: FAMILY MEDICINE

## 2023-06-07 NOTE — TELEPHONE ENCOUNTER
Reached out to the patient from the messages in rearguards of his procedure. The wife answer and said that he will call back later,  no time or date given.     Elmre Mann  Medical Assistant

## 2023-06-07 NOTE — TELEPHONE ENCOUNTER
----- Message from Jihan Resendez sent at 6/7/2023  3:51 PM CDT -----  Contact: Clemencia/daughter  Clemencia is needing a call back in regards to the patient's upcoming procedure. Please give her a call back at [phone]

## 2023-06-07 NOTE — LETTER
June 7, 2023    Richy Douglas  [address] [address]             [facility]  Primary Care  [address] [address] [address]  Phone: [phone]  Fax: [phone]   June 7, 2023     Patient: Richy Douglas   YOB: 1940   Date of Visit: 6/7/2023       To Whom it May Concern:    Richy Douglas was seen in my clinic on 6/7/2023.     He may return back to work on 06/08/2023.      Please excuse him from any  work missed.      If you have any questions or concerns, please don't hesitate to call.    Sincerely,     Vivian Ch MD

## 2023-06-08 ENCOUNTER — CLINICAL SUPPORT (OUTPATIENT)
Dept: REHABILITATION | Facility: HOSPITAL | Age: 83
End: 2023-06-08
Payer: COMMERCIAL

## 2023-06-08 DIAGNOSIS — D63.8 ANEMIA, CHRONIC DISEASE: ICD-10-CM

## 2023-06-08 DIAGNOSIS — D50.9 MICROCYTIC ANEMIA: ICD-10-CM

## 2023-06-08 DIAGNOSIS — D50.0 IRON DEFICIENCY ANEMIA DUE TO CHRONIC BLOOD LOSS: Primary | ICD-10-CM

## 2023-06-08 DIAGNOSIS — R29.898 BILATERAL LEG WEAKNESS: Primary | ICD-10-CM

## 2023-06-08 PROCEDURE — 97110 THERAPEUTIC EXERCISES: CPT | Mod: PN

## 2023-06-08 PROCEDURE — 97112 NEUROMUSCULAR REEDUCATION: CPT | Mod: PN

## 2023-06-08 NOTE — PROGRESS NOTES
"[facility]   Physical Therapy Treatment Note      Name: Richy Douglas  [facility] Number: [id number]    Therapy Diagnosis:   Encounter Diagnosis   Name Primary?    Bilateral leg weakness Yes     Physician: Mita Bruno NP    Visit Date: 6/8/2023    Physician Orders: PT Eval and Treat   Medical Diagnosis from Referral: Osteoarthritis of both knees, unspecified osteoarthritis type [M17.0], Primary osteoarthritis of both knees [M17.0]  Evaluation Date: 6/2/2023  Authorization Period Expiration: 12/31/2023  Plan of Care Expiration: 7/28/2023  Progress Note Due: 7/2/2023  Visit # / Visits authorized: 1/ 20  FOTO: 1/5     Precautions: CHF and cancer     PTA Visit #: 0/5     Time In: 12:00  Time Out: 12:50  Total Billable Time: 50 minutes ( 30 minutes 1:1 with trained technician)     Subjective     Pt reports: His left knee continues to hurt more than R.  He  was partially  compliant with home exercise program.  Response to previous treatment: n/a today  Functional change: n/a today    Pain: 9/10  Location: left knee      Objective      Objective Measures updated at progress report unless specified.     Treatment     Richy received the following treatments:    Richy received therapeutic exercises to develop strength, endurance, ROM, and flexibility for (25) minutes including:     Nustep 6' for joint bert and mobility    Shuttle 4 cords 3 minutes   Heel slides supine with strap B x 20 each   Hamstring Stretch 3 x 20" B     Richy received the following manual therapy techniques applied for (00) minutes, including:    Richy participated in neuromuscular re-education activities to improve: Balance, Coordination, Kinesthetic, Sense, Proprioception, and Posture for (25) minutes. The following activities were included:    Quad Sets 2 x 10 5" holds  Side lying Clamshells 2 x 8 B   Bridges 2 x 10   SAQ 2 x 8 3" holds     Richy participated in dynamic functional therapeutic activities to improve " functional performance for 00  minutes, including:      Richy participated in gait training to improve functional mobility and safety for 00  minutes, including:        Patient Education and Home Exercises       Education provided:   - importance of HEP compliance  - justification and explanation of treatment performed    Written Home Exercises Provided: Patient instructed to cont prior HEP. Exercises were reviewed and Richy was able to demonstrate them prior to the end of the session.  Richy demonstrated good  understanding of the education provided. See EMR under Patient Instructions for exercises provided during therapy sessions    Assessment   Patient presents with continued knee pain that is more prominent in the L side. Treatment today was focused on developing mobility, flexibility and proper muscle activation at the lower extremities. Patient requires cueing to prevent compensatory patterns and improve technique. Patient impaired quad control remains apparent with exercises today. Rest breaks are needed throughout session today. Will progress per tolerance.     Richy Is progressing well towards his goals.   Pt prognosis is Good.     Pt will continue to benefit from skilled outpatient physical therapy to address the deficits listed in the problem list box on initial evaluation, provide pt/family education and to maximize pt's level of independence in the home and community environment.     Pt's spiritual, cultural and educational needs considered and pt agreeable to plan of care and goals.     Anticipated Barriers for therapy: occupation and co-morbidities     Goals:      Short Term Goals:  4 weeks Progress   6/2/2023   Pain: Pt will demonstrate improved pain by reports of less than or equal to 8/10 worst pain on the verbal rating scale in order to progress toward maximal functional ability and improve QOL. PC   HEP: Patient will demonstrate independence with HEP in order to progress toward functional  independence. PC      Long Term Goals:  8 weeks Progress  6/2/2023   Pain: Pt will demonstrate improved pain by reports of less than or equal to 5/10 worst pain on the verbal rating scale in order to progress toward maximal functional ability and improve QOL.   PC   Function: Patient will demonstrate improved function as indicated by a functional limitation score of less than or equal to 45 out of 100 on FOTO. PC   Mobility: Patient will improve AROM to stated goals, listed in objective measures above, in order to return to maximal functional potential and improve quality of life. PC   Strength: Patient will improve strength to stated goals, listed in objective measures above, in order to improve functional independence and quality of life. PC   Stair Climbing: Patient will demonstrate improved tolerance of stair climbing with performance of 12 steps up/down with step through method and 5/10 pain or less. PC   GOALS KEY:   PC= progressing/continue;   PM= partially met;             DC= discontinue    Plan     Plan of care Certification: 6/2/2023 to 7/28/2023.     Outpatient Physical Therapy 1 times weekly for 8 weeks to include the following interventions: Cervical/Lumbar Traction, Electrical Stimulation with or without FDN, Gait Training, Manual Therapy, Moist Heat/ Ice, Neuromuscular Re-ed, Orthotic Management and Training, Patient Education, Self Care, Therapeutic Activities, Therapeutic Exercise, Ultrasound, and Dry Needling .     Shauna Aguirre, PT

## 2023-06-13 ENCOUNTER — LAB VISIT (OUTPATIENT)
Dept: LAB | Facility: HOSPITAL | Age: 83
End: 2023-06-13
Payer: COMMERCIAL

## 2023-06-13 DIAGNOSIS — D50.0 IRON DEFICIENCY ANEMIA DUE TO CHRONIC BLOOD LOSS: ICD-10-CM

## 2023-06-13 DIAGNOSIS — D63.8 ANEMIA, CHRONIC DISEASE: ICD-10-CM

## 2023-06-13 DIAGNOSIS — D50.9 MICROCYTIC ANEMIA: ICD-10-CM

## 2023-06-13 LAB
ALBUMIN SERPL BCP-MCNC: 3.9 G/DL (ref 3.5–5.2)
ALP SERPL-CCNC: 44 U/L (ref 55–135)
ALT SERPL W/O P-5'-P-CCNC: 15 U/L (ref 10–44)
ANION GAP SERPL CALC-SCNC: 9 MMOL/L (ref 8–16)
ANISOCYTOSIS BLD QL SMEAR: ABNORMAL
AST SERPL-CCNC: 27 U/L (ref 10–40)
BASOPHILS # BLD AUTO: 0.1 K/UL (ref 0–0.2)
BASOPHILS NFR BLD: 1.7 % (ref 0–1.9)
BILIRUB SERPL-MCNC: 1.6 MG/DL (ref 0.1–1)
BUN SERPL-MCNC: 19 MG/DL (ref 8–23)
CALCIUM SERPL-MCNC: 9.4 MG/DL (ref 8.7–10.5)
CHLORIDE SERPL-SCNC: 109 MMOL/L (ref 95–110)
CO2 SERPL-SCNC: 26 MMOL/L (ref 23–29)
CREAT SERPL-MCNC: 0.7 MG/DL (ref 0.5–1.4)
DACRYOCYTES BLD QL SMEAR: ABNORMAL
DIFFERENTIAL METHOD: ABNORMAL
EOSINOPHIL # BLD AUTO: 0.2 K/UL (ref 0–0.5)
EOSINOPHIL NFR BLD: 3.9 % (ref 0–8)
ERYTHROCYTE [DISTWIDTH] IN BLOOD BY AUTOMATED COUNT: 27.7 % (ref 11.5–14.5)
EST. GFR  (NO RACE VARIABLE): >60 ML/MIN/1.73 M^2
FERRITIN SERPL-MCNC: 16 NG/ML (ref 20–300)
FOLATE SERPL-MCNC: 13.4 NG/ML (ref 4–24)
GLUCOSE SERPL-MCNC: 59 MG/DL (ref 70–110)
HCT VFR BLD AUTO: 29.6 % (ref 40–54)
HGB BLD-MCNC: 8.8 G/DL (ref 14–18)
HYPOCHROMIA BLD QL SMEAR: ABNORMAL
IMM GRANULOCYTES # BLD AUTO: 0.02 K/UL (ref 0–0.04)
IMM GRANULOCYTES NFR BLD AUTO: 0.3 % (ref 0–0.5)
IRON SERPL-MCNC: 84 UG/DL (ref 45–160)
LYMPHOCYTES # BLD AUTO: 0.9 K/UL (ref 1–4.8)
LYMPHOCYTES NFR BLD: 14.6 % (ref 18–48)
MCH RBC QN AUTO: 20.6 PG (ref 27–31)
MCHC RBC AUTO-ENTMCNC: 29.7 G/DL (ref 32–36)
MCV RBC AUTO: 69 FL (ref 82–98)
MONOCYTES # BLD AUTO: 0.7 K/UL (ref 0.3–1)
MONOCYTES NFR BLD: 12.2 % (ref 4–15)
NEUTROPHILS # BLD AUTO: 4 K/UL (ref 1.8–7.7)
NEUTROPHILS NFR BLD: 67.3 % (ref 38–73)
NRBC BLD-RTO: 0 /100 WBC
OVALOCYTES BLD QL SMEAR: ABNORMAL
PLATELET # BLD AUTO: 385 K/UL (ref 150–450)
PLATELET BLD QL SMEAR: ABNORMAL
PMV BLD AUTO: ABNORMAL FL (ref 9.2–12.9)
POIKILOCYTOSIS BLD QL SMEAR: SLIGHT
POLYCHROMASIA BLD QL SMEAR: ABNORMAL
POTASSIUM SERPL-SCNC: 4.1 MMOL/L (ref 3.5–5.1)
PROT SERPL-MCNC: 6.9 G/DL (ref 6–8.4)
RBC # BLD AUTO: 4.28 M/UL (ref 4.6–6.2)
SATURATED IRON: 18 % (ref 20–50)
SCHISTOCYTES BLD QL SMEAR: PRESENT
SODIUM SERPL-SCNC: 144 MMOL/L (ref 136–145)
TARGETS BLD QL SMEAR: ABNORMAL
TOTAL IRON BINDING CAPACITY: 475 UG/DL (ref 250–450)
TRANSFERRIN SERPL-MCNC: 321 MG/DL (ref 200–375)
VIT B12 SERPL-MCNC: 1024 PG/ML (ref 210–950)
WBC # BLD AUTO: 5.97 K/UL (ref 3.9–12.7)

## 2023-06-13 PROCEDURE — 84466 ASSAY OF TRANSFERRIN: CPT

## 2023-06-13 PROCEDURE — 85025 COMPLETE CBC W/AUTO DIFF WBC: CPT

## 2023-06-13 PROCEDURE — 82607 VITAMIN B-12: CPT

## 2023-06-13 PROCEDURE — 36415 COLL VENOUS BLD VENIPUNCTURE: CPT

## 2023-06-13 PROCEDURE — 82728 ASSAY OF FERRITIN: CPT

## 2023-06-13 PROCEDURE — 80053 COMPREHEN METABOLIC PANEL: CPT

## 2023-06-13 PROCEDURE — 82746 ASSAY OF FOLIC ACID SERUM: CPT

## 2023-06-14 ENCOUNTER — TELEPHONE (OUTPATIENT)
Dept: NEUROLOGY | Facility: CLINIC | Age: 83
End: 2023-06-14
Payer: MEDICARE

## 2023-06-14 ENCOUNTER — OFFICE VISIT (OUTPATIENT)
Dept: NEUROLOGY | Facility: CLINIC | Age: 83
End: 2023-06-14
Payer: COMMERCIAL

## 2023-06-14 VITALS
BODY MASS INDEX: 21.82 KG/M2 | SYSTOLIC BLOOD PRESSURE: 113 MMHG | DIASTOLIC BLOOD PRESSURE: 48 MMHG | HEART RATE: 97 BPM | RESPIRATION RATE: 16 BRPM | WEIGHT: 155.88 LBS | HEIGHT: 71 IN

## 2023-06-14 DIAGNOSIS — F02.80 MAJOR NEUROCOGNITIVE DISORDER DUE TO MULTIPLE ETIOLOGIES: Primary | ICD-10-CM

## 2023-06-14 DIAGNOSIS — F10.230 ALCOHOL DEPENDENCE WITH UNCOMPLICATED WITHDRAWAL: ICD-10-CM

## 2023-06-14 DIAGNOSIS — G62.1 ALCOHOLIC PERIPHERAL NEUROPATHY: ICD-10-CM

## 2023-06-14 DIAGNOSIS — I48.0 PAROXYSMAL ATRIAL FIBRILLATION: ICD-10-CM

## 2023-06-14 DIAGNOSIS — Z72.0 NICOTINE ABUSE: ICD-10-CM

## 2023-06-14 DIAGNOSIS — M17.0 PRIMARY OSTEOARTHRITIS OF BOTH KNEES: ICD-10-CM

## 2023-06-14 DIAGNOSIS — R41.3 MEMORY LOSS: ICD-10-CM

## 2023-06-14 PROCEDURE — 99999 PR PBB SHADOW E&M-EST. PATIENT-LVL V: ICD-10-PCS | Mod: PBBFAC,,, | Performed by: NURSE PRACTITIONER

## 2023-06-14 PROCEDURE — 99214 PR OFFICE/OUTPT VISIT, EST, LEVL IV, 30-39 MIN: ICD-10-PCS | Mod: S$GLB,,, | Performed by: NURSE PRACTITIONER

## 2023-06-14 PROCEDURE — 99999 PR PBB SHADOW E&M-EST. PATIENT-LVL V: CPT | Mod: PBBFAC,,, | Performed by: NURSE PRACTITIONER

## 2023-06-14 PROCEDURE — 99215 OFFICE O/P EST HI 40 MIN: CPT | Mod: PBBFAC | Performed by: NURSE PRACTITIONER

## 2023-06-14 PROCEDURE — 99214 OFFICE O/P EST MOD 30 MIN: CPT | Mod: S$GLB,,, | Performed by: NURSE PRACTITIONER

## 2023-06-14 NOTE — PROGRESS NOTES
Subjective:       Patient ID: Richy Douglas is a 82 y.o. male.    Chief Complaint: Major Neurocognitive disorder due to multiple etiologies           HPI The patient is new to me, patient is here for memory loss evaluation. The patient is accompanied by daughter/caretaker. The patient is presenting with memory changes that began in January 2023. The main problems the patient has are related to progressive short term memory loss, forgetfulness and behavioral changes. The patient began getting lost while driving, he would set out to got to one location and end up in a different but familiar location. The patient family reports him driving and having 3 occurrences of  him getting lost and having to be directed home. The patient would repeat the same question over and over again. 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 He just works on the line  jugs.  The patient excessively forgets where placed certain things at home. He often accuses people of taking things he can't find, he is very  argumentative. Patient is easily agitated. He hollers often. He finds comfort in working and is fearful of having to stop working.  The patient is not forgetting names. He lives at home with spouse. No confusion around and inside the house. Daughter states he has  trouble remembering the date and time. Daughter helps with managing  medications and appointments. Patient is unaware major holidays. He is aware of Political changes. The patient wife handling finances and prepare meals. The patient is still independent with ADLs. No hallucinations or delusions. No seizures. + Behavioral problems. No language problems. No problems handling tools. No history of strokes. No history of headaches. No history of hypothyroidism. Chronic  alcoholism drinks Alberto John Whiskey daily (2 shots per day) drank over 40 years, /smokes  cigar daily use.  No history of B12 deficiency.  Alcoholic peripheral Neuropathy, Afib with RVR, Aortic aneurysm, HX of Prostate CA, Prostate surgery, Chronic back and knee pain, History of depression. No history of STDs.  No history of HIV infection. No toxic exposures.  No history of traumatic brain injury. Abnormal limping gait and  instability. No recent falls. No urinary incontinence. No change in sleep and appetite. No family history of dementia.     INTERVAL HISTORY 06-:Patient present for memory manage and work up. Accompanied by daughter. Patient doing well. Patient only able to tolerate Memantine/Namenda 5 mg BID, higher dose caused dizziness. Will avoid Aricept due to C/I and Hypotension. No cognitive decline, no behavioral changes noted. Patient continues to be independent with ADLS. He is no longer driving. Continues to work with no issues. CNP testing not completed.  04- Thiamine 46 NL, RPR neg, Vitamin B12 1011^, HIV neg, FA 12.6 NL, HC 10.9 NL, TSH 1.046 NL MRI Brain WO: 05- Mild-to-moderate global cortical atrophy without lobar predominance.Chronic microvascular ischemia, Fazekas score 3. Findings consistent with Dementia     Review of Systems   Unable to perform ROS: Dementia   Constitutional: Negative.    HENT: Negative.     Eyes: Negative.    Cardiovascular: Negative.    Gastrointestinal: Negative.    Endocrine: Negative.    Musculoskeletal:  Positive for arthralgias, gait problem and joint swelling.   Skin: Negative.    Allergic/Immunologic: Negative.    Hematological: Negative.    Psychiatric/Behavioral: Negative.                 Current Outpatient Medications:     albuterol (PROVENTIL) 2.5 mg /3 mL (0.083 %) nebulizer solution, Take 3 mLs (2.5 mg total) by nebulization every 4 to 6 hours as needed for Wheezing or Shortness of Breath., Disp: 360 mL, Rfl: 11    albuterol (PROVENTIL/VENTOLIN HFA) 90 mcg/actuation inhaler, Inhale 2 puffs into the lungs every 4 (four) hours as  needed for Wheezing or Shortness of Breath., Disp: 8.5 g, Rfl: 11    empagliflozin (JARDIANCE) 10 mg tablet, Take 1 tablet (10 mg total) by mouth once daily., Disp: 30 tablet, Rfl: 1    ferrous sulfate 325 (65 FE) MG EC tablet, Take 1 tablet (325 mg total) by mouth once daily., Disp: 30 tablet, Rfl: 1    fluticasone-umeclidin-vilanter (TRELEGY ELLIPTA) 200-62.5-25 mcg inhaler, Inhale 1 puff into the lungs once daily., Disp: 60 each, Rfl: 11    furosemide (LASIX) 40 MG tablet, Take 1 tablet (40 mg total) by mouth 2 (two) times a day., Disp: 90 tablet, Rfl: 3    memantine (NAMENDA) 5 MG Tab, Take 1 tablet (5 mg total) by mouth 2 (two) times daily., Disp: 60 tablet, Rfl: 11    metoprolol succinate (TOPROL-XL) 25 MG 24 hr tablet, Take 0.5 tablets (12.5 mg total) by mouth once daily., Disp: 30 tablet, Rfl: 6    potassium chloride SA (K-DUR,KLOR-CON M) 10 MEQ tablet, Take 1 tablet (10 mEq total) by mouth once daily., Disp: 90 tablet, Rfl: 1    predniSONE (DELTASONE) 20 MG tablet, Take 3 tablets (60mg) by mouth daily for 3 days THEN take 2 tablets (40mg) by mouth daily for 3 days THEN take 1 tablet (20mg) by mouth daily for 3 days THEN take 1/2 tablet (10mg) by mouth daily for 3 days, Disp: 20 tablet, Rfl: 0    sacubitriL-valsartan (ENTRESTO)  mg per tablet, Take 1 tablet by mouth 2 (two) times daily., Disp: 180 tablet, Rfl: 1  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    Anemia of chronic disease     Aortic aneurysm     Atrial fibrillation with RVR 09/24/2021    Chronic systolic congestive heart failure 08/31/2017    Emphysema of lung 08/31/2017    GERD (gastroesophageal reflux disease)     Hypertension     Knee osteoarthritis 10/21/2022    Major neurocognitive disorder due to multiple etiologies 4/14/2023    Microcytic anemia 11/24/2020    Other hyperlipidemia 04/27/2021    Personal history of colonic polyps 2022    Prostate cancer      Past Surgical History:   Procedure  Laterality Date    CATHETERIZATION OF BOTH LEFT AND RIGHT HEART N/A 1/13/2022    Procedure: CATHETERIZATION, HEART, BOTH LEFT AND RIGHT;  Surgeon: Janneth Tovar MD;  Location: [facility] CATH LAB;  Service: Cardiology;  Laterality: N/A;  poss cx    COLONOSCOPY      COLONOSCOPY N/A 6/30/2022    Procedure: COLONOSCOPY;  Surgeon: Sandra Perez MD;  Location: [facility] ENDO;  Service: Endoscopy;  Laterality: N/A;    COLONOSCOPY N/A 7/1/2022    Procedure: COLONOSCOPY;  Surgeon: Woodrow Christian MD;  Location: [facility] ENDO;  Service: Endoscopy;  Laterality: N/A;    CORONARY ANGIOGRAPHY N/A 1/13/2022    Procedure: ANGIOGRAM, CORONARY ARTERY;  Surgeon: Janneth Tovar MD;  Location: [facility] CATH LAB;  Service: Cardiology;  Laterality: N/A;    ESOPHAGOGASTRODUODENOSCOPY N/A 6/30/2022    Procedure: EGD (ESOPHAGOGASTRODUODENOSCOPY);  Surgeon: Sandra Perez MD;  Location: [facility] ENDO;  Service: Endoscopy;  Laterality: N/A;    INJECTION OF JOINT Bilateral 10/21/2022    Procedure: Bilateral intraarticular knee injection with local ALLERGIC TO IODINE;  Surgeon: Devon Grant MD;  Location: [facility] PAIN MGT;  Service: Pain Management;  Laterality: Bilateral;    INJECTION OF JOINT Bilateral 1/31/2023    Procedure: Bilateral IA knee joint injection with steroid RN IV Sedation;  Surgeon: Devon Grant MD;  Location: [facility] PAIN MGT;  Service: Pain Management;  Laterality: Bilateral;    INJECTION OF JOINT Bilateral 5/10/2023    Procedure: Bilateral IA knee joint injection Synvisc RN IV Sedation;  Surgeon: Devon Grant MD;  Location: [facility] PAIN MGT;  Service: Pain Management;  Laterality: Bilateral;    PROSTATE SURGERY      SPINE SURGERY       Social History     Socioeconomic History    Marital status:      Spouse name: jenn     Number of children: 6   Tobacco Use    Smoking status: Former     Packs/day: 1.00     Years: 68.00     Pack years: 68.00     Types: Cigarettes     Start date: 1/1/1954     Quit date: 3/1/2023     Years  since quittin.2    Smokeless tobacco: Never   Substance and Sexual Activity    Alcohol use: Yes     Comment: reports only drinks red wine when games are on    Drug use: Never    Sexual activity: Yes     Partners: Female             Past/Current Medical/Surgical History, Past/Current Social History, Past/Current Family History and Past/Current Medications were reviewed in detail.        Objective:           VITAL SIGNS WERE REVIEWED      GENERAL APPEARANCE:     The patient looks comfortable, cooperative, labile.    BMI 22.60 KG     No signs of respiratory distress.    Normal breathing pattern.    No dysmorphic features    Normal eye contact.     GENERAL MEDICAL EXAM:    HEENT:  Head is atraumatic normocephalic.     No tender temporal arteries. Fundoscopic  exam showed no disc edema.      Neck and Axillae: No JVD. No visible lesions.    No carotid bruits. No thyromegaly. No lymphadenopathy.    Cardiopulmonary: No cyanosis. No tachypnea. Normal respiratory effort.    Clear breath sounds.     Gastrointestinal/Urogenital:  No jaundice. No stomas or lesions. No visible hernias. No catheters.     Abdomen is soft non-tender. No masses or organomegaly.    Skin, Hair and Nails: No pathognonomic skin rash. No neurofibromatosis. No visible lesions.No stigmata of autoimmune disease. No clubbing.    Skin is warm and moist. No palpable masses.    Limbs: No varicose veins. Lower extremity visible swelling.    No palpable edema. Pulses are symmetric. Pedal pulses are palpable.      Muskoskeletal: + visible deformities. + visible lesions.    No spine tenderness. + signs of longstanding neuropathy. No dislocations or fractures.            Neurologic Exam     Mental Status   Oriented to person, place, and time.   Follows 3 step commands.   Attention: decreased. Concentration: decreased.   Speech: speech is normal and not slurred   Level of consciousness: alert  Knowledge: inconsistent with education and poor. Unable to perform  simple calculations.   Able to name object. Able to read. Able to repeat. Able to write. Abnormal comprehension.     Cranial Nerves     CN II   Visual fields full to confrontation.   Visual acuity: normal  Right visual field deficit: none  Left visual field deficit: none     CN III, IV, VI   Pupils are equal, round, and reactive to light.  Extraocular motions are normal.   Right pupil: Size: 2 mm. Shape: regular. Reactivity: brisk. Consensual response: intact. Accommodation: intact.   Left pupil: Size: 2 mm. Shape: regular. Reactivity: brisk. Consensual response: intact. Accommodation: intact.   CN III: no CN III palsy  CN VI: no CN VI palsy  Nystagmus: none   Diplopia: none  Ophthalmoparesis: none  Upgaze: normal  Downgaze: normal  Conjugate gaze: present  Vestibulo-ocular reflex: present    CN V   Facial sensation intact.   Right facial sensation deficit: none  Left facial sensation deficit: none    CN VII   Right facial weakness: none  Left facial weakness: none    CN VIII   CN VIII normal.   Hearing: intact  Right Rinne: AC > BC  Left Rinne: AC > BC  Martinez: does not lateralize     CN IX, X   CN IX normal.   CN X normal.   Palate: symmetric    CN XI   CN XI normal.   Right sternocleidomastoid strength: normal  Left sternocleidomastoid strength: normal  Right trapezius strength: normal  Left trapezius strength: normal    CN XII   CN XII normal.   Tongue: not atrophic  Fasciculations: absent  Tongue deviation: none    Motor Exam   Muscle bulk: normal  Overall muscle tone: normal  Right arm tone: normal  Left arm tone: normal  Right arm pronator drift: absent  Left arm pronator drift: absent  Right leg tone: normal  Left leg tone: normal    Strength   Strength 5/5 throughout.   Right neck flexion: 5/5  Left neck flexion: 5/5  Right neck extension: 5/5  Left neck extension: 5/5  Right deltoid: 5/5  Left deltoid: 5/5  Right biceps: 5/5  Left biceps: 5/5  Right triceps: 5/5  Left triceps: 5/5  Right wrist flexion:    Left wrist flexion:   Right wrist extension:   Left wrist extension:   Right interossei:   Left interossei:   Right iliopsoas:   Left iliopsoas:   Right quadriceps:   Left quadriceps:   Right hamstrin/5  Left hamstrin/5  Right glutei:   Left glutei:   Right anterior tibial:   Left anterior tibial:   Right posterior tibial:   Left posterior tibial:   Right peroneal:   Left peroneal:   Right gastroc:   Left gastroc:     Sensory Exam   Right arm light touch: decreased from fingers  Left arm light touch: decreased from fingers  Right leg light touch: decreased from ankle  Left leg light touch: decreased from ankle  Right arm vibration: normal  Left arm vibration: normal  Right leg vibration: decreased from toes  Left leg vibration: decreased from toes  Right arm proprioception: normal  Left arm proprioception: normal  Right leg proprioception: decreased from ankle  Left leg proprioception: decreased from ankle  Right arm pinprick: normal  Left arm pinprick: normal  Right leg pinprick: decreased from ankle  Left leg pinprick: decreased from ankle  Sensory deficit distribution on left: lateral cutaneous thigh  Graphesthesia: abnormal left  Stereognosis: abnormal left    Gait, Coordination, and Reflexes     Gait  Gait: wide-based (Limping gait, right knee instablity)    Coordination   Romberg: negative  Finger to nose coordination: normal  Heel to shin coordination: normal  Tandem walking coordination: normal    Tremor   Resting tremor: absent  Intention tremor: absent  Action tremor: absent    Reflexes   Right brachioradialis: 2+  Left brachioradialis: 2+  Right biceps: 2+  Left biceps: 2+  Right triceps: 2+  Left triceps: 2+  Right patellar: 1+  Left patellar: 1+  Right achilles: 0  Left achilles: 0  Right plantar: normal  Left plantar: normal  Right Gamble: absent  Left Gamble: absent  Right ankle clonus: absent  Left ankle clonus: absent  Right  pendular knee jerk: absent  Left pendular knee jerk: absent          JG COGNITIVE ASSESSMENT (MOCA) TOTAL SCORE     MILD DEMENTIA 20-25    Middle school Education     DATE 04-       TOTAL SCORE 20/30       VISUOSPATIAL EXECUTIVE (5) 2       NAMING (3) 3       ATTENTION (6) 2       LANGUAGE (3) 2       ABSTRACTION(2) 2       RECALL (5) 3       ORIENTATION (6) 6           Lab Results   Component Value Date    WBC 5.97 06/13/2023    HGB 8.8 (L) 06/13/2023    HCT 29.6 (L) 06/13/2023    MCV 69 (L) 06/13/2023     06/13/2023     Sodium   Date Value Ref Range Status   06/13/2023 144 136 - 145 mmol/L Final     Potassium   Date Value Ref Range Status   06/13/2023 4.1 3.5 - 5.1 mmol/L Final     Chloride   Date Value Ref Range Status   06/13/2023 109 95 - 110 mmol/L Final     CO2   Date Value Ref Range Status   06/13/2023 26 23 - 29 mmol/L Final     Glucose   Date Value Ref Range Status   06/13/2023 59 (L) 70 - 110 mg/dL Final     BUN   Date Value Ref Range Status   06/13/2023 19 8 - 23 mg/dL Final     Creatinine   Date Value Ref Range Status   06/13/2023 0.7 0.5 - 1.4 mg/dL Final     Calcium   Date Value Ref Range Status   06/13/2023 9.4 8.7 - 10.5 mg/dL Final     Total Protein   Date Value Ref Range Status   06/13/2023 6.9 6.0 - 8.4 g/dL Final     Albumin   Date Value Ref Range Status   06/13/2023 3.9 3.5 - 5.2 g/dL Final     Total Bilirubin   Date Value Ref Range Status   06/13/2023 1.6 (H) 0.1 - 1.0 mg/dL Final     Comment:     For infants and newborns, interpretation of results should be based  on gestational age, weight and in agreement with clinical  observations.    Premature Infant recommended reference ranges:  Up to 24 hours.............<8.0 mg/dL  Up to 48 hours............<12.0 mg/dL  3-5 days..................<15.0 mg/dL  6-29 days.................<15.0 mg/dL       Alkaline Phosphatase   Date Value Ref Range Status   06/13/2023 44 (L) 55 - 135 U/L Final     AST   Date Value Ref Range Status    06/13/2023 27 10 - 40 U/L Final     ALT   Date Value Ref Range Status   06/13/2023 15 10 - 44 U/L Final     Anion Gap   Date Value Ref Range Status   06/13/2023 9 8 - 16 mmol/L Final     eGFR if    Date Value Ref Range Status   06/24/2022 >60 >60 mL/min/1.73 m^2 Final     eGFR if non    Date Value Ref Range Status   06/24/2022 >60 >60 mL/min/1.73 m^2 Final     Comment:     Calculation used to obtain the estimated glomerular filtration  rate (eGFR) is the CKD-EPI equation.        Lab Results   Component Value Date    [id number] 1024 (H) 06/13/2023     Lab Results   Component Value Date    TSH 1.046 04/14/2023   Labs Reviewed:     04-    Thiamine 46 NL, RPR neg, Vitamin B12 1011^, HIV neg, FA 12.6 NL, HC 10.9 NL, TSH 1.046 NL    MRI Brain WO:     05-     No acute intracranial abnormality.     Mild-to-moderate global cortical atrophy without lobar predominance.     Nonspecific white matter changes likely related to chronic microvascular ischemia, Fazekas score 3.     Mild bilateral paranasal sinus disease.     Findings consistent with Dementia          Reviewed the neuroimaging independently       Assessment:       1. Major neurocognitive disorder due to multiple etiologies    2. Alcohol dependence with uncomplicated withdrawal    3. Memory loss    4. Alcoholic peripheral neuropathy    5. Paroxysmal atrial fibrillation    6. Primary osteoarthritis of both knees    7. Nicotine abuse          Plan:          MAJOR NEUROCOGNITIVE DISORDER DUE MULTIPLE ETIOLOGIES/FORGETFULNESS/ALCOHOL DEPENDENCE WITH UNCOMPLICATED WITHDRAWAL/ UNSTEADY GAIT/CHRONIC KNEE PAIN/AFIB WITH RVR/ HX PROSTATE CANCER        EVALUATION   Comprehensive Neuropsychological Testing.    No Driving     Explained to the patient and family that medications are intended to slow down the progression and not stop it or reverse the disease.The disease is relentless, progressive and so far cannot be controlled.     I  counseled the patient and family that the rate of progression is extremely variable and the average (10 years) is an inaccurate measure.     Continue memantine/Namenda 5 mg BID    Will avoid Donepezil/Aricept due to cardiac history       SYMPTOMATIC MANAGEMENT         HOME CARE     Falling Down Precautions. Gait is affected by the disease as well.     Avoid driving and access to firearms     Incremental 24/Care     Help with finances and decision making.    Join support group.    Proofing the house and use labeling.    Avoid antihistamines and anticholinergics.    Avoid changing routine.    Use written reminders.    Avoid multitasking.    Healthy diet, exercise (physical and cognitive).    Good sleep hygiene.    For behavioral problems I recommended that family to try some of the following communication tips: Try not to react and stay calm, Don't argue , Do not use logic, Let the patient feel safe, Keep reminding the patient and use photos if necessary, Distract the patient, Search for things to distract the person, then talk about what you found. For example, talk about a photograph or keepsake or even food, Use gentle touching or hugging to show you care, Body language matters, Use a cooling off period if needed, when possible. If safe to do so, give the patient some space or breathing room. Will consider antipsychotic medications as a last resort because of the risk of CAD and CVD.    Recommend reading the book: The 36-Hour Day and there are many online and printed resources to help caregivers as well.     For More Information About Hallucinations, Delusions, and Paranoia in Alzheimer's   [facility] Alzheimer's and related Dementias Education and Referral (ADEAR) Center   1-830.126.5067 (toll-free)   geronimo@nuris.nih.gov   www.nuris.nih.gov/alzheimers   The National [employer]'s ADEAR Center offers information and free print publications about Alzheimer's disease and related dementias for families, caregivers, and  health professionals. [facility] staff answer telephone, email, and written requests and make referrals to local and national resources      PREVENTION OF DELIRIUM       1. Good hydration and avoid electrolyte imbalance  2. Recognize andtreat infections immediately especially UTI.  3. Bladder emptying and prevent constipation.   4. Provide stimulating activities and familiar objects  5. Use eyeglasses and hearing aids if needed.   6. Use simple and regular communication about people, current place and time  7. Mobility and range-of-motion exercises  8. Reduce noise, lighting and avoid sleep interruptions  9. Non-narcotic pain management.  10.Nondrug treatment for sleep problems or anxiety  11. Avoid antihistamines.  12. Avoid narcotics.  13. Avoid benzodiazepines.       MEDICAL/SURGICAL COMORBIDITIES     All relevant medical comorbidities noted and managed by primary care physician and medical care team.          MISCELLANEOUS MEDICAL PROBLEMS       HEALTHY LIFESTYLE AND PREVENTATIVE CARE    Encouraged the patient to adhere to the age-appropriate health maintenance guidelines including screening tests and vaccinations.     Discussed the overall importance of healthy lifestyle, optimal weight, exercise, healthy diet, good sleep hygiene and avoiding drugs including smoking, alcohol and recreational drugs. The patient verbalized full understanding.       Advised the patient to follow COVID-19 prevention measures.       I spent 30 minutes face to face with the patient    Time spent in counseling and coordination of care including discussions etiology of diagnosis,pathogenesis of diagnosis, prognosis of diagnosis,, diagnostic results, impression and recommendations, diagnostic studies, management, risks and benefits of treatment, instructions of disease self-management, treatment instructions, follow up requirements, patient and family counseling/involvement in care compliance with treatment regimen. All of the  patient's questions were answered during this discussion.       Renetta Bruno, MSN NP      Collaborating Provider: Angel Rees MD, FAAN Neurologist/Epileptologist

## 2023-06-15 ENCOUNTER — CLINICAL SUPPORT (OUTPATIENT)
Dept: REHABILITATION | Facility: HOSPITAL | Age: 83
End: 2023-06-15
Payer: COMMERCIAL

## 2023-06-15 DIAGNOSIS — R29.898 BILATERAL LEG WEAKNESS: Primary | ICD-10-CM

## 2023-06-15 PROCEDURE — 97110 THERAPEUTIC EXERCISES: CPT | Mod: PN

## 2023-06-15 PROCEDURE — 97112 NEUROMUSCULAR REEDUCATION: CPT | Mod: PN

## 2023-06-15 NOTE — PROGRESS NOTES
"[facility]   Physical Therapy Treatment Note      Name: Richy Douglas  Clinic Number: [id number]    Therapy Diagnosis:   No diagnosis found.    Physician: Mita Bruno NP    Visit Date: 6/15/2023    Physician Orders: PT Eval and Treat   Medical Diagnosis from Referral: Osteoarthritis of both knees, unspecified osteoarthritis type [M17.0], Primary osteoarthritis of both knees [M17.0]  Evaluation Date: 6/2/2023  Authorization Period Expiration: 12/31/2023  Plan of Care Expiration: 7/28/2023  Progress Note Due: 7/2/2023  Visit # / Visits authorized: 1/ 20  FOTO: 1/5     Precautions: CHF and cancer     PTA Visit #: 0/5     Time In: 12:00  Time Out: 12:50  Total Billable Time: 50 minutes ( 30 minutes 1:1 with trained technician)     Subjective     Pt reports: His left knee continues to hurt more than R.  He  was partially  compliant with home exercise program.  Response to previous treatment: n/a today  Functional change: n/a today    Pain: 9/10  Location: left knee      Objective      Objective Measures updated at progress report unless specified.     Treatment     Richy received the following treatments:    Richy received therapeutic exercises to develop strength, endurance, ROM, and flexibility for (25) minutes including:     Nustep 6' for joint bert and mobility    Shuttle 4 cords 3 minutes   Heel slides supine with strap B x 20 each   Hamstring Stretch 3 x 20" B     Richy received the following manual therapy techniques applied for (00) minutes, including:    Richy participated in neuromuscular re-education activities to improve: Balance, Coordination, Kinesthetic, Sense, Proprioception, and Posture for (25) minutes. The following activities were included:    Quad Sets 2 x 10 5" holds  Side lying Clamshells 2 x 8 B   Bridges 2 x 10   SAQ 2 x 8 3" holds     Richy participated in dynamic functional therapeutic activities to improve functional performance for 00  minutes, " including:      Richy participated in gait training to improve functional mobility and safety for 00  minutes, including:        Patient Education and Home Exercises       Education provided:   - importance of HEP compliance  - justification and explanation of treatment performed    Written Home Exercises Provided: Patient instructed to cont prior HEP. Exercises were reviewed and Richy was able to demonstrate them prior to the end of the session.  Richy demonstrated good  understanding of the education provided. See EMR under Patient Instructions for exercises provided during therapy sessions    Assessment   Patient presents with continued knee pain that is more prominent in the L side. Treatment today was focused on developing mobility, flexibility and proper muscle activation at the lower extremities. Patient requires cueing to prevent compensatory patterns and improve technique. Patient impaired quad control remains apparent with exercises today. Rest breaks are needed throughout session today. Will progress per tolerance.     Richy Is progressing well towards his goals.   Pt prognosis is Good.     Pt will continue to benefit from skilled outpatient physical therapy to address the deficits listed in the problem list box on initial evaluation, provide pt/family education and to maximize pt's level of independence in the home and community environment.     Pt's spiritual, cultural and educational needs considered and pt agreeable to plan of care and goals.     Anticipated Barriers for therapy: occupation and co-morbidities     Goals:      Short Term Goals:  4 weeks Progress   6/2/2023   Pain: Pt will demonstrate improved pain by reports of less than or equal to 8/10 worst pain on the verbal rating scale in order to progress toward maximal functional ability and improve QOL. PC   HEP: Patient will demonstrate independence with HEP in order to progress toward functional independence. PC      Long Term Goals:  8  weeks Progress  6/2/2023   Pain: Pt will demonstrate improved pain by reports of less than or equal to 5/10 worst pain on the verbal rating scale in order to progress toward maximal functional ability and improve QOL.   PC   Function: Patient will demonstrate improved function as indicated by a functional limitation score of less than or equal to 45 out of 100 on FOTO. PC   Mobility: Patient will improve AROM to stated goals, listed in objective measures above, in order to return to maximal functional potential and improve quality of life. PC   Strength: Patient will improve strength to stated goals, listed in objective measures above, in order to improve functional independence and quality of life. PC   Stair Climbing: Patient will demonstrate improved tolerance of stair climbing with performance of 12 steps up/down with step through method and 5/10 pain or less. PC   GOALS KEY:   PC= progressing/continue;   PM= partially met;             DC= discontinue    Plan     Plan of care Certification: 6/2/2023 to 7/28/2023.     Outpatient Physical Therapy 1 times weekly for 8 weeks to include the following interventions: Cervical/Lumbar Traction, Electrical Stimulation with or without FDN, Gait Training, Manual Therapy, Moist Heat/ Ice, Neuromuscular Re-ed, Orthotic Management and Training, Patient Education, Self Care, Therapeutic Activities, Therapeutic Exercise, Ultrasound, and Dry Needling .     Shauna Aguirre, PT

## 2023-06-15 NOTE — PROGRESS NOTES
"[facility]   Physical Therapy Treatment Note      Name: Richy Douglas  [facility] Number: [id number]    Therapy Diagnosis:   Encounter Diagnosis   Name Primary?    Bilateral leg weakness Yes     Physician: Mita Bruno NP    Visit Date: 6/15/2023    Physician Orders: PT Eval and Treat   Medical Diagnosis from Referral: Osteoarthritis of both knees, unspecified osteoarthritis type [M17.0], Primary osteoarthritis of both knees [M17.0]  Evaluation Date: 6/2/2023  Authorization Period Expiration: 12/31/2023  Plan of Care Expiration: 7/28/2023  Progress Note Due: 7/2/2023  Visit # / Visits authorized: 2/20  FOTO: 1/5     Precautions: CHF and cancer     PTA Visit #: 0/5     Time In: 1:10pm  Time Out: 2:05pm  Total Billable Time: 55 minutes     Subjective     Pt reports: his knees are doing ok today.  He  was partially  compliant with home exercise program.  Response to previous treatment: n/a today  Functional change: n/a today    Pain: 9/10  Location: left knee      Objective      Objective Measures updated at progress report unless specified.     Treatment     Richy received the following treatments:    Richy received therapeutic exercises to develop strength, endurance, ROM, and flexibility for (25) minutes including:     Nustep 6' for joint bert and mobility    Shuttle 5 cords 3 minutes   Heel slides supine with strap B x 20 each   Hamstring Stretch 3 x 20" B     Richy received the following manual therapy techniques applied for (00) minutes, including:    Richy participated in neuromuscular re-education activities to improve: Balance, Coordination, Kinesthetic, Sense, Proprioception, and Posture for (30) minutes. The following activities were included:    Standing heel raises 20x  Step taps 6' 15x ea  Quad Sets 2 x 10 5" holds  Side lying Clamshells 2 x 8 B   Bridges 2 x 10   SAQ 2 x 8 3" holds     Richy participated in dynamic functional therapeutic activities to improve functional " performance for 00  minutes, including:      Richy participated in gait training to improve functional mobility and safety for 00  minutes, including:        Patient Education and Home Exercises       Education provided:   - importance of HEP compliance  - justification and explanation of treatment performed    Written Home Exercises Provided: Patient instructed to cont prior HEP. Exercises were reviewed and Richy was able to demonstrate them prior to the end of the session.  Richy demonstrated good  understanding of the education provided. See EMR under Patient Instructions for exercises provided during therapy sessions    Assessment   Patient presents with continued knee pain that is more prominent in the L side.  He tolerated his strengthening exercises well and no increase in pain.  Rest breaks are needed throughout session today. Will progress per tolerance.     Richy Is progressing well towards his goals.   Pt prognosis is Good.     Pt will continue to benefit from skilled outpatient physical therapy to address the deficits listed in the problem list box on initial evaluation, provide pt/family education and to maximize pt's level of independence in the home and community environment.     Pt's spiritual, cultural and educational needs considered and pt agreeable to plan of care and goals.     Anticipated Barriers for therapy: occupation and co-morbidities     Goals:      Short Term Goals:  4 weeks Progress   6/2/2023   Pain: Pt will demonstrate improved pain by reports of less than or equal to 8/10 worst pain on the verbal rating scale in order to progress toward maximal functional ability and improve QOL. PC   HEP: Patient will demonstrate independence with HEP in order to progress toward functional independence. PC      Long Term Goals:  8 weeks Progress  6/2/2023   Pain: Pt will demonstrate improved pain by reports of less than or equal to 5/10 worst pain on the verbal rating scale in order to progress  toward maximal functional ability and improve QOL.   PC   Function: Patient will demonstrate improved function as indicated by a functional limitation score of less than or equal to 45 out of 100 on FOTO. PC   Mobility: Patient will improve AROM to stated goals, listed in objective measures above, in order to return to maximal functional potential and improve quality of life. PC   Strength: Patient will improve strength to stated goals, listed in objective measures above, in order to improve functional independence and quality of life. PC   Stair Climbing: Patient will demonstrate improved tolerance of stair climbing with performance of 12 steps up/down with step through method and 5/10 pain or less. PC   GOALS KEY:   PC= progressing/continue;   PM= partially met;             DC= discontinue    Plan     Plan of care Certification: 6/2/2023 to 7/28/2023.     Outpatient Physical Therapy 1 times weekly for 8 weeks to include the following interventions: Cervical/Lumbar Traction, Electrical Stimulation with or without FDN, Gait Training, Manual Therapy, Moist Heat/ Ice, Neuromuscular Re-ed, Orthotic Management and Training, Patient Education, Self Care, Therapeutic Activities, Therapeutic Exercise, Ultrasound, and Dry Needling .     Leila Sousa, PT

## 2023-06-16 ENCOUNTER — OFFICE VISIT (OUTPATIENT)
Dept: HEMATOLOGY/ONCOLOGY | Facility: CLINIC | Age: 83
End: 2023-06-16
Payer: COMMERCIAL

## 2023-06-16 ENCOUNTER — PATIENT MESSAGE (OUTPATIENT)
Dept: PAIN MEDICINE | Facility: HOSPITAL | Age: 83
End: 2023-06-16
Payer: MEDICARE

## 2023-06-16 VITALS
OXYGEN SATURATION: 98 % | HEIGHT: 71 IN | BODY MASS INDEX: 22.04 KG/M2 | SYSTOLIC BLOOD PRESSURE: 113 MMHG | DIASTOLIC BLOOD PRESSURE: 52 MMHG | HEART RATE: 58 BPM | TEMPERATURE: 98 F | WEIGHT: 157.44 LBS

## 2023-06-16 DIAGNOSIS — D50.0 IRON DEFICIENCY ANEMIA DUE TO CHRONIC BLOOD LOSS: ICD-10-CM

## 2023-06-16 DIAGNOSIS — D50.9 MICROCYTIC ANEMIA: ICD-10-CM

## 2023-06-16 DIAGNOSIS — D63.8 ANEMIA, CHRONIC DISEASE: Primary | ICD-10-CM

## 2023-06-16 DIAGNOSIS — R17 ELEVATED BILIRUBIN: ICD-10-CM

## 2023-06-16 PROCEDURE — 99214 PR OFFICE/OUTPT VISIT, EST, LEVL IV, 30-39 MIN: ICD-10-PCS | Mod: S$GLB,,,

## 2023-06-16 PROCEDURE — 99999 PR PBB SHADOW E&M-EST. PATIENT-LVL V: ICD-10-PCS | Mod: PBBFAC,,,

## 2023-06-16 PROCEDURE — 99214 OFFICE O/P EST MOD 30 MIN: CPT | Mod: S$GLB,,,

## 2023-06-16 PROCEDURE — 99215 OFFICE O/P EST HI 40 MIN: CPT | Mod: PBBFAC

## 2023-06-16 PROCEDURE — 99999 PR PBB SHADOW E&M-EST. PATIENT-LVL V: CPT | Mod: PBBFAC,,,

## 2023-06-16 RX ORDER — HEPARIN 100 UNIT/ML
500 SYRINGE INTRAVENOUS
Status: CANCELLED | OUTPATIENT
Start: 2023-07-04

## 2023-06-16 RX ORDER — SODIUM CHLORIDE 0.9 % (FLUSH) 0.9 %
10 SYRINGE (ML) INJECTION
Status: CANCELLED | OUTPATIENT
Start: 2023-07-04

## 2023-06-16 RX ORDER — HEPARIN 100 UNIT/ML
500 SYRINGE INTRAVENOUS
Status: CANCELLED | OUTPATIENT
Start: 2023-06-16

## 2023-06-16 RX ORDER — SODIUM CHLORIDE 0.9 % (FLUSH) 0.9 %
10 SYRINGE (ML) INJECTION
Status: CANCELLED | OUTPATIENT
Start: 2023-06-16

## 2023-06-16 NOTE — ASSESSMENT & PLAN NOTE
Lab Results   Component Value Date    HGB 8.8 (L) 06/13/2023   stable chronic anemia    Lab Results   Component Value Date    IRON 84 06/13/2023    TRANSFERRIN 321 06/13/2023    TIBC 475 (H) 06/13/2023    FESATURATED 18 (L) 06/13/2023      Lab Results   Component Value Date    FERRITIN 16 (L) 06/13/2023       Plan for Feraheme x 2 doses  Follow-up in 8 weeks with repeat labs

## 2023-06-16 NOTE — PRE-PROCEDURE INSTRUCTIONS
Attempted to PAT patient for procedure on 6.20 with Dr. ANTOINE. No answer. M with return phone number. No return call at this time.

## 2023-06-16 NOTE — PROGRESS NOTES
Subjective:      Patient ID: Richy Douglas is a 82 y.o. male.    Chief Complaint: Follow-up (Iron deficiency anemia )      HPI:  MR. Douglas is an 82-year-old male with a PMHx that includes prostate cancer, HTN, GERD, aortic aneurysm. He presents today for follow-up of iron deficiency anemia. Pt completed EGD/Colonoscopy 22 unrevealing as to cause of KEN. Pt he states he continues to work 12 hours shifts everyday at [employer]. He c/o bilateral knee pain for which he is scheduled for injections  23. He notes sob with exertion. He is s/p ferrlecit x 2 doses 2022 and continues on iron supplement daily. Denies n/v/d/c, cp, abnormal bleeding, syncopal episodes.         Social History     Socioeconomic History    Marital status:      Spouse name: jenn     Number of children: 6   Tobacco Use    Smoking status: Former     Packs/day: 1.00     Years: 68.00     Pack years: 68.00     Types: Cigarettes     Start date: 1954     Quit date: 3/1/2023     Years since quittin.2    Smokeless tobacco: Never   Substance and Sexual Activity    Alcohol use: Yes     Comment: reports only drinks red wine when games are on    Drug use: Never    Sexual activity: Yes     Partners: Female       Family History   Problem Relation Age of Onset    Diabetes Mother     Peripheral vascular disease Mother        Past Surgical History:   Procedure Laterality Date    CATHETERIZATION OF BOTH LEFT AND RIGHT HEART N/A 2022    Procedure: CATHETERIZATION, HEART, BOTH LEFT AND RIGHT;  Surgeon: Janneth Tovar MD;  Location: [facility] CATH LAB;  Service: Cardiology;  Laterality: N/A;  poss cx    COLONOSCOPY      COLONOSCOPY N/A 2022    Procedure: COLONOSCOPY;  Surgeon: Sandra Perez MD;  Location: [facility] ENDO;  Service: Endoscopy;  Laterality: N/A;    COLONOSCOPY N/A 2022    Procedure: COLONOSCOPY;  Surgeon: Woodrow Christian MD;  Location: [facility] ENDO;  Service: Endoscopy;  Laterality: N/A;     CORONARY ANGIOGRAPHY N/A 1/13/2022    Procedure: ANGIOGRAM, CORONARY ARTERY;  Surgeon: Janneth Tovar MD;  Location: [facility] CATH LAB;  Service: Cardiology;  Laterality: N/A;    ESOPHAGOGASTRODUODENOSCOPY N/A 6/30/2022    Procedure: EGD (ESOPHAGOGASTRODUODENOSCOPY);  Surgeon: Sandra Perez MD;  Location: [facility] ENDO;  Service: Endoscopy;  Laterality: N/A;    INJECTION OF JOINT Bilateral 10/21/2022    Procedure: Bilateral intraarticular knee injection with local ALLERGIC TO IODINE;  Surgeon: Devon Grant MD;  Location: [facility] PAIN MGT;  Service: Pain Management;  Laterality: Bilateral;    INJECTION OF JOINT Bilateral 1/31/2023    Procedure: Bilateral IA knee joint injection with steroid RN IV Sedation;  Surgeon: Devon Grant MD;  Location: [facility] PAIN MGT;  Service: Pain Management;  Laterality: Bilateral;    INJECTION OF JOINT Bilateral 5/10/2023    Procedure: Bilateral IA knee joint injection Synvisc RN IV Sedation;  Surgeon: Devon Grant MD;  Location: [facility] PAIN MGT;  Service: Pain Management;  Laterality: Bilateral;    PROSTATE SURGERY      SPINE SURGERY         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 Negative for activity change, appetite change, chills, fever and unexpected weight change.   HENT:  Negative for hearing loss, mouth sores, nosebleeds, sore throat, tinnitus, trouble swallowing and voice change.    Eyes:  Negative for  visual disturbance.   Respiratory:  Negative for cough, chest tightness and wheezing.    Cardiovascular:  Negative for chest pain, palpitations and leg swelling.   Gastrointestinal:  Negative for abdominal pain, anal bleeding, blood in stool, constipation, diarrhea, nausea and vomiting.   Genitourinary:  Negative for frequency, hematuria and testicular pain.   Musculoskeletal:  Positive for arthralgias and joint swelling. Negative for back pain, gait problem and neck pain.   Skin:  Negative for color change, pallor, rash and wound.   Allergic/Immunologic: Negative for immunocompromised state.   Neurological:  Negative for seizures, syncope, weakness, numbness and headaches.   Hematological:  Negative for adenopathy. Does not bruise/bleed easily.   Psychiatric/Behavioral:  Negative for agitation, confusion, decreased concentration, hallucinations and sleep disturbance. The patient is not nervous/anxious.      Medication List with Changes/Refills   Current Medications    ALBUTEROL (PROVENTIL) 2.5 MG /3 ML (0.083 %) NEBULIZER SOLUTION    Take 3 mLs (2.5 mg total) by nebulization every 4 to 6 hours as needed for Wheezing or Shortness of Breath.    ALBUTEROL (PROVENTIL/VENTOLIN HFA) 90 MCG/ACTUATION INHALER    Inhale 2 puffs into the lungs every 4 (four) hours as needed for Wheezing or Shortness of Breath.    EMPAGLIFLOZIN (JARDIANCE) 10 MG TABLET    Take 1 tablet (10 mg total) by mouth once daily.    FERROUS SULFATE 325 (65 FE) MG EC TABLET    Take 1 tablet (325 mg total) by mouth once daily.    FLUTICASONE-UMECLIDIN-VILANTER (TRELEGY ELLIPTA) 200-62.5-25 MCG INHALER    Inhale 1 puff into the lungs once daily.    FUROSEMIDE (LASIX) 40 MG TABLET    Take 1 tablet (40 mg total) by mouth 2 (two) times a day.    MEMANTINE (NAMENDA) 5 MG TAB    Take 1 tablet (5 mg total) by mouth 2 (two) times daily.    METOPROLOL SUCCINATE (TOPROL-XL) 25 MG 24 HR TABLET    Take 0.5 tablets (12.5 mg total) by mouth once daily.    POTASSIUM  CHLORIDE SA (K-DUR,KLOR-CON M) 10 MEQ TABLET    Take 1 tablet (10 mEq total) by mouth once daily.    PREDNISONE (DELTASONE) 20 MG TABLET    Take 3 tablets (60mg) by mouth daily for 3 days THEN take 2 tablets (40mg) by mouth daily for 3 days THEN take 1 tablet (20mg) by mouth daily for 3 days THEN take 1/2 tablet (10mg) by mouth daily for 3 days    SACUBITRIL-VALSARTAN (ENTRESTO)  MG PER TABLET    Take 1 tablet by mouth 2 (two) times daily.        Objective:     Vitals:    06/16/23 1429   BP: (!) 113/52   Pulse: (!) 58   Temp: 97.5 °F (36.4 °C)       Physical Exam  Vitals and nursing note reviewed.   Constitutional:       Appearance: Normal appearance. He is not ill-appearing.   HENT:      Head: Normocephalic and atraumatic.      Right Ear: External ear normal.      Left Ear: External ear normal.      Nose: Nose normal.      Mouth/Throat:      Mouth: Mucous membranes are moist.      Pharynx: Oropharynx is clear.   Eyes:      Conjunctiva/sclera: Conjunctivae normal.   Cardiovascular:      Rate and Rhythm: Normal rate.   Pulmonary:      Effort: Pulmonary effort is normal.   Abdominal:      General: Abdomen is flat.      Palpations: Abdomen is soft.   Musculoskeletal:         General: Normal range of motion.      Right lower leg: No edema.      Left lower leg: No edema.   Skin:     General: Skin is warm and dry.      Capillary Refill: Capillary refill takes less than 2 seconds.   Neurological:      Mental Status: He is alert and oriented to person, place, and time.      Motor: No weakness.      Gait: Gait normal.   Psychiatric:         Mood and Affect: Mood normal.         Behavior: Behavior normal.         Thought Content: Thought content normal.         Judgment: Judgment normal.       Lab Results   Component Value Date    WBC 5.97 06/13/2023    HGB 8.8 (L) 06/13/2023    HCT 29.6 (L) 06/13/2023    MCV 69 (L) 06/13/2023     06/13/2023       Lab Results   Component Value Date     06/13/2023    K 4.1  06/13/2023     06/13/2023    CO2 26 06/13/2023    BUN 19 06/13/2023    CREATININE 0.7 06/13/2023    CALCIUM 9.4 06/13/2023    ANIONGAP 9 06/13/2023    ESTGFRAFRICA >60 06/24/2022    EGFRNONAA >60 06/24/2022     Lab Results   Component Value Date    ALT 15 06/13/2023    AST 27 06/13/2023    ALKPHOS 44 (L) 06/13/2023    BILITOT 1.6 (H) 06/13/2023       Assessment/Plan:     Problem List Items Addressed This Visit          Oncology    Iron deficiency anemia due to chronic blood loss     Lab Results   Component Value Date    HGB 8.8 (L) 06/13/2023   stable chronic anemia    Lab Results   Component Value Date    IRON 84 06/13/2023    TRANSFERRIN 321 06/13/2023    TIBC 475 (H) 06/13/2023    FESATURATED 18 (L) 06/13/2023      Lab Results   Component Value Date    FERRITIN 16 (L) 06/13/2023     Plan for Feraheme x 2 doses  Follow-up in 8 weeks with repeat labs            Relevant Orders    CBC Auto Differential    Comprehensive Metabolic Panel    Ferritin    Iron and TIBC       GI    Elevated bilirubin     Bilirubin 1.6    Pt states taking acetaminophen daily--advised against this Recommend taking lowest dose possible on prn basis only            Relevant Orders    CBC Auto Differential    Comprehensive Metabolic Panel    Ferritin    Iron and TIBC     Other Visit Diagnoses       Anemia, chronic disease    -  Primary    Relevant Orders    CBC Auto Differential    Comprehensive Metabolic Panel    Ferritin    Iron and TIBC    Microcytic anemia        Relevant Orders    CBC Auto Differential    Comprehensive Metabolic Panel    Ferritin    Iron and TIBC            Med Onc Chart Routing      Follow up with physician    Follow up with ANANDA 2 months. with repeat labs   Infusion scheduling note   Feraheme x 2 doses   Injection scheduling note    Labs CBC, CMP, iron and TIBC and ferritin   Scheduling:  Preferred lab:  Lab interval:     Imaging    Pharmacy appointment    Other referrals             ALEXX Gallardo,  FNP-C  Hematology/Oncology

## 2023-06-16 NOTE — ASSESSMENT & PLAN NOTE
Bilirubin 1.6    Pt states taking acetaminophen daily--advised against this Recommend taking lowest dose possible on prn basis only

## 2023-06-16 NOTE — PRE-PROCEDURE INSTRUCTIONS
Spoke with patients daughter regarding procedure scheduled on 6.20     Arrival time 0845     Has patient been sick with fever or on antibiotics within the last 7 days? No     Does the patient have any open wounds, sores or rashes? No     Does the patient have any recent fractures? no     Has patient received a vaccination within the last 7 days? No     Received the COVID vaccination? yes     Has the patient stopped all medications as directed? na     Does patient have a pacemaker and or defibrillator? no     Does the patient have a ride to and from procedure and someone reliable to remain with patient?      Is the patient diabetic? no     Does the patient have sleep apnea? Or use O2 at home? no     Is the patient receiving sedation? Yes     Is the patient instructed to remain NPO beginning at midnight the night before their procedure? yes     Procedure location confirmed with patient? Yes     Covid- Denies signs/symptoms. Instructed to notify PAT/MD if any changes.

## 2023-06-19 ENCOUNTER — TELEPHONE (OUTPATIENT)
Dept: INFUSION THERAPY | Facility: HOSPITAL | Age: 83
End: 2023-06-19
Payer: MEDICARE

## 2023-06-19 NOTE — TELEPHONE ENCOUNTER
"Spoke with patient's wife who states "just schedule it at the [facility] and my daughter will let us know".  Asked her patient's preferences and appointment scheduled. Call ended well.     "

## 2023-06-20 ENCOUNTER — HOSPITAL ENCOUNTER (OUTPATIENT)
Facility: HOSPITAL | Age: 83
Discharge: HOME | End: 2023-06-20
Attending: ANESTHESIOLOGY | Admitting: ANESTHESIOLOGY
Payer: COMMERCIAL

## 2023-06-20 VITALS
BODY MASS INDEX: 21.6 KG/M2 | HEART RATE: 66 BPM | RESPIRATION RATE: 20 BRPM | HEIGHT: 71 IN | OXYGEN SATURATION: 95 % | SYSTOLIC BLOOD PRESSURE: 130 MMHG | WEIGHT: 154.31 LBS | TEMPERATURE: 98 F | DIASTOLIC BLOOD PRESSURE: 62 MMHG

## 2023-06-20 DIAGNOSIS — M17.9 KNEE OSTEOARTHRITIS: ICD-10-CM

## 2023-06-20 PROCEDURE — 63600175 PHARM REV CODE 636 W HCPCS: Performed by: ANESTHESIOLOGY

## 2023-06-20 PROCEDURE — 64454 PR NERVE BLOCK INJ, ANES/STEROID, GENICULAR NERVE, W/IMG: ICD-10-PCS | Mod: 50,,, | Performed by: ANESTHESIOLOGY

## 2023-06-20 PROCEDURE — 64454 NJX AA&/STRD GNCLR NRV BRNCH: CPT | Mod: 50,,, | Performed by: ANESTHESIOLOGY

## 2023-06-20 PROCEDURE — 25000003 PHARM REV CODE 250: Performed by: ANESTHESIOLOGY

## 2023-06-20 PROCEDURE — 99152 MOD SED SAME PHYS/QHP 5/>YRS: CPT | Performed by: ANESTHESIOLOGY

## 2023-06-20 PROCEDURE — 64454 NJX AA&/STRD GNCLR NRV BRNCH: CPT | Mod: LT | Performed by: ANESTHESIOLOGY

## 2023-06-20 RX ORDER — TRIAMCINOLONE ACETONIDE 40 MG/ML
INJECTION, SUSPENSION INTRA-ARTICULAR; INTRAMUSCULAR
Status: DISCONTINUED | OUTPATIENT
Start: 2023-06-20 | End: 2023-06-20 | Stop reason: HOSPADM

## 2023-06-20 RX ORDER — BUPIVACAINE HYDROCHLORIDE 5 MG/ML
INJECTION, SOLUTION EPIDURAL; INTRACAUDAL
Status: DISCONTINUED | OUTPATIENT
Start: 2023-06-20 | End: 2023-06-20 | Stop reason: HOSPADM

## 2023-06-20 RX ORDER — FENTANYL CITRATE 50 UG/ML
INJECTION, SOLUTION INTRAMUSCULAR; INTRAVENOUS
Status: DISCONTINUED | OUTPATIENT
Start: 2023-06-20 | End: 2023-06-20 | Stop reason: HOSPADM

## 2023-06-20 RX ORDER — INDOMETHACIN 25 MG/1
CAPSULE ORAL
Status: DISCONTINUED | OUTPATIENT
Start: 2023-06-20 | End: 2023-06-20 | Stop reason: HOSPADM

## 2023-06-20 RX ORDER — MIDAZOLAM HYDROCHLORIDE 1 MG/ML
INJECTION, SOLUTION INTRAMUSCULAR; INTRAVENOUS
Status: DISCONTINUED | OUTPATIENT
Start: 2023-06-20 | End: 2023-06-20 | Stop reason: HOSPADM

## 2023-06-20 NOTE — DISCHARGE SUMMARY
Discharge Note  Short Stay      SUMMARY     Admit Date: 6/20/2023    Attending Physician: Devon Grant MD        Discharge Physician: Devon Grant MD        Discharge Date: 6/20/2023 10:34 AM    Procedure(s) (LRB):  Bilateral Genicular nerve block RN IV Sedation (Bilateral)    Final Diagnosis: Primary osteoarthritis of both knees [M17.0]    Disposition: Home or self care    Patient Instructions:   Current Discharge Medication List        CONTINUE these medications which have NOT CHANGED    Details   albuterol (PROVENTIL) 2.5 mg /3 mL (0.083 %) nebulizer solution Take 3 mLs (2.5 mg total) by nebulization every 4 to 6 hours as needed for Wheezing or Shortness of Breath.  Qty: 360 mL, Refills: 11    Comments: ChronicObstructivePulmonaryDiseaseCode:[id number]  Associated Diagnoses: Pulmonary emphysema, unspecified emphysema type; Panlobular emphysema      albuterol (PROVENTIL/VENTOLIN HFA) 90 mcg/actuation inhaler Inhale 2 puffs into the lungs every 4 (four) hours as needed for Wheezing or Shortness of Breath.  Qty: 8.5 g, Refills: 11    Comments: Dispense formulary  Associated Diagnoses: Panlobular emphysema      empagliflozin (JARDIANCE) 10 mg tablet Take 1 tablet (10 mg total) by mouth once daily.  Qty: 30 tablet, Refills: 1      ferrous sulfate 325 (65 FE) MG EC tablet Take 1 tablet (325 mg total) by mouth once daily.  Qty: 30 tablet, Refills: 1      furosemide (LASIX) 40 MG tablet Take 1 tablet (40 mg total) by mouth 2 (two) times a day.  Qty: 90 tablet, Refills: 3      memantine (NAMENDA) 5 MG Tab Take 1 tablet (5 mg total) by mouth 2 (two) times daily.  Qty: 60 tablet, Refills: 11    Associated Diagnoses: Alcohol dependence with uncomplicated withdrawal; Major neurocognitive disorder due to multiple etiologies      metoprolol succinate (TOPROL-XL) 25 MG 24 hr tablet Take 0.5 tablets (12.5 mg total) by mouth once daily.  Qty: 30 tablet, Refills: 6    Comments: .      potassium chloride SA  (K-DUR,KLOR-CON M) 10 MEQ tablet Take 1 tablet (10 mEq total) by mouth once daily.  Qty: 90 tablet, Refills: 1      predniSONE (DELTASONE) 20 MG tablet Take 3 tablets (60mg) by mouth daily for 3 days THEN take 2 tablets (40mg) by mouth daily for 3 days THEN take 1 tablet (20mg) by mouth daily for 3 days THEN take 1/2 tablet (10mg) by mouth daily for 3 days  Qty: 20 tablet, Refills: 0    Associated Diagnoses: COPD exacerbation      sacubitriL-valsartan (ENTRESTO)  mg per tablet Take 1 tablet by mouth 2 (two) times daily.  Qty: 180 tablet, Refills: 1    Associated Diagnoses: Chronic combined systolic and diastolic congestive heart failure, NYHA class 3      fluticasone-umeclidin-vilanter (TRELEGY ELLIPTA) 200-62.5-25 mcg inhaler Inhale 1 puff into the lungs once daily.  Qty: 60 each, Refills: 11    Associated Diagnoses: Panlobular emphysema                 Discharge Diagnosis: Primary osteoarthritis of both knees [M17.0]  Condition on Discharge: Stable with no complications to procedure   Diet on Discharge: Same as before.  Activity: as per instruction sheet.  Discharge to: Home with a responsible adult.  Follow up: 2-4 weeks       Please call the office at [phone] if you experience any weakness or loss of sensation, fever > 101.5, pain uncontrolled with oral medications, persistent nausea/vomiting/or diarrhea, redness or drainage from the incisions, or any other worrisome concerns. If physician on call was not reached or could not communicate with our office for any reason please go to the nearest emergency department

## 2023-06-20 NOTE — DISCHARGE INSTRUCTIONS

## 2023-06-20 NOTE — OP NOTE
Richy Douglas  82 y.o. male      Vitals:    06/20/23 1010   BP: (!) (P) 131/57   Pulse: (P) 65   Resp: (P) 20   Temp:        Procedure Date: 06/20/2023      Procedure Note:    Bilateral  Geniculate nerve block under fluoroscopy                               Surgeon: Devon Grant MD    Assistant: None    Pre-Op Diagnosis:  Bilateral  Primary osteoarthritis of both knees [M17.0]    Post-Op Diagnosis: Primary osteoarthritis of both knees [M17.0]    Sedation:  Conscious sedation provided by M.D    The patient was monitored with continuous pulse oximetry, EKG, and intermittent blood pressure monitors.  The patient was hemodynamically stable throughout the entire process was responsive to voice, and breathing spontaneously.  Supplemental O2 was provided at 2L/min via nasal cannula.  Patient was comfortable for the duration of the procedure. (See nurse documentation and case log for sedation time)    There was a total of 2mg IV Midazolam and 50mcg Fentanyl titrated for the procedure    EBL: None    Complications: None    Specimens: None    Description of procedure:    After written consent was obtained, patient placed in supine position.  The area over the medial and lateral aspect of the superior epi-condyle of the femur and the medial tibial metaphysis were prepped with chlorhexidine.  The procedure targets the superior medial genicular nerve (midpoint of the femur at the superior medial epicondyle), the superior lateral genicular nerve (midpoint of the femur at the superior lateral epicondyle), and the inferior medial genicular nerve (midpoint of the tibia at the inferior medial epicondyle).The area was draped in the usual sterile fashion.  Approximately 8 mL total 1% lidocaine was infiltrated into the skin overlying the 3 predetermined entry points. A 22 gauge spinal needle was then advanced under fluoroscopy in the AP and lateral views into the positions of the geniculate nerves at these levels. After negative  aspiration and no paresthesias there was injection of 2 mL of 0.25% bupivacaine and 40 mg Kenalog into each of these 3 areas for a total volume of 6 mL. Needle was withdrawn and a sterile band-aid applied to the skin.    Patient tolerated the procedure well, and was reporting improvement of pain symptoms after the injection.  Patient was discharged from the clinic in stable condition.

## 2023-06-22 ENCOUNTER — CLINICAL SUPPORT (OUTPATIENT)
Dept: REHABILITATION | Facility: HOSPITAL | Age: 83
End: 2023-06-22
Payer: COMMERCIAL

## 2023-06-22 DIAGNOSIS — R29.898 BILATERAL LEG WEAKNESS: Primary | ICD-10-CM

## 2023-06-22 PROCEDURE — 97110 THERAPEUTIC EXERCISES: CPT | Mod: PN

## 2023-06-22 PROCEDURE — 97112 NEUROMUSCULAR REEDUCATION: CPT | Mod: PN

## 2023-06-22 NOTE — PROGRESS NOTES
"[facility]   Physical Therapy Treatment Note      Name: Richy Douglas  Clinic Number: [id number]    Therapy Diagnosis:   Encounter Diagnosis   Name Primary?    Bilateral leg weakness Yes     Physician: Mita Bruno NP    Visit Date: 6/22/2023    Physician Orders: PT Eval and Treat   Medical Diagnosis from Referral: Osteoarthritis of both knees, unspecified osteoarthritis type [M17.0], Primary osteoarthritis of both knees [M17.0]  Evaluation Date: 6/2/2023  Authorization Period Expiration: 12/31/2023  Plan of Care Expiration: 7/28/2023  Progress Note Due: 7/2/2023  Visit # / Visits authorized: 3/20  FOTO: 2/5     Precautions: CHF and cancer     PTA Visit #: 0/5     Time In: 11:50 am   Time Out: 12:45 pm  Total Billable Time: 55 minutes (30 minutes 1:1 with trained technician)     Subjective     Pt reports: his knees have been doing much better since injections done earlier this week. The L still hurts and pops more so than the right knee.   He  was partially  compliant with home exercise program.  Response to previous treatment:soreness   Functional change: decreased pain with stair climbing at work    Pain: 4/10  Location: left knee      Objective      Objective Measures updated at progress report unless specified.       CMS Impairment/Limitation/Restriction for FOTO Knee Survey    Therapist reviewed FOTO scores for Richy Douglas on 6/22/2023.   FOTO documents entered into [employer] - see Media section.    Limitation Score: 45%          Treatment     Richy received the following treatments:    Richy received therapeutic exercises to develop strength, endurance, ROM, and flexibility for (25) minutes including:     Nustep 6' for joint bert and mobility    Shuttle 5 cords 3 minutes   Heel slides supine with strap B x 20 each   Hamstring Stretch 3 x 20" B   Standing calf stretch 2 x 1'    Richy received the following manual therapy techniques applied for (00) minutes, including:    Richy " "participated in neuromuscular re-education activities to improve: Balance, Coordination, Kinesthetic, Sense, Proprioception, and Posture for (30) minutes. The following activities were included:    Standing heel raises 20x  Step taps 6' 15x ea  Quad Sets 2 x 10 5" holds  Side lying Clamshells 2 x 8 B   Bridges 2 x 10   SAQ 2 x 8 3" holds  Mini wall sits 3 x 20"      Richy participated in dynamic functional therapeutic activities to improve functional performance for 00  minutes, including:      Richy participated in gait training to improve functional mobility and safety for 00  minutes, including:        Patient Education and Home Exercises       Education provided:   - importance of HEP compliance  - justification and explanation of treatment performed    Written Home Exercises Provided: Patient instructed to cont prior HEP. Exercises were reviewed and Richy was able to demonstrate them prior to the end of the session.  Richy demonstrated good  understanding of the education provided. See EMR under Patient Instructions for exercises provided during therapy sessions    Assessment   Patient presents with improvements noted in symptoms following knee injections. Patient remains with weakness and lacking mobility at the lower extremities, but has improved tolerance with progression of treatment today. Patient requires cues to help with pacing control and technique, but quad activation is improving with less tactile cues needed.      Richy Is progressing well towards his goals.   Pt prognosis is Good.     Pt will continue to benefit from skilled outpatient physical therapy to address the deficits listed in the problem list box on initial evaluation, provide pt/family education and to maximize pt's level of independence in the home and community environment.     Pt's spiritual, cultural and educational needs considered and pt agreeable to plan of care and goals.     Anticipated Barriers for therapy: occupation and " co-morbidities     Goals:      Short Term Goals:  4 weeks Progress   6/22/2023   Pain: Pt will demonstrate improved pain by reports of less than or equal to 8/10 worst pain on the verbal rating scale in order to progress toward maximal functional ability and improve QOL. MET   HEP: Patient will demonstrate independence with HEP in order to progress toward functional independence. MET      Long Term Goals:  8 weeks Progress  6/22/2023   Pain: Pt will demonstrate improved pain by reports of less than or equal to 5/10 worst pain on the verbal rating scale in order to progress toward maximal functional ability and improve QOL.   PC   Function: Patient will demonstrate improved function as indicated by a functional limitation score of less than or equal to 45 out of 100 on FOTO. MET   Mobility: Patient will improve AROM to stated goals, listed in objective measures above, in order to return to maximal functional potential and improve quality of life. PC   Strength: Patient will improve strength to stated goals, listed in objective measures above, in order to improve functional independence and quality of life. PC   Stair Climbing: Patient will demonstrate improved tolerance of stair climbing with performance of 12 steps up/down with step through method and 5/10 pain or less. PC   GOALS KEY:   PC= progressing/continue;   PM= partially met;             DC= discontinue    Plan     Plan of care Certification: 6/2/2023 to 7/28/2023.     Outpatient Physical Therapy 1 times weekly for 8 weeks to include the following interventions: Cervical/Lumbar Traction, Electrical Stimulation with or without FDN, Gait Training, Manual Therapy, Moist Heat/ Ice, Neuromuscular Re-ed, Orthotic Management and Training, Patient Education, Self Care, Therapeutic Activities, Therapeutic Exercise, Ultrasound, and Dry Needling .     Shauna Aguirre, PT

## 2023-06-28 NOTE — ANESTHESIA PREPROCEDURE EVALUATION
07/01/2022  Richy Douglas is a 81 y.o., male.      Pre-op Assessment    I have reviewed the Patient Summary Reports.     I have reviewed the Nursing Notes. I have reviewed the NPO Status.   I have reviewed the Medications.     Review of Systems  Anesthesia Hx:  No problems with previous Anesthesia    Social:  Former Smoker    Hematology/Oncology:         -- Anemia: Current/Recent Cancer.   Cardiovascular:   Hypertension, well controlled CHF hyperlipidemia  Abdominal Aortic Aneurysm    Pulmonary:   COPD, severe Shortness of breath    Renal/:  Renal/ Normal     Hepatic/GI:   Bowel Prep. GERD, poorly controlled    Musculoskeletal:  Musculoskeletal Normal    Neurological:  Neurology Normal    Endocrine:  Endocrine Normal    Dermatological:  Skin Normal    Psych:   Psychiatric History anxiety depression          Physical Exam  General: Well nourished    Airway:  Mallampati: II   Mouth Opening: Normal  TM Distance: Normal  Tongue: Normal  Neck ROM: Normal ROM    Dental:  Intact    Chest/Lungs:  Clear to auscultation    Heart:  Rate: Normal        Anesthesia Plan  Type of Anesthesia, risks & benefits discussed:    Anesthesia Type: MAC  Intra-op Monitoring Plan: Standard ASA Monitors  Induction:  IV  Informed Consent: Informed consent signed with the Patient and all parties understand the risks and agree with anesthesia plan.  All questions answered.   ASA Score: 3    Ready For Surgery From Anesthesia Perspective.     .      
Alert-The patient is alert, awake and responds to voice. The patient is oriented to time, place, and person. The triage nurse is able to obtain subjective information.

## 2023-06-30 ENCOUNTER — TELEPHONE (OUTPATIENT)
Dept: INFUSION THERAPY | Facility: HOSPITAL | Age: 83
End: 2023-06-30
Payer: MEDICARE

## 2023-06-30 NOTE — TELEPHONE ENCOUNTER
Informed patient that his treatment for July 3rd is not approved and will take up to 15 days before a decision is rendered. Infusion notified to reschedule.

## 2023-07-03 ENCOUNTER — INFUSION (OUTPATIENT)
Dept: INFUSION THERAPY | Facility: HOSPITAL | Age: 83
End: 2023-07-03
Payer: COMMERCIAL

## 2023-07-03 VITALS
SYSTOLIC BLOOD PRESSURE: 121 MMHG | RESPIRATION RATE: 16 BRPM | DIASTOLIC BLOOD PRESSURE: 57 MMHG | TEMPERATURE: 98 F | HEART RATE: 55 BPM | OXYGEN SATURATION: 95 %

## 2023-07-03 DIAGNOSIS — D50.0 IRON DEFICIENCY ANEMIA DUE TO CHRONIC BLOOD LOSS: ICD-10-CM

## 2023-07-03 DIAGNOSIS — D63.8 ANEMIA OF CHRONIC DISEASE: Primary | ICD-10-CM

## 2023-07-03 PROCEDURE — 25000003 PHARM REV CODE 250

## 2023-07-03 PROCEDURE — 63600175 PHARM REV CODE 636 W HCPCS: Mod: JZ,JG

## 2023-07-03 PROCEDURE — 96365 THER/PROPH/DIAG IV INF INIT: CPT

## 2023-07-03 RX ADMIN — FERUMOXYTOL 510 MG: 510 INJECTION INTRAVENOUS at 07:07

## 2023-07-03 NOTE — DISCHARGE INSTRUCTIONS
[facility]  [address] [address]  [address]  [phone] phone     [phone] fax  Hours of Operation: Monday- Friday 8:00am- 5:00pm  After hours phone  [phone]  Hematology / Oncology Physicians on call      ZAIRE Negrete Dr., Dr., VIVI Prater, VIVI Gallardo, SULEMA Fam    Please call with any concerns regarding your appointment today.       FALL PREVENTION   Falls often occur due to slipping, tripping or losing your balance. Here are ways to reduce your risk of falling again.   Was there anything that caused your fall that can be fixed, removed or replaced?   Make your home safe by keeping walkways clear of objects you may trip over.   Use non-slip pads under rugs.   Do not walk in poorly lit areas.   Do not stand on chairs or wobbly ladders.   Use caution when reaching overhead or looking upward. This position can cause a loss of balance.   Be sure your shoes fit properly, have non-slip bottoms and are in good condition.   Be cautious when going up and down stairs, curbs, and when walking on uneven sidewalks.   If your balance is poor, consider using a cane or walker.   If your fall was related to alcohol use, stop or limit alcohol intake.   If your fall was related to use of sleeping medicines, talk to your doctor about this. You may need to reduce your dosage at bedtime if you awaken during the night to go to the bathroom.   To reduce the need for nighttime bathroom trips:   Avoid drinking fluids for several hours before going to bed   Empty your bladder before going to bed   Men can keep a urinal at the bedside   © 8788-5242 [facility] [facility], [address] [place] [address], [place] [address]. All rights reserved. This information is not intended as a substitute for professional medical care. Always follow your healthcare professional's instructions.

## 2023-07-06 ENCOUNTER — CLINICAL SUPPORT (OUTPATIENT)
Dept: REHABILITATION | Facility: HOSPITAL | Age: 83
End: 2023-07-06
Payer: COMMERCIAL

## 2023-07-06 DIAGNOSIS — R29.898 BILATERAL LEG WEAKNESS: Primary | ICD-10-CM

## 2023-07-06 PROCEDURE — 97140 MANUAL THERAPY 1/> REGIONS: CPT | Mod: PN

## 2023-07-06 PROCEDURE — 97112 NEUROMUSCULAR REEDUCATION: CPT | Mod: PN

## 2023-07-06 PROCEDURE — 97110 THERAPEUTIC EXERCISES: CPT | Mod: PN

## 2023-07-06 NOTE — PROGRESS NOTES
"[facility]   Physical Therapy Treatment Note      Name: Richy Douglas  [facility] Number: [id number]    Therapy Diagnosis:   Encounter Diagnosis   Name Primary?    Bilateral leg weakness Yes       Physician: Mita Bruno NP    Visit Date: 7/6/2023    Physician Orders: PT Eval and Treat   Medical Diagnosis from Referral: Osteoarthritis of both knees, unspecified osteoarthritis type [M17.0], Primary osteoarthritis of both knees [M17.0]  Evaluation Date: 6/2/2023  Authorization Period Expiration: 12/31/2023  Plan of Care Expiration: 7/28/2023  Visit # / Visits authorized: 4/20  FOTO: 2/5     Precautions: CHF and cancer     PTA Visit #: 0/5     Time In: 12:00 pm   Time Out: 12:55 pm  Total Billable Time: 55 minutes (20 minutes 1:1 with trained technician)     Subjective     Pt reports: his L knee has been in significant pain over the past few days.   He  was partially  compliant with home exercise program.  Response to previous treatment:soreness   Functional change: decreased pain with stair climbing at work    Pain: 7/10  Location: left knee      Objective      Objective Measures updated at progress report unless specified.     Treatment     Richy received the following treatments:    Richy received therapeutic exercises to develop strength, endurance, ROM, and flexibility for (23) minutes including:     Nustep 6' for joint bert and mobility    Shuttle 5 cords 3 minutes   Heel slides supine with strap B x 20 each   Hamstring Stretch 3 x 20" B   Standing calf stretch 2 x 1'    Richy received the following manual therapy techniques applied for (9) minutes, including:    L knee distraction   L Patella glides     Richy participated in neuromuscular re-education activities to improve: Balance, Coordination, Kinesthetic, Sense, Proprioception, and Posture for (23) minutes. The following activities were included:    Standing heel raises 20x  Quad Sets 3 x 10 5" holds  Side lying Clamshells 2 x " "10 B   Bridges 2 x 12   SAQ 2 x 10 3" holds    Richy participated in dynamic functional therapeutic activities to improve functional performance for 00  minutes, including:    Richy participated in gait training to improve functional mobility and safety for 00  minutes, including:    Patient Education and Home Exercises       Education provided:   - importance of HEP compliance  - justification and explanation of treatment performed    Written Home Exercises Provided: Patient instructed to cont prior HEP. Exercises were reviewed and Richy was able to demonstrate them prior to the end of the session.  Richy demonstrated good  understanding of the education provided. See EMR under Patient Instructions for exercises provided during therapy sessions    Assessment   Started today's session with manual therapies to help with pain increase patient reports presenting. Less standing treatment done today and while repetitions of stabilizer exercises was increased. Patient requires cues to help with pacing control and technique, but quad activation is improving with less tactile cues needed.  Patient tolerated treatment well despite pain complaints at start of session, reporting no pain with walking by end of session.     Richy Is progressing well towards his goals.   Pt prognosis is Good.     Pt will continue to benefit from skilled outpatient physical therapy to address the deficits listed in the problem list box on initial evaluation, provide pt/family education and to maximize pt's level of independence in the home and community environment.     Pt's spiritual, cultural and educational needs considered and pt agreeable to plan of care and goals.     Anticipated Barriers for therapy: occupation and co-morbidities     Goals:      Short Term Goals:  4 weeks Progress   6/22/2023   Pain: Pt will demonstrate improved pain by reports of less than or equal to 8/10 worst pain on the verbal rating scale in order to progress " toward maximal functional ability and improve QOL. MET   HEP: Patient will demonstrate independence with HEP in order to progress toward functional independence. MET      Long Term Goals:  8 weeks Progress  6/22/2023   Pain: Pt will demonstrate improved pain by reports of less than or equal to 5/10 worst pain on the verbal rating scale in order to progress toward maximal functional ability and improve QOL.   PC   Function: Patient will demonstrate improved function as indicated by a functional limitation score of less than or equal to 45 out of 100 on FOTO. MET   Mobility: Patient will improve AROM to stated goals, listed in objective measures above, in order to return to maximal functional potential and improve quality of life. PC   Strength: Patient will improve strength to stated goals, listed in objective measures above, in order to improve functional independence and quality of life. PC   Stair Climbing: Patient will demonstrate improved tolerance of stair climbing with performance of 12 steps up/down with step through method and 5/10 pain or less. PC   GOALS KEY:   PC= progressing/continue;   PM= partially met;             DC= discontinue    Plan     Plan of care Certification: 6/2/2023 to 7/28/2023.     Outpatient Physical Therapy 1 times weekly for 8 weeks to include the following interventions: Cervical/Lumbar Traction, Electrical Stimulation with or without FDN, Gait Training, Manual Therapy, Moist Heat/ Ice, Neuromuscular Re-ed, Orthotic Management and Training, Patient Education, Self Care, Therapeutic Activities, Therapeutic Exercise, Ultrasound, and Dry Needling .     Shauna Aguirre, PT

## 2023-07-10 ENCOUNTER — INFUSION (OUTPATIENT)
Dept: INFUSION THERAPY | Facility: HOSPITAL | Age: 83
End: 2023-07-10
Payer: COMMERCIAL

## 2023-07-10 VITALS
BODY MASS INDEX: 20.52 KG/M2 | HEART RATE: 72 BPM | HEIGHT: 70 IN | OXYGEN SATURATION: 95 % | DIASTOLIC BLOOD PRESSURE: 56 MMHG | SYSTOLIC BLOOD PRESSURE: 114 MMHG | RESPIRATION RATE: 18 BRPM | TEMPERATURE: 98 F | WEIGHT: 143.31 LBS

## 2023-07-10 DIAGNOSIS — D50.0 IRON DEFICIENCY ANEMIA DUE TO CHRONIC BLOOD LOSS: ICD-10-CM

## 2023-07-10 DIAGNOSIS — D63.8 ANEMIA OF CHRONIC DISEASE: Primary | ICD-10-CM

## 2023-07-10 PROCEDURE — 25000003 PHARM REV CODE 250

## 2023-07-10 PROCEDURE — 63600175 PHARM REV CODE 636 W HCPCS: Mod: JZ,JG

## 2023-07-10 PROCEDURE — 96365 THER/PROPH/DIAG IV INF INIT: CPT

## 2023-07-10 RX ORDER — SODIUM CHLORIDE 0.9 % (FLUSH) 0.9 %
10 SYRINGE (ML) INJECTION
Status: DISCONTINUED | OUTPATIENT
Start: 2023-07-10 | End: 2023-07-10 | Stop reason: HOSPADM

## 2023-07-10 RX ADMIN — FERUMOXYTOL 510 MG: 510 INJECTION INTRAVENOUS at 07:07

## 2023-07-10 NOTE — NURSING
Infusion# 2    Recent labsReviewed    Premeds None    S/S of current or recent infections in the past 14 days? None/Denies    Recent Surgery/invasive procedures? None/Denies     Any recent vaccines ? None/Denies    Feraheme 510 mg administered IV at a 15 minute rate per orders; see MAR and vitals for more  Details.

## 2023-07-13 ENCOUNTER — CLINICAL SUPPORT (OUTPATIENT)
Dept: REHABILITATION | Facility: HOSPITAL | Age: 83
End: 2023-07-13
Payer: COMMERCIAL

## 2023-07-13 DIAGNOSIS — R29.898 BILATERAL LEG WEAKNESS: Primary | ICD-10-CM

## 2023-07-13 PROCEDURE — 97112 NEUROMUSCULAR REEDUCATION: CPT | Mod: PN

## 2023-07-13 PROCEDURE — 97140 MANUAL THERAPY 1/> REGIONS: CPT | Mod: PN

## 2023-07-13 PROCEDURE — 97110 THERAPEUTIC EXERCISES: CPT | Mod: PN

## 2023-07-13 NOTE — PROGRESS NOTES
"[facility]   Physical Therapy Treatment Note      Name: Richy Douglas  Clinic Number: [id number]    Therapy Diagnosis:   Encounter Diagnosis   Name Primary?    Bilateral leg weakness Yes       Physician: Mita Bruno NP    Visit Date: 7/13/2023    Physician Orders: PT Eval and Treat   Medical Diagnosis from Referral: Osteoarthritis of both knees, unspecified osteoarthritis type [M17.0], Primary osteoarthritis of both knees [M17.0]  Evaluation Date: 6/2/2023  Authorization Period Expiration: 12/31/2023  Plan of Care Expiration: 7/28/2023  Visit # / Visits authorized: 5/20  FOTO: 2/5     Precautions: CHF and cancer     PTA Visit #: 0/5     Time In: 12:15 pm   Time Out: 1:05 pm  Total Billable Time: 50 minutes     Subjective     Pt reports: L knee pain is worse after work today, but has continued to wake him up at night   He  was partially  compliant with home exercise program.  Response to previous treatment:soreness   Functional change: decreased pain with stair climbing at work    Pain: 7/10  Location: left knee      Objective      Objective Measures updated at progress report unless specified.     Treatment     Richy received the following treatments:    Richy received therapeutic exercises to develop strength, endurance, ROM, and flexibility for (15) minutes including:     Nustep 5' for joint bert and mobility    Shuttle 5 cords 3 minutes   Hamstring Stretch 3 x 20" B   Standing calf stretch 2 x 1'    Richy received the following manual therapy techniques applied for (10) minutes, including:    L knee distraction   L Patella glides     Richy participated in neuromuscular re-education activities to improve: Balance, Coordination, Kinesthetic, Sense, Proprioception, and Posture for (25) minutes. The following activities were included:    Standing heel raises 20x  Quad Sets 3 x 10 5" holds  Side lying abduction 2 x 6 B  Bridges 2 x 12   SLR flexion2 x8 B  LAQ 10 3" holds " B    Richy participated in dynamic functional therapeutic activities to improve functional performance for 00  minutes, including:    Richy participated in gait training to improve functional mobility and safety for 00  minutes, including:    Patient Education and Home Exercises       Education provided:   - importance of HEP compliance  - justification and explanation of treatment performed    Written Home Exercises Provided: Patient instructed to cont prior HEP. Exercises were reviewed and Richy was able to demonstrate them prior to the end of the session.  Richy demonstrated good  understanding of the education provided. See EMR under Patient Instructions for exercises provided during therapy sessions    Assessment   Patient reports with continued high levels of pain. Started with mobility and stretching exercise to allow improved tolerance for treatment. Patient reports improvement once mobility exercise and patient was placed in a non-weightbearing position. Patient tolerates increased intensity of mat treatment to improve quad control and lower extremity strength. Extensor lag is noted in final reps of SLR flexion on L side, showing limitation in quad strength and neuromotor control that remains. Manual continued as patient reports significantly less pain following manual therapies, allowing tolerance for some standing activity.       Richy Is progressing well towards his goals.   Pt prognosis is Good.     Pt will continue to benefit from skilled outpatient physical therapy to address the deficits listed in the problem list box on initial evaluation, provide pt/family education and to maximize pt's level of independence in the home and community environment.     Pt's spiritual, cultural and educational needs considered and pt agreeable to plan of care and goals.     Anticipated Barriers for therapy: occupation and co-morbidities     Goals:      Short Term Goals:  4 weeks Progress   6/22/2023   Pain: Pt  will demonstrate improved pain by reports of less than or equal to 8/10 worst pain on the verbal rating scale in order to progress toward maximal functional ability and improve QOL. MET   HEP: Patient will demonstrate independence with HEP in order to progress toward functional independence. MET      Long Term Goals:  8 weeks Progress  6/22/2023   Pain: Pt will demonstrate improved pain by reports of less than or equal to 5/10 worst pain on the verbal rating scale in order to progress toward maximal functional ability and improve QOL.   PC   Function: Patient will demonstrate improved function as indicated by a functional limitation score of less than or equal to 45 out of 100 on FOTO. MET   Mobility: Patient will improve AROM to stated goals, listed in objective measures above, in order to return to maximal functional potential and improve quality of life. PC   Strength: Patient will improve strength to stated goals, listed in objective measures above, in order to improve functional independence and quality of life. PC   Stair Climbing: Patient will demonstrate improved tolerance of stair climbing with performance of 12 steps up/down with step through method and 5/10 pain or less. PC   GOALS KEY:   PC= progressing/continue;   PM= partially met;             DC= discontinue    Plan     Plan of care Certification: 6/2/2023 to 7/28/2023.     Outpatient Physical Therapy 1 times weekly for 8 weeks to include the following interventions: Cervical/Lumbar Traction, Electrical Stimulation with or without FDN, Gait Training, Manual Therapy, Moist Heat/ Ice, Neuromuscular Re-ed, Orthotic Management and Training, Patient Education, Self Care, Therapeutic Activities, Therapeutic Exercise, Ultrasound, and Dry Needling .     Shauna Aguirre, PT

## 2023-07-17 ENCOUNTER — TELEPHONE (OUTPATIENT)
Dept: PAIN MEDICINE | Facility: CLINIC | Age: 83
End: 2023-07-17
Payer: MEDICARE

## 2023-07-20 ENCOUNTER — CLINICAL SUPPORT (OUTPATIENT)
Dept: REHABILITATION | Facility: HOSPITAL | Age: 83
End: 2023-07-20
Payer: COMMERCIAL

## 2023-07-20 DIAGNOSIS — R29.898 BILATERAL LEG WEAKNESS: Primary | ICD-10-CM

## 2023-07-20 PROCEDURE — 97140 MANUAL THERAPY 1/> REGIONS: CPT | Mod: PN

## 2023-07-20 PROCEDURE — 97112 NEUROMUSCULAR REEDUCATION: CPT | Mod: PN

## 2023-07-20 PROCEDURE — 97110 THERAPEUTIC EXERCISES: CPT | Mod: PN

## 2023-07-20 NOTE — PROGRESS NOTES
"[facility]   Physical Therapy Treatment Note      Name: Richy Douglas  Clinic Number: [id number]    Therapy Diagnosis:   Encounter Diagnosis   Name Primary?    Bilateral leg weakness Yes       Physician: Mita Bruno NP    Visit Date: 7/20/2023    Physician Orders: PT Eval and Treat   Medical Diagnosis from Referral: Osteoarthritis of both knees, unspecified osteoarthritis type [M17.0], Primary osteoarthritis of both knees [M17.0]  Evaluation Date: 6/2/2023  Authorization Period Expiration: 12/31/2023  Plan of Care Expiration: 7/28/2023  Visit # / Visits authorized: 6/20  FOTO: 2/5     Precautions: CHF and cancer     PTA Visit #: 0/5     Time In: 12:25 pm   Time Out: 1:25 pm  Total Billable Time: 60 minutes (50 minutes 1:1 with trained technician)     Subjective     Pt reports: L knee pain is still bad from work today, but patient states he has noticed much improvement since start of therapy.   He  was partially  compliant with home exercise program.  Response to previous treatment:soreness   Functional change: decreased pain with stair climbing at work    Pain: 6/10  Location: left knee      Objective      Objective Measures updated at progress report unless specified.     Treatment     Richy received the following treatments:    Richy received therapeutic exercises to develop strength, endurance, ROM, and flexibility for (25) minutes including:     Nustep 5' for joint bert and mobility    Shuttle 5 cords 3 minutes   Hamstring Stretch 3 x 20" B   Standing calf stretch 2 x 1'  Seated marches x 20 B     Richy received the following manual therapy techniques applied for (10) minutes, including:    L knee distraction   L Patella glides     Richy participated in neuromuscular re-education activities to improve: Balance, Coordination, Kinesthetic, Sense, Proprioception, and Posture for (25) minutes. The following activities were included:    Standing heel raises 20x  Quad Sets 3 x 10 " "5" holds  Side lying abduction 2 x 6 B  Bridges 2 x 12   SLR flexion2 x8 B  LAQ 10 3" holds B    Richy participated in dynamic functional therapeutic activities to improve functional performance for 00  minutes, including:    Richy participated in gait training to improve functional mobility and safety for 00  minutes, including:    Patient Education and Home Exercises       Education provided:   - importance of HEP compliance  - justification and explanation of treatment performed    Written Home Exercises Provided: Patient instructed to cont prior HEP. Exercises were reviewed and Richy was able to demonstrate them prior to the end of the session.  Richy demonstrated good  understanding of the education provided. See EMR under Patient Instructions for exercises provided during therapy sessions    Assessment   Patient reports with some improvement noted. Continued with mobility, strength and neuromotor training for improved function at work. Patient has more pain with abduction activities at the L side today compared to prior sessions. Therefore, this exercises was limited with reps today. Manual continued to allow for improvement. Patient reports leaving with less pain than presenting with.       Richy Is progressing well towards his goals.   Pt prognosis is Good.     Pt will continue to benefit from skilled outpatient physical therapy to address the deficits listed in the problem list box on initial evaluation, provide pt/family education and to maximize pt's level of independence in the home and community environment.     Pt's spiritual, cultural and educational needs considered and pt agreeable to plan of care and goals.     Anticipated Barriers for therapy: occupation and co-morbidities     Goals:      Short Term Goals:  4 weeks Progress   6/22/2023   Pain: Pt will demonstrate improved pain by reports of less than or equal to 8/10 worst pain on the verbal rating scale in order to progress toward maximal " functional ability and improve QOL. MET   HEP: Patient will demonstrate independence with HEP in order to progress toward functional independence. MET      Long Term Goals:  8 weeks Progress  6/22/2023   Pain: Pt will demonstrate improved pain by reports of less than or equal to 5/10 worst pain on the verbal rating scale in order to progress toward maximal functional ability and improve QOL.   PC   Function: Patient will demonstrate improved function as indicated by a functional limitation score of less than or equal to 45 out of 100 on FOTO. MET   Mobility: Patient will improve AROM to stated goals, listed in objective measures above, in order to return to maximal functional potential and improve quality of life. PC   Strength: Patient will improve strength to stated goals, listed in objective measures above, in order to improve functional independence and quality of life. PC   Stair Climbing: Patient will demonstrate improved tolerance of stair climbing with performance of 12 steps up/down with step through method and 5/10 pain or less. PC   GOALS KEY:   PC= progressing/continue;   PM= partially met;             DC= discontinue    Plan     Plan of care Certification: 6/2/2023 to 7/28/2023.     Outpatient Physical Therapy 1 times weekly for 8 weeks to include the following interventions: Cervical/Lumbar Traction, Electrical Stimulation with or without FDN, Gait Training, Manual Therapy, Moist Heat/ Ice, Neuromuscular Re-ed, Orthotic Management and Training, Patient Education, Self Care, Therapeutic Activities, Therapeutic Exercise, Ultrasound, and Dry Needling .     Shauna Aguirre, PT

## 2023-08-09 ENCOUNTER — OFFICE VISIT (OUTPATIENT)
Dept: CARDIOLOGY | Facility: CLINIC | Age: 83
End: 2023-08-09
Payer: COMMERCIAL

## 2023-08-09 VITALS — BODY MASS INDEX: 21.19 KG/M2 | WEIGHT: 147.69 LBS

## 2023-08-09 DIAGNOSIS — I50.22 CHRONIC SYSTOLIC CONGESTIVE HEART FAILURE: Primary | ICD-10-CM

## 2023-08-09 PROCEDURE — 99214 PR OFFICE/OUTPT VISIT, EST, LEVL IV, 30-39 MIN: ICD-10-PCS | Mod: S$GLB,,, | Performed by: PHYSICIAN ASSISTANT

## 2023-08-09 PROCEDURE — 99214 OFFICE O/P EST MOD 30 MIN: CPT | Mod: S$GLB,,, | Performed by: PHYSICIAN ASSISTANT

## 2023-08-09 PROCEDURE — 99999 PR PBB SHADOW E&M-EST. PATIENT-LVL III: ICD-10-PCS | Mod: PBBFAC,,, | Performed by: PHYSICIAN ASSISTANT

## 2023-08-09 PROCEDURE — 99213 OFFICE O/P EST LOW 20 MIN: CPT | Mod: PBBFAC | Performed by: PHYSICIAN ASSISTANT

## 2023-08-09 PROCEDURE — 99999 PR PBB SHADOW E&M-EST. PATIENT-LVL III: CPT | Mod: PBBFAC,,, | Performed by: PHYSICIAN ASSISTANT

## 2023-08-09 NOTE — LETTER
August 9, 2023      [facility]  [address] [facility] [address]  Phone: [phone]  Fax: [phone]       Patient: Richy Douglas   YOB: 1940  Date of Visit: 08/09/2023    To Whom It May Concern:    Richard Douglas  was at [facility] on 08/09/2023. The patient may return to work/school on 08/10/2023 with no restrictions. If you have any questions or concerns, or if I can be of further assistance, please do not hesitate to contact me.    Sincerely,  Maria D Rodgers LPN

## 2023-08-09 NOTE — PROGRESS NOTES
[facility] Provider Note (Follow-up) Consult Note      HPI:  Patient can walk without SOB, limited by knee pain   Patient sleeps on 2 pillows   Patient wakes up SOB, has to get out of bed, associated cough, sputum none   Palpitations - none   Dizzy, light-headed, pre-syncope or syncope none   Since discharge frequency of performing weights, home weight and weight change stable    Other information felt pertinent to HPI    Pt here for 3 month follow up, has been doing well from CV standpoint. Since Is aw him last he had genicular injections of knees and has been srtruggling with pain since unfortunately. HE is not sure if he got much relief after, not taking anythig for pain. Feels left more painful than right now.    No SOB, PND, orthopnea. Taking lasix BID and responds well.     Does have so,e mild chronic LE edema    Still works full time      PHYSICAL: There were no vitals filed for this visit.   Wt Readings from Last 3 Encounters:   08/09/23 67 kg (147 lb 11.3 oz)   07/10/23 65 kg (143 lb 4.8 oz)   06/20/23 70 kg (154 lb 5.2 oz)       JVD: no,    Heart rhythm: regular  Cardiac murmur: No    S3: no  S4: no  Lungs: clear  Liver span: 10 cm:   Hepatojugular reflux: no  Edema: yes, 1+      ASSESSMENT: chronic systolic Hf    PLAN:      Patient Instructions:   Instruct the patient to notify this clinic if HH, a physician or an advanced care provider wants to change medication one of their HF medications   Activity and Diet restrictions:   Recommend 2-3 gram sodium restriction and 1500cc- 2000cc fluid restriction.  Encourage physical activity with graded exercise program.  Requested patient to weigh themselves daily, and to notify us if their weight increases by more than 3 lbs in 1 day or 5 lbs in 3 days.    Assigned dry weight on home scale: 65 kg  Medication changes (include current dose and changed dose)  Continue lasix BID  ARNi, BB and SGLT2i for GDMT  Well compensated on exam  Have messaged pain clinic and they will  call him for asap follow up for knee pain  Upcoming labs and date anticipated: RTC 6 months with echo

## 2023-08-14 ENCOUNTER — PES CALL (OUTPATIENT)
Dept: ADMINISTRATIVE | Facility: CLINIC | Age: 83
End: 2023-08-14
Payer: MEDICARE

## 2023-08-16 ENCOUNTER — OFFICE VISIT (OUTPATIENT)
Dept: PAIN MEDICINE | Facility: CLINIC | Age: 83
End: 2023-08-16
Payer: COMMERCIAL

## 2023-08-16 VITALS
SYSTOLIC BLOOD PRESSURE: 107 MMHG | WEIGHT: 155.13 LBS | BODY MASS INDEX: 22.21 KG/M2 | HEIGHT: 70 IN | HEART RATE: 94 BPM | DIASTOLIC BLOOD PRESSURE: 54 MMHG

## 2023-08-16 DIAGNOSIS — M17.0 PRIMARY OSTEOARTHRITIS OF BOTH KNEES: Primary | ICD-10-CM

## 2023-08-16 DIAGNOSIS — I50.42 CHRONIC COMBINED SYSTOLIC AND DIASTOLIC CONGESTIVE HEART FAILURE, NYHA CLASS 3: ICD-10-CM

## 2023-08-16 PROCEDURE — 99214 OFFICE O/P EST MOD 30 MIN: CPT | Mod: PBBFAC | Performed by: PHYSICIAN ASSISTANT

## 2023-08-16 PROCEDURE — 99214 OFFICE O/P EST MOD 30 MIN: CPT | Mod: 25,S$GLB,, | Performed by: PHYSICIAN ASSISTANT

## 2023-08-16 PROCEDURE — 99999 PR PBB SHADOW E&M-EST. PATIENT-LVL IV: ICD-10-PCS | Mod: PBBFAC,,, | Performed by: PHYSICIAN ASSISTANT

## 2023-08-16 PROCEDURE — 96372 THER/PROPH/DIAG INJ SC/IM: CPT | Mod: S$GLB,,, | Performed by: PHYSICIAN ASSISTANT

## 2023-08-16 PROCEDURE — 99999 PR PBB SHADOW E&M-EST. PATIENT-LVL IV: CPT | Mod: PBBFAC,,, | Performed by: PHYSICIAN ASSISTANT

## 2023-08-16 PROCEDURE — 96372 PR INJECTION,THERAP/PROPH/DIAG2ST, IM OR SUBCUT: ICD-10-PCS | Mod: S$GLB,,, | Performed by: PHYSICIAN ASSISTANT

## 2023-08-16 PROCEDURE — 96372 THER/PROPH/DIAG INJ SC/IM: CPT | Mod: PBBFAC

## 2023-08-16 PROCEDURE — 99214 PR OFFICE/OUTPT VISIT, EST, LEVL IV, 30-39 MIN: ICD-10-PCS | Mod: 25,S$GLB,, | Performed by: PHYSICIAN ASSISTANT

## 2023-08-16 RX ORDER — SACUBITRIL AND VALSARTAN 97; 103 MG/1; MG/1
1 TABLET, FILM COATED ORAL 2 TIMES DAILY
Qty: 180 TABLET | Refills: 1 | Status: SHIPPED | OUTPATIENT
Start: 2023-08-16 | End: 2023-11-09 | Stop reason: SDUPTHER

## 2023-08-16 RX ORDER — KETOROLAC TROMETHAMINE 30 MG/ML
30 INJECTION, SOLUTION INTRAMUSCULAR; INTRAVENOUS ONCE
Status: COMPLETED | OUTPATIENT
Start: 2023-08-16 | End: 2023-08-16

## 2023-08-16 RX ADMIN — KETOROLAC TROMETHAMINE 30 MG: 30 INJECTION, SOLUTION INTRAMUSCULAR; INTRAVENOUS at 01:08

## 2023-08-16 NOTE — H&P (VIEW-ONLY)
Est Patient Chronic Pain Note (Follow up Visit)  PCP: Vivian Ch MD    Chief Complaint:   Chief Complaint   Patient presents with    Knee Pain          SUBJECTIVE:  Interval History (8/16/2023): Richy Douglas presents today for follow-up visit.  he underwent bilateral genicular block on 6/20/23.  The patient reports that he is/was better following the procedure.  he reports 80% pain relief X 2 days before pain returned.  Continues to report achy pain in both knees, left worse than right with intermittent swelling. He has now tried physical therapy, steroid injections, and gel injections without relief. Unable to take NSAIDs secondary to cardiac history.  Patient reports pain as 10/10 today.      Interval History (6/6/2023): Richy Douglas presents today for follow-up visit.  he underwent bilateral synvisc injection  on 5/10/23.  The patient reports that he is/was better following the procedure, but  he reports only 30% pain relief.  The changes lasted 4 weeks so far.  The changes have continued through this visit.  Patient reports pain as 8/10 today. Continues to report achy pain in both knees, left worse than right with intermittent swelling. He has now tried physical therapy, steroid injections, and gel injections without relief. Unable to take NSAIDs secondary to cardiac history.    Interval History (4/24/2023): Richy Douglas presents today for follow-up visit.  he underwent  bilateral IA knee injection  on 01/31/23.  The patient reports that he is/was better following the procedure.  he reports 50% pain relief.  The changes 2 months before pain returned to baseline. Continues to report achy pain in both knees, left worse than right with intermittent swelling.  Patient reports pain as 10/10 today.    Interval History (1/11/2023):  Richy Douglas presents today for follow-up visit.  Patient was last seen on 11/16/2022. At that visit, the plan was to repeat knee injections as needed. Last injection bilateral IA  knee injection with steroid on 10/21/22 with 60% relief. Patient reports pain as 9/10 today. He reports achy pain in both knees, left worse than right with intermittent swelling. He continues to work outside the home and also performs a majority of the household chores.      Interval History (11/16/2022): Richy Douglas presents today for follow-up visit.  he underwent bilateral IA knee injection with steroid on 10/21/22.  The patient reports that he is/was better following the procedure.  he reports 60% pain relief.  The changes lasted 4 weeks so far.  The changes have continued through this visit, though he notes some diminished relief in his left knee. He still works and reports the knee becomes swollen and gives out if he is up for too long.  Patient reports pain as 8/10 today. Since last visit, he has followed up with podiatry for ankle pain and right big toe pain with onychomycosis and big toe nail falling off. He also reports intermittent swelling of his left leg/ankle and intermittent shortness of breath. History of CHF, followed by cardiology, most recent visit 11/02/2022    X-ray lumbar spine 09/28/2022  FINDINGS:  Vertebral body heights maintained.  Trace anterolisthesis of L4 on L5 and retrolisthesis L1 on L2.  No significant change in alignment on extension or flexion..  Multilevel degenerative disc height loss, most severe L2-3.  Multilevel facet arthropathy and osteophyte changes.  No definite pars defects..  Atherosclerotic vascular calcification.  No acute abnormality.     Impression:     Multilevel prominent degenerative findings.    X-ray knee bilateral 09/28/2022  FINDINGS:  Bilateral arthritic changes present including moderate to severe right knee lateral compartment and left knee medial compartment joint space narrowing.  Bilateral chondrocalcinosis noted.  No acute abnormality appreciated.     Initial HPI 09/28/2022  Richy Douglas is a 82 y.o. male with past medical history significant for  alcohol dependence, lung emphysema, aortic aneurysm, atrial fibrillation, systolic congestive heart failure, hypertension, hyperlipidemia, anemia of chronic disease, GERD, nicotine dependence who presents to the clinic for the evaluation of bilateral knee pain.  Patient reports knee pain is in present since April 2022 without inciting accident injury or trauma.  Today patient reports pain is rated a 7/10 and is constant.  Patient reports pain in the popliteal fossa as well as pain which radiates distally to the dorsum of the foot in L4 distribution.  Patient reports associated superficial swelling along the suprapatellar aspect of bilateral knees.  Pain is exacerbated with knee flexion, extension, moving from sitting to standing and with ambulation.  Patient does endorse associated weakness in the lower extremities associated with his pain.  Of note patient reports prior lower back surgery [facility] with improvement in lower back and radicular pain.  Patient has not tried membrane stabilizing agents or interventions at this time.  Patient has tried physician directed physical therapy exercises at home without significant relief.  Patient also reports right big toe pain with onychomycosis and big toe nail falling off.    Patient reports significant motor weakness.  Patient denies night fever/night sweats, urinary incontinence, bowel incontinence, significant weight loss, and loss of sensations.      Pain Disability Index Review:     Last 3 PDI Scores 8/16/2023 9/28/2022   Pain Disability Index (PDI) 70 42       Non-Pharmacologic Treatments:  Physical Therapy/Home Exercise: yes  Ice/Heat:no  TENS: no  Acupuncture: no  Massage: no  Chiropractic: no    Other: no      Pain Medications:  - Adjuvant Medications: Neurontin (Gabapentin)    Pain Procedures:   -bilateral IA knee injection with steroid on 10/21/22 with 60% relief  -bilateral IA knee injection  on 01/31/23 with 50% relief  -bilateral synvisc injection   on 5/10/23 with 30% relief  -bilateral genicular block on 6/20/23 with 80% relief    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    Anemia of chronic disease     Aortic aneurysm     Atrial fibrillation with RVR 09/24/2021    Chronic systolic congestive heart failure 08/31/2017    Emphysema of lung 08/31/2017    GERD (gastroesophageal reflux disease)     Hypertension     Knee osteoarthritis 10/21/2022    Major neurocognitive disorder due to multiple etiologies 4/14/2023    Microcytic anemia 11/24/2020    Other hyperlipidemia 04/27/2021    Personal history of colonic polyps 2022    Prostate cancer      Past Surgical History:   Procedure Laterality Date    CATHETERIZATION OF BOTH LEFT AND RIGHT HEART N/A 1/13/2022    Procedure: CATHETERIZATION, HEART, BOTH LEFT AND RIGHT;  Surgeon: Janneth Tovar MD;  Location: [facility];  Service: Cardiology;  Laterality: N/A;  poss cx    COLONOSCOPY      COLONOSCOPY N/A 6/30/2022    Procedure: COLONOSCOPY;  Surgeon: Sandra Perez MD;  Location: [facility];  Service: Endoscopy;  Laterality: N/A;    COLONOSCOPY N/A 7/1/2022    Procedure: COLONOSCOPY;  Surgeon: Woodrow Christian MD;  Location: [facility];  Service: Endoscopy;  Laterality: N/A;    CORONARY ANGIOGRAPHY N/A 1/13/2022    Procedure: ANGIOGRAM, CORONARY ARTERY;  Surgeon: Janneth Tovar MD;  Location: [facility];  Service: Cardiology;  Laterality: N/A;    ESOPHAGOGASTRODUODENOSCOPY N/A 6/30/2022    Procedure: EGD (ESOPHAGOGASTRODUODENOSCOPY);  Surgeon: Sandra Perez MD;  Location: [facility];  Service: Endoscopy;  Laterality: N/A;    INJECTION OF ANESTHETIC AGENT AROUND NERVE Bilateral 6/20/2023    Procedure: Bilateral Genicular nerve block RN IV Sedation;  Surgeon: Devon Grant MD;  Location: [facility];  Service: Pain Management;  Laterality: Bilateral;    INJECTION OF JOINT Bilateral 10/21/2022    Procedure: Bilateral intraarticular knee injection with  local ALLERGIC TO IODINE;  Surgeon: Devon Grant MD;  Location: [facility];  Service: Pain Management;  Laterality: Bilateral;    INJECTION OF JOINT Bilateral 1/31/2023    Procedure: Bilateral IA knee joint injection with steroid RN IV Sedation;  Surgeon: Devon Grant MD;  Location: [facility];  Service: Pain Management;  Laterality: Bilateral;    INJECTION OF JOINT Bilateral 5/10/2023    Procedure: Bilateral IA knee joint injection Synvisc RN IV Sedation;  Surgeon: Devon Grant MD;  Location: [facility];  Service: Pain Management;  Laterality: Bilateral;    PROSTATE SURGERY      SPINE SURGERY       Review of patient's allergies indicates:   Allergen Reactions    Fish containing products     Iodine and iodide containing products     Shellfish containing products        Current Outpatient Medications   Medication Sig    albuterol (PROVENTIL) 2.5 mg /3 mL (0.083 %) nebulizer solution Take 3 mLs (2.5 mg total) by nebulization every 4 to 6 hours as needed for Wheezing or Shortness of Breath.    albuterol (PROVENTIL/VENTOLIN HFA) 90 mcg/actuation inhaler Inhale 2 puffs into the lungs every 4 (four) hours as needed for Wheezing or Shortness of Breath.    empagliflozin (JARDIANCE) 10 mg tablet Take 1 tablet (10 mg total) by mouth once daily.    ferrous sulfate 325 (65 FE) MG EC tablet Take 1 tablet (325 mg total) by mouth once daily.    fluticasone-umeclidin-vilanter (TRELEGY ELLIPTA) 200-62.5-25 mcg inhaler Inhale 1 puff into the lungs once daily.    furosemide (LASIX) 40 MG tablet Take 1 tablet (40 mg total) by mouth 2 (two) times a day.    memantine (NAMENDA) 5 MG Tab Take 1 tablet (5 mg total) by mouth 2 (two) times daily.    metoprolol succinate (TOPROL-XL) 25 MG 24 hr tablet Take 0.5 tablets (12.5 mg total) by mouth once daily.    potassium chloride SA (K-DUR,KLOR-CON M) 10 MEQ tablet Take 1 tablet (10 mEq total) by mouth once daily.    predniSONE (DELTASONE) 20 MG tablet Take 3 tablets (60mg) by mouth  "daily for 3 days THEN take 2 tablets (40mg) by mouth daily for 3 days THEN take 1 tablet (20mg) by mouth daily for 3 days THEN take 1/2 tablet (10mg) by mouth daily for 3 days    sacubitriL-valsartan (ENTRESTO)  mg per tablet Take 1 tablet by mouth 2 (two) times daily.     No current facility-administered medications for this visit.       Review of Systems     GENERAL:  No weight loss, malaise or fevers.  HEENT:   No recent changes in vision or hearing  NECK:  Negative for lumps, no difficulty with swallowing.  RESPIRATORY:  Negative for cough, wheezing or shortness of breath, patient denies any recent URI.  CARDIOVASCULAR:  Negative for chest pain or palpitations.  GI:  Negative for abdominal discomfort, blood in stools or black stools or change in bowel habits.  MUSCULOSKELETAL:  See HPI.  SKIN:  Negative for lesions, rash, and itching.  PSYCH:  No mood disorder or recent psychosocial stressors.   HEMATOLOGY/LYMPHOLOGY:  Negative for prolonged bleeding, bruising easily or swollen nodes.    NEURO:   No history of syncope, paralysis, seizures or tremors.  All other reviewed and negative other than HPI.    OBJECTIVE:    BP (!) 107/54 (BP Location: Right arm, Patient Position: Sitting)   Pulse 94   Ht 5' 10" (1.778 m)   Wt 70.4 kg (155 lb 1.5 oz)   BMI 22.25 kg/m²       Physical Exam    GENERAL: Well appearing, in no acute distress, alert and oriented x3.  PSYCH:  Mood and affect appropriate.  SKIN: Skin color, texture, turgor normal, no rashes or lesions.  HEAD/FACE:  Normocephalic, atraumatic. Cranial nerves grossly intact.    CV: RRR with palpation of the radial artery.  PULM: No evidence of respiratory difficulty, symmetric chest rise.  GI:  Soft and non-tender.    BACK:  Positive shopping cart sign.  Well-healed 6 in lower lumbar vertical midline incision.  Straight leg raising in the sitting and supine positions is negative to radicular pain. No pain to palpation over the facet joints of the lumbar " spine or spinous processes. Normal range of motion without pain reproduction.  EXTREMITIES: Peripheral joint ROM is reduced with pain.  No deformities, edema, or skin discoloration. Good capillary refill.  MUSCULOSKELETAL: Able to stand on heels & toes.   hip, and knee provocative maneuvers are negative.  There is no pain with palpation over the sacroiliac joints bilaterally.  Gaenslen's, Distraction/Compression and  FABERs test is negative.  Facet loading test is negative bilaterally.   Bilateral upper and lower extremity strength is normal and symmetric.  No atrophy or tone abnormalities are noted.    RIGHT Lower extremity: Hip flexion 5/5, Hip Abduction 5/5, Hip Adduction 5/5, Knee extension 5/5, Knee flexion 5/5, Ankle dorsiflexion5/5, Extensor hallucis longus 5/5, Ankle plantarflexion 5/5  LEFT Lower extremity:  Hip flexion 5/5, Hip Abduction 5/5,Hip Adduction 5/5, Knee extension 5/5, Knee flexion 5/5, Ankle dorsiflexion 5/5, Extensor hallucis longus 5/5, Ankle plantarflexion 5/5  -Normal testing knee (patellar) jerk and ankle (achilles) jerk    NEURO: Bilateral upper and lower extremity coordination and muscle stretch reflexes are physiologic and symmetric. No loss of sensation is noted.    Knee Exam:  bilateral    Erythema: Absent  Deformity/Swelling: Present  Effusion: Absent  Chronic Bony Changes: Present  Creptius: Present     milddiffuse tenderness palpation of the mediolateral joint lines and patella     ROM: full range with pain     Strength is 5/5 bilateral  Bilateral Non-pitting edema to lower extremities, left >> right     Neurovascular intact    GAIT:  Antalgic    Imaging:   X-ray lumbar spine 09/28/2022  FINDINGS:  Vertebral body heights maintained.  Trace anterolisthesis of L4 on L5 and retrolisthesis L1 on L2.  No significant change in alignment on extension or flexion..  Multilevel degenerative disc height loss, most severe L2-3.  Multilevel facet arthropathy and osteophyte changes.  No definite  pars defects..  Atherosclerotic vascular calcification.  No acute abnormality.     Impression:     Multilevel prominent degenerative findings.    X-ray knee bilateral 09/28/2022  FINDINGS:  Bilateral arthritic changes present including moderate to severe right knee lateral compartment and left knee medial compartment joint space narrowing.  Bilateral chondrocalcinosis noted.  No acute abnormality appreciated.        ASSESSMENT: 82 y.o. year old male with knee and leg pain, consistent with     1. Primary osteoarthritis of both knees  IR Ablation Neurolytic Agent_Other Peripheral Nerve Unilateral    Case Request-RAD/Other Procedure Area: Left Genicular Nerve RFA    Case Request-RAD/Other Procedure Area: Right Genicular Nerve RFA RN IV Sedation    IR Ablation Neurolytic Agent_Other Peripheral Nerve Unilateral    ketorolac injection 30 mg          PLAN:   - Interventions: Schedule for left then right genicular RFA to see if this offers long-term relief from knee pain as genicular block offered 80% relief  -s/p -bilateral genicular block on 6/20/23 with 80% relief  --bilateral synvisc injection  on 5/10/23 with 30% relief  --bilateral IA knee injection  on 01/31/23 with 50% relief  -s/p bilateral IA knee injection with steroid on 10/21/22 with 60% relief     - Procedure note: An IM injection of (ketolorac 30mg/1mL) was administered during clinic visit.  This was well tolerated.      - Anticoagulation use: no no anticoagulation       report:  Reviewed and consistent with medication use as prescribed.    - Medications:    - Therapy:   Continue activities as tolerated    - Imaging: Reviewed bilateral knee and lumbar x-ray with patient and answered any questions they had regarding study.      - Consults/Referrals:  Podiatry- continue follow up as needed    - Records: Obtain old records from outside physicians and imaging:  [facility]:  Lumbar spine surgery    - Follow up visit: return to clinic in 4 weeks after  injection      The above plan and management options were discussed at length with patient. Patient is in agreement with the above and verbalized understanding.    - I discussed the goals of interventional chronic pain management with the patient on today's visit. We discussed a multimodal and systematic approach to pain.  This includes diagnostic and therapeutic injections, adjuvant pharmacologic treatment, physical therapy, and at times psychiatry.  I emphasized the importance of regular exercise, core strengthening and stretching, diet and weight loss as a cornerstone of long-term pain management.    - This condition does not require this patient to take time off of work, and the primary goal of our Pain Management services is to improve the patient's functional capacity.  - Patient Questions: Answered all of the patient's questions regarding diagnoses, therapy, treatment and next steps        Meghan Escobar PA-C  Interventional Pain Management  [facility]    Disclaimer:  This note was prepared using voice recognition system and is likely to have sound alike errors that may have been overlooked even after proof reading.  Please call me with any questions

## 2023-08-16 NOTE — H&P (VIEW-ONLY)
Est Patient Chronic Pain Note (Follow up Visit)  PCP: Vivian Ch MD    Chief Complaint:   Chief Complaint   Patient presents with    Knee Pain          SUBJECTIVE:  Interval History (8/16/2023): Richy Douglas presents today for follow-up visit.  he underwent bilateral genicular block on 6/20/23.  The patient reports that he is/was better following the procedure.  he reports 80% pain relief X 2 days before pain returned.  Continues to report achy pain in both knees, left worse than right with intermittent swelling. He has now tried physical therapy, steroid injections, and gel injections without relief. Unable to take NSAIDs secondary to cardiac history.  Patient reports pain as 10/10 today.      Interval History (6/6/2023): Richy Douglas presents today for follow-up visit.  he underwent bilateral synvisc injection  on 5/10/23.  The patient reports that he is/was better following the procedure, but  he reports only 30% pain relief.  The changes lasted 4 weeks so far.  The changes have continued through this visit.  Patient reports pain as 8/10 today. Continues to report achy pain in both knees, left worse than right with intermittent swelling. He has now tried physical therapy, steroid injections, and gel injections without relief. Unable to take NSAIDs secondary to cardiac history.    Interval History (4/24/2023): Richy Douglas presents today for follow-up visit.  he underwent  bilateral IA knee injection  on 01/31/23.  The patient reports that he is/was better following the procedure.  he reports 50% pain relief.  The changes 2 months before pain returned to baseline. Continues to report achy pain in both knees, left worse than right with intermittent swelling.  Patient reports pain as 10/10 today.    Interval History (1/11/2023):  Richy Douglas presents today for follow-up visit.  Patient was last seen on 11/16/2022. At that visit, the plan was to repeat knee injections as needed. Last injection bilateral IA  knee injection with steroid on 10/21/22 with 60% relief. Patient reports pain as 9/10 today. He reports achy pain in both knees, left worse than right with intermittent swelling. He continues to work outside the home and also performs a majority of the household chores.      Interval History (11/16/2022): Richy Douglas presents today for follow-up visit.  he underwent bilateral IA knee injection with steroid on 10/21/22.  The patient reports that he is/was better following the procedure.  he reports 60% pain relief.  The changes lasted 4 weeks so far.  The changes have continued through this visit, though he notes some diminished relief in his left knee. He still works and reports the knee becomes swollen and gives out if he is up for too long.  Patient reports pain as 8/10 today. Since last visit, he has followed up with podiatry for ankle pain and right big toe pain with onychomycosis and big toe nail falling off. He also reports intermittent swelling of his left leg/ankle and intermittent shortness of breath. History of CHF, followed by cardiology, most recent visit 11/02/2022    X-ray lumbar spine 09/28/2022  FINDINGS:  Vertebral body heights maintained.  Trace anterolisthesis of L4 on L5 and retrolisthesis L1 on L2.  No significant change in alignment on extension or flexion..  Multilevel degenerative disc height loss, most severe L2-3.  Multilevel facet arthropathy and osteophyte changes.  No definite pars defects..  Atherosclerotic vascular calcification.  No acute abnormality.     Impression:     Multilevel prominent degenerative findings.    X-ray knee bilateral 09/28/2022  FINDINGS:  Bilateral arthritic changes present including moderate to severe right knee lateral compartment and left knee medial compartment joint space narrowing.  Bilateral chondrocalcinosis noted.  No acute abnormality appreciated.     Initial HPI 09/28/2022  Richy Douglas is a 82 y.o. male with past medical history significant for  alcohol dependence, lung emphysema, aortic aneurysm, atrial fibrillation, systolic congestive heart failure, hypertension, hyperlipidemia, anemia of chronic disease, GERD, nicotine dependence who presents to the clinic for the evaluation of bilateral knee pain.  Patient reports knee pain is in present since April 2022 without inciting accident injury or trauma.  Today patient reports pain is rated a 7/10 and is constant.  Patient reports pain in the popliteal fossa as well as pain which radiates distally to the dorsum of the foot in L4 distribution.  Patient reports associated superficial swelling along the suprapatellar aspect of bilateral knees.  Pain is exacerbated with knee flexion, extension, moving from sitting to standing and with ambulation.  Patient does endorse associated weakness in the lower extremities associated with his pain.  Of note patient reports prior lower back surgery [facility] with improvement in lower back and radicular pain.  Patient has not tried membrane stabilizing agents or interventions at this time.  Patient has tried physician directed physical therapy exercises at home without significant relief.  Patient also reports right big toe pain with onychomycosis and big toe nail falling off.    Patient reports significant motor weakness.  Patient denies night fever/night sweats, urinary incontinence, bowel incontinence, significant weight loss, and loss of sensations.      Pain Disability Index Review:     Last 3 PDI Scores 8/16/2023 9/28/2022   Pain Disability Index (PDI) 70 42       Non-Pharmacologic Treatments:  Physical Therapy/Home Exercise: yes  Ice/Heat:no  TENS: no  Acupuncture: no  Massage: no  Chiropractic: no    Other: no      Pain Medications:  - Adjuvant Medications: Neurontin (Gabapentin)    Pain Procedures:   -bilateral IA knee injection with steroid on 10/21/22 with 60% relief  -bilateral IA knee injection  on 01/31/23 with 50% relief  -bilateral synvisc injection   on 5/10/23 with 30% relief  -bilateral genicular block on 6/20/23 with 80% relief    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    Anemia of chronic disease     Aortic aneurysm     Atrial fibrillation with RVR 09/24/2021    Chronic systolic congestive heart failure 08/31/2017    Emphysema of lung 08/31/2017    GERD (gastroesophageal reflux disease)     Hypertension     Knee osteoarthritis 10/21/2022    Major neurocognitive disorder due to multiple etiologies 4/14/2023    Microcytic anemia 11/24/2020    Other hyperlipidemia 04/27/2021    Personal history of colonic polyps 2022    Prostate cancer      Past Surgical History:   Procedure Laterality Date    CATHETERIZATION OF BOTH LEFT AND RIGHT HEART N/A 1/13/2022    Procedure: CATHETERIZATION, HEART, BOTH LEFT AND RIGHT;  Surgeon: Janneth Tovar MD;  Location: [facility] CATH LAB;  Service: Cardiology;  Laterality: N/A;  poss cx    COLONOSCOPY      COLONOSCOPY N/A 6/30/2022    Procedure: COLONOSCOPY;  Surgeon: Sandra Perez MD;  Location: [facility] ENDO;  Service: Endoscopy;  Laterality: N/A;    COLONOSCOPY N/A 7/1/2022    Procedure: COLONOSCOPY;  Surgeon: Woodrow Christian MD;  Location: [facility];  Service: Endoscopy;  Laterality: N/A;    CORONARY ANGIOGRAPHY N/A 1/13/2022    Procedure: ANGIOGRAM, CORONARY ARTERY;  Surgeon: Janneth Tovar MD;  Location: [facility] CATH LAB;  Service: Cardiology;  Laterality: N/A;    ESOPHAGOGASTRODUODENOSCOPY N/A 6/30/2022    Procedure: EGD (ESOPHAGOGASTRODUODENOSCOPY);  Surgeon: Sandra Perez MD;  Location: [facility];  Service: Endoscopy;  Laterality: N/A;    INJECTION OF ANESTHETIC AGENT AROUND NERVE Bilateral 6/20/2023    Procedure: Bilateral Genicular nerve block RN IV Sedation;  Surgeon: Devon Grant MD;  Location: [facility] PAIN MGT;  Service: Pain Management;  Laterality: Bilateral;    INJECTION OF JOINT Bilateral 10/21/2022    Procedure: Bilateral intraarticular knee injection with  local ALLERGIC TO IODINE;  Surgeon: Devon Grant MD;  Location: [facility] PAIN MGT;  Service: Pain Management;  Laterality: Bilateral;    INJECTION OF JOINT Bilateral 1/31/2023    Procedure: Bilateral IA knee joint injection with steroid RN IV Sedation;  Surgeon: Devon Grant MD;  Location: [facility] PAIN MGT;  Service: Pain Management;  Laterality: Bilateral;    INJECTION OF JOINT Bilateral 5/10/2023    Procedure: Bilateral IA knee joint injection Synvisc RN IV Sedation;  Surgeon: Devon Grant MD;  Location: [facility] PAIN MGT;  Service: Pain Management;  Laterality: Bilateral;    PROSTATE SURGERY      SPINE SURGERY       Review of patient's allergies indicates:   Allergen Reactions    Fish containing products     Iodine and iodide containing products     Shellfish containing products        Current Outpatient Medications   Medication Sig    albuterol (PROVENTIL) 2.5 mg /3 mL (0.083 %) nebulizer solution Take 3 mLs (2.5 mg total) by nebulization every 4 to 6 hours as needed for Wheezing or Shortness of Breath.    albuterol (PROVENTIL/VENTOLIN HFA) 90 mcg/actuation inhaler Inhale 2 puffs into the lungs every 4 (four) hours as needed for Wheezing or Shortness of Breath.    empagliflozin (JARDIANCE) 10 mg tablet Take 1 tablet (10 mg total) by mouth once daily.    ferrous sulfate 325 (65 FE) MG EC tablet Take 1 tablet (325 mg total) by mouth once daily.    fluticasone-umeclidin-vilanter (TRELEGY ELLIPTA) 200-62.5-25 mcg inhaler Inhale 1 puff into the lungs once daily.    furosemide (LASIX) 40 MG tablet Take 1 tablet (40 mg total) by mouth 2 (two) times a day.    memantine (NAMENDA) 5 MG Tab Take 1 tablet (5 mg total) by mouth 2 (two) times daily.    metoprolol succinate (TOPROL-XL) 25 MG 24 hr tablet Take 0.5 tablets (12.5 mg total) by mouth once daily.    potassium chloride SA (K-DUR,KLOR-CON M) 10 MEQ tablet Take 1 tablet (10 mEq total) by mouth once daily.    predniSONE (DELTASONE) 20 MG tablet Take 3 tablets (60mg) by mouth  "daily for 3 days THEN take 2 tablets (40mg) by mouth daily for 3 days THEN take 1 tablet (20mg) by mouth daily for 3 days THEN take 1/2 tablet (10mg) by mouth daily for 3 days    sacubitriL-valsartan (ENTRESTO)  mg per tablet Take 1 tablet by mouth 2 (two) times daily.     No current facility-administered medications for this visit.       Review of Systems     GENERAL:  No weight loss, malaise or fevers.  HEENT:   No recent changes in vision or hearing  NECK:  Negative for lumps, no difficulty with swallowing.  RESPIRATORY:  Negative for cough, wheezing or shortness of breath, patient denies any recent URI.  CARDIOVASCULAR:  Negative for chest pain or palpitations.  GI:  Negative for abdominal discomfort, blood in stools or black stools or change in bowel habits.  MUSCULOSKELETAL:  See HPI.  SKIN:  Negative for lesions, rash, and itching.  PSYCH:  No mood disorder or recent psychosocial stressors.   HEMATOLOGY/LYMPHOLOGY:  Negative for prolonged bleeding, bruising easily or swollen nodes.    NEURO:   No history of syncope, paralysis, seizures or tremors.  All other reviewed and negative other than HPI.    OBJECTIVE:    BP (!) 107/54 (BP Location: Right arm, Patient Position: Sitting)   Pulse 94   Ht 5' 10" (1.778 m)   Wt 70.4 kg (155 lb 1.5 oz)   BMI 22.25 kg/m²       Physical Exam    GENERAL: Well appearing, in no acute distress, alert and oriented x3.  PSYCH:  Mood and affect appropriate.  SKIN: Skin color, texture, turgor normal, no rashes or lesions.  HEAD/FACE:  Normocephalic, atraumatic. Cranial nerves grossly intact.    CV: RRR with palpation of the radial artery.  PULM: No evidence of respiratory difficulty, symmetric chest rise.  GI:  Soft and non-tender.    BACK:  Positive shopping cart sign.  Well-healed 6 in lower lumbar vertical midline incision.  Straight leg raising in the sitting and supine positions is negative to radicular pain. No pain to palpation over the facet joints of the lumbar " spine or spinous processes. Normal range of motion without pain reproduction.  EXTREMITIES: Peripheral joint ROM is reduced with pain.  No deformities, edema, or skin discoloration. Good capillary refill.  MUSCULOSKELETAL: Able to stand on heels & toes.   hip, and knee provocative maneuvers are negative.  There is no pain with palpation over the sacroiliac joints bilaterally.  Gaenslen's, Distraction/Compression and  FABERs test is negative.  Facet loading test is negative bilaterally.   Bilateral upper and lower extremity strength is normal and symmetric.  No atrophy or tone abnormalities are noted.    RIGHT Lower extremity: Hip flexion 5/5, Hip Abduction 5/5, Hip Adduction 5/5, Knee extension 5/5, Knee flexion 5/5, Ankle dorsiflexion5/5, Extensor hallucis longus 5/5, Ankle plantarflexion 5/5  LEFT Lower extremity:  Hip flexion 5/5, Hip Abduction 5/5,Hip Adduction 5/5, Knee extension 5/5, Knee flexion 5/5, Ankle dorsiflexion 5/5, Extensor hallucis longus 5/5, Ankle plantarflexion 5/5  -Normal testing knee (patellar) jerk and ankle (achilles) jerk    NEURO: Bilateral upper and lower extremity coordination and muscle stretch reflexes are physiologic and symmetric. No loss of sensation is noted.    Knee Exam:  bilateral    Erythema: Absent  Deformity/Swelling: Present  Effusion: Absent  Chronic Bony Changes: Present  Creptius: Present     milddiffuse tenderness palpation of the mediolateral joint lines and patella     ROM: full range with pain     Strength is 5/5 bilateral  Bilateral Non-pitting edema to lower extremities, left >> right     Neurovascular intact    GAIT:  Antalgic    Imaging:   X-ray lumbar spine 09/28/2022  FINDINGS:  Vertebral body heights maintained.  Trace anterolisthesis of L4 on L5 and retrolisthesis L1 on L2.  No significant change in alignment on extension or flexion..  Multilevel degenerative disc height loss, most severe L2-3.  Multilevel facet arthropathy and osteophyte changes.  No definite  pars defects..  Atherosclerotic vascular calcification.  No acute abnormality.     Impression:     Multilevel prominent degenerative findings.    X-ray knee bilateral 09/28/2022  FINDINGS:  Bilateral arthritic changes present including moderate to severe right knee lateral compartment and left knee medial compartment joint space narrowing.  Bilateral chondrocalcinosis noted.  No acute abnormality appreciated.        ASSESSMENT: 82 y.o. year old male with knee and leg pain, consistent with     1. Primary osteoarthritis of both knees  IR Ablation Neurolytic Agent_Other Peripheral Nerve Unilateral    Case Request-RAD/Other Procedure Area: Left Genicular Nerve RFA    Case Request-RAD/Other Procedure Area: Right Genicular Nerve RFA RN IV Sedation    IR Ablation Neurolytic Agent_Other Peripheral Nerve Unilateral    ketorolac injection 30 mg          PLAN:   - Interventions: Schedule for left then right genicular RFA to see if this offers long-term relief from knee pain as genicular block offered 80% relief  -s/p -bilateral genicular block on 6/20/23 with 80% relief  --bilateral synvisc injection  on 5/10/23 with 30% relief  --bilateral IA knee injection  on 01/31/23 with 50% relief  -s/p bilateral IA knee injection with steroid on 10/21/22 with 60% relief     - Procedure note: An IM injection of (ketolorac 30mg/1mL) was administered during clinic visit.  This was well tolerated.      - Anticoagulation use: no no anticoagulation       report:  Reviewed and consistent with medication use as prescribed.    - Medications:    - Therapy:   Continue activities as tolerated    - Imaging: Reviewed bilateral knee and lumbar x-ray with patient and answered any questions they had regarding study.      - Consults/Referrals:  Podiatry- continue follow up as needed    - Records: Obtain old records from outside physicians and imaging:  [facility]:  Lumbar spine surgery    - Follow up visit: return to clinic in 4 weeks after  injection      The above plan and management options were discussed at length with patient. Patient is in agreement with the above and verbalized understanding.    - I discussed the goals of interventional chronic pain management with the patient on today's visit. We discussed a multimodal and systematic approach to pain.  This includes diagnostic and therapeutic injections, adjuvant pharmacologic treatment, physical therapy, and at times psychiatry.  I emphasized the importance of regular exercise, core strengthening and stretching, diet and weight loss as a cornerstone of long-term pain management.    - This condition does not require this patient to take time off of work, and the primary goal of our Pain Management services is to improve the patient's functional capacity.  - Patient Questions: Answered all of the patient's questions regarding diagnoses, therapy, treatment and next steps        Meghan Escobar PA-C  Interventional Pain Management  [facility]    Disclaimer:  This note was prepared using voice recognition system and is likely to have sound alike errors that may have been overlooked even after proof reading.  Please call me with any questions

## 2023-08-16 NOTE — PROGRESS NOTES
Est Patient Chronic Pain Note (Follow up Visit)  PCP: Vivian Ch MD    Chief Complaint:   Chief Complaint   Patient presents with    Knee Pain          SUBJECTIVE:  Interval History (8/16/2023): Richy Douglas presents today for follow-up visit.  he underwent bilateral genicular block on 6/20/23.  The patient reports that he is/was better following the procedure.  he reports 80% pain relief X 2 days before pain returned.  Continues to report achy pain in both knees, left worse than right with intermittent swelling. He has now tried physical therapy, steroid injections, and gel injections without relief. Unable to take NSAIDs secondary to cardiac history.  Patient reports pain as 10/10 today.      Interval History (6/6/2023): Richy Douglas presents today for follow-up visit.  he underwent bilateral synvisc injection  on 5/10/23.  The patient reports that he is/was better following the procedure, but  he reports only 30% pain relief.  The changes lasted 4 weeks so far.  The changes have continued through this visit.  Patient reports pain as 8/10 today. Continues to report achy pain in both knees, left worse than right with intermittent swelling. He has now tried physical therapy, steroid injections, and gel injections without relief. Unable to take NSAIDs secondary to cardiac history.    Interval History (4/24/2023): Richy Douglas presents today for follow-up visit.  he underwent  bilateral IA knee injection  on 01/31/23.  The patient reports that he is/was better following the procedure.  he reports 50% pain relief.  The changes 2 months before pain returned to baseline. Continues to report achy pain in both knees, left worse than right with intermittent swelling.  Patient reports pain as 10/10 today.    Interval History (1/11/2023):  Richy Douglas presents today for follow-up visit.  Patient was last seen on 11/16/2022. At that visit, the plan was to repeat knee injections as needed. Last injection bilateral IA  knee injection with steroid on 10/21/22 with 60% relief. Patient reports pain as 9/10 today. He reports achy pain in both knees, left worse than right with intermittent swelling. He continues to work outside the home and also performs a majority of the household chores.      Interval History (11/16/2022): Richy Douglas presents today for follow-up visit.  he underwent bilateral IA knee injection with steroid on 10/21/22.  The patient reports that he is/was better following the procedure.  he reports 60% pain relief.  The changes lasted 4 weeks so far.  The changes have continued through this visit, though he notes some diminished relief in his left knee. He still works and reports the knee becomes swollen and gives out if he is up for too long.  Patient reports pain as 8/10 today. Since last visit, he has followed up with podiatry for ankle pain and right big toe pain with onychomycosis and big toe nail falling off. He also reports intermittent swelling of his left leg/ankle and intermittent shortness of breath. History of CHF, followed by cardiology, most recent visit 11/02/2022    X-ray lumbar spine 09/28/2022  FINDINGS:  Vertebral body heights maintained.  Trace anterolisthesis of L4 on L5 and retrolisthesis L1 on L2.  No significant change in alignment on extension or flexion..  Multilevel degenerative disc height loss, most severe L2-3.  Multilevel facet arthropathy and osteophyte changes.  No definite pars defects..  Atherosclerotic vascular calcification.  No acute abnormality.     Impression:     Multilevel prominent degenerative findings.    X-ray knee bilateral 09/28/2022  FINDINGS:  Bilateral arthritic changes present including moderate to severe right knee lateral compartment and left knee medial compartment joint space narrowing.  Bilateral chondrocalcinosis noted.  No acute abnormality appreciated.     Initial HPI 09/28/2022  Richy Douglas is a 82 y.o. male with past medical history significant for  alcohol dependence, lung emphysema, aortic aneurysm, atrial fibrillation, systolic congestive heart failure, hypertension, hyperlipidemia, anemia of chronic disease, GERD, nicotine dependence who presents to the clinic for the evaluation of bilateral knee pain.  Patient reports knee pain is in present since April 2022 without inciting accident injury or trauma.  Today patient reports pain is rated a 7/10 and is constant.  Patient reports pain in the popliteal fossa as well as pain which radiates distally to the dorsum of the foot in L4 distribution.  Patient reports associated superficial swelling along the suprapatellar aspect of bilateral knees.  Pain is exacerbated with knee flexion, extension, moving from sitting to standing and with ambulation.  Patient does endorse associated weakness in the lower extremities associated with his pain.  Of note patient reports prior lower back surgery [facility] with improvement in lower back and radicular pain.  Patient has not tried membrane stabilizing agents or interventions at this time.  Patient has tried physician directed physical therapy exercises at home without significant relief.  Patient also reports right big toe pain with onychomycosis and big toe nail falling off.    Patient reports significant motor weakness.  Patient denies night fever/night sweats, urinary incontinence, bowel incontinence, significant weight loss, and loss of sensations.      Pain Disability Index Review:     Last 3 PDI Scores 8/16/2023 9/28/2022   Pain Disability Index (PDI) 70 42       Non-Pharmacologic Treatments:  Physical Therapy/Home Exercise: yes  Ice/Heat:no  TENS: no  Acupuncture: no  Massage: no  Chiropractic: no    Other: no      Pain Medications:  - Adjuvant Medications: Neurontin (Gabapentin)    Pain Procedures:   -bilateral IA knee injection with steroid on 10/21/22 with 60% relief  -bilateral IA knee injection  on 01/31/23 with 50% relief  -bilateral synvisc injection   on 5/10/23 with 30% relief  -bilateral genicular block on 6/20/23 with 80% relief    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    Anemia of chronic disease     Aortic aneurysm     Atrial fibrillation with RVR 09/24/2021    Chronic systolic congestive heart failure 08/31/2017    Emphysema of lung 08/31/2017    GERD (gastroesophageal reflux disease)     Hypertension     Knee osteoarthritis 10/21/2022    Major neurocognitive disorder due to multiple etiologies 4/14/2023    Microcytic anemia 11/24/2020    Other hyperlipidemia 04/27/2021    Personal history of colonic polyps 2022    Prostate cancer      Past Surgical History:   Procedure Laterality Date    CATHETERIZATION OF BOTH LEFT AND RIGHT HEART N/A 1/13/2022    Procedure: CATHETERIZATION, HEART, BOTH LEFT AND RIGHT;  Surgeon: Janneth Tovar MD;  Location: [facility] CATH LAB;  Service: Cardiology;  Laterality: N/A;  poss cx    COLONOSCOPY      COLONOSCOPY N/A 6/30/2022    Procedure: COLONOSCOPY;  Surgeon: Sandra Perez MD;  Location: [facility] ENDO;  Service: Endoscopy;  Laterality: N/A;    COLONOSCOPY N/A 7/1/2022    Procedure: COLONOSCOPY;  Surgeon: Woodrow Christian MD;  Location: [facility];  Service: Endoscopy;  Laterality: N/A;    CORONARY ANGIOGRAPHY N/A 1/13/2022    Procedure: ANGIOGRAM, CORONARY ARTERY;  Surgeon: Janneth Tovar MD;  Location: [facility] CATH LAB;  Service: Cardiology;  Laterality: N/A;    ESOPHAGOGASTRODUODENOSCOPY N/A 6/30/2022    Procedure: EGD (ESOPHAGOGASTRODUODENOSCOPY);  Surgeon: Sandra Perez MD;  Location: [facility];  Service: Endoscopy;  Laterality: N/A;    INJECTION OF ANESTHETIC AGENT AROUND NERVE Bilateral 6/20/2023    Procedure: Bilateral Genicular nerve block RN IV Sedation;  Surgeon: Devon Grant MD;  Location: [facility] PAIN MGT;  Service: Pain Management;  Laterality: Bilateral;    INJECTION OF JOINT Bilateral 10/21/2022    Procedure: Bilateral intraarticular knee injection with  local ALLERGIC TO IODINE;  Surgeon: Devon Grant MD;  Location: [facility] PAIN MGT;  Service: Pain Management;  Laterality: Bilateral;    INJECTION OF JOINT Bilateral 1/31/2023    Procedure: Bilateral IA knee joint injection with steroid RN IV Sedation;  Surgeon: Devon Grant MD;  Location: [facility] PAIN MGT;  Service: Pain Management;  Laterality: Bilateral;    INJECTION OF JOINT Bilateral 5/10/2023    Procedure: Bilateral IA knee joint injection Synvisc RN IV Sedation;  Surgeon: Devon Grant MD;  Location: [facility] PAIN MGT;  Service: Pain Management;  Laterality: Bilateral;    PROSTATE SURGERY      SPINE SURGERY       Review of patient's allergies indicates:   Allergen Reactions    Fish containing products     Iodine and iodide containing products     Shellfish containing products        Current Outpatient Medications   Medication Sig    albuterol (PROVENTIL) 2.5 mg /3 mL (0.083 %) nebulizer solution Take 3 mLs (2.5 mg total) by nebulization every 4 to 6 hours as needed for Wheezing or Shortness of Breath.    albuterol (PROVENTIL/VENTOLIN HFA) 90 mcg/actuation inhaler Inhale 2 puffs into the lungs every 4 (four) hours as needed for Wheezing or Shortness of Breath.    empagliflozin (JARDIANCE) 10 mg tablet Take 1 tablet (10 mg total) by mouth once daily.    ferrous sulfate 325 (65 FE) MG EC tablet Take 1 tablet (325 mg total) by mouth once daily.    fluticasone-umeclidin-vilanter (TRELEGY ELLIPTA) 200-62.5-25 mcg inhaler Inhale 1 puff into the lungs once daily.    furosemide (LASIX) 40 MG tablet Take 1 tablet (40 mg total) by mouth 2 (two) times a day.    memantine (NAMENDA) 5 MG Tab Take 1 tablet (5 mg total) by mouth 2 (two) times daily.    metoprolol succinate (TOPROL-XL) 25 MG 24 hr tablet Take 0.5 tablets (12.5 mg total) by mouth once daily.    potassium chloride SA (K-DUR,KLOR-CON M) 10 MEQ tablet Take 1 tablet (10 mEq total) by mouth once daily.    predniSONE (DELTASONE) 20 MG tablet Take 3 tablets (60mg) by mouth  "daily for 3 days THEN take 2 tablets (40mg) by mouth daily for 3 days THEN take 1 tablet (20mg) by mouth daily for 3 days THEN take 1/2 tablet (10mg) by mouth daily for 3 days    sacubitriL-valsartan (ENTRESTO)  mg per tablet Take 1 tablet by mouth 2 (two) times daily.     No current facility-administered medications for this visit.       Review of Systems     GENERAL:  No weight loss, malaise or fevers.  HEENT:   No recent changes in vision or hearing  NECK:  Negative for lumps, no difficulty with swallowing.  RESPIRATORY:  Negative for cough, wheezing or shortness of breath, patient denies any recent URI.  CARDIOVASCULAR:  Negative for chest pain or palpitations.  GI:  Negative for abdominal discomfort, blood in stools or black stools or change in bowel habits.  MUSCULOSKELETAL:  See HPI.  SKIN:  Negative for lesions, rash, and itching.  PSYCH:  No mood disorder or recent psychosocial stressors.   HEMATOLOGY/LYMPHOLOGY:  Negative for prolonged bleeding, bruising easily or swollen nodes.    NEURO:   No history of syncope, paralysis, seizures or tremors.  All other reviewed and negative other than HPI.    OBJECTIVE:    BP (!) 107/54 (BP Location: Right arm, Patient Position: Sitting)   Pulse 94   Ht 5' 10" (1.778 m)   Wt 70.4 kg (155 lb 1.5 oz)   BMI 22.25 kg/m²       Physical Exam    GENERAL: Well appearing, in no acute distress, alert and oriented x3.  PSYCH:  Mood and affect appropriate.  SKIN: Skin color, texture, turgor normal, no rashes or lesions.  HEAD/FACE:  Normocephalic, atraumatic. Cranial nerves grossly intact.    CV: RRR with palpation of the radial artery.  PULM: No evidence of respiratory difficulty, symmetric chest rise.  GI:  Soft and non-tender.    BACK:  Positive shopping cart sign.  Well-healed 6 in lower lumbar vertical midline incision.  Straight leg raising in the sitting and supine positions is negative to radicular pain. No pain to palpation over the facet joints of the lumbar " spine or spinous processes. Normal range of motion without pain reproduction.  EXTREMITIES: Peripheral joint ROM is reduced with pain.  No deformities, edema, or skin discoloration. Good capillary refill.  MUSCULOSKELETAL: Able to stand on heels & toes.   hip, and knee provocative maneuvers are negative.  There is no pain with palpation over the sacroiliac joints bilaterally.  Gaenslen's, Distraction/Compression and  FABERs test is negative.  Facet loading test is negative bilaterally.   Bilateral upper and lower extremity strength is normal and symmetric.  No atrophy or tone abnormalities are noted.    RIGHT Lower extremity: Hip flexion 5/5, Hip Abduction 5/5, Hip Adduction 5/5, Knee extension 5/5, Knee flexion 5/5, Ankle dorsiflexion5/5, Extensor hallucis longus 5/5, Ankle plantarflexion 5/5  LEFT Lower extremity:  Hip flexion 5/5, Hip Abduction 5/5,Hip Adduction 5/5, Knee extension 5/5, Knee flexion 5/5, Ankle dorsiflexion 5/5, Extensor hallucis longus 5/5, Ankle plantarflexion 5/5  -Normal testing knee (patellar) jerk and ankle (achilles) jerk    NEURO: Bilateral upper and lower extremity coordination and muscle stretch reflexes are physiologic and symmetric. No loss of sensation is noted.    Knee Exam:  bilateral    Erythema: Absent  Deformity/Swelling: Present  Effusion: Absent  Chronic Bony Changes: Present  Creptius: Present     milddiffuse tenderness palpation of the mediolateral joint lines and patella     ROM: full range with pain     Strength is 5/5 bilateral  Bilateral Non-pitting edema to lower extremities, left >> right     Neurovascular intact    GAIT:  Antalgic    Imaging:   X-ray lumbar spine 09/28/2022  FINDINGS:  Vertebral body heights maintained.  Trace anterolisthesis of L4 on L5 and retrolisthesis L1 on L2.  No significant change in alignment on extension or flexion..  Multilevel degenerative disc height loss, most severe L2-3.  Multilevel facet arthropathy and osteophyte changes.  No definite  pars defects..  Atherosclerotic vascular calcification.  No acute abnormality.     Impression:     Multilevel prominent degenerative findings.    X-ray knee bilateral 09/28/2022  FINDINGS:  Bilateral arthritic changes present including moderate to severe right knee lateral compartment and left knee medial compartment joint space narrowing.  Bilateral chondrocalcinosis noted.  No acute abnormality appreciated.        ASSESSMENT: 82 y.o. year old male with knee and leg pain, consistent with     1. Primary osteoarthritis of both knees  IR Ablation Neurolytic Agent_Other Peripheral Nerve Unilateral    Case Request-RAD/Other Procedure Area: Left Genicular Nerve RFA    Case Request-RAD/Other Procedure Area: Right Genicular Nerve RFA RN IV Sedation    IR Ablation Neurolytic Agent_Other Peripheral Nerve Unilateral    ketorolac injection 30 mg          PLAN:   - Interventions: Schedule for left then right genicular RFA to see if this offers long-term relief from knee pain as genicular block offered 80% relief  -s/p -bilateral genicular block on 6/20/23 with 80% relief  --bilateral synvisc injection  on 5/10/23 with 30% relief  --bilateral IA knee injection  on 01/31/23 with 50% relief  -s/p bilateral IA knee injection with steroid on 10/21/22 with 60% relief     - Procedure note: An IM injection of (ketolorac 30mg/1mL) was administered during clinic visit.  This was well tolerated.      - Anticoagulation use: no no anticoagulation       report:  Reviewed and consistent with medication use as prescribed.    - Medications:    - Therapy:   Continue activities as tolerated    - Imaging: Reviewed bilateral knee and lumbar x-ray with patient and answered any questions they had regarding study.      - Consults/Referrals:  Podiatry- continue follow up as needed    - Records: Obtain old records from outside physicians and imaging:  [facility]:  Lumbar spine surgery    - Follow up visit: return to clinic in 4 weeks after  injection      The above plan and management options were discussed at length with patient. Patient is in agreement with the above and verbalized understanding.    - I discussed the goals of interventional chronic pain management with the patient on today's visit. We discussed a multimodal and systematic approach to pain.  This includes diagnostic and therapeutic injections, adjuvant pharmacologic treatment, physical therapy, and at times psychiatry.  I emphasized the importance of regular exercise, core strengthening and stretching, diet and weight loss as a cornerstone of long-term pain management.    - This condition does not require this patient to take time off of work, and the primary goal of our Pain Management services is to improve the patient's functional capacity.  - Patient Questions: Answered all of the patient's questions regarding diagnoses, therapy, treatment and next steps        Meghan Escobar PA-C  Interventional Pain Management  [facility]    Disclaimer:  This note was prepared using voice recognition system and is likely to have sound alike errors that may have been overlooked even after proof reading.  Please call me with any questions

## 2023-08-22 DIAGNOSIS — I50.22 CHRONIC SYSTOLIC CONGESTIVE HEART FAILURE: Primary | ICD-10-CM

## 2023-08-23 ENCOUNTER — LAB VISIT (OUTPATIENT)
Dept: LAB | Facility: HOSPITAL | Age: 83
End: 2023-08-23
Payer: COMMERCIAL

## 2023-08-23 DIAGNOSIS — D63.8 ANEMIA, CHRONIC DISEASE: ICD-10-CM

## 2023-08-23 DIAGNOSIS — D50.9 MICROCYTIC ANEMIA: ICD-10-CM

## 2023-08-23 DIAGNOSIS — R17 ELEVATED BILIRUBIN: ICD-10-CM

## 2023-08-23 DIAGNOSIS — D50.0 IRON DEFICIENCY ANEMIA DUE TO CHRONIC BLOOD LOSS: ICD-10-CM

## 2023-08-23 LAB
ALBUMIN SERPL BCP-MCNC: 3.7 G/DL (ref 3.5–5.2)
ALP SERPL-CCNC: 37 U/L (ref 55–135)
ALT SERPL W/O P-5'-P-CCNC: 15 U/L (ref 10–44)
ANION GAP SERPL CALC-SCNC: 11 MMOL/L (ref 8–16)
ANISOCYTOSIS BLD QL SMEAR: ABNORMAL
AST SERPL-CCNC: 27 U/L (ref 10–40)
BASOPHILS # BLD AUTO: 0.06 K/UL (ref 0–0.2)
BASOPHILS NFR BLD: 1.5 % (ref 0–1.9)
BILIRUB SERPL-MCNC: 1.2 MG/DL (ref 0.1–1)
BUN SERPL-MCNC: 19 MG/DL (ref 8–23)
CALCIUM SERPL-MCNC: 9 MG/DL (ref 8.7–10.5)
CHLORIDE SERPL-SCNC: 110 MMOL/L (ref 95–110)
CO2 SERPL-SCNC: 25 MMOL/L (ref 23–29)
CREAT SERPL-MCNC: 0.7 MG/DL (ref 0.5–1.4)
DACRYOCYTES BLD QL SMEAR: ABNORMAL
DIFFERENTIAL METHOD: ABNORMAL
EOSINOPHIL # BLD AUTO: 0.2 K/UL (ref 0–0.5)
EOSINOPHIL NFR BLD: 3.9 % (ref 0–8)
ERYTHROCYTE [DISTWIDTH] IN BLOOD BY AUTOMATED COUNT: 32.6 % (ref 11.5–14.5)
EST. GFR  (NO RACE VARIABLE): >60 ML/MIN/1.73 M^2
FERRITIN SERPL-MCNC: 147 NG/ML (ref 20–300)
GIANT PLATELETS BLD QL SMEAR: PRESENT
GLUCOSE SERPL-MCNC: 90 MG/DL (ref 70–110)
HCT VFR BLD AUTO: 31 % (ref 40–54)
HGB BLD-MCNC: 9.9 G/DL (ref 14–18)
HYPOCHROMIA BLD QL SMEAR: ABNORMAL
IMM GRANULOCYTES # BLD AUTO: 0.01 K/UL (ref 0–0.04)
IMM GRANULOCYTES NFR BLD AUTO: 0.2 % (ref 0–0.5)
IRON SERPL-MCNC: 103 UG/DL (ref 45–160)
LYMPHOCYTES # BLD AUTO: 0.6 K/UL (ref 1–4.8)
LYMPHOCYTES NFR BLD: 13.7 % (ref 18–48)
MCH RBC QN AUTO: 25.9 PG (ref 27–31)
MCHC RBC AUTO-ENTMCNC: 31.9 G/DL (ref 32–36)
MCV RBC AUTO: 81 FL (ref 82–98)
MONOCYTES # BLD AUTO: 0.5 K/UL (ref 0.3–1)
MONOCYTES NFR BLD: 11 % (ref 4–15)
NEUTROPHILS # BLD AUTO: 2.8 K/UL (ref 1.8–7.7)
NEUTROPHILS NFR BLD: 69.7 % (ref 38–73)
NRBC BLD-RTO: 0 /100 WBC
OVALOCYTES BLD QL SMEAR: ABNORMAL
PLATELET # BLD AUTO: 280 K/UL (ref 150–450)
PLATELET BLD QL SMEAR: ABNORMAL
PMV BLD AUTO: ABNORMAL FL (ref 9.2–12.9)
POIKILOCYTOSIS BLD QL SMEAR: ABNORMAL
POLYCHROMASIA BLD QL SMEAR: ABNORMAL
POTASSIUM SERPL-SCNC: 3.5 MMOL/L (ref 3.5–5.1)
PROT SERPL-MCNC: 6.3 G/DL (ref 6–8.4)
RBC # BLD AUTO: 3.82 M/UL (ref 4.6–6.2)
SATURATED IRON: 33 % (ref 20–50)
SCHISTOCYTES BLD QL SMEAR: ABNORMAL
SCHISTOCYTES BLD QL SMEAR: PRESENT
SODIUM SERPL-SCNC: 146 MMOL/L (ref 136–145)
TARGETS BLD QL SMEAR: ABNORMAL
TOTAL IRON BINDING CAPACITY: 312 UG/DL (ref 250–450)
TRANSFERRIN SERPL-MCNC: 211 MG/DL (ref 200–375)
WBC # BLD AUTO: 4.08 K/UL (ref 3.9–12.7)

## 2023-08-23 PROCEDURE — 82728 ASSAY OF FERRITIN: CPT

## 2023-08-23 PROCEDURE — 80053 COMPREHEN METABOLIC PANEL: CPT

## 2023-08-23 PROCEDURE — 36415 COLL VENOUS BLD VENIPUNCTURE: CPT

## 2023-08-23 PROCEDURE — 84466 ASSAY OF TRANSFERRIN: CPT

## 2023-08-23 PROCEDURE — 85025 COMPLETE CBC W/AUTO DIFF WBC: CPT

## 2023-08-23 NOTE — PRE-PROCEDURE INSTRUCTIONS
Spoke with patient regarding procedure scheduled on 9/1     Arrival time 0645     Has patient been sick with fever or on antibiotics within the last 7 days? No     Does the patient have any open wounds, sores or rashes? No     Does the patient have any recent fractures? no     Has patient received a vaccination within the last 7 days? No     Received the COVID vaccination?      Has the patient stopped all medications as directed? na     Does patient have a pacemaker and or defibrillator? no     Does the patient have a ride to and from procedure and someone reliable to remain with patient? daughter     Is the patient diabetic? no     Does the patient have sleep apnea? Or use O2 at home? no     Is the patient receiving sedation?      Is the patient instructed to remain NPO beginning at midnight the night before their procedure? yes     Procedure location confirmed with patient? Yes     Covid- Denies signs/symptoms. Instructed to notify PAT/MD if any changes

## 2023-08-25 ENCOUNTER — OFFICE VISIT (OUTPATIENT)
Dept: HEMATOLOGY/ONCOLOGY | Facility: CLINIC | Age: 83
End: 2023-08-25
Payer: COMMERCIAL

## 2023-08-25 VITALS
TEMPERATURE: 98 F | HEART RATE: 86 BPM | OXYGEN SATURATION: 97 % | SYSTOLIC BLOOD PRESSURE: 125 MMHG | HEIGHT: 70 IN | BODY MASS INDEX: 22.22 KG/M2 | DIASTOLIC BLOOD PRESSURE: 62 MMHG | WEIGHT: 155.19 LBS

## 2023-08-25 DIAGNOSIS — D50.0 IRON DEFICIENCY ANEMIA DUE TO CHRONIC BLOOD LOSS: ICD-10-CM

## 2023-08-25 DIAGNOSIS — D63.8 ANEMIA, CHRONIC DISEASE: ICD-10-CM

## 2023-08-25 DIAGNOSIS — Z76.89 ENCOUNTER TO ESTABLISH CARE: Primary | ICD-10-CM

## 2023-08-25 DIAGNOSIS — D50.9 MICROCYTIC ANEMIA: ICD-10-CM

## 2023-08-25 PROCEDURE — 99999 PR PBB SHADOW E&M-EST. PATIENT-LVL IV: ICD-10-PCS | Mod: PBBFAC,,,

## 2023-08-25 PROCEDURE — 99214 OFFICE O/P EST MOD 30 MIN: CPT | Mod: S$GLB,,,

## 2023-08-25 PROCEDURE — 99999 PR PBB SHADOW E&M-EST. PATIENT-LVL IV: CPT | Mod: PBBFAC,,,

## 2023-08-25 PROCEDURE — 99214 OFFICE O/P EST MOD 30 MIN: CPT | Mod: PBBFAC

## 2023-08-25 PROCEDURE — 99214 PR OFFICE/OUTPT VISIT, EST, LEVL IV, 30-39 MIN: ICD-10-PCS | Mod: S$GLB,,,

## 2023-08-25 NOTE — PROGRESS NOTES
Subjective:      Patient ID: Richy Douglas is a 82 y.o. male.    Chief Complaint: Follow-up (KEN )      HPI:  MR. Douglas is an 82-year-old male with a PMHx that includes prostate cancer, HTN, GERD, aortic aneurysm. He presents today for follow-up of iron deficiency anemia. Pt completed EGD/Colonoscopy 22 unrevealing as to cause of KEN. Pt he states he continues to work 12 hours shifts everyday at [employer]. He c/o bilateral knee pain for which he is for genicular nerve block next month with pain management. Most recent iron infusion with ferrlecit x 2 doses 2022 and continues on iron supplement daily. Denies n/v/d/c, cp, abnormal bleeding, syncopal episodes.         Social History     Socioeconomic History    Marital status:      Spouse name: jenn     Number of children: 6   Tobacco Use    Smoking status: Former     Current packs/day: 0.00     Average packs/day: 1 pack/day for 69.2 years (69.2 ttl pk-yrs)     Types: Cigarettes     Start date: 1954     Quit date: 3/1/2023     Years since quittin.4    Smokeless tobacco: Never   Substance and Sexual Activity    Alcohol use: Yes     Comment: reports only drinks red wine when games are on    Drug use: Never    Sexual activity: Yes     Partners: Female     Social Determinants of Health     Financial Resource Strain: Low Risk  (12/10/2021)    Overall Financial Resource Strain (CARDIA)     Difficulty of Paying Living Expenses: Not very hard   Food Insecurity: Food Insecurity Present (12/10/2021)    Hunger Vital Sign     Worried About Running Out of Food in the Last Year: Often true     Ran Out of Food in the Last Year: Patient refused   Transportation Needs: No Transportation Needs (12/10/2021)    PRAPARE - Transportation     Lack of Transportation (Medical): No     Lack of Transportation (Non-Medical): No   Physical Activity: Unknown (12/10/2021)    Exercise Vital Sign     Days of Exercise per Week: 5 days   Stress: No Stress  Concern Present (12/10/2021)    [employer] - Occupational Stress Questionnaire     Feeling of Stress : Not at all   Social Connections: Unknown (12/10/2021)    Social Connection and Isolation Panel [NHANES]     Frequency of Communication with Friends and Family: More than three times a week     Frequency of Social Gatherings with Friends and Family: More than three times a week     Active Member of Clubs or Organizations: No     Attends Club or Organization Meetings: Never     Marital Status:    Housing Stability: Unknown (12/10/2021)    Housing Stability Vital Sign     Unable to Pay for Housing in the Last Year: Patient refused     Unstable Housing in the Last Year: Patient refused       Family History   Problem Relation Age of Onset    Diabetes Mother     Peripheral vascular disease Mother        Past Surgical History:   Procedure Laterality Date    CATHETERIZATION OF BOTH LEFT AND RIGHT HEART N/A 1/13/2022    Procedure: CATHETERIZATION, HEART, BOTH LEFT AND RIGHT;  Surgeon: Janneth Tovar MD;  Location: [facility] CATH LAB;  Service: Cardiology;  Laterality: N/A;  poss cx    COLONOSCOPY      COLONOSCOPY N/A 6/30/2022    Procedure: COLONOSCOPY;  Surgeon: Sandra Perez MD;  Location: [facility];  Service: Endoscopy;  Laterality: N/A;    COLONOSCOPY N/A 7/1/2022    Procedure: COLONOSCOPY;  Surgeon: Woodrow Christian MD;  Location: [facility];  Service: Endoscopy;  Laterality: N/A;    CORONARY ANGIOGRAPHY N/A 1/13/2022    Procedure: ANGIOGRAM, CORONARY ARTERY;  Surgeon: Janneth Tovar MD;  Location: [facility] CATH LAB;  Service: Cardiology;  Laterality: N/A;    ESOPHAGOGASTRODUODENOSCOPY N/A 6/30/2022    Procedure: EGD (ESOPHAGOGASTRODUODENOSCOPY);  Surgeon: Sandra Perez MD;  Location: [facility];  Service: Endoscopy;  Laterality: N/A;    INJECTION OF ANESTHETIC AGENT AROUND NERVE Bilateral 6/20/2023    Procedure: Bilateral Genicular nerve block RN IV Sedation;  Surgeon:  Devon Grant MD;  Location: [facility] PAIN MGT;  Service: Pain Management;  Laterality: Bilateral;    INJECTION OF JOINT Bilateral 10/21/2022    Procedure: Bilateral intraarticular knee injection with local ALLERGIC TO IODINE;  Surgeon: Devon Grant MD;  Location: [facility] PAIN MGT;  Service: Pain Management;  Laterality: Bilateral;    INJECTION OF JOINT Bilateral 1/31/2023    Procedure: Bilateral IA knee joint injection with steroid RN IV Sedation;  Surgeon: Devon Grant MD;  Location: [facility] PAIN MGT;  Service: Pain Management;  Laterality: Bilateral;    INJECTION OF JOINT Bilateral 5/10/2023    Procedure: Bilateral IA knee joint injection Synvisc RN IV Sedation;  Surgeon: Devon Grant MD;  Location: [facility] PAIN MGT;  Service: Pain Management;  Laterality: Bilateral;    PROSTATE SURGERY      SPINE SURGERY         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 Negative for activity change, appetite change, chills, fever and unexpected weight change.   HENT:  Negative for hearing loss, mouth sores, nosebleeds, sore throat, tinnitus, trouble swallowing and voice change.    Eyes:  Negative for visual disturbance.   Respiratory:  Negative for cough, chest tightness and wheezing.    Cardiovascular:  Negative for chest pain, palpitations and leg swelling.   Gastrointestinal:  Negative for abdominal pain, anal bleeding, blood in stool, constipation, diarrhea,  nausea and vomiting.   Genitourinary:  Negative for frequency, hematuria and testicular pain.   Musculoskeletal:  Positive for arthralgias and joint swelling. Negative for back pain, gait problem and neck pain.   Skin:  Negative for color change, pallor, rash and wound.   Allergic/Immunologic: Negative for immunocompromised state.   Neurological:  Negative for seizures, syncope, weakness, numbness and headaches.   Hematological:  Negative for adenopathy. Does not bruise/bleed easily.   Psychiatric/Behavioral:  Negative for agitation, confusion, decreased concentration, hallucinations and sleep disturbance. The patient is not nervous/anxious.        Medication List with Changes/Refills   Current Medications    ALBUTEROL (PROVENTIL) 2.5 MG /3 ML (0.083 %) NEBULIZER SOLUTION    Take 3 mLs (2.5 mg total) by nebulization every 4 to 6 hours as needed for Wheezing or Shortness of Breath.    ALBUTEROL (PROVENTIL/VENTOLIN HFA) 90 MCG/ACTUATION INHALER    Inhale 2 puffs into the lungs every 4 (four) hours as needed for Wheezing or Shortness of Breath.    EMPAGLIFLOZIN (JARDIANCE) 10 MG TABLET    Take 1 tablet (10 mg total) by mouth once daily.    FERROUS SULFATE 325 (65 FE) MG EC TABLET    Take 1 tablet (325 mg total) by mouth once daily.    FLUTICASONE-UMECLIDIN-VILANTER (TRELEGY ELLIPTA) 200-62.5-25 MCG INHALER    Inhale 1 puff into the lungs once daily.    FUROSEMIDE (LASIX) 40 MG TABLET    Take 1 tablet (40 mg total) by mouth 2 (two) times a day.    MEMANTINE (NAMENDA) 5 MG TAB    Take 1 tablet (5 mg total) by mouth 2 (two) times daily.    METOPROLOL SUCCINATE (TOPROL-XL) 25 MG 24 HR TABLET    Take 0.5 tablets (12.5 mg total) by mouth once daily.    POTASSIUM CHLORIDE SA (K-DUR,KLOR-CON M) 10 MEQ TABLET    Take 1 tablet (10 mEq total) by mouth once daily.    PREDNISONE (DELTASONE) 20 MG TABLET    Take 3 tablets (60mg) by mouth daily for 3 days THEN take 2 tablets (40mg) by mouth daily for 3 days THEN take 1 tablet (20mg)  by mouth daily for 3 days THEN take 1/2 tablet (10mg) by mouth daily for 3 days    SACUBITRIL-VALSARTAN (ENTRESTO)  MG PER TABLET    Take 1 tablet by mouth 2 (two) times daily.        Objective:     Vitals:    08/25/23 0911   BP: 125/62   Pulse: 86   Temp: 98 °F (36.7 °C)       Physical Exam  Vitals and nursing note reviewed.   Constitutional:       Appearance: Normal appearance. He is not ill-appearing.   HENT:      Head: Normocephalic and atraumatic.      Right Ear: External ear normal.      Left Ear: External ear normal.      Nose: Nose normal.      Mouth/Throat:      Mouth: Mucous membranes are moist.      Pharynx: Oropharynx is clear.   Eyes:      Conjunctiva/sclera: Conjunctivae normal.   Cardiovascular:      Rate and Rhythm: Normal rate.   Pulmonary:      Effort: Pulmonary effort is normal.   Abdominal:      General: Abdomen is flat.      Palpations: Abdomen is soft.   Musculoskeletal:         General: Normal range of motion.      Right lower leg: No edema.      Left lower leg: No edema.   Skin:     General: Skin is warm and dry.      Capillary Refill: Capillary refill takes less than 2 seconds.   Neurological:      Mental Status: He is alert and oriented to person, place, and time.      Motor: No weakness.      Gait: Gait normal.   Psychiatric:         Mood and Affect: Mood normal.         Behavior: Behavior normal.         Thought Content: Thought content normal.         Judgment: Judgment normal.         Lab Results   Component Value Date    WBC 4.08 08/23/2023    HGB 9.9 (L) 08/23/2023    HCT 31.0 (L) 08/23/2023    MCV 81 (L) 08/23/2023     08/23/2023       Lab Results   Component Value Date     (H) 08/23/2023    K 3.5 08/23/2023     08/23/2023    CO2 25 08/23/2023    BUN 19 08/23/2023    CREATININE 0.7 08/23/2023    CALCIUM 9.0 08/23/2023    ANIONGAP 11 08/23/2023    ESTGFRAFRICA >60 06/24/2022    EGFRNONAA >60 06/24/2022     Lab Results   Component Value Date    ALT 15  08/23/2023    AST 27 08/23/2023    ALKPHOS 37 (L) 08/23/2023    BILITOT 1.2 (H) 08/23/2023       Assessment/Plan:     Problem List Items Addressed This Visit          Oncology    Iron deficiency anemia due to chronic blood loss     Lab Results   Component Value Date    HGB 9.9 (L) 08/23/2023   stable chronic anemia  Improved from prior    Lab Results   Component Value Date    IRON 103 08/23/2023    TRANSFERRIN 211 08/23/2023    TIBC 312 08/23/2023    FESATURATED 33 08/23/2023      Lab Results   Component Value Date    FERRITIN 147 08/23/2023     Iron indices WNL   No indication for additional IV iron therapy at this time           Relevant Orders    CBC Auto Differential    Comprehensive Metabolic Panel    Ferritin    Iron and TIBC    HGB ELECTROPHERESIS - Hemoglobin Electrophoresis,Hgb A2 [place].    Immunofixation Electrophoresis    Immunoglobulin Free LT Chains Blood    Protein Electrophoresis, Serum     Other Visit Diagnoses       Encounter to establish care    -  Primary    Relevant Orders    Ambulatory referral/consult to Podiatry    Anemia, chronic disease        Relevant Orders    CBC Auto Differential    Comprehensive Metabolic Panel    Ferritin    Iron and TIBC    HGB ELECTROPHERESIS - Hemoglobin Electrophoresis,Hgb A2 [place].    Immunofixation Electrophoresis    Immunoglobulin Free LT Chains Blood    Protein Electrophoresis, Serum    Microcytic anemia        Relevant Orders    CBC Auto Differential    Comprehensive Metabolic Panel    Ferritin    Iron and TIBC    HGB ELECTROPHERESIS - Hemoglobin Electrophoresis,Hgb A2 [place].    Immunofixation Electrophoresis    Immunoglobulin Free LT Chains Blood    Protein Electrophoresis, Serum            Med Onc Chart Routing      Follow up with physician    Follow up with ANANDA 3 months. with repeat labs prior   Infusion scheduling note    Injection scheduling note    Labs Ferritin, CMP, iron and TIBC, free light chains, SPEP, other and CBC   Scheduling:  Preferred lab:  Lab  interval:     Imaging    Pharmacy appointment    Other referrals               SULEMA Kwok-C  Hematology/Oncology

## 2023-08-26 ENCOUNTER — TELEPHONE (OUTPATIENT)
Dept: ADMINISTRATIVE | Facility: HOSPITAL | Age: 83
End: 2023-08-26
Payer: MEDICARE

## 2023-08-28 PROBLEM — I27.20 PULMONARY HYPERTENSION: Status: ACTIVE | Noted: 2023-08-28

## 2023-08-28 NOTE — ASSESSMENT & PLAN NOTE
Lab Results   Component Value Date    HGB 9.9 (L) 08/23/2023   stable chronic anemia  Improved from prior    Lab Results   Component Value Date    IRON 103 08/23/2023    TRANSFERRIN 211 08/23/2023    TIBC 312 08/23/2023    FESATURATED 33 08/23/2023      Lab Results   Component Value Date    FERRITIN 147 08/23/2023       Iron indices WNL   No indication for additional IV iron therapy at this time

## 2023-08-29 NOTE — PROGRESS NOTES
Subjective:   Patient ID:  Richy Douglas is a 82 y.o. male who presents for follow-up of chronic chf      82 yoM referred for ICD consideration.     7/22: He has NICM with EF <35% on serial echos for the past 6+ months. Normal SD and QRS. He also has severe pHTN. He had an episode of short RP tachycardia 1/22 as well as AF 9/24/21. He is on eliquis for CVA prophylaxis. He has required serial transfusion for anemia before and after eliquis initiation. I see no other AF episodes outside the event 9/24/21 while he was septic.     11/22:  Eliquis stopped at the last visit. He deferred ICD and ablation. He is under the impression that he is here for swollen knees. He continues to decline invasive cardiac procedures.     Interval history: No recurrence of arrhythmia. EF remains <30%. He wishes to defer any procedures to care for his wife.     Echo 2/23:  · The left ventricle is normal in size with concentric hypertrophy and moderately decreased systolic function.  · Severe left atrial enlargement.  · Trivial pericardial effusion.  · Mild tricuspid regurgitation.  · Severe right atrial enlargement.  · The estimated ejection fraction is 30%.  · There is severe left ventricular global hypokinesis.  · Mild right ventricular enlargement with mildly to moderately reduced right ventricular systolic function.  · Moderate aortic regurgitation.  · Elevated central venous pressure (15 mmHg).  · The estimated PA systolic pressure is 80 mmHg.  · There is pulmonary hypertension.  · The ascending aorta is moderately dilated.  · The aortic root is moderately dilated.  · Atrial fibrillation observed.    Echo 5/22:  · The left ventricle is moderately enlarged with eccentric hypertrophy and moderately decreased systolic function.  · Severe left atrial enlargement.  · The estimated ejection fraction is 30%.  · Grade II left ventricular diastolic dysfunction.  · Moderate right ventricular enlargement with moderately reduced right ventricular  systolic function.  · Mild right atrial enlargement.  · Moderate aortic regurgitation.  · Mild mitral regurgitation.  · Mild to moderate tricuspid regurgitation.  · Mild pulmonic regurgitation.  · Intermediate central venous pressure (8 mmHg).  · The estimated PA systolic pressure is 93 mmHg.  · There is pulmonary hypertension.    LHC/RHC 1/22:  · The pre-procedure left ventricular end diastolic pressure was 13.  · The estimated blood loss was none.  · The coronary arteries were normal..  · The filling pressures on the right and left were normal.    Past Medical History:  3/1/2023: Alcoholic peripheral neuropathy  No date: Anemia of chronic disease  No date: Aortic aneurysm  09/24/2021: Atrial fibrillation with RVR  08/31/2017: Chronic systolic congestive heart failure  08/31/2017: Emphysema of lung  No date: GERD (gastroesophageal reflux disease)  No date: Hypertension  10/21/2022: Knee osteoarthritis  4/14/2023: Major neurocognitive disorder due to multiple etiologies  11/24/2020: Microcytic anemia  04/27/2021: Other hyperlipidemia  2022: Personal history of colonic polyps  No date: Prostate cancer    Past Surgical History:  1/13/2022: CATHETERIZATION OF BOTH LEFT AND RIGHT HEART; N/A      Comment:  Procedure: CATHETERIZATION, HEART, BOTH LEFT AND RIGHT;                Surgeon: Janneth Tovar MD;  Location: [facility] CATH LAB;                Service: Cardiology;  Laterality: N/A;  poss cx  No date: COLONOSCOPY  6/30/2022: COLONOSCOPY; N/A      Comment:  Procedure: COLONOSCOPY;  Surgeon: Sandra Perez MD;               Location: [facility] ENDO;  Service: Endoscopy;  Laterality:                N/A;  7/1/2022: COLONOSCOPY; N/A      Comment:  Procedure: COLONOSCOPY;  Surgeon: Woodrow Christian MD;  Location: [facility] ENDO;  Service: Endoscopy;                 Laterality: N/A;  1/13/2022: CORONARY ANGIOGRAPHY; N/A      Comment:  Procedure: ANGIOGRAM, CORONARY ARTERY;  Surgeon: Janneth                MD La;  Location: [facility] CATH LAB;  Service:                Cardiology;  Laterality: N/A;  2022: ESOPHAGOGASTRODUODENOSCOPY; N/A      Comment:  Procedure: EGD (ESOPHAGOGASTRODUODENOSCOPY);  Surgeon:                Sandra Perez MD;  Location: [facility] ENDO;  Service:                Endoscopy;  Laterality: N/A;  2023: INJECTION OF ANESTHETIC AGENT AROUND NERVE; Bilateral      Comment:  Procedure: Bilateral Genicular nerve block RN IV                Sedation;  Surgeon: Devon Grant MD;  Location: [facility]                PAIN MGT;  Service: Pain Management;  Laterality:                Bilateral;  10/21/2022: INJECTION OF JOINT; Bilateral      Comment:  Procedure: Bilateral intraarticular knee injection with                local ALLERGIC TO IODINE;  Surgeon: Devon Grant MD;                 Location: [facility] PAIN MGT;  Service: Pain Management;                 Laterality: Bilateral;  2023: INJECTION OF JOINT; Bilateral      Comment:  Procedure: Bilateral IA knee joint injection with                steroid RN IV Sedation;  Surgeon: Devon Grant MD;                 Location: [facility] PAIN MGT;  Service: Pain Management;                 Laterality: Bilateral;  5/10/2023: INJECTION OF JOINT; Bilateral      Comment:  Procedure: Bilateral IA knee joint injection Synvisc RN                IV Sedation;  Surgeon: Devon Grant MD;  Location: [facility]               PAIN MGT;  Service: Pain Management;  Laterality:                Bilateral;  No date: PROSTATE SURGERY  No date: SPINE SURGERY    Social History    Socioeconomic History      Marital status:       Spouse name: jenn       Number of children: 6    Tobacco Use      Smoking status: Former        Packs/day: 0.00        Years: 1 pack/day for 69.2 years (69.2 ttl pk-yrs)        Types: Cigarettes        Start date: 1954        Quit date: 3/1/2023        Years since quittin.4      Smokeless tobacco: Never    Substance and Sexual Activity       Alcohol use: Yes        Comment: reports only drinks red wine when games are on      Drug use: Never      Sexual activity: Yes        Partners: Female    Social Determinants of Health  Financial Resource Strain: Low Risk  (12/10/2021)      Overall Financial Resource Strain (CARDIA)          Difficulty of Paying Living Expenses: Not very hard  Food Insecurity: Food Insecurity Present (12/10/2021)      Hunger Vital Sign          Worried About Running Out of Food in the Last Year: Often true          Ran Out of Food in the Last Year: Patient refused  Transportation Needs: No Transportation Needs (12/10/2021)      PRAPARE - Transportation          Lack of Transportation (Medical): No          Lack of Transportation (Non-Medical): No  Physical Activity: Unknown (12/10/2021)      Exercise Vital Sign          Days of Exercise per Week: 5 days  Stress: No Stress Concern Present (12/10/2021)      [employer] - Occupational Stress Questionnaire          Feeling of Stress : Not at all  Social Connections: Unknown (12/10/2021)      Social Connection and Isolation Panel [NHANES]          Frequency of Communication with Friends and Family: More than three times a week          Frequency of Social Gatherings with Friends and Family: More than three times a week          Active Member of Clubs or Organizations: No          Attends Club or Organization Meetings: Never          Marital Status:   Housing Stability: Unknown (12/10/2021)      Housing Stability Vital Sign          Unable to Pay for Housing in the Last Year: Patient refused          Unstable Housing in the Last Year: Patient refused    Review of patient's family history indicates:  Problem: Diabetes      Relation: Mother          Age of Onset: (Not Specified)  Problem: Peripheral vascular disease      Relation: Mother          Age of Onset: (Not Specified)    Current Outpatient Medications:  albuterol (PROVENTIL) 2.5 mg /3 mL (0.083 %)  nebulizer solution, Take 3 mLs (2.5 mg total) by nebulization every 4 to 6 hours as needed for Wheezing or Shortness of Breath., Disp: 360 mL, Rfl: 11  albuterol (PROVENTIL/VENTOLIN HFA) 90 mcg/actuation inhaler, Inhale 2 puffs into the lungs every 4 (four) hours as needed for Wheezing or Shortness of Breath., Disp: 8.5 g, Rfl: 11  empagliflozin (JARDIANCE) 10 mg tablet, Take 1 tablet (10 mg total) by mouth once daily., Disp: 90 tablet, Rfl: 3  ferrous sulfate 325 (65 FE) MG EC tablet, Take 1 tablet (325 mg total) by mouth once daily., Disp: 30 tablet, Rfl: 1  fluticasone-umeclidin-vilanter (TRELEGY ELLIPTA) 200-62.5-25 mcg inhaler, Inhale 1 puff into the lungs once daily., Disp: 60 each, Rfl: 11  furosemide (LASIX) 40 MG tablet, Take 1 tablet (40 mg total) by mouth 2 (two) times a day., Disp: 90 tablet, Rfl: 3  memantine (NAMENDA) 5 MG Tab, Take 1 tablet (5 mg total) by mouth 2 (two) times daily., Disp: 60 tablet, Rfl: 11  metoprolol succinate (TOPROL-XL) 25 MG 24 hr tablet, Take 0.5 tablets (12.5 mg total) by mouth once daily., Disp: 30 tablet, Rfl: 6  potassium chloride SA (K-DUR,KLOR-CON M) 10 MEQ tablet, Take 1 tablet (10 mEq total) by mouth once daily., Disp: 90 tablet, Rfl: 1  predniSONE (DELTASONE) 20 MG tablet, Take 3 tablets (60mg) by mouth daily for 3 days THEN take 2 tablets (40mg) by mouth daily for 3 days THEN take 1 tablet (20mg) by mouth daily for 3 days THEN take 1/2 tablet (10mg) by mouth daily for 3 days, Disp: 20 tablet, Rfl: 0  sacubitriL-valsartan (ENTRESTO)  mg per tablet, Take 1 tablet by mouth 2 (two) times daily., Disp: 180 tablet, Rfl: 1    No current facility-administered medications for this visit.          Review of Systems   Constitutional: Negative.   HENT: Negative.     Eyes: Negative.    Cardiovascular:  Negative for chest pain, dyspnea on exertion, leg swelling, near-syncope, palpitations and syncope.   Respiratory: Negative.  Negative for shortness of breath.    Endocrine:  Negative.    Hematologic/Lymphatic: Negative.    Skin: Negative.    Musculoskeletal:  Positive for joint pain.   Gastrointestinal: Negative.    Genitourinary: Negative.    Neurological: Negative.  Negative for dizziness and light-headedness.   Psychiatric/Behavioral: Negative.     Allergic/Immunologic: Negative.        Objective:   Physical Exam  Vitals and nursing note reviewed.   Constitutional:       General: He is not in acute distress.     Appearance: He is well-developed. He is not diaphoretic.   HENT:      Head: Normocephalic and atraumatic.      Nose: Nose normal.   Eyes:      General: No scleral icterus.     Conjunctiva/sclera: Conjunctivae normal.   Neck:      Thyroid: No thyromegaly.      Vascular: No JVD.   Cardiovascular:      Rate and Rhythm: Normal rate and regular rhythm.      Heart sounds: S1 normal and S2 normal. No murmur heard.     No friction rub. No gallop. No S3 or S4 sounds.   Pulmonary:      Effort: Pulmonary effort is normal. No respiratory distress.      Breath sounds: Normal breath sounds. No stridor. No wheezing or rales.   Chest:      Chest wall: No tenderness.   Abdominal:      General: Bowel sounds are normal. There is no distension.      Palpations: Abdomen is soft. There is no mass.      Tenderness: There is no abdominal tenderness. There is no rebound.   Genitourinary:     Comments: Deferred  Musculoskeletal:         General: No tenderness or deformity. Normal range of motion.      Cervical back: Normal range of motion and neck supple.   Lymphadenopathy:      Cervical: No cervical adenopathy.   Skin:     General: Skin is warm and dry.      Coloration: Skin is not pale.      Findings: No erythema or rash.   Neurological:      Mental Status: He is alert and oriented to person, place, and time.      Motor: No abnormal muscle tone.      Coordination: Coordination normal.   Psychiatric:         Behavior: Behavior normal.         Thought Content: Thought content normal.         Judgment:  Judgment normal.       ECG: NSR nl CO, QRS, QTC    Assessment:      1. Chronic systolic congestive heart failure        Plan:     82 yoM here for follow up. He remains an ICD candidate but chooses to defer at this time. Will have him return should he decide to proceed with ICD.

## 2023-08-30 ENCOUNTER — HOSPITAL ENCOUNTER (OUTPATIENT)
Dept: CARDIOLOGY | Facility: HOSPITAL | Age: 83
Discharge: HOME | End: 2023-08-30
Attending: INTERNAL MEDICINE
Payer: COMMERCIAL

## 2023-08-30 ENCOUNTER — PATIENT MESSAGE (OUTPATIENT)
Dept: PAIN MEDICINE | Facility: HOSPITAL | Age: 83
End: 2023-08-30
Payer: MEDICARE

## 2023-08-30 ENCOUNTER — OFFICE VISIT (OUTPATIENT)
Dept: CARDIOLOGY | Facility: CLINIC | Age: 83
End: 2023-08-30
Payer: COMMERCIAL

## 2023-08-30 ENCOUNTER — OFFICE VISIT (OUTPATIENT)
Dept: PODIATRY | Facility: CLINIC | Age: 83
End: 2023-08-30
Payer: COMMERCIAL

## 2023-08-30 ENCOUNTER — OFFICE VISIT (OUTPATIENT)
Dept: INTERNAL MEDICINE | Facility: CLINIC | Age: 83
End: 2023-08-30
Payer: COMMERCIAL

## 2023-08-30 VITALS
DIASTOLIC BLOOD PRESSURE: 64 MMHG | HEIGHT: 70 IN | HEART RATE: 80 BPM | SYSTOLIC BLOOD PRESSURE: 130 MMHG | RESPIRATION RATE: 20 BRPM | BODY MASS INDEX: 22.79 KG/M2 | WEIGHT: 159.19 LBS

## 2023-08-30 VITALS
WEIGHT: 159.19 LBS | BODY MASS INDEX: 22.79 KG/M2 | DIASTOLIC BLOOD PRESSURE: 58 MMHG | RESPIRATION RATE: 16 BRPM | SYSTOLIC BLOOD PRESSURE: 128 MMHG | OXYGEN SATURATION: 94 % | HEIGHT: 70 IN | HEART RATE: 92 BPM

## 2023-08-30 VITALS — BODY MASS INDEX: 22.79 KG/M2 | WEIGHT: 159.19 LBS | HEIGHT: 70 IN

## 2023-08-30 DIAGNOSIS — I48.91 ATRIAL FIBRILLATION, UNSPECIFIED TYPE: ICD-10-CM

## 2023-08-30 DIAGNOSIS — J84.10 CALCIFIED GRANULOMA OF LUNG: ICD-10-CM

## 2023-08-30 DIAGNOSIS — L84 CORN OR CALLUS: ICD-10-CM

## 2023-08-30 DIAGNOSIS — R07.9 CHEST PAIN, UNSPECIFIED TYPE: ICD-10-CM

## 2023-08-30 DIAGNOSIS — R79.9 ABNORMAL FINDING OF BLOOD CHEMISTRY, UNSPECIFIED: ICD-10-CM

## 2023-08-30 DIAGNOSIS — Z86.010 HISTORY OF COLON POLYPS: ICD-10-CM

## 2023-08-30 DIAGNOSIS — I27.20 PULMONARY HYPERTENSION: ICD-10-CM

## 2023-08-30 DIAGNOSIS — I35.1 NONRHEUMATIC AORTIC VALVE INSUFFICIENCY: ICD-10-CM

## 2023-08-30 DIAGNOSIS — I10 PRIMARY HYPERTENSION: ICD-10-CM

## 2023-08-30 DIAGNOSIS — I70.0 ATHEROSCLEROSIS OF ABDOMINAL AORTA: ICD-10-CM

## 2023-08-30 DIAGNOSIS — J43.9 PULMONARY EMPHYSEMA, UNSPECIFIED EMPHYSEMA TYPE: ICD-10-CM

## 2023-08-30 DIAGNOSIS — R91.1 SOLITARY PULMONARY NODULE: ICD-10-CM

## 2023-08-30 DIAGNOSIS — Z72.0 NICOTINE ABUSE: ICD-10-CM

## 2023-08-30 DIAGNOSIS — G62.1 ALCOHOLIC PERIPHERAL NEUROPATHY: Primary | ICD-10-CM

## 2023-08-30 DIAGNOSIS — G62.1 ALCOHOLIC PERIPHERAL NEUROPATHY: ICD-10-CM

## 2023-08-30 DIAGNOSIS — D63.8 ANEMIA OF CHRONIC DISEASE: ICD-10-CM

## 2023-08-30 DIAGNOSIS — Z87.19 HISTORY OF LOWER GASTROINTESTINAL BLEEDING: ICD-10-CM

## 2023-08-30 DIAGNOSIS — Z13.1 DIABETES MELLITUS SCREENING: ICD-10-CM

## 2023-08-30 DIAGNOSIS — K55.21 AVM (ARTERIOVENOUS MALFORMATION) OF SMALL BOWEL, ACQUIRED WITH HEMORRHAGE: ICD-10-CM

## 2023-08-30 DIAGNOSIS — R26.9 ABNORMALITY OF GAIT AND MOBILITY: ICD-10-CM

## 2023-08-30 DIAGNOSIS — D50.0 IRON DEFICIENCY ANEMIA DUE TO CHRONIC BLOOD LOSS: ICD-10-CM

## 2023-08-30 DIAGNOSIS — E78.49 OTHER HYPERLIPIDEMIA: ICD-10-CM

## 2023-08-30 DIAGNOSIS — F10.20 UNCOMPLICATED ALCOHOL DEPENDENCE: ICD-10-CM

## 2023-08-30 DIAGNOSIS — F02.80 MAJOR NEUROCOGNITIVE DISORDER DUE TO MULTIPLE ETIOLOGIES: ICD-10-CM

## 2023-08-30 DIAGNOSIS — M17.0 PRIMARY OSTEOARTHRITIS OF BOTH KNEES: ICD-10-CM

## 2023-08-30 DIAGNOSIS — Z76.89 ENCOUNTER TO ESTABLISH CARE: ICD-10-CM

## 2023-08-30 DIAGNOSIS — R09.02 EXERCISE HYPOXEMIA: ICD-10-CM

## 2023-08-30 DIAGNOSIS — Z00.00 ENCOUNTER FOR PREVENTATIVE ADULT HEALTH CARE EXAMINATION: Primary | ICD-10-CM

## 2023-08-30 DIAGNOSIS — I50.22 CHRONIC SYSTOLIC CONGESTIVE HEART FAILURE: ICD-10-CM

## 2023-08-30 DIAGNOSIS — D46.1 IDIOPATHIC REFRACTORY ANEMIA: ICD-10-CM

## 2023-08-30 DIAGNOSIS — I50.22 CHRONIC SYSTOLIC CONGESTIVE HEART FAILURE: Primary | ICD-10-CM

## 2023-08-30 PROBLEM — Z87.891 EX-SMOKER: Status: ACTIVE | Noted: 2023-08-30

## 2023-08-30 PROBLEM — Z86.0100 HISTORY OF COLON POLYPS: Status: ACTIVE | Noted: 2023-08-30

## 2023-08-30 PROCEDURE — 93010 ELECTROCARDIOGRAM REPORT: CPT | Mod: ,,, | Performed by: INTERNAL MEDICINE

## 2023-08-30 PROCEDURE — 93005 ELECTROCARDIOGRAM TRACING: CPT

## 2023-08-30 PROCEDURE — 99214 PR OFFICE/OUTPT VISIT, EST, LEVL IV, 30-39 MIN: ICD-10-PCS | Mod: S$GLB,,, | Performed by: INTERNAL MEDICINE

## 2023-08-30 PROCEDURE — 99214 OFFICE O/P EST MOD 30 MIN: CPT | Mod: PBBFAC,27 | Performed by: NURSE PRACTITIONER

## 2023-08-30 PROCEDURE — G0439 PPPS, SUBSEQ VISIT: HCPCS | Mod: S$GLB,,, | Performed by: NURSE PRACTITIONER

## 2023-08-30 PROCEDURE — 99214 OFFICE O/P EST MOD 30 MIN: CPT | Mod: PBBFAC | Performed by: INTERNAL MEDICINE

## 2023-08-30 PROCEDURE — 93010 EKG 12-LEAD: ICD-10-PCS | Mod: ,,, | Performed by: INTERNAL MEDICINE

## 2023-08-30 PROCEDURE — 99213 OFFICE O/P EST LOW 20 MIN: CPT | Mod: S$GLB,,, | Performed by: PODIATRIST

## 2023-08-30 PROCEDURE — 99999 PR PBB SHADOW E&M-EST. PATIENT-LVL III: CPT | Mod: PBBFAC,,, | Performed by: PODIATRIST

## 2023-08-30 PROCEDURE — 99214 OFFICE O/P EST MOD 30 MIN: CPT | Mod: S$GLB,,, | Performed by: INTERNAL MEDICINE

## 2023-08-30 PROCEDURE — 99999 PR PBB SHADOW E&M-EST. PATIENT-LVL IV: CPT | Mod: PBBFAC,,, | Performed by: INTERNAL MEDICINE

## 2023-08-30 PROCEDURE — 99999 PR PBB SHADOW E&M-EST. PATIENT-LVL IV: ICD-10-PCS | Mod: PBBFAC,,, | Performed by: INTERNAL MEDICINE

## 2023-08-30 PROCEDURE — 99213 OFFICE O/P EST LOW 20 MIN: CPT | Mod: PBBFAC,27 | Performed by: PODIATRIST

## 2023-08-30 PROCEDURE — 99999 PR PBB SHADOW E&M-EST. PATIENT-LVL IV: CPT | Mod: PBBFAC,,, | Performed by: NURSE PRACTITIONER

## 2023-08-30 PROCEDURE — 99999 PR PBB SHADOW E&M-EST. PATIENT-LVL IV: ICD-10-PCS | Mod: PBBFAC,,, | Performed by: NURSE PRACTITIONER

## 2023-08-30 PROCEDURE — G0439 PR MEDICARE ANNUAL WELLNESS SUBSEQUENT VISIT: ICD-10-PCS | Mod: S$GLB,,, | Performed by: NURSE PRACTITIONER

## 2023-08-30 PROCEDURE — 99999 PR PBB SHADOW E&M-EST. PATIENT-LVL III: ICD-10-PCS | Mod: PBBFAC,,, | Performed by: PODIATRIST

## 2023-08-30 PROCEDURE — 99213 PR OFFICE/OUTPT VISIT, EST, LEVL III, 20-29 MIN: ICD-10-PCS | Mod: S$GLB,,, | Performed by: PODIATRIST

## 2023-08-30 NOTE — PROGRESS NOTES
"  Richy Douglas presented for a  Medicare AWV and comprehensive Health Risk Assessment today. The following components were reviewed and updated:    Medical history  Family History  Social history  Allergies and Current Medications  Health Risk Assessment  Health Maintenance  Care Team         ** See Completed Assessments for Annual Wellness Visit within the encounter summary.**         The following assessments were completed:  Living Situation  CAGE  Depression Screening  Timed Get Up and Go  Whisper Test  Cognitive Function Screening  Nutrition Screening  ADL Screening  PAQ Screening        Vitals:    08/30/23 1025   BP: 130/64   BP Location: Right arm   Patient Position: Sitting   Pulse: 80   Resp: 20   Weight: 72.2 kg (159 lb 2.8 oz)   Height:    Pain scale 5' 10" (1.778 m)    0     Body mass index is 22.84 kg/m².  Physical Exam  Constitutional:       General: He is not in acute distress.     Appearance: He is not ill-appearing or diaphoretic.      Comments: Elderly   HENT:      Head: Normocephalic and atraumatic.      Mouth/Throat:      Mouth: Mucous membranes are moist.   Eyes:      General:         Right eye: No discharge.         Left eye: No discharge.      Conjunctiva/sclera: Conjunctivae normal.   Cardiovascular:      Rate and Rhythm: Normal rate and regular rhythm.      Heart sounds: Normal heart sounds.   Pulmonary:      Effort: Pulmonary effort is normal. No respiratory distress.      Comments: BBs diminished  Skin:     General: Skin is warm and dry.   Neurological:      Mental Status: He is alert and oriented to person, place, and time. Mental status is at baseline.      Gait: Gait abnormal.   Psychiatric:         Mood and Affect: Mood normal.         Behavior: Behavior normal.         Thought Content: Thought content normal.         Judgment: Judgment normal.         Please note patient came alone to his visit and did not bring his medicines or medication list    Diagnoses and health risks " identified today and associated recommendations/orders:    1. Encounter for preventative adult health care examination  Review for opioid screening: Patient does not have Rx for Opioids  Review for substance use disorder: Patient does not use substance per chart    Patient states he does not drink alcohol    I offered to discuss advanced care planning, including how to pick a person who would make decisions for you if you were unable to make them for yourself, called a health care power of , and what kind of decisions you might make such as use of life sustaining treatments such as ventilators and tube feeding when faced with a life limiting illness recorded on a living will that they will need to know. (How you want to be cared for as you near the end of your natural life)     X  Patient has advanced directives on file, which we reviewed, and they do not wish to make changes.    2. Major neurocognitive disorder due to multiple etiologies  Monitor  Follow up as directed  Continue home namenda    3. Uncomplicated alcohol dependence- Hx of   Patient states he no longer drinks alcohol   Monitor  Follow up with PCP    4. Pulmonary emphysema, unspecified emphysema type, Calcified granuloma of lung, Solitary pulmonary nodule, Exercise hypoxemia, Nicotine abuse    Monitor  Follow up with with Pulmonology  Continue home albuterol, trelegy ellipta  Patient states he no longer smokes cigarettes, just occasionally cigar now    5. AVM (arteriovenous malformation) of small bowel, acquired with hemorrhage, History of lower gastrointestinal bleeding, History of colon polyp    Monitor  Follow up with GI as needed, directed    6. Chronic systolic congestive heart failure    Monitor  Follow up with with Cardiology  Continue home  lasix, potassium, entresto, jardiance  On jardiance for CHF- Check HGBA1C also    7. Atherosclerosis of abdominal aorta, Other hyperlipidemia  Monitor  Follow up with Cardiology, PCP  Blood pressure  control    8. Atrial fibrillation, unspecified type, Chest pain, unspecified type, Nonrheumatic aortic valve insufficiency   Monitor  Follow up with Cardiology  Continue home toprol xl    9. Primary hypertension  Monitor  Stable  Continue home entresto    10. Idiopathic refractory anemia, Anemia of chronic disease, Iron deficiency anemia due to chronic blood loss  Monitor  Follow up with PCP  Continue home iron    11. Alcoholic peripheral neuropathy  Monitor  Follow up as needed, directed     12. Pulmonary hypertension  Monitor  Follow up with PCP, Pulmonology, Cardiology   2/12/2023 Echo-  Summary    The left ventricle is normal in size with concentric hypertrophy and moderately decreased systolic function.  Severe left atrial enlargement.  Trivial pericardial effusion.  Mild tricuspid regurgitation.  Severe right atrial enlargement.  The estimated ejection fraction is 30%.  There is severe left ventricular global hypokinesis.  Mild right ventricular enlargement with mildly to moderately reduced right ventricular systolic function.  Moderate aortic regurgitation.  Elevated central venous pressure (15 mmHg).  The estimated PA systolic pressure is 80 mmHg.  There is pulmonary hypertension.  The ascending aorta is moderately dilated.  The aortic root is moderately dilated.  Atrial fibrillation observed.    13. Primary osteoarthritis of both knees, Abnormality of gait and mobility  Monitor  Follow up  as needed, directed                            Provided Richy with a 5-10 year written screening schedule and personal prevention plan. Recommendations were developed using the USPSTF age appropriate recommendations. Education, counseling, and referrals were provided as needed. After Visit Summary printed and given to patient which includes a list of additional screenings\tests needed.    Follow up in about 1 year (around 8/30/2024) for Annual wellness visit.    SULEMA Velazquez

## 2023-08-30 NOTE — PATIENT INSTRUCTIONS
Counseling and Referral of Other Preventative  (Italic type indicates deductible and co-insurance are waived)    Patient Name: Richy Douglas  Today's Date: 8/30/2023    Health Maintenance       Date Due Completion Date    COVID-19 Vaccine (4 - Pfizer series) 02/21/2022 12/27/2021    TETANUS VACCINE 06/07/2024 (Originally 10/13/1958) ---    Shingles Vaccine (1 of 2) 06/07/2024 (Originally 10/13/1990) ---    Pneumococcal Vaccines (Age 65+) (1 - PCV) 06/07/2024 (Originally 10/13/1946) ---    Influenza Vaccine (1) 09/01/2023 ---    Lipid Panel 09/17/2025 9/17/2020        No orders of the defined types were placed in this encounter.      The following information is provided to all patients.  This information is to help you find resources for any of the problems found today that may be affecting your health:                Living healthy guide: www.[facility].[place].[facility]      Understanding Diabetes: www.diabetes.org      Eating healthy: www.cdc.gov/healthyweight      CDC home safety checklist: www.cdc.gov/steadi/patient.html      Agency on Aging: www.goea.[place].[facility]      Alcoholics anonymous (AA): www.aa.org      Physical Activity: www.nuris.nih.gov/gf2turl      Tobacco use: www.quitwithusla.org

## 2023-08-30 NOTE — LETTER
August 30, 2023      [facility] 2nd Floor  [address]  [place] [address]  Phone: [phone]  Fax: [phone]       Patient: Richy Douglas   YOB: 1940  Date of Visit: 08/30/2023    To Whom It May Concern:    Richard Douglas  was at [facility] on 08/30/2023. The patient may return to work/school on 08/31/23 with no restrictions. If you have any questions or concerns, or if I can be of further assistance, please do not hesitate to contact me.    Sincerely,        Ann Sullivan MA

## 2023-09-01 ENCOUNTER — HOSPITAL ENCOUNTER (OUTPATIENT)
Facility: HOSPITAL | Age: 83
Discharge: HOME | End: 2023-09-01
Attending: ANESTHESIOLOGY | Admitting: ANESTHESIOLOGY
Payer: COMMERCIAL

## 2023-09-01 VITALS
HEIGHT: 70 IN | SYSTOLIC BLOOD PRESSURE: 135 MMHG | TEMPERATURE: 98 F | OXYGEN SATURATION: 96 % | DIASTOLIC BLOOD PRESSURE: 61 MMHG | RESPIRATION RATE: 20 BRPM | WEIGHT: 157.44 LBS | BODY MASS INDEX: 22.54 KG/M2 | HEART RATE: 79 BPM

## 2023-09-01 DIAGNOSIS — M17.12 OSTEOARTHRITIS OF LEFT KNEE: ICD-10-CM

## 2023-09-01 PROCEDURE — 25000003 PHARM REV CODE 250: Performed by: ANESTHESIOLOGY

## 2023-09-01 PROCEDURE — 64624 PR DESTRUCT, NEUROLYTIC AGENT, GENICULAR NERVE, W/IMG: ICD-10-PCS | Mod: LT,,, | Performed by: ANESTHESIOLOGY

## 2023-09-01 PROCEDURE — 64624 DSTRJ NULYT AGT GNCLR NRV: CPT | Mod: LT,,, | Performed by: ANESTHESIOLOGY

## 2023-09-01 PROCEDURE — 63600175 PHARM REV CODE 636 W HCPCS: Performed by: ANESTHESIOLOGY

## 2023-09-01 PROCEDURE — 64624 DSTRJ NULYT AGT GNCLR NRV: CPT | Mod: LT | Performed by: ANESTHESIOLOGY

## 2023-09-01 RX ORDER — FENTANYL CITRATE 50 UG/ML
INJECTION, SOLUTION INTRAMUSCULAR; INTRAVENOUS
Status: DISCONTINUED | OUTPATIENT
Start: 2023-09-01 | End: 2023-09-01 | Stop reason: HOSPADM

## 2023-09-01 RX ORDER — BUPIVACAINE HYDROCHLORIDE 2.5 MG/ML
INJECTION, SOLUTION EPIDURAL; INFILTRATION; INTRACAUDAL
Status: DISCONTINUED | OUTPATIENT
Start: 2023-09-01 | End: 2023-09-01 | Stop reason: HOSPADM

## 2023-09-01 RX ORDER — LIDOCAINE HYDROCHLORIDE 20 MG/ML
INJECTION, SOLUTION EPIDURAL; INFILTRATION; INTRACAUDAL; PERINEURAL
Status: DISCONTINUED | OUTPATIENT
Start: 2023-09-01 | End: 2023-09-01 | Stop reason: HOSPADM

## 2023-09-01 RX ORDER — TRIAMCINOLONE ACETONIDE 40 MG/ML
INJECTION, SUSPENSION INTRA-ARTICULAR; INTRAMUSCULAR
Status: DISCONTINUED | OUTPATIENT
Start: 2023-09-01 | End: 2023-09-01 | Stop reason: HOSPADM

## 2023-09-01 RX ORDER — SODIUM BICARBONATE 1 MEQ/ML
SYRINGE (ML) INTRAVENOUS
Status: DISCONTINUED | OUTPATIENT
Start: 2023-09-01 | End: 2023-09-01 | Stop reason: HOSPADM

## 2023-09-01 NOTE — DISCHARGE INSTRUCTIONS

## 2023-09-01 NOTE — DISCHARGE SUMMARY
Discharge Note  Short Stay      SUMMARY     Admit Date: 9/1/2023    Attending Physician: Devon Grant MD        Discharge Physician: Devon Grant MD        Discharge Date: 9/1/2023 7:57 AM    Procedure(s) (LRB):  Left Genicular Nerve RFA (Left)    Final Diagnosis: Primary osteoarthritis of both knees [M17.0]    Disposition: Home or self care    Patient Instructions:   Current Discharge Medication List        CONTINUE these medications which have NOT CHANGED    Details   empagliflozin (JARDIANCE) 10 mg tablet Take 1 tablet (10 mg total) by mouth once daily.  Qty: 90 tablet, Refills: 3      fluticasone-umeclidin-vilanter (TRELEGY ELLIPTA) 200-62.5-25 mcg inhaler Inhale 1 puff into the lungs once daily.  Qty: 60 each, Refills: 11    Associated Diagnoses: Panlobular emphysema      furosemide (LASIX) 40 MG tablet Take 1 tablet (40 mg total) by mouth 2 (two) times a day.  Qty: 90 tablet, Refills: 3      memantine (NAMENDA) 5 MG Tab Take 1 tablet (5 mg total) by mouth 2 (two) times daily.  Qty: 60 tablet, Refills: 11    Associated Diagnoses: Alcohol dependence with uncomplicated withdrawal; Major neurocognitive disorder due to multiple etiologies      metoprolol succinate (TOPROL-XL) 25 MG 24 hr tablet Take 0.5 tablets (12.5 mg total) by mouth once daily.  Qty: 30 tablet, Refills: 6    Comments: .      sacubitriL-valsartan (ENTRESTO)  mg per tablet Take 1 tablet by mouth 2 (two) times daily.  Qty: 180 tablet, Refills: 1    Associated Diagnoses: Chronic combined systolic and diastolic congestive heart failure, NYHA class 3      albuterol (PROVENTIL) 2.5 mg /3 mL (0.083 %) nebulizer solution Take 3 mLs (2.5 mg total) by nebulization every 4 to 6 hours as needed for Wheezing or Shortness of Breath.  Qty: 360 mL, Refills: 11    Comments: ChronicObstructivePulmonaryDiseaseCode:[id number]  Associated Diagnoses: Pulmonary emphysema, unspecified emphysema type; Panlobular emphysema      albuterol  (PROVENTIL/VENTOLIN HFA) 90 mcg/actuation inhaler Inhale 2 puffs into the lungs every 4 (four) hours as needed for Wheezing or Shortness of Breath.  Qty: 8.5 g, Refills: 11    Comments: Dispense formulary  Associated Diagnoses: Panlobular emphysema      ferrous sulfate 325 (65 FE) MG EC tablet Take 1 tablet (325 mg total) by mouth once daily.  Qty: 30 tablet, Refills: 1      potassium chloride SA (K-DUR,KLOR-CON M) 10 MEQ tablet Take 1 tablet (10 mEq total) by mouth once daily.  Qty: 90 tablet, Refills: 1                 Discharge Diagnosis: Primary osteoarthritis of both knees [M17.0]  Condition on Discharge: Stable with no complications to procedure   Diet on Discharge: Same as before.  Activity: as per instruction sheet.  Discharge to: Home with a responsible adult.  Follow up: 2-4 weeks       Please call the office at [phone] if you experience any weakness or loss of sensation, fever > 101.5, pain uncontrolled with oral medications, persistent nausea/vomiting/or diarrhea, redness or drainage from the incisions, or any other worrisome concerns. If physician on call was not reached or could not communicate with our office for any reason please go to the nearest emergency department

## 2023-09-01 NOTE — OP NOTE
Procedure Note:    Left  Geniculate nerve cooled radiofrequency ablation under fluoroscopy     1) medial superior epicondyle geniculate nerve cooled radiofrequency ablation  2) lateral superior epicondyle geniculate nerve cooled radiofrequency ablation  3) medial tibial metaphysis geniculate nerve cooled radiofrequency ablation  4) xray guidance for needle placement  5) Conscious sedation provided by MD                                Surgeon: Devon Grant MD    Assistant: None    Pre-Op Diagnosis:  Left  Primary osteoarthritis of both knees [M17.0]    Post-Op Diagnosis: Primary osteoarthritis of both knees [M17.0]    EBL: None    Complications: None    Specimens: None    Description of procedure:    After written consent was obtained, patient placed in supine position.  The area over the medial and lateral aspect of the superior epi-condyle of the femur and the medial tibial metaphysis were prepped with chlorhexidine.  The area was draped in the usual sterile fashion.  Approximately 3 mL total 1% lidocaine was infiltrated into the skin overlying the 3 predetermined entry points. A 17 gauge 10mm active tip needle was then advanced under fluoroscopy in the AP and lateral views into the positions of the geniculate nerves at these levels. This was followed by motor testing at each of the nerves to ensure that there was no dorsiflexion of the foot.  After negative aspiration and no paresthesias there was injection of 3mL of 2% lidocaine into each of these  3 areas. This was followed by cooled thermal lesioning at 80 degrees celsius for 150 seconds at each site. After negative aspiration and no paresthesias there was injection of  5 mL of 0.25% bupivacaine and 40 mg triamcinolone, divided into each of these  3 areas  Needle was withdrawn and a sterile band-aid applied to the skin.    Conscious sedation provided by M.D    The patient was monitored with continuous pulse oximetry, EKG, and intermittent blood pressure  monitors.  The patient was hemodynamically stable throughout the entire process was responsive to voice, and breathing spontaneously.  Supplemental O2 was provided at 2L/min via nasal cannula.  Patient was comfortable for the duration of the procedure. (See nurse documentation and case log for sedation time)    There was a total of 0mg IV Midazolam and 50mcg fentanyl given for the procedure    Patient tolerated the procedure well, and was reporting improvement of pain symptoms after the injection.  She was discharged from the clinic in stable condition.

## 2023-09-01 NOTE — PROGRESS NOTES
Subjective:      Patient ID: Richy Douglas is a 82 y.o. male.    Chief Complaint: Follow-up      Patient is a 82 y.o. male coming in today for f/u. Last time I saw pt was on March of 2021.  Currently f/u with cardiology, pulmonology, and hematology. He is requiring medication refill today. Pt has upcoming knee surgery for knee replacement. He continues to take Lasix for leg swelling treatment. Denies SOB or skin infection. Pt presents bilateral edema in legs; states he skipped Lasix in the past 2 days by accident. He is due for PNA, TDAP, and shingles vaccines.     1. Chronic systolic congestive heart failure    2. Pulmonary emphysema, unspecified emphysema type    3. Anemia of chronic disease    4. Primary hypertension    5. Memory loss    6. Major neurocognitive disorder due to multiple etiologies    7. Other hyperlipidemia    8. Atherosclerosis of abdominal aorta    9. Osteoarthritis of both knees, unspecified osteoarthritis type    10. Bilateral leg weakness    11. Bilateral leg edema    12. Iron deficiency anemia due to chronic blood loss    13. Idiopathic refractory anemia       No questionnaires on file.    Pmh, Psh, Family Hx, Social Hx, HM updated in Epic Tabs today.   Review of Systems   Constitutional:  Negative for activity change, appetite change, chills, fatigue and unexpected weight change.   HENT:  Negative for congestion, ear pain, postnasal drip, sneezing, sore throat and trouble swallowing.    Eyes:  Negative for pain and visual disturbance.   Respiratory:  Negative for cough and shortness of breath.    Cardiovascular:  Negative for chest pain and leg swelling.   Gastrointestinal:  Negative for abdominal pain, constipation, diarrhea, nausea and vomiting.   Endocrine: Negative for cold intolerance and heat intolerance.   Genitourinary:  Negative for difficulty urinating, dysuria and flank pain.   Musculoskeletal:  Negative for arthralgias, back pain, joint swelling and neck pain.   Skin:  Negative  "for color change and rash.   Neurological:  Negative for dizziness, seizures and headaches.   Psychiatric/Behavioral:  Negative for behavioral problems, dysphoric mood and sleep disturbance. The patient is not nervous/anxious.    Objective:     Vitals:    06/07/23 1423   BP: (!) 114/54   BP Location: Right arm   Patient Position: Sitting   BP Method: Medium (Manual)   Pulse: 78   Temp: 98.5 °F (36.9 °C)   TempSrc: Oral   SpO2: (!) 93%   Weight: 74.4 kg (164 lb 0.4 oz)   Height: 5' 11" (1.803 m)     Wt Readings from Last 10 Encounters:   06/07/23 74.4 kg (164 lb 0.4 oz)   06/06/23 72.3 kg (159 lb 8 oz)   05/10/23 74.5 kg (164 lb 2.1 oz)   05/09/23 72.4 kg (159 lb 9.8 oz)   04/25/23 73 kg (160 lb 15 oz)   04/05/23 73.5 kg (162 lb 0.6 oz)   03/28/23 74.7 kg (164 lb 10.9 oz)   03/28/23 74.7 kg (164 lb 10.9 oz)   03/01/23 70.8 kg (156 lb 1.4 oz)   03/01/23 70.8 kg (156 lb 1.4 oz)     Physical Exam  Vitals reviewed.   Constitutional:       General: He is not in acute distress.     Appearance: Normal appearance. He is well-developed and normal weight.   HENT:      Head: Normocephalic and atraumatic.      Right Ear: Tympanic membrane and external ear normal.      Left Ear: Tympanic membrane and external ear normal.      Nose: Nose normal.      Mouth/Throat:      Mouth: Mucous membranes are moist.      Pharynx: Oropharynx is clear.   Eyes:      Conjunctiva/sclera: Conjunctivae normal.      Pupils: Pupils are equal, round, and reactive to light.   Neck:      Thyroid: No thyromegaly.   Cardiovascular:      Rate and Rhythm: Normal rate and regular rhythm.      Heart sounds: No murmur heard.    No friction rub. No gallop.   Pulmonary:      Effort: Pulmonary effort is normal. No respiratory distress.      Breath sounds: Normal breath sounds.   Abdominal:      General: Bowel sounds are normal. There is no distension.      Palpations: Abdomen is soft.      Tenderness: There is no abdominal tenderness. There is no rebound. "   Musculoskeletal:         General: Normal range of motion.      Cervical back: Normal range of motion and neck supple.      Right lower leg: 3+ Pitting Edema present.      Left lower leg: 3+ Pitting Edema present.   Lymphadenopathy:      Cervical: No cervical adenopathy.   Skin:     General: Skin is warm and dry.   Neurological:      General: No focal deficit present.      Mental Status: He is alert and oriented to person, place, and time.      Coordination: Coordination normal.   Psychiatric:         Attention and Perception: Attention normal.         Mood and Affect: Mood and affect normal.         Speech: Speech normal.         Behavior: Behavior normal.         Thought Content: Thought content normal.         Cognition and Memory: Cognition normal.         Judgment: Judgment normal.       Assessment:     1. Chronic systolic congestive heart failure    2. Pulmonary emphysema, unspecified emphysema type    3. Anemia of chronic disease    4. Primary hypertension    5. Memory loss    6. Major neurocognitive disorder due to multiple etiologies    7. Other hyperlipidemia    8. Atherosclerosis of abdominal aorta    9. Osteoarthritis of both knees, unspecified osteoarthritis type    10. Bilateral leg weakness    11. Bilateral leg edema    12. Iron deficiency anemia due to chronic blood loss    13. Idiopathic refractory anemia        Plan:   Richy was seen today for follow-up.    Diagnoses and all orders for this visit:    Chronic systolic congestive heart failure    Pulmonary emphysema, unspecified emphysema type    Anemia of chronic disease    Primary hypertension    Memory loss    Major neurocognitive disorder due to multiple etiologies    Other hyperlipidemia    Atherosclerosis of abdominal aorta    Osteoarthritis of both knees, unspecified osteoarthritis type    Bilateral leg weakness    Bilateral leg edema    Iron deficiency anemia due to chronic blood loss  -     Ambulatory referral/consult to Hematology /  Oncology; Future    Idiopathic refractory anemia      - above diagnoses were discussed and reviewed today during visit. The conditions are stable other than heart failure, leg edema due to patient holding lasix for last 2 days. Advised patient to restart meds. Anemia- uncertain level of control, needing to get back in with Hem/onc. Placed referral. Last seen in 6/2022. Otherwise, Continue with current medications and interventions.   Pt declined all vaccines today.   Reviewed notes from specialists for chronic conditions treatment.   Will request refills for patient's pulm meds and inhalers on his behalf.   Note for missing work yesterday and today given to patient.   Work note given to pt today.   Instructed to f/u in 1 year.     There are no Patient Instructions on file for this visit.    Follow up in about 1 year (around 6/7/2024) for physical with Dr CAMPBELL.      LABS:   No results found for: HGBA1C   Lab Results   Component Value Date    CHOL 144 09/17/2020     Lab Results   Component Value Date    LDLCALC 74.0 09/17/2020     Lab Results   Component Value Date    WBC 12.27 02/14/2023    HGB 8.5 (L) 02/14/2023    HCT 28.1 (L) 02/14/2023     02/14/2023    CHOL 144 09/17/2020    TRIG 45 09/17/2020    HDL 61 09/17/2020    ALT 17 02/14/2023    AST 17 02/14/2023     05/09/2023    K 4.3 05/09/2023     05/09/2023    CREATININE 0.7 05/09/2023    BUN 16 05/09/2023    CO2 30 (H) 05/09/2023    TSH 1.046 04/14/2023    INR 1.4 (H) 09/24/2021       The ASCVD Risk score (Abdulaziz DK, et al., 2019) failed to calculate for the following reasons:    The 2019 ASCVD risk score is only valid for ages 40 to 79  [facility]  See OP Notes for results.     IMPRESSION: See OP Notes for results.     This procedure was auto-finalized by: Virtual Radiologist    Scribe Attestation:   I, Oscar Rocha, am scribing for, and in the presence of, Dr. Vivian Campbell MD. I performed the above scribed service and  the documentation accurately describes the services I performed. I attest to the accuracy of the note.    I, Dr. Vivian Ch MD, reviewed documentation as scribed above. I personally performed the services described in this documentation.  I agree that the record reflects my personal performance and is accurate and complete. Vivian Ch MD.    06/07/2023       36.9

## 2023-09-01 NOTE — PLAN OF CARE
Discharge instructions reviewed with patient, verbalized understanding. 3 injection sites to left knee; clean, dry and intact. Voiced no complaints. Vital signs stable. Discharging home with ride.

## 2023-09-07 NOTE — PRE-PROCEDURE INSTRUCTIONS
poke with patient regarding procedure scheduled on 9.15     Arrival time 0730     Has patient been sick with fever or on antibiotics within the last 7 days? No     Does the patient have any open wounds, sores or rashes? No     Does the patient have any recent fractures? no     Has patient received a vaccination within the last 7 days? No     Received the COVID vaccination?     Has the patient stopped all medications as directed? na     Does patient have a pacemaker and or defibrillator? no     Does the patient have a ride to and from procedure and someone reliable to remain with patient? unsure, educated     Is the patient diabetic? no     Does the patient have sleep apnea? Or use O2 at home? no     Is the patient receiving sedation?      Is the patient instructed to remain NPO beginning at midnight the night before their procedure? yes     Procedure location confirmed with patient? Yes     Covid- Denies signs/symptoms. Instructed to notify PAT/MD if any changes.

## 2023-09-11 NOTE — PROGRESS NOTES
Subjective:     Patient ID: Richy Douglas is a 82 y.o. male.    Chief Complaint: Nail Care (Nail care, pt states he has a corn on left fifth digit, BL great toe nails are falling off.  rates pain 10/10, pt diabetic and wearing tennis and socks. Last seen PCP Dr. Ch 6/7/23)    Richy is a 82 y.o. male who presents to the podiatry clinic  with complaint of bilateral ankle pain. Onset of the symptoms was about a month ago. Precipitating event: none known. Current symptoms include: ability to bear weight, but with some pain. Aggravating factors: standing and walking. Symptoms have been intermittent. Patients rates pain 10/10 on pain scale.    Patient Active Problem List   Diagnosis    Anemia of chronic disease    Chronic systolic congestive heart failure    Emphysema of lung    Hypertension    History of lower gastrointestinal bleeding    Iron deficiency anemia due to chronic blood loss    Nicotine abuse    Nonrheumatic aortic valve insufficiency    Calcified granuloma of lung    Shortness of breath    Atherosclerosis of abdominal aorta    Other hyperlipidemia    Atrial fibrillation    Bacteremia    Alcohol dependence    AVM (arteriovenous malformation) of small bowel, acquired with hemorrhage    Acute blood loss anemia    Solitary pulmonary nodule    Primary osteoarthritis of both knees    Chest pain    COVID-19    Alcoholic peripheral neuropathy    Exercise hypoxemia    Major neurocognitive disorder due to multiple etiologies    Other amnesia    Bilateral leg weakness    Bilateral leg edema    Idiopathic refractory anemia    Elevated bilirubin    Pulmonary hypertension    Ex-smoker    History of colon polyp    Abnormality of gait and mobility       Medication List with Changes/Refills   Current Medications    ALBUTEROL (PROVENTIL) 2.5 MG /3 ML (0.083 %) NEBULIZER SOLUTION    Take 3 mLs (2.5 mg total) by nebulization every 4 to 6 hours as needed for Wheezing or Shortness of Breath.    ALBUTEROL (PROVENTIL/VENTOLIN  HFA) 90 MCG/ACTUATION INHALER    Inhale 2 puffs into the lungs every 4 (four) hours as needed for Wheezing or Shortness of Breath.    EMPAGLIFLOZIN (JARDIANCE) 10 MG TABLET    Take 1 tablet (10 mg total) by mouth once daily.    FERROUS SULFATE 325 (65 FE) MG EC TABLET    Take 1 tablet (325 mg total) by mouth once daily.    FLUTICASONE-UMECLIDIN-VILANTER (TRELEGY ELLIPTA) 200-62.5-25 MCG INHALER    Inhale 1 puff into the lungs once daily.    FUROSEMIDE (LASIX) 40 MG TABLET    Take 1 tablet (40 mg total) by mouth 2 (two) times a day.    MEMANTINE (NAMENDA) 5 MG TAB    Take 1 tablet (5 mg total) by mouth 2 (two) times daily.    METOPROLOL SUCCINATE (TOPROL-XL) 25 MG 24 HR TABLET    Take 0.5 tablets (12.5 mg total) by mouth once daily.    POTASSIUM CHLORIDE SA (K-DUR,KLOR-CON M) 10 MEQ TABLET    Take 1 tablet (10 mEq total) by mouth once daily.    SACUBITRIL-VALSARTAN (ENTRESTO)  MG PER TABLET    Take 1 tablet by mouth 2 (two) times daily.       Review of patient's allergies indicates:   Allergen Reactions    Fish containing products     Iodine and iodide containing products     Shellfish containing products        Past Surgical History:   Procedure Laterality Date    CATHETERIZATION OF BOTH LEFT AND RIGHT HEART N/A 1/13/2022    Procedure: CATHETERIZATION, HEART, BOTH LEFT AND RIGHT;  Surgeon: Janneth Tovar MD;  Location: [facility] CATH LAB;  Service: Cardiology;  Laterality: N/A;  poss cx    COLONOSCOPY      COLONOSCOPY N/A 6/30/2022    Procedure: COLONOSCOPY;  Surgeon: Sandra Perez MD;  Location: [facility] ENDO;  Service: Endoscopy;  Laterality: N/A;    COLONOSCOPY N/A 7/1/2022    Procedure: COLONOSCOPY;  Surgeon: Woodrow Christian MD;  Location: [facility] ENDO;  Service: Endoscopy;  Laterality: N/A;    CORONARY ANGIOGRAPHY N/A 1/13/2022    Procedure: ANGIOGRAM, CORONARY ARTERY;  Surgeon: Janneth Tovar MD;  Location: [facility] CATH LAB;  Service: Cardiology;  Laterality: N/A;    ESOPHAGOGASTRODUODENOSCOPY N/A  2022    Procedure: EGD (ESOPHAGOGASTRODUODENOSCOPY);  Surgeon: Sandra Perez MD;  Location: [facility];  Service: Endoscopy;  Laterality: N/A;    INJECTION OF ANESTHETIC AGENT AROUND NERVE Bilateral 2023    Procedure: Bilateral Genicular nerve block RN IV Sedation;  Surgeon: Devon Grant MD;  Location: [facility];  Service: Pain Management;  Laterality: Bilateral;    INJECTION OF JOINT Bilateral 10/21/2022    Procedure: Bilateral intraarticular knee injection with local ALLERGIC TO IODINE;  Surgeon: Devon Grant MD;  Location: [facility];  Service: Pain Management;  Laterality: Bilateral;    INJECTION OF JOINT Bilateral 2023    Procedure: Bilateral IA knee joint injection with steroid RN IV Sedation;  Surgeon: Devon Grant MD;  Location: [facility];  Service: Pain Management;  Laterality: Bilateral;    INJECTION OF JOINT Bilateral 5/10/2023    Procedure: Bilateral IA knee joint injection Synvisc RN IV Sedation;  Surgeon: Devon Grant MD;  Location: [facility];  Service: Pain Management;  Laterality: Bilateral;    PROSTATE SURGERY      RADIOFREQUENCY THERMOCOAGULATION Left 2023    Procedure: Left Genicular Nerve RFA;  Surgeon: Devon Grant MD;  Location: [facility];  Service: Pain Management;  Laterality: Left;    SPINE SURGERY         Family History   Problem Relation Age of Onset    Diabetes Mother     Peripheral vascular disease Mother        Social History     Socioeconomic History    Marital status:      Spouse name: jenn     Number of children: 6   Tobacco Use    Smoking status: Former     Current packs/day: 0.00     Average packs/day: 1 pack/day for 69.2 years (69.2 ttl pk-yrs)     Types: Cigarettes     Start date: 1954     Quit date: 3/1/2023     Years since quittin.5    Smokeless tobacco: Never   Substance and Sexual Activity    Alcohol use: Yes     Comment: reports only drinks red wine when games are on    Drug use: Never    Sexual activity:  "Yes     Partners: Female     Social Determinants of Health     Financial Resource Strain: Low Risk  (8/30/2023)    Overall Financial Resource Strain (CARDIA)     Difficulty of Paying Living Expenses: Not very hard   Food Insecurity: No Food Insecurity (8/30/2023)    Hunger Vital Sign     Worried About Running Out of Food in the Last Year: Never true     Ran Out of Food in the Last Year: Never true   Transportation Needs: No Transportation Needs (8/30/2023)    PRAPARE - Transportation     Lack of Transportation (Medical): No     Lack of Transportation (Non-Medical): No   Physical Activity: Inactive (8/30/2023)    Exercise Vital Sign     Days of Exercise per Week: 0 days     Minutes of Exercise per Session: 0 min   Stress: No Stress Concern Present (8/30/2023)    [place] [employer] - Occupational Stress Questionnaire     Feeling of Stress : Not at all   Social Connections: Socially Integrated (8/30/2023)    Social Connection and Isolation Panel [NHANES]     Frequency of Communication with Friends and Family: More than three times a week     Frequency of Social Gatherings with Friends and Family: Once a week     Attends Sabianist Services: More than 4 times per year     Active Member of Clubs or Organizations: Yes     Attends Club or Organization Meetings: More than 4 times per year     Marital Status:    Housing Stability: Low Risk  (8/30/2023)    Housing Stability Vital Sign     Unable to Pay for Housing in the Last Year: No     Number of Places Lived in the Last Year: 1     Unstable Housing in the Last Year: No       Vitals:    08/30/23 1105   Weight: 72.2 kg (159 lb 2.8 oz)   Height: 5' 10" (1.778 m)   PainSc: 10-Worst pain ever         Review of Systems   Constitutional:  Negative for chills and fever.   Respiratory:  Negative for shortness of breath.    Cardiovascular:  Negative for chest pain, palpitations, orthopnea, claudication and leg swelling.   Gastrointestinal:  Negative for " diarrhea, nausea and vomiting.   Musculoskeletal:  Negative for joint pain.   Skin:  Negative for rash.   Neurological:  Negative for dizziness, tingling, sensory change, focal weakness and weakness.   Psychiatric/Behavioral: Negative.           Objective:       PHYSICAL EXAM: Apperance: Alert and orient in no distress,well developed, and with good attention to grooming and body habits  Patient presents ambulating in tennis shoes  Lower Extremity Physical Exam:  VASCULAR: Dorsalis pedis pulses 1/4 bilateral and Posterior Tibial pulses 1/4 bilateral. Capillary fill time <blanched bilateral. Mild edema observed bilateral. Varicosities absent bilateral. Skin temperature of the lower extremities is warm to warm, proximal to distal. Hair growth dim bilateral.  DERMATOLOGICAL: No skin rashes, subcutaneous nodules, lesions, or ulcers observed bilateral. Dry skin noted to bilateral lower extremities.  NEUROLOGICAL: Light touch, sharp-dull, proprioception all present and equal bilaterally.    MUSCULOSKELETAL: Muscle strength is 5/5 for foot inverters, everters, plantarflexors, and dorsiflexors. Muscle tone is normal. (--) pain on palpation of bilateral ankles.         Assessment:       ICD-10-CM ICD-9-CM   1. Alcoholic peripheral neuropathy  G62.1 357.5   2. Encounter to establish care  Z76.89 V65.8   3. Corn or callus - Right Foot  [id number]       Plan:   Alcoholic peripheral neuropathy    Encounter to establish care  -     Ambulatory referral/consult to Podiatry    [place] or callus - Right Foot      I counseled the patient on his conditions, regarding findings of my examination, my impressions, and usual treatment plan.   Discussed with patient treatments for arthritis/neuropathy consisting of topical or oral medication.  Recommendations given for over-the-counter medicine such as Two [place] Go[place] and/or Capsaicin.  The patient and I reviewed the types of shoes he should be wearing, my recommendation includes generally the best  time of the day for a shoe fitting is the afternoon, shoes with a wide toe box, very good cushion, and tennis shoes with removable inner soles.The patient and I reviewed my recommendations for over-the-counter orthotic inserts.   Counseled patient on daily foot inspections and proper shoe gear.  Patient to return in 6 months or sooner if needed.        Nick Adams DPM  [facility]

## 2023-09-15 ENCOUNTER — HOSPITAL ENCOUNTER (OUTPATIENT)
Facility: HOSPITAL | Age: 83
Discharge: HOME | End: 2023-09-15
Attending: ANESTHESIOLOGY | Admitting: ANESTHESIOLOGY
Payer: COMMERCIAL

## 2023-09-15 VITALS
DIASTOLIC BLOOD PRESSURE: 75 MMHG | HEART RATE: 85 BPM | BODY MASS INDEX: 24.35 KG/M2 | RESPIRATION RATE: 18 BRPM | WEIGHT: 170.06 LBS | HEIGHT: 70 IN | SYSTOLIC BLOOD PRESSURE: 158 MMHG | OXYGEN SATURATION: 94 % | TEMPERATURE: 97 F

## 2023-09-15 DIAGNOSIS — M17.9 KNEE OSTEOARTHRITIS: ICD-10-CM

## 2023-09-15 PROCEDURE — 99152 MOD SED SAME PHYS/QHP 5/>YRS: CPT | Performed by: ANESTHESIOLOGY

## 2023-09-15 PROCEDURE — 64624 DSTRJ NULYT AGT GNCLR NRV: CPT | Mod: RT | Performed by: ANESTHESIOLOGY

## 2023-09-15 PROCEDURE — 94640 AIRWAY INHALATION TREATMENT: CPT

## 2023-09-15 PROCEDURE — 64624 PR DESTRUCT, NEUROLYTIC AGENT, GENICULAR NERVE, W/IMG: ICD-10-PCS | Mod: RT,,, | Performed by: ANESTHESIOLOGY

## 2023-09-15 PROCEDURE — 25000242 PHARM REV CODE 250 ALT 637 W/ HCPCS: Performed by: ANESTHESIOLOGY

## 2023-09-15 PROCEDURE — 63600175 PHARM REV CODE 636 W HCPCS: Performed by: ANESTHESIOLOGY

## 2023-09-15 PROCEDURE — 25000003 PHARM REV CODE 250: Performed by: ANESTHESIOLOGY

## 2023-09-15 PROCEDURE — 64624 DSTRJ NULYT AGT GNCLR NRV: CPT | Mod: RT,,, | Performed by: ANESTHESIOLOGY

## 2023-09-15 RX ORDER — TRIAMCINOLONE ACETONIDE 40 MG/ML
INJECTION, SUSPENSION INTRA-ARTICULAR; INTRAMUSCULAR
Status: DISCONTINUED | OUTPATIENT
Start: 2023-09-15 | End: 2023-09-15 | Stop reason: HOSPADM

## 2023-09-15 RX ORDER — LIDOCAINE HYDROCHLORIDE 20 MG/ML
INJECTION, SOLUTION EPIDURAL; INFILTRATION; INTRACAUDAL; PERINEURAL
Status: DISCONTINUED | OUTPATIENT
Start: 2023-09-15 | End: 2023-09-15 | Stop reason: HOSPADM

## 2023-09-15 RX ORDER — IPRATROPIUM BROMIDE AND ALBUTEROL SULFATE 2.5; .5 MG/3ML; MG/3ML
3 SOLUTION RESPIRATORY (INHALATION) ONCE
Status: COMPLETED | OUTPATIENT
Start: 2023-09-15 | End: 2023-09-15

## 2023-09-15 RX ORDER — BUPIVACAINE HYDROCHLORIDE 2.5 MG/ML
INJECTION, SOLUTION EPIDURAL; INFILTRATION; INTRACAUDAL
Status: DISCONTINUED | OUTPATIENT
Start: 2023-09-15 | End: 2023-09-15 | Stop reason: HOSPADM

## 2023-09-15 RX ORDER — FENTANYL CITRATE 50 UG/ML
INJECTION, SOLUTION INTRAMUSCULAR; INTRAVENOUS
Status: DISCONTINUED | OUTPATIENT
Start: 2023-09-15 | End: 2023-09-15 | Stop reason: HOSPADM

## 2023-09-15 RX ORDER — INDOMETHACIN 25 MG/1
CAPSULE ORAL
Status: DISCONTINUED | OUTPATIENT
Start: 2023-09-15 | End: 2023-09-15 | Stop reason: HOSPADM

## 2023-09-15 RX ADMIN — IPRATROPIUM BROMIDE AND ALBUTEROL SULFATE 3 ML: 2.5; .5 SOLUTION RESPIRATORY (INHALATION) at 07:09

## 2023-09-15 NOTE — DISCHARGE INSTRUCTIONS

## 2023-09-15 NOTE — OP NOTE
Procedure Note:    Right  Geniculate nerve cooled radiofrequency ablation under fluoroscopy     1) medial superior epicondyle geniculate nerve cooled radiofrequency ablation  2) lateral superior epicondyle geniculate nerve cooled radiofrequency ablation  3) medial tibial metaphysis geniculate nerve cooled radiofrequency ablation  4) xray guidance for needle placement  5) Conscious sedation provided by MD                                Surgeon: Devon Grant MD    Assistant: None    Pre-Op Diagnosis:  Right  Primary osteoarthritis of both knees [M17.0]    Post-Op Diagnosis: Primary osteoarthritis of both knees [M17.0]    EBL: None    Complications: None    Specimens: None    Description of procedure:    After written consent was obtained, patient placed in supine position.  The area over the medial and lateral aspect of the superior epi-condyle of the femur and the medial tibial metaphysis were prepped with chlorhexidine.  The area was draped in the usual sterile fashion.  Approximately 3 mL total 1% lidocaine was infiltrated into the skin overlying the 3 predetermined entry points. A 17 gauge 10mm active tip needle was then advanced under fluoroscopy in the AP and lateral views into the positions of the geniculate nerves at these levels. This was followed by motor testing at each of the nerves to ensure that there was no dorsiflexion of the foot.  After negative aspiration and no paresthesias there was injection of 3mL of 2% lidocaine into each of these  3 areas. This was followed by cooled thermal lesioning at 80 degrees celsius for 150 seconds at each site. After negative aspiration and no paresthesias there was injection of  5 mL of 0.25% bupivacaine and 40 mg triamcinolone, divided into each of these  3 areas  Needle was withdrawn and a sterile band-aid applied to the skin.    Conscious sedation provided by M.D    The patient was monitored with continuous pulse oximetry, EKG, and intermittent blood pressure  monitors.  The patient was hemodynamically stable throughout the entire process was responsive to voice, and breathing spontaneously.  Supplemental O2 was provided at 2L/min via nasal cannula.  Patient was comfortable for the duration of the procedure. (See nurse documentation and case log for sedation time)    There was a total of 0mg IV Midazolam and 50mcg fentanyl given for the procedure    Patient tolerated the procedure well, and was reporting improvement of pain symptoms after the injection.  She was discharged from the clinic in stable condition.

## 2023-09-15 NOTE — DISCHARGE SUMMARY
Discharge Note  Short Stay      SUMMARY     Admit Date: 9/15/2023    Attending Physician: Devon Grant MD        Discharge Physician: Devon Grant MD        Discharge Date: 9/15/2023 8:37 AM    Procedure(s) (LRB):  Right Genicular Nerve RFA RN IV Sedation (Right)    Final Diagnosis: Primary osteoarthritis of both knees [M17.0]    Disposition: Home or self care    Patient Instructions:   Current Discharge Medication List        CONTINUE these medications which have NOT CHANGED    Details   metoprolol succinate (TOPROL-XL) 25 MG 24 hr tablet Take 0.5 tablets (12.5 mg total) by mouth once daily.  Qty: 30 tablet, Refills: 6    Comments: .      sacubitriL-valsartan (ENTRESTO)  mg per tablet Take 1 tablet by mouth 2 (two) times daily.  Qty: 180 tablet, Refills: 1    Associated Diagnoses: Chronic combined systolic and diastolic congestive heart failure, NYHA class 3      albuterol (PROVENTIL) 2.5 mg /3 mL (0.083 %) nebulizer solution Take 3 mLs (2.5 mg total) by nebulization every 4 to 6 hours as needed for Wheezing or Shortness of Breath.  Qty: 360 mL, Refills: 11    Comments: ChronicObstructivePulmonaryDiseaseCode:[id number]  Associated Diagnoses: Pulmonary emphysema, unspecified emphysema type; Panlobular emphysema      albuterol (PROVENTIL/VENTOLIN HFA) 90 mcg/actuation inhaler Inhale 2 puffs into the lungs every 4 (four) hours as needed for Wheezing or Shortness of Breath.  Qty: 8.5 g, Refills: 11    Comments: Dispense formulary  Associated Diagnoses: Panlobular emphysema      empagliflozin (JARDIANCE) 10 mg tablet Take 1 tablet (10 mg total) by mouth once daily.  Qty: 90 tablet, Refills: 3      ferrous sulfate 325 (65 FE) MG EC tablet Take 1 tablet (325 mg total) by mouth once daily.  Qty: 30 tablet, Refills: 1      fluticasone-umeclidin-vilanter (TRELEGY ELLIPTA) 200-62.5-25 mcg inhaler Inhale 1 puff into the lungs once daily.  Qty: 60 each, Refills: 11    Associated Diagnoses: Panlobular  emphysema      furosemide (LASIX) 40 MG tablet Take 1 tablet (40 mg total) by mouth 2 (two) times a day.  Qty: 90 tablet, Refills: 3      memantine (NAMENDA) 5 MG Tab Take 1 tablet (5 mg total) by mouth 2 (two) times daily.  Qty: 60 tablet, Refills: 11    Associated Diagnoses: Alcohol dependence with uncomplicated withdrawal; Major neurocognitive disorder due to multiple etiologies      potassium chloride SA (K-DUR,KLOR-CON M) 10 MEQ tablet Take 1 tablet (10 mEq total) by mouth once daily.  Qty: 90 tablet, Refills: 1                 Discharge Diagnosis: Primary osteoarthritis of both knees [M17.0]  Condition on Discharge: Stable with no complications to procedure   Diet on Discharge: Same as before.  Activity: as per instruction sheet.  Discharge to: Home with a responsible adult.  Follow up: 2-4 weeks       Please call the office at [phone] if you experience any weakness or loss of sensation, fever > 101.5, pain uncontrolled with oral medications, persistent nausea/vomiting/or diarrhea, redness or drainage from the incisions, or any other worrisome concerns. If physician on call was not reached or could not communicate with our office for any reason please go to the nearest emergency department

## 2023-10-18 NOTE — H&P (VIEW-ONLY)
Established Patient Chronic Pain Note (Follow up Visit)  PCP: Vivian Ch MD    Chief Complaint:   Chief Complaint   Patient presents with    Knee Pain          SUBJECTIVE:  Interval history 10/19/2023  Patient presents status post left-sided genicular cooled radiofrequency ablation 09/01/2023 and right-sided cooled radiofrequency ablation 09/15/2023.  Patient reports approximately 50-60% sustained relief in bilateral knee pain following his procedure.  He continues to report severe pain, mostly on the left, which today is rated an 8/10.  Patient reports pain is primarily along the suprapatellar and lateral aspect of both knees.  Pain is exacerbated with knee flexion, extension and with standing and with ambulation.  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  He does continue physician directed physical therapy exercises at home, over the last 6 weeks without meaningful improvement in his knee pain, range of motion or functionality.      Interval History (8/16/2023): Richy Douglas presents today for follow-up visit.  he underwent bilateral genicular block on 6/20/23.  The patient reports that he is/was better following the procedure.  he reports 80% pain relief X 2 days before pain returned.  Continues to report achy pain in both knees, left worse than right with intermittent swelling. He has now tried physical therapy, steroid injections, and gel injections without relief. Unable to take NSAIDs secondary to cardiac history.  Patient reports pain as 10/10 today.      Interval History (6/6/2023): Richy Douglas presents today for follow-up visit.  he underwent bilateral synvisc injection  on 5/10/23.  The patient reports that he is/was better following the procedure, but  he reports only 30% pain relief.  The changes lasted 4 weeks so far.  The changes have continued through this visit.  Patient reports pain as 8/10 today. Continues to report achy  pain in both knees, left worse than right with intermittent swelling. He has now tried physical therapy, steroid injections, and gel injections without relief. Unable to take NSAIDs secondary to cardiac history.    Interval History (4/24/2023): Richy Douglas presents today for follow-up visit.  he underwent  bilateral IA knee injection  on 01/31/23.  The patient reports that he is/was better following the procedure.  he reports 50% pain relief.  The changes 2 months before pain returned to baseline. Continues to report achy pain in both knees, left worse than right with intermittent swelling.  Patient reports pain as 10/10 today.    Interval History (1/11/2023):  Richy Douglas presents today for follow-up visit.  Patient was last seen on 11/16/2022. At that visit, the plan was to repeat knee injections as needed. Last injection bilateral IA knee injection with steroid on 10/21/22 with 60% relief. Patient reports pain as 9/10 today. He reports achy pain in both knees, left worse than right with intermittent swelling. He continues to work outside the home and also performs a majority of the household chores.      Interval History (11/16/2022): Richy Douglas presents today for follow-up visit.  he underwent bilateral IA knee injection with steroid on 10/21/22.  The patient reports that he is/was better following the procedure.  he reports 60% pain relief.  The changes lasted 4 weeks so far.  The changes have continued through this visit, though he notes some diminished relief in his left knee. He still works and reports the knee becomes swollen and gives out if he is up for too long.  Patient reports pain as 8/10 today. Since last visit, he has followed up with podiatry for ankle pain and right big toe pain with onychomycosis and big toe nail falling off. He also reports intermittent swelling of his left leg/ankle and intermittent shortness of breath. History of CHF, followed by cardiology, most recent visit  11/02/2022    X-ray lumbar spine 09/28/2022  FINDINGS:  Vertebral body heights maintained.  Trace anterolisthesis of L4 on L5 and retrolisthesis L1 on L2.  No significant change in alignment on extension or flexion..  Multilevel degenerative disc height loss, most severe L2-3.  Multilevel facet arthropathy and osteophyte changes.  No definite pars defects..  Atherosclerotic vascular calcification.  No acute abnormality.     Impression:     Multilevel prominent degenerative findings.    X-ray knee bilateral 09/28/2022  FINDINGS:  Bilateral arthritic changes present including moderate to severe right knee lateral compartment and left knee medial compartment joint space narrowing.  Bilateral chondrocalcinosis noted.  No acute abnormality appreciated.     Initial HPI 09/28/2022  Richy Douglas is a 83 y.o. male with past medical history significant for alcohol dependence, lung emphysema, aortic aneurysm, atrial fibrillation, systolic congestive heart failure, hypertension, hyperlipidemia, anemia of chronic disease, GERD, nicotine dependence who presents to the clinic for the evaluation of bilateral knee pain.  Patient reports knee pain is in present since April 2022 without inciting accident injury or trauma.  Today patient reports pain is rated a 7/10 and is constant.  Patient reports pain in the popliteal fossa as well as pain which radiates distally to the dorsum of the foot in L4 distribution.  Patient reports associated superficial swelling along the suprapatellar aspect of bilateral knees.  Pain is exacerbated with knee flexion, extension, moving from sitting to standing and with ambulation.  Patient does endorse associated weakness in the lower extremities associated with his pain.  Of note patient reports prior lower back surgery [facility] with improvement in lower back and radicular pain.  Patient has not tried membrane stabilizing agents or interventions at this time.  Patient has tried physician  directed physical therapy exercises at home without significant relief.  Patient also reports right big toe pain with onychomycosis and big toe nail falling off.    Patient reports significant motor weakness.  Patient denies night fever/night sweats, urinary incontinence, bowel incontinence, significant weight loss, and loss of sensations.      Pain Disability Index Review:         10/19/2023    10:54 AM 8/16/2023    12:17 PM 9/28/2022     7:06 AM   Last 3 PDI Scores   Pain Disability Index (PDI) 64 70 42       Non-Pharmacologic Treatments:  Physical Therapy/Home Exercise: yes  Ice/Heat:no  TENS: no  Acupuncture: no  Massage: no  Chiropractic: no    Other: no      Pain Medications:  - Adjuvant Medications: Neurontin (Gabapentin)    Pain Procedures:   -10/21/2022: bilateral IA knee injection with steroid with 60% relief  -01/31/2023: bilateral IA knee injection with 50% relief  -05/10/2023: bilateral synvisc injection with 30% relief  -06/20/2023: bilateral genicular block with 80% relief  -09/01/2023: Left-sided cooled genicular radiofrequency ablation  -09/15/2023: Right-sided cooled genicular radiofrequency ablation    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    Anemia of chronic disease     Aortic aneurysm     Atrial fibrillation with RVR 09/24/2021    Chronic systolic congestive heart failure 08/31/2017    Emphysema of lung 08/31/2017    GERD (gastroesophageal reflux disease)     Hypertension     Knee osteoarthritis 10/21/2022    Major neurocognitive disorder due to multiple etiologies 4/14/2023    Microcytic anemia 11/24/2020    Other hyperlipidemia 04/27/2021    Personal history of colonic polyps 2022    Prostate cancer      Past Surgical History:   Procedure Laterality Date    CATHETERIZATION OF BOTH LEFT AND RIGHT HEART N/A 1/13/2022    Procedure: CATHETERIZATION, HEART, BOTH LEFT AND RIGHT;  Surgeon: Janneth Tovar MD;  Location: [facility] CATH LAB;  Service:  Cardiology;  Laterality: N/A;  poss cx    COLONOSCOPY      COLONOSCOPY N/A 6/30/2022    Procedure: COLONOSCOPY;  Surgeon: Sandra Perez MD;  Location: [facility] ENDO;  Service: Endoscopy;  Laterality: N/A;    COLONOSCOPY N/A 7/1/2022    Procedure: COLONOSCOPY;  Surgeon: Woodrow Christian MD;  Location: [facility] ENDO;  Service: Endoscopy;  Laterality: N/A;    CORONARY ANGIOGRAPHY N/A 1/13/2022    Procedure: ANGIOGRAM, CORONARY ARTERY;  Surgeon: Janneth Tvoar MD;  Location: [facility] CATH LAB;  Service: Cardiology;  Laterality: N/A;    ESOPHAGOGASTRODUODENOSCOPY N/A 6/30/2022    Procedure: EGD (ESOPHAGOGASTRODUODENOSCOPY);  Surgeon: Sandra Perez MD;  Location: [facility] ENDO;  Service: Endoscopy;  Laterality: N/A;    INJECTION OF ANESTHETIC AGENT AROUND NERVE Bilateral 6/20/2023    Procedure: Bilateral Genicular nerve block RN IV Sedation;  Surgeon: Devon Grant MD;  Location: [facility];  Service: Pain Management;  Laterality: Bilateral;    INJECTION OF JOINT Bilateral 10/21/2022    Procedure: Bilateral intraarticular knee injection with local ALLERGIC TO IODINE;  Surgeon: Devon Grant MD;  Location: [facility];  Service: Pain Management;  Laterality: Bilateral;    INJECTION OF JOINT Bilateral 1/31/2023    Procedure: Bilateral IA knee joint injection with steroid RN IV Sedation;  Surgeon: Devon Grant MD;  Location: [facility];  Service: Pain Management;  Laterality: Bilateral;    INJECTION OF JOINT Bilateral 5/10/2023    Procedure: Bilateral IA knee joint injection Synvisc RN IV Sedation;  Surgeon: Devon Grant MD;  Location: [facility];  Service: Pain Management;  Laterality: Bilateral;    PROSTATE SURGERY      RADIOFREQUENCY THERMOCOAGULATION Left 9/1/2023    Procedure: Left Genicular Nerve RFA;  Surgeon: Devon Grant MD;  Location: [facility];  Service: Pain Management;  Laterality: Left;    RADIOFREQUENCY THERMOCOAGULATION Right 9/15/2023    Procedure: Right Genicular Nerve RFA RN IV  Sedation;  Surgeon: Devon Grant MD;  Location: [facility];  Service: Pain Management;  Laterality: Right;    SPINE SURGERY       Review of patient's allergies indicates:   Allergen Reactions    Fish containing products     Iodine and iodide containing products     Shellfish containing products        Current Outpatient Medications   Medication Sig    albuterol (PROVENTIL) 2.5 mg /3 mL (0.083 %) nebulizer solution Take 3 mLs (2.5 mg total) by nebulization every 4 to 6 hours as needed for Wheezing or Shortness of Breath.    albuterol (PROVENTIL/VENTOLIN HFA) 90 mcg/actuation inhaler Inhale 2 puffs into the lungs every 4 (four) hours as needed for Wheezing or Shortness of Breath.    empagliflozin (JARDIANCE) 10 mg tablet Take 1 tablet (10 mg total) by mouth once daily.    ferrous sulfate 325 (65 FE) MG EC tablet Take 1 tablet (325 mg total) by mouth once daily.    fluticasone-umeclidin-vilanter (TRELEGY ELLIPTA) 200-62.5-25 mcg inhaler Inhale 1 puff into the lungs once daily.    furosemide (LASIX) 40 MG tablet Take 1 tablet (40 mg total) by mouth 2 (two) times a day.    memantine (NAMENDA) 5 MG Tab Take 1 tablet (5 mg total) by mouth 2 (two) times daily.    metoprolol succinate (TOPROL-XL) 25 MG 24 hr tablet Take 0.5 tablets (12.5 mg total) by mouth once daily.    potassium chloride SA (K-DUR,KLOR-CON M) 10 MEQ tablet Take 1 tablet (10 mEq total) by mouth once daily.    sacubitriL-valsartan (ENTRESTO)  mg per tablet Take 1 tablet by mouth 2 (two) times daily.     No current facility-administered medications for this visit.       Review of Systems     GENERAL:  No weight loss, malaise or fevers.  HEENT:   No recent changes in vision or hearing  NECK:  Negative for lumps, no difficulty with swallowing.  RESPIRATORY:  Negative for cough, wheezing or shortness of breath, patient denies any recent URI.  CARDIOVASCULAR:  Negative for chest pain or palpitations.  GI:  Negative for abdominal discomfort, blood in  "stools or black stools or change in bowel habits.  MUSCULOSKELETAL:  See HPI.  SKIN:  Negative for lesions, rash, and itching.  PSYCH:  No mood disorder or recent psychosocial stressors.   HEMATOLOGY/LYMPHOLOGY:  Negative for prolonged bleeding, bruising easily or swollen nodes.    NEURO:   No history of syncope, paralysis, seizures or tremors.  All other reviewed and negative other than HPI.    OBJECTIVE:    BP (!) 125/54   Pulse 89   Ht 5' 10" (1.778 m)   Wt 72.1 kg (159 lb 1 oz)   BMI 22.82 kg/m²       Physical Exam    GENERAL: Well appearing, in no acute distress, alert and oriented x3.  PSYCH:  Mood and affect appropriate.  SKIN: Skin color, texture, turgor normal, no rashes or lesions.  HEAD/FACE:  Normocephalic, atraumatic. Cranial nerves grossly intact.    CV: RRR with palpation of the radial artery.  PULM: No evidence of respiratory difficulty, symmetric chest rise.  GI:  Soft and non-tender.    BACK:  Positive shopping cart sign.  Well-healed 6 in lower lumbar vertical midline incision.  Straight leg raising in the sitting and supine positions is negative to radicular pain. No pain to palpation over the facet joints of the lumbar spine or spinous processes. Normal range of motion without pain reproduction.  EXTREMITIES: Peripheral joint ROM is reduced with pain.  No deformities, edema, or skin discoloration. Good capillary refill.  MUSCULOSKELETAL: Able to stand on heels & toes.   hip, and knee provocative maneuvers are negative.  There is no pain with palpation over the sacroiliac joints bilaterally.  Gaenslen's, Distraction/Compression and  FABERs test is negative.  Facet loading test is negative bilaterally.   Bilateral upper and lower extremity strength is normal and symmetric.  No atrophy or tone abnormalities are noted.    RIGHT Lower extremity: Hip flexion 5/5, Hip Abduction 5/5, Hip Adduction 5/5, Knee extension 5/5, Knee flexion 5/5, Ankle dorsiflexion5/5, Extensor hallucis longus 5/5, Ankle " plantarflexion 5/5  LEFT Lower extremity:  Hip flexion 5/5, Hip Abduction 5/5,Hip Adduction 5/5, Knee extension 5/5, Knee flexion 5/5, Ankle dorsiflexion 5/5, Extensor hallucis longus 5/5, Ankle plantarflexion 5/5  -Normal testing knee (patellar) jerk and ankle (achilles) jerk    NEURO: Bilateral upper and lower extremity coordination and muscle stretch reflexes are physiologic and symmetric. No loss of sensation is noted.    Knee Exam:  bilateral    Erythema: Absent  Deformity/Swelling: Present  Effusion: Absent  Chronic Bony Changes: Present  Creptius: Present     milddiffuse tenderness palpation of the mediolateral joint lines and patella     ROM: full range with pain     Strength is 5/5 bilateral  Bilateral Non-pitting edema to lower extremities, left >> right     Neurovascular intact    GAIT:  Antalgic    Imaging:   X-ray lumbar spine 09/28/2022  FINDINGS:  Vertebral body heights maintained.  Trace anterolisthesis of L4 on L5 and retrolisthesis L1 on L2.  No significant change in alignment on extension or flexion..  Multilevel degenerative disc height loss, most severe L2-3.  Multilevel facet arthropathy and osteophyte changes.  No definite pars defects..  Atherosclerotic vascular calcification.  No acute abnormality.     Impression:     Multilevel prominent degenerative findings.    X-ray knee bilateral 09/28/2022  FINDINGS:  Bilateral arthritic changes present including moderate to severe right knee lateral compartment and left knee medial compartment joint space narrowing.  Bilateral chondrocalcinosis noted.  No acute abnormality appreciated.        ASSESSMENT: 83 y.o. year old male with knee and leg pain, consistent with     1. Primary osteoarthritis of both knees  IR Aspiration Injection Large Joint W FL    IR Aspiration Injection Large Joint W FL    Case Request-RAD/Other Procedure Area: Bilateral intraarticular knee injection      2. Chronic pain of both knees  IR Aspiration Injection Large Joint W FL     IR Aspiration Injection Large Joint W FL    Case Request-RAD/Other Procedure Area: Bilateral intraarticular knee injection            PLAN:   - Interventions:  Schedule for bilateral intra-articular knee injection with corticosteroid to see if this further compounds pain relief in bilateral knees.  We discussed the procedure, benefits, potential risk and alternative options in detail.  Patient has elected to pursue this procedure.    - Anticoagulation use: no no anticoagulation     report:  Reviewed and consistent with medication use as prescribed.    - Medications:  -I will start the patient on a prescription compound cream to see if this helps with their pain.  Compound medication will include flurbiprofen 10%, baclofen 2%, cyclobenzaprine 2%, gabapentin 6%, orphenadrine 5%, tetracaine 2% with ketamine 30% transdermal gel for anti-inflammatory, neuropathic and anesthetic properties.  Patient can apply this medication 2-4 times daily to painful areas.      - Therapy:   -We discussed continuing physician directed at home physical therapy to help manage the patient/s painful condition. The patient was counseled that muscle strengthening will improve the long term prognosis in regards to pain and may also help increase range of motion and mobility.     - Imaging: Reviewed bilateral knee and lumbar x-ray with patient and answered any questions they had regarding study.        - Records: Obtain old records from outside physicians and imaging:  [facility]:  Lumbar spine surgery    - Follow up visit: return to clinic in 4 weeks after injection      The above plan and management options were discussed at length with patient. Patient is in agreement with the above and verbalized understanding.    - I discussed the goals of interventional chronic pain management with the patient on today's visit. We discussed a multimodal and systematic approach to pain.  This includes diagnostic and therapeutic injections,  adjuvant pharmacologic treatment, physical therapy, and at times psychiatry.  I emphasized the importance of regular exercise, core strengthening and stretching, diet and weight loss as a cornerstone of long-term pain management.    - This condition does not require this patient to take time off of work, and the primary goal of our Pain Management services is to improve the patient's functional capacity.  - Patient Questions: Answered all of the patient's questions regarding diagnoses, therapy, treatment and next steps      Devon Grant MD  Interventional Pain Management  [facility]    Disclaimer:  This note was prepared using voice recognition system and is likely to have sound alike errors that may have been overlooked even after proof reading.  Please call me with any questions

## 2023-10-18 NOTE — PROGRESS NOTES
Established Patient Chronic Pain Note (Follow up Visit)  PCP: Vivian Ch MD    Chief Complaint:   Chief Complaint   Patient presents with    Knee Pain          SUBJECTIVE:  Interval history 10/19/2023  Patient presents status post left-sided genicular cooled radiofrequency ablation 09/01/2023 and right-sided cooled radiofrequency ablation 09/15/2023.  Patient reports approximately 50-60% sustained relief in bilateral knee pain following his procedure.  He continues to report severe pain, mostly on the left, which today is rated an 8/10.  Patient reports pain is primarily along the suprapatellar and lateral aspect of both knees.  Pain is exacerbated with knee flexion, extension and with standing and with ambulation.  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  He does continue physician directed physical therapy exercises at home, over the last 6 weeks without meaningful improvement in his knee pain, range of motion or functionality.      Interval History (8/16/2023): Richy Douglas presents today for follow-up visit.  he underwent bilateral genicular block on 6/20/23.  The patient reports that he is/was better following the procedure.  he reports 80% pain relief X 2 days before pain returned.  Continues to report achy pain in both knees, left worse than right with intermittent swelling. He has now tried physical therapy, steroid injections, and gel injections without relief. Unable to take NSAIDs secondary to cardiac history.  Patient reports pain as 10/10 today.      Interval History (6/6/2023): Richy Douglas presents today for follow-up visit.  he underwent bilateral synvisc injection  on 5/10/23.  The patient reports that he is/was better following the procedure, but  he reports only 30% pain relief.  The changes lasted 4 weeks so far.  The changes have continued through this visit.  Patient reports pain as 8/10 today. Continues to report achy  pain in both knees, left worse than right with intermittent swelling. He has now tried physical therapy, steroid injections, and gel injections without relief. Unable to take NSAIDs secondary to cardiac history.    Interval History (4/24/2023): Richy Douglas presents today for follow-up visit.  he underwent  bilateral IA knee injection  on 01/31/23.  The patient reports that he is/was better following the procedure.  he reports 50% pain relief.  The changes 2 months before pain returned to baseline. Continues to report achy pain in both knees, left worse than right with intermittent swelling.  Patient reports pain as 10/10 today.    Interval History (1/11/2023):  Richy Douglas presents today for follow-up visit.  Patient was last seen on 11/16/2022. At that visit, the plan was to repeat knee injections as needed. Last injection bilateral IA knee injection with steroid on 10/21/22 with 60% relief. Patient reports pain as 9/10 today. He reports achy pain in both knees, left worse than right with intermittent swelling. He continues to work outside the home and also performs a majority of the household chores.      Interval History (11/16/2022): Richy Douglas presents today for follow-up visit.  he underwent bilateral IA knee injection with steroid on 10/21/22.  The patient reports that he is/was better following the procedure.  he reports 60% pain relief.  The changes lasted 4 weeks so far.  The changes have continued through this visit, though he notes some diminished relief in his left knee. He still works and reports the knee becomes swollen and gives out if he is up for too long.  Patient reports pain as 8/10 today. Since last visit, he has followed up with podiatry for ankle pain and right big toe pain with onychomycosis and big toe nail falling off. He also reports intermittent swelling of his left leg/ankle and intermittent shortness of breath. History of CHF, followed by cardiology, most recent visit  11/02/2022    X-ray lumbar spine 09/28/2022  FINDINGS:  Vertebral body heights maintained.  Trace anterolisthesis of L4 on L5 and retrolisthesis L1 on L2.  No significant change in alignment on extension or flexion..  Multilevel degenerative disc height loss, most severe L2-3.  Multilevel facet arthropathy and osteophyte changes.  No definite pars defects..  Atherosclerotic vascular calcification.  No acute abnormality.     Impression:     Multilevel prominent degenerative findings.    X-ray knee bilateral 09/28/2022  FINDINGS:  Bilateral arthritic changes present including moderate to severe right knee lateral compartment and left knee medial compartment joint space narrowing.  Bilateral chondrocalcinosis noted.  No acute abnormality appreciated.     Initial HPI 09/28/2022  Richy Douglas is a 83 y.o. male with past medical history significant for alcohol dependence, lung emphysema, aortic aneurysm, atrial fibrillation, systolic congestive heart failure, hypertension, hyperlipidemia, anemia of chronic disease, GERD, nicotine dependence who presents to the clinic for the evaluation of bilateral knee pain.  Patient reports knee pain is in present since April 2022 without inciting accident injury or trauma.  Today patient reports pain is rated a 7/10 and is constant.  Patient reports pain in the popliteal fossa as well as pain which radiates distally to the dorsum of the foot in L4 distribution.  Patient reports associated superficial swelling along the suprapatellar aspect of bilateral knees.  Pain is exacerbated with knee flexion, extension, moving from sitting to standing and with ambulation.  Patient does endorse associated weakness in the lower extremities associated with his pain.  Of note patient reports prior lower back surgery [facility] with improvement in lower back and radicular pain.  Patient has not tried membrane stabilizing agents or interventions at this time.  Patient has tried physician  directed physical therapy exercises at home without significant relief.  Patient also reports right big toe pain with onychomycosis and big toe nail falling off.    Patient reports significant motor weakness.  Patient denies night fever/night sweats, urinary incontinence, bowel incontinence, significant weight loss, and loss of sensations.      Pain Disability Index Review:         10/19/2023    10:54 AM 8/16/2023    12:17 PM 9/28/2022     7:06 AM   Last 3 PDI Scores   Pain Disability Index (PDI) 64 70 42       Non-Pharmacologic Treatments:  Physical Therapy/Home Exercise: yes  Ice/Heat:no  TENS: no  Acupuncture: no  Massage: no  Chiropractic: no    Other: no      Pain Medications:  - Adjuvant Medications: Neurontin (Gabapentin)    Pain Procedures:   -10/21/2022: bilateral IA knee injection with steroid with 60% relief  -01/31/2023: bilateral IA knee injection with 50% relief  -05/10/2023: bilateral synvisc injection with 30% relief  -06/20/2023: bilateral genicular block with 80% relief  -09/01/2023: Left-sided cooled genicular radiofrequency ablation  -09/15/2023: Right-sided cooled genicular radiofrequency ablation    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    Anemia of chronic disease     Aortic aneurysm     Atrial fibrillation with RVR 09/24/2021    Chronic systolic congestive heart failure 08/31/2017    Emphysema of lung 08/31/2017    GERD (gastroesophageal reflux disease)     Hypertension     Knee osteoarthritis 10/21/2022    Major neurocognitive disorder due to multiple etiologies 4/14/2023    Microcytic anemia 11/24/2020    Other hyperlipidemia 04/27/2021    Personal history of colonic polyps 2022    Prostate cancer      Past Surgical History:   Procedure Laterality Date    CATHETERIZATION OF BOTH LEFT AND RIGHT HEART N/A 1/13/2022    Procedure: CATHETERIZATION, HEART, BOTH LEFT AND RIGHT;  Surgeon: Janneth Tovar MD;  Location: [facility] CATH LAB;  Service:  Cardiology;  Laterality: N/A;  poss cx    COLONOSCOPY      COLONOSCOPY N/A 6/30/2022    Procedure: COLONOSCOPY;  Surgeon: Sandra Perez MD;  Location: [facility] ENDO;  Service: Endoscopy;  Laterality: N/A;    COLONOSCOPY N/A 7/1/2022    Procedure: COLONOSCOPY;  Surgeon: Woodrow Christian MD;  Location: [facility] ENDO;  Service: Endoscopy;  Laterality: N/A;    CORONARY ANGIOGRAPHY N/A 1/13/2022    Procedure: ANGIOGRAM, CORONARY ARTERY;  Surgeon: Janneth Tovar MD;  Location: [facility] CATH LAB;  Service: Cardiology;  Laterality: N/A;    ESOPHAGOGASTRODUODENOSCOPY N/A 6/30/2022    Procedure: EGD (ESOPHAGOGASTRODUODENOSCOPY);  Surgeon: Sandra Perez MD;  Location: [facility] ENDO;  Service: Endoscopy;  Laterality: N/A;    INJECTION OF ANESTHETIC AGENT AROUND NERVE Bilateral 6/20/2023    Procedure: Bilateral Genicular nerve block RN IV Sedation;  Surgeon: Devon Grant MD;  Location: [facility];  Service: Pain Management;  Laterality: Bilateral;    INJECTION OF JOINT Bilateral 10/21/2022    Procedure: Bilateral intraarticular knee injection with local ALLERGIC TO IODINE;  Surgeon: Devon Grant MD;  Location: [facility];  Service: Pain Management;  Laterality: Bilateral;    INJECTION OF JOINT Bilateral 1/31/2023    Procedure: Bilateral IA knee joint injection with steroid RN IV Sedation;  Surgeon: Devon Grant MD;  Location: [facility];  Service: Pain Management;  Laterality: Bilateral;    INJECTION OF JOINT Bilateral 5/10/2023    Procedure: Bilateral IA knee joint injection Synvisc RN IV Sedation;  Surgeon: Devon Grant MD;  Location: [facility];  Service: Pain Management;  Laterality: Bilateral;    PROSTATE SURGERY      RADIOFREQUENCY THERMOCOAGULATION Left 9/1/2023    Procedure: Left Genicular Nerve RFA;  Surgeon: Devon Grant MD;  Location: [facility];  Service: Pain Management;  Laterality: Left;    RADIOFREQUENCY THERMOCOAGULATION Right 9/15/2023    Procedure: Right Genicular Nerve RFA RN IV  Sedation;  Surgeon: Devon Grant MD;  Location: [facility];  Service: Pain Management;  Laterality: Right;    SPINE SURGERY       Review of patient's allergies indicates:   Allergen Reactions    Fish containing products     Iodine and iodide containing products     Shellfish containing products        Current Outpatient Medications   Medication Sig    albuterol (PROVENTIL) 2.5 mg /3 mL (0.083 %) nebulizer solution Take 3 mLs (2.5 mg total) by nebulization every 4 to 6 hours as needed for Wheezing or Shortness of Breath.    albuterol (PROVENTIL/VENTOLIN HFA) 90 mcg/actuation inhaler Inhale 2 puffs into the lungs every 4 (four) hours as needed for Wheezing or Shortness of Breath.    empagliflozin (JARDIANCE) 10 mg tablet Take 1 tablet (10 mg total) by mouth once daily.    ferrous sulfate 325 (65 FE) MG EC tablet Take 1 tablet (325 mg total) by mouth once daily.    fluticasone-umeclidin-vilanter (TRELEGY ELLIPTA) 200-62.5-25 mcg inhaler Inhale 1 puff into the lungs once daily.    furosemide (LASIX) 40 MG tablet Take 1 tablet (40 mg total) by mouth 2 (two) times a day.    memantine (NAMENDA) 5 MG Tab Take 1 tablet (5 mg total) by mouth 2 (two) times daily.    metoprolol succinate (TOPROL-XL) 25 MG 24 hr tablet Take 0.5 tablets (12.5 mg total) by mouth once daily.    potassium chloride SA (K-DUR,KLOR-CON M) 10 MEQ tablet Take 1 tablet (10 mEq total) by mouth once daily.    sacubitriL-valsartan (ENTRESTO)  mg per tablet Take 1 tablet by mouth 2 (two) times daily.     No current facility-administered medications for this visit.       Review of Systems     GENERAL:  No weight loss, malaise or fevers.  HEENT:   No recent changes in vision or hearing  NECK:  Negative for lumps, no difficulty with swallowing.  RESPIRATORY:  Negative for cough, wheezing or shortness of breath, patient denies any recent URI.  CARDIOVASCULAR:  Negative for chest pain or palpitations.  GI:  Negative for abdominal discomfort, blood in  "stools or black stools or change in bowel habits.  MUSCULOSKELETAL:  See HPI.  SKIN:  Negative for lesions, rash, and itching.  PSYCH:  No mood disorder or recent psychosocial stressors.   HEMATOLOGY/LYMPHOLOGY:  Negative for prolonged bleeding, bruising easily or swollen nodes.    NEURO:   No history of syncope, paralysis, seizures or tremors.  All other reviewed and negative other than HPI.    OBJECTIVE:    BP (!) 125/54   Pulse 89   Ht 5' 10" (1.778 m)   Wt 72.1 kg (159 lb 1 oz)   BMI 22.82 kg/m²       Physical Exam    GENERAL: Well appearing, in no acute distress, alert and oriented x3.  PSYCH:  Mood and affect appropriate.  SKIN: Skin color, texture, turgor normal, no rashes or lesions.  HEAD/FACE:  Normocephalic, atraumatic. Cranial nerves grossly intact.    CV: RRR with palpation of the radial artery.  PULM: No evidence of respiratory difficulty, symmetric chest rise.  GI:  Soft and non-tender.    BACK:  Positive shopping cart sign.  Well-healed 6 in lower lumbar vertical midline incision.  Straight leg raising in the sitting and supine positions is negative to radicular pain. No pain to palpation over the facet joints of the lumbar spine or spinous processes. Normal range of motion without pain reproduction.  EXTREMITIES: Peripheral joint ROM is reduced with pain.  No deformities, edema, or skin discoloration. Good capillary refill.  MUSCULOSKELETAL: Able to stand on heels & toes.   hip, and knee provocative maneuvers are negative.  There is no pain with palpation over the sacroiliac joints bilaterally.  Gaenslen's, Distraction/Compression and  FABERs test is negative.  Facet loading test is negative bilaterally.   Bilateral upper and lower extremity strength is normal and symmetric.  No atrophy or tone abnormalities are noted.    RIGHT Lower extremity: Hip flexion 5/5, Hip Abduction 5/5, Hip Adduction 5/5, Knee extension 5/5, Knee flexion 5/5, Ankle dorsiflexion5/5, Extensor hallucis longus 5/5, Ankle " plantarflexion 5/5  LEFT Lower extremity:  Hip flexion 5/5, Hip Abduction 5/5,Hip Adduction 5/5, Knee extension 5/5, Knee flexion 5/5, Ankle dorsiflexion 5/5, Extensor hallucis longus 5/5, Ankle plantarflexion 5/5  -Normal testing knee (patellar) jerk and ankle (achilles) jerk    NEURO: Bilateral upper and lower extremity coordination and muscle stretch reflexes are physiologic and symmetric. No loss of sensation is noted.    Knee Exam:  bilateral    Erythema: Absent  Deformity/Swelling: Present  Effusion: Absent  Chronic Bony Changes: Present  Creptius: Present     milddiffuse tenderness palpation of the mediolateral joint lines and patella     ROM: full range with pain     Strength is 5/5 bilateral  Bilateral Non-pitting edema to lower extremities, left >> right     Neurovascular intact    GAIT:  Antalgic    Imaging:   X-ray lumbar spine 09/28/2022  FINDINGS:  Vertebral body heights maintained.  Trace anterolisthesis of L4 on L5 and retrolisthesis L1 on L2.  No significant change in alignment on extension or flexion..  Multilevel degenerative disc height loss, most severe L2-3.  Multilevel facet arthropathy and osteophyte changes.  No definite pars defects..  Atherosclerotic vascular calcification.  No acute abnormality.     Impression:     Multilevel prominent degenerative findings.    X-ray knee bilateral 09/28/2022  FINDINGS:  Bilateral arthritic changes present including moderate to severe right knee lateral compartment and left knee medial compartment joint space narrowing.  Bilateral chondrocalcinosis noted.  No acute abnormality appreciated.        ASSESSMENT: 83 y.o. year old male with knee and leg pain, consistent with     1. Primary osteoarthritis of both knees  IR Aspiration Injection Large Joint W FL    IR Aspiration Injection Large Joint W FL    Case Request-RAD/Other Procedure Area: Bilateral intraarticular knee injection      2. Chronic pain of both knees  IR Aspiration Injection Large Joint W FL     IR Aspiration Injection Large Joint W FL    Case Request-RAD/Other Procedure Area: Bilateral intraarticular knee injection            PLAN:   - Interventions:  Schedule for bilateral intra-articular knee injection with corticosteroid to see if this further compounds pain relief in bilateral knees.  We discussed the procedure, benefits, potential risk and alternative options in detail.  Patient has elected to pursue this procedure.    - Anticoagulation use: no no anticoagulation     report:  Reviewed and consistent with medication use as prescribed.    - Medications:  -I will start the patient on a prescription compound cream to see if this helps with their pain.  Compound medication will include flurbiprofen 10%, baclofen 2%, cyclobenzaprine 2%, gabapentin 6%, orphenadrine 5%, tetracaine 2% with ketamine 30% transdermal gel for anti-inflammatory, neuropathic and anesthetic properties.  Patient can apply this medication 2-4 times daily to painful areas.      - Therapy:   -We discussed continuing physician directed at home physical therapy to help manage the patient/s painful condition. The patient was counseled that muscle strengthening will improve the long term prognosis in regards to pain and may also help increase range of motion and mobility.     - Imaging: Reviewed bilateral knee and lumbar x-ray with patient and answered any questions they had regarding study.        - Records: Obtain old records from outside physicians and imaging:  [facility]:  Lumbar spine surgery    - Follow up visit: return to clinic in 4 weeks after injection      The above plan and management options were discussed at length with patient. Patient is in agreement with the above and verbalized understanding.    - I discussed the goals of interventional chronic pain management with the patient on today's visit. We discussed a multimodal and systematic approach to pain.  This includes diagnostic and therapeutic injections,  adjuvant pharmacologic treatment, physical therapy, and at times psychiatry.  I emphasized the importance of regular exercise, core strengthening and stretching, diet and weight loss as a cornerstone of long-term pain management.    - This condition does not require this patient to take time off of work, and the primary goal of our Pain Management services is to improve the patient's functional capacity.  - Patient Questions: Answered all of the patient's questions regarding diagnoses, therapy, treatment and next steps      Devon Grant MD  Interventional Pain Management  [facility]    Disclaimer:  This note was prepared using voice recognition system and is likely to have sound alike errors that may have been overlooked even after proof reading.  Please call me with any questions

## 2023-10-19 ENCOUNTER — OFFICE VISIT (OUTPATIENT)
Dept: PAIN MEDICINE | Facility: CLINIC | Age: 83
End: 2023-10-19
Payer: MEDICARE

## 2023-10-19 VITALS
BODY MASS INDEX: 22.77 KG/M2 | SYSTOLIC BLOOD PRESSURE: 125 MMHG | HEIGHT: 70 IN | HEART RATE: 89 BPM | DIASTOLIC BLOOD PRESSURE: 54 MMHG | WEIGHT: 159.06 LBS

## 2023-10-19 DIAGNOSIS — G89.29 CHRONIC PAIN OF BOTH KNEES: ICD-10-CM

## 2023-10-19 DIAGNOSIS — M25.562 CHRONIC PAIN OF BOTH KNEES: ICD-10-CM

## 2023-10-19 DIAGNOSIS — M25.561 CHRONIC PAIN OF BOTH KNEES: ICD-10-CM

## 2023-10-19 DIAGNOSIS — M17.0 PRIMARY OSTEOARTHRITIS OF BOTH KNEES: Primary | ICD-10-CM

## 2023-10-19 PROCEDURE — 99999 PR PBB SHADOW E&M-EST. PATIENT-LVL IV: CPT | Mod: PBBFAC,,, | Performed by: ANESTHESIOLOGY

## 2023-10-19 PROCEDURE — 99999 PR PBB SHADOW E&M-EST. PATIENT-LVL IV: ICD-10-PCS | Mod: PBBFAC,,, | Performed by: ANESTHESIOLOGY

## 2023-10-19 PROCEDURE — 99214 PR OFFICE/OUTPT VISIT, EST, LEVL IV, 30-39 MIN: ICD-10-PCS | Mod: S$PBB,,, | Performed by: ANESTHESIOLOGY

## 2023-10-19 PROCEDURE — 99214 OFFICE O/P EST MOD 30 MIN: CPT | Mod: S$PBB,,, | Performed by: ANESTHESIOLOGY

## 2023-10-19 PROCEDURE — 99214 OFFICE O/P EST MOD 30 MIN: CPT | Mod: PBBFAC | Performed by: ANESTHESIOLOGY

## 2023-10-31 ENCOUNTER — PATIENT MESSAGE (OUTPATIENT)
Dept: PAIN MEDICINE | Facility: HOSPITAL | Age: 83
End: 2023-10-31
Payer: MEDICARE

## 2023-11-01 NOTE — PRE-PROCEDURE INSTRUCTIONS
Spoke with patient regarding procedure scheduled on 11.7     Arrival time 0630     Has patient been sick with fever or on antibiotics within the last 7 days? No     Does the patient have any open wounds, sores or rashes? No     Does the patient have any recent fractures? no     Has patient received a vaccination within the last 7 days? No     Received the COVID vaccination? yes     Has the patient stopped all medications as directed? na     Does patient have a pacemaker, defibrillator, or implantable stimulator? No     Does the patient have a ride to and from procedure and someone reliable to remain with patient?       Is the patient diabetic? no     Does the patient have sleep apnea? Or use O2 at home? No and no     Is the patient receiving sedation? yes     Is the patient instructed to remain NPO beginning at midnight the night before their procedure? yes     Procedure location confirmed with patient? Yes     Covid- Denies signs/symptoms. Instructed to notify PAT/MD if any changes.

## 2023-11-07 ENCOUNTER — HOSPITAL ENCOUNTER (OUTPATIENT)
Facility: HOSPITAL | Age: 83
Discharge: HOME | End: 2023-11-07
Attending: ANESTHESIOLOGY | Admitting: ANESTHESIOLOGY
Payer: MEDICARE

## 2023-11-07 VITALS
RESPIRATION RATE: 16 BRPM | BODY MASS INDEX: 23.86 KG/M2 | TEMPERATURE: 97 F | HEIGHT: 70 IN | OXYGEN SATURATION: 100 % | SYSTOLIC BLOOD PRESSURE: 137 MMHG | HEART RATE: 78 BPM | WEIGHT: 166.69 LBS | DIASTOLIC BLOOD PRESSURE: 67 MMHG

## 2023-11-07 DIAGNOSIS — M17.9 KNEE OSTEOARTHRITIS: ICD-10-CM

## 2023-11-07 PROCEDURE — A9585 GADOBUTROL INJECTION: HCPCS | Performed by: ANESTHESIOLOGY

## 2023-11-07 PROCEDURE — 20610 DRAIN/INJ JOINT/BURSA W/O US: CPT | Mod: 50 | Performed by: ANESTHESIOLOGY

## 2023-11-07 PROCEDURE — 25500020 PHARM REV CODE 255: Performed by: ANESTHESIOLOGY

## 2023-11-07 PROCEDURE — 20610 PR DRAIN/INJECT LARGE JOINT/BURSA: ICD-10-PCS | Mod: 50,,, | Performed by: ANESTHESIOLOGY

## 2023-11-07 PROCEDURE — 25000003 PHARM REV CODE 250: Performed by: ANESTHESIOLOGY

## 2023-11-07 PROCEDURE — 20610 DRAIN/INJ JOINT/BURSA W/O US: CPT | Mod: 50,,, | Performed by: ANESTHESIOLOGY

## 2023-11-07 PROCEDURE — 63600175 PHARM REV CODE 636 W HCPCS: Performed by: ANESTHESIOLOGY

## 2023-11-07 RX ORDER — BUPIVACAINE HYDROCHLORIDE 2.5 MG/ML
INJECTION, SOLUTION EPIDURAL; INFILTRATION; INTRACAUDAL
Status: DISCONTINUED | OUTPATIENT
Start: 2023-11-07 | End: 2023-11-07 | Stop reason: HOSPADM

## 2023-11-07 RX ORDER — SODIUM BICARBONATE 1 MEQ/ML
SYRINGE (ML) INTRAVENOUS
Status: DISCONTINUED | OUTPATIENT
Start: 2023-11-07 | End: 2023-11-07 | Stop reason: HOSPADM

## 2023-11-07 RX ORDER — GADOBUTROL 604.72 MG/ML
INJECTION INTRAVENOUS
Status: DISCONTINUED | OUTPATIENT
Start: 2023-11-07 | End: 2023-11-07 | Stop reason: HOSPADM

## 2023-11-07 RX ORDER — TRIAMCINOLONE ACETONIDE 40 MG/ML
INJECTION, SUSPENSION INTRA-ARTICULAR; INTRAMUSCULAR
Status: DISCONTINUED | OUTPATIENT
Start: 2023-11-07 | End: 2023-11-07 | Stop reason: HOSPADM

## 2023-11-07 NOTE — DISCHARGE SUMMARY
Discharge Note  Short Stay      SUMMARY     Admit Date: 11/7/2023    Attending Physician: Devon Grant MD        Discharge Physician: Devon Grant MD        Discharge Date: 11/7/2023 10:06 AM    Procedure(s) (LRB):  Bilateral intraarticular knee injection (Bilateral)    Final Diagnosis: Primary osteoarthritis of both knees [M17.0]  Chronic pain of both knees [M25.561, M25.562, G89.29]    Disposition: Home or self care    Patient Instructions:   Current Discharge Medication List        CONTINUE these medications which have NOT CHANGED    Details   albuterol (PROVENTIL) 2.5 mg /3 mL (0.083 %) nebulizer solution Take 3 mLs (2.5 mg total) by nebulization every 4 to 6 hours as needed for Wheezing or Shortness of Breath.  Qty: 360 mL, Refills: 11    Comments: ChronicObstructivePulmonaryDiseaseCode:[id number]  Associated Diagnoses: Pulmonary emphysema, unspecified emphysema type; Panlobular emphysema      albuterol (PROVENTIL/VENTOLIN HFA) 90 mcg/actuation inhaler Inhale 2 puffs into the lungs every 4 (four) hours as needed for Wheezing or Shortness of Breath.  Qty: 8.5 g, Refills: 11    Comments: Dispense formulary  Associated Diagnoses: Panlobular emphysema      empagliflozin (JARDIANCE) 10 mg tablet Take 1 tablet (10 mg total) by mouth once daily.  Qty: 90 tablet, Refills: 3      ferrous sulfate 325 (65 FE) MG EC tablet Take 1 tablet (325 mg total) by mouth once daily.  Qty: 30 tablet, Refills: 1      fluticasone-umeclidin-vilanter (TRELEGY ELLIPTA) 200-62.5-25 mcg inhaler Inhale 1 puff into the lungs once daily.  Qty: 60 each, Refills: 11    Associated Diagnoses: Panlobular emphysema      furosemide (LASIX) 40 MG tablet Take 1 tablet (40 mg total) by mouth 2 (two) times a day.  Qty: 90 tablet, Refills: 3      memantine (NAMENDA) 5 MG Tab Take 1 tablet (5 mg total) by mouth 2 (two) times daily.  Qty: 60 tablet, Refills: 11    Associated Diagnoses: Alcohol dependence with uncomplicated withdrawal; Major  neurocognitive disorder due to multiple etiologies      metoprolol succinate (TOPROL-XL) 25 MG 24 hr tablet Take 0.5 tablets (12.5 mg total) by mouth once daily.  Qty: 30 tablet, Refills: 6    Comments: .      potassium chloride SA (K-DUR,KLOR-CON M) 10 MEQ tablet Take 1 tablet (10 mEq total) by mouth once daily.  Qty: 90 tablet, Refills: 1      sacubitriL-valsartan (ENTRESTO)  mg per tablet Take 1 tablet by mouth 2 (two) times daily.  Qty: 180 tablet, Refills: 1    Associated Diagnoses: Chronic combined systolic and diastolic congestive heart failure, NYHA class 3                 Discharge Diagnosis: Primary osteoarthritis of both knees [M17.0]  Chronic pain of both knees [M25.561, M25.562, G89.29]  Condition on Discharge: Stable with no complications to procedure   Diet on Discharge: Same as before.  Activity: as per instruction sheet.  Discharge to: Home with a responsible adult.  Follow up: 2-4 weeks       Please call the office at [phone] if you experience any weakness or loss of sensation, fever > 101.5, pain uncontrolled with oral medications, persistent nausea/vomiting/or diarrhea, redness or drainage from the incisions, or any other worrisome concerns. If physician on call was not reached or could not communicate with our office for any reason please go to the nearest emergency department

## 2023-11-07 NOTE — DISCHARGE INSTRUCTIONS

## 2023-11-07 NOTE — OP NOTE
Procedure Note:     bilateral Intra-articular Knee Steroid Injection Under Fluoroscopy    11/07/2023                                 Surgeon: Devon Grant MD       Assistant: None     Pre-Op Diagnosis:  bilateral Primary osteoarthritis of both knees [M17.0]  Chronic pain of both knees [M25.561, M25.562, G89.29]    Post-Op Diagnosis: Primary osteoarthritis of both knees [M17.0]  Chronic pain of both knees [M25.561, M25.562, G89.29]     EBL: None     Complications: None     Specimens: None    Sedation:  local    The patient was monitored with continuous pulse oximetry, EKG, and intermittent blood pressure monitors.  The patient was hemodynamically stable throughout the entire process was responsive to voice, and breathing spontaneously.  Supplemental O2 was provided at 2L/min via nasal cannula.  Patient was comfortable for the duration of the procedure. (See nurse documentation and case log for sedation time)          Description of procedure:     After written consent was obtained, patient placed in supine position.  The area over the  lateral aspect of the bilateral  knee(s) joint prepped with chlorhexidine.  The area was draped in the usual sterile fashion.  Approximately 5 mL 1% lidocaine was infiltrated into the skin overlying the predetermined entry point. A 22 gauge spinal needle was then advanced under fluoroscopy in the AP views into the knee joint. After negative aspiration there was injection of 1 mL radio opaque contrast dye to confirm needle location within the joint, and no evidence of intravascular spread. This was followed by injection of 6 mL of 0.25% bupivacaine +40mg of triamcinolone into the joint space. Displacement of the contrast indicated that the medication went into the area of the joint space. Needle was withdrawn and a sterile band-aid applied to the skin.     Patient tolerated the procedure well, and was reporting improvement of pain symptoms after the injection. Richy Douglas was  discharged from the clinic in stable condition.

## 2023-11-09 ENCOUNTER — OFFICE VISIT (OUTPATIENT)
Dept: CARDIOLOGY | Facility: CLINIC | Age: 83
End: 2023-11-09
Payer: MEDICARE

## 2023-11-09 VITALS
HEART RATE: 77 BPM | RESPIRATION RATE: 16 BRPM | OXYGEN SATURATION: 97 % | SYSTOLIC BLOOD PRESSURE: 133 MMHG | DIASTOLIC BLOOD PRESSURE: 67 MMHG | HEIGHT: 70 IN | WEIGHT: 170.44 LBS | BODY MASS INDEX: 24.4 KG/M2

## 2023-11-09 DIAGNOSIS — D64.9 SYMPTOMATIC ANEMIA: ICD-10-CM

## 2023-11-09 DIAGNOSIS — I77.810 DILATED AORTIC ROOT: ICD-10-CM

## 2023-11-09 DIAGNOSIS — D63.8 ANEMIA OF CHRONIC DISEASE: ICD-10-CM

## 2023-11-09 DIAGNOSIS — I48.0 PAROXYSMAL ATRIAL FIBRILLATION: ICD-10-CM

## 2023-11-09 DIAGNOSIS — I27.20 PULMONARY HYPERTENSION: ICD-10-CM

## 2023-11-09 DIAGNOSIS — K55.21 AVM (ARTERIOVENOUS MALFORMATION) OF SMALL BOWEL, ACQUIRED WITH HEMORRHAGE: ICD-10-CM

## 2023-11-09 DIAGNOSIS — R07.9 CHEST PAIN, UNSPECIFIED TYPE: ICD-10-CM

## 2023-11-09 DIAGNOSIS — I50.22 CHRONIC SYSTOLIC CONGESTIVE HEART FAILURE: Primary | ICD-10-CM

## 2023-11-09 DIAGNOSIS — G89.29 CHRONIC PAIN OF LEFT KNEE: ICD-10-CM

## 2023-11-09 DIAGNOSIS — R60.0 LOCALIZED EDEMA: ICD-10-CM

## 2023-11-09 DIAGNOSIS — I73.9 PVD (PERIPHERAL VASCULAR DISEASE): ICD-10-CM

## 2023-11-09 DIAGNOSIS — R06.02 SOB (SHORTNESS OF BREATH): ICD-10-CM

## 2023-11-09 DIAGNOSIS — J43.9 PULMONARY EMPHYSEMA, UNSPECIFIED EMPHYSEMA TYPE: ICD-10-CM

## 2023-11-09 DIAGNOSIS — I70.0 ATHEROSCLEROSIS OF ABDOMINAL AORTA: ICD-10-CM

## 2023-11-09 DIAGNOSIS — M25.562 CHRONIC PAIN OF LEFT KNEE: ICD-10-CM

## 2023-11-09 DIAGNOSIS — I50.42 CHRONIC COMBINED SYSTOLIC AND DIASTOLIC CONGESTIVE HEART FAILURE, NYHA CLASS 3: ICD-10-CM

## 2023-11-09 DIAGNOSIS — R06.02 SHORTNESS OF BREATH: ICD-10-CM

## 2023-11-09 DIAGNOSIS — D50.0 IRON DEFICIENCY ANEMIA DUE TO CHRONIC BLOOD LOSS: ICD-10-CM

## 2023-11-09 DIAGNOSIS — R06.09 DOE (DYSPNEA ON EXERTION): ICD-10-CM

## 2023-11-09 DIAGNOSIS — I48.0 PAF (PAROXYSMAL ATRIAL FIBRILLATION): ICD-10-CM

## 2023-11-09 PROCEDURE — 99214 PR OFFICE/OUTPT VISIT, EST, LEVL IV, 30-39 MIN: ICD-10-PCS | Mod: S$PBB,,, | Performed by: INTERNAL MEDICINE

## 2023-11-09 PROCEDURE — 99214 OFFICE O/P EST MOD 30 MIN: CPT | Mod: PBBFAC | Performed by: INTERNAL MEDICINE

## 2023-11-09 PROCEDURE — 99999 PR PBB SHADOW E&M-EST. PATIENT-LVL IV: CPT | Mod: PBBFAC,,, | Performed by: INTERNAL MEDICINE

## 2023-11-09 PROCEDURE — 99214 OFFICE O/P EST MOD 30 MIN: CPT | Mod: S$PBB,,, | Performed by: INTERNAL MEDICINE

## 2023-11-09 PROCEDURE — 99999 PR PBB SHADOW E&M-EST. PATIENT-LVL IV: ICD-10-PCS | Mod: PBBFAC,,, | Performed by: INTERNAL MEDICINE

## 2023-11-09 RX ORDER — POTASSIUM CHLORIDE 750 MG/1
20 TABLET, EXTENDED RELEASE ORAL DAILY
Qty: 90 TABLET | Refills: 1 | Status: SHIPPED | OUTPATIENT
Start: 2023-11-09 | End: 2024-01-03 | Stop reason: SDUPTHER

## 2023-11-09 RX ORDER — SACUBITRIL AND VALSARTAN 97; 103 MG/1; MG/1
1 TABLET, FILM COATED ORAL 2 TIMES DAILY
Qty: 180 TABLET | Refills: 1 | Status: SHIPPED | OUTPATIENT
Start: 2023-11-09 | End: 2024-01-04

## 2023-11-09 RX ORDER — FUROSEMIDE 40 MG/1
40 TABLET ORAL 2 TIMES DAILY
Qty: 90 TABLET | Refills: 3 | Status: SHIPPED | OUTPATIENT
Start: 2023-11-09 | End: 2024-01-03 | Stop reason: SDUPTHER

## 2023-11-09 NOTE — PROGRESS NOTES
"Subjective:   Patient ID:  Richy Douglas is a 83 y.o. male who presents for cardiac consult of Follow-up    Referring Physician:    Vivian Ch MD [id number] [place] [address]      Reason for consult: CHF, AI    HPI  The patient came in today for cardiac consult of Follow-up      Richy Douglas is a 83 y.o. 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     4/27/21  He had ER eval last month - Emergency Department for evaluation of SOB which onset gradually x 1 week ago. Pt reports sustaining a rib fracture 1 week ago and states his "breathing got bad". Pt reports smoking "only when football games are on". Symptoms are constant and moderate in severity. No mitigating or exacerbating factors reported. Associated sxs include cough. Pt also notes L foot swelling and L knee swelling that has been intermittent for a few months.     BNP was 1258.     He had CT chest -    Moderate to severe vascular calcification seen involving the aorta.  Coronary artery calcifications are also noted.  There is no pericardial effusion. No enlarged mediastinal, hilar or axillary lymph nodes are identified.    He feels more dyspnea but also CP from rib fracture.     2/1/23  He had b/l kneei injections yest with Dr. Grant feels good now. BP and Hr well controlled. BMI 22 - 159 lbs.     11/9/23  ECHO 2/2023 with LVEF 30%, RV dec function, mild TR, Mod AI, inc CVP, PASP 80 mmHg, mod dilated aortic root.   He saw Dr. Christianson in Aug 2023 - deferred ICD again.     BP and HR stable today. BMI 24 - 170 lbs - from 159 lbs.     Patient has fairly good exercise tolerance. He does recycling, separation.     Patient is compliant with medications.    FH - mother - DM, PVD, tobacco    Results for orders placed during the hospital encounter of 02/11/23    Echo    Interpretation Summary  · The left ventricle is normal in size with " concentric hypertrophy and moderately decreased systolic function.  · Severe left atrial enlargement.  · Trivial pericardial effusion.  · Mild tricuspid regurgitation.  · Severe right atrial enlargement.  · The estimated ejection fraction is 30%.  · There is severe left ventricular global hypokinesis.  · Mild right ventricular enlargement with mildly to moderately reduced right ventricular systolic function.  · Moderate aortic regurgitation.  · Elevated central venous pressure (15 mmHg).  · The estimated PA systolic pressure is 80 mmHg.  · There is pulmonary hypertension.  · The ascending aorta is moderately dilated.  · The aortic root is moderately dilated.  · Atrial fibrillation observed.  Aortic Root - End Diastolic   Ao root annulus 4.69 cm          Sinus 4.84 cm          STJ 4.89 cm          Ascending aorta 4.62 cm               LE arterial u/s 5/2022  Conclusion    Normal BLE MARIBEL with distal occlusions noted with moderate to severe plaque/blockage  Right distal PT/PER arteries are occluded  Left distal PT with monophasic waveforms, L GUILLE occluded      Conclusion 5/2022    A Baker's cyst measuring 1.67 cm x 3.77 cm was seen in the right extremity.  A Baker's cyst measuring 1.53 cm x 2.78 cm was seen in the left extremity.  There is no evidence of a left lower extremity DVT.  The right smaller saphenous vein is abnormal. A chronic thrombus is present.          Results for orders placed during the hospital encounter of 01/09/22    Cardiac catheterization    Conclusion  · The pre-procedure left ventricular end diastolic pressure was 13.  · The estimated blood loss was none.  · The coronary arteries were normal..  · The filling pressures on the right and left were normal.    The procedure log was documented by Documenter: Jenna Cantu, RN and verified by Janneth Tovar MD.    Date: 1/13/2022  Time: 5:22 PM          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    Anemia of  chronic disease     Aortic aneurysm     Atrial fibrillation with RVR 09/24/2021    Chronic systolic congestive heart failure 08/31/2017    Emphysema of lung 08/31/2017    GERD (gastroesophageal reflux disease)     Hypertension     Knee osteoarthritis 10/21/2022    Major neurocognitive disorder due to multiple etiologies 4/14/2023    Microcytic anemia 11/24/2020    Other hyperlipidemia 04/27/2021    Personal history of colonic polyps 2022    Prostate cancer        Past Surgical History:   Procedure Laterality Date    CATHETERIZATION OF BOTH LEFT AND RIGHT HEART N/A 1/13/2022    Procedure: CATHETERIZATION, HEART, BOTH LEFT AND RIGHT;  Surgeon: Janneth Tovar MD;  Location: [facility] CATH LAB;  Service: Cardiology;  Laterality: N/A;  poss cx    COLONOSCOPY      COLONOSCOPY N/A 6/30/2022    Procedure: COLONOSCOPY;  Surgeon: Sandra Perez MD;  Location: [facility] ENDO;  Service: Endoscopy;  Laterality: N/A;    COLONOSCOPY N/A 7/1/2022    Procedure: COLONOSCOPY;  Surgeon: Woodrow Christian MD;  Location: [facility];  Service: Endoscopy;  Laterality: N/A;    CORONARY ANGIOGRAPHY N/A 1/13/2022    Procedure: ANGIOGRAM, CORONARY ARTERY;  Surgeon: Janneth Tovar MD;  Location: [facility] CATH LAB;  Service: Cardiology;  Laterality: N/A;    ESOPHAGOGASTRODUODENOSCOPY N/A 6/30/2022    Procedure: EGD (ESOPHAGOGASTRODUODENOSCOPY);  Surgeon: Sandra Perez MD;  Location: [facility];  Service: Endoscopy;  Laterality: N/A;    INJECTION OF ANESTHETIC AGENT AROUND NERVE Bilateral 6/20/2023    Procedure: Bilateral Genicular nerve block RN IV Sedation;  Surgeon: Devon Grant MD;  Location: [facility] PAIN MGT;  Service: Pain Management;  Laterality: Bilateral;    INJECTION OF JOINT Bilateral 10/21/2022    Procedure: Bilateral intraarticular knee injection with local ALLERGIC TO IODINE;  Surgeon: Devon Grant MD;  Location: [facility] PAIN MGT;  Service: Pain Management;  Laterality: Bilateral;    INJECTION OF JOINT Bilateral 1/31/2023     Procedure: Bilateral IA knee joint injection with steroid RN IV Sedation;  Surgeon: Devon Grant MD;  Location: [facility] PAIN MGT;  Service: Pain Management;  Laterality: Bilateral;    INJECTION OF JOINT Bilateral 5/10/2023    Procedure: Bilateral IA knee joint injection Synvisc RN IV Sedation;  Surgeon: Devon Grant MD;  Location: [facility] PAIN MGT;  Service: Pain Management;  Laterality: Bilateral;    INJECTION OF JOINT Bilateral 2023    Procedure: Bilateral intraarticular knee injection;  Surgeon: Devon Grant MD;  Location: [facility] PAIN MGT;  Service: Pain Management;  Laterality: Bilateral;    PROSTATE SURGERY      RADIOFREQUENCY THERMOCOAGULATION Left 2023    Procedure: Left Genicular Nerve RFA;  Surgeon: Devon Grant MD;  Location: [facility] PAIN MGT;  Service: Pain Management;  Laterality: Left;    RADIOFREQUENCY THERMOCOAGULATION Right 9/15/2023    Procedure: Right Genicular Nerve RFA RN IV Sedation;  Surgeon: Devon Grant MD;  Location: [facility] PAIN MGT;  Service: Pain Management;  Laterality: Right;    SPINE SURGERY         Social History     Tobacco Use    Smoking status: Former     Current packs/day: 0.00     Average packs/day: 1 pack/day for 69.2 years (69.2 ttl pk-yrs)     Types: Cigarettes     Start date: 1954     Quit date: 3/1/2023     Years since quittin.6    Smokeless tobacco: Never   Substance Use Topics    Alcohol use: Yes     Comment: reports only drinks red wine when games are on    Drug use: Never       Family History   Problem Relation Age of Onset    Diabetes Mother     Peripheral vascular disease Mother        Patient's Medications   New Prescriptions    No medications on file   Previous Medications    ALBUTEROL (PROVENTIL) 2.5 MG /3 ML (0.083 %) NEBULIZER SOLUTION    Take 3 mLs (2.5 mg total) by nebulization every 4 to 6 hours as needed for Wheezing or Shortness of Breath.    ALBUTEROL (PROVENTIL/VENTOLIN HFA) 90 MCG/ACTUATION INHALER    Inhale 2 puffs into the lungs every 4  (four) hours as needed for Wheezing or Shortness of Breath.    EMPAGLIFLOZIN (JARDIANCE) 10 MG TABLET    Take 1 tablet (10 mg total) by mouth once daily.    FERROUS SULFATE 325 (65 FE) MG EC TABLET    Take 1 tablet (325 mg total) by mouth once daily.    FLUTICASONE-UMECLIDIN-VILANTER (TRELEGY ELLIPTA) 200-62.5-25 MCG INHALER    Inhale 1 puff into the lungs once daily.    FUROSEMIDE (LASIX) 40 MG TABLET    Take 1 tablet (40 mg total) by mouth 2 (two) times a day.    MEMANTINE (NAMENDA) 5 MG TAB    Take 1 tablet (5 mg total) by mouth 2 (two) times daily.    METOPROLOL SUCCINATE (TOPROL-XL) 25 MG 24 HR TABLET    Take 0.5 tablets (12.5 mg total) by mouth once daily.    POTASSIUM CHLORIDE SA (K-DUR,KLOR-CON M) 10 MEQ TABLET    Take 1 tablet (10 mEq total) by mouth once daily.    SACUBITRIL-VALSARTAN (ENTRESTO)  MG PER TABLET    Take 1 tablet by mouth 2 (two) times daily.   Modified Medications    No medications on file   Discontinued Medications    No medications on file       Review of Systems   Constitutional:  Positive for malaise/fatigue.   HENT: Negative.     Eyes: Negative.    Respiratory:  Positive for shortness of breath.    Cardiovascular:  Positive for chest pain and leg swelling.   Gastrointestinal: Negative.    Genitourinary: Negative.    Musculoskeletal:  Positive for back pain and joint pain.   Skin: Negative.    Neurological: Negative.    Endo/Heme/Allergies: Negative.    Psychiatric/Behavioral: Negative.     All 12 systems otherwise negative.      Wt Readings from Last 3 Encounters:   11/07/23 75.6 kg (166 lb 10.7 oz)   10/19/23 72.1 kg (159 lb 1 oz)   09/15/23 77.2 kg (170 lb 1.4 oz)     Temp Readings from Last 3 Encounters:   11/07/23 97.4 °F (36.3 °C) (Temporal)   09/15/23 96.5 °F (35.8 °C) (Temporal)   09/01/23 97.9 °F (36.6 °C) (Temporal)     BP Readings from Last 3 Encounters:   11/07/23 137/67   10/19/23 (!) 125/54   09/15/23 (!) 158/75     Pulse Readings from Last 3 Encounters:   11/07/23 78    10/19/23 89   09/15/23 85       There were no vitals taken for this visit.    Objective:   Physical Exam  Vitals and nursing note reviewed.   Constitutional:       General: He is not in acute distress.     Appearance: He is well-developed. He is ill-appearing. He is not diaphoretic.   HENT:      Head: Normocephalic and atraumatic.      Nose: Nose normal.   Eyes:      General: No scleral icterus.     Conjunctiva/sclera: Conjunctivae normal.   Neck:      Thyroid: No thyromegaly.      Vascular: No JVD.   Cardiovascular:      Rate and Rhythm: Normal rate and regular rhythm.      Heart sounds: S1 normal and S2 normal. Murmur heard.      No friction rub. No gallop. No S3 or S4 sounds.   Pulmonary:      Effort: Pulmonary effort is normal. No respiratory distress.      Breath sounds: Normal breath sounds. No stridor. No wheezing or rales.   Chest:      Chest wall: No tenderness.   Abdominal:      General: Bowel sounds are normal. There is no distension.      Palpations: Abdomen is soft. There is no mass.      Tenderness: There is no abdominal tenderness. There is no rebound.   Genitourinary:     Comments: Deferred  Musculoskeletal:         General: No tenderness or deformity. Normal range of motion.      Cervical back: Normal range of motion and neck supple.      Right lower leg: Edema present.      Left lower leg: Edema present.   Lymphadenopathy:      Cervical: No cervical adenopathy.   Skin:     General: Skin is warm and dry.      Coloration: Skin is not pale.      Findings: No erythema or rash.   Neurological:      Mental Status: He is alert and oriented to person, place, and time.      Motor: No abnormal muscle tone.      Coordination: Coordination normal.   Psychiatric:         Behavior: Behavior normal.         Thought Content: Thought content normal.         Judgment: Judgment normal.         Lab Results   Component Value Date     (H) 08/23/2023    K 3.5 08/23/2023     08/23/2023    CO2 25 08/23/2023     BUN 19 08/23/2023    CREATININE 0.7 08/23/2023    GLU 90 08/23/2023    MG 1.9 02/13/2023    AST 27 08/23/2023    ALT 15 08/23/2023    ALBUMIN 3.7 08/23/2023    PROT 6.3 08/23/2023    BILITOT 1.2 (H) 08/23/2023    WBC 4.08 08/23/2023    HGB 9.9 (L) 08/23/2023    HCT 31.0 (L) 08/23/2023    MCV 81 (L) 08/23/2023     08/23/2023    INR 1.4 (H) 09/24/2021    TSH 1.046 04/14/2023    CHOL 144 09/17/2020    HDL 61 09/17/2020    LDLCALC 74.0 09/17/2020    TRIG 45 09/17/2020    BNP 3,158 (H) 05/09/2023     Assessment:      1. Chronic systolic congestive heart failure    2. Atherosclerosis of abdominal aorta    3. Iron deficiency anemia due to chronic blood loss    4. Chronic pain of left knee    5. Shortness of breath    6. Chest pain, unspecified type    7. SOB (shortness of breath)    8. Chronic combined systolic and diastolic congestive heart failure, NYHA class 3    9. Paroxysmal atrial fibrillation    10. Localized edema    11. AVM (arteriovenous malformation) of small bowel, acquired with hemorrhage    12. PAF (paroxysmal atrial fibrillation)    13. Pulmonary emphysema, unspecified emphysema type    14. SANCHEZ (dyspnea on exertion)    15. Pulmonary hypertension    16. Dilated aortic root    17. Anemia of chronic disease    18. PVD (peripheral vascular disease)    19. Symptomatic anemia            Plan:   1. BIV failure EF 30%, AI with aortic atherosc;  BNP 1047 -- 969 --> 1844 --> 615 --> 952 --> 3158  - ECHO 2/2023 with LVEF 30%, RV dec function, mild TR, Mod AI, inc CVP, PASP 80 mmHg, mod dilated aortic root.   - R/LHC normal coronaries 1/2022  - cont lasix daily and extra PRN  - cont Entresto to max dose   - f/u EP discuss ICD/CRT - pt saw Dr. Christianson does not want it now  - gained > 15 lbs since last visit  - Double up lasix to 80mg BID x 5 days and resume daily, labs next week and if worsening symptoms needs ER eval and admission    2. Emphysema with dyspnea, pulm HTN; still smokes   - cont tx PCP/Pulm    3.  HTN with aortic root moderately dilated with AI - Aortic root 4.6-4.9 cm --> 4.55 --> 4.89 (STJ)  - titrate meds  - cont to monitor     4. HLD with aortic athero and Coronary artery calcifications  - cont statin     5. PAF, diagnosed in setting of sepsis/PNA, had SVT in hosp - sec to alc withdrawal  - cont BB; stop Anticoag  - Lone AF, Dr. Christianson discussed with pt to stop anticoag sec risk of bleeding    6. AVM/Symptomatic anemia s/p PRBC with fe def  - angioectasias noted in recent EGD s/p APC  - off Eliquis now  - f/u heme/onc - recent Fe levels improved     7. PVD, Leg Edema, pain with claudication   - LE arterial u/s with distal occlusions/monophasic waveforms, normal BLE MARIBEL.   - prior LE venous u/s with b/l baker's cysts and chronic right small saphenous vein thrombus     8. Fall with hematoma in right hip  - f/u pain management for knee pain/baker's cysts  - s/p knee injections         Thank you for allowing me to participate in this patient's care. Please do not hesitate to contact me with any questions or concerns. Consult note has been forwarded to the referral physician.

## 2023-11-10 ENCOUNTER — TELEPHONE (OUTPATIENT)
Dept: CARDIOLOGY | Facility: CLINIC | Age: 83
End: 2023-11-10
Payer: MEDICARE

## 2023-11-10 NOTE — TELEPHONE ENCOUNTER
Returned pt call regarding his Furosemide (LASIX) RX being expensive and his insurance doesn't take into effect until January. Informed pt I will let Dr. Garces know.     ----- Message from Umu Puente sent at 11/10/2023  9:48 AM CST -----  Contact: Richy  Pt is calling in regards to medication being expensive and pt stated that his insurance to cover prescription doesn't take effect until January. Please call back at [phone]                            Thanks  KT

## 2023-11-12 ENCOUNTER — PATIENT MESSAGE (OUTPATIENT)
Dept: CARDIOLOGY | Facility: CLINIC | Age: 83
End: 2023-11-12
Payer: MEDICARE

## 2023-11-13 ENCOUNTER — TELEPHONE (OUTPATIENT)
Dept: CARDIOLOGY | Facility: CLINIC | Age: 83
End: 2023-11-13
Payer: MEDICARE

## 2023-11-13 ENCOUNTER — HOSPITAL ENCOUNTER (EMERGENCY)
Facility: HOSPITAL | Age: 83
Discharge: HOME | End: 2023-11-13
Attending: FAMILY MEDICINE
Payer: MEDICARE

## 2023-11-13 VITALS
SYSTOLIC BLOOD PRESSURE: 123 MMHG | HEART RATE: 104 BPM | DIASTOLIC BLOOD PRESSURE: 73 MMHG | OXYGEN SATURATION: 97 % | TEMPERATURE: 98 F | RESPIRATION RATE: 16 BRPM

## 2023-11-13 DIAGNOSIS — R06.02 SHORTNESS OF BREATH: ICD-10-CM

## 2023-11-13 DIAGNOSIS — M79.89 LEG SWELLING: ICD-10-CM

## 2023-11-13 DIAGNOSIS — I50.20 SYSTOLIC CONGESTIVE HEART FAILURE, UNSPECIFIED HF CHRONICITY: Primary | ICD-10-CM

## 2023-11-13 DIAGNOSIS — R07.9 CHEST PAIN: ICD-10-CM

## 2023-11-13 LAB
ALBUMIN SERPL BCP-MCNC: 4.1 G/DL (ref 3.5–5.2)
ALP SERPL-CCNC: 56 U/L (ref 55–135)
ALT SERPL W/O P-5'-P-CCNC: 23 U/L (ref 10–44)
ANION GAP SERPL CALC-SCNC: 13 MMOL/L (ref 8–16)
AST SERPL-CCNC: 34 U/L (ref 10–40)
BASOPHILS # BLD AUTO: 0.05 K/UL (ref 0–0.2)
BASOPHILS NFR BLD: 0.8 % (ref 0–1.9)
BILIRUB SERPL-MCNC: 1.7 MG/DL (ref 0.1–1)
BNP SERPL-MCNC: 2565 PG/ML (ref 0–99)
BUN SERPL-MCNC: 23 MG/DL (ref 8–23)
CALCIUM SERPL-MCNC: 9.8 MG/DL (ref 8.7–10.5)
CHLORIDE SERPL-SCNC: 98 MMOL/L (ref 95–110)
CO2 SERPL-SCNC: 31 MMOL/L (ref 23–29)
CREAT SERPL-MCNC: 0.8 MG/DL (ref 0.5–1.4)
DIFFERENTIAL METHOD: ABNORMAL
EOSINOPHIL # BLD AUTO: 0.3 K/UL (ref 0–0.5)
EOSINOPHIL NFR BLD: 5.1 % (ref 0–8)
ERYTHROCYTE [DISTWIDTH] IN BLOOD BY AUTOMATED COUNT: 27.1 % (ref 11.5–14.5)
EST. GFR  (NO RACE VARIABLE): >60 ML/MIN/1.73 M^2
GLUCOSE SERPL-MCNC: 86 MG/DL (ref 70–110)
HCT VFR BLD AUTO: 37.3 % (ref 40–54)
HGB BLD-MCNC: 12.1 G/DL (ref 14–18)
IMM GRANULOCYTES # BLD AUTO: 0.03 K/UL (ref 0–0.04)
IMM GRANULOCYTES NFR BLD AUTO: 0.5 % (ref 0–0.5)
LYMPHOCYTES # BLD AUTO: 0.9 K/UL (ref 1–4.8)
LYMPHOCYTES NFR BLD: 14 % (ref 18–48)
MCH RBC QN AUTO: 26 PG (ref 27–31)
MCHC RBC AUTO-ENTMCNC: 32.4 G/DL (ref 32–36)
MCV RBC AUTO: 80 FL (ref 82–98)
MONOCYTES # BLD AUTO: 0.7 K/UL (ref 0.3–1)
MONOCYTES NFR BLD: 10.8 % (ref 4–15)
NEUTROPHILS # BLD AUTO: 4.3 K/UL (ref 1.8–7.7)
NEUTROPHILS NFR BLD: 68.8 % (ref 38–73)
NRBC BLD-RTO: 0 /100 WBC
PLATELET # BLD AUTO: 311 K/UL (ref 150–450)
PMV BLD AUTO: 9.8 FL (ref 9.2–12.9)
POTASSIUM SERPL-SCNC: 3.6 MMOL/L (ref 3.5–5.1)
PROT SERPL-MCNC: 7.4 G/DL (ref 6–8.4)
RBC # BLD AUTO: 4.66 M/UL (ref 4.6–6.2)
SODIUM SERPL-SCNC: 142 MMOL/L (ref 136–145)
TROPONIN I SERPL DL<=0.01 NG/ML-MCNC: 0.04 NG/ML (ref 0–0.03)
WBC # BLD AUTO: 6.23 K/UL (ref 3.9–12.7)

## 2023-11-13 PROCEDURE — 80053 COMPREHEN METABOLIC PANEL: CPT | Performed by: PHYSICIAN ASSISTANT

## 2023-11-13 PROCEDURE — 96374 THER/PROPH/DIAG INJ IV PUSH: CPT

## 2023-11-13 PROCEDURE — 93010 ELECTROCARDIOGRAM REPORT: CPT | Mod: ,,, | Performed by: INTERNAL MEDICINE

## 2023-11-13 PROCEDURE — 93005 ELECTROCARDIOGRAM TRACING: CPT

## 2023-11-13 PROCEDURE — 93010 EKG 12-LEAD: ICD-10-PCS | Mod: ,,, | Performed by: INTERNAL MEDICINE

## 2023-11-13 PROCEDURE — 85025 COMPLETE CBC W/AUTO DIFF WBC: CPT | Performed by: PHYSICIAN ASSISTANT

## 2023-11-13 PROCEDURE — 99285 EMERGENCY DEPT VISIT HI MDM: CPT | Mod: 25

## 2023-11-13 PROCEDURE — 84484 ASSAY OF TROPONIN QUANT: CPT | Performed by: PHYSICIAN ASSISTANT

## 2023-11-13 PROCEDURE — 83880 ASSAY OF NATRIURETIC PEPTIDE: CPT | Performed by: PHYSICIAN ASSISTANT

## 2023-11-13 PROCEDURE — 63600175 PHARM REV CODE 636 W HCPCS: Performed by: FAMILY MEDICINE

## 2023-11-13 RX ORDER — FUROSEMIDE 10 MG/ML
40 INJECTION INTRAMUSCULAR; INTRAVENOUS
Status: COMPLETED | OUTPATIENT
Start: 2023-11-13 | End: 2023-11-13

## 2023-11-13 RX ADMIN — FUROSEMIDE 40 MG: 10 INJECTION, SOLUTION INTRAMUSCULAR; INTRAVENOUS at 05:11

## 2023-11-13 NOTE — TELEPHONE ENCOUNTER
"Called and spoke to pt daughter regarding [employer] messages about pt prescription. I spoke with pt on 11/10/23 regarding his Furosemide medication being expensive. PT daughter stated she couldn't verify which medication but she didn't know they stated if pt didn't receive it he will have to be admitted in the ER for 3 days. I advised Dr. Garces of this information and Dr. Garces stated   "I think the Entresto may be expensive but not the lasix - if he has not been taking the lasix last few days as discussed he is likely more volume overloaded, I recommend he goes to [facility] ER and be admitted for IV lasix and then can adjust meds and discharge on meds he can afford. Thanks"    Informed pt daughter of this information. She is thinking it's the furosemide being that she was told pt would have to be admitted as per Dr. Garces stated the same thing. Pt daughter stated she will let her dad know to arrive at the [facility] ER  "

## 2023-11-13 NOTE — FIRST PROVIDER EVALUATION
Emergency Department TeleTriage Encounter Note      CHIEF COMPLAINT    Chief Complaint   Patient presents with    Leg Swelling     Bilateral foot swelling, hx of CHF, intermittent shortness of breath and chest pain.       VITAL SIGNS   Initial Vitals   BP Pulse Resp Temp SpO2   11/13/23 1525 11/13/23 1525 11/13/23 1525 11/13/23 1529 11/13/23 1525   (!) 110/39 71 20 98.2 °F (36.8 °C) 96 %      MAP       --                   ALLERGIES    Review of patient's allergies indicates:   Allergen Reactions    Fish containing products     Iodine and iodide containing products     Shellfish containing products        PROVIDER TRIAGE NOTE  This is a teletriage evaluation of a 83 y.o. male presenting to the ED complaining of shortness of breath. Patient reports bilateral leg swelling, shortness of breath, and chest pain intermittently for the past few days. Also reports generalized joint pain.    Patient is alert and oriented. He speaks in complete sentences. He is sitting upright in the chair in no distress.     Initial orders will be placed and care will be transferred to an alternate provider when patient is roomed for a full evaluation. Any additional orders and the final disposition will be determined by that provider.         ORDERS  Labs Reviewed   CBC W/ AUTO DIFFERENTIAL   COMPREHENSIVE METABOLIC PANEL   TROPONIN I   B-TYPE NATRIURETIC PEPTIDE       ED Orders (720h ago, onward)      Start Ordered     Status Ordering Provider    11/13/23 1548 11/13/23 1547  Brain Natriuretic Peptide  STAT         Ordered VERONICA GUERRA    11/13/23 1547 11/13/23 1547  CBC Auto Differential  STAT         Ordered VERONICA GUERRA    11/13/23 1547 11/13/23 1547  Comprehensive Metabolic Panel  STAT         Ordered VERONICA GUERRA    11/13/23 1547 11/13/23 1547  Pulse Oximetry Continuous  Continuous         Ordered VERONICA GUERRA    11/13/23 1547 11/13/23 1547  Cardiac Monitoring - Adult  Continuous         Ordered VERONICA GUERRA    11/13/23  1547 11/13/23 1547  X-Ray Chest 1 View  1 time imaging         Ordered VERONICA GUERRA.    11/13/23 1547 11/13/23 1547  Troponin I  STAT         Ordered VERONICA GUERRA.    11/13/23 1531 11/13/23 1531  EKG 12-lead  Once         Completed by TRICIA GRAVES on 11/13/2023 at  3:33 PM AZUL TREVINO              Virtual Visit Note: The provider triage portion of this emergency department evaluation and documentation was performed via [employer], a HIPAA-compliant telemedicine application, in concert with a tele-presenter in the room. A face to face patient evaluation with one of my colleagues will occur once the patient is placed in an emergency department room.      DISCLAIMER: This note was prepared with [employer] voice recognition transcription software. Garbled syntax, mangled pronouns, and other bizarre constructions may be attributed to that software system.

## 2023-11-14 ENCOUNTER — PATIENT OUTREACH (OUTPATIENT)
Dept: EMERGENCY MEDICINE | Facility: HOSPITAL | Age: 83
End: 2023-11-14
Payer: MEDICARE

## 2023-11-14 NOTE — PROGRESS NOTES
Patient declined assistance making a follow-up appointment with his PCP at this time.     Caity Hernandez  ED Navigator  [phone]

## 2023-11-14 NOTE — ED PROVIDER NOTES
SCRIBE #1 NOTE: I, Bita Taveras, am scribing for, and in the presence of, Alyssa Wan MD. I have scribed the entire note.       History     Chief Complaint   Patient presents with    Leg Swelling     Bilateral foot swelling, hx of CHF, intermittent shortness of breath and chest pain.     Review of patient's allergies indicates:   Allergen Reactions    Fish containing products     Iodine and iodide containing products     Shellfish containing products          History of Present Illness     HPI    11/13/2023, 6:15 PM  History obtained from the patient      History of Present Illness: Richy Douglas is a 83 y.o. male patient with a PMHx of aortic aneurysm, atrial fibrillation with RVR, chronic systolic congestive heart failure, GERD, emphysema of lung, and hyperlipidemia who presents to the Emergency Department for evaluation of bilateral leg swelling which onset several days PTA. Symptoms are constant and moderate in severity. Associated sxs include BLE pain, SOB, chest pain, and abdominal distention. Patient denies any abdominal pain, and all other sxs at this time. Pt is compliant with lasix. He urinates normally.  No further complaints or concerns at this time.       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    Anemia of chronic disease     Aortic aneurysm     Atrial fibrillation with RVR 09/24/2021    Chronic systolic congestive heart failure 08/31/2017    Emphysema of lung 08/31/2017    GERD (gastroesophageal reflux disease)     Hypertension     Knee osteoarthritis 10/21/2022    Major neurocognitive disorder due to multiple etiologies 4/14/2023    Microcytic anemia 11/24/2020    Other hyperlipidemia 04/27/2021    Personal history of colonic polyps 2022    Prostate cancer        Past Surgical History:  Past Surgical History:   Procedure Laterality Date    CATHETERIZATION  OF BOTH LEFT AND RIGHT HEART N/A 1/13/2022    Procedure: CATHETERIZATION, HEART, BOTH LEFT AND RIGHT;  Surgeon: Janneth Tovar MD;  Location: [facility] CATH LAB;  Service: Cardiology;  Laterality: N/A;  poss cx    COLONOSCOPY      COLONOSCOPY N/A 6/30/2022    Procedure: COLONOSCOPY;  Surgeon: Sandra Perez MD;  Location: [facility] ENDO;  Service: Endoscopy;  Laterality: N/A;    COLONOSCOPY N/A 7/1/2022    Procedure: COLONOSCOPY;  Surgeon: Woodrow Christian MD;  Location: [facility] ENDO;  Service: Endoscopy;  Laterality: N/A;    CORONARY ANGIOGRAPHY N/A 1/13/2022    Procedure: ANGIOGRAM, CORONARY ARTERY;  Surgeon: Janneth Tovar MD;  Location: [facility] CATH LAB;  Service: Cardiology;  Laterality: N/A;    ESOPHAGOGASTRODUODENOSCOPY N/A 6/30/2022    Procedure: EGD (ESOPHAGOGASTRODUODENOSCOPY);  Surgeon: Sandra Perez MD;  Location: [facility];  Service: Endoscopy;  Laterality: N/A;    INJECTION OF ANESTHETIC AGENT AROUND NERVE Bilateral 6/20/2023    Procedure: Bilateral Genicular nerve block RN IV Sedation;  Surgeon: Devon Grant MD;  Location: [facility] PAIN MGT;  Service: Pain Management;  Laterality: Bilateral;    INJECTION OF JOINT Bilateral 10/21/2022    Procedure: Bilateral intraarticular knee injection with local ALLERGIC TO IODINE;  Surgeon: Devon Grant MD;  Location: [facility] PAIN [facility]GT;  Service: Pain Management;  Laterality: Bilateral;    INJECTION OF JOINT Bilateral 1/31/2023    Procedure: Bilateral IA knee joint injection with steroid RN IV Sedation;  Surgeon: Devon Grant MD;  Location: [facility] PAIN [facility]GT;  Service: Pain Management;  Laterality: Bilateral;    INJECTION OF JOINT Bilateral 5/10/2023    Procedure: Bilateral IA knee joint injection Synvisc RN IV Sedation;  Surgeon: Devon Grant MD;  Location: [facility] PAIN [facility]GT;  Service: Pain Management;  Laterality: Bilateral;    INJECTION OF JOINT Bilateral 11/7/2023    Procedure: Bilateral intraarticular knee injection;  Surgeon: Devon Grant MD;  Location: [facility]  PAIN MGT;  Service: Pain Management;  Laterality: Bilateral;    PROSTATE SURGERY      RADIOFREQUENCY THERMOCOAGULATION Left 2023    Procedure: Left Genicular Nerve RFA;  Surgeon: Devon Grant MD;  [facility];  Service: Pain Management;  Laterality: Left;    RADIOFREQUENCY THERMOCOAGULATION Right 9/15/2023    Procedure: Right Genicular Nerve RFA RN IV Sedation;  Surgeon: Devon Grant MD;  Location: [facility] PAIN MGT;  Service: Pain Management;  Laterality: Right;    SPINE SURGERY           Family History:  Family History   Problem Relation Age of Onset    Diabetes Mother     Peripheral vascular disease Mother        Social History:  Social History     Tobacco Use    Smoking status: Former     Current packs/day: 0.00     Average packs/day: 1 pack/day for 69.2 years (69.2 ttl pk-yrs)     Types: Cigarettes     Start date: 1954     Quit date: 3/1/2023     Years since quittin.7    Smokeless tobacco: Never   Substance and Sexual Activity    Alcohol use: Yes     Comment: reports only drinks red wine when games are on    Drug use: Never    Sexual activity: Yes     Partners: Female        Review of Systems     Review of Systems   Respiratory:  Positive for shortness of breath.    Cardiovascular:  Positive for chest pain and leg swelling (BLE).   Gastrointestinal:  Positive for abdominal distention. Negative for abdominal pain.   Musculoskeletal:  Positive for myalgias (BLE).   All other systems reviewed and are negative.     Physical Exam     Initial Vitals   BP Pulse Resp Temp SpO2   23 1525 23 1525 23 1525 23 1529 23 1525   (!) 110/39 71 20 98.2 °F (36.8 °C) 96 %      MAP       --                 Physical Exam  Nursing Notes and Vital Signs Reviewed.  Constitutional: Patient is in no acute distress. Well-developed and well-nourished.  Head: Atraumatic. Normocephalic.  Eyes: PERRL. EOM intact. Conjunctivae are not pale. No scleral icterus.  ENT: Mucous membranes are  moist. Oropharynx is clear and symmetric.    Neck: Supple. Full ROM. No lymphadenopathy.  Cardiovascular: Regular rate. Regular rhythm. No murmurs, rubs, or gallops. Distal pulses are 2+ and symmetric.  Pulmonary/Chest: No respiratory distress. Clear to auscultation bilaterally. No wheezing or rales.  Abdominal: Soft and non-distended.  There is no tenderness.  No rebound, guarding, or rigidity. Good bowel sounds.  Genitourinary: No CVA tenderness  Musculoskeletal: Moves all extremities. No obvious deformities. 3+ edema below knee bilaterally.  Skin: Warm and dry.  Neurological:  Alert, awake, and appropriate.  Normal speech.  No acute focal neurological deficits are appreciated.  Psychiatric: Normal affect. Good eye contact. Appropriate in content.     ED Course   Procedures  ED Vital Signs:  Vitals:    11/13/23 1525 11/13/23 1529 11/13/23 1728   BP: (!) 110/39  123/73   Pulse: 71  104   Resp: 20  16   Temp:  98.2 °F (36.8 °C)    TempSrc:  Oral    SpO2: 96%  97%       Abnormal Lab Results:  Labs Reviewed   CBC W/ AUTO DIFFERENTIAL - Abnormal; Notable for the following components:       Result Value    Hemoglobin 12.1 (*)     Hematocrit 37.3 (*)     MCV 80 (*)     MCH 26.0 (*)     RDW 27.1 (*)     Lymph # 0.9 (*)     Lymph % 14.0 (*)     All other components within normal limits   COMPREHENSIVE METABOLIC PANEL - Abnormal; Notable for the following components:    CO2 31 (*)     Total Bilirubin 1.7 (*)     All other components within normal limits   TROPONIN I - Abnormal; Notable for the following components:    Troponin I 0.038 (*)     All other components within normal limits   B-TYPE NATRIURETIC PEPTIDE - Abnormal; Notable for the following components:    BNP 2,565 (*)     All other components within normal limits        All Lab Results:  Results for orders placed or performed during the hospital encounter of 11/13/23   CBC Auto Differential   Result Value Ref Range    WBC 6.23 3.90 - 12.70 K/uL    RBC 4.66 4.60 -  6.20 M/uL    Hemoglobin 12.1 (L) 14.0 - 18.0 g/dL    Hematocrit 37.3 (L) 40.0 - 54.0 %    MCV 80 (L) 82 - 98 fL    MCH 26.0 (L) 27.0 - 31.0 pg    MCHC 32.4 32.0 - 36.0 g/dL    RDW 27.1 (H) 11.5 - 14.5 %    Platelets 311 150 - 450 K/uL    MPV 9.8 9.2 - 12.9 fL    Immature Granulocytes 0.5 0.0 - 0.5 %    Gran # (ANC) 4.3 1.8 - 7.7 K/uL    Immature Grans (Abs) 0.03 0.00 - 0.04 K/uL    Lymph # 0.9 (L) 1.0 - 4.8 K/uL    Mono # 0.7 0.3 - 1.0 K/uL    Eos # 0.3 0.0 - 0.5 K/uL    Baso # 0.05 0.00 - 0.20 K/uL    nRBC 0 0 /100 WBC    Gran % 68.8 38.0 - 73.0 %    Lymph % 14.0 (L) 18.0 - 48.0 %    Mono % 10.8 4.0 - 15.0 %    Eosinophil % 5.1 0.0 - 8.0 %    Basophil % 0.8 0.0 - 1.9 %    Differential Method Automated    Comprehensive Metabolic Panel   Result Value Ref Range    Sodium 142 136 - 145 mmol/L    Potassium 3.6 3.5 - 5.1 mmol/L    Chloride 98 95 - 110 mmol/L    CO2 31 (H) 23 - 29 mmol/L    Glucose 86 70 - 110 mg/dL    BUN 23 8 - 23 mg/dL    Creatinine 0.8 0.5 - 1.4 mg/dL    Calcium 9.8 8.7 - 10.5 mg/dL    Total Protein 7.4 6.0 - 8.4 g/dL    Albumin 4.1 3.5 - 5.2 g/dL    Total Bilirubin 1.7 (H) 0.1 - 1.0 mg/dL    Alkaline Phosphatase 56 55 - 135 U/L    AST 34 10 - 40 U/L    ALT 23 10 - 44 U/L    eGFR >60 >60 mL/min/1.73 m^2    Anion Gap 13 8 - 16 mmol/L   Troponin I   Result Value Ref Range    Troponin I 0.038 (H) 0.000 - 0.026 ng/mL   Brain Natriuretic Peptide   Result Value Ref Range    BNP 2,565 (H) 0 - 99 pg/mL         Imaging Results:  Imaging Results              X-Ray Chest 1 View (Final result)  Result time 11/13/23 16:09:04      Final result by Syed Lozada MD (11/13/23 16:09:04)                   Impression:      See above      Electronically signed by: Syed Lozada MD  Date:    11/13/2023  Time:    16:09               Narrative:    EXAMINATION:  XR CHEST 1 VIEW    CLINICAL HISTORY:  shortness of breath;    FINDINGS:  Single view of the chest.  02/12/2023    Stable cardiomegaly.  Stable pleuroparenchymal  scarring at the apices.  Chronic interstitial opacities are seen within the lungs.  No consolidation.  No large pleural effusion or pneumothorax.  Moderate degenerative changes and moderate atherosclerotic disease.                                       The EKG was ordered, reviewed, and independently interpreted by the ED provider.  Interpretation time: 15:26  Rate: 109 BPM  Rhythm: Atrial flutter with variable A-V block with premature ventricular or aberrantly conducted complexes   Interpretation: Rightward axis.  Biventricular hypertrophy with repolarization abnormality. No STEMI.           The Emergency Provider reviewed the vital signs and test results, which are outlined above.     ED Discussion     6:21 PM: Reassessed pt at this time. Discussed with pt all pertinent ED information and results. Discussed pt dx and plan of tx. Gave pt all f/u and return to the ED instructions. All questions and concerns were addressed at this time. Pt expresses understanding of information and instructions, and is comfortable with plan to discharge. Pt is stable for discharge.    I discussed with patient and/or family/caretaker that evaluation in the ED does not suggest any emergent or life threatening medical conditions requiring immediate intervention beyond what was provided in the ED, and I believe patient is safe for discharge.  Regardless, an unremarkable evaluation in the ED does not preclude the development or presence of a serious of life threatening condition. As such, patient was instructed to return immediately for any worsening or change in current symptoms.         Medical Decision Making  Amount and/or Complexity of Data Reviewed  Labs: ordered. Decision-making details documented in ED Course.  Radiology: ordered. Decision-making details documented in ED Course.  ECG/medicine tests: ordered and independent interpretation performed. Decision-making details documented in ED Course.    Risk  Prescription drug  management.                ED Medication(s):  Medications   furosemide injection 40 mg (40 mg Intravenous Given 11/13/23 4378)       Discharge Medication List as of 11/13/2023  5:46 PM           Follow-up Information       Schedule an appointment as soon as possible for a visit  with Vivian Ch MD.    Specialty: Family Medicine  Why: As needed  Contact information:  [id number] [facility]  [place] [address]  [phone]                                 Scribe Attestation:   Scribe #1: I performed the above scribed service and the documentation accurately describes the services I performed. I attest to the accuracy of the note.     Attending:   Physician Attestation Statement for Scribe #1: I, Alyssa Wan MD, personally performed the services described in this documentation, as scribed by Bita Taveras, in my presence, and it is both accurate and complete.           Clinical Impression       ICD-10-CM ICD-9-CM   1. Systolic congestive heart failure, unspecified HF chronicity  I50.20 428.20     428.0   2. Chest pain  R07.9 786.50   3. Shortness of breath  R06.02 786.05   4. Leg swelling  M79.89 729.81       Disposition:   Disposition: Discharged  Condition: Stable         Alsysa Wan MD  11/15/23 [id number]

## 2023-12-03 DIAGNOSIS — Z71.89 COMPLEX CARE COORDINATION: ICD-10-CM

## 2023-12-28 ENCOUNTER — HOSPITAL ENCOUNTER (INPATIENT)
Facility: HOSPITAL | Age: 83
LOS: 2 days | Discharge: HOME HEALTH | DRG: 291 | End: 2023-12-31
Attending: EMERGENCY MEDICINE | Admitting: HOSPITALIST
Payer: MEDICARE

## 2023-12-28 DIAGNOSIS — R07.9 CHEST PAIN: ICD-10-CM

## 2023-12-28 DIAGNOSIS — R60.9 SWELLING: ICD-10-CM

## 2023-12-28 DIAGNOSIS — J96.11 CHRONIC HYPOXEMIC RESPIRATORY FAILURE: Primary | ICD-10-CM

## 2023-12-28 DIAGNOSIS — N50.89 SCROTAL EDEMA: ICD-10-CM

## 2023-12-28 DIAGNOSIS — R06.02 SOB (SHORTNESS OF BREATH): ICD-10-CM

## 2023-12-28 DIAGNOSIS — I50.23 ACUTE ON CHRONIC SYSTOLIC CONGESTIVE HEART FAILURE: ICD-10-CM

## 2023-12-28 PROBLEM — I50.9 CHF EXACERBATION: Status: ACTIVE | Noted: 2023-12-28

## 2023-12-28 PROBLEM — N39.0 UTI (URINARY TRACT INFECTION): Status: ACTIVE | Noted: 2023-12-28

## 2023-12-28 LAB
ALBUMIN SERPL BCP-MCNC: 3.9 G/DL (ref 3.5–5.2)
ALP SERPL-CCNC: 51 U/L (ref 55–135)
ALT SERPL W/O P-5'-P-CCNC: 21 U/L (ref 10–44)
ANION GAP SERPL CALC-SCNC: 12 MMOL/L (ref 8–16)
AST SERPL-CCNC: 36 U/L (ref 10–40)
BACTERIA #/AREA URNS HPF: ABNORMAL /HPF
BASOPHILS # BLD AUTO: 0.11 K/UL (ref 0–0.2)
BASOPHILS NFR BLD: 2 % (ref 0–1.9)
BILIRUB SERPL-MCNC: 2.5 MG/DL (ref 0.1–1)
BILIRUB UR QL STRIP: NEGATIVE
BNP SERPL-MCNC: 3572 PG/ML (ref 0–99)
BUN SERPL-MCNC: 17 MG/DL (ref 8–23)
CALCIUM SERPL-MCNC: 9.1 MG/DL (ref 8.7–10.5)
CHLORIDE SERPL-SCNC: 108 MMOL/L (ref 95–110)
CLARITY UR: CLEAR
CO2 SERPL-SCNC: 24 MMOL/L (ref 23–29)
COLOR UR: YELLOW
CREAT SERPL-MCNC: 0.8 MG/DL (ref 0.5–1.4)
DIFFERENTIAL METHOD BLD: ABNORMAL
EOSINOPHIL # BLD AUTO: 0.3 K/UL (ref 0–0.5)
EOSINOPHIL NFR BLD: 5.8 % (ref 0–8)
ERYTHROCYTE [DISTWIDTH] IN BLOOD BY AUTOMATED COUNT: 26.5 % (ref 11.5–14.5)
EST. GFR  (NO RACE VARIABLE): >60 ML/MIN/1.73 M^2
GLUCOSE SERPL-MCNC: 105 MG/DL (ref 70–110)
GLUCOSE UR QL STRIP: NEGATIVE
HCT VFR BLD AUTO: 34.6 % (ref 40–54)
HGB BLD-MCNC: 11.1 G/DL (ref 14–18)
HGB UR QL STRIP: ABNORMAL
HYALINE CASTS #/AREA URNS LPF: 1 /LPF
IMM GRANULOCYTES # BLD AUTO: 0.01 K/UL (ref 0–0.04)
IMM GRANULOCYTES NFR BLD AUTO: 0.2 % (ref 0–0.5)
KETONES UR QL STRIP: NEGATIVE
LACTATE SERPL-SCNC: 2.1 MMOL/L (ref 0.5–2.2)
LACTATE SERPL-SCNC: 2.6 MMOL/L (ref 0.5–2.2)
LEUKOCYTE ESTERASE UR QL STRIP: ABNORMAL
LIPASE SERPL-CCNC: 9 U/L (ref 4–60)
LYMPHOCYTES # BLD AUTO: 1.1 K/UL (ref 1–4.8)
LYMPHOCYTES NFR BLD: 19.2 % (ref 18–48)
MCH RBC QN AUTO: 24.4 PG (ref 27–31)
MCHC RBC AUTO-ENTMCNC: 32.1 G/DL (ref 32–36)
MCV RBC AUTO: 76 FL (ref 82–98)
MICROSCOPIC COMMENT: ABNORMAL
MONOCYTES # BLD AUTO: 0.6 K/UL (ref 0.3–1)
MONOCYTES NFR BLD: 11.2 % (ref 4–15)
NEUTROPHILS # BLD AUTO: 3.4 K/UL (ref 1.8–7.7)
NEUTROPHILS NFR BLD: 61.6 % (ref 38–73)
NITRITE UR QL STRIP: NEGATIVE
NRBC BLD-RTO: 1 /100 WBC
PH UR STRIP: 7 [PH] (ref 5–8)
PLATELET # BLD AUTO: 352 K/UL (ref 150–450)
PMV BLD AUTO: ABNORMAL FL (ref 9.2–12.9)
POCT GLUCOSE: 110 MG/DL (ref 70–110)
POTASSIUM SERPL-SCNC: 3.8 MMOL/L (ref 3.5–5.1)
PROT SERPL-MCNC: 7.1 G/DL (ref 6–8.4)
PROT UR QL STRIP: ABNORMAL
RBC # BLD AUTO: 4.55 M/UL (ref 4.6–6.2)
RBC #/AREA URNS HPF: 0 /HPF (ref 0–4)
SODIUM SERPL-SCNC: 144 MMOL/L (ref 136–145)
SP GR UR STRIP: 1.02 (ref 1–1.03)
SQUAMOUS #/AREA URNS HPF: 3 /HPF
TROPONIN I SERPL DL<=0.01 NG/ML-MCNC: 0.01 NG/ML (ref 0–0.03)
URN SPEC COLLECT METH UR: ABNORMAL
UROBILINOGEN UR STRIP-ACNC: >=8 EU/DL
WBC # BLD AUTO: 5.56 K/UL (ref 3.9–12.7)
WBC #/AREA URNS HPF: 28 /HPF (ref 0–5)
WBC CLUMPS URNS QL MICRO: ABNORMAL

## 2023-12-28 PROCEDURE — G0378 HOSPITAL OBSERVATION PER HR: HCPCS

## 2023-12-28 PROCEDURE — 87186 SC STD MICRODIL/AGAR DIL: CPT | Performed by: NURSE PRACTITIONER

## 2023-12-28 PROCEDURE — 84484 ASSAY OF TROPONIN QUANT: CPT | Performed by: NURSE PRACTITIONER

## 2023-12-28 PROCEDURE — 83605 ASSAY OF LACTIC ACID: CPT | Mod: 91 | Performed by: HOSPITALIST

## 2023-12-28 PROCEDURE — 87077 CULTURE AEROBIC IDENTIFY: CPT | Performed by: NURSE PRACTITIONER

## 2023-12-28 PROCEDURE — 93010 ELECTROCARDIOGRAM REPORT: CPT | Mod: ,,, | Performed by: INTERNAL MEDICINE

## 2023-12-28 PROCEDURE — 83690 ASSAY OF LIPASE: CPT | Performed by: NURSE PRACTITIONER

## 2023-12-28 PROCEDURE — 81000 URINALYSIS NONAUTO W/SCOPE: CPT | Performed by: NURSE PRACTITIONER

## 2023-12-28 PROCEDURE — 99285 EMERGENCY DEPT VISIT HI MDM: CPT | Mod: 25

## 2023-12-28 PROCEDURE — 80053 COMPREHEN METABOLIC PANEL: CPT | Performed by: NURSE PRACTITIONER

## 2023-12-28 PROCEDURE — 36415 COLL VENOUS BLD VENIPUNCTURE: CPT | Performed by: HOSPITALIST

## 2023-12-28 PROCEDURE — 87040 BLOOD CULTURE FOR BACTERIA: CPT | Performed by: EMERGENCY MEDICINE

## 2023-12-28 PROCEDURE — 63600175 PHARM REV CODE 636 W HCPCS: Performed by: HOSPITALIST

## 2023-12-28 PROCEDURE — 85025 COMPLETE CBC W/AUTO DIFF WBC: CPT | Performed by: NURSE PRACTITIONER

## 2023-12-28 PROCEDURE — 87086 URINE CULTURE/COLONY COUNT: CPT | Performed by: NURSE PRACTITIONER

## 2023-12-28 PROCEDURE — 93005 ELECTROCARDIOGRAM TRACING: CPT

## 2023-12-28 PROCEDURE — 94761 N-INVAS EAR/PLS OXIMETRY MLT: CPT

## 2023-12-28 PROCEDURE — 83880 ASSAY OF NATRIURETIC PEPTIDE: CPT | Performed by: NURSE PRACTITIONER

## 2023-12-28 PROCEDURE — 94640 AIRWAY INHALATION TREATMENT: CPT | Mod: XB

## 2023-12-28 PROCEDURE — 96374 THER/PROPH/DIAG INJ IV PUSH: CPT

## 2023-12-28 PROCEDURE — 63600175 PHARM REV CODE 636 W HCPCS: Performed by: EMERGENCY MEDICINE

## 2023-12-28 PROCEDURE — 25000242 PHARM REV CODE 250 ALT 637 W/ HCPCS: Performed by: EMERGENCY MEDICINE

## 2023-12-28 PROCEDURE — 25000003 PHARM REV CODE 250: Performed by: HOSPITALIST

## 2023-12-28 PROCEDURE — 83605 ASSAY OF LACTIC ACID: CPT | Performed by: NURSE PRACTITIONER

## 2023-12-28 PROCEDURE — 87088 URINE BACTERIA CULTURE: CPT | Performed by: NURSE PRACTITIONER

## 2023-12-28 RX ORDER — MEMANTINE HYDROCHLORIDE 5 MG/1
5 TABLET ORAL 2 TIMES DAILY
Status: DISCONTINUED | OUTPATIENT
Start: 2023-12-28 | End: 2023-12-31 | Stop reason: HOSPADM

## 2023-12-28 RX ORDER — ONDANSETRON 2 MG/ML
4 INJECTION INTRAMUSCULAR; INTRAVENOUS EVERY 6 HOURS PRN
Status: DISCONTINUED | OUTPATIENT
Start: 2023-12-28 | End: 2023-12-31 | Stop reason: HOSPADM

## 2023-12-28 RX ORDER — IBUPROFEN 200 MG
16 TABLET ORAL
Status: DISCONTINUED | OUTPATIENT
Start: 2023-12-28 | End: 2023-12-31 | Stop reason: HOSPADM

## 2023-12-28 RX ORDER — GLUCAGON 1 MG
1 KIT INJECTION
Status: DISCONTINUED | OUTPATIENT
Start: 2023-12-28 | End: 2023-12-31 | Stop reason: HOSPADM

## 2023-12-28 RX ORDER — FUROSEMIDE 10 MG/ML
40 INJECTION INTRAMUSCULAR; INTRAVENOUS
Status: DISCONTINUED | OUTPATIENT
Start: 2023-12-29 | End: 2023-12-29

## 2023-12-28 RX ORDER — FUROSEMIDE 10 MG/ML
60 INJECTION INTRAMUSCULAR; INTRAVENOUS
Status: COMPLETED | OUTPATIENT
Start: 2023-12-28 | End: 2023-12-28

## 2023-12-28 RX ORDER — POTASSIUM CHLORIDE 20 MEQ/1
20 TABLET, EXTENDED RELEASE ORAL DAILY
Status: DISCONTINUED | OUTPATIENT
Start: 2023-12-29 | End: 2023-12-29

## 2023-12-28 RX ORDER — NALOXONE HCL 0.4 MG/ML
0.02 VIAL (ML) INJECTION
Status: DISCONTINUED | OUTPATIENT
Start: 2023-12-28 | End: 2023-12-31 | Stop reason: HOSPADM

## 2023-12-28 RX ORDER — ACETAMINOPHEN 325 MG/1
650 TABLET ORAL EVERY 4 HOURS PRN
Status: DISCONTINUED | OUTPATIENT
Start: 2023-12-28 | End: 2023-12-31 | Stop reason: HOSPADM

## 2023-12-28 RX ORDER — IBUPROFEN 200 MG
24 TABLET ORAL
Status: DISCONTINUED | OUTPATIENT
Start: 2023-12-28 | End: 2023-12-31 | Stop reason: HOSPADM

## 2023-12-28 RX ORDER — SODIUM CHLORIDE 0.9 % (FLUSH) 0.9 %
10 SYRINGE (ML) INJECTION EVERY 12 HOURS PRN
Status: DISCONTINUED | OUTPATIENT
Start: 2023-12-28 | End: 2023-12-31 | Stop reason: HOSPADM

## 2023-12-28 RX ORDER — IPRATROPIUM BROMIDE AND ALBUTEROL SULFATE 2.5; .5 MG/3ML; MG/3ML
3 SOLUTION RESPIRATORY (INHALATION)
Status: COMPLETED | OUTPATIENT
Start: 2023-12-28 | End: 2023-12-28

## 2023-12-28 RX ORDER — INSULIN ASPART 100 [IU]/ML
0-5 INJECTION, SOLUTION INTRAVENOUS; SUBCUTANEOUS
Status: DISCONTINUED | OUTPATIENT
Start: 2023-12-28 | End: 2023-12-31 | Stop reason: HOSPADM

## 2023-12-28 RX ADMIN — IPRATROPIUM BROMIDE AND ALBUTEROL SULFATE 3 ML: 2.5; .5 SOLUTION RESPIRATORY (INHALATION) at 03:12

## 2023-12-28 RX ADMIN — FUROSEMIDE 60 MG: 10 INJECTION, SOLUTION INTRAMUSCULAR; INTRAVENOUS at 06:12

## 2023-12-28 RX ADMIN — MEMANTINE HYDROCHLORIDE 5 MG: 5 TABLET ORAL at 10:12

## 2023-12-28 RX ADMIN — SACUBITRIL AND VALSARTAN 2 TABLET: 49; 51 TABLET, FILM COATED ORAL at 10:12

## 2023-12-28 RX ADMIN — CEFTRIAXONE SODIUM 2 G: 2 INJECTION, POWDER, FOR SOLUTION INTRAMUSCULAR; INTRAVENOUS at 10:12

## 2023-12-28 NOTE — ED PROVIDER NOTES
SCRIBE #1 NOTE: I, Clarisse Rosas, am scribing for, and in the presence of, Kamille Durand DO. I have scribed the entire note.       History     Chief Complaint   Patient presents with    Groin Swelling     Pt. Reports he thinks he hurt himself picking up on his wife. He is having difficulty urinating. He reports swelling to his penis.      Review of patient's allergies indicates:   Allergen Reactions    Fish containing products     Iodine and iodide containing products     Shellfish containing products          History of Present Illness     HPI    12/28/2023, 3:11 PM  History obtained from the patient      History of Present Illness: Richy Douglas is a 83 y.o. male patient with a PMHx of aortic aneurysm, HTN, prostate cancer, CHF, emphysema, HLD, and AFIB who presents to the Emergency Department for evaluation of worsening groin swelling which onset gradually in March. Pt states that he noticed the swelling many months ago after what he thinks hurting himself from trying to  his wife. He states that he has difficulty urinating for the last several weeks, unable to go when he feels he needs to, but wears a diper because he will have urinary incontinence frequently. Symptoms are constant and worsening in severity. No mitigating or exacerbating factors reported. Associated sxs include SOB, urinary incontinence, urinary urgency, and difficulty urinating. Patient denies any fever, abdominal pain, n/v/d, CP, and all other sxs at this time. No prior tx. No further complaints or concerns at this time.       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    Anemia of chronic disease     Aortic aneurysm     Atrial fibrillation with RVR 09/24/2021    Chronic systolic congestive heart failure 08/31/2017    Emphysema of lung 08/31/2017    GERD (gastroesophageal reflux disease)     Hypertension      Knee osteoarthritis 10/21/2022    Major neurocognitive disorder due to multiple etiologies 4/14/2023    Microcytic anemia 11/24/2020    Other hyperlipidemia 04/27/2021    Personal history of colonic polyps 2022    Prostate cancer        Past Surgical History:  Past Surgical History:   Procedure Laterality Date    CATHETERIZATION OF BOTH LEFT AND RIGHT HEART N/A 1/13/2022    Procedure: CATHETERIZATION, HEART, BOTH LEFT AND RIGHT;  Surgeon: Janneth Tovar MD;  Location: [facility] CATH LAB;  Service: Cardiology;  Laterality: N/A;  poss cx    COLONOSCOPY      COLONOSCOPY N/A 6/30/2022    Procedure: COLONOSCOPY;  Surgeon: Sandra Perez MD;  Location: [facility] ENDO;  Service: Endoscopy;  Laterality: N/A;    COLONOSCOPY N/A 7/1/2022    Procedure: COLONOSCOPY;  Surgeon: Woodrow Christian MD;  Location: [facility];  Service: Endoscopy;  Laterality: N/A;    CORONARY ANGIOGRAPHY N/A 1/13/2022    Procedure: ANGIOGRAM, CORONARY ARTERY;  Surgeon: Janneth Tovar MD;  Location: [facility] CATH LAB;  Service: Cardiology;  Laterality: N/A;    ESOPHAGOGASTRODUODENOSCOPY N/A 6/30/2022    Procedure: EGD (ESOPHAGOGASTRODUODENOSCOPY);  Surgeon: Sandra Perez MD;  Location: [facility];  Service: Endoscopy;  Laterality: N/A;    INJECTION OF ANESTHETIC AGENT AROUND NERVE Bilateral 6/20/2023    Procedure: Bilateral Genicular nerve block RN IV Sedation;  Surgeon: Devon Grant MD;  Location: [facility] MGT;  Service: Pain Management;  Laterality: Bilateral;    INJECTION OF JOINT Bilateral 10/21/2022    Procedure: Bilateral intraarticular knee injection with local ALLERGIC TO IODINE;  Surgeon: Devon Grant MD;  Location: [facility] PAIN MGT;  Service: Pain Management;  Laterality: Bilateral;    INJECTION OF JOINT Bilateral 1/31/2023    Procedure: Bilateral IA knee joint injection with steroid RN IV Sedation;  Surgeon: Devon Grant MD;  Location: [facility] PAIN MGT;  Service: Pain Management;  Laterality: Bilateral;    INJECTION OF JOINT  Bilateral 5/10/2023    Procedure: Bilateral IA knee joint injection Synvisc RN IV Sedation;  Surgeon: Devon Grant MD;  Location: [facility] PAIN MGT;  Service: Pain Management;  Laterality: Bilateral;    INJECTION OF JOINT Bilateral 2023    Procedure: Bilateral intraarticular knee injection;  Surgeon: Devon Grant MD;  Location: [facility] MGT;  Service: Pain Management;  Laterality: Bilateral;    PROSTATE SURGERY      RADIOFREQUENCY THERMOCOAGULATION Left 2023    Procedure: Left Genicular Nerve RFA;  Surgeon: Devon Grant MD;  Location: [facility] PAIN MGT;  Service: Pain Management;  Laterality: Left;    RADIOFREQUENCY THERMOCOAGULATION Right 9/15/2023    Procedure: Right Genicular Nerve RFA RN IV Sedation;  Surgeon: Devon Grant MD;  [facility];  Service: Pain Management;  Laterality: Right;    SPINE SURGERY           Family History:  Family History   Problem Relation Age of Onset    Diabetes Mother     Peripheral vascular disease Mother        Social History:  Social History     Tobacco Use    Smoking status: Former     Current packs/day: 0.00     Average packs/day: 1 pack/day for 69.2 years (69.2 ttl pk-yrs)     Types: Cigarettes     Start date: 1954     Quit date: 3/1/2023     Years since quittin.8    Smokeless tobacco: Never   Substance and Sexual Activity    Alcohol use: Yes     Comment: reports only drinks red wine when games are on    Drug use: Never    Sexual activity: Yes     Partners: Female        Review of Systems     Review of Systems   Constitutional:  Negative for fever.   Respiratory:  Positive for shortness of breath.    Cardiovascular:  Negative for chest pain.   Gastrointestinal:  Negative for abdominal pain, diarrhea, nausea and vomiting.   Genitourinary:  Positive for difficulty urinating, frequency, penile swelling, scrotal swelling and urgency.        (+) Urinary incontinence         Physical Exam     Initial Vitals [23 1005]   BP Pulse Resp Temp SpO2  "  (!) 155/72 94 20 97.5 °F (36.4 °C) 100 %      MAP       --          Physical Exam  Nursing Notes and Vital Signs Reviewed.  Constitutional: Patient is in no acute distress. Frail appearing.  Head:  Atraumatic. Normocephalic.  Eyes: PERRL. No  conjunctival pallor. EOM intact. No scleral icterus.  ENT: Mucous membranes are moist.     Neck: Supple. Full ROM.   JVD present.  Cardiovascular: Regular rate. Regular rhythm.   Pulmonary/Chest: Diminished lung sounds with prolonged aeration and expiratory wheezing.   Abdominal: Soft and non-distended.  There is  tenderness to palpation in the right upper quadrant greater than epigastric region.  No rebound, guarding, or rigidity. Bladder not palpable.  Genitourinary: No CVA tenderness.   Diffuse edema of bilateral inguinal area, penis, and scrotum. Pitting edema of the scrotal wall and penile shaft. Phimosis present without signs of paraphimosis.  Musculoskeletal: Moves all extremities. No obvious deformities. 3+ pitting edema to lower extremities.  Skin: Warm and dry.  Neurological:  Alert, awake, and appropriate.  Normal speech.  No acute focal neurological deficits are appreciated.  Psychiatric: Normal affect. Good eye contact. Appropriate in content.     ED Course   Procedures  ED Vital Signs:  Vitals:    12/28/23 1520 12/28/23 1523 12/28/23 1527 12/28/23 1747   BP:       Pulse: 85 86 85    Resp: 18 18 18    Temp:       TempSrc:       SpO2: 97% 97% 97%    Weight:    74 kg (163 lb 2.3 oz)   Height:        12/28/23 1833 12/28/23 2119 12/28/23 2235 12/28/23 2240   BP: (!) 141/65  (!) 130/58    Pulse: 84 87 89    Resp:   18    Temp:   97.5 °F (36.4 °C)    TempSrc:   Oral    SpO2:  97% 98%    Weight:    75.8 kg (167 lb 1.7 oz)   Height:    5' 8" (1.727 m)    12/29/23 0100 12/29/23 0400 12/29/23 0444 12/29/23 0536   BP:   (!) 109/56    Pulse: 88 68 82 77   Resp:   18    Temp:   98.1 °F (36.7 °C)    TempSrc:   Oral    SpO2:   (!) 90%    Weight:       Height:        12/29/23 " 0809 12/29/23 1148 12/29/23 1647   BP: (!) 89/45 (!) 91/50 131/70   Pulse: 83 100 103   Resp: 18 18 18   Temp: 98.2 °F (36.8 °C) 97.4 °F (36.3 °C) 97.5 °F (36.4 °C)   TempSrc:  Oral Oral   SpO2: (!) 94% (!) 93% (!) 94%   Weight:      Height:          Abnormal Lab Results:  Labs Reviewed   URINALYSIS, REFLEX TO URINE CULTURE - Abnormal; Notable for the following components:       Result Value    Protein, UA 2+ (*)     Occult Blood UA 2+ (*)     Urobilinogen, UA >=8.0 (*)     Leukocytes, UA 1+ (*)     All other components within normal limits    Narrative:     Specimen Source->Urine   CBC W/ AUTO DIFFERENTIAL - Abnormal; Notable for the following components:    RBC 4.55 (*)     Hemoglobin 11.1 (*)     Hematocrit 34.6 (*)     MCV 76 (*)     MCH 24.4 (*)     RDW 26.5 (*)     nRBC 1 (*)     Basophil % 2.0 (*)     All other components within normal limits   COMPREHENSIVE METABOLIC PANEL - Abnormal; Notable for the following components:    Total Bilirubin 2.5 (*)     Alkaline Phosphatase 51 (*)     All other components within normal limits   LACTIC ACID, PLASMA - Abnormal; Notable for the following components:    Lactate (Lactic Acid) 2.6 (*)     All other components within normal limits   B-TYPE NATRIURETIC PEPTIDE - Abnormal; Notable for the following components:    BNP 3,572 (*)     All other components within normal limits   URINALYSIS MICROSCOPIC - Abnormal; Notable for the following components:    WBC, UA 28 (*)     WBC Clumps, UA Occasional (*)     Bacteria Many (*)     All other components within normal limits    Narrative:     Specimen Source->Urine   CULTURE, URINE   LIPASE   TROPONIN I   LIPASE   TROPONIN I   POCT GLUCOSE MONITORING CONTINUOUS        All Lab Results:  Results for orders placed or performed during the hospital encounter of 12/28/23   Blood culture #1 **CANNOT BE ORDERED STAT**    Specimen: Peripheral, Antecubital, Right; Blood   Result Value Ref Range    Blood Culture, Routine No Growth to date     Blood culture #2 **CANNOT BE ORDERED STAT**    Specimen: Peripheral, Forearm, Left; Blood   Result Value Ref Range    Blood Culture, Routine No Growth to date    Urinalysis, Reflex to Urine Culture Urine, Clean Catch    Specimen: Urine   Result Value Ref Range    Specimen UA Urine, Clean Catch     Color, UA Yellow Yellow, Straw, Zayda    Appearance, UA Clear Clear    pH, UA 7.0 5.0 - 8.0    Specific Gravity, UA 1.020 1.005 - 1.030    Protein, UA 2+ (A) Negative    Glucose, UA Negative Negative    Ketones, UA Negative Negative    Bilirubin (UA) Negative Negative    Occult Blood UA 2+ (A) Negative    Nitrite, UA Negative Negative    Urobilinogen, UA >=8.0 (A) <2.0 EU/dL    Leukocytes, UA 1+ (A) Negative   CBC auto differential   Result Value Ref Range    WBC 5.56 3.90 - 12.70 K/uL    RBC 4.55 (L) 4.60 - 6.20 M/uL    Hemoglobin 11.1 (L) 14.0 - 18.0 g/dL    Hematocrit 34.6 (L) 40.0 - 54.0 %    MCV 76 (L) 82 - 98 fL    MCH 24.4 (L) 27.0 - 31.0 pg    MCHC 32.1 32.0 - 36.0 g/dL    RDW 26.5 (H) 11.5 - 14.5 %    Platelets 352 150 - 450 K/uL    MPV SEE COMMENT 9.2 - 12.9 fL    Immature Granulocytes 0.2 0.0 - 0.5 %    Gran # (ANC) 3.4 1.8 - 7.7 K/uL    Immature Grans (Abs) 0.01 0.00 - 0.04 K/uL    Lymph # 1.1 1.0 - 4.8 K/uL    Mono # 0.6 0.3 - 1.0 K/uL    Eos # 0.3 0.0 - 0.5 K/uL    Baso # 0.11 0.00 - 0.20 K/uL    nRBC 1 (A) 0 /100 WBC    Gran % 61.6 38.0 - 73.0 %    Lymph % 19.2 18.0 - 48.0 %    Mono % 11.2 4.0 - 15.0 %    Eosinophil % 5.8 0.0 - 8.0 %    Basophil % 2.0 (H) 0.0 - 1.9 %    Differential Method Automated    Comprehensive metabolic panel   Result Value Ref Range    Sodium 144 136 - 145 mmol/L    Potassium 3.8 3.5 - 5.1 mmol/L    Chloride 108 95 - 110 mmol/L    CO2 24 23 - 29 mmol/L    Glucose 105 70 - 110 mg/dL    BUN 17 8 - 23 mg/dL    Creatinine 0.8 0.5 - 1.4 mg/dL    Calcium 9.1 8.7 - 10.5 mg/dL    Total Protein 7.1 6.0 - 8.4 g/dL    Albumin 3.9 3.5 - 5.2 g/dL    Total Bilirubin 2.5 (H) 0.1 - 1.0 mg/dL     Alkaline Phosphatase 51 (L) 55 - 135 U/L    AST 36 10 - 40 U/L    ALT 21 10 - 44 U/L    eGFR >60 >60 mL/min/1.73 m^2    Anion Gap 12 8 - 16 mmol/L   Lactic acid, plasma   Result Value Ref Range    Lactate (Lactic Acid) 2.6 (H) 0.5 - 2.2 mmol/L   Brain natriuretic peptide   Result Value Ref Range    BNP 3,572 (H) 0 - 99 pg/mL   Lipase   Result Value Ref Range    Lipase 9 4 - 60 U/L   Troponin I   Result Value Ref Range    Troponin I 0.015 0.000 - 0.026 ng/mL   Urinalysis Microscopic   Result Value Ref Range    RBC, UA 0 0 - 4 /hpf    WBC, UA 28 (H) 0 - 5 /hpf    WBC Clumps, UA Occasional (A) None-Rare    Bacteria Many (A) None-Occ /hpf    Squam Epithel, UA 3 /hpf    Hyaline Casts, UA 1 0-1/lpf /lpf    Microscopic Comment SEE COMMENT    Lactic acid, plasma   Result Value Ref Range    Lactate (Lactic Acid) 2.1 0.5 - 2.2 mmol/L   Basic Metabolic Panel (BMP)   Result Value Ref Range    Sodium 144 136 - 145 mmol/L    Potassium 3.2 (L) 3.5 - 5.1 mmol/L    Chloride 108 95 - 110 mmol/L    CO2 24 23 - 29 mmol/L    Glucose 102 70 - 110 mg/dL    BUN 16 8 - 23 mg/dL    Creatinine 0.8 0.5 - 1.4 mg/dL    Calcium 8.9 8.7 - 10.5 mg/dL    Anion Gap 12 8 - 16 mmol/L    eGFR >60 >60 mL/min/1.73 m^2   Magnesium   Result Value Ref Range    Magnesium 1.7 1.6 - 2.6 mg/dL   CBC with Automated Differential   Result Value Ref Range    WBC 6.53 3.90 - 12.70 K/uL    RBC 4.34 (L) 4.60 - 6.20 M/uL    Hemoglobin 10.6 (L) 14.0 - 18.0 g/dL    Hematocrit 32.9 (L) 40.0 - 54.0 %    MCV 76 (L) 82 - 98 fL    MCH 24.4 (L) 27.0 - 31.0 pg    MCHC 32.2 32.0 - 36.0 g/dL    RDW 26.1 (H) 11.5 - 14.5 %    Platelets 316 150 - 450 K/uL    MPV 9.8 9.2 - 12.9 fL    Immature Granulocytes 0.3 0.0 - 0.5 %    Gran # (ANC) 4.4 1.8 - 7.7 K/uL    Immature Grans (Abs) 0.02 0.00 - 0.04 K/uL    Lymph # 0.8 (L) 1.0 - 4.8 K/uL    Mono # 0.8 0.3 - 1.0 K/uL    Eos # 0.5 0.0 - 0.5 K/uL    Baso # 0.09 0.00 - 0.20 K/uL    nRBC 1 (A) 0 /100 WBC    Gran % 66.7 38.0 - 73.0 %    Lymph %  12.1 (L) 18.0 - 48.0 %    Mono % 12.6 4.0 - 15.0 %    Eosinophil % 6.9 0.0 - 8.0 %    Basophil % 1.4 0.0 - 1.9 %    Platelet Estimate Appears normal     Aniso Moderate     Poik Moderate     Poly Occasional     Hypo Occasional     Ovalocytes Occasional     Target Cells Moderate     Spherocytes Occasional     Differential Method Automated    POCT glucose   Result Value Ref Range    POCT Glucose 110 70 - 110 mg/dL   POCT glucose   Result Value Ref Range    POCT Glucose 95 70 - 110 mg/dL   POCT glucose   Result Value Ref Range    POCT Glucose 118 (H) 70 - 110 mg/dL   POCT glucose   Result Value Ref Range    POCT Glucose 98 70 - 110 mg/dL         Imaging Results:  Imaging Results              US Scrotum And Testicles (Final result)  Result time 12/28/23 18:23:59      Final result by Sabiha Solares MD (12/28/23 18:23:59)                   Impression:      No testicular pathology seen; spermatoceles or extratesticular fluid      Electronically signed by: Sabiha Solares  Date:    12/28/2023  Time:    18:23               Narrative:    EXAMINATION:  US SCROTUM AND TESTICLES    CLINICAL HISTORY:  Edema, unspecified    TECHNIQUE:  Sonography of the scrotum and testes.    COMPARISON:  None.    FINDINGS:  Testes are normal size with positive Doppler flow.  Bilateral spermatocele or scrotal fluid.                                       CT Abdomen Pelvis  Without Contrast (Final result)  Result time 12/28/23 15:55:22      Final result by Reed Pollard MD (12/28/23 15:55:22)                   Impression:      1. Marked scrotal and penile edema with large right hydrocele and at least small left hydrocele which are not entirely included on this exam.  Anasarca with cardiomegaly and small volume ascites suggest fluid overload which may be the etiology of this pelvic induration and scrotal fluid.  Infectious Disease of the perineum is also a consideration; however no subcutaneous gas is present on this exam (which does not  include the lower scrotum) to suggest gas-forming infectious process.  2. Other findings as above.      Electronically signed by: Reed Garcia  Date:    12/28/2023  Time:    15:55               Narrative:    EXAMINATION:  CT ABDOMEN PELVIS WITHOUT CONTRAST    CLINICAL HISTORY:  Lymphadenopathy, groin;Penile, scrotal, suprapubic swelling and pain;    COMPARISON:  None available.    TECHNIQUE:  Axial CT images were obtained of the abdomen and pelvis.  Iterative reconstruction technique was used. The CT exam was performed using one or more of the following dose reduction techniques- Automated exposure control, adjustment of the mA and/or kV according to patient size, and/or use of iterative reconstructed technique.    FINDINGS:  Heart: Cardiomegaly.    Lung Bases: Bibasilar scarring and centrilobular emphysematous changes.    Liver: Unremarkable.    Gallbladder: Low-density stones and or sludge in the otherwise normal appearing gallbladder.    Bile Ducts: No evidence of dilated ducts.    Pancreas: No mass or peripancreatic fat stranding.    Spleen: Unremarkable.    Adrenals: Unremarkable.    Kidneys/ Ureters: Right renal cyst.  No hydronephrosis or nephrolithiasis on either side.  Both ureters are unremarkable.    Bladder: No evidence of wall thickening.    Reproductive organs: Significant scrotal swelling and edema with large right hydrocele and at least small right hydrocele which are not entirely included on this exam.  Significant subcutaneous edema extends up the anterior pelvic soft tissues.  No soft tissue gas is demonstrated on this exam.  The lower scrotum and mid penis are not included on this exam.    GI Tract/Mesentery: No evidence of bowel obstruction or acute inflammation.    Peritoneal Space: Trace perihepatic ascites and small volume fluid in the pelvis.    Retroperitoneum: No adenopathy.    Abdominal wall: Anasarca.    Vasculature: Marked atherosclerotic calcifications.  No aortic aneurysm.    Bones:  No acute fracture.                                       X-Ray Chest 1 View (Final result)  Result time 12/28/23 15:26:29      Final result by Reed Pollard MD (12/28/23 15:26:29)                   Impression:      Stable chest with chronic lung disease.      Electronically signed by: Reed Pollard  Date:    12/28/2023  Time:    15:26               Narrative:    EXAMINATION:  XR CHEST 1 VIEW    CLINICAL HISTORY:  Edema, unspecified    FINDINGS:  Comparison November 13 2023.    Cardiomediastinal silhouette is enlarged.  Similar bilateral pulmonary scarring and fibrosis.  Emphysematous findings.                                       The EKG was ordered, reviewed, and independently interpreted by the ED provider.  Interpretation time: 18:59  Rate: 85 BPM  Rhythm:  Sinus rhythm with frequent Premature ventricular complexes   Interpretation: Rightward axis. Minimal voltage. No STEMI.           The Emergency Provider reviewed the vital signs and test results, which are outlined above.     ED Discussion       7:26 PM: Discussed case with Senait Cody NP ([facility]). Dr. Upton agrees with current care and management of pt and accepts admission.   Admitting Service:   Admitting Physician: Dr. Upton  Admit to: OBS med/tele  7:26 PM: Re-evaluated pt. I have discussed test results, shared treatment plan, and the need for admission with patient and family at bedside. Pt and family express understanding at this time and agree with all information. All questions answered. Pt and family have no further questions or concerns at this time. Pt is ready for admit.      ED Course as of 12/29/23 1856   Thu Dec 28, 2023   1903  EKG: Sinus rhythm with frequent PVCs.  Rate 85.  Right axis deviation.  MT interval 192.  QRS 72 prolonged .  LVH.  No STEMI. [NF]   1904   83-year-old male presents with severe swelling to his penis and testicles.  Ultrasound shows no signs of torsion and CT negative for small-bowel  obstruction, incarcerated or strangulated hernia, or Eitan's gangrene.   Chest x-ray shows emphysema.  Treated with bronchodilators and IV Lasix.  BNP 3500.   CMP shows normal renal function and electrolytes.  CBC shows no leukocytosis with baseline H&H.  Additionally he is afebrile with no other vital signs to suggest sepsis despite a lactic acid being elevated at 2.6.  Given his severe edema IV fluids contraindicated.  EKG shows PVCs with right axis deviation, prolonged QTC interval, and LVH.  Bladder scan showing 107 cc, not retaining urine.  Lipase negative for acute pancreatitis and troponin negative for ACS.   Given severity of the swelling patient may benefit from IV diuresis.  [NF]      ED Course User Index  [NF] Kamille Durand, DO     Medical Decision Making  Amount and/or Complexity of Data Reviewed  Labs: ordered. Decision-making details documented in ED Course.  Radiology: ordered and independent interpretation performed. Decision-making details documented in ED Course.  ECG/medicine tests: ordered and independent interpretation performed. Decision-making details documented in ED Course.    Risk  Prescription drug management.  Decision regarding hospitalization.                ED Medication(s):  Medications   cefTRIAXone (ROCEPHIN) 2 g in dextrose 5 % in water (D5W) 100 mL IVPB (MB+) (0 g Intravenous Stopped 12/28/23 2320)   sodium chloride 0.9% flush 10 mL (has no administration in time range)   naloxone 0.4 mg/mL injection 0.02 mg (has no administration in time range)   glucose chewable tablet 16 g (has no administration in time range)   glucose chewable tablet 24 g (has no administration in time range)   glucagon (human recombinant) injection 1 mg (has no administration in time range)   acetaminophen tablet 650 mg (650 mg Oral Given 12/29/23 0905)   ondansetron injection 4 mg (has no administration in time range)   dextrose 10% bolus 125 mL 125 mL (has no administration in time range)   dextrose  10% bolus 250 mL 250 mL (has no administration in time range)   glucose chewable tablet 16 g (has no administration in time range)   glucose chewable tablet 24 g (has no administration in time range)   glucagon (human recombinant) injection 1 mg (has no administration in time range)   insulin aspart U-100 pen 0-5 Units (has no administration in time range)   dextrose 10% bolus 125 mL 125 mL (has no administration in time range)   dextrose 10% bolus 250 mL 250 mL (has no administration in time range)   metoprolol succinate (TOPROL-XL) 24 hr split tablet 12.5 mg (12.5 mg Oral Given 12/29/23 1236)   sacubitriL-valsartan 49-51 mg per tablet 2 tablet (2 tablets Oral Not Given 12/29/23 0904)   memantine tablet 5 mg (5 mg Oral Given 12/29/23 0904)   sodium chloride 0.9% flush 10 mL (has no administration in time range)   furosemide injection 20 mg (20 mg Intravenous Given 12/29/23 1647)   albuterol-ipratropium 2.5 mg-0.5 mg/3 mL nebulizer solution 3 mL (has no administration in time range)   potassium chloride SA CR tablet 40 mEq (40 mEq Oral Given 12/29/23 1821)   magnesium oxide tablet 400 mg (has no administration in time range)   LORazepam injection 1 mg (has no administration in time range)   budesonide nebulizer solution 0.5 mg (has no administration in time range)   albuterol-ipratropium 2.5 mg-0.5 mg/3 mL nebulizer solution 3 mL (3 mLs Nebulization Given 12/28/23 1527)   furosemide injection 60 mg (60 mg Intravenous Given 12/28/23 1849)   magnesium oxide tablet 400 mg (400 mg Oral Given 12/29/23 1236)   potassium chloride SA CR tablet 40 mEq (40 mEq Oral Given 12/29/23 1236)       Current Discharge Medication List                  Scribe Attestation:   Scribe #1: I performed the above scribed service and the documentation accurately describes the services I performed. I attest to the accuracy of the note.     Attending:   Physician Attestation Statement for Scribe #1: I, Kamille Durand DO, personally performed  the services described in this documentation, as scribed by Clarisse Rosas, in my presence, and it is both accurate and complete.           Clinical Impression       ICD-10-CM ICD-9-CM   1. Swelling  R60.9 782.3   2. SOB (shortness of breath)  R06.02 786.05   3. Chest pain  R07.9 786.50   4. Scrotal edema  N50.89 608.86       Disposition:   Disposition: Placed in Observation  Condition: Stable         Kamille Durand,   12/29/23 1856

## 2023-12-28 NOTE — FIRST PROVIDER EVALUATION
Medical screening examination initiated.  I have conducted a focused provider triage encounter, findings are as follows:    Brief history of present illness:  Patient presents with genitalia and scrotal swelling.    Vitals:    12/28/23 1005 12/28/23 1421   BP: (!) 155/72 (!) 145/73   BP Location: Right arm Left arm   Patient Position: Sitting Sitting   Pulse: 94 88   Resp: 20 18   Temp: 97.5 °F (36.4 °C)    TempSrc: Oral    SpO2: 100% 95%       Pertinent physical exam:  Penis is grossly swollen and tender to palpation, the scrotum is also swollen the suprapubic region is grossly swollen.    Brief workup plan:  Labs, imaging, MD to see    Preliminary workup initiated; this workup will be continued and followed by the physician or advanced practice provider that is assigned to the patient when roomed.

## 2023-12-28 NOTE — FIRST PROVIDER EVALUATION
"Medical screening examination initiated.  I have conducted a focused provider triage encounter, findings are as follows:    Brief history of present illness:  Patient presents to ER for genital pain/swelling. He states has been present for the past several months. Contributes symptoms to "picking up on his wife" who is on hospice.    There were no vitals filed for this visit.    Pertinent physical exam:  no acute distress    Brief workup plan:  UA, room for examination.    Preliminary workup initiated; this workup will be continued and followed by the physician or advanced practice provider that is assigned to the patient when roomed.  "

## 2023-12-29 PROBLEM — I50.23 ACUTE ON CHRONIC SYSTOLIC CONGESTIVE HEART FAILURE: Status: ACTIVE | Noted: 2023-12-29

## 2023-12-29 PROBLEM — J96.11 CHRONIC HYPOXEMIC RESPIRATORY FAILURE: Status: ACTIVE | Noted: 2023-12-29

## 2023-12-29 LAB
ANION GAP SERPL CALC-SCNC: 12 MMOL/L (ref 8–16)
ANISOCYTOSIS BLD QL SMEAR: ABNORMAL
BASOPHILS # BLD AUTO: 0.09 K/UL (ref 0–0.2)
BASOPHILS NFR BLD: 1.4 % (ref 0–1.9)
BUN SERPL-MCNC: 16 MG/DL (ref 8–23)
CALCIUM SERPL-MCNC: 8.9 MG/DL (ref 8.7–10.5)
CHLORIDE SERPL-SCNC: 108 MMOL/L (ref 95–110)
CO2 SERPL-SCNC: 24 MMOL/L (ref 23–29)
CREAT SERPL-MCNC: 0.8 MG/DL (ref 0.5–1.4)
DIFFERENTIAL METHOD BLD: ABNORMAL
EOSINOPHIL # BLD AUTO: 0.5 K/UL (ref 0–0.5)
EOSINOPHIL NFR BLD: 6.9 % (ref 0–8)
ERYTHROCYTE [DISTWIDTH] IN BLOOD BY AUTOMATED COUNT: 26.1 % (ref 11.5–14.5)
EST. GFR  (NO RACE VARIABLE): >60 ML/MIN/1.73 M^2
GLUCOSE SERPL-MCNC: 102 MG/DL (ref 70–110)
HCT VFR BLD AUTO: 32.9 % (ref 40–54)
HGB BLD-MCNC: 10.6 G/DL (ref 14–18)
HYPOCHROMIA BLD QL SMEAR: ABNORMAL
IMM GRANULOCYTES # BLD AUTO: 0.02 K/UL (ref 0–0.04)
IMM GRANULOCYTES NFR BLD AUTO: 0.3 % (ref 0–0.5)
LYMPHOCYTES # BLD AUTO: 0.8 K/UL (ref 1–4.8)
LYMPHOCYTES NFR BLD: 12.1 % (ref 18–48)
MAGNESIUM SERPL-MCNC: 1.7 MG/DL (ref 1.6–2.6)
MCH RBC QN AUTO: 24.4 PG (ref 27–31)
MCHC RBC AUTO-ENTMCNC: 32.2 G/DL (ref 32–36)
MCV RBC AUTO: 76 FL (ref 82–98)
MONOCYTES # BLD AUTO: 0.8 K/UL (ref 0.3–1)
MONOCYTES NFR BLD: 12.6 % (ref 4–15)
NEUTROPHILS # BLD AUTO: 4.4 K/UL (ref 1.8–7.7)
NEUTROPHILS NFR BLD: 66.7 % (ref 38–73)
NRBC BLD-RTO: 1 /100 WBC
OVALOCYTES BLD QL SMEAR: ABNORMAL
PLATELET # BLD AUTO: 316 K/UL (ref 150–450)
PLATELET BLD QL SMEAR: ABNORMAL
PMV BLD AUTO: 9.8 FL (ref 9.2–12.9)
POCT GLUCOSE: 117 MG/DL (ref 70–110)
POCT GLUCOSE: 118 MG/DL (ref 70–110)
POCT GLUCOSE: 95 MG/DL (ref 70–110)
POCT GLUCOSE: 98 MG/DL (ref 70–110)
POIKILOCYTOSIS BLD QL SMEAR: ABNORMAL
POLYCHROMASIA BLD QL SMEAR: ABNORMAL
POTASSIUM SERPL-SCNC: 3.2 MMOL/L (ref 3.5–5.1)
RBC # BLD AUTO: 4.34 M/UL (ref 4.6–6.2)
SODIUM SERPL-SCNC: 144 MMOL/L (ref 136–145)
SPHEROCYTES BLD QL SMEAR: ABNORMAL
TARGETS BLD QL SMEAR: ABNORMAL
WBC # BLD AUTO: 6.53 K/UL (ref 3.9–12.7)

## 2023-12-29 PROCEDURE — 36415 COLL VENOUS BLD VENIPUNCTURE: CPT | Performed by: HOSPITALIST

## 2023-12-29 PROCEDURE — 80321 ALCOHOLS BIOMARKERS 1OR 2: CPT | Performed by: FAMILY MEDICINE

## 2023-12-29 PROCEDURE — 25000003 PHARM REV CODE 250: Performed by: HOSPITALIST

## 2023-12-29 PROCEDURE — 85025 COMPLETE CBC W/AUTO DIFF WBC: CPT | Performed by: HOSPITALIST

## 2023-12-29 PROCEDURE — 63600175 PHARM REV CODE 636 W HCPCS: Performed by: NURSE PRACTITIONER

## 2023-12-29 PROCEDURE — 94761 N-INVAS EAR/PLS OXIMETRY MLT: CPT

## 2023-12-29 PROCEDURE — 27000221 HC OXYGEN, UP TO 24 HOURS

## 2023-12-29 PROCEDURE — 99900035 HC TECH TIME PER 15 MIN (STAT)

## 2023-12-29 PROCEDURE — 25000003 PHARM REV CODE 250: Performed by: FAMILY MEDICINE

## 2023-12-29 PROCEDURE — 25000242 PHARM REV CODE 250 ALT 637 W/ HCPCS: Performed by: FAMILY MEDICINE

## 2023-12-29 PROCEDURE — 99223 1ST HOSP IP/OBS HIGH 75: CPT | Mod: ,,, | Performed by: INTERNAL MEDICINE

## 2023-12-29 PROCEDURE — 36415 COLL VENOUS BLD VENIPUNCTURE: CPT | Mod: XB | Performed by: FAMILY MEDICINE

## 2023-12-29 PROCEDURE — 94640 AIRWAY INHALATION TREATMENT: CPT

## 2023-12-29 PROCEDURE — 25000003 PHARM REV CODE 250: Performed by: NURSE PRACTITIONER

## 2023-12-29 PROCEDURE — 63600175 PHARM REV CODE 636 W HCPCS: Performed by: HOSPITALIST

## 2023-12-29 PROCEDURE — 11000001 HC ACUTE MED/SURG PRIVATE ROOM

## 2023-12-29 PROCEDURE — 80048 BASIC METABOLIC PNL TOTAL CA: CPT | Performed by: HOSPITALIST

## 2023-12-29 PROCEDURE — 83735 ASSAY OF MAGNESIUM: CPT | Performed by: HOSPITALIST

## 2023-12-29 RX ORDER — FUROSEMIDE 10 MG/ML
60 INJECTION INTRAMUSCULAR; INTRAVENOUS
Status: DISCONTINUED | OUTPATIENT
Start: 2023-12-29 | End: 2023-12-29

## 2023-12-29 RX ORDER — FUROSEMIDE 10 MG/ML
20 INJECTION INTRAMUSCULAR; INTRAVENOUS
Status: DISCONTINUED | OUTPATIENT
Start: 2023-12-29 | End: 2023-12-31 | Stop reason: HOSPADM

## 2023-12-29 RX ORDER — FUROSEMIDE 10 MG/ML
40 INJECTION INTRAMUSCULAR; INTRAVENOUS
Status: DISCONTINUED | OUTPATIENT
Start: 2023-12-29 | End: 2023-12-29

## 2023-12-29 RX ORDER — LORAZEPAM 2 MG/ML
1 INJECTION INTRAMUSCULAR
Status: DISCONTINUED | OUTPATIENT
Start: 2023-12-29 | End: 2023-12-31 | Stop reason: HOSPADM

## 2023-12-29 RX ORDER — BUDESONIDE 0.25 MG/2ML
0.5 INHALANT ORAL EVERY 12 HOURS
Status: DISCONTINUED | OUTPATIENT
Start: 2023-12-29 | End: 2023-12-31 | Stop reason: HOSPADM

## 2023-12-29 RX ORDER — SODIUM CHLORIDE 0.9 % (FLUSH) 0.9 %
10 SYRINGE (ML) INJECTION
Status: DISCONTINUED | OUTPATIENT
Start: 2023-12-29 | End: 2023-12-31 | Stop reason: HOSPADM

## 2023-12-29 RX ORDER — LANOLIN ALCOHOL/MO/W.PET/CERES
400 CREAM (GRAM) TOPICAL
Status: COMPLETED | OUTPATIENT
Start: 2023-12-29 | End: 2023-12-29

## 2023-12-29 RX ORDER — IPRATROPIUM BROMIDE AND ALBUTEROL SULFATE 2.5; .5 MG/3ML; MG/3ML
3 SOLUTION RESPIRATORY (INHALATION) EVERY 6 HOURS PRN
Status: DISCONTINUED | OUTPATIENT
Start: 2023-12-29 | End: 2023-12-29

## 2023-12-29 RX ORDER — LANOLIN ALCOHOL/MO/W.PET/CERES
400 CREAM (GRAM) TOPICAL 2 TIMES DAILY
Status: DISCONTINUED | OUTPATIENT
Start: 2023-12-29 | End: 2023-12-31 | Stop reason: HOSPADM

## 2023-12-29 RX ORDER — POTASSIUM CHLORIDE 20 MEQ/1
40 TABLET, EXTENDED RELEASE ORAL
Status: COMPLETED | OUTPATIENT
Start: 2023-12-29 | End: 2023-12-29

## 2023-12-29 RX ORDER — IPRATROPIUM BROMIDE AND ALBUTEROL SULFATE 2.5; .5 MG/3ML; MG/3ML
3 SOLUTION RESPIRATORY (INHALATION)
Status: DISCONTINUED | OUTPATIENT
Start: 2023-12-29 | End: 2023-12-31

## 2023-12-29 RX ORDER — POTASSIUM CHLORIDE 20 MEQ/1
40 TABLET, EXTENDED RELEASE ORAL 2 TIMES DAILY
Status: DISCONTINUED | OUTPATIENT
Start: 2023-12-29 | End: 2023-12-31 | Stop reason: HOSPADM

## 2023-12-29 RX ADMIN — POTASSIUM CHLORIDE 40 MEQ: 1500 TABLET, EXTENDED RELEASE ORAL at 06:12

## 2023-12-29 RX ADMIN — Medication 400 MG: at 08:12

## 2023-12-29 RX ADMIN — Medication 400 MG: at 12:12

## 2023-12-29 RX ADMIN — ACETAMINOPHEN 650 MG: 325 TABLET ORAL at 09:12

## 2023-12-29 RX ADMIN — MEMANTINE HYDROCHLORIDE 5 MG: 5 TABLET ORAL at 09:12

## 2023-12-29 RX ADMIN — CEFTRIAXONE SODIUM 2 G: 2 INJECTION, POWDER, FOR SOLUTION INTRAMUSCULAR; INTRAVENOUS at 09:12

## 2023-12-29 RX ADMIN — Medication 400 MG: at 10:12

## 2023-12-29 RX ADMIN — IPRATROPIUM BROMIDE AND ALBUTEROL SULFATE 3 ML: 2.5; .5 SOLUTION RESPIRATORY (INHALATION) at 07:12

## 2023-12-29 RX ADMIN — POTASSIUM CHLORIDE 40 MEQ: 1500 TABLET, EXTENDED RELEASE ORAL at 10:12

## 2023-12-29 RX ADMIN — POTASSIUM CHLORIDE 40 MEQ: 1500 TABLET, EXTENDED RELEASE ORAL at 12:12

## 2023-12-29 RX ADMIN — METOPROLOL SUCCINATE 12.5 MG: 25 TABLET, EXTENDED RELEASE ORAL at 12:12

## 2023-12-29 RX ADMIN — FUROSEMIDE 20 MG: 10 INJECTION, SOLUTION INTRAMUSCULAR; INTRAVENOUS at 04:12

## 2023-12-29 RX ADMIN — MEMANTINE HYDROCHLORIDE 5 MG: 5 TABLET ORAL at 08:12

## 2023-12-29 RX ADMIN — BUDESONIDE 0.5 MG: 0.25 INHALANT RESPIRATORY (INHALATION) at 07:12

## 2023-12-29 NOTE — HOSPITAL COURSE
"83 year old male, under the care of Hospital medicine for management of Scrotal Edema, CHF exacerbation. Patient cont on  IV lasix, dose reduced to 20mg due to hypotension, Net Output since admission: 7320L. Improved scrotal edema. improved SOB, dyspnea. Patient in tripod position at times due to dyspnea on 12/29, this has resolved by time of discharge. Endorsed able to lay down at times but needed to "sit leaning forward" for relief at home and for first 24h of admission, this has improved by time of discharge. HTN medications held AM 12/29 secondary to significant hypotension. BP improved by the afternoon, IV lasix reduced to 20mg. K+ and Mg repleted. Due to continued dyspnea ordered repeat CXR, possible PNA vs atelectasis, consulted Pulmonology to evaluate, not felt related to PNA, suspect Cardiac cause. Run of Vtach overnight (12/30), rhythm changed to A. Fib. Cardiology evaluated added ASA, recommend to cont metoprolol.  Urine culture +Proteus mirabilis, pansensitive, plan to d/c with PO Augmentin BID x7 days. F/U with PCP and Cardiology as OP. Referral placed for Home Health. Patient in agreement with plan. Prescription sent to pharmacy of choice. Patient seen and examined on day of discharge and determined stable for discharge.   "

## 2023-12-29 NOTE — ASSESSMENT & PLAN NOTE
Patient with Paroxysmal (<7 days) atrial fibrillation which is controlled currently with Beta Blocker. Patient is currently in sinus rhythm.[id number] Score: 2. Anticoagulation not indicated due to bleeding risk, follows with Cardiology .

## 2023-12-29 NOTE — PROGRESS NOTES
"[facility] - Med Surg 3  [facility] Medicine  Progress Note    Patient Name: Richy Douglas  MRN: [id number]  Patient Class: IP- Inpatient   Admission Date: 12/28/2023  Length of Stay: 0 days  Attending Physician: Mando Guallpa MD  Primary Care Provider: Vivian Ch MD        Subjective:     Principal Problem:Scrotal edema        HPI:  84 y/o male with PMHx of chronic HFrEF (EF 30%), atrial fibrillation (not on AC due to bleeding risk), HTN, PVD, emphysema, GERD, prostate cancer who presented to the ER today with complaints of swelling to his penis, testicles and BLE. Also c/o dysuria, difficulty starting urinating, associated with pain. Onset of swelling > 1 month ago, started to have difficulty urinating over the past week. States hasn't urinated at all past 2-3 days. He denies chest pain, fevers/chills, nausea/vomiting or any other complaints.    In ER, labs significant for H/H 11.1/34.6, T bili 2.5, BNP 3572, troponin WNL (0.015), lactic acid 2.6. UA +protein, occult blood, leukocytes, 28 WBC, many bacteria - culture pending. Scrotal US with no torsion. CT A/P with "marked scrotal and penile edema, large right hydrocele, small left hydrocele. Anasarca with cardiomegaly and small volume ascites. CXR no acute findings, noted emphysema. EKG shows PVC's with right axis deviation, prolonged QTC interval, and LVH. Patient received breathing treatment, IV Lasix. [facility] consulted for further management, admit as inpatient.    Overview/Hospital Course:  83 year old male, under the care of Hospital medicine for management of Scrotal Edema, CHF exacerbation. Patient treated with IV lasix overnight, UOP: 2100mL. Improved scrotal edema. Continued SOB, dyspnea. Patient in tripod position at times due to dyspnea. Endorses able to lay down at times but needs to "sit leaning forward" at times. HTN medications held this AM due to significant hypotension. BP improved by the afternoon, IV lasix reduced to 20mg. K+ and Mg repleted. Repeat " labs in AM. Due to continued dyspnea ordered repeat CXR, possible PNA, consulted Pulmonology to evaluate.     Interval History: Consulted Pulmonology. Possible PNA on CXR, cont dyspnea, sitting in tripod at times.     Review of Systems   Respiratory:  Positive for chest tightness and shortness of breath.    Cardiovascular:  Positive for leg swelling.   Genitourinary:  Positive for difficulty urinating and scrotal swelling.   All other systems reviewed and are negative.    Objective:     Vital Signs (Most Recent):  Temp: 97.5 °F (36.4 °C) (12/29/23 1647)  Pulse: 103 (12/29/23 1647)  Resp: 18 (12/29/23 1647)  BP: 131/70 (12/29/23 1647)  SpO2: (!) 94 % (12/29/23 1647) Vital Signs (24h Range):  Temp:  [97.4 °F (36.3 °C)-98.2 °F (36.8 °C)] 97.5 °F (36.4 °C)  Pulse:  [] 103  Resp:  [18] 18  SpO2:  [90 %-98 %] 94 %  BP: ()/(45-70) 131/70     Weight: 75.8 kg (167 lb 1.7 oz)  Body mass index is 25.41 kg/m².    Intake/Output Summary (Last 24 hours) at 12/29/2023 1740  Last data filed at 12/29/2023 1256  Gross per 24 hour   Intake 480 ml   Output 2700 ml   Net -2220 ml         Physical Exam  Vitals and nursing note reviewed.   Constitutional:       Appearance: Normal appearance. He is normal weight. He is ill-appearing.      Interventions: Nasal cannula in place.   HENT:      Head: Normocephalic and atraumatic.      Nose: Nose normal.      Mouth/Throat:      Pharynx: Oropharynx is clear.   Eyes:      Extraocular Movements: Extraocular movements intact.      Pupils: Pupils are equal, round, and reactive to light.   Cardiovascular:      Pulses:           Dorsalis pedis pulses are 1+ on the right side and 1+ on the left side.      Heart sounds: Normal heart sounds.   Pulmonary:      Effort: Pulmonary effort is normal. Tachypnea present. No respiratory distress.      Breath sounds: Examination of the right-lower field reveals decreased breath sounds. Examination of the left-lower field reveals decreased breath sounds.  Decreased breath sounds present. No wheezing, rhonchi or rales.      Comments: Conversational Dyspnea, tripod  Abdominal:      General: Abdomen is flat. Bowel sounds are decreased. There is no distension.      Palpations: Abdomen is soft.      Tenderness: There is no abdominal tenderness. There is no guarding.   Genitourinary:     Testes:         Left: Swelling (improved) present.      Comments: Scrotal edema, erythema  Skin:     Coloration: Skin is pale.   Neurological:      General: No focal deficit present.      Mental Status: He is alert and oriented to person, place, and time.   Psychiatric:         Mood and Affect: Mood normal.         Behavior: Behavior normal.             Significant Labs: All pertinent labs within the past 24 hours have been reviewed.  CBC:   Recent Labs   Lab 12/28/23  1745 12/29/23  0521   WBC 5.56 6.53   HGB 11.1* 10.6*   HCT 34.6* 32.9*    316     CMP:   Recent Labs   Lab 12/28/23  1745 12/29/23  0521    144   K 3.8 3.2*    108   CO2 24 24    102   BUN 17 16   CREATININE 0.8 0.8   CALCIUM 9.1 8.9   PROT 7.1  --    ALBUMIN 3.9  --    BILITOT 2.5*  --    ALKPHOS 51*  --    AST 36  --    ALT 21  --    ANIONGAP 12 12       Significant Imaging: I have reviewed all pertinent imaging results/findings within the past 24 hours.    Assessment/Plan:      * Scrotal edema  Due to CHF exacerbation and also UTI  Diuresis and antibiotics, as above  Supportive care    --Improving    Acute on chronic systolic congestive heart failure  Patient is identified as having Systolic (HFrEF) heart failure that is Acute on chronic. CHF is currently uncontrolled due to Continued edema of extremities. Latest ECHO performed and demonstrates- Results for orders placed during the hospital encounter of 02/11/23    Echo    Interpretation Summary  · The left ventricle is normal in size with concentric hypertrophy and moderately decreased systolic function.  · Severe left atrial enlargement.  ·  Trivial pericardial effusion.  · Mild tricuspid regurgitation.  · Severe right atrial enlargement.  · The estimated ejection fraction is 30%.  · There is severe left ventricular global hypokinesis.  · Mild right ventricular enlargement with mildly to moderately reduced right ventricular systolic function.  · Moderate aortic regurgitation.  · Elevated central venous pressure (15 mmHg).  · The estimated PA systolic pressure is 80 mmHg.  · There is pulmonary hypertension.  · The ascending aorta is moderately dilated.  · The aortic root is moderately dilated.  · Atrial fibrillation observed.  . Continue Beta Blocker, Furosemide, and ARNI and monitor clinical status closely. Monitor on telemetry. Patient is on CHF pathway.  Monitor strict Is&Os and daily weights.  Place on fluid restriction of 1.5 L. Cardiology has been consulted. Continue to stress to patient importance of self efficacy and  on diet for CHF. Last BNP reviewed- and noted below   Recent Labs   Lab 12/28/23  1745   BNP 3,572*     --Lasix 20mg IV BID  --Monitor BP    UTI (urinary tract infection)  UA consistent with infectious process  Urine culture pending  Continue ceftriaxone    Atrial fibrillation  Patient with Paroxysmal (<7 days) atrial fibrillation which is controlled currently with Beta Blocker. Patient is currently in sinus rhythm.[id number] Score: 2. Anticoagulation not indicated due to bleeding risk, follows with Cardiology .    Hypertension  Chronic, controlled. Latest blood pressure and vitals reviewed-     Temp:  [97.4 °F (36.3 °C)-98.2 °F (36.8 °C)]   Pulse:  []   Resp:  [18]   BP: ()/(45-70)   SpO2:  [90 %-98 %] .   Home meds for hypertension were reviewed and noted below.   Hypertension Medications               furosemide (LASIX) 40 MG tablet Take 1 tablet (40 mg total) by mouth 2 (two) times a day. Double up to lasix 80mg twice a day for next 5 days    metoprolol succinate (TOPROL-XL) 25 MG 24 hr tablet Take 0.5 tablets  (12.5 mg total) by mouth once daily.    sacubitriL-valsartan (ENTRESTO)  mg per tablet Take 1 tablet by mouth 2 (two) times daily.            While in the hospital, will manage blood pressure as follows; Continue home antihypertensive regimen    Will utilize p.r.n. blood pressure medication only if patient's blood pressure greater than 180/110 and he develops symptoms such as worsening chest pain or shortness of breath.    Emphysema of lung  Patient's COPD is uncontrolled currently.  Patient is currently off COPD Pathway.  Breathing treatments PRN  --CXR: possible PNA  --Consult Pulmonology      Anemia of chronic disease  Patient's anemia is currently controlled. Has not received any PRBCs to date.   Current CBC reviewed-   Lab Results   Component Value Date    HGB 10.6 (L) 12/29/2023    HCT 32.9 (L) 12/29/2023     Monitor serial CBC and transfuse if patient becomes hemodynamically unstable, symptomatic or H/H drops below 7/21.      VTE Risk Mitigation (From admission, onward)           Ordered     IP VTE HIGH RISK PATIENT  Once         12/29/23 0115     Place sequential compression device  Until discontinued         12/28/23 2038                    Discharge Planning   MEGAN:      Code Status: Full Code   Is the patient medically ready for discharge?:     Reason for patient still in hospital (select all that apply): Patient trending condition  Discharge Plan A: Home with family                  Pamela Mixon NP  Department of Hospital Medicine   [facility] - Med S[facility] 3

## 2023-12-29 NOTE — HPI
Cardiology consulted for CHF.  F/u by Dr. Garces, Cardiology.  Has combined CHF, PAF, HTN, PAD, PHTN, COPD, AI, h/o prostate cancer.  Admitted w acute CHF sxs, anasarca with leg edema, scrotal edema and dyspnea over last few weeks.  No angina/CP.  Ecg on admit sinus, LVH, PVC.  BNP 3572  Troponin wnl.  Echo Feb 2023 EF 30% RV failure, severe PHTN.  Not on anticoagulation for PAF due to risk for bleeding per Cardiology notes.

## 2023-12-29 NOTE — ASSESSMENT & PLAN NOTE
Patient is identified as having Systolic (HFrEF) heart failure that is Acute on chronic. CHF is currently uncontrolled due to Continued edema of extremities. Latest ECHO performed and demonstrates- Results for orders placed during the hospital encounter of 02/11/23    Echo    Interpretation Summary  · The left ventricle is normal in size with concentric hypertrophy and moderately decreased systolic function.  · Severe left atrial enlargement.  · Trivial pericardial effusion.  · Mild tricuspid regurgitation.  · Severe right atrial enlargement.  · The estimated ejection fraction is 30%.  · There is severe left ventricular global hypokinesis.  · Mild right ventricular enlargement with mildly to moderately reduced right ventricular systolic function.  · Moderate aortic regurgitation.  · Elevated central venous pressure (15 mmHg).  · The estimated PA systolic pressure is 80 mmHg.  · There is pulmonary hypertension.  · The ascending aorta is moderately dilated.  · The aortic root is moderately dilated.  · Atrial fibrillation observed.  . Continue Beta Blocker, Furosemide, and ARNI and monitor clinical status closely. Monitor on telemetry. Patient is on CHF pathway.  Monitor strict Is&Os and daily weights.  Place on fluid restriction of 1.5 L. Cardiology has been consulted. Continue to stress to patient importance of self efficacy and  on diet for CHF. Last BNP reviewed- and noted below   Recent Labs   Lab 12/28/23  0045   BNP 3,572*     --Lasix 20mg IV BID  --Monitor BP

## 2023-12-29 NOTE — ADMISSIONCARE
AdmissionCare    Guideline: Heart Failure - INPT, Inpatient    Based on the indications selected for the patient, the bed status of Admit to Inpatient was determined to be MET    The following indications were selected as present at the time of evaluation of the patient:      Anasarca or severe peripheral edema (eg, impairs ability to void or ambulate)   -     Anasarca    -      - Anasarca is generalized edema that is widespread and may be more pronounced in dependent areas (eg, back while lying down, legs while sitting up).    AdmissionCare documentation entered by: Giovany Upton    [facility] [employer], 27th edition, Copyright © 2023 [facility] [employer], [facility] All Rights Reserved.  [id number]:00

## 2023-12-29 NOTE — ASSESSMENT & PLAN NOTE
Patient is identified as having Combined Systolic and Diastolic heart failure that is Acute on chronic. Latest ECHO performed and demonstrates- Results for orders placed during the hospital encounter of 02/11/23    Echo    Interpretation Summary  · The left ventricle is normal in size with concentric hypertrophy and moderately decreased systolic function.  · Severe left atrial enlargement.  · Trivial pericardial effusion.  · Mild tricuspid regurgitation.  · Severe right atrial enlargement.  · The estimated ejection fraction is 30%.  · There is severe left ventricular global hypokinesis.  · Mild right ventricular enlargement with mildly to moderately reduced right ventricular systolic function.  · Moderate aortic regurgitation.  · Elevated central venous pressure (15 mmHg).  · The estimated PA systolic pressure is 80 mmHg.  · There is pulmonary hypertension.  · The ascending aorta is moderately dilated.  · The aortic root is moderately dilated.  · Atrial fibrillation observed.    Recent Labs   Lab 12/28/23  1745   BNP 3,572*       IV Lasix diuresis.  Low salt diet.  Echocardiogram.  GDMT advised for CHF in future.

## 2023-12-29 NOTE — ASSESSMENT & PLAN NOTE
Patient's COPD is controlled currently.  Patient is currently off COPD Pathway.  Breathing treatments PRN  Stable on RA

## 2023-12-29 NOTE — ASSESSMENT & PLAN NOTE
Patient's anemia is currently controlled. Has not received any PRBCs to date.   Current CBC reviewed-   Lab Results   Component Value Date    HGB 11.1 (L) 12/28/2023    HCT 34.6 (L) 12/28/2023     Monitor serial CBC and transfuse if patient becomes hemodynamically unstable, symptomatic or H/H drops below 7/21.

## 2023-12-29 NOTE — ASSESSMENT & PLAN NOTE
Patient with Paroxysmal (<7 days) atrial fibrillation which is controlled currently with Beta Blocker. Patient is currently in sinus rhythm.[id number] Score: 2. Anticoagulation not indicated due to bleeding risk, follows with Cardiology .

## 2023-12-29 NOTE — ASSESSMENT & PLAN NOTE
Patient's COPD is uncontrolled currently.  Patient is currently off COPD Pathway.  Breathing treatments PRN  --CXR: possible PNA  --Consult Pulmonology

## 2023-12-29 NOTE — ASSESSMENT & PLAN NOTE
Chronic, controlled. Latest blood pressure and vitals reviewed-     Temp:  [97.4 °F (36.3 °C)-98.2 °F (36.8 °C)]   Pulse:  []   Resp:  [18]   BP: ()/(45-70)   SpO2:  [90 %-98 %] .   Home meds for hypertension were reviewed and noted below.   Hypertension Medications               furosemide (LASIX) 40 MG tablet Take 1 tablet (40 mg total) by mouth 2 (two) times a day. Double up to lasix 80mg twice a day for next 5 days    metoprolol succinate (TOPROL-XL) 25 MG 24 hr tablet Take 0.5 tablets (12.5 mg total) by mouth once daily.    sacubitriL-valsartan (ENTRESTO)  mg per tablet Take 1 tablet by mouth 2 (two) times daily.            While in the hospital, will manage blood pressure as follows; Continue home antihypertensive regimen    Will utilize p.r.n. blood pressure medication only if patient's blood pressure greater than 180/110 and he develops symptoms such as worsening chest pain or shortness of breath.

## 2023-12-29 NOTE — ASSESSMENT & PLAN NOTE
Due to CHF exacerbation and also UTI  Diuresis and antibiotics, as above  Supportive care    --Improving

## 2023-12-29 NOTE — H&P
"  [facility] - Emergency Dept.  [facility] Medicine  History & Physical    Patient Name: Richy Douglas  MRN: [id number]  Patient Class: IP- Inpatient  Admission Date: 12/28/2023  Attending Physician: Giovany Upton MD   Primary Care Provider: Vivian Ch MD         Patient information was obtained from patient, relative(s), past medical records, and ER records.     Subjective:     Principal Problem:Scrotal edema    Chief Complaint:   Chief Complaint   Patient presents with    Groin Swelling     Pt. Reports he thinks he hurt himself picking up on his wife. He is having difficulty urinating. He reports swelling to his penis.         HPI: 82 y/o male with PMHx of chronic HFrEF (EF 30%), atrial fibrillation (not on AC due to bleeding risk), HTN, PVD, emphysema, GERD, prostate cancer who presented to the ER today with complaints of swelling to his penis, testicles and BLE. Also c/o dysuria, difficulty starting urinating, associated with pain. Onset of swelling > 1 month ago, started to have difficulty urinating over the past week. States hasn't urinated at all past 2-3 days. He denies chest pain, fevers/chills, nausea/vomiting or any other complaints.    In ER, labs significant for H/H 11.1/34.6, T bili 2.5, BNP 3572, troponin WNL (0.015), lactic acid 2.6. UA +protein, occult blood, leukocytes, 28 WBC, many bacteria - culture pending. Scrotal US with no torsion. CT A/P with "marked scrotal and penile edema, large right hydrocele, small left hydrocele. Anasarca with cardiomegaly and small volume ascites. CXR no acute findings, noted emphysema. EKG shows PVC's with right axis deviation, prolonged QTC interval, and LVH. Patient received breathing treatment, IV Lasix. [facility] [place], admit as inpatient.    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    Anemia of chronic disease     Aortic aneurysm     Atrial fibrillation with RVR 09/24/2021    Chronic systolic " congestive heart failure 08/31/2017    Emphysema of lung 08/31/2017    GERD (gastroesophageal reflux disease)     Hypertension     Knee osteoarthritis 10/21/2022    Major neurocognitive disorder due to multiple etiologies 4/14/2023    Microcytic anemia 11/24/2020    Other hyperlipidemia 04/27/2021    Personal history of colonic polyps 2022    Prostate cancer        Past Surgical History:   Procedure Laterality Date    CATHETERIZATION OF BOTH LEFT AND RIGHT HEART N/A 1/13/2022    Procedure: CATHETERIZATION, HEART, BOTH LEFT AND RIGHT;  Surgeon: Janneth Tovar MD;  Location: [facility] CATH LAB;  Service: Cardiology;  Laterality: N/A;  poss cx    COLONOSCOPY      COLONOSCOPY N/A 6/30/2022    Procedure: COLONOSCOPY;  Surgeon: Sandra Perez MD;  Location: [facility] ENDO;  Service: Endoscopy;  Laterality: N/A;    COLONOSCOPY N/A 7/1/2022    Procedure: COLONOSCOPY;  Surgeon: Woodrow Christian MD;  Location: [facility] ENDO;  Service: Endoscopy;  Laterality: N/A;    CORONARY ANGIOGRAPHY N/A 1/13/2022    Procedure: ANGIOGRAM, CORONARY ARTERY;  Surgeon: Janneth Tovar MD;  Location: [facility] CATH LAB;  Service: Cardiology;  Laterality: N/A;    ESOPHAGOGASTRODUODENOSCOPY N/A 6/30/2022    Procedure: EGD (ESOPHAGOGASTRODUODENOSCOPY);  Surgeon: Sandra Perez MD;  Location: [facility];  Service: Endoscopy;  Laterality: N/A;    INJECTION OF ANESTHETIC AGENT AROUND NERVE Bilateral 6/20/2023    Procedure: Bilateral Genicular nerve block RN IV Sedation;  Surgeon: Devon Grant MD;  Location: [facility] PAIN MGT;  Service: Pain Management;  Laterality: Bilateral;    INJECTION OF JOINT Bilateral 10/21/2022    Procedure: Bilateral intraarticular knee injection with local ALLERGIC TO IODINE;  Surgeon: Devon Grant MD;  Location: [facility] PAIN MGT;  Service: Pain Management;  Laterality: Bilateral;    INJECTION OF JOINT Bilateral 1/31/2023    Procedure: Bilateral IA knee joint injection with steroid RN IV Sedation;  Surgeon: Devno Grant MD;   Location: [facility] PAIN MGT;  Service: Pain Management;  Laterality: Bilateral;    INJECTION OF JOINT Bilateral 5/10/2023    Procedure: Bilateral IA knee joint injection Synvisc RN IV Sedation;  Surgeon: Devon Grant MD;  Location: [facility] PAIN MGT;  Service: Pain Management;  Laterality: Bilateral;    INJECTION OF JOINT Bilateral 11/7/2023    Procedure: Bilateral intraarticular knee injection;  Surgeon: Devon Grant MD;  Location: [facility] PAIN MGT;  Service: Pain Management;  Laterality: Bilateral;    PROSTATE SURGERY      RADIOFREQUENCY THERMOCOAGULATION Left 9/1/2023    Procedure: Left Genicular Nerve RFA;  Surgeon: Devon Grant MD;  Location: [facility] PAIN MGT;  Service: Pain Management;  Laterality: Left;    RADIOFREQUENCY THERMOCOAGULATION Right 9/15/2023    Procedure: Right Genicular Nerve RFA RN IV Sedation;  Surgeon: Devon Grant MD;  Location: [facility] PAIN MGT;  Service: Pain Management;  Laterality: Right;    SPINE SURGERY         Review of patient's allergies indicates:   Allergen Reactions    Fish containing products     Iodine and iodide containing products     Shellfish containing products        No current facility-administered medications on file prior to encounter.     Current Outpatient Medications on File Prior to Encounter   Medication Sig    albuterol (PROVENTIL) 2.5 mg /3 mL (0.083 %) nebulizer solution Take 3 mLs (2.5 mg total) by nebulization every 4 to 6 hours as needed for Wheezing or Shortness of Breath.    albuterol (PROVENTIL/VENTOLIN HFA) 90 mcg/actuation inhaler Inhale 2 puffs into the lungs every 4 (four) hours as needed for Wheezing or Shortness of Breath.    empagliflozin (JARDIANCE) 10 mg tablet Take 1 tablet (10 mg total) by mouth once daily.    ferrous sulfate 325 (65 FE) MG EC tablet Take 1 tablet (325 mg total) by mouth once daily.    fluticasone-umeclidin-vilanter (TRELEGY ELLIPTA) 200-62.5-25 mcg inhaler Inhale 1 puff into the lungs once daily.    furosemide (LASIX) 40 MG tablet  Take 1 tablet (40 mg total) by mouth 2 (two) times a day. Double up to lasix 80mg twice a day for next 5 days    memantine (NAMENDA) 5 MG Tab Take 1 tablet (5 mg total) by mouth 2 (two) times daily.    metoprolol succinate (TOPROL-XL) 25 MG 24 hr tablet Take 0.5 tablets (12.5 mg total) by mouth once daily.    potassium chloride SA (K-DUR,KLOR-CON M) 10 MEQ tablet Take 2 tablets (20 mEq total) by mouth once daily.    sacubitriL-valsartan (ENTRESTO)  mg per tablet Take 1 tablet by mouth 2 (two) times daily.     Family History       Problem Relation (Age of Onset)    Diabetes Mother    Peripheral vascular disease Mother          Tobacco Use    Smoking status: Former     Current packs/day: 0.00     Average packs/day: 1 pack/day for 69.2 years (69.2 ttl pk-yrs)     Types: Cigarettes     Start date: 1954     Quit date: 3/1/2023     Years since quittin.8    Smokeless tobacco: Never   Substance and Sexual Activity    Alcohol use: Yes     Comment: reports only drinks red wine when games are on    Drug use: Never    Sexual activity: Yes     Partners: Female     Review of Systems   All other systems reviewed and are negative.    Objective:     Vital Signs (Most Recent):  Temp: 97.5 °F (36.4 °C) (23 1005)  Pulse: 84 (23 1833)  Resp: 18 (23 1527)  BP: (!) 141/65 (23 1833)  SpO2: 97 % (23 1527) Vital Signs (24h Range):  Temp:  [97.5 °F (36.4 °C)] 97.5 °F (36.4 °C)  Pulse:  [84-94] 84  Resp:  [18-20] 18  SpO2:  [95 %-100 %] 97 %  BP: (141-155)/(65-73) 141/65     Weight: 74 kg (163 lb 2.3 oz)  Body mass index is 23.41 kg/m².     Physical Exam  Vitals and nursing note reviewed.   Constitutional:       General: He is not in acute distress.     Appearance: Normal appearance. He is normal weight.   HENT:      Head: Normocephalic and atraumatic.      Nose: Nose normal.      Mouth/Throat:      Pharynx: Oropharynx is clear.   Eyes:      Extraocular Movements: Extraocular movements intact.       Pupils: Pupils are equal, round, and reactive to light.   Cardiovascular:      Pulses: Normal pulses.      Heart sounds: Normal heart sounds.   Pulmonary:      Effort: Pulmonary effort is normal. No respiratory distress.      Breath sounds: Normal breath sounds. No wheezing, rhonchi or rales.   Abdominal:      General: Abdomen is flat. Bowel sounds are normal. There is no distension.      Palpations: Abdomen is soft.      Tenderness: There is no abdominal tenderness. There is no guarding.   Genitourinary:     Comments: Scrotal edema, erythema  Neurological:      General: No focal deficit present.      Mental Status: He is alert and oriented to person, place, and time.   Psychiatric:         Mood and Affect: Mood normal.         Behavior: Behavior normal.              CRANIAL NERVES     CN III, IV, VI   Pupils are equal, round, and reactive to light.       Significant Labs: All pertinent labs within the past 24 hours have been reviewed.  Recent Lab Results         12/28/23  1916   12/28/23  1745        Albumin   3.9       ALP   51       ALT   21       Anion Gap   12       Appearance, UA Clear         AST   36       Bacteria, UA Many         Baso #   0.11       Basophil %   2.0       Bilirubin (UA) Negative         BILIRUBIN TOTAL   2.5  Comment: For infants and newborns, interpretation of results should be based  on gestational age, weight and in agreement with clinical  observations.    Premature Infant recommended reference ranges:  Up to 24 hours.............<8.0 mg/dL  Up to 48 hours............<12.0 mg/dL  3-5 days..................<15.0 mg/dL  6-29 days.................<15.0 mg/dL         BNP   3,572  Comment: Values of less than 100 pg/ml are consistent with non-CHF populations.       BUN   17       Calcium   9.1       Chloride   108       CO2   24       Color, UA Yellow         Creatinine   0.8       Differential Method   Automated       eGFR   >60       Eos #   0.3       Eosinophil %   5.8       Glucose    105       Glucose, UA Negative         Gran # (ANC)   3.4       Gran %   61.6       Hematocrit   34.6       Hemoglobin   11.1       Hyaline Casts, UA 1         Immature Grans (Abs)   0.01  Comment: Mild elevation in immature granulocytes is non specific and   can be seen in a variety of conditions including stress response,   acute inflammation, trauma and pregnancy. Correlation with other   laboratory and clinical findings is essential.         Immature Granulocytes   0.2       Ketones, UA Negative         Lactate, Cedric   2.6  Comment: Falsely low lactic acid results can be found in samples   containing >=13.0 mg/dL total bilirubin and/or >=3.5 mg/dL   direct bilirubin.         Leukocytes, UA 1+         Lipase   9       Lymph #   1.1       Lymph %   19.2       MCH   24.4       MCHC   32.1       MCV   76       Microscopic Comment SEE COMMENT  Comment: Other formed elements not mentioned in the report are not   present in the microscopic examination.            Mono #   0.6       Mono %   11.2       MPV   SEE COMMENT  Comment: Result not available.       NITRITE UA Negative         nRBC   1       Occult Blood UA 2+         pH, UA 7.0         Platelet Count   352       Potassium   3.8       PROTEIN TOTAL   7.1       Protein, UA 2+  Comment: Recommend a 24 hour urine protein or a urine   protein/creatinine ratio if globulin induced proteinuria is  clinically suspected.           RBC   4.55       RBC, UA 0         RDW   26.5       Sodium   144       Specific [place], UA 1.020         Specimen UA Urine, Clean Catch         Squam Epithel, UA 3         Troponin I   0.015  Comment: The reference interval for Troponin I represents the 99th percentile   cutoff   for our facility and is consistent with 3rd generation assay   performance.         UROBILINOGEN UA >=8.0         WBC Clumps, UA Occasional         WBC, UA 28         WBC   5.56               Significant Imaging: I have reviewed all pertinent imaging results/findings  within the past 24 hours.    US Scrotum And Testicles   Final Result      No testicular pathology seen; spermatoceles or extratesticular fluid         Electronically signed by: Sabiha Solares   Date:    12/28/2023   Time:    18:23      CT Abdomen Pelvis  Without Contrast   Final Result      1. Marked scrotal and penile edema with large right hydrocele and at least small left hydrocele which are not entirely included on this exam.  Anasarca with cardiomegaly and small volume ascites suggest fluid overload which may be the etiology of this pelvic induration and scrotal fluid.  Infectious Disease of the perineum is also a consideration; however no subcutaneous gas is present on this exam (which does not include the lower scrotum) to suggest gas-forming infectious process.   2. Other findings as above.         Electronically signed by: Reed Garcia   Date:    12/28/2023   Time:    15:55      X-Ray Chest 1 View   Final Result      Stable chest with chronic lung disease.         Electronically signed by: Reed Garcia   Date:    12/28/2023   Time:    15:26          Assessment/Plan:     * Scrotal edema  Due to CHF exacerbation and also UTI  Diuresis and antibiotics, as above  Supportive care    Acute on chronic systolic congestive heart failure  Patient is identified as having Systolic (HFrEF) heart failure that is Acute on chronic. CHF is currently uncontrolled due to Continued edema of extremities. Latest ECHO performed and demonstrates- Results for orders placed during the hospital encounter of 02/11/23    Echo    Interpretation Summary  · The left ventricle is normal in size with concentric hypertrophy and moderately decreased systolic function.  · Severe left atrial enlargement.  · Trivial pericardial effusion.  · Mild tricuspid regurgitation.  · Severe right atrial enlargement.  · The estimated ejection fraction is 30%.  · There is severe left ventricular global hypokinesis.  · Mild right ventricular  enlargement with mildly to moderately reduced right ventricular systolic function.  · Moderate aortic regurgitation.  · Elevated central venous pressure (15 mmHg).  · The estimated PA systolic pressure is 80 mmHg.  · There is pulmonary hypertension.  · The ascending aorta is moderately dilated.  · The aortic root is moderately dilated.  · Atrial fibrillation observed.  . Continue Beta Blocker, Furosemide, and ARNI and monitor clinical status closely. Monitor on telemetry. Patient is on CHF pathway.  Monitor strict Is&Os and daily weights.  Place on fluid restriction of 1.5 L. Cardiology has been consulted. Continue to stress to patient importance of self efficacy and  on diet for CHF. Last BNP reviewed- and noted below   Recent Labs   Lab 12/28/23  1745   BNP 3,572*       UTI (urinary tract infection)  UA consistent with infectious process  Urine culture pending  Continue ceftriaxone    Atrial fibrillation  Patient with Paroxysmal (<7 days) atrial fibrillation which is controlled currently with Beta Blocker. Patient is currently in sinus rhythm.[id number] Score: 2. Anticoagulation not indicated due to bleeding risk, follows with Cardiology .    Emphysema of lung  Patient's COPD is controlled currently.  Patient is currently off COPD Pathway.  Breathing treatments PRN  Stable on RA    Anemia of chronic disease  Patient's anemia is currently controlled. Has not received any PRBCs to date.   Current CBC reviewed-   Lab Results   Component Value Date    HGB 11.1 (L) 12/28/2023    HCT 34.6 (L) 12/28/2023     Monitor serial CBC and transfuse if patient becomes hemodynamically unstable, symptomatic or H/H drops below 7/21.    Hypertension  Chronic, controlled. Latest blood pressure and vitals reviewed-     Temp:  [97.5 °F (36.4 °C)]   Pulse:  [84-94]   Resp:  [18-20]   BP: (130-155)/(58-73)   SpO2:  [95 %-100 %] .   Home meds for hypertension were reviewed and noted below.   Hypertension Medications                furosemide (LASIX) 40 MG tablet Take 1 tablet (40 mg total) by mouth 2 (two) times a day. Double up to lasix 80mg twice a day for next 5 days    metoprolol succinate (TOPROL-XL) 25 MG 24 hr tablet Take 0.5 tablets (12.5 mg total) by mouth once daily.    sacubitriL-valsartan (ENTRESTO)  mg per tablet Take 1 tablet by mouth 2 (two) times daily.            While in the hospital, will manage blood pressure as follows; Continue home antihypertensive regimen    Will utilize p.r.n. blood pressure medication only if patient's blood pressure greater than 180/110 and he develops symptoms such as worsening chest pain or shortness of breath.      VTE Risk Mitigation (From admission, onward)           Ordered     IP VTE HIGH RISK PATIENT  Once         12/28/23 2038     Place sequential compression device  Until discontinued         12/28/23 2038                             AdmissionCare    Guideline: Heart Failure - INPT, Inpatient    Based on the indications selected for the patient, the bed status of Admit to Inpatient was determined to be MET    The following indications were selected as present at the time of evaluation of the patient:      Anasarca or severe peripheral edema (eg, impairs ability to void or ambulate)   -     Anasarca    -      - Anasarca is generalized edema that is widespread and may be more pronounced in dependent areas (eg, back while lying down, legs while sitting up).    AdmissionCare documentation entered by: Giovany Upton    [facility] [place], 27th edition, Copyright © 2023 [facility] [place], [facility] All Rights Reserved.  [id number]:54:00-06:00      Giovany Upton MD  Department of Hospital Medicine  '[place] - Emergency Dept.

## 2023-12-29 NOTE — HPI
"82 y/o male with PMHx of chronic HFrEF (EF 30%), atrial fibrillation (not on AC due to bleeding risk), HTN, PVD, emphysema, GERD, prostate cancer who presented to the ER today with complaints of swelling to his penis, testicles and BLE. Also c/o dysuria, difficulty starting urinating, associated with pain. Onset of swelling > 1 month ago, started to have difficulty urinating over the past week. States hasn't urinated at all past 2-3 days. He denies chest pain, fevers/chills, nausea/vomiting or any other complaints.    In ER, labs significant for H/H 11.1/34.6, T bili 2.5, BNP 3572, troponin WNL (0.015), lactic acid 2.6. UA +protein, occult blood, leukocytes, 28 WBC, many bacteria - culture pending. Scrotal US with no torsion. CT A/P with "marked scrotal and penile edema, large right hydrocele, small left hydrocele. Anasarca with cardiomegaly and small volume ascites. CXR no acute findings, noted emphysema. EKG shows PVC's with right axis deviation, prolonged QTC interval, and LVH. Patient received breathing treatment, IV Lasix. [facility] [place] consulted for further management, admit as inpatient.  "

## 2023-12-29 NOTE — PLAN OF CARE
Plan of care reviewed and followed with patient understanding of POC verbalized. Patient's call bell in reach and instructed to call for needs. Remains free of falls or injury. Pain controlled with PRN medications. Tolerating medications well. Cardiac monitoring in place. Vital signs stable. NADN. Will continue to monitor.      Problem: Adult Inpatient Plan of Care  Goal: Plan of Care Review  Outcome: Ongoing, Progressing  Goal: Patient-Specific Goal (Individualized)  Outcome: Ongoing, Progressing  Flowsheets (Taken 12/29/2023 0300)  Individualized Care Needs: keep light on  Goal: Absence of Hospital-Acquired Illness or Injury  Outcome: Ongoing, Progressing  Goal: Optimal Comfort and Wellbeing  Outcome: Ongoing, Progressing  Goal: Readiness for Transition of Care  Outcome: Ongoing, Progressing

## 2023-12-29 NOTE — ASSESSMENT & PLAN NOTE
Patient is identified as having Systolic (HFrEF) heart failure that is Acute on chronic. CHF is currently uncontrolled due to Continued edema of extremities. Latest ECHO performed and demonstrates- Results for orders placed during the hospital encounter of 02/11/23    Echo    Interpretation Summary  · The left ventricle is normal in size with concentric hypertrophy and moderately decreased systolic function.  · Severe left atrial enlargement.  · Trivial pericardial effusion.  · Mild tricuspid regurgitation.  · Severe right atrial enlargement.  · The estimated ejection fraction is 30%.  · There is severe left ventricular global hypokinesis.  · Mild right ventricular enlargement with mildly to moderately reduced right ventricular systolic function.  · Moderate aortic regurgitation.  · Elevated central venous pressure (15 mmHg).  · The estimated PA systolic pressure is 80 mmHg.  · There is pulmonary hypertension.  · The ascending aorta is moderately dilated.  · The aortic root is moderately dilated.  · Atrial fibrillation observed.  . Continue Beta Blocker, Furosemide, and ARNI and monitor clinical status closely. Monitor on telemetry. Patient is on CHF pathway.  Monitor strict Is&Os and daily weights.  Place on fluid restriction of 1.5 L. Cardiology has been consulted. Continue to stress to patient importance of self efficacy and  on diet for CHF. Last BNP reviewed- and noted below   Recent Labs   Lab 12/28/23  6295   BNP 3,572*

## 2023-12-29 NOTE — SUBJECTIVE & OBJECTIVE
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    Anemia of chronic disease     Aortic aneurysm     Atrial fibrillation with RVR 09/24/2021    Chronic systolic congestive heart failure 08/31/2017    Emphysema of lung 08/31/2017    GERD (gastroesophageal reflux disease)     Hypertension     Knee osteoarthritis 10/21/2022    Major neurocognitive disorder due to multiple etiologies 4/14/2023    Microcytic anemia 11/24/2020    Other hyperlipidemia 04/27/2021    Personal history of colonic polyps 2022    Prostate cancer        Past Surgical History:   Procedure Laterality Date    CATHETERIZATION OF BOTH LEFT AND RIGHT HEART N/A 1/13/2022    Procedure: CATHETERIZATION, HEART, BOTH LEFT AND RIGHT;  Surgeon: Janneth Tovar MD;  Location: [facility] CATH LAB;  Service: Cardiology;  Laterality: N/A;  poss cx    COLONOSCOPY      COLONOSCOPY N/A 6/30/2022    Procedure: COLONOSCOPY;  Surgeon: Sandra Perez MD;  Location: [facility] ENDO;  Service: Endoscopy;  Laterality: N/A;    COLONOSCOPY N/A 7/1/2022    Procedure: COLONOSCOPY;  Surgeon: Woodrow Christian MD;  Location: [facility];  Service: Endoscopy;  Laterality: N/A;    CORONARY ANGIOGRAPHY N/A 1/13/2022    Procedure: ANGIOGRAM, CORONARY ARTERY;  Surgeon: Janneth Tovar MD;  Location: [facility] CATH LAB;  Service: Cardiology;  Laterality: N/A;    ESOPHAGOGASTRODUODENOSCOPY N/A 6/30/2022    Procedure: EGD (ESOPHAGOGASTRODUODENOSCOPY);  Surgeon: Sandra Perez MD;  Location: [facility];  Service: Endoscopy;  Laterality: N/A;    INJECTION OF ANESTHETIC AGENT AROUND NERVE Bilateral 6/20/2023    Procedure: Bilateral Genicular nerve block RN IV Sedation;  Surgeon: Devon Grant MD;  Location: [facility] PAIN MGT;  Service: Pain Management;  Laterality: Bilateral;    INJECTION OF JOINT Bilateral 10/21/2022    Procedure: Bilateral intraarticular knee injection with local ALLERGIC TO IODINE;  Surgeon: Devon Grant MD;  Location: [facility] PAIN MGT;   Service: Pain Management;  Laterality: Bilateral;    INJECTION OF JOINT Bilateral 1/31/2023    Procedure: Bilateral IA knee joint injection with steroid RN IV Sedation;  Surgeon: Devon Grant MD;  Location: [facility] PAIN MGT;  Service: Pain Management;  Laterality: Bilateral;    INJECTION OF JOINT Bilateral 5/10/2023    Procedure: Bilateral IA knee joint injection Synvisc RN IV Sedation;  Surgeon: Devon Grant MD;  Location: [facility] PAIN MGT;  Service: Pain Management;  Laterality: Bilateral;    INJECTION OF JOINT Bilateral 11/7/2023    Procedure: Bilateral intraarticular knee injection;  Surgeon: Devon Grant MD;  Location: [facility] PAIN MGT;  Service: Pain Management;  Laterality: Bilateral;    PROSTATE SURGERY      RADIOFREQUENCY THERMOCOAGULATION Left 9/1/2023    Procedure: Left Genicular Nerve RFA;  Surgeon: Devon Grant MD;  Location: [facility] PAIN MGT;  Service: Pain Management;  Laterality: Left;    RADIOFREQUENCY THERMOCOAGULATION Right 9/15/2023    Procedure: Right Genicular Nerve RFA RN IV Sedation;  Surgeon: Devon Grant MD;  Location: [facility] PAIN MGT;  Service: Pain Management;  Laterality: Right;    SPINE SURGERY         Review of patient's allergies indicates:   Allergen Reactions    Fish containing products     Iodine and iodide containing products     Shellfish containing products        No current facility-administered medications on file prior to encounter.     Current Outpatient Medications on File Prior to Encounter   Medication Sig    albuterol (PROVENTIL) 2.5 mg /3 mL (0.083 %) nebulizer solution Take 3 mLs (2.5 mg total) by nebulization every 4 to 6 hours as needed for Wheezing or Shortness of Breath.    albuterol (PROVENTIL/VENTOLIN HFA) 90 mcg/actuation inhaler Inhale 2 puffs into the lungs every 4 (four) hours as needed for Wheezing or Shortness of Breath.    empagliflozin (JARDIANCE) 10 mg tablet Take 1 tablet (10 mg total) by mouth once daily.    ferrous sulfate 325 (65 FE) MG EC tablet Take  1 tablet (325 mg total) by mouth once daily.    fluticasone-umeclidin-vilanter (TRELEGY ELLIPTA) 200-62.5-25 mcg inhaler Inhale 1 puff into the lungs once daily.    furosemide (LASIX) 40 MG tablet Take 1 tablet (40 mg total) by mouth 2 (two) times a day. Double up to lasix 80mg twice a day for next 5 days    memantine (NAMENDA) 5 MG Tab Take 1 tablet (5 mg total) by mouth 2 (two) times daily.    metoprolol succinate (TOPROL-XL) 25 MG 24 hr tablet Take 0.5 tablets (12.5 mg total) by mouth once daily.    potassium chloride SA (K-DUR,KLOR-CON M) 10 MEQ tablet Take 2 tablets (20 mEq total) by mouth once daily.    sacubitriL-valsartan (ENTRESTO)  mg per tablet Take 1 tablet by mouth 2 (two) times daily.     Family History       Problem Relation (Age of Onset)    Diabetes Mother    Peripheral vascular disease Mother          Tobacco Use    Smoking status: Former     Current packs/day: 0.00     Average packs/day: 1 pack/day for 69.2 years (69.2 ttl pk-yrs)     Types: Cigarettes     Start date: 1954     Quit date: 3/1/2023     Years since quittin.8    Smokeless tobacco: Never   Substance and Sexual Activity    Alcohol use: Yes     Comment: reports only drinks red wine when games are on    Drug use: Never    Sexual activity: Yes     Partners: Female     Review of Systems   Constitutional: Positive for malaise/fatigue.   HENT: Negative.     Eyes: Negative.    Cardiovascular:  Positive for dyspnea on exertion and leg swelling.   Respiratory:  Positive for cough and shortness of breath.    Endocrine: Negative.    Hematologic/Lymphatic: Negative.    Skin: Negative.    Musculoskeletal: Negative.    Gastrointestinal: Negative.    Genitourinary: Negative.    Neurological:  Positive for weakness.   Psychiatric/Behavioral: Negative.     Allergic/Immunologic: Negative.      Objective:     Vital Signs (Most Recent):  Temp: 98.2 °F (36.8 °C) (23 08)  Pulse: 83 (23 08)  Resp: 18 (23 08)  BP: (!)  89/45 (12/29/23 0809)  SpO2: (!) 94 % (12/29/23 0809) Vital Signs (24h Range):  Temp:  [97.5 °F (36.4 °C)-98.2 °F (36.8 °C)] 98.2 °F (36.8 °C)  Pulse:  [68-89] 83  Resp:  [18] 18  SpO2:  [90 %-98 %] 94 %  BP: ()/(45-73) 89/45     Weight: 75.8 kg (167 lb 1.7 oz)  Body mass index is 25.41 kg/m².    SpO2: (!) 94 %         Intake/Output Summary (Last 24 hours) at 12/29/2023 1114  Last data filed at 12/29/2023 0400  Gross per 24 hour   Intake --   Output 2100 ml   Net -2100 ml       Lines/Drains/Airways       Peripheral Intravenous Line  Duration                  Peripheral IV - Single Lumen 12/28/23 1745 Right Antecubital <1 day         Peripheral IV - Single Lumen 12/28/23 1925 20 G Left Forearm <1 day                     Physical Exam  Vitals and nursing note reviewed.   Constitutional:       Appearance: He is well-developed.   HENT:      Head: Normocephalic.   Neck:      Thyroid: No thyromegaly.      Vascular: Normal carotid pulses. No carotid bruit or JVD.   Cardiovascular:      Rate and Rhythm: Normal rate and regular rhythm.      Pulses:           Radial pulses are 2+ on the right side and 2+ on the left side.      Heart sounds: S1 normal and S2 normal. Heart sounds not distant. No midsystolic click and no opening snap. No murmur heard.     No friction rub. No S3 or S4 sounds.   Pulmonary:      Effort: Pulmonary effort is normal.      Breath sounds: Examination of the right-lower field reveals decreased breath sounds. Decreased breath sounds present. No wheezing or rales.   Abdominal:      General: Bowel sounds are normal. There is no abdominal bruit.      Palpations: Abdomen is soft.      Tenderness: There is no abdominal tenderness.   Musculoskeletal:      Cervical back: Neck supple.      Right lower leg: Edema present.      Left lower leg: Edema present.   Skin:     General: Skin is warm.   Neurological:      Mental Status: He is alert and oriented to person, place, and time.   Psychiatric:          "Behavior: Behavior normal.          Significant Labs: ABG: No results for input(s): "PH", "PCO2", "HCO3", "POCSATURATED", "BE" in the last 48 hours. and Blood Culture:   Recent Labs   Lab 12/28/23  1745 12/28/23 1925   LABBLOO No Growth to date No Growth to date     Lab Results   Component Value Date    WBC 6.53 12/29/2023    HGB 10.6 (L) 12/29/2023    HCT 32.9 (L) 12/29/2023    MCV 76 (L) 12/29/2023     12/29/2023       BMP  Lab Results   Component Value Date     12/29/2023    K 3.2 (L) 12/29/2023     12/29/2023    CO2 24 12/29/2023    BUN 16 12/29/2023    CREATININE 0.8 12/29/2023    CALCIUM 8.9 12/29/2023    ANIONGAP 12 12/29/2023    EGFRNORACEVR >60 12/29/2023       Significant Imaging: Echocardiogram: Transthoracic echo (TTE) complete (Cupid Only):   Results for orders placed or performed during the hospital encounter of 02/11/23   Echo   Result Value Ref Range    BSA 1.86 m2    TDI SEPTAL 0.06 m/s    LV LATERAL E/E' RATIO 10.86 m/s    LV SEPTAL E/E' RATIO 12.67 m/s    LA WIDTH 4.90 cm    IVC diameter 25 cm    Left Ventricular Outflow Tract Mean Velocity 0.95 cm/s    Left Ventricular Outflow Tract Mean Gradient 3.87 mmHg    TDI LATERAL 0.07 m/s    LVIDd 5.59 3.5 - 6.0 cm    IVS 1.67 (A) 0.6 - 1.1 cm    Posterior Wall 1.45 (A) 0.6 - 1.1 cm    Ao root annulus 4.69 cm    LVIDs 4.72 (A) 2.1 - 4.0 cm    FS 16 28 - 44 %    LA volume 115.59 cm3    Sinus 4.84 cm    STJ 4.89 cm    Ascending aorta 4.62 cm    LV mass 405.03 g    LA size 4.04 cm    RVDD 4.50 cm    Left Ventricle Relative Wall Thickness 0.52 cm    AV regurgitation pressure 1/2 time [id number] ms    AV mean gradient 7 mmHg    AV valve area 3.09 cm2    AV Velocity Ratio 0.75     AV index (prosthetic) 0.63     MV valve area p 1/2 method 3.00 cm2    E/A ratio 2.17     Mean e' 0.07 m/s    E wave deceleration time 253.20 msec    IVRT 45.67 msec    LVOT diameter 2.49 cm    LVOT area 4.9 cm2    LVOT peak michelet 1.32 m/s    LVOT peak VTI " 20.30 cm    Ao peak karl 1.76 m/s    Ao VTI 32.0 cm    RVOT peak karl 0.97 m/s    RVOT peak VTI 15.9 cm    LVOT stroke volume 98.80 cm3    AV peak gradient 12 mmHg    PV mean gradient 2.55 mmHg    E/E' ratio 11.69 m/s    MV Peak E Karl 0.76 m/s    AR Max Karl 3.73 m/s    TR Max Karl 4.03 m/s    MV stenosis pressure 1/2 time 73.43 ms    MV Peak A Karl 0.35 m/s    LV Systolic Volume 103.44 mL    LV Systolic Volume Index 55.0 mL/m2    LV Diastolic Volume 152.78 mL    LV Diastolic Volume Index 81.27 mL/m2    LA Volume Index 61.5 mL/m2    LV Mass Index 215 g/m2    RA Major Axis 5.95 cm    Left Atrium Minor Axis 6.33 cm    Left Atrium Major Axis 7.51 cm    Triscuspid Valve Regurgitation Peak Gradient 65 mmHg    RA Width 5.68 cm    Right Atrial Pressure (from IVC) 15 mmHg    EF 30 %    TV resting pulmonary artery pressure 80 mmHg    Narrative    · The left ventricle is normal in size with concentric hypertrophy and   moderately decreased systolic function.  · Severe left atrial enlargement.  · Trivial pericardial effusion.  · Mild tricuspid regurgitation.  · Severe right atrial enlargement.  · The estimated ejection fraction is 30%.  · There is severe left ventricular global hypokinesis.  · Mild right ventricular enlargement with mildly to moderately reduced   right ventricular systolic function.  · Moderate aortic regurgitation.  · Elevated central venous pressure (15 mmHg).  · The estimated PA systolic pressure is 80 mmHg.  · There is pulmonary hypertension.  · The ascending aorta is moderately dilated.  · The aortic root is moderately dilated.  · Atrial fibrillation observed.

## 2023-12-29 NOTE — CONSULTS
"                    Food & Nutrition Education    Diet Education: Heart Failure  Time Spent: 15 minutes.    Learners: Pt    Nutrition Education provided with handouts:  Healthy-Heart Nutrition Therapy, Fluid-Restricted Diet, Low-Sodium Nutrition Therapy ([email or web address].org)    Comments:  84 y/o male with PMHx of chronic HFrEF (EF 30%), atrial fibrillation (not on AC due to bleeding risk), HTN, PVD, emphysema, GERD, prostate cancer who presented to the ER today with complaints of swelling to his penis, testicles and BLE. Also c/o dysuria, difficulty starting urinating, associated with pain. Onset of swelling > 1 month ago, started to have difficulty urinating over the past week. States hasn't urinated at all past 2-3 days. He denies chest pain, fevers/chills, nausea/vomiting or any other complaints. Dietitian consulted to provide heart failure nutrition education.    Dietitian educated patient on low sodium diet and fluid restriction related to hospital diagnosis. Discussed the importance of limiting sodium to 2,000 mg per day and reading food labels to avoid further complications of CHF. Discussed using salt free seasonings (Mrs. Mayes and [employer]ac[employer] "No Salt") and other herbs and spices in meals to enhance flavor without additional sodium. Discussed 1500 ml fluid restriction per MD and dietary sources of fluid. Dietitian recommended using a cup with measurements for fluids and to try to consume small sips spread throughout the day rather than a lot at one time.    The pt remained engaged throughout entire nutrition education. The pt states he has not followed fluid restrictions prior to current admission however, the pt states he understands the information presented and will begin accounting for all sources of fluids throughout the day. Pt states he prepare all meal w/o additional salt. However, pt states he enjoys large cans of soup. Discussed  starting to make soups homemade to assist with remaining " consistent to sodium requirements. The pt has not additional questions or concerns. Pt currently has good appetite with PO intake of 100%.    NFPE not performed, RD will assess the need for NFPE during RD follow up.    All questions and concerns answered.    Provided handout with dietitian's contact information.    *Please re-consult as needed.    Thank You!   Reginaldo Morales, Registration Eligible, Provisional LDN

## 2023-12-29 NOTE — ED NOTES
Pt currently in bed, aaox4. Pt using a urinal to void currently. Bladder scan showing 107ml in bladder. Pt denies further needs at this time. Call light within reach. Family at bedside. Pt and family updated on POC.

## 2023-12-29 NOTE — SUBJECTIVE & OBJECTIVE
Interval History: Consulted Pulmonology. Possible PNA on CXR, cont dyspnea, sitting in tripod at times.     Review of Systems   Respiratory:  Positive for chest tightness and shortness of breath.    Cardiovascular:  Positive for leg swelling.   Genitourinary:  Positive for difficulty urinating and scrotal swelling.   All other systems reviewed and are negative.    Objective:     Vital Signs (Most Recent):  Temp: 97.5 °F (36.4 °C) (12/29/23 1647)  Pulse: 103 (12/29/23 1647)  Resp: 18 (12/29/23 1647)  BP: 131/70 (12/29/23 1647)  SpO2: (!) 94 % (12/29/23 1647) Vital Signs (24h Range):  Temp:  [97.4 °F (36.3 °C)-98.2 °F (36.8 °C)] 97.5 °F (36.4 °C)  Pulse:  [] 103  Resp:  [18] 18  SpO2:  [90 %-98 %] 94 %  BP: ()/(45-70) 131/70     Weight: 75.8 kg (167 lb 1.7 oz)  Body mass index is 25.41 kg/m².    Intake/Output Summary (Last 24 hours) at 12/29/2023 1740  Last data filed at 12/29/2023 1256  Gross per 24 hour   Intake 480 ml   Output 2700 ml   Net -2220 ml         Physical Exam  Vitals and nursing note reviewed.   Constitutional:       Appearance: Normal appearance. He is normal weight. He is ill-appearing.      Interventions: Nasal cannula in place.   HENT:      Head: Normocephalic and atraumatic.      Nose: Nose normal.      Mouth/Throat:      Pharynx: Oropharynx is clear.   Eyes:      Extraocular Movements: Extraocular movements intact.      Pupils: Pupils are equal, round, and reactive to light.   Cardiovascular:      Pulses:           Dorsalis pedis pulses are 1+ on the right side and 1+ on the left side.      Heart sounds: Normal heart sounds.   Pulmonary:      Effort: Pulmonary effort is normal. Tachypnea present. No respiratory distress.      Breath sounds: Examination of the right-lower field reveals decreased breath sounds. Examination of the left-lower field reveals decreased breath sounds. Decreased breath sounds present. No wheezing, rhonchi or rales.      Comments: Conversational Dyspnea,  tripod  Abdominal:      General: Abdomen is flat. Bowel sounds are decreased. There is no distension.      Palpations: Abdomen is soft.      Tenderness: There is no abdominal tenderness. There is no guarding.   Genitourinary:     Testes:         Left: Swelling (improved) present.      Comments: Scrotal edema, erythema  Skin:     Coloration: Skin is pale.   Neurological:      General: No focal deficit present.      Mental Status: He is alert and oriented to person, place, and time.   Psychiatric:         Mood and Affect: Mood normal.         Behavior: Behavior normal.             Significant Labs: All pertinent labs within the past 24 hours have been reviewed.  CBC:   Recent Labs   Lab 12/28/23 1745 12/29/23  0521   WBC 5.56 6.53   HGB 11.1* 10.6*   HCT 34.6* 32.9*    316     CMP:   Recent Labs   Lab 12/28/23 1745 12/29/23  0521    144   K 3.8 3.2*    108   CO2 24 24    102   BUN 17 16   CREATININE 0.8 0.8   CALCIUM 9.1 8.9   PROT 7.1  --    ALBUMIN 3.9  --    BILITOT 2.5*  --    ALKPHOS 51*  --    AST 36  --    ALT 21  --    ANIONGAP 12 12       Significant Imaging: I have reviewed all pertinent imaging results/findings within the past 24 hours.

## 2023-12-29 NOTE — ASSESSMENT & PLAN NOTE
Chronic, controlled. Latest blood pressure and vitals reviewed-     Temp:  [97.5 °F (36.4 °C)]   Pulse:  [84-94]   Resp:  [18-20]   BP: (130-155)/(58-73)   SpO2:  [95 %-100 %] .   Home meds for hypertension were reviewed and noted below.   Hypertension Medications               furosemide (LASIX) 40 MG tablet Take 1 tablet (40 mg total) by mouth 2 (two) times a day. Double up to lasix 80mg twice a day for next 5 days    metoprolol succinate (TOPROL-XL) 25 MG 24 hr tablet Take 0.5 tablets (12.5 mg total) by mouth once daily.    sacubitriL-valsartan (ENTRESTO)  mg per tablet Take 1 tablet by mouth 2 (two) times daily.            While in the hospital, will manage blood pressure as follows; Continue home antihypertensive regimen    Will utilize p.r.n. blood pressure medication only if patient's blood pressure greater than 180/110 and he develops symptoms such as worsening chest pain or shortness of breath.

## 2023-12-29 NOTE — PLAN OF CARE
[facility]  Initial [facility]       Primary Care Provider: Vivian Ch MD    Admission Diagnosis: Swelling [R60.9]  SOB (shortness of breath) [R06.02]  Chest pain [R07.9]  Scrotal edema [N50.89]    Admission Date: 12/28/2023  Expected Discharge Date:     Transition of Care Barriers: None    Payor: MEDICARE / Plan: MEDICARE PART A & B / Product Type: Government /     Extended Emergency Contact Information  Primary Emergency Contact: Clemencia Black  Mobile Phone: [phone]  Relation: Daughter  Secondary Emergency Contact: Lizette Huerta  Mobile Phone: [phone]  Relation: Sister  Preferred language: English   needed? No    Discharge Plan A: Home with family         [facility] beds at the [facility]  [address]  [place] [address]  Phone: [phone] Fax: [phone]    [facility]  [address]  [place] [address]  Phone: [phone] Fax: [phone]      Initial Assessment (most recent)       Adult Discharge Assessment - 12/29/23 1147          Discharge Assessment    Assessment Type Discharge Planning Assessment     Confirmed/corrected address, phone number and insurance Yes     Confirmed Demographics Correct on Facesheet     Source of Information patient     When was your last doctors appointment? 11/09/23   Uriel Garces MD - Cardiology    Communicated MEGAN with patient/caregiver Date not available/Unable to determine     Reason For Admission Scrotal edema     People in Home spouse     Facility Arrived From: home     Do you expect to return to your current living situation? Yes     Do you have help at home or someone to help you manage your care at home? Yes     Who are your caregiver(s) and their phone number(s)? Spouse     Prior to hospitilization cognitive status: Alert/Oriented     Current cognitive status: Alert/Oriented     Walking or Climbing Stairs Difficulty yes     Walking or Climbing Stairs ambulation difficulty, requires  equipment     Mobility Management cane, standard walker     Dressing/Bathing Difficulty yes     Dressing/Bathing bathing difficulty, requires equipment     Dressing/Bathing Management bath bench     Home Accessibility wheelchair accessible     Equipment Currently Used at Home walker, standard;cane, straight;bath bench;oxygen     Readmission within 30 days? No     Patient currently being followed by outpatient case management? No     Do you currently have service(s) that help you manage your care at home? No     Do you take prescription medications? Yes     Do you have prescription coverage? Yes     Coverage Medicare A & B     Do you have any problems affording any of your prescribed medications? No     Is the patient taking medications as prescribed? yes     Who is going to help you get home at discharge? spouse, grandson     How do you get to doctors appointments? car, drives self;family or friend will provide     Are you on dialysis? No     Do you take coumadin? No     Discharge Plan A Home with family     DME Needed Upon Discharge  none     Discharge Plan discussed with: Patient     Transition of Care Barriers None                   Anticipated DC dispo: home with family  Prior Level of Function: indep, family to help at needed with ADLs  People in home: spouse    Comments:  SW met with patient at bedside to introduce role and discuss discharge planning. Patient's grandson, daughter, family will be help at home and can provide transport at time of discharge. Confirmed demographics, insurance, and emergency contacts. Patient has cane and standard walker, bath bench, and oxygen at home. SW updated Patient's whiteboard with contact information and anticipated DC plan. CM discharge needs depends on hospital progress. SW will continue following to assist with other needs.

## 2023-12-29 NOTE — ASSESSMENT & PLAN NOTE
Patient's anemia is currently controlled. Has not received any PRBCs to date.   Current CBC reviewed-   Lab Results   Component Value Date    HGB 10.6 (L) 12/29/2023    HCT 32.9 (L) 12/29/2023     Monitor serial CBC and transfuse if patient becomes hemodynamically unstable, symptomatic or H/H drops below 7/21.

## 2023-12-29 NOTE — CONSULTS
[facility]  Cardiology  Consult Note    Patient Name: Richy Douglas  MRN: [id number]  Admission Date: 12/28/2023  Hospital Length of Stay: 0 days  Code Status: Full Code   Attending Provider: Mando Guallpa MD   Consulting Provider: Omar Hedrick MD  Primary Care Physician: Vivian Ch MD  Principal Problem:Scrotal edema    Patient information was obtained from patient, past medical records, ER records, and primary team.     Inpatient consult to Cardiology  Consult performed by: Omar Hedrick MD  Consult ordered by: Giovany Upton MD  Reason for consult: CHF        Subjective:     Chief Complaint:  EDEMA     HPI:   Cardiology consulted for CHF.  F/u by Dr. Garces, Cardiology.  Has combined CHF, PAF, HTN, PAD, PHTN, COPD, AI, h/o prostate cancer.  Admitted w acute CHF sxs, anasarca with leg edema, scrotal edema and dyspnea over last few weeks.  No angina/CP.  Ecg on admit sinus, LVH, PVC.  BNP 3572  Troponin wnl.  Echo Feb 2023 EF 30% RV failure, severe PHTN.  Not on anticoagulation for PAF due to risk for bleeding per Cardiology notes.      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    Anemia of chronic disease     Aortic aneurysm     Atrial fibrillation with RVR 09/24/2021    Chronic systolic congestive heart failure 08/31/2017    Emphysema of lung 08/31/2017    GERD (gastroesophageal reflux disease)     Hypertension     Knee osteoarthritis 10/21/2022    Major neurocognitive disorder due to multiple etiologies 4/14/2023    Microcytic anemia 11/24/2020    Other hyperlipidemia 04/27/2021    Personal history of colonic polyps 2022    Prostate cancer        Past Surgical History:   Procedure Laterality Date    CATHETERIZATION OF BOTH LEFT AND RIGHT HEART N/A 1/13/2022    Procedure: CATHETERIZATION, HEART, BOTH LEFT AND RIGHT;  Surgeon: Janneth Tovar MD;  Location: [facility] CATH LAB;  Service: Cardiology;  Laterality: N/A;  poss cx    COLONOSCOPY      COLONOSCOPY  Patient's mother informed of results and recommendations below. (permission given at visit to speak with mother)   N/A 6/30/2022    Procedure: COLONOSCOPY;  Surgeon: Sandra Perez MD;  Location: [facility] ENDO;  Service: Endoscopy;  Laterality: N/A;    COLONOSCOPY N/A 7/1/2022    Procedure: COLONOSCOPY;  Surgeon: Woodrow Christian MD;  Location: [facility] ENDO;  Service: Endoscopy;  Laterality: N/A;    CORONARY ANGIOGRAPHY N/A 1/13/2022    Procedure: ANGIOGRAM, CORONARY ARTERY;  Surgeon: Janneth Tovar MD;  Location: [facility] CATH LAB;  Service: Cardiology;  Laterality: N/A;    ESOPHAGOGASTRODUODENOSCOPY N/A 6/30/2022    Procedure: EGD (ESOPHAGOGASTRODUODENOSCOPY);  Surgeon: Sandra Perez MD;  Location: [facility] ENDO;  Service: Endoscopy;  Laterality: N/A;    INJECTION OF ANESTHETIC AGENT AROUND NERVE Bilateral 6/20/2023    Procedure: Bilateral Genicular nerve block RN IV Sedation;  Surgeon: Devon Grant MD;  [facility];  Service: Pain Management;  Laterality: Bilateral;    INJECTION OF JOINT Bilateral 10/21/2022    Procedure: Bilateral intraarticular knee injection with local ALLERGIC TO IODINE;  Surgeon: Devon Grant MD;  Location: [facility];  Service: Pain Management;  Laterality: Bilateral;    INJECTION OF JOINT Bilateral 1/31/2023    Procedure: Bilateral IA knee joint injection with steroid RN IV Sedation;  Surgeon: Devon Grant MD;  Location: [facility];  Service: Pain Management;  Laterality: Bilateral;    INJECTION OF JOINT Bilateral 5/10/2023    Procedure: Bilateral IA knee joint injection Synvisc RN IV Sedation;  Surgeon: Devon Grant MD;  Location: [facility];  Service: Pain Management;  Laterality: Bilateral;    INJECTION OF JOINT Bilateral 11/7/2023    Procedure: Bilateral intraarticular knee injection;  Surgeon: Devon Grant MD;  Location: [facility];  Service: Pain Management;  Laterality: Bilateral;    PROSTATE SURGERY      RADIOFREQUENCY THERMOCOAGULATION Left 9/1/2023    Procedure: Left Genicular Nerve RFA;  Surgeon: Devon Grant MD;  Location: [facility];  Service:  Pain Management;  Laterality: Left;    RADIOFREQUENCY THERMOCOAGULATION Right 9/15/2023    Procedure: Right Genicular Nerve RFA RN IV Sedation;  Surgeon: Devon Grant MD;  Location: [facility];  Service: Pain Management;  Laterality: Right;    SPINE SURGERY         Review of patient's allergies indicates:   Allergen Reactions    Fish containing products     Iodine and iodide containing products     Shellfish containing products        No current facility-administered medications on file prior to encounter.     Current Outpatient Medications on File Prior to Encounter   Medication Sig    albuterol (PROVENTIL) 2.5 mg /3 mL (0.083 %) nebulizer solution Take 3 mLs (2.5 mg total) by nebulization every 4 to 6 hours as needed for Wheezing or Shortness of Breath.    albuterol (PROVENTIL/VENTOLIN HFA) 90 mcg/actuation inhaler Inhale 2 puffs into the lungs every 4 (four) hours as needed for Wheezing or Shortness of Breath.    empagliflozin (JARDIANCE) 10 mg tablet Take 1 tablet (10 mg total) by mouth once daily.    ferrous sulfate 325 (65 FE) MG EC tablet Take 1 tablet (325 mg total) by mouth once daily.    fluticasone-umeclidin-vilanter (TRELEGY ELLIPTA) 200-62.5-25 mcg inhaler Inhale 1 puff into the lungs once daily.    furosemide (LASIX) 40 MG tablet Take 1 tablet (40 mg total) by mouth 2 (two) times a day. Double up to lasix 80mg twice a day for next 5 days    memantine (NAMENDA) 5 MG Tab Take 1 tablet (5 mg total) by mouth 2 (two) times daily.    metoprolol succinate (TOPROL-XL) 25 MG 24 hr tablet Take 0.5 tablets (12.5 mg total) by mouth once daily.    potassium chloride SA (K-DUR,KLOR-CON M) 10 MEQ tablet Take 2 tablets (20 mEq total) by mouth once daily.    sacubitriL-valsartan (ENTRESTO)  mg per tablet Take 1 tablet by mouth 2 (two) times daily.     Family History       Problem Relation (Age of Onset)    Diabetes Mother    Peripheral vascular disease Mother          Tobacco Use    Smoking status: Former      Current packs/day: 0.00     Average packs/day: 1 pack/day for 69.2 years (69.2 ttl pk-yrs)     Types: Cigarettes     Start date: 1954     Quit date: 3/1/2023     Years since quittin.8    Smokeless tobacco: Never   Substance and Sexual Activity    Alcohol use: Yes     Comment: reports only drinks red wine when games are on    Drug use: Never    Sexual activity: Yes     Partners: Female     Review of Systems   Constitutional: Positive for malaise/fatigue.   HENT: Negative.     Eyes: Negative.    Cardiovascular:  Positive for dyspnea on exertion and leg swelling.   Respiratory:  Positive for cough and shortness of breath.    Endocrine: Negative.    Hematologic/Lymphatic: Negative.    Skin: Negative.    Musculoskeletal: Negative.    Gastrointestinal: Negative.    Genitourinary: Negative.    Neurological:  Positive for weakness.   Psychiatric/Behavioral: Negative.     Allergic/Immunologic: Negative.      Objective:     Vital Signs (Most Recent):  Temp: 98.2 °F (36.8 °C) (23 0809)  Pulse: 83 (23 0809)  Resp: 18 (23 0809)  BP: (!) 89/45 (23 0809)  SpO2: (!) 94 % (23 0809) Vital Signs (24h Range):  Temp:  [97.5 °F (36.4 °C)-98.2 °F (36.8 °C)] 98.2 °F (36.8 °C)  Pulse:  [68-89] 83  Resp:  [18] 18  SpO2:  [90 %-98 %] 94 %  BP: ()/(45-73) 89/45     Weight: 75.8 kg (167 lb 1.7 oz)  Body mass index is 25.41 kg/m².    SpO2: (!) 94 %         Intake/Output Summary (Last 24 hours) at 2023 1114  Last data filed at 2023 0400  Gross per 24 hour   Intake --   Output 2100 ml   Net -2100 ml       Lines/Drains/Airways       Peripheral Intravenous Line  Duration                  Peripheral IV - Single Lumen 23 1745 Right Antecubital <1 day         Peripheral IV - Single Lumen 23 1925 20 G Left Forearm <1 day                     Physical Exam  Vitals and nursing note reviewed.   Constitutional:       Appearance: He is well-developed.   HENT:      Head: Normocephalic.  "  Neck:      Thyroid: No thyromegaly.      Vascular: Normal carotid pulses. No carotid bruit or JVD.   Cardiovascular:      Rate and Rhythm: Normal rate and regular rhythm.      Pulses:           Radial pulses are 2+ on the right side and 2+ on the left side.      Heart sounds: S1 normal and S2 normal. Heart sounds not distant. No midsystolic click and no opening snap. No murmur heard.     No friction rub. No S3 or S4 sounds.   Pulmonary:      Effort: Pulmonary effort is normal.      Breath sounds: Examination of the right-lower field reveals decreased breath sounds. Decreased breath sounds present. No wheezing or rales.   Abdominal:      General: Bowel sounds are normal. There is no abdominal bruit.      Palpations: Abdomen is soft.      Tenderness: There is no abdominal tenderness.   Musculoskeletal:      Cervical back: Neck supple.      Right lower leg: Edema present.      Left lower leg: Edema present.   Skin:     General: Skin is warm.   Neurological:      Mental Status: He is alert and oriented to person, place, and time.   Psychiatric:         Behavior: Behavior normal.          Significant Labs: ABG: No results for input(s): "PH", "PCO2", "HCO3", "POCSATURATED", "BE" in the last 48 hours. and Blood Culture:   Recent Labs   Lab 12/28/23  1745 12/28/23  1925   LABBLOO No Growth to date No Growth to date     Lab Results   Component Value Date    WBC 6.53 12/29/2023    HGB 10.6 (L) 12/29/2023    HCT 32.9 (L) 12/29/2023    MCV 76 (L) 12/29/2023     12/29/2023       BMP  Lab Results   Component Value Date     12/29/2023    K 3.2 (L) 12/29/2023     12/29/2023    CO2 24 12/29/2023    BUN 16 12/29/2023    CREATININE 0.8 12/29/2023    CALCIUM 8.9 12/29/2023    ANIONGAP 12 12/29/2023    EGFRNORACEVR >60 12/29/2023       Significant Imaging: Echocardiogram: Transthoracic echo (TTE) complete ([facility] Only):   Results for orders placed or performed during the hospital encounter of 02/11/23   Echo   Result " Value Ref Range    BSA 1.86 m2    TDI SEPTAL 0.06 m/s    LV LATERAL E/E' RATIO 10.86 m/s    LV SEPTAL E/E' RATIO 12.67 m/s    LA WIDTH 4.90 cm    IVC diameter 25 cm    Left Ventricular Outflow Tract Mean Velocity 0.95 cm/s    Left Ventricular Outflow Tract Mean Gradient 3.87 mmHg    TDI LATERAL 0.07 m/s    LVIDd 5.59 3.5 - 6.0 cm    IVS 1.67 (A) 0.6 - 1.1 cm    Posterior Wall 1.45 (A) 0.6 - 1.1 cm    Ao root annulus 4.69 cm    LVIDs 4.72 (A) 2.1 - 4.0 cm    FS 16 28 - 44 %    LA volume 115.59 cm3    Sinus 4.84 cm    STJ 4.89 cm    Ascending aorta 4.62 cm    LV mass 405.03 g    LA size 4.04 cm    RVDD 4.50 cm    Left Ventricle Relative Wall Thickness 0.52 cm    AV regurgitation pressure 1/2 time [id number] ms    AV mean gradient 7 mmHg    AV valve area 3.09 cm2    AV Velocity Ratio 0.75     AV index (prosthetic) 0.63     MV valve area p 1/2 method 3.00 cm2    E/A ratio 2.17     Mean e' 0.07 m/s    E wave deceleration time 253.20 msec    IVRT 45.67 msec    LVOT diameter 2.49 cm    LVOT area 4.9 cm2    LVOT peak karl 1.32 m/s    LVOT peak VTI 20.30 cm    Ao peak karl 1.76 m/s    Ao VTI 32.0 cm    RVOT peak karl 0.97 m/s    RVOT peak VTI 15.9 cm    LVOT stroke volume 98.80 cm3    AV peak gradient 12 mmHg    PV mean gradient 2.55 mmHg    E/E' ratio 11.69 m/s    MV Peak E Karl 0.76 m/s    AR Max Karl 3.73 m/s    TR Max Karl 4.03 m/s    MV stenosis pressure 1/2 time 73.43 ms    MV Peak A Karl 0.35 m/s    LV Systolic Volume 103.44 mL    LV Systolic Volume Index 55.0 mL/m2    LV Diastolic Volume 152.78 mL    LV Diastolic Volume Index 81.27 mL/m2    LA Volume Index 61.5 mL/m2    LV Mass Index 215 g/m2    RA Major Axis 5.95 cm    Left Atrium Minor Axis 6.33 cm    Left Atrium Major Axis 7.51 cm    Triscuspid Valve Regurgitation Peak Gradient 65 mmHg    RA Width 5.68 cm    Right Atrial Pressure (from IVC) 15 mmHg    EF 30 %    TV resting pulmonary artery pressure 80 mmHg    Narrative    · The left ventricle is normal in size  with concentric hypertrophy and   moderately decreased systolic function.  · Severe left atrial enlargement.  · Trivial pericardial effusion.  · Mild tricuspid regurgitation.  · Severe right atrial enlargement.  · The estimated ejection fraction is 30%.  · There is severe left ventricular global hypokinesis.  · Mild right ventricular enlargement with mildly to moderately reduced   right ventricular systolic function.  · Moderate aortic regurgitation.  · Elevated central venous pressure (15 mmHg).  · The estimated PA systolic pressure is 80 mmHg.  · There is pulmonary hypertension.  · The ascending aorta is moderately dilated.  · The aortic root is moderately dilated.  · Atrial fibrillation observed.        Assessment and Plan:     Acute on chronic systolic congestive heart failure  Patient is identified as having Combined Systolic and Diastolic heart failure that is Acute on chronic. Latest ECHO performed and demonstrates- Results for orders placed during the hospital encounter of 02/11/23    Echo    Interpretation Summary  · The left ventricle is normal in size with concentric hypertrophy and moderately decreased systolic function.  · Severe left atrial enlargement.  · Trivial pericardial effusion.  · Mild tricuspid regurgitation.  · Severe right atrial enlargement.  · The estimated ejection fraction is 30%.  · There is severe left ventricular global hypokinesis.  · Mild right ventricular enlargement with mildly to moderately reduced right ventricular systolic function.  · Moderate aortic regurgitation.  · Elevated central venous pressure (15 mmHg).  · The estimated PA systolic pressure is 80 mmHg.  · There is pulmonary hypertension.  · The ascending aorta is moderately dilated.  · The aortic root is moderately dilated.  · Atrial fibrillation observed.    Recent Labs   Lab 12/28/23  1745   BNP 3,572*       IV Lasix diuresis.  Low salt diet.  Echocardiogram.  GDMT advised for CHF in future.            UTI (urinary  tract infection)  Per Hospital med mgt.    Atrial fibrillation  PAF hx.  Not on DOAC due to hx of bleeding.  Monitor.  Sinus on admit.    Hypertension  Continue current HTN meds; adjust as needed.        CLOSE F/U WITH CHF CLINIC AFTER DISCHARGE ADVISED.        VTE Risk Mitigation (From admission, onward)           Ordered     IP VTE HIGH RISK PATIENT  Once         12/29/23 0115     Place sequential compression device  Until discontinued         12/28/23 2038                    Thank you for your consult.     Omar Hedrick MD  Cardiology   [facility]

## 2023-12-29 NOTE — SUBJECTIVE & OBJECTIVE
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    Anemia of chronic disease     Aortic aneurysm     Atrial fibrillation with RVR 09/24/2021    Chronic systolic congestive heart failure 08/31/2017    Emphysema of lung 08/31/2017    GERD (gastroesophageal reflux disease)     Hypertension     Knee osteoarthritis 10/21/2022    Major neurocognitive disorder due to multiple etiologies 4/14/2023    Microcytic anemia 11/24/2020    Other hyperlipidemia 04/27/2021    Personal history of colonic polyps 2022    Prostate cancer        Past Surgical History:   Procedure Laterality Date    CATHETERIZATION OF BOTH LEFT AND RIGHT HEART N/A 1/13/2022    Procedure: CATHETERIZATION, HEART, BOTH LEFT AND RIGHT;  Surgeon: Janneth Tovar MD;  Location: [facility] CATH LAB;  Service: Cardiology;  Laterality: N/A;  poss cx    COLONOSCOPY      COLONOSCOPY N/A 6/30/2022    Procedure: COLONOSCOPY;  Surgeon: Sandra Perez MD;  Location: [facility] ENDO;  Service: Endoscopy;  Laterality: N/A;    COLONOSCOPY N/A 7/1/2022    Procedure: COLONOSCOPY;  Surgeon: Woodrow Christian MD;  Location: [facility];  Service: Endoscopy;  Laterality: N/A;    CORONARY ANGIOGRAPHY N/A 1/13/2022    Procedure: ANGIOGRAM, CORONARY ARTERY;  Surgeon: Janneth Tovar MD;  Location: [facility] CATH LAB;  Service: Cardiology;  Laterality: N/A;    ESOPHAGOGASTRODUODENOSCOPY N/A 6/30/2022    Procedure: EGD (ESOPHAGOGASTRODUODENOSCOPY);  Surgeon: Sandra Perez MD;  Location: [facility];  Service: Endoscopy;  Laterality: N/A;    INJECTION OF ANESTHETIC AGENT AROUND NERVE Bilateral 6/20/2023    Procedure: Bilateral Genicular nerve block RN IV Sedation;  Surgeon: Devon Grant MD;  Location: [facility] PAIN MGT;  Service: Pain Management;  Laterality: Bilateral;    INJECTION OF JOINT Bilateral 10/21/2022    Procedure: Bilateral intraarticular knee injection with local ALLERGIC TO IODINE;  Surgeon: Devon Grant MD;  Location: [facility] PAIN MGT;   Service: Pain Management;  Laterality: Bilateral;    INJECTION OF JOINT Bilateral 1/31/2023    Procedure: Bilateral IA knee joint injection with steroid RN IV Sedation;  Surgeon: Devon Grant MD;  Location: [facility] PAIN MGT;  Service: Pain Management;  Laterality: Bilateral;    INJECTION OF JOINT Bilateral 5/10/2023    Procedure: Bilateral IA knee joint injection Synvisc RN IV Sedation;  Surgeon: Devon Grant MD;  Location: [facility] PAIN MGT;  Service: Pain Management;  Laterality: Bilateral;    INJECTION OF JOINT Bilateral 11/7/2023    Procedure: Bilateral intraarticular knee injection;  Surgeon: Devon Grant MD;  Location: [facility] PAIN MGT;  Service: Pain Management;  Laterality: Bilateral;    PROSTATE SURGERY      RADIOFREQUENCY THERMOCOAGULATION Left 9/1/2023    Procedure: Left Genicular Nerve RFA;  Surgeon: Devon Grant MD;  Location: [facility] PAIN MGT;  Service: Pain Management;  Laterality: Left;    RADIOFREQUENCY THERMOCOAGULATION Right 9/15/2023    Procedure: Right Genicular Nerve RFA RN IV Sedation;  Surgeon: Devon Grant MD;  Location: [facility] PAIN MGT;  Service: Pain Management;  Laterality: Right;    SPINE SURGERY         Review of patient's allergies indicates:   Allergen Reactions    Fish containing products     Iodine and iodide containing products     Shellfish containing products        No current facility-administered medications on file prior to encounter.     Current Outpatient Medications on File Prior to Encounter   Medication Sig    albuterol (PROVENTIL) 2.5 mg /3 mL (0.083 %) nebulizer solution Take 3 mLs (2.5 mg total) by nebulization every 4 to 6 hours as needed for Wheezing or Shortness of Breath.    albuterol (PROVENTIL/VENTOLIN HFA) 90 mcg/actuation inhaler Inhale 2 puffs into the lungs every 4 (four) hours as needed for Wheezing or Shortness of Breath.    empagliflozin (JARDIANCE) 10 mg tablet Take 1 tablet (10 mg total) by mouth once daily.    ferrous sulfate 325 (65 FE) MG EC tablet Take  1 tablet (325 mg total) by mouth once daily.    fluticasone-umeclidin-vilanter (TRELEGY ELLIPTA) 200-62.5-25 mcg inhaler Inhale 1 puff into the lungs once daily.    furosemide (LASIX) 40 MG tablet Take 1 tablet (40 mg total) by mouth 2 (two) times a day. Double up to lasix 80mg twice a day for next 5 days    memantine (NAMENDA) 5 MG Tab Take 1 tablet (5 mg total) by mouth 2 (two) times daily.    metoprolol succinate (TOPROL-XL) 25 MG 24 hr tablet Take 0.5 tablets (12.5 mg total) by mouth once daily.    potassium chloride SA (K-DUR,KLOR-CON M) 10 MEQ tablet Take 2 tablets (20 mEq total) by mouth once daily.    sacubitriL-valsartan (ENTRESTO)  mg per tablet Take 1 tablet by mouth 2 (two) times daily.     Family History       Problem Relation (Age of Onset)    Diabetes Mother    Peripheral vascular disease Mother          Tobacco Use    Smoking status: Former     Current packs/day: 0.00     Average packs/day: 1 pack/day for 69.2 years (69.2 ttl pk-yrs)     Types: Cigarettes     Start date: 1954     Quit date: 3/1/2023     Years since quittin.8    Smokeless tobacco: Never   Substance and Sexual Activity    Alcohol use: Yes     Comment: reports only drinks red wine when games are on    Drug use: Never    Sexual activity: Yes     Partners: Female     Review of Systems   All other systems reviewed and are negative.    Objective:     Vital Signs (Most Recent):  Temp: 97.5 °F (36.4 °C) (23 1005)  Pulse: 84 (23 1833)  Resp: 18 (23 1527)  BP: (!) 141/65 (23 1833)  SpO2: 97 % (23 1527) Vital Signs (24h Range):  Temp:  [97.5 °F (36.4 °C)] 97.5 °F (36.4 °C)  Pulse:  [84-94] 84  Resp:  [18-20] 18  SpO2:  [95 %-100 %] 97 %  BP: (141-155)/(65-73) 141/65     Weight: 74 kg (163 lb 2.3 oz)  Body mass index is 23.41 kg/m².     Physical Exam  Vitals and nursing note reviewed.   Constitutional:       General: He is not in acute distress.     Appearance: Normal appearance. He is normal weight.    HENT:      Head: Normocephalic and atraumatic.      Nose: Nose normal.      Mouth/Throat:      Pharynx: Oropharynx is clear.   Eyes:      Extraocular Movements: Extraocular movements intact.      Pupils: Pupils are equal, round, and reactive to light.   Cardiovascular:      Pulses: Normal pulses.      Heart sounds: Normal heart sounds.   Pulmonary:      Effort: Pulmonary effort is normal. No respiratory distress.      Breath sounds: Normal breath sounds. No wheezing, rhonchi or rales.   Abdominal:      General: Abdomen is flat. Bowel sounds are normal. There is no distension.      Palpations: Abdomen is soft.      Tenderness: There is no abdominal tenderness. There is no guarding.   Genitourinary:     Comments: Scrotal edema, erythema  Neurological:      General: No focal deficit present.      Mental Status: He is alert and oriented to person, place, and time.   Psychiatric:         Mood and Affect: Mood normal.         Behavior: Behavior normal.              CRANIAL NERVES     CN III, IV, VI   Pupils are equal, round, and reactive to light.       Significant Labs: All pertinent labs within the past 24 hours have been reviewed.  Recent Lab Results         12/28/23  1916   12/28/23  1745        Albumin   3.9       ALP   51       ALT   21       Anion Gap   12       Appearance, UA Clear         AST   36       Bacteria, UA Many         Baso #   0.11       Basophil %   2.0       Bilirubin (UA) Negative         BILIRUBIN TOTAL   2.5  Comment: For infants and newborns, interpretation of results should be based  on gestational age, weight and in agreement with clinical  observations.    Premature Infant recommended reference ranges:  Up to 24 hours.............<8.0 mg/dL  Up to 48 hours............<12.0 mg/dL  3-5 days..................<15.0 mg/dL  6-29 days.................<15.0 mg/dL         BNP   3,572  Comment: Values of less than 100 pg/ml are consistent with non-CHF populations.       BUN   17       Calcium   9.1        Chloride   108       CO2   24       Color, UA Yellow         Creatinine   0.8       Differential Method   Automated       eGFR   >60       Eos #   0.3       Eosinophil %   5.8       Glucose   105       Glucose, UA Negative         Gran # (ANC)   3.4       Gran %   61.6       Hematocrit   34.6       Hemoglobin   11.1       Hyaline Casts, UA 1         Immature Grans (Abs)   0.01  Comment: Mild elevation in immature granulocytes is non specific and   can be seen in a variety of conditions including stress response,   acute inflammation, trauma and pregnancy. Correlation with other   laboratory and clinical findings is essential.         Immature Granulocytes   0.2       Ketones, UA Negative         Lactate, Cedric   2.6  Comment: Falsely low lactic acid results can be found in samples   containing >=13.0 mg/dL total bilirubin and/or >=3.5 mg/dL   direct bilirubin.         Leukocytes, UA 1+         Lipase   9       Lymph #   1.1       Lymph %   19.2       MCH   24.4       MCHC   32.1       MCV   76       Microscopic Comment SEE COMMENT  Comment: Other formed elements not mentioned in the report are not   present in the microscopic examination.            Mono #   0.6       Mono %   11.2       MPV   SEE COMMENT  Comment: Result not available.       NITRITE UA Negative         nRBC   1       Occult Blood UA 2+         pH, UA 7.0         Platelet Count   352       Potassium   3.8       PROTEIN TOTAL   7.1       Protein, UA 2+  Comment: Recommend a 24 hour urine protein or a urine   protein/creatinine ratio if globulin induced proteinuria is  clinically suspected.           RBC   4.55       RBC, UA 0         RDW   26.5       Sodium   144       Specific [place], UA 1.020         Specimen UA Urine, Clean Catch         Squam Epithel, UA 3         Troponin I   0.015  Comment: The reference interval for Troponin I represents the 99th percentile   cutoff   for our facility and is consistent with 3rd generation assay    performance.         UROBILINOGEN UA >=8.0         WBC Clumps, UA Occasional         WBC, UA 28         WBC   5.56               Significant Imaging: I have reviewed all pertinent imaging results/findings within the past 24 hours.    US Scrotum And Testicles   Final Result      No testicular pathology seen; spermatoceles or extratesticular fluid         Electronically signed by: Sabiha Solares   Date:    12/28/2023   Time:    18:23      CT Abdomen Pelvis  Without Contrast   Final Result      1. Marked scrotal and penile edema with large right hydrocele and at least small left hydrocele which are not entirely included on this exam.  Anasarca with cardiomegaly and small volume ascites suggest fluid overload which may be the etiology of this pelvic induration and scrotal fluid.  Infectious Disease of the perineum is also a consideration; however no subcutaneous gas is present on this exam (which does not include the lower scrotum) to suggest gas-forming infectious process.   2. Other findings as above.         Electronically signed by: Reed Garcia   Date:    12/28/2023   Time:    15:55      X-Ray Chest 1 View   Final Result      Stable chest with chronic lung disease.         Electronically signed by: Reed Garcia   Date:    12/28/2023   Time:    15:26

## 2023-12-30 LAB
ALBUMIN SERPL BCP-MCNC: 3.2 G/DL (ref 3.5–5.2)
ALP SERPL-CCNC: 45 U/L (ref 55–135)
ALT SERPL W/O P-5'-P-CCNC: 15 U/L (ref 10–44)
ANION GAP SERPL CALC-SCNC: 13 MMOL/L (ref 8–16)
AST SERPL-CCNC: 26 U/L (ref 10–40)
BASOPHILS # BLD AUTO: 0.07 K/UL (ref 0–0.2)
BASOPHILS NFR BLD: 0.9 % (ref 0–1.9)
BILIRUB SERPL-MCNC: 1.3 MG/DL (ref 0.1–1)
BUN SERPL-MCNC: 15 MG/DL (ref 8–23)
CALCIUM SERPL-MCNC: 9 MG/DL (ref 8.7–10.5)
CHLORIDE SERPL-SCNC: 106 MMOL/L (ref 95–110)
CO2 SERPL-SCNC: 25 MMOL/L (ref 23–29)
CREAT SERPL-MCNC: 0.8 MG/DL (ref 0.5–1.4)
DIFFERENTIAL METHOD BLD: ABNORMAL
EOSINOPHIL # BLD AUTO: 0.4 K/UL (ref 0–0.5)
EOSINOPHIL NFR BLD: 5.5 % (ref 0–8)
ERYTHROCYTE [DISTWIDTH] IN BLOOD BY AUTOMATED COUNT: 27.7 % (ref 11.5–14.5)
EST. GFR  (NO RACE VARIABLE): >60 ML/MIN/1.73 M^2
GLUCOSE SERPL-MCNC: 82 MG/DL (ref 70–110)
HCT VFR BLD AUTO: 37 % (ref 40–54)
HGB BLD-MCNC: 11.7 G/DL (ref 14–18)
IMM GRANULOCYTES # BLD AUTO: 0.03 K/UL (ref 0–0.04)
IMM GRANULOCYTES NFR BLD AUTO: 0.4 % (ref 0–0.5)
LYMPHOCYTES # BLD AUTO: 1 K/UL (ref 1–4.8)
LYMPHOCYTES NFR BLD: 12.6 % (ref 18–48)
MAGNESIUM SERPL-MCNC: 1.8 MG/DL (ref 1.6–2.6)
MCH RBC QN AUTO: 24.4 PG (ref 27–31)
MCHC RBC AUTO-ENTMCNC: 31.6 G/DL (ref 32–36)
MCV RBC AUTO: 77 FL (ref 82–98)
MONOCYTES # BLD AUTO: 0.6 K/UL (ref 0.3–1)
MONOCYTES NFR BLD: 7.8 % (ref 4–15)
NEUTROPHILS # BLD AUTO: 5.7 K/UL (ref 1.8–7.7)
NEUTROPHILS NFR BLD: 72.8 % (ref 38–73)
NRBC BLD-RTO: 0 /100 WBC
PLATELET # BLD AUTO: 352 K/UL (ref 150–450)
PMV BLD AUTO: ABNORMAL FL (ref 9.2–12.9)
POCT GLUCOSE: 105 MG/DL (ref 70–110)
POCT GLUCOSE: 83 MG/DL (ref 70–110)
POCT GLUCOSE: 93 MG/DL (ref 70–110)
POCT GLUCOSE: 96 MG/DL (ref 70–110)
POTASSIUM SERPL-SCNC: 4.4 MMOL/L (ref 3.5–5.1)
PROCALCITONIN SERPL IA-MCNC: <0.02 NG/ML
PROT SERPL-MCNC: 6 G/DL (ref 6–8.4)
RBC # BLD AUTO: 4.79 M/UL (ref 4.6–6.2)
SODIUM SERPL-SCNC: 144 MMOL/L (ref 136–145)
WBC # BLD AUTO: 7.83 K/UL (ref 3.9–12.7)

## 2023-12-30 PROCEDURE — 27000221 HC OXYGEN, UP TO 24 HOURS

## 2023-12-30 PROCEDURE — 36415 COLL VENOUS BLD VENIPUNCTURE: CPT | Performed by: NURSE PRACTITIONER

## 2023-12-30 PROCEDURE — 99233 SBSQ HOSP IP/OBS HIGH 50: CPT | Mod: ,,, | Performed by: INTERNAL MEDICINE

## 2023-12-30 PROCEDURE — 94799 UNLISTED PULMONARY SVC/PX: CPT | Mod: XB

## 2023-12-30 PROCEDURE — 25000242 PHARM REV CODE 250 ALT 637 W/ HCPCS: Performed by: FAMILY MEDICINE

## 2023-12-30 PROCEDURE — 80053 COMPREHEN METABOLIC PANEL: CPT | Performed by: NURSE PRACTITIONER

## 2023-12-30 PROCEDURE — 25000003 PHARM REV CODE 250: Performed by: HOSPITALIST

## 2023-12-30 PROCEDURE — 63600175 PHARM REV CODE 636 W HCPCS: Performed by: NURSE PRACTITIONER

## 2023-12-30 PROCEDURE — 25000003 PHARM REV CODE 250: Performed by: NURSE PRACTITIONER

## 2023-12-30 PROCEDURE — 94640 AIRWAY INHALATION TREATMENT: CPT

## 2023-12-30 PROCEDURE — 25000003 PHARM REV CODE 250: Performed by: FAMILY MEDICINE

## 2023-12-30 PROCEDURE — 11000001 HC ACUTE MED/SURG PRIVATE ROOM

## 2023-12-30 PROCEDURE — 36415 COLL VENOUS BLD VENIPUNCTURE: CPT | Mod: XB | Performed by: NURSE PRACTITIONER

## 2023-12-30 PROCEDURE — 84145 PROCALCITONIN (PCT): CPT | Performed by: FAMILY MEDICINE

## 2023-12-30 PROCEDURE — 94761 N-INVAS EAR/PLS OXIMETRY MLT: CPT

## 2023-12-30 PROCEDURE — 25000003 PHARM REV CODE 250: Performed by: INTERNAL MEDICINE

## 2023-12-30 PROCEDURE — 85025 COMPLETE CBC W/AUTO DIFF WBC: CPT | Performed by: NURSE PRACTITIONER

## 2023-12-30 PROCEDURE — 83735 ASSAY OF MAGNESIUM: CPT | Performed by: NURSE PRACTITIONER

## 2023-12-30 PROCEDURE — 99900035 HC TECH TIME PER 15 MIN (STAT)

## 2023-12-30 RX ORDER — ASPIRIN 81 MG/1
81 TABLET ORAL DAILY
Status: DISCONTINUED | OUTPATIENT
Start: 2023-12-30 | End: 2023-12-31 | Stop reason: HOSPADM

## 2023-12-30 RX ADMIN — Medication 400 MG: at 08:12

## 2023-12-30 RX ADMIN — IPRATROPIUM BROMIDE AND ALBUTEROL SULFATE 3 ML: 2.5; .5 SOLUTION RESPIRATORY (INHALATION) at 07:12

## 2023-12-30 RX ADMIN — POTASSIUM CHLORIDE 40 MEQ: 1500 TABLET, EXTENDED RELEASE ORAL at 09:12

## 2023-12-30 RX ADMIN — Medication 400 MG: at 09:12

## 2023-12-30 RX ADMIN — BUDESONIDE 0.5 MG: 0.25 INHALANT RESPIRATORY (INHALATION) at 07:12

## 2023-12-30 RX ADMIN — ASPIRIN 81 MG: 81 TABLET, COATED ORAL at 09:12

## 2023-12-30 RX ADMIN — FUROSEMIDE 20 MG: 10 INJECTION, SOLUTION INTRAMUSCULAR; INTRAVENOUS at 06:12

## 2023-12-30 RX ADMIN — SACUBITRIL AND VALSARTAN 2 TABLET: 49; 51 TABLET, FILM COATED ORAL at 09:12

## 2023-12-30 RX ADMIN — IPRATROPIUM BROMIDE AND ALBUTEROL SULFATE 3 ML: 2.5; .5 SOLUTION RESPIRATORY (INHALATION) at 01:12

## 2023-12-30 RX ADMIN — MEMANTINE HYDROCHLORIDE 5 MG: 5 TABLET ORAL at 08:12

## 2023-12-30 RX ADMIN — METOPROLOL SUCCINATE 12.5 MG: 25 TABLET, EXTENDED RELEASE ORAL at 08:12

## 2023-12-30 RX ADMIN — FUROSEMIDE 20 MG: 10 INJECTION, SOLUTION INTRAMUSCULAR; INTRAVENOUS at 05:12

## 2023-12-30 RX ADMIN — MEMANTINE HYDROCHLORIDE 5 MG: 5 TABLET ORAL at 09:12

## 2023-12-30 RX ADMIN — POTASSIUM CHLORIDE 40 MEQ: 1500 TABLET, EXTENDED RELEASE ORAL at 08:12

## 2023-12-30 RX ADMIN — CEFTRIAXONE 1 G: 1 INJECTION, POWDER, FOR SOLUTION INTRAMUSCULAR; INTRAVENOUS at 09:12

## 2023-12-30 NOTE — ASSESSMENT & PLAN NOTE
- does not appear to be in acute exacerbation  - on home O2, currently on 2L NC  - follows with Dr. Jackson in clinic   - continue nebs  - monitor for bronchospasm

## 2023-12-30 NOTE — ASSESSMENT & PLAN NOTE
Patient with Hypoxic Respiratory failure which is Acute on chronic.  he is on home oxygen at ? LPM. Supplemental oxygen was provided and noted-      Signs/symptoms of respiratory failure include- increased work of breathing, respiratory distress, and lethargy. Contributing diagnoses includes - CHF Labs and images were reviewed. Patient Has not had a recent ABG. Will treat underlying causes and adjust management of respiratory failure as follows    - s/t HF exacerbation, do not suspect pneumonia at this time  - procal pending  - imaging reviewed   - pt reports feeling much better since admission and is diuresing well, monitor

## 2023-12-30 NOTE — ASSESSMENT & PLAN NOTE
Patient is identified as having Systolic (HFrEF) heart failure that is Acute on chronic. CHF is currently uncontrolled due to Continued edema of extremities and Hepatic congestion/ascites. Latest ECHO performed and demonstrates- Results for orders placed during the hospital encounter of 02/11/23    Echo    Interpretation Summary  · The left ventricle is normal in size with concentric hypertrophy and moderately decreased systolic function.  · Severe left atrial enlargement.  · Trivial pericardial effusion.  · Mild tricuspid regurgitation.  · Severe right atrial enlargement.  · The estimated ejection fraction is 30%.  · There is severe left ventricular global hypokinesis.  · Mild right ventricular enlargement with mildly to moderately reduced right ventricular systolic function.  · Moderate aortic regurgitation.  · Elevated central venous pressure (15 mmHg).  · The estimated PA systolic pressure is 80 mmHg.  · There is pulmonary hypertension.  · The ascending aorta is moderately dilated.  · The aortic root is moderately dilated.  · Atrial fibrillation observed.  . Continue Beta Blocker, Furosemide, and ARNI and monitor clinical status closely. Monitor on telemetry. Cardiology has been consulted. Continue to stress to patient importance of self efficacy and  on diet for CHF. Last BNP reviewed- and noted below   Recent Labs   Lab 12/28/23  1745   BNP 3,572*       - continue diuresis  - mgmt per cards and HM

## 2023-12-30 NOTE — SUBJECTIVE & OBJECTIVE
Review of Systems   Constitutional: Negative.   HENT: Negative.     Eyes: Negative.    Cardiovascular:  Positive for leg swelling.   Respiratory: Negative.     Endocrine: Negative.    Hematologic/Lymphatic: Negative.    Skin: Negative.    Musculoskeletal:  Positive for arthritis and stiffness.   Gastrointestinal: Negative.    Genitourinary: Negative.    Neurological:  Positive for weakness.   Psychiatric/Behavioral: Negative.     Allergic/Immunologic: Negative.      Objective:     Vital Signs (Most Recent):  Temp: 98.2 °F (36.8 °C) (12/30/23 1119)  Pulse: 91 (12/30/23 1119)  Resp: 18 (12/30/23 1119)  BP: (!) 108/56 (12/30/23 1119)  SpO2: 96 % (12/30/23 1119) Vital Signs (24h Range):  Temp:  [96.3 °F (35.7 °C)-98.4 °F (36.9 °C)] 98.2 °F (36.8 °C)  Pulse:  [] 91  Resp:  [16-18] 18  SpO2:  [90 %-98 %] 96 %  BP: ()/(50-70) 108/56     Weight: 75.8 kg (167 lb 1.7 oz)  Body mass index is 25.41 kg/m².     SpO2: 96 %         Intake/Output Summary (Last 24 hours) at 12/30/2023 1135  Last data filed at 12/30/2023 0844  Gross per 24 hour   Intake 240 ml   Output 3150 ml   Net -2910 ml       Lines/Drains/Airways       Peripheral Intravenous Line  Duration                  Peripheral IV - Single Lumen 12/28/23 1745 Right Antecubital 1 day         Peripheral IV - Single Lumen 12/28/23 1925 20 G Left Forearm 1 day                       Physical Exam  Vitals and nursing note reviewed.   Constitutional:       Appearance: He is well-developed.   HENT:      Head: Normocephalic.   Neck:      Thyroid: No thyromegaly.      Vascular: No carotid bruit or JVD.   Cardiovascular:      Rate and Rhythm: Normal rate. Rhythm irregularly irregular.      Heart sounds: S1 normal and S2 normal. Heart sounds not distant. No midsystolic click and no opening snap. No murmur heard.     No friction rub. No S3 or S4 sounds.   Pulmonary:      Effort: Pulmonary effort is normal.      Breath sounds: Examination of the right-lower field reveals  "decreased breath sounds. Examination of the left-lower field reveals decreased breath sounds. Decreased breath sounds present. No wheezing or rales.   Abdominal:      General: Bowel sounds are normal. There is no distension or abdominal bruit.      Palpations: Abdomen is soft.      Tenderness: There is no abdominal tenderness.   Musculoskeletal:      Cervical back: Neck supple.      Right lower leg: Edema present.      Left lower leg: Edema present.   Skin:     General: Skin is warm.   Neurological:      Mental Status: He is alert and oriented to person, place, and time.   Psychiatric:         Behavior: Behavior normal.            Significant Labs: ABG: No results for input(s): "PH", "PCO2", "HCO3", "POCSATURATED", "BE" in the last 48 hours., Blood Culture:   Recent Labs   Lab 12/28/23 1745 12/28/23  1925   LABBLOO No Growth to date  No Growth to date No Growth to date  No Growth to date   , BMP:   Recent Labs   Lab 12/28/23 1745 12/29/23  0521 12/30/23  0402    102 82    144 144   K 3.8 3.2* 4.4    108 106   CO2 24 24 25   BUN 17 16 15   CREATININE 0.8 0.8 0.8   CALCIUM 9.1 8.9 9.0   MG  --  1.7 1.8   , CMP   Recent Labs   Lab 12/28/23 1745 12/29/23  0521 12/30/23  0402    144 144   K 3.8 3.2* 4.4    108 106   CO2 24 24 25    102 82   BUN 17 16 15   CREATININE 0.8 0.8 0.8   CALCIUM 9.1 8.9 9.0   PROT 7.1  --  6.0   ALBUMIN 3.9  --  3.2*   BILITOT 2.5*  --  1.3*   ALKPHOS 51*  --  45*   AST 36  --  26   ALT 21  --  15   ANIONGAP 12 12 13   , CBC   Recent Labs   Lab 12/28/23 1745 12/29/23  0521 12/30/23  0809   WBC 5.56 6.53 7.83   HGB 11.1* 10.6* 11.7*   HCT 34.6* 32.9* 37.0*    316 352   , INR No results for input(s): "INR", "PROTIME" in the last 48 hours., Lipid Panel No results for input(s): "CHOL", "HDL", "LDLCALC", "TRIG", "CHOLHDL" in the last 48 hours., and Troponin   Recent Labs   Lab 12/28/23  1745   TROPONINI 0.015       Significant Imaging: " Echocardiogram: Transthoracic echo (TTE) complete (Cupid Only):   Results for orders placed or performed during the hospital encounter of 02/11/23   Echo   Result Value Ref Range    BSA 1.86 m2    TDI SEPTAL 0.06 m/s    LV LATERAL E/E' RATIO 10.86 m/s    LV SEPTAL E/E' RATIO 12.67 m/s    LA WIDTH 4.90 cm    IVC diameter 25 cm    Left Ventricular Outflow Tract Mean Velocity 0.95 cm/s    Left Ventricular Outflow Tract Mean Gradient 3.87 mmHg    TDI LATERAL 0.07 m/s    LVIDd 5.59 3.5 - 6.0 cm    IVS 1.67 (A) 0.6 - 1.1 cm    Posterior Wall 1.45 (A) 0.6 - 1.1 cm    Ao root annulus 4.69 cm    LVIDs 4.72 (A) 2.1 - 4.0 cm    FS 16 28 - 44 %    LA volume 115.59 cm3    Sinus 4.84 cm    STJ 4.89 cm    Ascending aorta 4.62 cm    LV mass 405.03 g    LA size 4.04 cm    RVDD 4.50 cm    Left Ventricle Relative Wall Thickness 0.52 cm    AV regurgitation pressure 1/2 time [id number] ms    AV mean gradient 7 mmHg    AV valve area 3.09 cm2    AV Velocity Ratio 0.75     AV index (prosthetic) 0.63     MV valve area p 1/2 method 3.00 cm2    E/A ratio 2.17     Mean e' 0.07 m/s    E wave deceleration time 253.20 msec    IVRT 45.67 msec    LVOT diameter 2.49 cm    LVOT area 4.9 cm2    LVOT peak karl 1.32 m/s    LVOT peak VTI 20.30 cm    Ao peak karl 1.76 m/s    Ao VTI 32.0 cm    RVOT peak karl 0.97 m/s    RVOT peak VTI 15.9 cm    LVOT stroke volume 98.80 cm3    AV peak gradient 12 mmHg    PV mean gradient 2.55 mmHg    E/E' ratio 11.69 m/s    MV Peak E Karl 0.76 m/s    AR Max Karl 3.73 m/s    TR Max Karl 4.03 m/s    MV stenosis pressure 1/2 time 73.43 ms    MV Peak A Karl 0.35 m/s    LV Systolic Volume 103.44 mL    LV Systolic Volume Index 55.0 mL/m2    LV Diastolic Volume 152.78 mL    LV Diastolic Volume Index 81.27 mL/m2    LA Volume Index 61.5 mL/m2    LV Mass Index 215 g/m2    RA Major Axis 5.95 cm    Left Atrium Minor Axis 6.33 cm    Left Atrium Major Axis 7.51 cm    Triscuspid Valve Regurgitation Peak Gradient 65 mmHg    RA Width 5.68  cm    Right Atrial Pressure (from IVC) 15 mmHg    EF 30 %    TV resting pulmonary artery pressure 80 mmHg    Narrative    · The left ventricle is normal in size with concentric hypertrophy and   moderately decreased systolic function.  · Severe left atrial enlargement.  · Trivial pericardial effusion.  · Mild tricuspid regurgitation.  · Severe right atrial enlargement.  · The estimated ejection fraction is 30%.  · There is severe left ventricular global hypokinesis.  · Mild right ventricular enlargement with mildly to moderately reduced   right ventricular systolic function.  · Moderate aortic regurgitation.  · Elevated central venous pressure (15 mmHg).  · The estimated PA systolic pressure is 80 mmHg.  · There is pulmonary hypertension.  · The ascending aorta is moderately dilated.  · The aortic root is moderately dilated.  · Atrial fibrillation observed.

## 2023-12-30 NOTE — HPI
"83 year old male with a known past medical history of chronic HFrEF (EF 30%), atrial fibrillation (not on AC due to bleeding risk), HTN, PVD, emphysema, GERD, and prostate cancer that presented to the ER 12/28 with complaints of swelling to his penis, testicles, abdomen, and BLE. He also complained of dysuria, difficulty starting urinating with associated with pain. Onset of swelling > 1 month ago, started to have difficulty urinating over the past week. States hasn't urinated at all in the 2-3 days.     In ER, labs significant for H/H 11.1/34.6, T bili 2.5, BNP 3572, troponin WNL (0.015), lactic acid 2.6. UA +protein, occult blood, leukocytes, 28 WBC, many bacteria - culture pending. Scrotal US with no torsion. CT A/P with "marked scrotal and penile edema, large right hydrocele, small left hydrocele. Anasarca with cardiomegaly and small volume ascites. CXR no acute findings, noted emphysema. EKG shows PVC's with right axis deviation, prolonged QTC interval, and LVH. Patient received breathing treatment, IV Lasix. [facility] consulted for further management, admit as inpatient.    Pulmonary consulted on the evening of 12/29 for ongoing dyspnea.    Interval History:  12/30: looking better and better as he undergoes diuresis, in good spirits this AM, on 2L NC, edema improving   "

## 2023-12-30 NOTE — NURSING
Received a call from the cardiac monitor room that  converted to A-fib as of 1:31AM. Current vitals: BP:93/53, O2 94%, pulse 74, temp 98.3. He has no complaints. He has a history of A-fib. Hospital medicine notified. No new orders given.      12/30/2023 @ 3:45AM:monitor room just reported a 12 beat run of V-tach non-sustained and HR was at 186. He is now back in A-fib and pulse is 89. Providers notified. Mariana Miranda

## 2023-12-30 NOTE — CONSULTS
"[facility]  Pulmonology  Consult Note    Patient Name: Rihcy Douglas  MRN: [id number]  Admission Date: 12/28/2023  Hospital Length of Stay: 0 days  Code Status: Full Code  Attending Physician: Mando Guallpa MD  Primary Care Provider: Vivian Ch MD   Principal Problem: Scrotal edema    Inpatient consult to Pulmonology  Consult performed by: Christopher Browne NP  Consult ordered by: Pamela Mixon NP        Subjective:     HPI:  83 year old male with a known past medical history of chronic HFrEF (EF 30%), atrial fibrillation (not on AC due to bleeding risk), HTN, PVD, emphysema, GERD, and prostate cancer that presented to the ER 12/28 with complaints of swelling to his penis, testicles, abdomen, and BLE. He also complained of dysuria, difficulty starting urinating with associated with pain. Onset of swelling > 1 month ago, started to have difficulty urinating over the past week. States hasn't urinated at all in the 2-3 days.     In ER, labs significant for H/H 11.1/34.6, T bili 2.5, BNP 3572, troponin WNL (0.015), lactic acid 2.6. UA +protein, occult blood, leukocytes, 28 WBC, many bacteria - culture pending. Scrotal US with no torsion. CT A/P with "marked scrotal and penile edema, large right hydrocele, small left hydrocele. Anasarca with cardiomegaly and small volume ascites. CXR no acute findings, noted emphysema. EKG shows PVC's with right axis deviation, prolonged QTC interval, and LVH. Patient received breathing treatment, IV Lasix. [facility] [place], admit as inpatient.    Pulmonary consulted on the evening of 12/29 for ongoing dyspnea.    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    Anemia of chronic disease     Aortic aneurysm     Atrial fibrillation with RVR 09/24/2021    Chronic systolic congestive heart failure 08/31/2017    Emphysema of lung 08/31/2017    GERD (gastroesophageal reflux disease)     Hypertension     Knee " osteoarthritis 10/21/2022    Major neurocognitive disorder due to multiple etiologies 4/14/2023    Microcytic anemia 11/24/2020    Other hyperlipidemia 04/27/2021    Personal history of colonic polyps 2022    Prostate cancer        Past Surgical History:   Procedure Laterality Date    CATHETERIZATION OF BOTH LEFT AND RIGHT HEART N/A 1/13/2022    Procedure: CATHETERIZATION, HEART, BOTH LEFT AND RIGHT;  Surgeon: Janneth Tovar MD;  Location: [facility] CATH LAB;  Service: Cardiology;  Laterality: N/A;  poss cx    COLONOSCOPY      COLONOSCOPY N/A 6/30/2022    Procedure: COLONOSCOPY;  Surgeon: Sandra Perez MD;  Location: [facility] ENDO;  Service: Endoscopy;  Laterality: N/A;    COLONOSCOPY N/A 7/1/2022    Procedure: COLONOSCOPY;  Surgeon: Woodrow Christian MD;  Location: [facility];  Service: Endoscopy;  Laterality: N/A;    CORONARY ANGIOGRAPHY N/A 1/13/2022    Procedure: ANGIOGRAM, CORONARY ARTERY;  Surgeon: Janneth Tovar MD;  Location: [facility] CATH LAB;  Service: Cardiology;  Laterality: N/A;    ESOPHAGOGASTRODUODENOSCOPY N/A 6/30/2022    Procedure: EGD (ESOPHAGOGASTRODUODENOSCOPY);  Surgeon: Sandra Perez MD;  Location: [facility];  Service: Endoscopy;  Laterality: N/A;    INJECTION OF ANESTHETIC AGENT AROUND NERVE Bilateral 6/20/2023    Procedure: Bilateral Genicular nerve block RN IV Sedation;  Surgeon: Devon Grant MD;  Location: [facility] PAIN [facility]GT;  Service: Pain Management;  Laterality: Bilateral;    INJECTION OF JOINT Bilateral 10/21/2022    Procedure: Bilateral intraarticular knee injection with local ALLERGIC TO IODINE;  Surgeon: Devon Grant MD;  Location: [facility];  Service: Pain Management;  Laterality: Bilateral;    INJECTION OF JOINT Bilateral 1/31/2023    Procedure: Bilateral IA knee joint injection with steroid RN IV Sedation;  Surgeon: Devon Grant MD;  Location: [facility]IN [facility];  Service: Pain Management;  Laterality: Bilateral;    INJECTION OF JOINT Bilateral 5/10/2023    Procedure:  Bilateral IA knee joint injection Synvisc RN IV Sedation;  Surgeon: Devon Grant MD;  Location: [facility] PAIN MGT;  Service: Pain Management;  Laterality: Bilateral;    INJECTION OF JOINT Bilateral 2023    Procedure: Bilateral intraarticular knee injection;  Surgeon: Devon Grant MD;  Location: [facility] PAIN MGT;  Service: Pain Management;  Laterality: Bilateral;    PROSTATE SURGERY      RADIOFREQUENCY THERMOCOAGULATION Left 2023    Procedure: Left Genicular Nerve RFA;  Surgeon: Devon Grant MD;  Location: [facility] PAIN [facility]GT;  Service: Pain Management;  Laterality: Left;    RADIOFREQUENCY THERMOCOAGULATION Right 9/15/2023    Procedure: Right Genicular Nerve RFA RN IV Sedation;  Surgeon: Devon Grant MD;  Location: [facility] PAIN [facility]GT;  Service: Pain Management;  Laterality: Right;    SPINE SURGERY         Review of patient's allergies indicates:   Allergen Reactions    Fish containing products     Iodine and iodide containing products     Shellfish containing products        Family History       Problem Relation (Age of Onset)    Diabetes Mother    Peripheral vascular disease Mother          Tobacco Use    Smoking status: Former     Current packs/day: 0.00     Average packs/day: 1 pack/day for 69.2 years (69.2 ttl pk-yrs)     Types: Cigarettes     Start date: 1954     Quit date: 3/1/2023     Years since quittin.8    Smokeless tobacco: Never   Substance and Sexual Activity    Alcohol use: Yes     Comment: reports only drinks red wine when games are on    Drug use: Never    Sexual activity: Yes     Partners: Female         Review of Systems   Constitutional:  Negative for fever.   Respiratory:  Positive for cough (no sputum production) and shortness of breath.    Cardiovascular:  Positive for leg swelling.        Scrotal, leg, abdominal edema    Gastrointestinal:  Negative for abdominal pain, nausea and vomiting.   Genitourinary:  Positive for difficulty urinating and frequency.   Neurological:  Positive for  weakness.   All other systems reviewed and are negative.    Objective:     Vital Signs (Most Recent):  Temp: 97.5 °F (36.4 °C) (12/29/23 1647)  Pulse: 103 (12/29/23 1647)  Resp: 18 (12/29/23 1647)  BP: 131/70 (12/29/23 1647)  SpO2: (!) 94 % (12/29/23 1647) Vital Signs (24h Range):  Temp:  [97.4 °F (36.3 °C)-98.2 °F (36.8 °C)] 97.5 °F (36.4 °C)  Pulse:  [] 103  Resp:  [18] 18  SpO2:  [90 %-98 %] 94 %  BP: ()/(45-70) 131/70     Weight: 75.8 kg (167 lb 1.7 oz)  Body mass index is 25.41 kg/m².      Intake/Output Summary (Last 24 hours) at 12/29/2023 1810  Last data filed at 12/29/2023 1306  Gross per 24 hour   Intake 480 ml   Output 2850 ml   Net -2370 ml        Physical Exam  Vitals reviewed.   Constitutional:       General: He is awake. He is not in acute distress.     Appearance: He is well-developed. He is ill-appearing.      Comments: Elderly male semi-upright in bed on NC in NAD, some noted mild conversational dyspnea   Cardiovascular:      Pulses:           Radial pulses are 2+ on the right side and 2+ on the left side.      Comments: Anasarca   Pulmonary:      Effort: Pulmonary effort is normal.      Breath sounds: Decreased breath sounds and rales present. No wheezing.      Comments: On 2L NC  Abdominal:      General: Bowel sounds are normal. There is distension.      Tenderness: There is no abdominal tenderness.   Genitourinary:     Comments: Voiding per urinal  Musculoskeletal:      Right lower leg: Edema present.      Left lower leg: Edema present.   Skin:     General: Skin is warm and dry.   Neurological:      General: No focal deficit present.      Mental Status: He is alert.   Psychiatric:         Behavior: Behavior is cooperative.                 Lines/Drains/Airways       Peripheral Intravenous Line  Duration                  Peripheral IV - Single Lumen 12/28/23 1745 Right Antecubital 1 day         Peripheral IV - Single Lumen 12/28/23 1925 20 G Left Forearm <1 day               "      Significant Labs:    CBC/Anemia Profile:  Recent Labs   Lab 12/28/23  1745 12/29/23  0521   WBC 5.56 6.53   HGB 11.1* 10.6*   HCT 34.6* 32.9*    316   MCV 76* 76*   RDW 26.5* 26.1*        Chemistries:  Recent Labs   Lab 12/28/23  1745 12/29/23  0521    144   K 3.8 3.2*    108   CO2 24 24   BUN 17 16   CREATININE 0.8 0.8   CALCIUM 9.1 8.9   ALBUMIN 3.9  --    PROT 7.1  --    BILITOT 2.5*  --    ALKPHOS 51*  --    ALT 21  --    AST 36  --    MG  --  1.7       All pertinent labs within the past 24 hours have been reviewed.    Significant Imaging:   I have reviewed all pertinent imaging results/findings within the past 24 hours.    ABG  No results for input(s): "PH", "PO2", "PCO2", "HCO3", "BE" in the last 168 hours.  Assessment/Plan:     Pulmonary  Chronic hypoxemic respiratory failure  Patient with Hypoxic Respiratory failure which is Acute on chronic.  he is on home oxygen at ? LPM. Supplemental oxygen was provided and noted-      Signs/symptoms of respiratory failure include- increased work of breathing, respiratory distress, and lethargy. Contributing diagnoses includes - CHF Labs and images were reviewed. Patient Has not had a recent ABG. Will treat underlying causes and adjust management of respiratory failure as follows    - s/t HF exacerbation, do not suspect pneumonia at this time  - procal pending  - imaging reviewed   - pt reports feeling much better since admission and is diuresing well, monitor     Emphysema of lung  - does not appear to be in acute exacerbation  - on home O2, currently on 2L NC  - follows with Dr. Jackson in clinic   - continue nebs  - monitor for bronchospasm     Cardiac/Vascular  Acute on chronic systolic congestive heart failure  Patient is identified as having Systolic (HFrEF) heart failure that is Acute on chronic. CHF is currently uncontrolled due to Continued edema of extremities and Hepatic congestion/ascites. Latest ECHO performed and demonstrates- Results " for orders placed during the hospital encounter of 02/11/23    Echo    Interpretation Summary  · The left ventricle is normal in size with concentric hypertrophy and moderately decreased systolic function.  · Severe left atrial enlargement.  · Trivial pericardial effusion.  · Mild tricuspid regurgitation.  · Severe right atrial enlargement.  · The estimated ejection fraction is 30%.  · There is severe left ventricular global hypokinesis.  · Mild right ventricular enlargement with mildly to moderately reduced right ventricular systolic function.  · Moderate aortic regurgitation.  · Elevated central venous pressure (15 mmHg).  · The estimated PA systolic pressure is 80 mmHg.  · There is pulmonary hypertension.  · The ascending aorta is moderately dilated.  · The aortic root is moderately dilated.  · Atrial fibrillation observed.  . Continue Beta Blocker, Furosemide, and ARNI and monitor clinical status closely. Monitor on telemetry. Cardiology has been consulted. Continue to stress to patient importance of self efficacy and  on diet for CHF. Last BNP reviewed- and noted below   Recent Labs   Lab 12/28/23  1745   BNP 3,572*     - continue diuresis  - mgmt per cards     Pulmonary hypertension  - last echo 2/2023 with PAP 80, EF 30%, and severe global hypokinesis  - repeat echo pending   - BNP at admit 3572  - cards following  - continue diuresis     Renal/  * Scrotal edema  - see plan for HF          Thank you for your consult. I will follow-up with patient. Please contact us if you have any additional questions.     Christopher Browne NP  Pulmonology  [facility]

## 2023-12-30 NOTE — SUBJECTIVE & OBJECTIVE
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    Anemia of chronic disease     Aortic aneurysm     Atrial fibrillation with RVR 09/24/2021    Chronic systolic congestive heart failure 08/31/2017    Emphysema of lung 08/31/2017    GERD (gastroesophageal reflux disease)     Hypertension     Knee osteoarthritis 10/21/2022    Major neurocognitive disorder due to multiple etiologies 4/14/2023    Microcytic anemia 11/24/2020    Other hyperlipidemia 04/27/2021    Personal history of colonic polyps 2022    Prostate cancer        Past Surgical History:   Procedure Laterality Date    CATHETERIZATION OF BOTH LEFT AND RIGHT HEART N/A 1/13/2022    Procedure: CATHETERIZATION, HEART, BOTH LEFT AND RIGHT;  Surgeon: Janneth Tovar MD;  Location: [facility] CATH LAB;  Service: Cardiology;  Laterality: N/A;  poss cx    COLONOSCOPY      COLONOSCOPY N/A 6/30/2022    Procedure: COLONOSCOPY;  Surgeon: Sandra Perez MD;  Location: [facility] ENDO;  Service: Endoscopy;  Laterality: N/A;    COLONOSCOPY N/A 7/1/2022    Procedure: COLONOSCOPY;  Surgeon: Woodrow Christian MD;  Location: [facility];  Service: Endoscopy;  Laterality: N/A;    CORONARY ANGIOGRAPHY N/A 1/13/2022    Procedure: ANGIOGRAM, CORONARY ARTERY;  Surgeon: Janneth Tovar MD;  Location: [facility] CATH LAB;  Service: Cardiology;  Laterality: N/A;    ESOPHAGOGASTRODUODENOSCOPY N/A 6/30/2022    Procedure: EGD (ESOPHAGOGASTRODUODENOSCOPY);  Surgeon: Sandra Perez MD;  Location: [facility];  Service: Endoscopy;  Laterality: N/A;    INJECTION OF ANESTHETIC AGENT AROUND NERVE Bilateral 6/20/2023    Procedure: Bilateral Genicular nerve block RN IV Sedation;  Surgeon: Devon Grant MD;  Location: [facility] PAIN MGT;  Service: Pain Management;  Laterality: Bilateral;    INJECTION OF JOINT Bilateral 10/21/2022    Procedure: Bilateral intraarticular knee injection with local ALLERGIC TO IODINE;  Surgeon: Devon Grant MD;  Location: [facility] PAIN MGT;   Service: Pain Management;  Laterality: Bilateral;    INJECTION OF JOINT Bilateral 2023    Procedure: Bilateral IA knee joint injection with steroid RN IV Sedation;  Surgeon: Devon Grant MD;  Location: [facility] PAIN [facility]GT;  Service: Pain Management;  Laterality: Bilateral;    INJECTION OF JOINT Bilateral 5/10/2023    Procedure: Bilateral IA knee joint injection Synvisc RN IV Sedation;  Surgeon: Devon Grant MD;  Location: [facility] PAIN [facility]GT;  Service: Pain Management;  Laterality: Bilateral;    INJECTION OF JOINT Bilateral 2023    Procedure: Bilateral intraarticular knee injection;  Surgeon: Devon Grant MD;  Location: [facility] PAIN [facility]GT;  Service: Pain Management;  Laterality: Bilateral;    PROSTATE SURGERY      RADIOFREQUENCY THERMOCOAGULATION Left 2023    Procedure: Left Genicular Nerve RFA;  Surgeon: Devon Grant MD;  Location: [facility] PAIN [facility]GT;  Service: Pain Management;  Laterality: Left;    RADIOFREQUENCY THERMOCOAGULATION Right 9/15/2023    Procedure: Right Genicular Nerve RFA RN IV Sedation;  Surgeon: Devon Grant MD;  Location: [facility] PAIN [facility]GT;  Service: Pain Management;  Laterality: Right;    SPINE SURGERY         Review of patient's allergies indicates:   Allergen Reactions    Fish containing products     Iodine and iodide containing products     Shellfish containing products        Family History       Problem Relation (Age of Onset)    Diabetes Mother    Peripheral vascular disease Mother          Tobacco Use    Smoking status: Former     Current packs/day: 0.00     Average packs/day: 1 pack/day for 69.2 years (69.2 ttl pk-yrs)     Types: Cigarettes     Start date: 1954     Quit date: 3/1/2023     Years since quittin.8    Smokeless tobacco: Never   Substance and Sexual Activity    Alcohol use: Yes     Comment: reports only drinks red wine when games are on    Drug use: Never    Sexual activity: Yes     Partners: Female         Review of Systems   Constitutional:  Negative for fever.    Respiratory:  Positive for cough (no sputum production) and shortness of breath.    Cardiovascular:  Positive for leg swelling.        Scrotal, leg, abdominal edema    Gastrointestinal:  Negative for abdominal pain, nausea and vomiting.   Genitourinary:  Positive for difficulty urinating and frequency.   Neurological:  Positive for weakness.   All other systems reviewed and are negative.    Objective:     Vital Signs (Most Recent):  Temp: 97.5 °F (36.4 °C) (12/29/23 1647)  Pulse: 103 (12/29/23 1647)  Resp: 18 (12/29/23 1647)  BP: 131/70 (12/29/23 1647)  SpO2: (!) 94 % (12/29/23 1647) Vital Signs (24h Range):  Temp:  [97.4 °F (36.3 °C)-98.2 °F (36.8 °C)] 97.5 °F (36.4 °C)  Pulse:  [] 103  Resp:  [18] 18  SpO2:  [90 %-98 %] 94 %  BP: ()/(45-70) 131/70     Weight: 75.8 kg (167 lb 1.7 oz)  Body mass index is 25.41 kg/m².      Intake/Output Summary (Last 24 hours) at 12/29/2023 1810  Last data filed at 12/29/2023 1306  Gross per 24 hour   Intake 480 ml   Output 2850 ml   Net -2370 ml        Physical Exam  Vitals reviewed.   Constitutional:       General: He is awake. He is not in acute distress.     Appearance: He is well-developed. He is ill-appearing.      Comments: Elderly male semi-upright in bed on NC in NAD, some noted mild conversational dyspnea   Cardiovascular:      Pulses:           Radial pulses are 2+ on the right side and 2+ on the left side.      Comments: Anasarca   Pulmonary:      Effort: Pulmonary effort is normal.      Breath sounds: Decreased breath sounds and rales present. No wheezing.      Comments: On 2L NC  Abdominal:      General: Bowel sounds are normal. There is distension.      Tenderness: There is no abdominal tenderness.   Genitourinary:     Comments: Voiding per urinal  Musculoskeletal:      Right lower leg: Edema present.      Left lower leg: Edema present.   Skin:     General: Skin is warm and dry.   Neurological:      General: No focal deficit present.      Mental Status:  He is alert.   Psychiatric:         Behavior: Behavior is cooperative.                 Lines/Drains/Airways       Peripheral Intravenous Line  Duration                  Peripheral IV - Single Lumen 12/28/23 1745 Right Antecubital 1 day         Peripheral IV - Single Lumen 12/28/23 1925 20 G Left Forearm <1 day                    Significant Labs:    CBC/Anemia Profile:  Recent Labs   Lab 12/28/23 1745 12/29/23  0521   WBC 5.56 6.53   HGB 11.1* 10.6*   HCT 34.6* 32.9*    316   MCV 76* 76*   RDW 26.5* 26.1*        Chemistries:  Recent Labs   Lab 12/28/23 1745 12/29/23  0521    144   K 3.8 3.2*    108   CO2 24 24   BUN 17 16   CREATININE 0.8 0.8   CALCIUM 9.1 8.9   ALBUMIN 3.9  --    PROT 7.1  --    BILITOT 2.5*  --    ALKPHOS 51*  --    ALT 21  --    AST 36  --    MG  --  1.7       All pertinent labs within the past 24 hours have been reviewed.    Significant Imaging:   I have reviewed all pertinent imaging results/findings within the past 24 hours.

## 2023-12-30 NOTE — SUBJECTIVE & OBJECTIVE
"83 year old male with a known past medical history of chronic HFrEF (EF 30%), atrial fibrillation (not on AC due to bleeding risk), HTN, PVD, emphysema, GERD, and prostate cancer that presented to the ER 12/28 with complaints of swelling to his penis, testicles, abdomen, and BLE. He also complained of dysuria, difficulty starting urinating with associated with pain. Onset of swelling > 1 month ago, started to have difficulty urinating over the past week. States hasn't urinated at all in the 2-3 days.     In ER, labs significant for H/H 11.1/34.6, T bili 2.5, BNP 3572, troponin WNL (0.015), lactic acid 2.6. UA +protein, occult blood, leukocytes, 28 WBC, many bacteria - culture pending. Scrotal US with no torsion. CT A/P with "marked scrotal and penile edema, large right hydrocele, small left hydrocele. Anasarca with cardiomegaly and small volume ascites. CXR no acute findings, noted emphysema. EKG shows PVC's with right axis deviation, prolonged QTC interval, and LVH. Patient received breathing treatment, IV Lasix. [facility] [place] [place] consulted for further management, admit as inpatient.    Pulmonary consulted on the evening of 12/29 for ongoing dyspnea.    Interval History:  12/30: looking better and better as he undergoes diuresis, in good spirits this AM, on 2L NC, edema improving     ROS complete and negative unless stated in the interval HPI     Objective:     Vital Signs (Most Recent):  Temp: 98.2 °F (36.8 °C) (12/30/23 1119)  Pulse: 91 (12/30/23 1119)  Resp: 18 (12/30/23 1119)  BP: (!) 108/56 (12/30/23 1119)  SpO2: 96 % (12/30/23 1119) Vital Signs (24h Range):  Temp:  [96.3 °F (35.7 °C)-98.4 °F (36.9 °C)] 98.2 °F (36.8 °C)  Pulse:  [] 91  Resp:  [16-18] 18  SpO2:  [90 %-98 %] 96 %  BP: ()/(50-70) 108/56     Weight: 75.8 kg (167 lb 1.7 oz)  Body mass index is 25.41 kg/m².      Intake/Output Summary (Last 24 hours) at 12/30/2023 1138  Last data filed at 12/30/2023 0844  Gross per 24 hour   Intake 240 ml   Output " 3150 ml   Net -2910 ml        Physical Exam  Vitals reviewed.   Constitutional:       General: He is awake. He is not in acute distress.     Comments: Chronically ill appearing make sitting on side of bed on 2L NC in NAD   Pulmonary:      Effort: Pulmonary effort is normal.      Breath sounds: Rales present. No wheezing or rhonchi.      Comments: On 2L NC  Neurological:      Mental Status: He is alert.   Psychiatric:         Behavior: Behavior is cooperative.               Vents:  Oxygen Concentration (%): 28 (12/30/23 0729)    Lines/Drains/Airways       Peripheral Intravenous Line  Duration                  Peripheral IV - Single Lumen 12/28/23 1745 Right Antecubital 1 day         Peripheral IV - Single Lumen 12/28/23 1925 20 G Left Forearm 1 day                    Significant Labs:    CBC/Anemia Profile:  Recent Labs   Lab 12/28/23 1745 12/29/23  0521 12/30/23  0809   WBC 5.56 6.53 7.83   HGB 11.1* 10.6* 11.7*   HCT 34.6* 32.9* 37.0*    316 352   MCV 76* 76* 77*   RDW 26.5* 26.1* 27.7*        Chemistries:  Recent Labs   Lab 12/28/23 1745 12/29/23  0521 12/30/23  0402    144 144   K 3.8 3.2* 4.4    108 106   CO2 24 24 25   BUN 17 16 15   CREATININE 0.8 0.8 0.8   CALCIUM 9.1 8.9 9.0   ALBUMIN 3.9  --  3.2*   PROT 7.1  --  6.0   BILITOT 2.5*  --  1.3*   ALKPHOS 51*  --  45*   ALT 21  --  15   AST 36  --  26   MG  --  1.7 1.8       All pertinent labs within the past 24 hours have been reviewed.    Significant Imaging:  I have reviewed all pertinent imaging results/findings within the past 24 hours.

## 2023-12-30 NOTE — ASSESSMENT & PLAN NOTE
Patient is identified as having Systolic (HFrEF) heart failure that is Acute on chronic. CHF is currently uncontrolled due to Continued edema of extremities. Latest ECHO performed and demonstrates- Results for orders placed during the hospital encounter of 02/11/23    Echo    Interpretation Summary  · The left ventricle is normal in size with concentric hypertrophy and moderately decreased systolic function.  · Severe left atrial enlargement.  · Trivial pericardial effusion.  · Mild tricuspid regurgitation.  · Severe right atrial enlargement.  · The estimated ejection fraction is 30%.  · There is severe left ventricular global hypokinesis.  · Mild right ventricular enlargement with mildly to moderately reduced right ventricular systolic function.  · Moderate aortic regurgitation.  · Elevated central venous pressure (15 mmHg).  · The estimated PA systolic pressure is 80 mmHg.  · There is pulmonary hypertension.  · The ascending aorta is moderately dilated.  · The aortic root is moderately dilated.  · Atrial fibrillation observed.  . Continue Beta Blocker, Furosemide, and ARNI and monitor clinical status closely. Monitor on telemetry. Patient is on CHF pathway.  Monitor strict Is&Os and daily weights.  Place on fluid restriction of 1.5 L. Cardiology has been consulted. Continue to stress to patient importance of self efficacy and  on diet for CHF. Last BNP reviewed- and noted below   Recent Labs   Lab 12/28/23  3145   BNP 3,572*     --Lasix 20mg IV BID  --Monitor BP

## 2023-12-30 NOTE — ASSESSMENT & PLAN NOTE
UA consistent with infectious process  Urine culture : +proteus sp, sensitivity pending  Continue ceftriaxone for now

## 2023-12-30 NOTE — PLAN OF CARE
Discussed POC with patient, Pt verbalized understanding, purposeful rounding, Cardiac monitoring in use, tele monitor [id number], Fall precautions in place, patient remains injury free, scheduled medications given/tolerated, call light within reach, will continue with plan of care.     Problem: Adult Inpatient Plan of Care  Goal: Plan of Care Review  Outcome: Ongoing, Progressing  Goal: Patient-Specific Goal (Individualized)  Outcome: Ongoing, Progressing  Goal: Absence of Hospital-Acquired Illness or Injury  Outcome: Ongoing, Progressing  Goal: Optimal Comfort and Wellbeing  Outcome: Ongoing, Progressing  Goal: Readiness for Transition of Care  Outcome: Ongoing, Progressing

## 2023-12-30 NOTE — PROGRESS NOTES
[facility]  Cardiology  Progress Note    Patient Name: Richy Douglas  MRN: [id number]  Admission Date: 12/28/2023  Hospital Length of Stay: 1 days  Code Status: Full Code   Attending Physician: Mando Guallpa MD   Primary Care Physician: Vivian Ch MD  Expected Discharge Date:   Principal Problem:Acute on chronic systolic congestive heart failure    Subjective:     HPI:    Cardiology consulted for CHF.  F/u by Dr. Garces, Cardiology.  Has combined CHF, PAF, HTN, PAD, PHTN, COPD, AI, h/o prostate cancer.  Admitted w acute CHF sxs, anasarca with leg edema, scrotal edema and dyspnea over last few weeks.  No angina/CP.  Ecg on admit sinus, LVH, PVC.  BNP 3572  Troponin wnl.  Echo Feb 2023 EF 30% RV failure, severe PHTN.  Not on anticoagulation for PAF due to risk for bleeding per Cardiology notes.    Hospital Course:   12/30/23:  Pt seen and examined this am. Chart/labs reviewed.  A fib noted overnight.  Pt denies chest pain or dyspnea.  On Lasix IV.        Review of Systems   Constitutional: Negative.   HENT: Negative.     Eyes: Negative.    Cardiovascular:  Positive for leg swelling.   Respiratory: Negative.     Endocrine: Negative.    Hematologic/Lymphatic: Negative.    Skin: Negative.    Musculoskeletal:  Positive for arthritis and stiffness.   Gastrointestinal: Negative.    Genitourinary: Negative.    Neurological:  Positive for weakness.   Psychiatric/Behavioral: Negative.     Allergic/Immunologic: Negative.      Objective:     Vital Signs (Most Recent):  Temp: 98.2 °F (36.8 °C) (12/30/23 1119)  Pulse: 91 (12/30/23 1119)  Resp: 18 (12/30/23 1119)  BP: (!) 108/56 (12/30/23 1119)  SpO2: 96 % (12/30/23 1119) Vital Signs (24h Range):  Temp:  [96.3 °F (35.7 °C)-98.4 °F (36.9 °C)] 98.2 °F (36.8 °C)  Pulse:  [] 91  Resp:  [16-18] 18  SpO2:  [90 %-98 %] 96 %  BP: ()/(50-70) 108/56     Weight: 75.8 kg (167 lb 1.7 oz)  Body mass index is 25.41 kg/m².     SpO2: 96 %         Intake/Output Summary (Last  "24 hours) at 12/30/2023 1135  Last data filed at 12/30/2023 0844  Gross per 24 hour   Intake 240 ml   Output 3150 ml   Net -2910 ml       Lines/Drains/Airways       Peripheral Intravenous Line  Duration                  Peripheral IV - Single Lumen 12/28/23 1745 Right Antecubital 1 day         Peripheral IV - Single Lumen 12/28/23 1925 20 G Left Forearm 1 day                       Physical Exam  Vitals and nursing note reviewed.   Constitutional:       Appearance: He is well-developed.   HENT:      Head: Normocephalic.   Neck:      Thyroid: No thyromegaly.      Vascular: No carotid bruit or JVD.   Cardiovascular:      Rate and Rhythm: Normal rate. Rhythm irregularly irregular.      Heart sounds: S1 normal and S2 normal. Heart sounds not distant. No midsystolic click and no opening snap. No murmur heard.     No friction rub. No S3 or S4 sounds.   Pulmonary:      Effort: Pulmonary effort is normal.      Breath sounds: Examination of the right-lower field reveals decreased breath sounds. Examination of the left-lower field reveals decreased breath sounds. Decreased breath sounds present. No wheezing or rales.   Abdominal:      General: Bowel sounds are normal. There is no distension or abdominal bruit.      Palpations: Abdomen is soft.      Tenderness: There is no abdominal tenderness.   Musculoskeletal:      Cervical back: Neck supple.      Right lower leg: Edema present.      Left lower leg: Edema present.   Skin:     General: Skin is warm.   Neurological:      Mental Status: He is alert and oriented to person, place, and time.   Psychiatric:         Behavior: Behavior normal.            Significant Labs: ABG: No results for input(s): "PH", "PCO2", "HCO3", "POCSATURATED", "BE" in the last 48 hours., Blood Culture:   Recent Labs   Lab 12/28/23 1745 12/28/23 1925   LABBLOO No Growth to date  No Growth to date No Growth to date  No Growth to date   , BMP:   Recent Labs   Lab 12/28/23 1745 12/29/23  0521 " "12/30/23  0402    102 82    144 144   K 3.8 3.2* 4.4    108 106   CO2 24 24 25   BUN 17 16 15   CREATININE 0.8 0.8 0.8   CALCIUM 9.1 8.9 9.0   MG  --  1.7 1.8   , CMP   Recent Labs   Lab 12/28/23  1745 12/29/23  0521 12/30/23  0402    144 144   K 3.8 3.2* 4.4    108 106   CO2 24 24 25    102 82   BUN 17 16 15   CREATININE 0.8 0.8 0.8   CALCIUM 9.1 8.9 9.0   PROT 7.1  --  6.0   ALBUMIN 3.9  --  3.2*   BILITOT 2.5*  --  1.3*   ALKPHOS 51*  --  45*   AST 36  --  26   ALT 21  --  15   ANIONGAP 12 12 13   , CBC   Recent Labs   Lab 12/28/23  1745 12/29/23  0521 12/30/23  0809   WBC 5.56 6.53 7.83   HGB 11.1* 10.6* 11.7*   HCT 34.6* 32.9* 37.0*    316 352   , INR No results for input(s): "INR", "PROTIME" in the last 48 hours., Lipid Panel No results for input(s): "CHOL", "HDL", "LDLCALC", "TRIG", "CHOLHDL" in the last 48 hours., and Troponin   Recent Labs   Lab 12/28/23  1745   TROPONINI 0.015       Significant Imaging: Echocardiogram: Transthoracic echo (TTE) complete (Cupid Only):   Results for orders placed or performed during the hospital encounter of 02/11/23   Echo   Result Value Ref Range    BSA 1.86 m2    TDI SEPTAL 0.06 m/s    LV LATERAL E/E' RATIO 10.86 m/s    LV SEPTAL E/E' RATIO 12.67 m/s    LA WIDTH 4.90 cm    IVC diameter 25 cm    Left Ventricular Outflow Tract Mean Velocity 0.95 cm/s    Left Ventricular Outflow Tract Mean Gradient 3.87 mmHg    TDI LATERAL 0.07 m/s    LVIDd 5.59 3.5 - 6.0 cm    IVS 1.67 (A) 0.6 - 1.1 cm    Posterior Wall 1.45 (A) 0.6 - 1.1 cm    Ao root annulus 4.69 cm    LVIDs 4.72 (A) 2.1 - 4.0 cm    FS 16 28 - 44 %    LA volume 115.59 cm3    Sinus 4.84 cm    STJ 4.89 cm    Ascending aorta 4.62 cm    LV mass 405.03 g    LA size 4.04 cm    RVDD 4.50 cm    Left Ventricle Relative Wall Thickness 0.52 cm    AV regurgitation pressure 1/2 time [id number] ms    AV mean gradient 7 mmHg    AV valve area 3.09 cm2    AV Velocity Ratio 0.75     AV " index (prosthetic) 0.63     MV valve area p 1/2 method 3.00 cm2    E/A ratio 2.17     Mean e' 0.07 m/s    E wave deceleration time 253.20 msec    IVRT 45.67 msec    LVOT diameter 2.49 cm    LVOT area 4.9 cm2    LVOT peak karl 1.32 m/s    LVOT peak VTI 20.30 cm    Ao peak karl 1.76 m/s    Ao VTI 32.0 cm    RVOT peak karl 0.97 m/s    RVOT peak VTI 15.9 cm    LVOT stroke volume 98.80 cm3    AV peak gradient 12 mmHg    PV mean gradient 2.55 mmHg    E/E' ratio 11.69 m/s    MV Peak E Karl 0.76 m/s    AR Max Karl 3.73 m/s    TR Max Karl 4.03 m/s    MV stenosis pressure 1/2 time 73.43 ms    MV Peak A Karl 0.35 m/s    LV Systolic Volume 103.44 mL    LV Systolic Volume Index 55.0 mL/m2    LV Diastolic Volume 152.78 mL    LV Diastolic Volume Index 81.27 mL/m2    LA Volume Index 61.5 mL/m2    LV Mass Index 215 g/m2    RA Major Axis 5.95 cm    Left Atrium Minor Axis 6.33 cm    Left Atrium Major Axis 7.51 cm    Triscuspid Valve Regurgitation Peak Gradient 65 mmHg    RA Width 5.68 cm    Right Atrial Pressure (from IVC) 15 mmHg    EF 30 %    TV resting pulmonary artery pressure 80 mmHg    Narrative    · The left ventricle is normal in size with concentric hypertrophy and   moderately decreased systolic function.  · Severe left atrial enlargement.  · Trivial pericardial effusion.  · Mild tricuspid regurgitation.  · Severe right atrial enlargement.  · The estimated ejection fraction is 30%.  · There is severe left ventricular global hypokinesis.  · Mild right ventricular enlargement with mildly to moderately reduced   right ventricular systolic function.  · Moderate aortic regurgitation.  · Elevated central venous pressure (15 mmHg).  · The estimated PA systolic pressure is 80 mmHg.  · There is pulmonary hypertension.  · The ascending aorta is moderately dilated.  · The aortic root is moderately dilated.  · Atrial fibrillation observed.        Assessment and Plan:         * Acute on chronic systolic congestive heart failure  Patient is  identified as having Combined Systolic and Diastolic heart failure that is Acute on chronic. Latest ECHO performed and demonstrates- Results for orders placed during the hospital encounter of 02/11/23    Echo    Interpretation Summary  · The left ventricle is normal in size with concentric hypertrophy and moderately decreased systolic function.  · Severe left atrial enlargement.  · Trivial pericardial effusion.  · Mild tricuspid regurgitation.  · Severe right atrial enlargement.  · The estimated ejection fraction is 30%.  · There is severe left ventricular global hypokinesis.  · Mild right ventricular enlargement with mildly to moderately reduced right ventricular systolic function.  · Moderate aortic regurgitation.  · Elevated central venous pressure (15 mmHg).  · The estimated PA systolic pressure is 80 mmHg.  · There is pulmonary hypertension.  · The ascending aorta is moderately dilated.  · The aortic root is moderately dilated.  · Atrial fibrillation observed.    Recent Labs   Lab 12/28/23 1745   BNP 3,572*         Continue IV Lasix diuresis.  Low salt diet.  Echocardiogram.  GDMT advised for CHF in future.            UTI (urinary tract infection)  Per Hospital med mgt.    Pulmonary hypertension  Monitor.    Atrial fibrillation  PAF hx.  Not on DOAC due to hx of bleeding.  Monitor.  Sinus on admit.    PAF  noted.  Add low dose asa 81 mg qd.  Metoprolol bid as tolerated.  Monitor.      Hypertension  Continue current HTN meds; adjust as needed.        VTE Risk Mitigation (From admission, onward)           Ordered     IP VTE HIGH RISK PATIENT  Once         12/29/23 0115     Place sequential compression device  Until discontinued         12/28/23 2038                    Omar Hedrick MD  Cardiology  [facility]

## 2023-12-30 NOTE — HOSPITAL COURSE
12/30/23:  Pt seen and examined this am. Chart/labs reviewed.  A fib noted overnight.  Pt denies chest pain or dyspnea.  On Lasix IV.    12/31/23: Pt seen and examined this am. Chart/labs reviewed. Pt denies chest pain or dyspnea.  On Lasix IV.  He feels improved.

## 2023-12-30 NOTE — ASSESSMENT & PLAN NOTE
Patient with Hypoxic Respiratory failure which is Acute on chronic.  he is on home oxygen at ? LPM. Supplemental oxygen was provided and noted- Oxygen Concentration (%):  [28] 28    Signs/symptoms of respiratory failure include- increased work of breathing, respiratory distress, and lethargy. Contributing diagnoses includes - CHF Labs and images were reviewed. Patient Has not had a recent ABG. Will treat underlying causes and adjust management of respiratory failure as follows    - s/t HF exacerbation, do not suspect pneumonia at this time  - procal negative   - continue O2, currently on 2L NC

## 2023-12-30 NOTE — PROGRESS NOTES
"[facility]  [facility] Medicine  Progress Note    Patient Name: Richy Douglas  MRN: [id number]  Patient Class: IP- Inpatient   Admission Date: 12/28/2023  Length of Stay: 1 days  Attending Physician: Mando Guallpa MD  Primary Care Provider: Vivian Ch MD        Subjective:     Principal Problem:Acute on chronic systolic congestive heart failure        HPI:  82 y/o male with PMHx of chronic HFrEF (EF 30%), atrial fibrillation (not on AC due to bleeding risk), HTN, PVD, emphysema, GERD, prostate cancer who presented to the ER today with complaints of swelling to his penis, testicles and BLE. Also c/o dysuria, difficulty starting urinating, associated with pain. Onset of swelling > 1 month ago, started to have difficulty urinating over the past week. States hasn't urinated at all past 2-3 days. He denies chest pain, fevers/chills, nausea/vomiting or any other complaints.    In ER, labs significant for H/H 11.1/34.6, T bili 2.5, BNP 3572, troponin WNL (0.015), lactic acid 2.6. UA +protein, occult blood, leukocytes, 28 WBC, many bacteria - culture pending. Scrotal US with no torsion. CT A/P with "marked scrotal and penile edema, large right hydrocele, small left hydrocele. Anasarca with cardiomegaly and small volume ascites. CXR no acute findings, noted emphysema. EKG shows PVC's with right axis deviation, prolonged QTC interval, and LVH. Patient received breathing treatment, IV Lasix. [facility] further management, admit as inpatient.    Overview/Hospital Course:  83 year old male, under the care of Hospital medicine for management of Scrotal Edema, CHF exacerbation. Patient cont on  IV lasix, dose reduced to 20mg due to hypotension, UOP: 2650mL over past 24H. Improved scrotal edema. Continued SOB, dyspnea. Patient in tripod position at times due to dyspnea on 12/29. Endorsed able to lay down at times but needed to "sit leaning forward" at times. HTN medications held AM 12/29 secondary to significant " hypotension. BP improved by the afternoon, IV lasix reduced to 20mg. K+ and Mg repleted. Due to continued dyspnea ordered repeat CXR, possible PNA vs atelectasis, consulted Pulmonology to evaluate, will follow. Given Rocephin IV x1 dose. Wheezing noted bilateral. Cont on scheduled nebs. Run of Vtach overnight, rhythm changed to A. Fib. Cardiology evaluated added ASA, recommend to cont metoprolol.  BP improved, labs stable.     Interval History: WOB improved, dyspnea improved. Wheezing noted. Cont scheduled nebs. A.Fib overnight and run of Vtach. Cardiology recommended ASA and cont metoprolol as tolerated.     Review of Systems   Respiratory:  Positive for shortness of breath and wheezing. Negative for chest tightness.    Cardiovascular:  Positive for leg swelling.   Genitourinary:  Positive for scrotal swelling. Negative for difficulty urinating.   All other systems reviewed and are negative.    Objective:     Vital Signs (Most Recent):  Temp: 98.2 °F (36.8 °C) (12/30/23 1119)  Pulse: 91 (12/30/23 1119)  Resp: 18 (12/30/23 1119)  BP: (!) 108/56 (12/30/23 1119)  SpO2: 96 % (12/30/23 1119) Vital Signs (24h Range):  Temp:  [96.3 °F (35.7 °C)-98.4 °F (36.9 °C)] 98.2 °F (36.8 °C)  Pulse:  [] 91  Resp:  [16-18] 18  SpO2:  [90 %-98 %] 96 %  BP: ()/(50-70) 108/56     Weight: 75.8 kg (167 lb 1.7 oz)  Body mass index is 25.41 kg/m².    Intake/Output Summary (Last 24 hours) at 12/30/2023 1153  Last data filed at 12/30/2023 0844  Gross per 24 hour   Intake 240 ml   Output 3150 ml   Net -2910 ml         Physical Exam  Vitals and nursing note reviewed.   Constitutional:       Appearance: Normal appearance. He is normal weight. He is not ill-appearing.      Interventions: Nasal cannula in place.   HENT:      Head: Normocephalic and atraumatic.      Nose: Nose normal.      Mouth/Throat:      Pharynx: Oropharynx is clear.   Eyes:      Extraocular Movements: Extraocular movements intact.      Pupils: Pupils are equal,  round, and reactive to light.   Cardiovascular:      Pulses:           Dorsalis pedis pulses are 1+ on the right side and 1+ on the left side.      Heart sounds: Normal heart sounds.   Pulmonary:      Effort: Pulmonary effort is normal. No tachypnea or respiratory distress.      Breath sounds: Examination of the right-lower field reveals decreased breath sounds. Examination of the left-lower field reveals decreased breath sounds. Decreased breath sounds and wheezing present. No rhonchi or rales.      Comments: Conversational Dyspnea, present but improved.   Abdominal:      General: Abdomen is flat. Bowel sounds are decreased. There is no distension.      Palpations: Abdomen is soft.      Tenderness: There is no abdominal tenderness. There is no guarding.   Genitourinary:     Testes:         Left: Swelling (improved) present.      Comments: Scrotal edema, erythema  Skin:     Coloration: Skin is pale.   Neurological:      General: No focal deficit present.      Mental Status: He is alert and oriented to person, place, and time.   Psychiatric:         Mood and Affect: Mood normal.         Behavior: Behavior normal.             Significant Labs: All pertinent labs within the past 24 hours have been reviewed.  CBC:   Recent Labs   Lab 12/28/23  1745 12/29/23  0521 12/30/23  0809   WBC 5.56 6.53 7.83   HGB 11.1* 10.6* 11.7*   HCT 34.6* 32.9* 37.0*    316 352     CMP:   Recent Labs   Lab 12/28/23  1745 12/29/23  0521 12/30/23  0402    144 144   K 3.8 3.2* 4.4    108 106   CO2 24 24 25    102 82   BUN 17 16 15   CREATININE 0.8 0.8 0.8   CALCIUM 9.1 8.9 9.0   PROT 7.1  --  6.0   ALBUMIN 3.9  --  3.2*   BILITOT 2.5*  --  1.3*   ALKPHOS 51*  --  45*   AST 36  --  26   ALT 21  --  15   ANIONGAP 12 12 13       Significant Imaging: I have reviewed all pertinent imaging results/findings within the past 24 hours.    Assessment/Plan:      * Acute on chronic systolic congestive heart failure  Patient is  identified as having Systolic (HFrEF) heart failure that is Acute on chronic. CHF is currently uncontrolled due to Continued edema of extremities. Latest ECHO performed and demonstrates- Results for orders placed during the hospital encounter of 02/11/23    Echo    Interpretation Summary  · The left ventricle is normal in size with concentric hypertrophy and moderately decreased systolic function.  · Severe left atrial enlargement.  · Trivial pericardial effusion.  · Mild tricuspid regurgitation.  · Severe right atrial enlargement.  · The estimated ejection fraction is 30%.  · There is severe left ventricular global hypokinesis.  · Mild right ventricular enlargement with mildly to moderately reduced right ventricular systolic function.  · Moderate aortic regurgitation.  · Elevated central venous pressure (15 mmHg).  · The estimated PA systolic pressure is 80 mmHg.  · There is pulmonary hypertension.  · The ascending aorta is moderately dilated.  · The aortic root is moderately dilated.  · Atrial fibrillation observed.  . Continue Beta Blocker, Furosemide, and ARNI and monitor clinical status closely. Monitor on telemetry. Patient is on CHF pathway.  Monitor strict Is&Os and daily weights.  Place on fluid restriction of 1.5 L. Cardiology has been consulted. Continue to stress to patient importance of self efficacy and  on diet for CHF. Last BNP reviewed- and noted below   Recent Labs   Lab 12/28/23  1745   BNP 3,572*     --Lasix 20mg IV BID  --Monitor BP    Chronic hypoxemic respiratory failure  Patient with Hypoxic Respiratory failure which is Acute on chronic.  he is on home oxygen at 2 LPM. Supplemental oxygen was provided and noted- Oxygen Concentration (%):  [28] 28    .   Signs/symptoms of respiratory failure include- tachypnea, increased work of breathing, and wheezing. Contributing diagnoses includes - CHF Labs and images were reviewed. Patient Has not had a recent ABG. Will treat underlying causes and  adjust management of respiratory failure as follows-     --Nebs  --Supportive O2    UTI (urinary tract infection)  UA consistent with infectious process  Urine culture : +proteus sp, sensitivity pending  Continue ceftriaxone for now    Scrotal edema  Due to CHF exacerbation and also UTI  Diuresis and antibiotics, as above  Supportive care    --Improving    Pulmonary hypertension        Atrial fibrillation  Patient with Paroxysmal (<7 days) atrial fibrillation which is controlled currently with Beta Blocker. Patient is currently in sinus rhythm.[id number] Score: 2. Anticoagulation not indicated due to bleeding risk, follows with Cardiology .    Hypertension  Chronic, controlled. Latest blood pressure and vitals reviewed-     Temp:  [96.3 °F (35.7 °C)-98.4 °F (36.9 °C)]   Pulse:  []   Resp:  [16-18]   BP: ()/(50-70)   SpO2:  [90 %-98 %] .   Home meds for hypertension were reviewed and noted below.   Hypertension Medications               furosemide (LASIX) 40 MG tablet Take 1 tablet (40 mg total) by mouth 2 (two) times a day. Double up to lasix 80mg twice a day for next 5 days    metoprolol succinate (TOPROL-XL) 25 MG 24 hr tablet Take 0.5 tablets (12.5 mg total) by mouth once daily.    sacubitriL-valsartan (ENTRESTO)  mg per tablet Take 1 tablet by mouth 2 (two) times daily.            While in the hospital, will manage blood pressure as follows; Continue home antihypertensive regimen    Will utilize p.r.n. blood pressure medication only if patient's blood pressure greater than 180/110 and he develops symptoms such as worsening chest pain or shortness of breath.    Emphysema of lung  Patient's COPD is uncontrolled currently.  Patient is currently off COPD Pathway.  Breathing treatments PRN  --CXR: possible PNA vs atelectasis  --Consult Pulmonology      Anemia of chronic disease  Patient's anemia is currently controlled. Has not received any PRBCs to date.   Current CBC reviewed-   Lab Results    Component Value Date    HGB 11.7 (L) 12/30/2023    HCT 37.0 (L) 12/30/2023     Monitor serial CBC and transfuse if patient becomes hemodynamically unstable, symptomatic or H/H drops below 7/21.      VTE Risk Mitigation (From admission, onward)           Ordered     IP VTE HIGH RISK PATIENT  Once         12/29/23 0115     Place sequential compression device  Until discontinued         12/28/23 2038                    Discharge Planning   MEGAN:      Code Status: Full Code   Is the patient medically ready for discharge?:     Reason for patient still in hospital (select all that apply): Patient trending condition  Discharge Plan A: Home with family                  Pamela Mixon NP  Department of Hospital Medicine   [facility]

## 2023-12-30 NOTE — ASSESSMENT & PLAN NOTE
- last echo 2/2023 with PAP 80, EF 30%, and severe global hypokinesis  - repeat echo pending   - BNP at admit 3572  - cards following  - continue diuresis

## 2023-12-30 NOTE — ASSESSMENT & PLAN NOTE
Patient is identified as having Systolic (HFrEF) heart failure that is Acute on chronic. CHF is currently uncontrolled due to Continued edema of extremities and Hepatic congestion/ascites. Latest ECHO performed and demonstrates- Results for orders placed during the hospital encounter of 02/11/23    Echo    Interpretation Summary  · The left ventricle is normal in size with concentric hypertrophy and moderately decreased systolic function.  · Severe left atrial enlargement.  · Trivial pericardial effusion.  · Mild tricuspid regurgitation.  · Severe right atrial enlargement.  · The estimated ejection fraction is 30%.  · There is severe left ventricular global hypokinesis.  · Mild right ventricular enlargement with mildly to moderately reduced right ventricular systolic function.  · Moderate aortic regurgitation.  · Elevated central venous pressure (15 mmHg).  · The estimated PA systolic pressure is 80 mmHg.  · There is pulmonary hypertension.  · The ascending aorta is moderately dilated.  · The aortic root is moderately dilated.  · Atrial fibrillation observed.  . Continue Beta Blocker, Furosemide, and ARNI and monitor clinical status closely. Monitor on telemetry. Cardiology has been consulted. Continue to stress to patient importance of self efficacy and  on diet for CHF. Last BNP reviewed- and noted below   Recent Labs   Lab 12/28/23  1745   BNP 3,572*     - continue diuresis  - mgmt per cards

## 2023-12-30 NOTE — SUBJECTIVE & OBJECTIVE
Interval History: WOB improved, dyspnea improved. Wheezing noted. Cont scheduled nebs. A.Fib overnight and run of Vtach. Cardiology recommended ASA and cont metoprolol as tolerated.     Review of Systems   Respiratory:  Positive for shortness of breath and wheezing. Negative for chest tightness.    Cardiovascular:  Positive for leg swelling.   Genitourinary:  Positive for scrotal swelling. Negative for difficulty urinating.   All other systems reviewed and are negative.    Objective:     Vital Signs (Most Recent):  Temp: 98.2 °F (36.8 °C) (12/30/23 1119)  Pulse: 91 (12/30/23 1119)  Resp: 18 (12/30/23 1119)  BP: (!) 108/56 (12/30/23 1119)  SpO2: 96 % (12/30/23 1119) Vital Signs (24h Range):  Temp:  [96.3 °F (35.7 °C)-98.4 °F (36.9 °C)] 98.2 °F (36.8 °C)  Pulse:  [] 91  Resp:  [16-18] 18  SpO2:  [90 %-98 %] 96 %  BP: ()/(50-70) 108/56     Weight: 75.8 kg (167 lb 1.7 oz)  Body mass index is 25.41 kg/m².    Intake/Output Summary (Last 24 hours) at 12/30/2023 1153  Last data filed at 12/30/2023 0844  Gross per 24 hour   Intake 240 ml   Output 3150 ml   Net -2910 ml         Physical Exam  Vitals and nursing note reviewed.   Constitutional:       Appearance: Normal appearance. He is normal weight. He is not ill-appearing.      Interventions: Nasal cannula in place.   HENT:      Head: Normocephalic and atraumatic.      Nose: Nose normal.      Mouth/Throat:      Pharynx: Oropharynx is clear.   Eyes:      Extraocular Movements: Extraocular movements intact.      Pupils: Pupils are equal, round, and reactive to light.   Cardiovascular:      Pulses:           Dorsalis pedis pulses are 1+ on the right side and 1+ on the left side.      Heart sounds: Normal heart sounds.   Pulmonary:      Effort: Pulmonary effort is normal. No tachypnea or respiratory distress.      Breath sounds: Examination of the right-lower field reveals decreased breath sounds. Examination of the left-lower field reveals decreased breath sounds.  Decreased breath sounds and wheezing present. No rhonchi or rales.      Comments: Conversational Dyspnea, present but improved.   Abdominal:      General: Abdomen is flat. Bowel sounds are decreased. There is no distension.      Palpations: Abdomen is soft.      Tenderness: There is no abdominal tenderness. There is no guarding.   Genitourinary:     Testes:         Left: Swelling (improved) present.      Comments: Scrotal edema, erythema  Skin:     Coloration: Skin is pale.   Neurological:      General: No focal deficit present.      Mental Status: He is alert and oriented to person, place, and time.   Psychiatric:         Mood and Affect: Mood normal.         Behavior: Behavior normal.             Significant Labs: All pertinent labs within the past 24 hours have been reviewed.  CBC:   Recent Labs   Lab 12/28/23  1745 12/29/23  0521 12/30/23  0809   WBC 5.56 6.53 7.83   HGB 11.1* 10.6* 11.7*   HCT 34.6* 32.9* 37.0*    316 352     CMP:   Recent Labs   Lab 12/28/23  1745 12/29/23  0521 12/30/23  0402    144 144   K 3.8 3.2* 4.4    108 106   CO2 24 24 25    102 82   BUN 17 16 15   CREATININE 0.8 0.8 0.8   CALCIUM 9.1 8.9 9.0   PROT 7.1  --  6.0   ALBUMIN 3.9  --  3.2*   BILITOT 2.5*  --  1.3*   ALKPHOS 51*  --  45*   AST 36  --  26   ALT 21  --  15   ANIONGAP 12 12 13       Significant Imaging: I have reviewed all pertinent imaging results/findings within the past 24 hours.

## 2023-12-30 NOTE — ASSESSMENT & PLAN NOTE
Chronic, controlled. Latest blood pressure and vitals reviewed-     Temp:  [96.3 °F (35.7 °C)-98.4 °F (36.9 °C)]   Pulse:  []   Resp:  [16-18]   BP: ()/(50-70)   SpO2:  [90 %-98 %] .   Home meds for hypertension were reviewed and noted below.   Hypertension Medications               furosemide (LASIX) 40 MG tablet Take 1 tablet (40 mg total) by mouth 2 (two) times a day. Double up to lasix 80mg twice a day for next 5 days    metoprolol succinate (TOPROL-XL) 25 MG 24 hr tablet Take 0.5 tablets (12.5 mg total) by mouth once daily.    sacubitriL-valsartan (ENTRESTO)  mg per tablet Take 1 tablet by mouth 2 (two) times daily.            While in the hospital, will manage blood pressure as follows; Continue home antihypertensive regimen    Will utilize p.r.n. blood pressure medication only if patient's blood pressure greater than 180/110 and he develops symptoms such as worsening chest pain or shortness of breath.

## 2023-12-30 NOTE — ASSESSMENT & PLAN NOTE
Patient with Hypoxic Respiratory failure which is Acute on chronic.  he is on home oxygen at 2 LPM. Supplemental oxygen was provided and noted- Oxygen Concentration (%):  [28] 28    Signs/symptoms of respiratory failure include- increased work of breathing, respiratory distress, and lethargy. Contributing diagnoses includes - CHF Labs and images were reviewed. Patient Has not had a recent ABG. Will treat underlying causes and adjust management of respiratory failure as follows    - s/t HF exacerbation, do not suspect pneumonia at this time  - procal negative   - continue O2, currently on 2L NC

## 2023-12-30 NOTE — ASSESSMENT & PLAN NOTE
Patient with Paroxysmal (<7 days) atrial fibrillation which is controlled currently with Beta Blocker. Patient is currently in sinus rhythm.[id number] Score: 2. Anticoagulation not indicated due to bleeding risk, follows with Cardiology .

## 2023-12-30 NOTE — PROGRESS NOTES
"[facility]  Pulmonology  Progress Note    Patient Name: Richy Douglas  MRN: [id number]  Admission Date: 12/28/2023  Hospital Length of Stay: 1 days  Code Status: Full Code  Attending Provider: Mando Guallpa MD  Primary Care Provider: Vivian Ch MD   Principal Problem: Acute on chronic systolic congestive heart failure    Subjective:     83 year old male with a known past medical history of chronic HFrEF (EF 30%), atrial fibrillation (not on AC due to bleeding risk), HTN, PVD, emphysema, GERD, and prostate cancer that presented to the ER 12/28 with complaints of swelling to his penis, testicles, abdomen, and BLE. He also complained of dysuria, difficulty starting urinating with associated with pain. Onset of swelling > 1 month ago, started to have difficulty urinating over the past week. States hasn't urinated at all in the 2-3 days.     In ER, labs significant for H/H 11.1/34.6, T bili 2.5, BNP 3572, troponin WNL (0.015), lactic acid 2.6. UA +protein, occult blood, leukocytes, 28 WBC, many bacteria - culture pending. Scrotal US with no torsion. CT A/P with "marked scrotal and penile edema, large right hydrocele, small left hydrocele. Anasarca with cardiomegaly and small volume ascites. CXR no acute findings, noted emphysema. EKG shows PVC's with right axis deviation, prolonged QTC interval, and LVH. Patient received breathing treatment, IV Lasix. [facility] consulted for further management, admit as inpatient.    Pulmonary consulted on the evening of 12/29 for ongoing dyspnea.    Interval History:  12/30: looking better and better as he undergoes diuresis, in good spirits this AM, on 2L NC, edema improving     ROS complete and negative unless stated in the interval HPI     Objective:     Vital Signs (Most Recent):  Temp: 98.2 °F (36.8 °C) (12/30/23 1119)  Pulse: 91 (12/30/23 1119)  Resp: 18 (12/30/23 1119)  BP: (!) 108/56 (12/30/23 1119)  SpO2: 96 % (12/30/23 1119) Vital Signs (24h Range):  Temp:  [96.3 °F " "(35.7 °C)-98.4 °F (36.9 °C)] 98.2 °F (36.8 °C)  Pulse:  [] 91  Resp:  [16-18] 18  SpO2:  [90 %-98 %] 96 %  BP: ()/(50-70) 108/56     Weight: 75.8 kg (167 lb 1.7 oz)  Body mass index is 25.41 kg/m².      Intake/Output Summary (Last 24 hours) at 12/30/2023 1138  Last data filed at 12/30/2023 0844  Gross per 24 hour   Intake 240 ml   Output 3150 ml   Net -2910 ml        Physical Exam  Vitals reviewed.   Constitutional:       General: He is awake. He is not in acute distress.     Comments: Chronically ill appearing make sitting on side of bed on 2L NC in NAD   Pulmonary:      Effort: Pulmonary effort is normal.      Breath sounds: Rales present. No wheezing or rhonchi.      Comments: On 2L NC  Neurological:      Mental Status: He is alert.   Psychiatric:         Behavior: Behavior is cooperative.               Vents:  Oxygen Concentration (%): 28 (12/30/23 0729)    Lines/Drains/Airways       Peripheral Intravenous Line  Duration                  Peripheral IV - Single Lumen 12/28/23 1745 Right Antecubital 1 day         Peripheral IV - Single Lumen 12/28/23 1925 20 G Left Forearm 1 day                    Significant Labs:    CBC/Anemia Profile:  Recent Labs   Lab 12/28/23 1745 12/29/23  0521 12/30/23  0809   WBC 5.56 6.53 7.83   HGB 11.1* 10.6* 11.7*   HCT 34.6* 32.9* 37.0*    316 352   MCV 76* 76* 77*   RDW 26.5* 26.1* 27.7*        Chemistries:  Recent Labs   Lab 12/28/23 1745 12/29/23  0521 12/30/23  0402    144 144   K 3.8 3.2* 4.4    108 106   CO2 24 24 25   BUN 17 16 15   CREATININE 0.8 0.8 0.8   CALCIUM 9.1 8.9 9.0   ALBUMIN 3.9  --  3.2*   PROT 7.1  --  6.0   BILITOT 2.5*  --  1.3*   ALKPHOS 51*  --  45*   ALT 21  --  15   AST 36  --  26   MG  --  1.7 1.8       All pertinent labs within the past 24 hours have been reviewed.    Significant Imaging:  I have reviewed all pertinent imaging results/findings within the past 24 hours.    ABG  No results for input(s): "PH", "PO2", "PCO2", " ""HCO3", "BE" in the last 168 hours.  Assessment/Plan:     Pulmonary  Chronic hypoxemic respiratory failure  Patient with Hypoxic Respiratory failure which is Acute on chronic.  he is on home oxygen at ? LPM. Supplemental oxygen was provided and noted- Oxygen Concentration (%):  [28] 28    Signs/symptoms of respiratory failure include- increased work of breathing, respiratory distress, and lethargy. Contributing diagnoses includes - CHF Labs and images were reviewed. Patient Has not had a recent ABG. Will treat underlying causes and adjust management of respiratory failure as follows    - s/t HF exacerbation, do not suspect pneumonia at this time  - procal negative   - continue O2, currently on 2L NC    Emphysema of lung  - does not appear to be in acute exacerbation  - on home O2, currently on 2L NC  - follows with Dr. Jackson in clinic   - continue nebs  - monitor for bronchospasm     Cardiac/Vascular  * Acute on chronic systolic congestive heart failure  Patient is identified as having Systolic (HFrEF) heart failure that is Acute on chronic. CHF is currently uncontrolled due to Continued edema of extremities and Hepatic congestion/ascites. Latest ECHO performed and demonstrates- Results for orders placed during the hospital encounter of 02/11/23    Echo    Interpretation Summary  · The left ventricle is normal in size with concentric hypertrophy and moderately decreased systolic function.  · Severe left atrial enlargement.  · Trivial pericardial effusion.  · Mild tricuspid regurgitation.  · Severe right atrial enlargement.  · The estimated ejection fraction is 30%.  · There is severe left ventricular global hypokinesis.  · Mild right ventricular enlargement with mildly to moderately reduced right ventricular systolic function.  · Moderate aortic regurgitation.  · Elevated central venous pressure (15 mmHg).  · The estimated PA systolic pressure is 80 mmHg.  · There is pulmonary hypertension.  · The ascending aorta is " moderately dilated.  · The aortic root is moderately dilated.  · Atrial fibrillation observed.  . Continue Beta Blocker, Furosemide, and ARNI and monitor clinical status closely. Monitor on telemetry. Cardiology has been consulted. Continue to stress to patient importance of self efficacy and  on diet for CHF. Last BNP reviewed- and noted below   Recent Labs   Lab 12/28/23  1745   BNP 3,572*       - continue diuresis  - mgmt per cards and     Pulmonary hypertension  - last echo 2/2023 with PAP 80, EF 30%, and severe global hypokinesis  - repeat echo pending   - BNP at admit 3572  - cards following  - continue diuresis     Renal/  Scrotal edema  - see plan for HF      Pulmonary team will remain available, but not plan to see daily. Please call if we can be of assistance sooner or if questions should arise.        Vu Mclaughlin MD  Pulmonology  [facility]

## 2023-12-30 NOTE — ASSESSMENT & PLAN NOTE
Patient with Hypoxic Respiratory failure which is Acute on chronic.  he is on home oxygen at 2 LPM. Supplemental oxygen was provided and noted- Oxygen Concentration (%):  [28] 28    .   Signs/symptoms of respiratory failure include- tachypnea, increased work of breathing, and wheezing. Contributing diagnoses includes - CHF Labs and images were reviewed. Patient Has not had a recent ABG. Will treat underlying causes and adjust management of respiratory failure as follows-     --Nebs  --Supportive O2

## 2023-12-30 NOTE — PLAN OF CARE
Remains injury free. Denies c/o pain. +4 edema to BLE and scrotum. 1500ml fluid restriction . Tolerating diet. No s/s acute distress.

## 2023-12-30 NOTE — ASSESSMENT & PLAN NOTE
Patient's anemia is currently controlled. Has not received any PRBCs to date.   Current CBC reviewed-   Lab Results   Component Value Date    HGB 11.7 (L) 12/30/2023    HCT 37.0 (L) 12/30/2023     Monitor serial CBC and transfuse if patient becomes hemodynamically unstable, symptomatic or H/H drops below 7/21.

## 2023-12-30 NOTE — ASSESSMENT & PLAN NOTE
PAF hx.  Not on DOAC due to hx of bleeding.  Monitor.  Sinus on admit.    PAF  noted.  Add low dose asa 81 mg qd.  Metoprolol bid as tolerated.  Monitor.

## 2023-12-30 NOTE — ASSESSMENT & PLAN NOTE
Patient is identified as having Combined Systolic and Diastolic heart failure that is Acute on chronic. Latest ECHO performed and demonstrates- Results for orders placed during the hospital encounter of 02/11/23    Echo    Interpretation Summary  · The left ventricle is normal in size with concentric hypertrophy and moderately decreased systolic function.  · Severe left atrial enlargement.  · Trivial pericardial effusion.  · Mild tricuspid regurgitation.  · Severe right atrial enlargement.  · The estimated ejection fraction is 30%.  · There is severe left ventricular global hypokinesis.  · Mild right ventricular enlargement with mildly to moderately reduced right ventricular systolic function.  · Moderate aortic regurgitation.  · Elevated central venous pressure (15 mmHg).  · The estimated PA systolic pressure is 80 mmHg.  · There is pulmonary hypertension.  · The ascending aorta is moderately dilated.  · The aortic root is moderately dilated.  · Atrial fibrillation observed.    Recent Labs   Lab 12/28/23  1745   BNP 3,572*         Continue IV Lasix diuresis.  Low salt diet.  Echocardiogram.  GDMT advised for CHF in future.

## 2023-12-30 NOTE — PLAN OF CARE
Remains injury free. Tolerating diet. Repositions independently. Call bell in reach. Cardiac monitoring in progress. No s/s acute distress.

## 2023-12-30 NOTE — ASSESSMENT & PLAN NOTE
Patient's COPD is uncontrolled currently.  Patient is currently off COPD Pathway.  Breathing treatments PRN  --CXR: possible PNA vs atelectasis  --Consult Pulmonology

## 2023-12-31 VITALS
TEMPERATURE: 98 F | HEART RATE: 90 BPM | DIASTOLIC BLOOD PRESSURE: 59 MMHG | BODY MASS INDEX: 25.33 KG/M2 | RESPIRATION RATE: 18 BRPM | OXYGEN SATURATION: 97 % | HEIGHT: 68 IN | WEIGHT: 167.13 LBS | SYSTOLIC BLOOD PRESSURE: 119 MMHG

## 2023-12-31 LAB
BACTERIA UR CULT: ABNORMAL
POCT GLUCOSE: 116 MG/DL (ref 70–110)
POCT GLUCOSE: 92 MG/DL (ref 70–110)

## 2023-12-31 PROCEDURE — 25000003 PHARM REV CODE 250: Performed by: FAMILY MEDICINE

## 2023-12-31 PROCEDURE — 99900035 HC TECH TIME PER 15 MIN (STAT)

## 2023-12-31 PROCEDURE — 99232 SBSQ HOSP IP/OBS MODERATE 35: CPT | Mod: ,,, | Performed by: INTERNAL MEDICINE

## 2023-12-31 PROCEDURE — 27000221 HC OXYGEN, UP TO 24 HOURS

## 2023-12-31 PROCEDURE — 25000003 PHARM REV CODE 250: Performed by: HOSPITALIST

## 2023-12-31 PROCEDURE — 94761 N-INVAS EAR/PLS OXIMETRY MLT: CPT

## 2023-12-31 PROCEDURE — 25000003 PHARM REV CODE 250: Performed by: NURSE PRACTITIONER

## 2023-12-31 PROCEDURE — 94799 UNLISTED PULMONARY SVC/PX: CPT | Mod: XB

## 2023-12-31 PROCEDURE — 94640 AIRWAY INHALATION TREATMENT: CPT

## 2023-12-31 PROCEDURE — 25000242 PHARM REV CODE 250 ALT 637 W/ HCPCS: Performed by: FAMILY MEDICINE

## 2023-12-31 PROCEDURE — 63600175 PHARM REV CODE 636 W HCPCS: Performed by: NURSE PRACTITIONER

## 2023-12-31 PROCEDURE — 25000003 PHARM REV CODE 250: Performed by: INTERNAL MEDICINE

## 2023-12-31 RX ORDER — IPRATROPIUM BROMIDE AND ALBUTEROL SULFATE 2.5; .5 MG/3ML; MG/3ML
3 SOLUTION RESPIRATORY (INHALATION) EVERY 6 HOURS PRN
Status: DISCONTINUED | OUTPATIENT
Start: 2023-12-31 | End: 2023-12-31 | Stop reason: HOSPADM

## 2023-12-31 RX ORDER — IPRATROPIUM BROMIDE AND ALBUTEROL SULFATE 2.5; .5 MG/3ML; MG/3ML
3 SOLUTION RESPIRATORY (INHALATION) 2 TIMES DAILY
Status: DISCONTINUED | OUTPATIENT
Start: 2023-12-31 | End: 2023-12-31 | Stop reason: HOSPADM

## 2023-12-31 RX ORDER — ASPIRIN 81 MG/1
81 TABLET ORAL DAILY
Qty: 30 TABLET | Refills: 0 | Status: SHIPPED | OUTPATIENT
Start: 2024-01-01 | End: 2024-12-31

## 2023-12-31 RX ORDER — AMOXICILLIN AND CLAVULANATE POTASSIUM 875; 125 MG/1; MG/1
1 TABLET, FILM COATED ORAL EVERY 12 HOURS
Qty: 14 TABLET | Refills: 0 | Status: SHIPPED | OUTPATIENT
Start: 2023-12-31 | End: 2024-01-07

## 2023-12-31 RX ORDER — AMOXICILLIN AND CLAVULANATE POTASSIUM 875; 125 MG/1; MG/1
1 TABLET, FILM COATED ORAL EVERY 12 HOURS
Status: DISCONTINUED | OUTPATIENT
Start: 2023-12-31 | End: 2023-12-31 | Stop reason: HOSPADM

## 2023-12-31 RX ADMIN — MEMANTINE HYDROCHLORIDE 5 MG: 5 TABLET ORAL at 08:12

## 2023-12-31 RX ADMIN — METOPROLOL SUCCINATE 12.5 MG: 25 TABLET, EXTENDED RELEASE ORAL at 08:12

## 2023-12-31 RX ADMIN — IPRATROPIUM BROMIDE AND ALBUTEROL SULFATE 3 ML: 2.5; .5 SOLUTION RESPIRATORY (INHALATION) at 07:12

## 2023-12-31 RX ADMIN — ASPIRIN 81 MG: 81 TABLET, COATED ORAL at 08:12

## 2023-12-31 RX ADMIN — SACUBITRIL AND VALSARTAN 2 TABLET: 49; 51 TABLET, FILM COATED ORAL at 08:12

## 2023-12-31 RX ADMIN — BUDESONIDE 0.5 MG: 0.25 INHALANT RESPIRATORY (INHALATION) at 07:12

## 2023-12-31 RX ADMIN — Medication 400 MG: at 08:12

## 2023-12-31 RX ADMIN — FUROSEMIDE 20 MG: 10 INJECTION, SOLUTION INTRAMUSCULAR; INTRAVENOUS at 04:12

## 2023-12-31 RX ADMIN — POTASSIUM CHLORIDE 40 MEQ: 1500 TABLET, EXTENDED RELEASE ORAL at 08:12

## 2023-12-31 RX ADMIN — AMOXICILLIN AND CLAVULANATE POTASSIUM 1 TABLET: 875; 125 TABLET, FILM COATED ORAL at 08:12

## 2023-12-31 NOTE — ASSESSMENT & PLAN NOTE
Patient with Paroxysmal (<7 days) atrial fibrillation which is controlled currently with Beta Blocker. Patient is currently in sinus rhythm.[id number] Score: 2. Anticoagulation not indicated due to bleeding risk, follows with Cardiology .    --Cont ASA 81mg PO daily at discharge

## 2023-12-31 NOTE — ASSESSMENT & PLAN NOTE
Due to CHF exacerbation and also UTI  Diuresis and antibiotics, as above  Supportive care    --Improved

## 2023-12-31 NOTE — SUBJECTIVE & OBJECTIVE
Review of Systems   Constitutional: Positive for malaise/fatigue.   HENT: Negative.     Eyes: Negative.    Cardiovascular:  Positive for leg swelling.   Respiratory: Negative.     Endocrine: Negative.    Hematologic/Lymphatic: Negative.    Skin: Negative.    Musculoskeletal:  Positive for arthritis and stiffness.   Gastrointestinal: Negative.    Genitourinary: Negative.    Neurological:  Positive for weakness.   Psychiatric/Behavioral: Negative.     Allergic/Immunologic: Negative.      Objective:     Vital Signs (Most Recent):  Temp: 98 °F (36.7 °C) (12/31/23 0817)  Pulse: 90 (12/31/23 0817)  Resp: 18 (12/31/23 0817)  BP: (!) 119/59 (12/31/23 0817)  SpO2: 97 % (12/31/23 0817) Vital Signs (24h Range):  Temp:  [97.6 °F (36.4 °C)-98.2 °F (36.8 °C)] 98 °F (36.7 °C)  Pulse:  [] 90  Resp:  [18-20] 18  SpO2:  [90 %-99 %] 97 %  BP: (108-130)/(55-65) 119/59     Weight: 75.8 kg (167 lb 1.7 oz)  Body mass index is 25.41 kg/m².     SpO2: 97 %         Intake/Output Summary (Last 24 hours) at 12/31/2023 1102  Last data filed at 12/31/2023 1001  Gross per 24 hour   Intake --   Output 2150 ml   Net -2150 ml       Lines/Drains/Airways       None                      Physical Exam  Vitals and nursing note reviewed.   Constitutional:       Appearance: He is well-developed.   HENT:      Head: Normocephalic.   Neck:      Thyroid: No thyromegaly.      Vascular: No carotid bruit or JVD.   Cardiovascular:      Rate and Rhythm: Normal rate. Rhythm irregularly irregular.      Pulses:           Radial pulses are 2+ on the right side and 2+ on the left side.      Heart sounds: S1 normal and S2 normal. Heart sounds not distant. No midsystolic click and no opening snap. No murmur heard.     No friction rub. No S3 or S4 sounds.   Pulmonary:      Effort: Pulmonary effort is normal.      Breath sounds: Examination of the right-lower field reveals decreased breath sounds. Examination of the left-lower field reveals decreased breath sounds.  "Decreased breath sounds present. No wheezing or rales.   Abdominal:      General: Bowel sounds are normal. There is no distension or abdominal bruit.      Palpations: Abdomen is soft.      Tenderness: There is no abdominal tenderness.   Musculoskeletal:      Cervical back: Neck supple.      Right lower leg: Edema present.      Left lower leg: Edema present.   Skin:     General: Skin is warm.   Neurological:      Mental Status: He is alert.   Psychiatric:         Behavior: Behavior normal.            Significant Labs: ABG: No results for input(s): "PH", "PCO2", "HCO3", "POCSATURATED", "BE" in the last 48 hours., Blood Culture: No results for input(s): "LABBLOO" in the last 48 hours., BMP:   Recent Labs   Lab 12/30/23  0402   GLU 82      K 4.4      CO2 25   BUN 15   CREATININE 0.8   CALCIUM 9.0   MG 1.8   , CMP   Recent Labs   Lab 12/30/23  0402      K 4.4      CO2 25   GLU 82   BUN 15   CREATININE 0.8   CALCIUM 9.0   PROT 6.0   ALBUMIN 3.2*   BILITOT 1.3*   ALKPHOS 45*   AST 26   ALT 15   ANIONGAP 13   , CBC   Recent Labs   Lab 12/30/23  0809   WBC 7.83   HGB 11.7*   HCT 37.0*      , INR No results for input(s): "INR", "PROTIME" in the last 48 hours., Lipid Panel No results for input(s): "CHOL", "HDL", "LDLCALC", "TRIG", "CHOLHDL" in the last 48 hours., and Troponin No results for input(s): "TROPONINI" in the last 48 hours.    Significant Imaging: Echocardiogram: Transthoracic echo (TTE) complete (Cupid Only):   Results for orders placed or performed during the hospital encounter of 02/11/23   Echo   Result Value Ref Range    BSA 1.86 m2    TDI SEPTAL 0.06 m/s    LV LATERAL E/E' RATIO 10.86 m/s    LV SEPTAL E/E' RATIO 12.67 m/s    LA WIDTH 4.90 cm    IVC diameter 25 cm    Left Ventricular Outflow Tract Mean Velocity 0.95 cm/s    Left Ventricular Outflow Tract Mean Gradient 3.87 mmHg    TDI LATERAL 0.07 m/s    LVIDd 5.59 3.5 - 6.0 cm    IVS 1.67 (A) 0.6 - 1.1 cm    Posterior Wall 1.45 " (A) 0.6 - 1.1 cm    Ao root annulus 4.69 cm    LVIDs 4.72 (A) 2.1 - 4.0 cm    FS 16 28 - 44 %    LA volume 115.59 cm3    Sinus 4.84 cm    STJ 4.89 cm    Ascending aorta 4.62 cm    LV mass 405.03 g    LA size 4.04 cm    RVDD 4.50 cm    Left Ventricle Relative Wall Thickness 0.52 cm    AV regurgitation pressure 1/2 time [id number] ms    AV mean gradient 7 mmHg    AV valve area 3.09 cm2    AV Velocity Ratio 0.75     AV index (prosthetic) 0.63     MV valve area p 1/2 method 3.00 cm2    E/A ratio 2.17     Mean e' 0.07 m/s    E wave deceleration time 253.20 msec    IVRT 45.67 msec    LVOT diameter 2.49 cm    LVOT area 4.9 cm2    LVOT peak karl 1.32 m/s    LVOT peak VTI 20.30 cm    Ao peak karl 1.76 m/s    Ao VTI 32.0 cm    RVOT peak karl 0.97 m/s    RVOT peak VTI 15.9 cm    LVOT stroke volume 98.80 cm3    AV peak gradient 12 mmHg    PV mean gradient 2.55 mmHg    E/E' ratio 11.69 m/s    MV Peak E Karl 0.76 m/s    AR Max Karl 3.73 m/s    TR Max Karl 4.03 m/s    MV stenosis pressure 1/2 time 73.43 ms    MV Peak A Karl 0.35 m/s    LV Systolic Volume 103.44 mL    LV Systolic Volume Index 55.0 mL/m2    LV Diastolic Volume 152.78 mL    LV Diastolic Volume Index 81.27 mL/m2    LA Volume Index 61.5 mL/m2    LV Mass Index 215 g/m2    RA Major Axis 5.95 cm    Left Atrium Minor Axis 6.33 cm    Left Atrium Major Axis 7.51 cm    Triscuspid Valve Regurgitation Peak Gradient 65 mmHg    RA Width 5.68 cm    Right Atrial Pressure (from IVC) 15 mmHg    EF 30 %    TV resting pulmonary artery pressure 80 mmHg    Narrative    · The left ventricle is normal in size with concentric hypertrophy and   moderately decreased systolic function.  · Severe left atrial enlargement.  · Trivial pericardial effusion.  · Mild tricuspid regurgitation.  · Severe right atrial enlargement.  · The estimated ejection fraction is 30%.  · There is severe left ventricular global hypokinesis.  · Mild right ventricular enlargement with mildly to moderately reduced    right ventricular systolic function.  · Moderate aortic regurgitation.  · Elevated central venous pressure (15 mmHg).  · The estimated PA systolic pressure is 80 mmHg.  · There is pulmonary hypertension.  · The ascending aorta is moderately dilated.  · The aortic root is moderately dilated.  · Atrial fibrillation observed.

## 2023-12-31 NOTE — PLAN OF CARE
Discussed POC with patient, Pt verbalized understanding, purposeful rounding, Cardiac monitoring in use , tele monitor [id number], Fall precautions in place, patient remains injury free, medications given at scheduled times, call light within reach, will continue with plan of care.       Problem: Adult Inpatient Plan of Care  Goal: Plan of Care Review  Outcome: Ongoing, Progressing  Goal: Patient-Specific Goal (Individualized)  Outcome: Ongoing, Progressing  Goal: Absence of Hospital-Acquired Illness or Injury  Outcome: Ongoing, Progressing  Goal: Optimal Comfort and Wellbeing  Outcome: Ongoing, Progressing  Goal: Readiness for Transition of Care  Outcome: Ongoing, Progressing

## 2023-12-31 NOTE — ASSESSMENT & PLAN NOTE
Patient is identified as having Systolic (HFrEF) heart failure that is Acute on chronic. CHF is currently uncontrolled due to Continued edema of extremities. Latest ECHO performed and demonstrates- Results for orders placed during the hospital encounter of 02/11/23    Echo    Interpretation Summary  · The left ventricle is normal in size with concentric hypertrophy and moderately decreased systolic function.  · Severe left atrial enlargement.  · Trivial pericardial effusion.  · Mild tricuspid regurgitation.  · Severe right atrial enlargement.  · The estimated ejection fraction is 30%.  · There is severe left ventricular global hypokinesis.  · Mild right ventricular enlargement with mildly to moderately reduced right ventricular systolic function.  · Moderate aortic regurgitation.  · Elevated central venous pressure (15 mmHg).  · The estimated PA systolic pressure is 80 mmHg.  · There is pulmonary hypertension.  · The ascending aorta is moderately dilated.  · The aortic root is moderately dilated.  · Atrial fibrillation observed.  . Continue Beta Blocker, Furosemide, and ARNI and monitor clinical status closely. Monitor on telemetry. Patient is on CHF pathway.  Monitor strict Is&Os and daily weights.  Place on fluid restriction of 1.5 L. Cardiology has been consulted. Continue to stress to patient importance of self efficacy and  on diet for CHF. Last BNP reviewed- and noted below   Recent Labs   Lab 12/28/23  1745   BNP 3,572*     --Resume home regimen  --HH to monitor/educate  --F/U with Cardiology

## 2023-12-31 NOTE — ASSESSMENT & PLAN NOTE
Chronic, controlled. Latest blood pressure and vitals reviewed-     Temp:  [97.6 °F (36.4 °C)-98.2 °F (36.8 °C)]   Pulse:  []   Resp:  [18-20]   BP: (108-130)/(55-65)   SpO2:  [90 %-99 %] .   Home meds for hypertension were reviewed and noted below.   Hypertension Medications               furosemide (LASIX) 40 MG tablet Take 1 tablet (40 mg total) by mouth 2 (two) times a day. Double up to lasix 80mg twice a day for next 5 days    metoprolol succinate (TOPROL-XL) 25 MG 24 hr tablet Take 0.5 tablets (12.5 mg total) by mouth once daily.    sacubitriL-valsartan (ENTRESTO)  mg per tablet Take 1 tablet by mouth 2 (two) times daily.            While in the hospital, will manage blood pressure as follows; Continue home antihypertensive regimen    Will utilize p.r.n. blood pressure medication only if patient's blood pressure greater than 180/110 and he develops symptoms such as worsening chest pain or shortness of breath.    --Stable, resume home regimen on discharge

## 2023-12-31 NOTE — ASSESSMENT & PLAN NOTE
Patient with Hypoxic Respiratory failure which is Acute on chronic.  he is on home oxygen at 2 LPM. Supplemental oxygen was provided and noted- Oxygen Concentration (%):  [28] 28    .   Signs/symptoms of respiratory failure include- tachypnea, increased work of breathing, and wheezing. Contributing diagnoses includes - CHF Labs and images were reviewed. Patient Has not had a recent ABG. Will treat underlying causes and adjust management of respiratory failure as follows-     --Improved, at baseline

## 2023-12-31 NOTE — ASSESSMENT & PLAN NOTE
Patient's anemia is currently controlled. Has not received any PRBCs to date.   Current CBC reviewed-   Lab Results   Component Value Date    HGB 11.7 (L) 12/30/2023    HCT 37.0 (L) 12/30/2023     Monitor serial CBC and transfuse if patient becomes hemodynamically unstable, symptomatic or H/H drops below 7/21.    --Stable

## 2023-12-31 NOTE — PROGRESS NOTES
[facility] - [facility]  Cardiology  Progress Note    Patient Name: Richy Douglas  MRN: [id number]  Admission Date: 12/28/2023  Hospital Length of Stay: 2 days  Code Status: Full Code   Attending Physician: Mando Guallpa MD   Primary Care Physician: Vivian Ch MD  Expected Discharge Date: 12/31/2023  Principal Problem:Acute on chronic systolic congestive heart failure    Subjective:     HPI:  Cardiology consulted for CHF.  F/u by Dr. Garces, Cardiology.  Has combined CHF, PAF, HTN, PAD, PHTN, COPD, AI, h/o prostate cancer.  Admitted w acute CHF sxs, anasarca with leg edema, scrotal edema and dyspnea over last few weeks.  No angina/CP.  Ecg on admit sinus, LVH, PVC.  BNP 3572  Troponin wnl.  Echo Feb 2023 EF 30% RV failure, severe PHTN.  Not on anticoagulation for PAF due to risk for bleeding per Cardiology notes.        Hospital Course:   12/30/23:  Pt seen and examined this am. Chart/labs reviewed.  A fib noted overnight.  Pt denies chest pain or dyspnea.  On Lasix IV.    12/31/23: Pt seen and examined this am. Chart/labs reviewed. Pt denies chest pain or dyspnea.  On Lasix IV.  He feels improved.        Review of Systems   Constitutional: Positive for malaise/fatigue.   HENT: Negative.     Eyes: Negative.    Cardiovascular:  Positive for leg swelling.   Respiratory: Negative.     Endocrine: Negative.    Hematologic/Lymphatic: Negative.    Skin: Negative.    Musculoskeletal:  Positive for arthritis and stiffness.   Gastrointestinal: Negative.    Genitourinary: Negative.    Neurological:  Positive for weakness.   Psychiatric/Behavioral: Negative.     Allergic/Immunologic: Negative.      Objective:     Vital Signs (Most Recent):  Temp: 98 °F (36.7 °C) (12/31/23 0817)  Pulse: 90 (12/31/23 0817)  Resp: 18 (12/31/23 0817)  BP: (!) 119/59 (12/31/23 0817)  SpO2: 97 % (12/31/23 0817) Vital Signs (24h Range):  Temp:  [97.6 °F (36.4 °C)-98.2 °F (36.8 °C)] 98 °F (36.7 °C)  Pulse:  [] 90  Resp:  [18-20] 18  SpO2:  [90  "%-99 %] 97 %  BP: (108-130)/(55-65) 119/59     Weight: 75.8 kg (167 lb 1.7 oz)  Body mass index is 25.41 kg/m².     SpO2: 97 %         Intake/Output Summary (Last 24 hours) at 12/31/2023 1102  Last data filed at 12/31/2023 1001  Gross per 24 hour   Intake --   Output 2150 ml   Net -2150 ml       Lines/Drains/Airways       None                      Physical Exam  Vitals and nursing note reviewed.   Constitutional:       Appearance: He is well-developed.   HENT:      Head: Normocephalic.   Neck:      Thyroid: No thyromegaly.      Vascular: No carotid bruit or JVD.   Cardiovascular:      Rate and Rhythm: Normal rate. Rhythm irregularly irregular.      Pulses:           Radial pulses are 2+ on the right side and 2+ on the left side.      Heart sounds: S1 normal and S2 normal. Heart sounds not distant. No midsystolic click and no opening snap. No murmur heard.     No friction rub. No S3 or S4 sounds.   Pulmonary:      Effort: Pulmonary effort is normal.      Breath sounds: Examination of the right-lower field reveals decreased breath sounds. Examination of the left-lower field reveals decreased breath sounds. Decreased breath sounds present. No wheezing or rales.   Abdominal:      General: Bowel sounds are normal. There is no distension or abdominal bruit.      Palpations: Abdomen is soft.      Tenderness: There is no abdominal tenderness.   Musculoskeletal:      Cervical back: Neck supple.      Right lower leg: Edema present.      Left lower leg: Edema present.   Skin:     General: Skin is warm.   Neurological:      Mental Status: He is alert.   Psychiatric:         Behavior: Behavior normal.            Significant Labs: ABG: No results for input(s): "PH", "PCO2", "HCO3", "POCSATURATED", "BE" in the last 48 hours., Blood Culture: No results for input(s): "LABBLOO" in the last 48 hours., BMP:   Recent Labs   Lab 12/30/23  0402   GLU 82      K 4.4      CO2 25   BUN 15   CREATININE 0.8   CALCIUM 9.0   MG 1.8 " "  , CMP   Recent Labs   Lab 12/30/23  0402      K 4.4      CO2 25   GLU 82   BUN 15   CREATININE 0.8   CALCIUM 9.0   PROT 6.0   ALBUMIN 3.2*   BILITOT 1.3*   ALKPHOS 45*   AST 26   ALT 15   ANIONGAP 13   , CBC   Recent Labs   Lab 12/30/23  0809   WBC 7.83   HGB 11.7*   HCT 37.0*      , INR No results for input(s): "INR", "PROTIME" in the last 48 hours., Lipid Panel No results for input(s): "CHOL", "HDL", "LDLCALC", "TRIG", "CHOLHDL" in the last 48 hours., and Troponin No results for input(s): "TROPONINI" in the last 48 hours.    Significant Imaging: Echocardiogram: Transthoracic echo (TTE) complete (Cupid Only):   Results for orders placed or performed during the hospital encounter of 02/11/23   Echo   Result Value Ref Range    BSA 1.86 m2    TDI SEPTAL 0.06 m/s    LV LATERAL E/E' RATIO 10.86 m/s    LV SEPTAL E/E' RATIO 12.67 m/s    LA WIDTH 4.90 cm    IVC diameter 25 cm    Left Ventricular Outflow Tract Mean Velocity 0.95 cm/s    Left Ventricular Outflow Tract Mean Gradient 3.87 mmHg    TDI LATERAL 0.07 m/s    LVIDd 5.59 3.5 - 6.0 cm    IVS 1.67 (A) 0.6 - 1.1 cm    Posterior Wall 1.45 (A) 0.6 - 1.1 cm    Ao root annulus 4.69 cm    LVIDs 4.72 (A) 2.1 - 4.0 cm    FS 16 28 - 44 %    LA volume 115.59 cm3    Sinus 4.84 cm    STJ 4.89 cm    Ascending aorta 4.62 cm    LV mass 405.03 g    LA size 4.04 cm    RVDD 4.50 cm    Left Ventricle Relative Wall Thickness 0.52 cm    AV regurgitation pressure 1/2 time [id number] ms    AV mean gradient 7 mmHg    AV valve area 3.09 cm2    AV Velocity Ratio 0.75     AV index (prosthetic) 0.63     MV valve area p 1/2 method 3.00 cm2    E/A ratio 2.17     Mean e' 0.07 m/s    E wave deceleration time 253.20 msec    IVRT 45.67 msec    LVOT diameter 2.49 cm    LVOT area 4.9 cm2    LVOT peak michelet 1.32 m/s    LVOT peak VTI 20.30 cm    Ao peak michelet 1.76 m/s    Ao VTI 32.0 cm    RVOT peak michelet 0.97 m/s    RVOT peak VTI 15.9 cm    LVOT stroke volume 98.80 cm3    AV peak " gradient 12 mmHg    PV mean gradient 2.55 mmHg    E/E' ratio 11.69 m/s    MV Peak E Karl 0.76 m/s    AR Max Karl 3.73 m/s    TR Max Karl 4.03 m/s    MV stenosis pressure 1/2 time 73.43 ms    MV Peak A Karl 0.35 m/s    LV Systolic Volume 103.44 mL    LV Systolic Volume Index 55.0 mL/m2    LV Diastolic Volume 152.78 mL    LV Diastolic Volume Index 81.27 mL/m2    LA Volume Index 61.5 mL/m2    LV Mass Index 215 g/m2    RA Major Axis 5.95 cm    Left Atrium Minor Axis 6.33 cm    Left Atrium Major Axis 7.51 cm    Triscuspid Valve Regurgitation Peak Gradient 65 mmHg    RA Width 5.68 cm    Right Atrial Pressure (from IVC) 15 mmHg    EF 30 %    TV resting pulmonary artery pressure 80 mmHg    Narrative    · The left ventricle is normal in size with concentric hypertrophy and   moderately decreased systolic function.  · Severe left atrial enlargement.  · Trivial pericardial effusion.  · Mild tricuspid regurgitation.  · Severe right atrial enlargement.  · The estimated ejection fraction is 30%.  · There is severe left ventricular global hypokinesis.  · Mild right ventricular enlargement with mildly to moderately reduced   right ventricular systolic function.  · Moderate aortic regurgitation.  · Elevated central venous pressure (15 mmHg).  · The estimated PA systolic pressure is 80 mmHg.  · There is pulmonary hypertension.  · The ascending aorta is moderately dilated.  · The aortic root is moderately dilated.  · Atrial fibrillation observed.        Assessment and Plan:         * Acute on chronic systolic congestive heart failure  Patient is identified as having Combined Systolic and Diastolic heart failure that is Acute on chronic. Latest ECHO performed and demonstrates- Results for orders placed during the hospital encounter of 02/11/23    Echo    Interpretation Summary  · The left ventricle is normal in size with concentric hypertrophy and moderately decreased systolic function.  · Severe left atrial enlargement.  · Trivial  pericardial effusion.  · Mild tricuspid regurgitation.  · Severe right atrial enlargement.  · The estimated ejection fraction is 30%.  · There is severe left ventricular global hypokinesis.  · Mild right ventricular enlargement with mildly to moderately reduced right ventricular systolic function.  · Moderate aortic regurgitation.  · Elevated central venous pressure (15 mmHg).  · The estimated PA systolic pressure is 80 mmHg.  · There is pulmonary hypertension.  · The ascending aorta is moderately dilated.  · The aortic root is moderately dilated.  · Atrial fibrillation observed.    Recent Labs   Lab 12/28/23  1745   BNP 3,572*         IV Lasix diuresis then transition back to po dosing.  Low salt diet.  Echocardiogram.  GDMT advised for CHF in future.    F/u with Dr. Sim, Cardiology, and CHF clinic asap after discharge.            UTI (urinary tract infection)  Per Hospital med mgt.    Pulmonary hypertension  Monitor.    Atrial fibrillation  PAF hx.  Not on DOAC due to hx of bleeding.  Monitor.  Sinus on admit.    PAF  noted.  Add low dose asa 81 mg qd.  Metoprolol bid as tolerated.  Monitor.      Hypertension  Continue current HTN meds; adjust as needed.        F/U WITH DR. SIM, PRIMARY CARDIOLOGIST, AND CHF CLINIC ASAP AFTER DISCHARGE.  WILL BE AVAILABLE AS NEEDED.        VTE Risk Mitigation (From admission, onward)           Ordered     IP VTE HIGH RISK PATIENT  Once         12/29/23 0115     Place sequential compression device  Until discontinued         12/28/23 2038                    Omar Hedrick MD  Cardiology  [facility] - [facility]

## 2023-12-31 NOTE — ASSESSMENT & PLAN NOTE
Patient is identified as having Combined Systolic and Diastolic heart failure that is Acute on chronic. Latest ECHO performed and demonstrates- Results for orders placed during the hospital encounter of 02/11/23    Echo    Interpretation Summary  · The left ventricle is normal in size with concentric hypertrophy and moderately decreased systolic function.  · Severe left atrial enlargement.  · Trivial pericardial effusion.  · Mild tricuspid regurgitation.  · Severe right atrial enlargement.  · The estimated ejection fraction is 30%.  · There is severe left ventricular global hypokinesis.  · Mild right ventricular enlargement with mildly to moderately reduced right ventricular systolic function.  · Moderate aortic regurgitation.  · Elevated central venous pressure (15 mmHg).  · The estimated PA systolic pressure is 80 mmHg.  · There is pulmonary hypertension.  · The ascending aorta is moderately dilated.  · The aortic root is moderately dilated.  · Atrial fibrillation observed.    Recent Labs   Lab 12/28/23  1745   BNP 3,572*         IV Lasix diuresis then transition back to po dosing.  Low salt diet.  Echocardiogram.  GDMT advised for CHF in future.    F/u with Dr. Garces, Cardiology, and CHF clinic asap after discharge.

## 2023-12-31 NOTE — ASSESSMENT & PLAN NOTE
UA consistent with infectious process  Urine culture : +proteus mirabilis, pansensitive   Continue ceftriaxone for now- rec'd 3 doses  D/C with Augmentin 875/125mg PO BID x7 days, given 1st dose prior to d/c

## 2023-12-31 NOTE — PLAN OF CARE
12/31/23 0855   Post-Acute Status   Post-Acute Authorization Home Health   Home Health Status Referrals Sent   Discharge Plan   Discharge Plan A Home Health     Referral sent to [facility].

## 2023-12-31 NOTE — ASSESSMENT & PLAN NOTE
Patient's COPD is uncontrolled currently.  Patient is currently off COPD Pathway.  Breathing treatments PRN    --Resume home regimen

## 2023-12-31 NOTE — DISCHARGE SUMMARY
"[facility]  Hospital Medicine  Discharge Summary      Patient Name: Richy Douglas  MRN: [id number]  IRAJ: [id number]  Patient Class: IP- Inpatient  Admission Date: 12/28/2023  Hospital Length of Stay: 2 days  Discharge Date and Time: No discharge date for patient encounter.  Attending Physician: Mando Guallpa MD   Discharging Provider: Pamela Mixon NP  Primary Care Provider: Vivian Ch MD    Primary Care Team: Networked reference to record PCT     HPI:   84 y/o male with PMHx of chronic HFrEF (EF 30%), atrial fibrillation (not on AC due to bleeding risk), HTN, PVD, emphysema, GERD, prostate cancer who presented to the ER today with complaints of swelling to his penis, testicles and BLE. Also c/o dysuria, difficulty starting urinating, associated with pain. Onset of swelling > 1 month ago, started to have difficulty urinating over the past week. States hasn't urinated at all past 2-3 days. He denies chest pain, fevers/chills, nausea/vomiting or any other complaints.    In ER, labs significant for H/H 11.1/34.6, T bili 2.5, BNP 3572, troponin WNL (0.015), lactic acid 2.6. UA +protein, occult blood, leukocytes, 28 WBC, many bacteria - culture pending. Scrotal US with no torsion. CT A/P with "marked scrotal and penile edema, large right hydrocele, small left hydrocele. Anasarca with cardiomegaly and small volume ascites. CXR no acute findings, noted emphysema. EKG shows PVC's with right axis deviation, prolonged QTC interval, and LVH. Patient received breathing treatment, IV Lasix. [facility] [place] for further management, admit as inpatient.    * No surgery found *      Hospital Course:   83 year old male, under the care of Hospital medicine for management of Scrotal Edema, CHF exacerbation. Patient cont on  IV lasix, dose reduced to 20mg due to hypotension, Net Output since admission: 7320L. Improved scrotal edema. improved SOB, dyspnea. Patient in tripod position at times due to dyspnea on 12/29, this " "has resolved by time of discharge. Endorsed able to lay down at times but needed to "sit leaning forward" for relief at home and for first 24h of admission, this has improved by time of discharge. HTN medications held AM 12/29 secondary to significant hypotension. BP improved by the afternoon, IV lasix reduced to 20mg. K+ and Mg repleted. Due to continued dyspnea ordered repeat CXR, possible PNA vs atelectasis, consulted Pulmonology to evaluate, not felt related to PNA, suspect Cardiac cause. Run of Vtach overnight (12/30), rhythm changed to A. Fib. Cardiology evaluated added ASA, recommend to cont metoprolol.  Urine culture +Proteus mirabilis, pansensitive, plan to d/c with PO Augmentin BID x7 days. F/U with PCP and Cardiology as OP. Referral placed for Home Health. Patient in agreement with plan. Prescription sent to pharmacy of choice. Patient seen and examined on day of discharge and determined stable for discharge.      Goals of Care Treatment Preferences:  Code Status: Full Code      Consults:   Consults (From admission, onward)          Status Ordering Provider     Inpatient consult to Social Work  Once        Provider:  (Not yet assigned)    Acknowledged CHERELLE MCCOY.     Inpatient consult to Pulmonology  Once        Provider:  Vu Mclaughlin MD    Completed CHERELLE MCCOY.     Inpatient consult to Cardiology  Once        Provider:  Omar Hedrick MD    Completed MORRISON, GONZALES     Inpatient consult to Social Work/Case Management  Once        Provider:  (Not yet assigned)    Completed MORRISON GONZALES     Inpatient consult to Registered Dietitian/Nutritionist  Once        Provider:  (Not yet assigned)    Completed MORRISONNHUNGGONZALES            Pulmonary  Chronic hypoxemic respiratory failure  Patient with Hypoxic Respiratory failure which is Acute on chronic.  he is on home oxygen at 2 LPM. Supplemental oxygen was provided and noted- Oxygen Concentration (%):  [28] 28    .   Signs/symptoms of " respiratory failure include- tachypnea, increased work of breathing, and wheezing. Contributing diagnoses includes - CHF Labs and images were reviewed. Patient Has not had a recent ABG. Will treat underlying causes and adjust management of respiratory failure as follows-     --Improved, at baseline      Emphysema of lung  Patient's COPD is uncontrolled currently.  Patient is currently off COPD Pathway.  Breathing treatments PRN    --Resume home regimen      Cardiac/Vascular  * Acute on chronic systolic congestive heart failure  Patient is identified as having Systolic (HFrEF) heart failure that is Acute on chronic. CHF is currently uncontrolled due to Continued edema of extremities. Latest ECHO performed and demonstrates- Results for orders placed during the hospital encounter of 02/11/23    Echo    Interpretation Summary  · The left ventricle is normal in size with concentric hypertrophy and moderately decreased systolic function.  · Severe left atrial enlargement.  · Trivial pericardial effusion.  · Mild tricuspid regurgitation.  · Severe right atrial enlargement.  · The estimated ejection fraction is 30%.  · There is severe left ventricular global hypokinesis.  · Mild right ventricular enlargement with mildly to moderately reduced right ventricular systolic function.  · Moderate aortic regurgitation.  · Elevated central venous pressure (15 mmHg).  · The estimated PA systolic pressure is 80 mmHg.  · There is pulmonary hypertension.  · The ascending aorta is moderately dilated.  · The aortic root is moderately dilated.  · Atrial fibrillation observed.  . Continue Beta Blocker, Furosemide, and ARNI and monitor clinical status closely. Monitor on telemetry. Patient is on CHF pathway.  Monitor strict Is&Os and daily weights.  Place on fluid restriction of 1.5 L. Cardiology has been consulted. Continue to stress to patient importance of self efficacy and  on diet for CHF. Last BNP reviewed- and noted below    Recent Labs   Lab 12/28/23  1745   BNP 3,572*     --Resume home regimen  -- to monitor/educate  --F/U with Cardiology    Pulmonary hypertension        Atrial fibrillation  Patient with Paroxysmal (<7 days) atrial fibrillation which is controlled currently with Beta Blocker. Patient is currently in sinus rhythm.[id number] Score: 2. Anticoagulation not indicated due to bleeding risk, follows with Cardiology .    --Cont ASA 81mg PO daily at discharge    Hypertension  Chronic, controlled. Latest blood pressure and vitals reviewed-     Temp:  [97.6 °F (36.4 °C)-98.2 °F (36.8 °C)]   Pulse:  []   Resp:  [18-20]   BP: (108-130)/(55-65)   SpO2:  [90 %-99 %] .   Home meds for hypertension were reviewed and noted below.   Hypertension Medications               furosemide (LASIX) 40 MG tablet Take 1 tablet (40 mg total) by mouth 2 (two) times a day. Double up to lasix 80mg twice a day for next 5 days    metoprolol succinate (TOPROL-XL) 25 MG 24 hr tablet Take 0.5 tablets (12.5 mg total) by mouth once daily.    sacubitriL-valsartan (ENTRESTO)  mg per tablet Take 1 tablet by mouth 2 (two) times daily.            While in the hospital, will manage blood pressure as follows; Continue home antihypertensive regimen    Will utilize p.r.n. blood pressure medication only if patient's blood pressure greater than 180/110 and he develops symptoms such as worsening chest pain or shortness of breath.    --Stable, resume home regimen on discharge    Renal/  UTI (urinary tract infection)  UA consistent with infectious process  Urine culture : +proteus mirabilis, pansensitive   Continue ceftriaxone for now- rec'd 3 doses  D/C with Augmentin 875/125mg PO BID x7 days, given 1st dose prior to d/c    Scrotal edema  Due to CHF exacerbation and also UTI  Diuresis and antibiotics, as above  Supportive care    --Improved    Oncology  Anemia of chronic disease  Patient's anemia is currently controlled. Has not received any PRBCs to  date.   Current CBC reviewed-   Lab Results   Component Value Date    HGB 11.7 (L) 12/30/2023    HCT 37.0 (L) 12/30/2023     Monitor serial CBC and transfuse if patient becomes hemodynamically unstable, symptomatic or H/H drops below 7/21.    --Stable      Final Active Diagnoses:    Diagnosis Date Noted POA    PRINCIPAL PROBLEM:  Acute on chronic systolic congestive heart failure [I50.23] 12/29/2023 Yes    Chronic hypoxemic respiratory failure [J96.11] 12/29/2023 Yes    Scrotal edema [N50.89] 12/28/2023 Yes    UTI (urinary tract infection) [N39.0] 12/28/2023 Yes    Pulmonary hypertension [I27.20] 08/28/2023 Yes    Atrial fibrillation [I48.91] 09/24/2021 Yes    Emphysema of lung [J43.9] 08/31/2017 Yes    Anemia of chronic disease [D63.8] 04/03/2017 Yes    Hypertension [I10] 04/03/2017 Yes      Problems Resolved During this Admission:       Discharged Condition: stable    Disposition: Home or Self Care    Follow Up:   Follow-up Information       [place] Follow up.    Specialty: Home Health Services  Why: Home Health  Contact information:  [address], [address]  [place] [address]  [phone]               Vivian Ch MD Follow up in 1 week(s).    Specialty: Family Medicine  Contact information:  [address] [address]  [phone]               Uriel Garces MD Follow up.    Specialties: Cardiology, Internal Medicine  Contact information:  [address]  [place] [address]  [phone]                           Patient Instructions:      Ambulatory referral/consult to Outpatient Case Management   Referral Priority: Routine Referral Type: Consultation   Referral Reason: Specialty Services Required   Number of Visits Requested: 1     Ambulatory referral/consult to Home Health   Standing Status: Future   Referral Priority: Routine Referral Type: Home Health Care   Referral Reason: Specialty Services Required   Requested Specialty: Home  Health Services   Number of Visits Requested: 1     Ambulatory referral/consult to [facility]   Standing Status: Future   Referral Priority: Routine Referral Type: Consultation   Referral Reason: Specialty Services Required   Number of Visits Requested: 1     Notify your health care provider if you experience any of the following:  temperature >100.4     Notify your health care provider if you experience any of the following:  persistent nausea and vomiting or diarrhea     Notify your health care provider if you experience any of the following:  severe uncontrolled pain     No dressing needed     Activity as tolerated       Significant Diagnostic Studies: Labs: CMP   Recent Labs   Lab 12/30/23  0402      K 4.4      CO2 25   GLU 82   BUN 15   CREATININE 0.8   CALCIUM 9.0   PROT 6.0   ALBUMIN 3.2*   BILITOT 1.3*   ALKPHOS 45*   AST 26   ALT 15   ANIONGAP 13    and CBC   Recent Labs   Lab 12/30/23  0809   WBC 7.83   HGB 11.7*   HCT 37.0*          Pending Diagnostic Studies:       Procedure Component Value Units Date/Time    Phosphatidylethanol (PETH) [[id number]] Collected: 12/29/23 1820    Order Status: Sent Lab Status: In process Updated: 12/30/23 0203    Specimen: Blood            Medications:  Reconciled Home Medications:      Medication List        START taking these medications      amoxicillin-clavulanate 875-125mg 875-125 mg per tablet  Commonly known as: AUGMENTIN  Take 1 tablet by mouth every 12 (twelve) hours. for 7 days     aspirin 81 MG EC tablet  Commonly known as: ECOTRIN  Take 1 tablet (81 mg total) by mouth once daily.  Start taking on: January 1, 2024            CONTINUE taking these medications      * albuterol 2.5 mg /3 mL (0.083 %) nebulizer solution  Commonly known as: PROVENTIL  Take 3 mLs (2.5 mg total) by nebulization every 4 to 6 hours as needed for Wheezing or Shortness of Breath.     * albuterol 90 mcg/actuation inhaler  Commonly known as:  PROVENTIL/VENTOLIN HFA  Inhale 2 puffs into the lungs every 4 (four) hours as needed for Wheezing or Shortness of Breath.     ENTRESTO  mg per tablet  Generic drug: sacubitriL-valsartan  Take 1 tablet by mouth 2 (two) times daily.     ferrous sulfate 325 (65 FE) MG EC tablet  Take 1 tablet (325 mg total) by mouth once daily.     furosemide 40 MG tablet  Commonly known as: LASIX  Take 1 tablet (40 mg total) by mouth 2 (two) times a day. Double up to lasix 80mg twice a day for next 5 days     JARDIANCE 10 mg tablet  Generic drug: empagliflozin  Take 1 tablet (10 mg total) by mouth once daily.     memantine 5 MG Tab  Commonly known as: NAMENDA  Take 1 tablet (5 mg total) by mouth 2 (two) times daily.     metoprolol succinate 25 MG 24 hr tablet  Commonly known as: TOPROL-XL  Take 0.5 tablets (12.5 mg total) by mouth once daily.     potassium chloride SA 10 MEQ tablet  Commonly known as: K-DUR,KLOR-CON M  Take 2 tablets (20 mEq total) by mouth once daily.     TRELEGY ELLIPTA 200-62.5-25 mcg inhaler  Generic drug: fluticasone-umeclidin-vilanter  Inhale 1 puff into the lungs once daily.           * This list has 2 medication(s) that are the same as other medications prescribed for you. Read the directions carefully, and ask your doctor or other care provider to review them with you.                  Indwelling Lines/Drains at time of discharge:   Lines/Drains/Airways       None                   Time spent on the discharge of patient: 75 minutes         Pamela Mixon NP  Department of Hospital Medicine  [facility]

## 2023-12-31 NOTE — PLAN OF CARE
[facility]  Discharge Final Note    Primary Care Provider: Vivian Ch MD    Expected Discharge Date: 12/31/2023    Final Discharge Note (most recent)       Final Note - 12/31/23 1049          Final Note    Assessment Type Final Discharge Note     Anticipated Discharge Disposition Home-Health Care Svc        Post-Acute Status    Discharge Delays None known at this time                     Important Message from Medicare             Contact Info       [facility]   Specialty: Home Health Services    [address]  [place] [address]   Phone: [phone]       Next Steps: Follow up    Instructions: Home Health    Vivian Ch MD   Specialty: Family Medicine   Relationship: PCP - General    [address] [employer]   Phone: [phone]       Next Steps: Follow up in 1 week(s)    Uriel Garces MD   Specialty: Cardiology, Internal Medicine    [address]  [place] [address]   Phone: [phone]       Next Steps: Follow up          Discharge home with [facility].  No dme orders noted.

## 2024-01-02 ENCOUNTER — TELEPHONE (OUTPATIENT)
Dept: CARDIOLOGY | Facility: CLINIC | Age: 84
End: 2024-01-02
Payer: MEDICARE

## 2024-01-02 NOTE — TELEPHONE ENCOUNTER
"[facility]) hospital discharge 48-72 hour phone follow up completed.     Most Recent Hospital Discharge Date: 12/31/2023  Last admission Diagnosis/chief complaint:Scrotal edema    TCC nurse Navigator spoke with the patient.        Pt reports the following:  []  Shortness of Breath with Activity  []  Shortness of Breath at rest   []  Fatigue  []  Edema   [] Chest pain or tightness  [] Weight Increase since discharge  [x] None of the above    Medications:   Discharge medication reviewed with pt.  Pt reports having medication list available and has all medications at home for use per list.     Education:     Reviewed key points as listed below.     Recommend 2 -3 gram sodium restriction and 1500 cc-2000 cc fluid restriction.  Encourage physical activity with graded exercise program.  Requested patient to weigh themselves daily, and to notify us if their weight increases by more than 3 lbs in 1 day or 5 lbs in 3 days.   Reminded patient to use "Daily weight and symptom tracker" to record and guide patient on when and how to call HFTCC. PT may also use symptom tracker if no scale available  Pt reports being in the green(color) Zone. If in yellow/red, reminded that they should be calling HFTCC today or now.     Watch for these Signs and Symptoms: If any of these occur, contact HFT[facility] immediately:   Increase in shortness of breath with movement   Increase in swelling in your legs and ankles   Weight gain of more than 3 pounds in a day or 5 pounds in 3 days.   Difficulty breathing when you are lying down   Worsening fatigue or tiredness   Stomach bloating, a full feeling or a loss of appetite   Increased coughing--especially when you are lying down      Pt was able to verbalize back to nurse in their own words correct diet/fluid restrictions, necessity for exercise, warning signs and symptoms, when and how to contact their TCC team.      Pt educated on follow-up plan while in HFTCC program " to include:   Week 1 -  F/u appt with [facility] on 1/4/2023 verified with pt.    Week 2-5 - In person/ Virtual/ phone call check ins    Week 5-7 - Pt will discharge from H[facility] and transition to longterm care provider (Cardiology/PCP/ Advanced Heart Failure).      Patient active on myChart? Yes      Pt given the following contact information for ease of communication: [phone] (Mon-Fri, 8a-5p) & for urgent issues on the weekend call [facility] on-call [phone] to page the Cardiology MD on call.  Pt also encouraged utilize [facility] messaging as well.      Will follow up with pt at first clinic visit and HF nurse navigator available for pt questions, issues or concerns.

## 2024-01-03 ENCOUNTER — LAB VISIT (OUTPATIENT)
Dept: LAB | Facility: HOSPITAL | Age: 84
End: 2024-01-03
Attending: FAMILY MEDICINE
Payer: MEDICARE

## 2024-01-03 DIAGNOSIS — I50.23 ACUTE ON CHRONIC SYSTOLIC HEART FAILURE: Primary | ICD-10-CM

## 2024-01-03 DIAGNOSIS — F02.80 MAJOR NEUROCOGNITIVE DISORDER DUE TO MULTIPLE ETIOLOGIES: ICD-10-CM

## 2024-01-03 DIAGNOSIS — F10.230 ALCOHOL DEPENDENCE WITH UNCOMPLICATED WITHDRAWAL: ICD-10-CM

## 2024-01-03 LAB
ANION GAP SERPL CALC-SCNC: 15 MMOL/L (ref 8–16)
BACTERIA BLD CULT: NORMAL
BACTERIA BLD CULT: NORMAL
BUN SERPL-MCNC: 25 MG/DL (ref 8–23)
CALCIUM SERPL-MCNC: 9.5 MG/DL (ref 8.7–10.5)
CHLORIDE SERPL-SCNC: 105 MMOL/L (ref 95–110)
CLINICAL BIOCHEMIST REVIEW: NORMAL
CO2 SERPL-SCNC: 25 MMOL/L (ref 23–29)
CREAT SERPL-MCNC: 0.8 MG/DL (ref 0.5–1.4)
ERYTHROCYTE [DISTWIDTH] IN BLOOD BY AUTOMATED COUNT: 26.6 % (ref 11.5–14.5)
EST. GFR  (NO RACE VARIABLE): >60 ML/MIN/1.73 M^2
GLUCOSE SERPL-MCNC: 89 MG/DL (ref 70–110)
HCT VFR BLD AUTO: 34.9 % (ref 40–54)
HGB BLD-MCNC: 11.3 G/DL (ref 14–18)
MCH RBC QN AUTO: 24.7 PG (ref 27–31)
MCHC RBC AUTO-ENTMCNC: 32.4 G/DL (ref 32–36)
MCV RBC AUTO: 76 FL (ref 82–98)
PLATELET # BLD AUTO: 212 K/UL (ref 150–450)
PLATELET BLD QL SMEAR: NORMAL
PLPETH BLD-MCNC: 56 NG/ML
PMV BLD AUTO: ABNORMAL FL (ref 9.2–12.9)
POPETH BLD-MCNC: 43 NG/ML
POTASSIUM SERPL-SCNC: 4.6 MMOL/L (ref 3.5–5.1)
RBC # BLD AUTO: 4.57 M/UL (ref 4.6–6.2)
SODIUM SERPL-SCNC: 145 MMOL/L (ref 136–145)
WBC # BLD AUTO: 5.1 K/UL (ref 3.9–12.7)

## 2024-01-03 PROCEDURE — 80048 BASIC METABOLIC PNL TOTAL CA: CPT | Performed by: FAMILY MEDICINE

## 2024-01-03 PROCEDURE — 85027 COMPLETE CBC AUTOMATED: CPT | Performed by: FAMILY MEDICINE

## 2024-01-03 PROCEDURE — 36415 COLL VENOUS BLD VENIPUNCTURE: CPT | Performed by: FAMILY MEDICINE

## 2024-01-03 RX ORDER — FUROSEMIDE 40 MG/1
40 TABLET ORAL 2 TIMES DAILY
Qty: 90 TABLET | Refills: 3 | Status: SHIPPED | OUTPATIENT
Start: 2024-01-03 | End: 2024-01-04

## 2024-01-03 RX ORDER — MEMANTINE HYDROCHLORIDE 5 MG/1
5 TABLET ORAL 2 TIMES DAILY
Qty: 60 TABLET | Refills: 11 | Status: SHIPPED | OUTPATIENT
Start: 2024-01-03 | End: 2025-01-02

## 2024-01-03 RX ORDER — FERROUS SULFATE 325(65) MG
325 TABLET, DELAYED RELEASE (ENTERIC COATED) ORAL DAILY
Qty: 30 TABLET | Refills: 1 | Status: SHIPPED | OUTPATIENT
Start: 2024-01-03

## 2024-01-03 RX ORDER — POTASSIUM CHLORIDE 750 MG/1
20 TABLET, EXTENDED RELEASE ORAL DAILY
Qty: 90 TABLET | Refills: 1 | Status: SHIPPED | OUTPATIENT
Start: 2024-01-03

## 2024-01-03 NOTE — TELEPHONE ENCOUNTER
Refill Routing Note   Medication(s) are not appropriate for processing by [facility] for the following reason(s):        Outside of protocol    [facility] action(s):  Route               Appointments  past 12m or future 3m with PCP    Date Provider   Last Visit   6/7/2023 Vivian Ch MD   Next Visit   6/10/2024 Vivian Ch MD   ED visits in past 90 days: 1        Note composed:1:36 PM 01/03/2024           Depressed    HTN (hypertension)

## 2024-01-03 NOTE — TELEPHONE ENCOUNTER
----- Message from Kaushal Granger sent at 1/3/2024 12:35 PM CST -----  Contact: [facility]  [facility] nurse is asking for an return call in reference to pt being admitted to home health on today and pt has serval medications listed for medication that wasn't in home and is needing those medications refilled please call back at [phone] Thx CJ

## 2024-01-04 ENCOUNTER — OFFICE VISIT (OUTPATIENT)
Dept: CARDIOLOGY | Facility: CLINIC | Age: 84
End: 2024-01-04
Payer: MEDICARE

## 2024-01-04 VITALS
SYSTOLIC BLOOD PRESSURE: 120 MMHG | WEIGHT: 166.44 LBS | HEIGHT: 68 IN | HEART RATE: 100 BPM | BODY MASS INDEX: 25.23 KG/M2 | DIASTOLIC BLOOD PRESSURE: 62 MMHG

## 2024-01-04 DIAGNOSIS — I48.11 LONGSTANDING PERSISTENT ATRIAL FIBRILLATION: ICD-10-CM

## 2024-01-04 DIAGNOSIS — I50.42 CHRONIC COMBINED SYSTOLIC AND DIASTOLIC CONGESTIVE HEART FAILURE, NYHA CLASS 3: ICD-10-CM

## 2024-01-04 DIAGNOSIS — I73.9 PVD (PERIPHERAL VASCULAR DISEASE): ICD-10-CM

## 2024-01-04 DIAGNOSIS — I50.23 ACUTE ON CHRONIC SYSTOLIC CONGESTIVE HEART FAILURE: ICD-10-CM

## 2024-01-04 PROCEDURE — 99999 PR PBB SHADOW E&M-EST. PATIENT-LVL II: CPT | Mod: PBBFAC,HCNC,, | Performed by: PHYSICIAN ASSISTANT

## 2024-01-04 PROCEDURE — 3074F SYST BP LT 130 MM HG: CPT | Mod: HCNC,CPTII,S$GLB, | Performed by: PHYSICIAN ASSISTANT

## 2024-01-04 PROCEDURE — 1157F ADVNC CARE PLAN IN RCRD: CPT | Mod: HCNC,CPTII,S$GLB, | Performed by: PHYSICIAN ASSISTANT

## 2024-01-04 PROCEDURE — 3078F DIAST BP <80 MM HG: CPT | Mod: HCNC,CPTII,S$GLB, | Performed by: PHYSICIAN ASSISTANT

## 2024-01-04 PROCEDURE — 99496 TRANSJ CARE MGMT HIGH F2F 7D: CPT | Mod: HCNC,S$GLB,, | Performed by: PHYSICIAN ASSISTANT

## 2024-01-04 RX ORDER — METOPROLOL SUCCINATE 25 MG/1
12.5 TABLET, EXTENDED RELEASE ORAL DAILY
Qty: 30 TABLET | Refills: 6 | Status: SHIPPED | OUTPATIENT
Start: 2024-01-04 | End: 2025-02-27

## 2024-01-04 RX ORDER — FUROSEMIDE 40 MG/1
40 TABLET ORAL 2 TIMES DAILY
Qty: 90 TABLET | Refills: 3 | Status: SHIPPED | OUTPATIENT
Start: 2024-01-04 | End: 2024-07-02

## 2024-01-04 RX ORDER — SACUBITRIL AND VALSARTAN 97; 103 MG/1; MG/1
1 TABLET, FILM COATED ORAL 2 TIMES DAILY
Qty: 180 TABLET | Refills: 1 | Status: SHIPPED | OUTPATIENT
Start: 2024-01-04

## 2024-01-04 NOTE — PROGRESS NOTES
"HF TCC Provider Note (Follow-up) Consult Note      HPI:  84 y/o male with PMHx of chronic HFrEF (EF 30%), atrial fibrillation (not on AC due to bleeding risk), HTN, PVD, emphysema, GERD, prostate cancer who presented to the ER today with complaints of swelling to his penis, testicles and BLE. Also c/o dysuria, difficulty starting urinating, associated with pain. Onset of swelling > 1 month ago, started to have difficulty urinating over the past week. States hasn't urinated at all past 2-3 days. He denies chest pain, fevers/chills, nausea/vomiting or any other complaints.     In ER, labs significant for H/H 11.1/34.6, T bili 2.5, BNP 3572, troponin WNL (0.015), lactic acid 2.6. UA +protein, occult blood, leukocytes, 28 WBC, many bacteria - culture pending. Scrotal US with no torsion. CT A/P with "marked scrotal and penile edema, large right hydrocele, small left hydrocele. Anasarca with cardiomegaly and small volume ascites. CXR no acute findings, noted emphysema. EKG shows PVC's with right axis deviation, prolonged QTC interval, and LVH. Patient received breathing treatment, IV Lasix. [facility] consulted for further management, admit as inpatient.    83 year old male, under the care of Hospital medicine for management of Scrotal Edema, CHF exacerbation. Patient cont on  IV lasix, dose reduced to 20mg due to hypotension, Net Output since admission: 7320L. Improved scrotal edema. improved SOB, dyspnea. Patient in tripod position at times due to dyspnea on 12/29, this has resolved by time of discharge. Endorsed able to lay down at times but needed to "sit leaning forward" for relief at home and for first 24h of admission, this has improved by time of discharge. HTN medications held AM 12/29 secondary to significant hypotension. BP improved by the afternoon, IV lasix reduced to 20mg. K+ and Mg repleted. Due to continued dyspnea ordered repeat CXR, possible PNA vs atelectasis, consulted Pulmonology to evaluate, not felt " related to PNA, suspect Cardiac cause. Run of Vtach overnight (12/30), rhythm changed to A. Fib. Cardiology evaluated added ASA, recommend to cont metoprolol.  Urine culture +Proteus mirabilis, pansensitive, plan to d/c with PO Augmentin BID x7 days. F/U with PCP and Cardiology as OP. Referral placed for Home Health. Patient in agreement with plan. Prescription sent to pharmacy of choice. Patient seen and examined on day of discharge and determined stable for discharge.     Dc on 12/31    Since dc has some med consfusion, is not sure if he is taking lasix. Spoke with dtr on phone     No SOB, wife in home hospice so has some stressors at home    Transitional Care Note    Family and/or Caretaker present at visit?  Yes.  Diagnostic tests reviewed/disposition: I have reviewed all completed as well as pending diagnostic tests at the time of discharge.  Disease/illness education: yes  Home health/community services discussion/referrals: Patient has home health established at   .   Establishment or re-establishment of referral orders for community resources: No other necessary community resources.   Discussion with other health care providers: No discussion with other health care providers necessary.                 PHYSICAL:   Vitals:    01/04/24 0859   BP: 120/62   Pulse: 100      @[id number](3)@    JVD: no,    Heart rhythm: irregular  Cardiac murmur: No    S3: no  S4: no  Lungs: clear  Liver span: 10 cm:   Hepatojugular reflux: no  Edema: yes, 1+      ASSESSMENT: acute on chronic systolic HF    PLAN:      Patient Instructions:   Instruct the patient to notify this clinic if HH, a physician or an advanced care provider wants to change medication one of their HF medications   Activity and Diet restrictions:   Recommend 2-3 gram sodium restriction and 1500cc- 2000cc fluid restriction.  Encourage physical activity with graded exercise program.  Requested patient to weigh themselves daily, and to notify us if their weight  increases by more than 3 lbs in 1 day or 5 lbs in 3 days.      Medication changes (include current dose and changed dose)  Refill all CV meds, lasix 40 BID especially  Given direct number to dtr on phone will call and leave message about what meds he is taking  On ARNi BB and jardiance  Unfortunately with readmission high likelihood of readmission again, no advanced options should consider palliation.  RTC 1 month   Home health PT

## 2024-01-05 ENCOUNTER — OUTPATIENT CASE MANAGEMENT (OUTPATIENT)
Dept: ADMINISTRATIVE | Facility: OTHER | Age: 84
End: 2024-01-05
Payer: MEDICARE

## 2024-01-05 NOTE — PHYSICIAN QUERY
"PT Name: Richy Douglas  MR #: [id number]     DOCUMENTATION CLARIFICATION      CDS/: Obdulia Hendricks RN         Contact information: [email or web address]    This form is a permanent document in the medical record.     Query Date: January 5, 2024    Dear Provider,  By submitting this query, we are merely seeking further clarification of documentation.  Please utilize your independent clinical judgment when addressing the question(s) below.     The Medical Record contains the following:    Supporting Clinical Findings Location in Medical Record   83 year old male, under the care of Hospital medicine for management of Scrotal Edema, CHF exacerbation. Patient cont on  IV lasix, dose reduced to 20mg due to hypotension, Net Output since admission: 7320L. Improved scrotal edema. improved SOB, dyspnea. Patient in tripod position at times due to dyspnea on 12/29, this has resolved by time of discharge. Endorsed able to lay down at times but needed to "sit leaning forward" for relief at home and for first 24h of admission, this has improved by time of discharge. HTN medications held AM 12/29 secondary to significant hypotension. BP improved by the afternoon, IV lasix reduced to 20mg. K+ and Mg repleted. Due to continued dyspnea ordered repeat CXR, possible PNA vs atelectasis, consulted Pulmonology to evaluate, not felt related to PNA, suspect Cardiac cause. Run of Vtach overnight (12/30), rhythm changed to A. Fib. Cardiology evaluated added ASA, recommend to cont metoprolol.  Urine culture +Proteus mirabilis, pansensitive, plan to d/c with PO Augmentin BID x7 days. F/U with PCP and Cardiology as OP. Referral placed for Home Health. Patient in agreement with plan. Prescription sent to pharmacy of choice. Patient seen and examined on day of discharge and determined stable for discharge.    DS    Chronic hypoxemic respiratory failure  Patient with Hypoxic Respiratory failure which is Acute on chronic.  he is on " home oxygen at 2 LPM. Supplemental oxygen was provided and noted- Oxygen Concentration (%):  [28]     Emphysema of lung  Patient's COPD is uncontrolled currently.  Patient is currently off COPD Pathway.  Breathing treatments PRN    Acute on chronic systolic congestive heart failure  Patient is identified as having Systolic (HFrEF) heart failure that is Acute on chronic. CHF is currently uncontrolled due to Continued edema of extremities. DS   12/28  Patient was on room air on arrival to the hospital, with SPO2 of 95% - 100%    12/29  SPO2 dropped to 90% - 94% on 12/29. Was started on oxygen  Oxygen device - Nasal cannula  Oxygen - conc - 28%  Patient was on 2L oxygen until discharge         12/28 ED Note      VS Flowsheet   Signs/symptoms of respiratory failure include- increased work of breathing, respiratory distress, and lethargy. Contributing diagnoses includes - CHF   Positive for cough (no sputum production) and shortness of breath.   Breath sounds: Decreased breath sounds and rales present.      Emphysema of lung   CHF is currently uncontrolled due to Continued edema of extremities and Hepatic congestion/ascites  12/31  PN         The clinical guidelines noted are only a system guideline. It does not replace the providers clinical judgment.    [facility] Approved Diagnostic Criteria      Acute Respiratory Failure    Hypoxic: ABG pO2<60 mmHg or O2 sat of <91% on RA   AND/OR   Hypercapnic: ABG pCO2>50 mmHg with pH <7.35   AND   Respiratory symptoms documented (Subjective: SOB; Objective: Tachypnea, respiratory distress, increased work of breathing, unable to speak in complete sentences, labored breathing, use of accessory muscles, RR>26, cyanosis, dyspnea, wheezing, stridor, lethargy)      Chronic Respiratory Failure   Hypoxic: Continuous home oxygen    AND/OR   Hypercapnic: Normal pH with high CO2 (ex. COPD)      Acute on Chronic Respiratory Failure   Hypoxic: ABG pO2>10mmHg below baseline OR ABG pO2<60  mmHg OR SpO2<91% on usual home O2  OR O2>2L/min over baseline home O2   AND/OR   Hypercapnic: ABG pCO2>50 mmHg OR pCO2>10mmHg over baseline and pH <7.35   AND   Respiratory symptoms documented      Acute Respiratory Distress Syndrome (ARDS) - an acute, diffuse, inflammatory form of lung injury. Suspect with progressive dyspnea, hypoxemic respiratory failure, and bilateral alveolar infiltrates on chest imaging within 6 to 72 hours of an inciting event.   Acute Respiratory Distress - Generally describes less severe respiratory symptoms (tachypnea, in respiratory distress, increased work of breathing, unable to speak in complete sentences, labored breathing, use of accessory muscles, RR> 24, cyanosis, dyspnea, wheezing, stridor, lethargy) without sufficient measurements (pO2, SpO2, pH, and pCO2) to meet criteria for respiratory failure)   Acute Respiratory Insufficiency - Generally describes less severe respiratory symptoms and measurements (pO2, SpO2, pH, and pCO2) not meeting criteria for respiratory failure         Due to the conflicting clinical picture, please clinically validate the diagnosis of Acute respiratory failure.    If validated, please provide additional clinical support for the diagnosis.       [ x ]  Acute respiratory failure ruled out. Respiratory distress ruled in.   [  ] Acute respiratory failure ruled in   [  ] Still to be ruled out at the time of discharge   [  ] Other/Clarification of findings (please specify): _______________       Please document in your progress notes daily for the duration of treatment, until resolved, and include in your discharge summary.    Form No. [id number]

## 2024-01-05 NOTE — PROGRESS NOTES
Outpatient Care Management  Patient Does Not Consent    Patient: Richy Douglas  MRN:  [id number]  Date of Service:  1/5/2024  Completed by:  Meredith Segal, RN    Chief Complaint   Patient presents with    OPCM Enrollment Call    Nursing Assessment    Case Closure     Pt declines OPCM, states he has home health nursing, PT and needs met. Pt is not interested in [employer] or SW referral        Patient Summary           Consent Received:  Decline  Decline Reason:  Needs met

## 2024-01-08 ENCOUNTER — LAB VISIT (OUTPATIENT)
Dept: LAB | Facility: HOSPITAL | Age: 84
End: 2024-01-08
Attending: FAMILY MEDICINE
Payer: MEDICARE

## 2024-01-08 DIAGNOSIS — I50.23 ACUTE ON CHRONIC SYSTOLIC HEART FAILURE: ICD-10-CM

## 2024-01-08 LAB
ANION GAP SERPL CALC-SCNC: 8 MMOL/L (ref 8–16)
BASOPHILS # BLD AUTO: 0.1 K/UL (ref 0–0.2)
BASOPHILS NFR BLD: 1.6 % (ref 0–1.9)
BUN SERPL-MCNC: 31 MG/DL (ref 8–23)
CALCIUM SERPL-MCNC: 9.7 MG/DL (ref 8.7–10.5)
CHLORIDE SERPL-SCNC: 108 MMOL/L (ref 95–110)
CO2 SERPL-SCNC: 28 MMOL/L (ref 23–29)
CREAT SERPL-MCNC: 0.8 MG/DL (ref 0.5–1.4)
DIFFERENTIAL METHOD BLD: ABNORMAL
EOSINOPHIL # BLD AUTO: 0.3 K/UL (ref 0–0.5)
EOSINOPHIL NFR BLD: 4.5 % (ref 0–8)
ERYTHROCYTE [DISTWIDTH] IN BLOOD BY AUTOMATED COUNT: 27.7 % (ref 11.5–14.5)
EST. GFR  (NO RACE VARIABLE): >60 ML/MIN/1.73 M^2
GLUCOSE SERPL-MCNC: 90 MG/DL (ref 70–110)
HCT VFR BLD AUTO: 35.3 % (ref 40–54)
HGB BLD-MCNC: 10.8 G/DL (ref 14–18)
IMM GRANULOCYTES # BLD AUTO: 0.01 K/UL (ref 0–0.04)
IMM GRANULOCYTES NFR BLD AUTO: 0.2 % (ref 0–0.5)
LYMPHOCYTES # BLD AUTO: 1.1 K/UL (ref 1–4.8)
LYMPHOCYTES NFR BLD: 17.4 % (ref 18–48)
MCH RBC QN AUTO: 24 PG (ref 27–31)
MCHC RBC AUTO-ENTMCNC: 30.6 G/DL (ref 32–36)
MCV RBC AUTO: 78 FL (ref 82–98)
MONOCYTES # BLD AUTO: 0.6 K/UL (ref 0.3–1)
MONOCYTES NFR BLD: 9.6 % (ref 4–15)
NEUTROPHILS # BLD AUTO: 4.2 K/UL (ref 1.8–7.7)
NEUTROPHILS NFR BLD: 66.7 % (ref 38–73)
NRBC BLD-RTO: 0 /100 WBC
PLATELET # BLD AUTO: 256 K/UL (ref 150–450)
PMV BLD AUTO: ABNORMAL FL (ref 9.2–12.9)
POTASSIUM SERPL-SCNC: 4.2 MMOL/L (ref 3.5–5.1)
RBC # BLD AUTO: 4.5 M/UL (ref 4.6–6.2)
SODIUM SERPL-SCNC: 144 MMOL/L (ref 136–145)
WBC # BLD AUTO: 6.25 K/UL (ref 3.9–12.7)

## 2024-01-08 PROCEDURE — 85025 COMPLETE CBC W/AUTO DIFF WBC: CPT | Mod: HCNC | Performed by: FAMILY MEDICINE

## 2024-01-08 PROCEDURE — 80048 BASIC METABOLIC PNL TOTAL CA: CPT | Mod: HCNC | Performed by: FAMILY MEDICINE

## 2024-01-11 DIAGNOSIS — Z00.00 ENCOUNTER FOR MEDICARE ANNUAL WELLNESS EXAM: ICD-10-CM

## 2024-01-12 ENCOUNTER — TELEPHONE (OUTPATIENT)
Dept: CARDIOLOGY | Facility: CLINIC | Age: 84
End: 2024-01-12
Payer: MEDICARE

## 2024-01-12 NOTE — TELEPHONE ENCOUNTER
Returned call and spoke with Vivian  nurse who informed pt BP was 90/40 today. Pt is asymptomatic, no c/o dizziness or light headedness. Denies CP,SOB, PND, orthopnea, or edema.   Vivian informed did notice per patient pill box that patient has been taking 25 mg toprol instead of half tablet, 12.5 mg.  Informed Vivian pt should continue 12.5 mg toprol as per Mar.  Continue to monitor BP and notify provider if still having low readings.   Informed Vivian would notify provider of above information.

## 2024-01-12 NOTE — TELEPHONE ENCOUNTER
----- Message from Palak Correia sent at 1/12/2024  9:33 AM CST -----  Regarding[facility]  States she is calling to report his BP 90/40. Please call Vivian [phone]. Thank you

## 2024-01-18 ENCOUNTER — LAB VISIT (OUTPATIENT)
Dept: LAB | Facility: HOSPITAL | Age: 84
End: 2024-01-18
Payer: MEDICARE

## 2024-01-18 DIAGNOSIS — I50.23 ACUTE ON CHRONIC SYSTOLIC HEART FAILURE: ICD-10-CM

## 2024-01-18 LAB
ANION GAP SERPL CALC-SCNC: 8 MMOL/L (ref 8–16)
ANISOCYTOSIS BLD QL SMEAR: SLIGHT
BASOPHILS # BLD AUTO: 0.1 K/UL (ref 0–0.2)
BASOPHILS NFR BLD: 1.6 % (ref 0–1.9)
BUN SERPL-MCNC: 32 MG/DL (ref 8–23)
CALCIUM SERPL-MCNC: 9.4 MG/DL (ref 8.7–10.5)
CHLORIDE SERPL-SCNC: 105 MMOL/L (ref 95–110)
CO2 SERPL-SCNC: 27 MMOL/L (ref 23–29)
CREAT SERPL-MCNC: 1 MG/DL (ref 0.5–1.4)
DACRYOCYTES BLD QL SMEAR: ABNORMAL
DIFFERENTIAL METHOD BLD: ABNORMAL
EOSINOPHIL # BLD AUTO: 0.6 K/UL (ref 0–0.5)
EOSINOPHIL NFR BLD: 8.9 % (ref 0–8)
ERYTHROCYTE [DISTWIDTH] IN BLOOD BY AUTOMATED COUNT: 28.1 % (ref 11.5–14.5)
EST. GFR  (NO RACE VARIABLE): >60 ML/MIN/1.73 M^2
GIANT PLATELETS BLD QL SMEAR: PRESENT
GLUCOSE SERPL-MCNC: 75 MG/DL (ref 70–110)
HCT VFR BLD AUTO: 34.9 % (ref 40–54)
HGB BLD-MCNC: 11.1 G/DL (ref 14–18)
IMM GRANULOCYTES # BLD AUTO: 0.01 K/UL (ref 0–0.04)
IMM GRANULOCYTES NFR BLD AUTO: 0.2 % (ref 0–0.5)
LYMPHOCYTES # BLD AUTO: 1.1 K/UL (ref 1–4.8)
LYMPHOCYTES NFR BLD: 17.5 % (ref 18–48)
MCH RBC QN AUTO: 24.4 PG (ref 27–31)
MCHC RBC AUTO-ENTMCNC: 31.8 G/DL (ref 32–36)
MCV RBC AUTO: 77 FL (ref 82–98)
MONOCYTES # BLD AUTO: 0.8 K/UL (ref 0.3–1)
MONOCYTES NFR BLD: 13 % (ref 4–15)
NEUTROPHILS # BLD AUTO: 3.7 K/UL (ref 1.8–7.7)
NEUTROPHILS NFR BLD: 58.8 % (ref 38–73)
NRBC BLD-RTO: 1 /100 WBC
OVALOCYTES BLD QL SMEAR: ABNORMAL
PLATELET # BLD AUTO: 304 K/UL (ref 150–450)
PMV BLD AUTO: ABNORMAL FL (ref 9.2–12.9)
POIKILOCYTOSIS BLD QL SMEAR: SLIGHT
POTASSIUM SERPL-SCNC: 4.2 MMOL/L (ref 3.5–5.1)
RBC # BLD AUTO: 4.55 M/UL (ref 4.6–6.2)
SCHISTOCYTES BLD QL SMEAR: ABNORMAL
SODIUM SERPL-SCNC: 140 MMOL/L (ref 136–145)
TARGETS BLD QL SMEAR: ABNORMAL
WBC # BLD AUTO: 6.3 K/UL (ref 3.9–12.7)

## 2024-01-18 PROCEDURE — 80048 BASIC METABOLIC PNL TOTAL CA: CPT | Mod: HCNC | Performed by: FAMILY MEDICINE

## 2024-01-18 PROCEDURE — 85025 COMPLETE CBC W/AUTO DIFF WBC: CPT | Mod: HCNC | Performed by: FAMILY MEDICINE

## 2024-01-22 ENCOUNTER — LAB VISIT (OUTPATIENT)
Dept: LAB | Facility: HOSPITAL | Age: 84
End: 2024-01-22
Attending: FAMILY MEDICINE
Payer: MEDICARE

## 2024-01-22 DIAGNOSIS — I11.0 HYPERTENSIVE HEART DISEASE WITH CONGESTIVE HEART FAILURE: ICD-10-CM

## 2024-01-22 DIAGNOSIS — J96.11 CHRONIC HYPOXEMIC RESPIRATORY FAILURE: ICD-10-CM

## 2024-01-22 DIAGNOSIS — I50.23 ACUTE ON CHRONIC SYSTOLIC HEART FAILURE: ICD-10-CM

## 2024-01-22 DIAGNOSIS — J96.11 CHRONIC HYPOXEMIC RESPIRATORY FAILURE: Primary | ICD-10-CM

## 2024-01-22 LAB
ANION GAP SERPL CALC-SCNC: 11 MMOL/L (ref 8–16)
BUN SERPL-MCNC: 43 MG/DL (ref 8–23)
CALCIUM SERPL-MCNC: 9.9 MG/DL (ref 8.7–10.5)
CHLORIDE SERPL-SCNC: 104 MMOL/L (ref 95–110)
CO2 SERPL-SCNC: 25 MMOL/L (ref 23–29)
CREAT SERPL-MCNC: 1.1 MG/DL (ref 0.5–1.4)
EST. GFR  (NO RACE VARIABLE): >60 ML/MIN/1.73 M^2
GLUCOSE SERPL-MCNC: 64 MG/DL (ref 70–110)
POTASSIUM SERPL-SCNC: 4.3 MMOL/L (ref 3.5–5.1)
SODIUM SERPL-SCNC: 140 MMOL/L (ref 136–145)

## 2024-01-22 PROCEDURE — 36415 COLL VENOUS BLD VENIPUNCTURE: CPT | Mod: HCNC | Performed by: FAMILY MEDICINE

## 2024-01-22 PROCEDURE — 85025 COMPLETE CBC W/AUTO DIFF WBC: CPT | Mod: HCNC | Performed by: FAMILY MEDICINE

## 2024-01-22 PROCEDURE — 80048 BASIC METABOLIC PNL TOTAL CA: CPT | Mod: HCNC | Performed by: FAMILY MEDICINE

## 2024-01-23 LAB
ANISOCYTOSIS BLD QL SMEAR: ABNORMAL
BASOPHILS # BLD AUTO: 0.12 K/UL (ref 0–0.2)
BASOPHILS NFR BLD: 1.4 % (ref 0–1.9)
BURR CELLS BLD QL SMEAR: ABNORMAL
DACRYOCYTES BLD QL SMEAR: ABNORMAL
DIFFERENTIAL METHOD BLD: ABNORMAL
EOSINOPHIL # BLD AUTO: 0.7 K/UL (ref 0–0.5)
EOSINOPHIL NFR BLD: 7.9 % (ref 0–8)
ERYTHROCYTE [DISTWIDTH] IN BLOOD BY AUTOMATED COUNT: 28.8 % (ref 11.5–14.5)
GIANT PLATELETS BLD QL SMEAR: PRESENT
HCT VFR BLD AUTO: 37.4 % (ref 40–54)
HGB BLD-MCNC: 11.9 G/DL (ref 14–18)
HYPOCHROMIA BLD QL SMEAR: ABNORMAL
IMM GRANULOCYTES # BLD AUTO: 0.03 K/UL (ref 0–0.04)
IMM GRANULOCYTES NFR BLD AUTO: 0.4 % (ref 0–0.5)
LYMPHOCYTES # BLD AUTO: 1.2 K/UL (ref 1–4.8)
LYMPHOCYTES NFR BLD: 13.9 % (ref 18–48)
MCH RBC QN AUTO: 24.2 PG (ref 27–31)
MCHC RBC AUTO-ENTMCNC: 31.8 G/DL (ref 32–36)
MCV RBC AUTO: 76 FL (ref 82–98)
MONOCYTES # BLD AUTO: 0.8 K/UL (ref 0.3–1)
MONOCYTES NFR BLD: 10 % (ref 4–15)
NEUTROPHILS # BLD AUTO: 5.5 K/UL (ref 1.8–7.7)
NEUTROPHILS NFR BLD: 66.4 % (ref 38–73)
NRBC BLD-RTO: 0 /100 WBC
OVALOCYTES BLD QL SMEAR: ABNORMAL
PLATELET # BLD AUTO: 457 K/UL (ref 150–450)
PLATELET BLD QL SMEAR: ABNORMAL
PMV BLD AUTO: ABNORMAL FL (ref 9.2–12.9)
POIKILOCYTOSIS BLD QL SMEAR: SLIGHT
RBC # BLD AUTO: 4.92 M/UL (ref 4.6–6.2)
SCHISTOCYTES BLD QL SMEAR: ABNORMAL
SCHISTOCYTES BLD QL SMEAR: PRESENT
SPHEROCYTES BLD QL SMEAR: ABNORMAL
TARGETS BLD QL SMEAR: ABNORMAL
WBC # BLD AUTO: 8.33 K/UL (ref 3.9–12.7)

## 2024-01-26 ENCOUNTER — EXTERNAL HOME HEALTH (OUTPATIENT)
Dept: HOME HEALTH SERVICES | Facility: HOSPITAL | Age: 84
End: 2024-01-26
Payer: MEDICARE

## 2024-02-05 ENCOUNTER — TELEPHONE (OUTPATIENT)
Dept: PRIMARY CARE CLINIC | Facility: CLINIC | Age: 84
End: 2024-02-05
Payer: MEDICARE

## 2024-02-05 NOTE — TELEPHONE ENCOUNTER
----- Message from Vandana Cr sent at 2/5/2024  2:52 PM CST -----  Regarding: pt called  Name of Who is Calling: ERNESTINA GOEL [[id number]] Sesar ( daughter)      What is the request in detail:  requesting callback and checking the status of paperwork . Please advise       Can the clinic reply by [facility]: No       What Number [facility] Back [facility] [phone]

## 2024-02-07 ENCOUNTER — DOCUMENT SCAN (OUTPATIENT)
Dept: HOME HEALTH SERVICES | Facility: HOSPITAL | Age: 84
End: 2024-02-07
Payer: MEDICARE

## 2024-02-09 ENCOUNTER — HOSPITAL ENCOUNTER (OUTPATIENT)
Dept: CARDIOLOGY | Facility: HOSPITAL | Age: 84
Discharge: HOME | End: 2024-02-09
Attending: PHYSICIAN ASSISTANT
Payer: MEDICARE

## 2024-02-09 ENCOUNTER — DOCUMENT SCAN (OUTPATIENT)
Dept: HOME HEALTH SERVICES | Facility: HOSPITAL | Age: 84
End: 2024-02-09
Payer: MEDICARE

## 2024-02-09 ENCOUNTER — OFFICE VISIT (OUTPATIENT)
Dept: CARDIOLOGY | Facility: CLINIC | Age: 84
End: 2024-02-09
Attending: PHYSICIAN ASSISTANT
Payer: MEDICARE

## 2024-02-09 VITALS
HEIGHT: 68 IN | BODY MASS INDEX: 25.16 KG/M2 | DIASTOLIC BLOOD PRESSURE: 60 MMHG | DIASTOLIC BLOOD PRESSURE: 62 MMHG | HEIGHT: 68 IN | SYSTOLIC BLOOD PRESSURE: 150 MMHG | HEART RATE: 70 BPM | WEIGHT: 151 LBS | BODY MASS INDEX: 22.88 KG/M2 | SYSTOLIC BLOOD PRESSURE: 120 MMHG | WEIGHT: 166 LBS

## 2024-02-09 DIAGNOSIS — I50.23 ACUTE ON CHRONIC SYSTOLIC CONGESTIVE HEART FAILURE: Primary | ICD-10-CM

## 2024-02-09 DIAGNOSIS — I50.22 CHRONIC SYSTOLIC CONGESTIVE HEART FAILURE: ICD-10-CM

## 2024-02-09 PROCEDURE — 1160F RVW MEDS BY RX/DR IN RCRD: CPT | Mod: CPTII,S$GLB,, | Performed by: PHYSICIAN ASSISTANT

## 2024-02-09 PROCEDURE — 3077F SYST BP >= 140 MM HG: CPT | Mod: CPTII,S$GLB,, | Performed by: PHYSICIAN ASSISTANT

## 2024-02-09 PROCEDURE — 93306 TTE W/DOPPLER COMPLETE: CPT | Mod: 26,,, | Performed by: INTERNAL MEDICINE

## 2024-02-09 PROCEDURE — 93306 TTE W/DOPPLER COMPLETE: CPT

## 2024-02-09 PROCEDURE — 99214 OFFICE O/P EST MOD 30 MIN: CPT | Mod: 25,S$GLB,, | Performed by: PHYSICIAN ASSISTANT

## 2024-02-09 PROCEDURE — 3078F DIAST BP <80 MM HG: CPT | Mod: CPTII,S$GLB,, | Performed by: PHYSICIAN ASSISTANT

## 2024-02-09 PROCEDURE — 99999 PR PBB SHADOW E&M-EST. PATIENT-LVL III: CPT | Mod: PBBFAC,,, | Performed by: PHYSICIAN ASSISTANT

## 2024-02-09 PROCEDURE — 1159F MED LIST DOCD IN RCRD: CPT | Mod: CPTII,S$GLB,, | Performed by: PHYSICIAN ASSISTANT

## 2024-02-09 PROCEDURE — 1157F ADVNC CARE PLAN IN RCRD: CPT | Mod: CPTII,S$GLB,, | Performed by: PHYSICIAN ASSISTANT

## 2024-02-09 NOTE — PROGRESS NOTES
HF TCC Provider Note (Follow-up) Consult Note      HPI:  Patient can walk without SOB   Patient sleeps on 2 pillows   Patient wakes up SOB, has to get out of bed, associated cough, sputumnone   Palpitations - none   Dizzy, light-headed, pre-syncope or syncope none   Since discharge frequency of performing weights, home weight and weight change down    Other information felt pertinent to HPI    84 y/o male with PMHx of chronic HFrEF (EF 30%), atrial fibrillation (not on AC due to bleeding risk), HTN, PVD, emphysema, GERD, prostate cancer who present s for one month follow up0    Since last visit has not had any ER admits for ADHF, on lasix 40 mg BID, responds well. Weight is down despite appetite being intact    No SOB, PND no LE edema    Goes to activity center for meals and activities      PHYSICAL:   Vitals:    02/09/24 0854   BP: (!) 150/60   Pulse: 70      68 kg    JVD: no,    Heart rhythm: regular  Cardiac murmur: No    S3: no  S4: no  Lungs: clear  Liver span: 10 cm:   Hepatojugular reflux: no  Edema: no,       ASSESSMENT: chronic systolic HF    PLAN:      Patient Instructions:   Instruct the patient to notify this clinic if HH, a physician or an advanced care provider wants to change medication one of their HF medications   Activity and Diet restrictions:   Recommend 2-3 gram sodium restriction and 1500cc- 2000cc fluid restriction.  Encourage physical activity with graded exercise program.  Requested patient to weigh themselves daily, and to notify us if their weight increases by more than 3 lbs in 1 day or 5 lbs in 3 days.    Assigned dry weight on home scale: 69 kg  Medication changes (include current dose and changed dose)  Echo today  Euvolemic on exam on lasix 40 mg BID  Continue ARNi, BB and SGLT2i  RTC 3 months

## 2024-02-10 LAB
AORTIC ROOT ANNULUS: 3.98 CM
ASCENDING AORTA: 3.86 CM
AV INDEX (PROSTH): 0.67
AV MEAN GRADIENT: 4 MMHG
AV PEAK GRADIENT: 7 MMHG
AV REGURGITATION PRESSURE HALF TIME: 354.55 MS
AV VALVE AREA BY VELOCITY RATIO: 3.66 CM²
AV VALVE AREA: 3.29 CM²
AV VELOCITY RATIO: 0.75
BSA FOR ECHO PROCEDURE: 1.9 M2
CV ECHO LV RWT: 0.36 CM
DOP CALC AO PEAK VEL: 1.34 M/S
DOP CALC AO VTI: 31.8 CM
DOP CALC LVOT AREA: 4.9 CM2
DOP CALC LVOT DIAMETER: 2.5 CM
DOP CALC LVOT PEAK VEL: 1 M/S
DOP CALC LVOT STROKE VOLUME: 104.5 CM3
DOP CALC RVOT PEAK VEL: 0.71 M/S
DOP CALC RVOT VTI: 18.1 CM
DOP CALCLVOT PEAK VEL VTI: 21.3 CM
E WAVE DECELERATION TIME: 212.6 MSEC
E/A RATIO: 0.67
E/E' RATIO: 20 M/S
ECHO LV POSTERIOR WALL: 1.17 CM (ref 0.6–1.1)
FRACTIONAL SHORTENING: 18 % (ref 28–44)
INTERVENTRICULAR SEPTUM: 1.34 CM (ref 0.6–1.1)
IVC DIAMETER: 1.76 CM
IVRT: 182.68 MSEC
LA MAJOR: 7.15 CM
LA MINOR: 6.92 CM
LA WIDTH: 4.8 CM
LEFT ATRIUM SIZE: 4.33 CM
LEFT ATRIUM VOLUME INDEX MOD: 62.5 ML/M2
LEFT ATRIUM VOLUME INDEX: 68.6 ML/M2
LEFT ATRIUM VOLUME MOD: 113.19 CM3
LEFT ATRIUM VOLUME: 124.25 CM3
LEFT INTERNAL DIMENSION IN SYSTOLE: 5.28 CM (ref 2.1–4)
LEFT VENTRICLE DIASTOLIC VOLUME INDEX: 116.82 ML/M2
LEFT VENTRICLE DIASTOLIC VOLUME: 211.45 ML
LEFT VENTRICLE MASS INDEX: 207 G/M2
LEFT VENTRICLE SYSTOLIC VOLUME INDEX: 74.2 ML/M2
LEFT VENTRICLE SYSTOLIC VOLUME: 134.31 ML
LEFT VENTRICULAR INTERNAL DIMENSION IN DIASTOLE: 6.44 CM (ref 3.5–6)
LEFT VENTRICULAR MASS: 374.86 G
LV LATERAL E/E' RATIO: 20 M/S
LV SEPTAL E/E' RATIO: 20 M/S
LVOT MG: 2.18 MMHG
LVOT MV: 0.69 CM/S
MV PEAK A VEL: 0.9 M/S
MV PEAK E VEL: 0.6 M/S
MV STENOSIS PRESSURE HALF TIME: 61.65 MS
MV VALVE AREA P 1/2 METHOD: 3.57 CM2
PISA AR MAX VEL: 4.38 M/S
PISA TR MAX VEL: 4.08 M/S
PULM VEIN S/D RATIO: 1.44
PV MEAN GRADIENT: 1 MMHG
PV MV: 0.62 M/S
PV PEAK D VEL: 0.36 M/S
PV PEAK GRADIENT: 2 MMHG
PV PEAK S VEL: 0.52 M/S
PV PEAK VELOCITY: 0.92 M/S
RA MAJOR: 5.35 CM
RA PRESSURE ESTIMATED: 3 MMHG
RA WIDTH: 5.34 CM
RIGHT VENTRICULAR END-DIASTOLIC DIMENSION: 4.73 CM
RV TB RVSP: 7 MMHG
SINUS: 4.39 CM
STJ: 4.68 CM
TDI LATERAL: 0.03 M/S
TDI SEPTAL: 0.03 M/S
TDI: 0.03 M/S
TR MAX PG: 67 MMHG
TRICUSPID ANNULAR PLANE SYSTOLIC EXCURSION: 1.56 CM
TV REST PULMONARY ARTERY PRESSURE: 70 MMHG
Z-SCORE OF LEFT VENTRICULAR DIMENSION IN END DIASTOLE: 2.52
Z-SCORE OF LEFT VENTRICULAR DIMENSION IN END SYSTOLE: 4.23

## 2024-02-12 ENCOUNTER — DOCUMENT SCAN (OUTPATIENT)
Dept: HOME HEALTH SERVICES | Facility: HOSPITAL | Age: 84
End: 2024-02-12
Payer: MEDICARE

## 2024-02-26 ENCOUNTER — OFFICE VISIT (OUTPATIENT)
Dept: PRIMARY CARE CLINIC | Facility: CLINIC | Age: 84
End: 2024-02-26
Payer: MEDICARE

## 2024-02-26 DIAGNOSIS — J43.9 PULMONARY EMPHYSEMA, UNSPECIFIED EMPHYSEMA TYPE: ICD-10-CM

## 2024-02-26 DIAGNOSIS — I50.22 CHRONIC SYSTOLIC CONGESTIVE HEART FAILURE: Primary | ICD-10-CM

## 2024-02-26 DIAGNOSIS — F10.20 UNCOMPLICATED ALCOHOL DEPENDENCE: ICD-10-CM

## 2024-02-26 DIAGNOSIS — Z74.8 DEPENDENT FOR TRANSPORTATION: ICD-10-CM

## 2024-02-26 DIAGNOSIS — Z02.71 ISSUE OF MEDICAL CERTIFICATE FOR DISABILITY EXAMINATION: ICD-10-CM

## 2024-02-26 DIAGNOSIS — F02.80 MAJOR NEUROCOGNITIVE DISORDER DUE TO MULTIPLE ETIOLOGIES: ICD-10-CM

## 2024-02-26 DIAGNOSIS — I70.0 ATHEROSCLEROSIS OF ABDOMINAL AORTA: ICD-10-CM

## 2024-02-26 DIAGNOSIS — D46.1 IDIOPATHIC REFRACTORY ANEMIA: ICD-10-CM

## 2024-02-26 DIAGNOSIS — J96.11 CHRONIC RESPIRATORY FAILURE WITH HYPOXIA: ICD-10-CM

## 2024-02-26 DIAGNOSIS — G62.1 ALCOHOLIC PERIPHERAL NEUROPATHY: ICD-10-CM

## 2024-02-26 PROCEDURE — 1126F AMNT PAIN NOTED NONE PRSNT: CPT | Mod: CPTII,95,, | Performed by: FAMILY MEDICINE

## 2024-02-26 PROCEDURE — 3288F FALL RISK ASSESSMENT DOCD: CPT | Mod: CPTII,95,, | Performed by: FAMILY MEDICINE

## 2024-02-26 PROCEDURE — 1101F PT FALLS ASSESS-DOCD LE1/YR: CPT | Mod: CPTII,95,, | Performed by: FAMILY MEDICINE

## 2024-02-26 PROCEDURE — 99214 OFFICE O/P EST MOD 30 MIN: CPT | Mod: 95,,, | Performed by: FAMILY MEDICINE

## 2024-02-26 PROCEDURE — 1159F MED LIST DOCD IN RCRD: CPT | Mod: CPTII,95,, | Performed by: FAMILY MEDICINE

## 2024-02-26 PROCEDURE — 1157F ADVNC CARE PLAN IN RCRD: CPT | Mod: CPTII,95,, | Performed by: FAMILY MEDICINE

## 2024-02-26 PROCEDURE — 1160F RVW MEDS BY RX/DR IN RCRD: CPT | Mod: CPTII,95,, | Performed by: FAMILY MEDICINE

## 2024-02-26 NOTE — PROGRESS NOTES
Subjective:      Patient ID: Richy Douglas is a 83 y.o. male.    Chief Complaint: Follow-up (FMLA paperwork )    The patient location is: home  Visit type: video and audio simultaneous    Patient is a 83 y.o. male coming in today for FMLA paperwork completion. Daughter is present during visit as main caregiver.   Paperwork is for the daughter to be able to establish care of her father, the pt. She works at [employer]. Paperwork filled out during visit. Pt is requiring podiatry referral. No other health concern at this time.       [facility] Reason For Visit    2/26/2024  3:53 PM CST - Filed by Patient   What is your primary reason for visit? Other/Annual   Have you experienced any of the following:   Change in activity? No   Unexpected weight change? Yes   Neck pain? No   Hearing loss? No   Runny nose? No   Trouble swallowing? No   Eye discharge? No   Changes in vision? No   Chest tightness? No   Wheezing? No   Chest pain? No   Heart beating fast or racing? No   Blood in stool? No   Constipation? No   Vomiting? No   Diarrhea? No   Drinking much more than usual? No   Urinating much more than usual? No   Difficulty urinating? No   Have a sudden urge to urinate? No   Blood in the urine? No   Joint swelling? No   Joint pain? Yes   Headaches? No   Weakness? No   Confusion? No   Feeling depressed? Yes     [facility] Peq Sdoh    2/26/2024  3:55 PM CST - Filed by Patient   This questionnaire should take approximately 5 to 10 minutes to complete.  To begin, press Let's Begin and then press Continue. Let's Begin   On average, how many days per week do you engage in moderate to strenuous exercise (like a brisk walk)? 3 days   On average, how many minutes do you engage in exercise at this level? 10 min   Do you feel stress - tense, restless, nervous, or anxious, or unable to sleep at night because your mind is troubled all the time - these days?    Do you belong to any clubs or organizations such as Sikhism groups, unions, fraternal or  athletic groups, or school groups? No   How often do you attend meetings of the clubs or organizations you belong to? Patient declined   In a typical week, how many times do you talk on the phone with family, friends, or neighbors? Once a week   How often do you get together with friends or relatives? Once a week   Are you , , , , never , or living with a partner?    How hard is it for you to pay for the very basics like food, housing, medical care, and heating? Patient declined   Within the past 12 months, you worried that your food would run out before you got the money to buy more. Patient declined   Within the past 12 months, the food you bought just didnt last and you didnt have money to get more. Patient declined   In the past 12 months, has lack of transportation kept you from medical appointments or from getting medications? Patient declined   In the past 12 months, has lack of transportation kept you from meetings, work, or from getting things needed for daily living? Patient declined   How often do you have a drink containing alcohol? Monthly or less   How many drinks containing alcohol do you have on a typical day when you are drinking? 1 or 2   How often do you have six or more drinks on one occasion? Patient declined   In the last 12 months, was there a time when you were not able to pay the mortgage or rent on time? Patient declined   In the last 12 months, how many places have you lived? (range: at least 0)    In the last 12 months, was there a time when you did not have a steady place to sleep or slept in a shelter (including now)? Patient declined         Pmh, Psh, Family Hx, Social Hx updated in Epic Tabs today.     LABS:   Lab Results   Component Value Date    WBC 8.33 01/22/2024    HGB 11.9 (L) 01/22/2024    HCT 37.4 (L) 01/22/2024     (H) 01/22/2024    CHOL 144 09/17/2020    TRIG 45 09/17/2020    HDL 61 09/17/2020    ALT 15 12/30/2023    AST  26 12/30/2023     01/22/2024    K 4.3 01/22/2024     01/22/2024    CREATININE 1.1 01/22/2024    BUN 43 (H) 01/22/2024    CO2 25 01/22/2024    TSH 1.046 04/14/2023    INR 1.4 (H) 09/24/2021       Echo    Left Ventricle: The left ventricle is moderately dilated. Normal wall   thickness. There is eccentric hypertrophy. Moderate global hypokinesis   present. There is moderately reduced systolic function with a visually   estimated ejection fraction of 30 - 35%. There is normal diastolic   function.    Right Ventricle: Mild right ventricular enlargement. Wall thickness is   normal. Right ventricle wall motion  is normal. Systolic function is   normal.    Left Atrium: Left atrium is moderately dilated.    Right Atrium: Right atrium is mildly dilated.    Aortic Valve: There is mild aortic regurgitation with a centrally   directed jet.    Mitral Valve: There is mild regurgitation with a centrally directed   jet.    Tricuspid Valve: There is mild regurgitation with a centrally directed   jet.    Pulmonary Artery: The estimated pulmonary artery systolic pressure is   70 mmHg.    IVC/SVC: Normal venous pressure at 3 mmHg.        Review of Systems   Constitutional:  Positive for unexpected weight change. Negative for activity change.   HENT:  Negative for hearing loss, rhinorrhea and trouble swallowing.    Eyes:  Negative for discharge and visual disturbance.   Respiratory:  Negative for chest tightness and wheezing.    Cardiovascular:  Negative for chest pain and palpitations.   Gastrointestinal:  Negative for blood in stool, constipation, diarrhea and vomiting.   Endocrine: Negative for polydipsia and polyuria.   Genitourinary:  Negative for difficulty urinating, hematuria and urgency.   Musculoskeletal:  Positive for arthralgias. Negative for joint swelling and neck pain.   Neurological:  Negative for weakness and headaches.   Psychiatric/Behavioral:  Positive for dysphoric mood. Negative for confusion.       Objective:   There were no vitals filed for this visit.  Wt Readings from Last 10 Encounters:   02/09/24 75.3 kg (166 lb)   02/09/24 68.5 kg (151 lb 0.2 oz)   01/04/24 75.5 kg (166 lb 7.2 oz)   12/28/23 75.8 kg (167 lb 1.7 oz)   11/09/23 77.3 kg (170 lb 6.7 oz)   11/07/23 75.6 kg (166 lb 10.7 oz)   10/19/23 72.1 kg (159 lb 1 oz)   09/15/23 77.2 kg (170 lb 1.4 oz)   09/01/23 71.4 kg (157 lb 6.5 oz)   08/30/23 72.2 kg (159 lb 2.8 oz)     Physical Exam  Vitals reviewed.   Constitutional:       General: He is awake. He is not in acute distress.     Appearance: Normal appearance. He is well-developed, well-groomed and normal weight. He is not ill-appearing.   HENT:      Head: Normocephalic and atraumatic.      Right Ear: External ear normal.      Left Ear: External ear normal.      Nose: Nose normal.      Mouth/Throat:      Lips: Pink.   Eyes:      Conjunctiva/sclera: Conjunctivae normal.   Pulmonary:      Effort: Pulmonary effort is normal.   Neurological:      Mental Status: He is alert.   Psychiatric:         Attention and Perception: Attention and perception normal. He is attentive.         Mood and Affect: Mood and affect normal. Mood is not anxious or depressed. Affect is not labile, blunt, angry or inappropriate.         Speech: Speech normal. He is communicative. Speech is not rapid and pressured, delayed, slurred or tangential.         Behavior: Behavior normal. Behavior is not agitated, slowed, aggressive, withdrawn, hyperactive or combative. Behavior is cooperative.         Thought Content: Thought content normal. Thought content is not paranoid or delusional. Thought content does not include homicidal or suicidal ideation. Thought content does not include homicidal or suicidal plan.         Cognition and Memory: Cognition and memory normal. Memory is not impaired. He does not exhibit impaired recent memory or impaired remote memory.         Judgment: Judgment normal. Judgment is not impulsive or  inappropriate.       Assessment:     1. Chronic systolic congestive heart failure    2. Issue of medical certificate for disability examination    3. Dependent for transportation    4. Pulmonary emphysema, unspecified emphysema type    5. Idiopathic refractory anemia    6. Major neurocognitive disorder due to multiple etiologies    7. Uncomplicated alcohol dependence    8. Chronic respiratory failure with hypoxia    9. Alcoholic peripheral neuropathy    10. Atherosclerosis of abdominal aorta      Plan:   Richy was seen today for follow-up.    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 This will start 2/26/24 and last through lifetime of the patient.   - The other above diagnoses were reassessed during today's visit. The associated diagnoses are linked to their chronic conditions. These conditions are currently stable, and will be monitored through associated labs, imaging, and treated with the associated medications as listed per EPIC in the patient's Medication List. I will refill medications to continue ongoing care as requested per the patient or pharmacy when needed.   Continue with f/u with specialists as needed.   Instructed to f/u  in 4 months.     Face to Face time with patient: 4:12 PM-4:26 PM         30  minutes of total time spent on the encounter, which includes face to face time and non-face to face time preparing to see the patient (eg, review of tests), Obtaining and/or reviewing separately obtained history, Documenting clinical information in the electronic or other health record, Independently interpreting results (not separately reported) and communicating results to the patient/family/caregiver, or Care coordination (not separately reported).     Each patient to whom he or she provides medical services by telemedicine is:  (1) informed of the relationship between the physician and patient and the respective role of any other health care provider with respect to management of the patient; and (2) notified that he or she may decline to receive medical services by telemedicine and may withdraw from such care at any time.      No follow-ups on file.    There are no Patient Instructions on file for this visit.    Scribe Attestation:   I, Oscar Rocha, am scribing for, and in the presence of, Dr. Vivian Ch MD. I performed the above scribed service and the documentation accurately describes the services I performed. I attest to the accuracy of the note.    I, Dr. Vivian Ch MD, reviewed documentation as scribed above. I personally performed the services described in this documentation.  I agree that the record reflects my personal performance and is accurate and complete. Vivian Ch MD.    02/26/2024

## 2024-03-01 ENCOUNTER — PATIENT MESSAGE (OUTPATIENT)
Dept: PRIMARY CARE CLINIC | Facility: CLINIC | Age: 84
End: 2024-03-01
Payer: MEDICARE

## 2024-03-07 RX ORDER — FERROUS SULFATE 325(65) MG
325 TABLET, DELAYED RELEASE (ENTERIC COATED) ORAL DAILY
Qty: 30 TABLET | Refills: 1 | Status: SHIPPED | OUTPATIENT
Start: 2024-03-07 | End: 2024-06-04 | Stop reason: SDUPTHER

## 2024-03-14 ENCOUNTER — OFFICE VISIT (OUTPATIENT)
Dept: PODIATRY | Facility: CLINIC | Age: 84
End: 2024-03-14
Payer: MEDICARE

## 2024-03-14 VITALS — BODY MASS INDEX: 22.88 KG/M2 | WEIGHT: 151 LBS | HEIGHT: 68 IN

## 2024-03-14 DIAGNOSIS — B35.1 DERMATOPHYTOSIS OF NAIL: ICD-10-CM

## 2024-03-14 DIAGNOSIS — G62.1 ALCOHOLIC PERIPHERAL NEUROPATHY: Primary | ICD-10-CM

## 2024-03-14 PROCEDURE — 99999 PR PBB SHADOW E&M-EST. PATIENT-LVL II: CPT | Mod: PBBFAC,HCNC,, | Performed by: PODIATRIST

## 2024-03-14 PROCEDURE — 99499 UNLISTED E&M SERVICE: CPT | Mod: HCNC,S$GLB,, | Performed by: PODIATRIST

## 2024-03-14 PROCEDURE — 11721 DEBRIDE NAIL 6 OR MORE: CPT | Mod: Q9,HCNC,S$GLB, | Performed by: PODIATRIST

## 2024-03-14 NOTE — PROGRESS NOTES
"Subjective:     Patient ID: Richy Douglas is a 83 y.o. male.    Chief Complaint: Nail Care (Non-diabetic pt wears crocs, PCP Dr. Ch last seen 2-26-24)    Richy is a 83 y.o. male who presents to the clinic for evaluation and treatment of high risk feet. 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 The patient's chief complaint is long, thick toenails. This patient has documented high risk feet requiring routine maintenance secondary to peripheral neuropathy.    PCP: Vivian Ch MD    Date Last Seen by PCP: 02/26/2024    Current shoe gear:  Affected Foot: Slip-on shoes     Unaffected Foot: Slip-on shoes    Last encounter in this department: Visit date not found    No results found for: "HGBA1C"    Patient Active Problem List   Diagnosis    Anemia of chronic disease    Chronic systolic congestive heart failure    Emphysema of lung    Hypertension    History of lower gastrointestinal bleeding    Iron deficiency anemia due to chronic blood loss    Nicotine abuse    Nonrheumatic aortic valve insufficiency    Calcified granuloma of lung    Shortness of breath    Atherosclerosis of abdominal aorta    Other hyperlipidemia    Atrial fibrillation    Bacteremia    Alcohol dependence    AVM (arteriovenous malformation) of small bowel, acquired with hemorrhage    Acute blood loss anemia    Solitary pulmonary nodule    Primary osteoarthritis of both knees    Chest pain    COVID-19    Alcoholic peripheral neuropathy    " Exercise hypoxemia    Major neurocognitive disorder due to multiple etiologies    Other amnesia    Bilateral leg weakness    Bilateral leg edema    Idiopathic refractory anemia    Elevated bilirubin    Pulmonary hypertension    Ex-smoker    History of colon polyp    Abnormality of gait and mobility    Scrotal edema    UTI (urinary tract infection)    CHF exacerbation    Acute on chronic systolic congestive heart failure    Chronic hypoxemic respiratory failure       Medication List with Changes/Refills   Current Medications    ALBUTEROL (PROVENTIL) 2.5 MG /3 ML (0.083 %) NEBULIZER SOLUTION    Take 3 mLs (2.5 mg total) by nebulization every 4 to 6 hours as needed for Wheezing or Shortness of Breath.    ALBUTEROL (PROVENTIL/VENTOLIN HFA) 90 MCG/ACTUATION INHALER    Inhale 2 puffs into the lungs every 4 (four) hours as needed for Wheezing or Shortness of Breath.    ASPIRIN (ECOTRIN) 81 MG EC TABLET    Take 1 tablet (81 mg total) by mouth once daily.    EMPAGLIFLOZIN (JARDIANCE) 10 MG TABLET    Take 1 tablet (10 mg total) by mouth once daily.    FERROUS SULFATE 325 (65 FE) MG EC TABLET    Take 1 tablet (325 mg total) by mouth once daily.    FLUTICASONE-UMECLIDIN-VILANTER (TRELEGY ELLIPTA) 200-62.5-25 MCG INHALER    Inhale 1 puff into the lungs once daily.    FUROSEMIDE (LASIX) 40 MG TABLET    Take 1 tablet (40 mg total) by mouth 2 (two) times a day.    MEMANTINE (NAMENDA) 5 MG TAB    Take 1 tablet (5 mg total) by mouth 2 (two) times daily.    METOPROLOL SUCCINATE (TOPROL-XL) 25 MG 24 HR TABLET    Take 0.5 tablets (12.5 mg total) by mouth once daily.    POTASSIUM CHLORIDE SA (K-DUR,KLOR-CON M) 10 MEQ TABLET    Take 2 tablets (20 mEq total) by mouth once daily.    SACUBITRIL-VALSARTAN (ENTRESTO)  MG PER TABLET    Take 1 tablet by mouth 2 (two) times daily.       Review of patient's allergies indicates:   Allergen Reactions    Fish containing products     Iodine and iodide containing products     Shellfish containing  products        Past Surgical History:   Procedure Laterality Date    CATHETERIZATION OF BOTH LEFT AND RIGHT HEART N/A 1/13/2022    Procedure: CATHETERIZATION, HEART, BOTH LEFT AND RIGHT;  Surgeon: Janneth Tovar MD;  Location: [facility] CATH LAB;  Service: Cardiology;  Laterality: N/A;  poss cx    COLONOSCOPY      COLONOSCOPY N/A 6/30/2022    Procedure: COLONOSCOPY;  Surgeon: Sandra Perez MD;  Location: [facility] ENDO;  Service: Endoscopy;  Laterality: N/A;    COLONOSCOPY N/A 7/1/2022    Procedure: COLONOSCOPY;  Surgeon: Woodrow Christian MD;  Location: [facility] ENDO;  Service: Endoscopy;  Laterality: N/A;    CORONARY ANGIOGRAPHY N/A 1/13/2022    Procedure: ANGIOGRAM, CORONARY ARTERY;  Surgeon: Janneth Tovar MD;  Location: [facility] CATH LAB;  Service: Cardiology;  Laterality: N/A;    ESOPHAGOGASTRODUODENOSCOPY N/A 6/30/2022    Procedure: EGD (ESOPHAGOGASTRODUODENOSCOPY);  Surgeon: Sandra Perez MD;  Location: [facility] ENDO;  Service: Endoscopy;  Laterality: N/A;    INJECTION OF ANESTHETIC AGENT AROUND NERVE Bilateral 6/20/2023    Procedure: Bilateral Genicular nerve block RN IV Sedation;  Surgeon: Devon Grant MD;  Location: [facility] PAIN MGT;  Service: Pain Management;  Laterality: Bilateral;    INJECTION OF JOINT Bilateral 10/21/2022    Procedure: Bilateral intraarticular knee injection with local ALLERGIC TO IODINE;  Surgeon: Devon Grant MD;  Location: [facility] PAIN MGT;  Service: Pain Management;  Laterality: Bilateral;    INJECTION OF JOINT Bilateral 1/31/2023    Procedure: Bilateral IA knee joint injection with steroid RN IV Sedation;  Surgeon: Devon Grant MD;  Location: [facility] PAIN MGT;  Service: Pain Management;  Laterality: Bilateral;    INJECTION OF JOINT Bilateral 5/10/2023    Procedure: Bilateral IA knee joint injection Synvisc RN IV Sedation;  Surgeon: Devon Grant MD;  Location: [facility] PAIN MGT;  Service: Pain Management;  Laterality: Bilateral;    INJECTION OF JOINT Bilateral 11/7/2023    Procedure:  Bilateral intraarticular knee injection;  Surgeon: Devon Grant MD;  Location: [facility] PAIN MGT;  Service: Pain Management;  Laterality: Bilateral;    PROSTATE SURGERY      RADIOFREQUENCY THERMOCOAGULATION Left 2023    Procedure: Left Genicular Nerve RFA;  Surgeon: Devon Grant MD;  [facility];  Service: Pain Management;  Laterality: Left;    RADIOFREQUENCY THERMOCOAGULATION Right 9/15/2023    Procedure: Right Genicular Nerve RFA RN IV Sedation;  Surgeon: Devon Grant MD;  Location: [facility] PAIN MGT;  Service: Pain Management;  Laterality: Right;    SPINE SURGERY         Family History   Problem Relation Age of Onset    Diabetes Mother     Peripheral vascular disease Mother        Social History     Socioeconomic History    Marital status:      Spouse name: jenn     Number of children: 6   Tobacco Use    Smoking status: Former     Current packs/day: 0.00     Average packs/day: 1 pack/day for 69.2 years (69.2 ttl pk-yrs)     Types: Cigarettes     Start date: 1954     Quit date: 3/1/2023     Years since quittin.0    Smokeless tobacco: Never   Substance and Sexual Activity    Alcohol use: Yes     Comment: reports only drinks red wine when games are on    Drug use: Never    Sexual activity: Yes     Partners: Female     Social Determinants of Health     Financial Resource Strain: Patient Declined (2024)    Overall Financial Resource Strain (CARDIA)     Difficulty of Paying Living Expenses: Patient declined   Food Insecurity: Patient Declined (2024)    Hunger Vital Sign     Worried About Running Out of Food in the Last Year: Patient declined     Ran Out of Food in the Last Year: Patient declined   Transportation Needs: Patient Declined (2024)    PRAPARE - Transportation     Lack of Transportation (Medical): Patient declined     Lack of Transportation (Non-Medical): Patient declined   Physical Activity: Insufficiently Active (2024)    Exercise Vital Sign     Days of  "Exercise per Week: 3 days     Minutes of Exercise per Session: 10 min   Stress: No Stress Concern Present (8/30/2023)    [employer] - Occupational Stress Questionnaire     Feeling of Stress : Not at all   Social Connections: Socially Isolated (2/26/2024)    Social Connection and Isolation Panel [NHANES]     Frequency of Communication with Friends and Family: Once a week     Frequency of Social Gatherings with Friends and Family: Once a week     Attends [employer] Services: More than 4 times per year     Active Member of Clubs or Organizations: No     Attends Club or Organization Meetings: Patient declined     Marital Status:    Housing Stability: Patient Declined (2/26/2024)    Housing Stability Vital Sign     Unable to Pay for Housing in the Last Year: Patient declined     Number of Places Lived in the Last Year: 1     Unstable Housing in the Last Year: Patient declined       Vitals:    03/14/24 1530   Weight: 68.5 kg (151 lb 0.2 oz)   Height: 5' 8" (1.727 m)   PainSc: 0-No pain       Review of Systems   Constitutional:  Negative for chills and fever.   Respiratory:  Negative for shortness of breath.    Cardiovascular:  Negative for chest pain, palpitations, orthopnea, claudication and leg swelling.   Gastrointestinal:  Negative for diarrhea, nausea and vomiting.   Musculoskeletal:  Negative for joint pain.   Skin:  Negative for rash.   Neurological:  Positive for tingling. Negative for dizziness, sensory change, focal weakness and weakness.   Psychiatric/Behavioral: Negative.           Objective:      PHYSICAL EXAM: Apperance: Alert and orient in no distress,well developed, and with good attention to grooming and body habits  Patient presents ambulating in [facility]  Lower Extremity Physical Exam:  VASCULAR: Dorsalis pedis pulses 1/4 bilateral and Posterior Tibial pulses 1/4 bilateral. Capillary fill time <blanched bilateral. Mild edema observed bilateral. Varicosities absent " bilateral. Skin temperature of the lower extremities is warm to warm, proximal to distal. Hair growth dim bilateral.  DERMATOLOGICAL: No skin rashes, subcutaneous nodules, lesions, or ulcers observed bilateral. Dry skin noted to bilateral lower extremities. Nails 1,2,3,4,5 bilateral elongated, thickened, and discolored with subungual debris.  NEUROLOGICAL: Light touch, sharp-dull, proprioception all present and equal bilaterally.    MUSCULOSKELETAL: Muscle strength is 5/5 for foot inverters, everters, plantarflexors, and dorsiflexors. Muscle tone is normal.          Assessment:       ICD-10-CM ICD-9-CM   1. Alcoholic peripheral neuropathy  G62.1 357.5   2. Dermatophytosis of nail - Right Foot  B35.1 110.1       Plan:   Alcoholic peripheral neuropathy    Dermatophytosis of nail - Right Foot    I counseled the patient on his conditions, regarding findings of my examination, my impressions, and usual treatment plan.   Appointment spent on education about the neuropathy, and prevention of limb loss.  Shoe inspection. Patient instructed on proper foot hygeine. We discussed wearing proper shoe gear, daily foot inspections, never walking without protective shoe gear, never putting sharp instruments to feet.    With patient's permission, nails 1-5 bilateral were debrided/trimmed in length and thickness aggressively to their soft tissue attachment mechanically and with electric , removing all offending nail and debris. Patient relates relief following the procedure.  Patient  will continue to monitor the areas daily, inspect feet, wear protective shoe gear when ambulatory, moisturizer to maintain skin integrity.   Patient to return 6 months or sooner if needed.          Nick Adams DPM  [facility]

## 2024-03-19 ENCOUNTER — HOSPITAL ENCOUNTER (OUTPATIENT)
Dept: RADIOLOGY | Facility: HOSPITAL | Age: 84
Discharge: HOME | End: 2024-03-19
Attending: INTERNAL MEDICINE
Payer: MEDICARE

## 2024-03-19 ENCOUNTER — CLINICAL SUPPORT (OUTPATIENT)
Dept: PULMONOLOGY | Facility: CLINIC | Age: 84
End: 2024-03-19
Payer: MEDICARE

## 2024-03-19 ENCOUNTER — OFFICE VISIT (OUTPATIENT)
Dept: PULMONOLOGY | Facility: CLINIC | Age: 84
End: 2024-03-19
Payer: MEDICARE

## 2024-03-19 VITALS
WEIGHT: 148.38 LBS | SYSTOLIC BLOOD PRESSURE: 120 MMHG | DIASTOLIC BLOOD PRESSURE: 60 MMHG | OXYGEN SATURATION: 97 % | BODY MASS INDEX: 22.49 KG/M2 | RESPIRATION RATE: 18 BRPM | HEART RATE: 85 BPM | HEIGHT: 68 IN

## 2024-03-19 VITALS — BODY MASS INDEX: 22.47 KG/M2 | HEIGHT: 68 IN | WEIGHT: 148.25 LBS

## 2024-03-19 DIAGNOSIS — R91.1 SOLITARY PULMONARY NODULE: Primary | ICD-10-CM

## 2024-03-19 DIAGNOSIS — J43.9 PULMONARY EMPHYSEMA, UNSPECIFIED EMPHYSEMA TYPE: ICD-10-CM

## 2024-03-19 DIAGNOSIS — R09.02 EXERCISE HYPOXEMIA: ICD-10-CM

## 2024-03-19 DIAGNOSIS — F17.211 NICOTINE DEPENDENCE, CIGARETTES, IN REMISSION: ICD-10-CM

## 2024-03-19 DIAGNOSIS — R91.1 SOLITARY PULMONARY NODULE: ICD-10-CM

## 2024-03-19 DIAGNOSIS — J43.1 PANLOBULAR EMPHYSEMA: ICD-10-CM

## 2024-03-19 PROCEDURE — 3078F DIAST BP <80 MM HG: CPT | Mod: HCNC,CPTII,S$GLB, | Performed by: INTERNAL MEDICINE

## 2024-03-19 PROCEDURE — 1157F ADVNC CARE PLAN IN RCRD: CPT | Mod: HCNC,CPTII,S$GLB, | Performed by: INTERNAL MEDICINE

## 2024-03-19 PROCEDURE — 3288F FALL RISK ASSESSMENT DOCD: CPT | Mod: HCNC,CPTII,S$GLB, | Performed by: INTERNAL MEDICINE

## 2024-03-19 PROCEDURE — 1126F AMNT PAIN NOTED NONE PRSNT: CPT | Mod: HCNC,CPTII,S$GLB, | Performed by: INTERNAL MEDICINE

## 2024-03-19 PROCEDURE — 94618 PULMONARY STRESS TESTING: CPT | Mod: HCNC,S$GLB,, | Performed by: INTERNAL MEDICINE

## 2024-03-19 PROCEDURE — 71250 CT THORAX DX C-: CPT | Mod: 26,HCNC,, | Performed by: RADIOLOGY

## 2024-03-19 PROCEDURE — 99999 PR PBB SHADOW E&M-EST. PATIENT-LVL I: CPT | Mod: PBBFAC,HCNC,,

## 2024-03-19 PROCEDURE — 1159F MED LIST DOCD IN RCRD: CPT | Mod: HCNC,CPTII,S$GLB, | Performed by: INTERNAL MEDICINE

## 2024-03-19 PROCEDURE — 71250 CT THORAX DX C-: CPT | Mod: TC,HCNC

## 2024-03-19 PROCEDURE — 99999 PR PBB SHADOW E&M-EST. PATIENT-LVL IV: CPT | Mod: PBBFAC,HCNC,, | Performed by: INTERNAL MEDICINE

## 2024-03-19 PROCEDURE — 94060 EVALUATION OF WHEEZING: CPT | Mod: HCNC,59,S$GLB, | Performed by: INTERNAL MEDICINE

## 2024-03-19 PROCEDURE — 3074F SYST BP LT 130 MM HG: CPT | Mod: HCNC,CPTII,S$GLB, | Performed by: INTERNAL MEDICINE

## 2024-03-19 PROCEDURE — 1101F PT FALLS ASSESS-DOCD LE1/YR: CPT | Mod: HCNC,CPTII,S$GLB, | Performed by: INTERNAL MEDICINE

## 2024-03-19 PROCEDURE — 99215 OFFICE O/P EST HI 40 MIN: CPT | Mod: 25,HCNC,S$GLB, | Performed by: INTERNAL MEDICINE

## 2024-03-19 RX ORDER — ALBUTEROL SULFATE 0.83 MG/ML
2.5 SOLUTION RESPIRATORY (INHALATION)
Qty: 360 ML | Refills: 11 | Status: SHIPPED | OUTPATIENT
Start: 2024-03-19 | End: 2025-03-19

## 2024-03-19 RX ORDER — ALBUTEROL SULFATE 90 UG/1
2 AEROSOL, METERED RESPIRATORY (INHALATION) EVERY 4 HOURS PRN
Qty: 8.5 G | Refills: 11 | Status: SHIPPED | OUTPATIENT
Start: 2024-03-19 | End: 2024-06-19 | Stop reason: SDUPTHER

## 2024-03-19 RX ORDER — FLUTICASONE FUROATE, UMECLIDINIUM BROMIDE AND VILANTEROL TRIFENATATE 200; 62.5; 25 UG/1; UG/1; UG/1
1 POWDER RESPIRATORY (INHALATION) DAILY
Qty: 60 EACH | Refills: 11 | Status: SHIPPED | OUTPATIENT
Start: 2024-03-19 | End: 2024-06-19 | Stop reason: SDUPTHER

## 2024-03-19 NOTE — PROCEDURES
"[facility] - Pulmonary Function  Six Minute Walk     SUMMARY     Ordering Provider: Dr. Jackson   Interpreting Provider: Dr. Jackson  Performing nurse/tech/RT: NADJA Pritchard RRT  Diagnosis: Emphysema  Height: 5' 8" (172.7 cm)  Weight: 67.3 kg (148 lb 4.2 oz)  BMI (Calculated): 22.5   Patient Race:             Phase Oxygen Assessment Supplemental O2 Heart   Rate Blood Pressure [place] Dyspnea Scale Rating   Resting 96 % Room Air 85 bpm 98/48 2   Exercise        Minute        1 92 % Room Air 89 bpm     2 92 % Room Air 90 bpm     3 96 % Room Air 89 bpm     4 100 % Room Air 94 bpm     5 94 % Room Air 96 bpm     6  96 % Room Air 95 bpm 116/57 3   Recovery        Minute        1 97 % Room Air 96 bpm     2 97 % Room Air 90 bpm     3 97 % Room Air 85 bpm     4 97 % Room Air 85 bpm 147/63 2     Six Minute Walk Summary  6MWT Status: completed without stopping  Patient Reported: Dyspnea     Interpretation:  Did the patient stop or pause?: No                                         Total Time Walked (Calculated): 360 seconds  Final Partial Lap Distance (feet): 0 feet  Total Distance Meters (Calculated): 243.84 meters  Predicted Distance Meters (Calculated): 463.32 meters  Percentage of Predicted (Calculated): 52.63  Peak VO2 (Calculated): 11.3  Mets: 3.23  Has The Patient Had a Previous Six Minute Walk Test?: Yes       Previous 6MWT Results  Has The Patient Had a Previous Six Minute Walk Test?: Yes  Date of Previous Test: 03/28/23  Total Time Walked: 346 seconds  Total Distance (meters): 182.88  Predicted Distance (meters): 512.76 meters  Percentage of Predicted: 35.67  Percent Change (Calculated): -0.33      Interpretation:  Total distance walked in six minutes is moderately reduced indicating a reduction in overall  functional capacity. The patient did not meet criteria for supplemental oxygen prescription.  Clinical correlation suggested.  [] Mild exercise-induced hypoxemia described as an arterial oxygen saturation of " 93-95% (with a fall of 3-4% with exercise),   [x] Moderate exercise-induced hypoxemia as a fall in oxygen saturation to  89-93% (with a fall of 3-4 % with exercise)  [] Severe exercise induced hypoxemia as < 89% O2 saturation (88% and below).  Medicare Criteria for Oxygen prescription comments: When arterial oxygen saturation is at or below 88% during exercise (severe exercise induced hypoxemia) then the patient falls under   Details about Medicare Group Criteria coverage can be found at [email or web address]/[email or web address]     Michael Jackson MD

## 2024-03-19 NOTE — PROGRESS NOTES
Subjective:     Patient ID: Richy Douglas is a 83 y.o. male.    Chief Complaint:      HPI    COPD  He presents for evaluation and treatment of COPD. The patient is not currently have symptoms / an exacerbation. The patient has COPD for approximately 11 - 12 years. Symptoms include cough with sputum production  have included dyspnea, cough, wheezing and fatigue, and typically last 2 weeks. Previous episodes have been exacerbated by any exercise. Current treatment includes albuterol inhaler, which generally provides some relief of symptoms.   He uses 1 pillows at night. Patient currently is not on home oxygen therapy.. The patient is having no constitutional symptoms, denying fever, chills, anorexia, or weight loss. The patient has been hospitalized for this condition before. He has a history of 68 pack years. The patient is experiencing exercise intolerance (difficulty climbing 1 flights of stairs).  Stopped smoking  Qualifies for oxygen    Lung Nodule  He presents for evaluation and treatment of a lung nodule. The patient reports that the imaging was performed to evaluate symptoms of dyspnea on exertion which have been present for 5 years and are unchanged. Symptoms are exacerbated by exercise and relieved by rest. Other symptoms include non productive cough. He has a history of 68 pack years. The patient has no known exposure to tuberculosis. The patient does not have a history of cancer.    History of prostate cancer - operated in 2005     https://reference.[employer]/calculator/4/solitary-pulmonary-nodule-malignancy-risk  Results for Solitary Pulmonary Nodule Malignancy Risk by [employer]  Probability of Malignancy: 55.6 %    This probability is only an estimate and clinical assessment is required to further refine this estimation.      Answers calculated to formulate result:    1. Age? -- 83 Years  2. Smoker (current or previous)? -- Yes  3. Extra-thoracic cancer more than 5 years previous? -- Yes - prostate  4.  Diameter? -- 7 mm  5. Upper Lobe? -- Yes  6. Spiculated? -- No  7. PET? 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    Anemia of chronic disease     Aortic aneurysm     Atrial fibrillation with RVR 09/24/2021    Chronic systolic congestive heart failure 08/31/2017    Depression     Emphysema of lung 08/31/2017    GERD (gastroesophageal reflux disease)     Hypertension     Knee osteoarthritis 10/21/2022    Major neurocognitive disorder due to multiple etiologies 04/14/2023    Microcytic anemia 11/24/2020    Other hyperlipidemia 04/27/2021    Personal history of colonic polyps 2022    Prostate cancer      Past Surgical History:   Procedure Laterality Date    CATHETERIZATION OF BOTH LEFT AND RIGHT HEART N/A 1/13/2022    Procedure: CATHETERIZATION, HEART, BOTH LEFT AND RIGHT;  Surgeon: Janneth Tovar MD;  Location: [facility] CATH LAB;  Service: Cardiology;  Laterality: N/A;  poss cx    COLONOSCOPY      COLONOSCOPY N/A 6/30/2022    Procedure: COLONOSCOPY;  Surgeon: Sandra Perez MD;  Location: [facility];  Service: Endoscopy;  Laterality: N/A;    COLONOSCOPY N/A 7/1/2022    Procedure: COLONOSCOPY;  Surgeon: Woodrow Christian MD;  Location: [facility];  Service: Endoscopy;  Laterality: N/A;    CORONARY ANGIOGRAPHY N/A 1/13/2022    Procedure: ANGIOGRAM, CORONARY ARTERY;  Surgeon: Janneth Tovar MD;  Location: [facility] CATH LAB;  Service: Cardiology;  Laterality: N/A;    ESOPHAGOGASTRODUODENOSCOPY N/A 6/30/2022    Procedure: EGD (ESOPHAGOGASTRODUODENOSCOPY);  Surgeon: Sandra Perez MD;  Location: [facility];  Service: Endoscopy;  Laterality: N/A;    INJECTION OF ANESTHETIC AGENT AROUND NERVE Bilateral 6/20/2023    Procedure: Bilateral Genicular nerve block RN IV  Sedation;  Surgeon: Devon Grant MD;  Location: [facility];  Service: Pain Management;  Laterality: Bilateral;    INJECTION OF JOINT Bilateral 10/21/2022    Procedure: Bilateral intraarticular knee injection with local ALLERGIC TO IODINE;  Surgeon: Devon Grant MD;  Location: [facility];  Service: Pain Management;  Laterality: Bilateral;    INJECTION OF JOINT Bilateral 1/31/2023    Procedure: Bilateral IA knee joint injection with steroid RN IV Sedation;  Surgeon: Devon Grant MD;  Location: [facility];  Service: Pain Management;  Laterality: Bilateral;    INJECTION OF JOINT Bilateral 5/10/2023    Procedure: Bilateral IA knee joint injection Synvisc RN IV Sedation;  Surgeon: Devon Grant MD;  Location: [facility];  Service: Pain Management;  Laterality: Bilateral;    INJECTION OF JOINT Bilateral 11/7/2023    Procedure: Bilateral intraarticular knee injection;  Surgeon: Devon Grant MD;  Location: [facility];  Service: Pain Management;  Laterality: Bilateral;    PROSTATE SURGERY      RADIOFREQUENCY THERMOCOAGULATION Left 9/1/2023    Procedure: Left Genicular Nerve RFA;  Surgeon: Devon Grant MD;  Location: [facility];  Service: Pain Management;  Laterality: Left;    RADIOFREQUENCY THERMOCOAGULATION Right 9/15/2023    Procedure: Right Genicular Nerve RFA RN IV Sedation;  Surgeon: Devon Grant MD;  Location: [facility];  Service: Pain Management;  Laterality: Right;    SPINE SURGERY       Review of patient's allergies indicates:   Allergen Reactions    Fish containing products     Iodine and iodide containing products     Shellfish containing products      Current Outpatient Medications on File Prior to Visit   Medication Sig Dispense Refill    aspirin (ECOTRIN) 81 MG EC tablet Take 1 tablet (81 mg total) by mouth once daily. 30 tablet 0    empagliflozin (JARDIANCE) 10 mg tablet Take 1 tablet (10 mg total) by mouth once daily. 90 tablet 3    empagliflozin (JARDIANCE) 10 mg tablet  Take 1 tablet by mouth once daily.      ferrous sulfate 325 (65 FE) MG EC tablet Take 1 tablet (325 mg total) by mouth once daily. 30 tablet 1    furosemide (LASIX) 40 MG tablet Take 1 tablet (40 mg total) by mouth 2 (two) times a day. 90 tablet 3    memantine (NAMENDA) 5 MG Tab Take 1 tablet (5 mg total) by mouth 2 (two) times daily. 60 tablet 11    metoprolol succinate (TOPROL-XL) 25 MG 24 hr tablet Take 0.5 tablets (12.5 mg total) by mouth once daily. 30 tablet 6    potassium chloride SA (K-DUR,KLOR-CON M) 10 MEQ tablet Take 2 tablets (20 mEq total) by mouth once daily. 90 tablet 1    sacubitriL-valsartan (ENTRESTO)  mg per tablet Take 1 tablet by mouth 2 (two) times daily. 180 tablet 1    [DISCONTINUED] albuterol (PROVENTIL/VENTOLIN HFA) 90 mcg/actuation inhaler Inhale 2 puffs into the lungs every 4 (four) hours as needed for Wheezing or Shortness of Breath. 8.5 g 11    [DISCONTINUED] fluticasone-umeclidin-vilanter (TRELEGY ELLIPTA) 200-62.5-25 mcg inhaler Inhale 1 puff into the lungs once daily. 60 each 11    [DISCONTINUED] albuterol (PROVENTIL) 2.5 mg /3 mL (0.083 %) nebulizer solution Take 3 mLs (2.5 mg total) by nebulization every 4 to 6 hours as needed for Wheezing or Shortness of Breath. 360 mL 11     No current facility-administered medications on file prior to visit.     Social History     Socioeconomic History    Marital status:      Spouse name: jenn     Number of children: 6   Tobacco Use    Smoking status: Some Days     Current packs/day: 0.00     Average packs/day: 1 pack/day for 69.2 years (69.2 ttl pk-yrs)     Types: Cigarettes     Start date: 1954     Last attempt to quit: 3/1/2023     Years since quittin.0    Smokeless tobacco: Never   Substance and Sexual Activity    Alcohol use: Yes     Comment: reports only drinks red wine when games are on    Drug use: Never    Sexual activity: Yes     Partners: Female     Social Determinants of Health     Financial Resource Strain:  Patient Declined (2/26/2024)    Overall Financial Resource Strain (CARDIA)     Difficulty of Paying Living Expenses: Patient declined   Food Insecurity: Patient Declined (2/26/2024)    Hunger Vital Sign     Worried About Running Out of Food in the Last Year: Patient declined     Ran Out of Food in the Last Year: Patient declined   Transportation Needs: Patient Declined (2/26/2024)    PRAPARE - Transportation     Lack of Transportation (Medical): Patient declined     Lack of Transportation (Non-Medical): Patient declined   Physical Activity: Insufficiently Active (2/26/2024)    Exercise Vital Sign     Days of Exercise per Week: 3 days     Minutes of Exercise per Session: 10 min   Stress: No Stress Concern Present (8/30/2023)    [place] [employer] - Occupational Stress Questionnaire     Feeling of Stress : Not at all   Social Connections: Socially Isolated (2/26/2024)    Social Connection and Isolation Panel [NHANES]     Frequency of Communication with Friends and Family: Once a week     Frequency of Social Gatherings with Friends and Family: Once a week     Attends [employer] Services: More than 4 times per year     Active Member of Clubs or Organizations: No     Attends Club or Organization Meetings: Patient declined     Marital Status:    Housing Stability: Patient Declined (2/26/2024)    Housing Stability Vital Sign     Unable to Pay for Housing in the Last Year: Patient declined     Number of Places Lived in the Last Year: 1     Unstable Housing in the Last Year: Patient declined     Family History   Problem Relation Age of Onset    Diabetes Mother     Peripheral vascular disease Mother        Review of Systems   Constitutional:  Positive for fatigue. Negative for fever.   HENT:  Positive for postnasal drip, rhinorrhea and congestion.    Eyes:  Negative for redness and itching.   Respiratory:  Positive for cough, sputum production, shortness of breath, dyspnea on extertion, use of  "rescue inhaler and Paroxysmal Nocturnal Dyspnea.    Cardiovascular:  Positive for palpitations. Negative for chest pain and leg swelling.   Genitourinary:  Negative for difficulty urinating and hematuria.   Endocrine:  Negative for cold intolerance and heat intolerance.    Musculoskeletal:  Positive for arthralgias.   Skin:  Negative for rash.   Gastrointestinal:  Negative for nausea and abdominal pain.   Neurological:  Negative for dizziness, syncope, weakness and light-headedness.   Hematological:  Negative for adenopathy. Does not bruise/bleed easily.   Psychiatric/Behavioral:  Negative for sleep disturbance. The patient is not nervous/anxious.        Objective:      /60   Pulse 85   Resp 18   Ht 5' 8" (1.727 m)   Wt 67.3 kg (148 lb 5.9 oz)   SpO2 97%   BMI 22.56 kg/m²   Physical Exam  Vitals and nursing note reviewed.   Constitutional:       Appearance: He is well-developed.   HENT:      Head: Normocephalic and atraumatic.      Nose: Congestion and rhinorrhea present.   Eyes:      Conjunctiva/sclera: Conjunctivae normal.      Pupils: Pupils are equal, round, and reactive to light.   Neck:      Thyroid: No thyromegaly.      Vascular: No JVD.      Trachea: No tracheal deviation.   Cardiovascular:      Rate and Rhythm: Normal rate. Rhythm irregular.      Heart sounds: No murmur heard.  Pulmonary:      Breath sounds: Examination of the right-lower field reveals wheezing. Examination of the left-lower field reveals wheezing. Decreased breath sounds and wheezing present. No rhonchi or rales.   Abdominal:      General: Bowel sounds are normal.      Palpations: Abdomen is soft.   Musculoskeletal:         General: No tenderness. Normal range of motion.      Cervical back: Neck supple.   Lymphadenopathy:      Cervical: No cervical adenopathy.   Skin:     General: Skin is warm and dry.   Neurological:      Mental Status: He is alert and oriented to person, place, and time.       Personal Diagnostic Review  CT " "stable nodule        3/19/2024    11:51 AM   Pulmonary Studies Review   Ordering Provider Dr. Jackson   Interpreting Provider Dr. Jackson   Performing nurse/tech/RT C. Credeur, RRT   Diagnosis Emphysema   Height 5' 8" (1.727 m)   Weight 67.3 kg (148 lb 4.2 oz)   BMI (Calculated) 22.5   Predicted Distance 284.76   Patient Race    6MWT Status completed without stopping   Patient Reported Dyspnea   Was O2 used? No   6MW Distance walked (feet) 800 feet   Distance walked (meters) 243.84 meters   Did patient stop? No   Type of assistive device(s) used? a cane   Is extra documentation required for this patient? Yes   Oxygen Saturation 96 %   Supplemental Oxygen Room Air   Heart Rate 85 bpm   Blood Pressure 98/48   [place] Dyspnea Rating  light   Oxygen Saturation 96 %   Supplemental Oxygen Room Air   Heart Rate 95 bpm   Blood Pressure 116/57   [place] Dyspnea Rating  moderate   Recovery Time (seconds) 240 seconds   Oxygen Saturation 97 %   Supplemental Oxygen Room Air   Heart Rate 85 bpm   Blood Pressure 147/63   [place] Dyspnea Rating  light   Is procedure ready for interpretation? Yes   Did the patient stop or pause? No   Total Time Walked (Calculated) 360 seconds   Total Laps Walked 4   Final Partial Lap Distance (feet) 0 feet   Total Distance Feet (Calculated) 800 feet   Total Distance Meters (Calculated) 243.84 meters   Predicted Distance Meters (Calculated) 463.32 meters   Percentage of Predicted (Calculated) 52.63   Peak VO2 (Calculated) 11.3   Mets 3.23   Has The Patient Had a Previous Six Minute Walk Test? Yes   Oxygen Qualification? No       CT Chest Without Contrast  Narrative: EXAMINATION:  CT CHEST WITHOUT CONTRAST    CLINICAL HISTORY:  Solitary pulmonary noduleAbnormal xray - lung nodule, < 1 cm, mod-high risk;    COMPARISON:  Chest x-ray December 29, 2023.  CT chest March 28, 2023.    TECHNIQUE:  Axial CT images were obtained of the CHEST.  Iterative reconstruction technique was used. The CT exam was " "performed using one or more of the following dose reduction techniques- Automated exposure control, adjustment of the mA and/or kV according to patient size, and/or use of iterative reconstructed technique.    FINDINGS:  Lungs/pleura: Previously described 5 mm nodules in the left upper lobe has increased in size now measuring 7 mm (series 4, image 235).  Similar marked emphysematous changes with scarring and fibrosis.  No pneumothorax or pleural effusion.    Coronary artery calcification: Present.    Aorta: Atherosclerosis. No Aneurysm.    Heart: Enlarged.    Bones/joints: No acute osseous finding.    Other: No mediastinal or axillary lymphadenopathy. No acute finding in the visualized upper abdomen.  Impression: 1. Enlarging pulmonary nodule in the left upper lobe.  Finding is highly concerning for malignancy.  Size is borderline to attempt percutaneous biopsy.  Size is borderline for PET CT.  Short follow-up, 3 months is recommended.  2. Other findings as above.    Electronically signed by: Reed Garcia  Date:    03/19/2024  Time:    10:43      Office Spirometry Results:         3/19/2024    11:51 AM 3/19/2024    10:04 AM 3/14/2024     3:30 PM 2/9/2024     8:54 AM 2/9/2024     8:10 AM 1/4/2024     8:59 AM 12/31/2023     8:17 AM   Pulmonary Function Tests   SpO2  97 %     97 %   Ordering Provider Dr. Jackson         Performing nurse/tech/RT C. Credeur, RRT         Diagnosis Emphysema         Height 5' 8" (1.727 m) 5' 8" (1.727 m) 5' 8" (1.727 m) 5' 8" (1.727 m) 5' 8" (1.727 m) 5' 8" (1.727 m)    Weight 67.3 kg (148 lb 4.2 oz) 67.3 kg (148 lb 5.9 oz) 68.5 kg (151 lb 0.2 oz) 68.5 kg (151 lb 0.2 oz) 75.3 kg (166 lb) 75.5 kg (166 lb 7.2 oz)    BMI (Calculated) 22.5 22.6 23 23 25.2 25.3    Patient Race          6MWT Status completed without stopping         Patient Reported Dyspnea         Was O2 used? No         6MW Distance walked (feet) 800 feet         Distance walked (meters) 243.84 meters       " "  Did patient stop? No         Type of assistive device(s) used? a cane         Oxygen Saturation 96 %         Supplemental Oxygen Room Air         Heart Rate 85 bpm         Blood Pressure 98/48         [place] Dyspnea Rating  light         Oxygen Saturation 96 %         Supplemental Oxygen Room Air         Heart Rate 95 bpm         Blood Pressure 116/57         [place] Dyspnea Rating  moderate         Recovery Time (seconds) 240 seconds         Oxygen Saturation 97 %         Supplemental Oxygen Room Air         Heart Rate 85 bpm         Blood Pressure 147/63         [place] Dyspnea Rating  light         Is procedure ready for interpretation? Yes         Oxygen Qualification? No               3/19/2024    11:51 AM   Pulmonary Studies Review   Ordering Provider Dr. Jackson   Interpreting Provider Dr. Jackson   Performing nurse/tech/RT C. Credeur, RRT   Diagnosis Emphysema   Height 5' 8" (1.727 m)   Weight 67.3 kg (148 lb 4.2 oz)   BMI (Calculated) 22.5   Predicted Distance 284.76   Patient Race    6MWT Status completed without stopping   Patient Reported Dyspnea   Was O2 used? No   6MW Distance walked (feet) 800 feet   Distance walked (meters) 243.84 meters   Did patient stop? No   Type of assistive device(s) used? a cane   Is extra documentation required for this patient? Yes   Oxygen Saturation 96 %   Supplemental Oxygen Room Air   Heart Rate 85 bpm   Blood Pressure 98/48   [place] Dyspnea Rating  light   Oxygen Saturation 96 %   Supplemental Oxygen Room Air   Heart Rate 95 bpm   Blood Pressure 116/57   [place] Dyspnea Rating  moderate   Recovery Time (seconds) 240 seconds   Oxygen Saturation 97 %   Supplemental Oxygen Room Air   Heart Rate 85 bpm   Blood Pressure 147/63   [place] Dyspnea Rating  light   Is procedure ready for interpretation? Yes   Did the patient stop or pause? No   Total Time Walked (Calculated) 360 seconds   Total Laps Walked 4   Final Partial Lap Distance (feet) 0 feet   Total Distance Feet " "(Calculated) 800 feet   Total Distance Meters (Calculated) 243.84 meters   Predicted Distance Meters (Calculated) 463.32 meters   Percentage of Predicted (Calculated) 52.63   Peak VO2 (Calculated) 11.3   Mets 3.23   Has The Patient Had a Previous Six Minute Walk Test? Yes   Oxygen Qualification? No   [facility] - Pulmonary Function  Six Minute Walk      SUMMARY      Ordering Provider: Dr. Jacksno              Interpreting Provider: Dr. Jackson  Performing nurse/tech/RT: NADJA Pritchard RRT  Diagnosis: Emphysema  Height: 5' 8" (172.7 cm)  Weight: 67.3 kg (148 lb 4.2 oz)  BMI (Calculated): 22.5              Patient Race:                                                                 Phase Oxygen Assessment Supplemental O2 Heart   Rate Blood Pressure [place] Dyspnea Scale Rating   Resting 96 % Room Air 85 bpm 98/48 2   Exercise             Minute             1 92 % Room Air 89 bpm       2 92 % Room Air 90 bpm       3 96 % Room Air 89 bpm       4 100 % Room Air 94 bpm       5 94 % Room Air 96 bpm       6  96 % Room Air 95 bpm 116/57 3   Recovery             Minute             1 97 % Room Air 96 bpm       2 97 % Room Air 90 bpm       3 97 % Room Air 85 bpm       4 97 % Room Air 85 bpm 147/63 2      Six Minute Walk Summary  6MWT Status: completed without stopping  Patient Reported: Dyspnea         Interpretation:  Did the patient stop or pause?: No  Total Time Walked (Calculated): 360 seconds  Final Partial Lap Distance (feet): 0 feet  Total Distance Meters (Calculated): 243.84 meters  Predicted Distance Meters (Calculated): 463.32 meters  Percentage of Predicted (Calculated): 52.63  Peak VO2 (Calculated): 11.3  Mets: 3.23  Has The Patient Had a Previous Six Minute Walk Test?: Yes     Previous 6MWT Results  Has The Patient Had a Previous Six Minute Walk Test?: Yes  Date of Previous Test: 03/28/23  Total Time Walked: 346 seconds  Total Distance (meters): 182.88  Predicted Distance (meters): 512.76 meters  Percentage of " Predicted: 35.67  Percent Change (Calculated): -0.33         Assessment:            Solitary pulmonary nodule  -     CT Chest Without Contrast; Future; Expected date: 03/19/2024    Panlobular emphysema  -     fluticasone-umeclidin-vilanter (TRELEGY ELLIPTA) 200-62.5-25 mcg inhaler; Inhale 1 puff into the lungs once daily.  Dispense: 60 each; Refill: 11  -     albuterol (PROVENTIL/VENTOLIN HFA) 90 mcg/actuation inhaler; Inhale 2 puffs into the lungs every 4 (four) hours as needed for Wheezing or Shortness of Breath.  Dispense: 8.5 g; Refill: 11  -     albuterol (PROVENTIL) 2.5 mg /3 mL (0.083 %) nebulizer solution; Take 3 mLs (2.5 mg total) by nebulization every 4 to 6 hours as needed for Wheezing or Shortness of Breath.  Dispense: 360 mL; Refill: 11  -     Six Minute Walk Test to qualify for Home Oxygen; Future    Pulmonary emphysema, unspecified emphysema type  -     albuterol (PROVENTIL) 2.5 mg /3 mL (0.083 %) nebulizer solution; Take 3 mLs (2.5 mg total) by nebulization every 4 to 6 hours as needed for Wheezing or Shortness of Breath.  Dispense: 360 mL; Refill: 11    Exercise hypoxemia  -     Six Minute Walk Test to qualify for Home Oxygen; Future          Outpatient Encounter Medications as of 3/19/2024   Medication Sig Dispense Refill    aspirin (ECOTRIN) 81 MG EC tablet Take 1 tablet (81 mg total) by mouth once daily. 30 tablet 0    empagliflozin (JARDIANCE) 10 mg tablet Take 1 tablet (10 mg total) by mouth once daily. 90 tablet 3    empagliflozin (JARDIANCE) 10 mg tablet Take 1 tablet by mouth once daily.      ferrous sulfate 325 (65 FE) MG EC tablet Take 1 tablet (325 mg total) by mouth once daily. 30 tablet 1    furosemide (LASIX) 40 MG tablet Take 1 tablet (40 mg total) by mouth 2 (two) times a day. 90 tablet 3    memantine (NAMENDA) 5 MG Tab Take 1 tablet (5 mg total) by mouth 2 (two) times daily. 60 tablet 11    metoprolol succinate (TOPROL-XL) 25 MG 24 hr tablet Take 0.5 tablets (12.5 mg total) by mouth  once daily. 30 tablet 6    potassium chloride SA (K-DUR,KLOR-CON M) 10 MEQ tablet Take 2 tablets (20 mEq total) by mouth once daily. 90 tablet 1    sacubitriL-valsartan (ENTRESTO)  mg per tablet Take 1 tablet by mouth 2 (two) times daily. 180 tablet 1    [DISCONTINUED] albuterol (PROVENTIL/VENTOLIN HFA) 90 mcg/actuation inhaler Inhale 2 puffs into the lungs every 4 (four) hours as needed for Wheezing or Shortness of Breath. 8.5 g 11    [DISCONTINUED] fluticasone-umeclidin-vilanter (TRELEGY ELLIPTA) 200-62.5-25 mcg inhaler Inhale 1 puff into the lungs once daily. 60 each 11    albuterol (PROVENTIL) 2.5 mg /3 mL (0.083 %) nebulizer solution Take 3 mLs (2.5 mg total) by nebulization every 4 to 6 hours as needed for Wheezing or Shortness of Breath. 360 mL 11    albuterol (PROVENTIL/VENTOLIN HFA) 90 mcg/actuation inhaler Inhale 2 puffs into the lungs every 4 (four) hours as needed for Wheezing or Shortness of Breath. 8.5 g 11    fluticasone-umeclidin-vilanter (TRELEGY ELLIPTA) 200-62.5-25 mcg inhaler Inhale 1 puff into the lungs once daily. 60 each 11    [DISCONTINUED] albuterol (PROVENTIL) 2.5 mg /3 mL (0.083 %) nebulizer solution Take 3 mLs (2.5 mg total) by nebulization every 4 to 6 hours as needed for Wheezing or Shortness of Breath. 360 mL 11    [DISCONTINUED] ferrous sulfate 325 (65 FE) MG EC tablet Take 1 tablet (325 mg total) by mouth once daily. 30 tablet 1     No facility-administered encounter medications on file as of 3/19/2024.     Plan:       Requested Prescriptions     Signed Prescriptions Disp Refills    fluticasone-umeclidin-vilanter (TRELEGY ELLIPTA) 200-62.5-25 mcg inhaler 60 each 11     Sig: Inhale 1 puff into the lungs once daily.    albuterol (PROVENTIL/VENTOLIN HFA) 90 mcg/actuation inhaler 8.5 g 11     Sig: Inhale 2 puffs into the lungs every 4 (four) hours as needed for Wheezing or Shortness of Breath.    albuterol (PROVENTIL) 2.5 mg /3 mL (0.083 %) nebulizer solution 360 mL 11     Sig:  Take 3 mLs (2.5 mg total) by nebulization every 4 to 6 hours as needed for Wheezing or Shortness of Breath.     Problem List Items Addressed This Visit       Emphysema of lung    Relevant Medications    fluticasone-umeclidin-vilanter (TRELEGY ELLIPTA) 200-62.5-25 mcg inhaler    albuterol (PROVENTIL/VENTOLIN HFA) 90 mcg/actuation inhaler    albuterol (PROVENTIL) 2.5 mg /3 mL (0.083 %) nebulizer solution    Other Relevant Orders    Six Minute Walk Test to qualify for Home Oxygen (Completed)    Exercise hypoxemia    Relevant Orders    Six Minute Walk Test to qualify for Home Oxygen (Completed)    Solitary pulmonary nodule - Primary    Relevant Orders    CT Chest Without Contrast          Follow up in about 3 months (around 6/19/2024) for CT - on return visit, 6 min walk - ASAP.    MEDICAL DECISION MAKING: Moderate to high complexity.  Overall, the multiple problems listed are of moderate to high severity that may impact quality of life and activities of daily living. Side effects of medications, treatment plan as well as options and alternatives reviewed and discussed with patient. There was counseling of patient concerning these issues.    Total time spent in counseling and coordination of care - 30  minutes of total time spent on the encounter, which includes face to face time and non-face to face time preparing to see the patient (eg, review of tests), Obtaining and/or reviewing separately obtained history, Documenting clinical information in the electronic or other health record, Independently interpreting results (not separately reported) and communicating results to the patient/family/caregiver, or Care coordination (not separately reported).    Time was used in discussion of prognosis, risks, benefits of treatment, instructions and compliance with regimen . Discussion with other physicians and/or health care providers - home health or for use of durable medical equipment (oxygen, nebulizers, CPAP, BiPAP)  occurred.

## 2024-03-20 LAB
BRPFT: NORMAL
FEF 25 75 CHG: 2.9 %
FEF 25 75 LLN: 0.45
FEF 25 75 POST REF: 22.7 %
FEF 25 75 PRE REF: 22 %
FEF 25 75 REF: 1.59
FET100 CHG: 17.3 %
FEV1 CHG: 11.9 %
FEV1 FVC CHG: 6 %
FEV1 FVC LLN: 60
FEV1 FVC POST REF: 50.9 %
FEV1 FVC PRE REF: 48 %
FEV1 FVC REF: 75
FEV1 LLN: 1.44
FEV1 POST REF: 55.3 %
FEV1 PRE REF: 49.4 %
FEV1 REF: 2.23
FVC CHG: 5.6 %
FVC LLN: 2.07
FVC POST REF: 107.6 %
FVC PRE REF: 101.9 %
FVC REF: 2.99
PEF CHG: 1.5 %
PEF LLN: 4.51
PEF POST REF: 30.7 %
PEF PRE REF: 30.2 %
PEF REF: 7.02
POST FEF 25 75: 0.36 L/S
POST FET 100: 17.1 SEC
POST FEV1 FVC: 38.26 %
POST FEV1: 1.23 L
POST FVC: 3.22 L
POST PEF: 2.15 L/S
PRE FEF 25 75: 0.35 L/S
PRE FET 100: 14.58 SEC
PRE FEV1 FVC: 36.11 %
PRE FEV1: 1.1 L
PRE FVC: 3.05 L
PRE PEF: 2.12 L/S

## 2024-03-28 ENCOUNTER — HOSPITAL ENCOUNTER (OUTPATIENT)
Dept: RADIOLOGY | Facility: HOSPITAL | Age: 84
Discharge: HOME | End: 2024-03-28
Attending: INTERNAL MEDICINE
Payer: MEDICARE

## 2024-03-28 DIAGNOSIS — J43.9 PULMONARY EMPHYSEMA, UNSPECIFIED EMPHYSEMA TYPE: ICD-10-CM

## 2024-03-28 PROCEDURE — 71046 X-RAY EXAM CHEST 2 VIEWS: CPT | Mod: 26,HCNC,, | Performed by: RADIOLOGY

## 2024-03-28 PROCEDURE — 71046 X-RAY EXAM CHEST 2 VIEWS: CPT | Mod: TC,HCNC

## 2024-04-01 PROBLEM — J96.11 CHRONIC HYPOXEMIC RESPIRATORY FAILURE: Status: RESOLVED | Noted: 2023-12-29 | Resolved: 2024-04-01

## 2024-04-01 PROBLEM — N39.0 UTI (URINARY TRACT INFECTION): Status: RESOLVED | Noted: 2023-12-28 | Resolved: 2024-04-01

## 2024-04-09 ENCOUNTER — OFFICE VISIT (OUTPATIENT)
Dept: NEUROLOGY | Facility: CLINIC | Age: 84
End: 2024-04-09
Payer: MEDICARE

## 2024-04-09 DIAGNOSIS — F10.230 ALCOHOL DEPENDENCE WITH UNCOMPLICATED WITHDRAWAL: ICD-10-CM

## 2024-04-09 DIAGNOSIS — R41.3 MEMORY LOSS: ICD-10-CM

## 2024-04-09 DIAGNOSIS — G62.1 ALCOHOLIC PERIPHERAL NEUROPATHY: ICD-10-CM

## 2024-04-09 DIAGNOSIS — F02.80 MAJOR NEUROCOGNITIVE DISORDER DUE TO MULTIPLE ETIOLOGIES: Primary | ICD-10-CM

## 2024-04-09 DIAGNOSIS — F10.10 ALCOHOL USE DISORDER, MILD, ABUSE: ICD-10-CM

## 2024-04-09 DIAGNOSIS — F02.80 MAJOR NEUROCOGNITIVE DISORDER DUE TO MULTIPLE ETIOLOGIES: ICD-10-CM

## 2024-04-09 DIAGNOSIS — F43.21 GRIEF: ICD-10-CM

## 2024-04-09 PROCEDURE — 96139 PSYCL/NRPSYC TST TECH EA: CPT | Mod: HCNC,S$GLB,, | Performed by: STUDENT IN AN ORGANIZED HEALTH CARE EDUCATION/TRAINING PROGRAM

## 2024-04-09 PROCEDURE — 99499 UNLISTED E&M SERVICE: CPT | Mod: HCNC,S$GLB,, | Performed by: STUDENT IN AN ORGANIZED HEALTH CARE EDUCATION/TRAINING PROGRAM

## 2024-04-09 PROCEDURE — 96133 NRPSYC TST EVAL PHYS/QHP EA: CPT | Mod: HCNC,S$GLB,, | Performed by: STUDENT IN AN ORGANIZED HEALTH CARE EDUCATION/TRAINING PROGRAM

## 2024-04-09 PROCEDURE — 96138 PSYCL/NRPSYC TECH 1ST: CPT | Mod: HCNC,S$GLB,, | Performed by: STUDENT IN AN ORGANIZED HEALTH CARE EDUCATION/TRAINING PROGRAM

## 2024-04-09 PROCEDURE — 96132 NRPSYC TST EVAL PHYS/QHP 1ST: CPT | Mod: HCNC,S$GLB,, | Performed by: STUDENT IN AN ORGANIZED HEALTH CARE EDUCATION/TRAINING PROGRAM

## 2024-04-09 PROCEDURE — 90791 PSYCH DIAGNOSTIC EVALUATION: CPT | Mod: HCNC,95,S$GLB, | Performed by: STUDENT IN AN ORGANIZED HEALTH CARE EDUCATION/TRAINING PROGRAM

## 2024-04-09 PROCEDURE — 99999 PR PBB SHADOW E&M-EST. PATIENT-LVL I: CPT | Mod: PBBFAC,HCNC,, | Performed by: STUDENT IN AN ORGANIZED HEALTH CARE EDUCATION/TRAINING PROGRAM

## 2024-04-09 PROCEDURE — 99999 PR PBB SHADOW E&M-EST. PATIENT-LVL II: CPT | Mod: PBBFAC,HCNC,, | Performed by: STUDENT IN AN ORGANIZED HEALTH CARE EDUCATION/TRAINING PROGRAM

## 2024-04-09 NOTE — PROGRESS NOTES
Mr. Douglas was seen during this appointment for the testing component of his neuropsychological evaluation. Please see my visit note from this same date of service for documentation of his encounter.        _____________________  Isaiah Pulido, Ph.D.  Neuropsychologist  Department of Neuropsychology  [facility], [place]

## 2024-04-09 NOTE — PROGRESS NOTES
NEUROPSYCHOLOGY CONSULT    Referral Information  NAME:  Richy Douglas DATE OF SERVICE: 2024   MRN#:  [id number] EDUCATION: 11   AGE: 83 y.o. HANDEDNESS: Right    : 1940 RACE: Black or    SEX: Male REFERRAL: Mita Bruno NP;  Neurology, [facility]     Evaluation methods: I had the pleasure of seeing Richy Douglas on 2024 in person at the [facility], Department of Neurology. Data sources for the below report include review of the available medical record, an interview with the patient, a collateral interview with their friend, Anthony Aaron with their expressed consent, and administration of a series of neuropsychological tests listed in the Results section of this report. At the outset of the appointment, the undersigned explained the rationale for the evaluation along with the limits of confidentiality; and verbal informed consent for this evaluation was obtained.    The chief complaint/medical necessity leading to consultation/medical necessity is: cognitive decline    NEUROPSYCHOLOGICAL EVALUATION - CONFIDENTIAL    SUMMARY/TREATMENT PLAN   Summary of History:  Mr. Douglas is a 83 y.o., right-handed, Black or , male with 11 years of formal education. 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 Medical history is complex and additionally notable for emphysema, alcoholic peripheral neuropathy, chronic congestive heart failure, atrial fibrillation, hypertension, anemia, and pulmonary hypertension. Cognitive screening completed by his referring neurology team on 2023 was below normal limits (MoCA = 20/30). Most-recent neurologic exam completed on 2023 was documented as notable for subtly-reduced right sided lower extremity strength, a bilateral and symmetric pattern  "of sensory loss in his extremities, wide-based gait, and abnormal left-sided graphesthesia and stereognosis. Most-recent brain MRI completed on 05/03/2023 was documented as reflecting chronic microvascular ischemic changes (Fazekas score 3) and mild-to-moderate global cortical atrophy.     During interview, Mr. Douglas reported the insidious onset and gradually worsening course of cognitive concerns beginning in approximately 01/2023 with both good days and bad. Specifically, he and his friend characterized difficulties with memory for recent verbal and visual information leading to him becoming lost in familiar locations, slowed information processing speed, difficulties with sustained attention and mental tracking, word-finding difficulties, difficulty understanding what others say to him, and difficulties with executive functions. Mr. Douglas relies on his daughter for management of his appointments, medications, finances, and cooking. He continues to drive reportedly without incident; however, he did discuss becoming lost more often recently, as well as rapidly forgetting instructions when he calls and asks for directions home.     Emotionally, Mr. Douglas discussed both grief and clinically significant symptoms of depression. He and his friend also discussed significant anxiety exacerbated by the stress of settling his wife's estate and the process of selling their home. These symptoms are present in the context of few active coping strategies for management of his mood and life stressors, with Mr. Douglas remarking: "I just go from day to day" when asked how he manages depression and anxiety symptoms. Mr. Douglas appears to cope with alcohol and drinks daily in spite of health consequences. He reported a history of drinking up to 19 or more drinks a day (750ml of whiskey and "several" beers), corroborated by his friend who accompanied him to this evaluation.     Test Results:    Call Findings:   Mr. Douglas is a man " of estimated below-average to low-average baseline cognitive functioning on the basis of demographic data and his performances on tasks typically insensitive to neurologic insult.  Current general cognitive functioning is below-average (FSIQ = 71); not markedly discrepant from baseline estimates.   Performances in the following areas were within normal limits or consistent with baseline estimates, suggesting intact cognitive functioning in relevant domains:  Auditory attention and working memory  Information processing speed (variable)  Naming/ word-finding  Verbal fluency  Receptive language  Basic visual-perceptual skills  Verbal and visual abstraction  Bilateral fine motor dexterity  Performances in the following areas were below normal limits, suggesting weakness or decline in relevant domains:  Learning of verbal information with variable recall and normal-range recognition  Learning, recall, and recognition of visual information (I.e. an amnestic pattern)  Constructional praxis  Set-shifting/ multitasking  Mr. Douglas's friend endorsed clinically-significant declines in ADL functioning in the following domains on a standard caregiver questionnaire: household care, employment & recreation, shopping & money, travel, and communication  Mr. Douglas's friend endorsed mild neuropsychiatric symptoms of agitation, elation, and disinhibition; and moderate appetite/ eating behavior changes.   On screening measures of depression and anxiety, Mr. Douglas endorsed mild clinically significant depression. He did not endorse clinically significant anxiety.    Data Synthesis: Cognitive test results suggest a mixed pattern of frontal-subcortical and right mesial temporal systems dysfunction.    Diagnostic Considerations: Mr. Douglas has evidence for clinically significant cognitive decline relative to his baseline. In this context, there is evidence for functional decline due to his cognitive changes on the basis of interview and  report of his caregiver on a standard inventory. As such, he qualifies for a major neurocognitive disorder diagnosis. Mr. Dougals's current alcohol use pattern is consistent with an alcohol use disorder, in the context of historic heavy use and dependence. Although Mr. Douglas reported and endorsed depressed mood, this is seen in the context of grief, following the death of his wife in 01/2024.     Etiologic Considerations: Given his above profile, medical history, and alcohol use history, cerebrovascular disease and sequelae of alcohol use are both likely contributory to his cognitive changes. Ongoing monitoring may help rule out a co-occurring Alzheimer's disease process given evidence of right mesial temporal systems dysfunction.     Diagnoses  1. Major neurocognitive disorder due to multiple etiologies        2. Alcoholic peripheral neuropathy        3. Alcohol use disorder, mild, abuse        4. Grief             Provider Recommendations:   Mr. Douglas will be encouraged to follow up with his referring provider in order to integrate these findings into the overall context of his clinical care, and to implement any of the below recommendations as appropriate.     Continued close management of Mr. Douglas's cerebrovascular risk factors will be important moving forward.    I will discuss Mr. Douglas's continued alcohol use with him and encourage him to reduce or eliminate alcohol use.    If Mr. Kamaras symptoms of depression do not improve by approximately 06/2024, consider a major depressive disorder diagnosis; medications to help improve mood may be considered now or at that time, in keeping with Mr. Douglas's preferences.     Consider medications to manage cognitive decline in the context of a neurocognitive disease.     Repeat assessment is recommended in 12 months, in order to assess for changes over time and update his treatment plan. Scores from this assessment should be used as a baseline for comparison at that  time.     Patient Recommendations:  The next step in your care is to follow up with your referring provider in order to help with continued management of your care. The below recommendations may help you and your family compensate for your difficulties and better understand the reasons for your cognitive changes.     Alcohol use is likely worsening your thinking skills at this time. I recommend progressively reducing your alcohol use over the next few weeks until you are no longer drinking. Occasional use in moderation (for example 1-2 drinks a day, 1-2 days a week) or abstinence are recommended at this time.    Take steps to reduce your risk of stroke. Follow with your primary care and neurology team for management of medical risk factors.     Your referring neurology team has prescribed a medication for cognitive decline. Although this medication have been approved for Alzheimer's disease, there is some evidence that it is safe and effective for the cognitive changes of cerebrovascular disease as well.     There are steps you can take to improve your long-term brain health and reduce your risk for cognitive decline in the future. I encourage you to follow the recommendations in your daily life:  Engage in physical activity (i.e., something that gets your heart rate up) totaling at least 15-30 minutes per day. Regular exercise is very important for both long-term health and stress management. Walking, hiking, elliptical, stationary biking, dancing, and yoga are all good options. If you have any physical limitations or health conditions that may impact your ability to exercise, consult with your doctor and/or a physical therapist to develop a regimen that works for you.  Work closely with your doctor(s) to manage any vascular conditions such as high blood pressure, high cholesterol, and diabetes. Follow the management guidelines from the American Heart Association at www.heart.org.  Remain mentally engaged by  keeping socially active, reading, learning new skills, playing games, or participating in a hobby.  Get a good night's sleep by avoiding screens 30-60 minutes before bed and sticking to a regular sleep schedule.  Eat a balanced, heart-healthy diet that is low in salt, saturated fats, and added sugar. The Mediterranean diet has been shown to be especially healthy; studies show that it may reduce the risk of developing dementia and slow the rate of age-related cognitive decline.      Repeat assessment is indicated in 12 months, or sooner in the event that you or your family notice significant cognitive or functional declines. At that time scores from this assessment should be used as a baseline for comparison.    Mr. Douglas will be provided the results of the evaluation.     Thank you for allowing me to participate in Mr. Owen care.  If you have any questions, please contact me at [phone].        _____________________  Isaiah Pulido, Ph.D.  Neuropsychologist  Department of Neuropsychology  [facility], [place]    HISTORY OF PRESENT ILLNESS: Mr. Richy Douglas is an 83 y.o., right-handed, -American male with 11 years of education who was referred for a neuropsychological evaluation in the setting of major neurocognitive disorder.    Onset and Course of Cognitive Concerns: Concerns began insidiously in approximately 01/2023 and have been progressively worsening over time. He discussed that there is some variability from day-to-day with good days and bad.     Characterization of Cognitive Concerns  Attention/ working memory: Mr. Douglas reported difficulties with sustaining attention and often losing track of his train of thought mid-conversation.  Processing speed: Mr. Douglas reported difficulties with slowed thinking speed.  Language: Mr. Douglas reported difficulties with expressive language, specifically word-finding difficulty; he uses circumlocution often due to difficulties finding the right word  "in conversation. He discussed subtle difficulties with receptive language from time to time, with his caregiver discussing frequent pauses to "gather what you just said."   Visual-spatial/ navigation: Mr. Douglas denied difficulties with visual-spatial skills or navigation.  Psychomotor: Mr. Douglas discussed that left knee pain and instability leads to imbalance, for which he uses a cane.  Memory: Mr. Douglas reported difficulties with learning new information. He discussed that he will often forget where he is going after arising to go somewhere. He discussed forgetting conversations, forgetting when his wife  (2024), and other important details. He and his caregiver discussed difficulties with memory for select remote information as well. He has had instances of becoming lost while driving, present over the past 4-5 months.   Executive Functions: Mr. Douglas and his caregiver discussed that he has difficulties prioritizing tasks and multitasking day-to-day. Difficulties with inattention above are seen in the context of tangentiality.   Orientation: Mr. Douglas denied errors in orientation to person, place, time, or situation.     Neuropsychiatric Symptoms:  Hallucinations: Denied  Delusional/Paranoid Thinking: Denied  Apathy: Denied  Irritability/Agitation: Endorsed, though this has improved or stabilized since his wife's death. This was a much bigger problem 5 years ago while he worked.   Disinhibition: Denied  Depression/Labile Mood: Endorsed current depression, worsened since his wife . He discussed depressed mood and reduced energy, and reduced engagement in goal-directed activities. When conversations about subject matter such as his circumstances are broached he may become atypically tearful but he is otherwise described as his old self.   Anxiety: This has been present since his wife went on hospice in 10/2023 and has worsened again since her death. The process of settling her estate and selling " "their house in particular has been a major stressor for him. Psychosocial stressors are a considerable source of stress for him related tot he above and how this affects his family system (I.e. him and his daughter).   Coping: He discussed limited coping strategies, remarking "I just go from day to day."     DAILY FUNCTIONING:  Living Environment: He lives with his daughter in her house.     BASIC ADLS:  Feeding: independent  Dressing: independent  Bathing: independent  Toileting: independent    IADLS:  Support System: His daughter is his primary caregiver.   Appointment Management: dependent on his daughter since the death of his wife, who previously managed this activity for him.   Medication Compliance: His daughter manages his medications for him. She had begun to assist with this activity before his wife's death due to cognitive and health changes. She took over medication management approximately 2 years ago when his regimen became too complex for him to manage alone and he made mistakes.   Financial Management: His daughter has taken over financial management since the death of his wife, who had always managed this aspect of their life.   Cooking: His daughter cooks for him. His wife had always cooked before her death.   Driving: He continues to drive and but for getting lost he denied errors. He discussed that he will stop and then call someone to help find his way. However, there have been instances of him doing so and still navigating to the other side of town.     BRAIN HEALTH RISK FACTORS:  Hearing Loss: Denied  Vision Loss: Denied with correction.  Physical Activity: He goes to a senior center, and walks every morning.    Social Isolation: Denied  Falls: He last fell 4-5 months ago while caring for his wife near the end of her life. They reported this and one other fall in the last few years.   Sleep: He discussed that his medications and hydration before bed make him use the restroom 3-4 times a night. " He estimated that he achieves 3-4 hours of sleep per night in this context and naps during the day, sometimes when conversing with others. He discussed rapid sleep onset, frequent awakening at night as above. He denied clear REM sleep behaviors. He gets up and walks at night in his robe but denied confusion.     MEDICAL HISTORY: Mr. Douglas  has a past medical history of Alcoholic peripheral neuropathy (03/01/2023), Anemia of chronic disease, Aortic aneurysm, Atrial fibrillation with RVR (09/24/2021), Chronic systolic congestive heart failure (08/31/2017), Depression, Emphysema of lung (08/31/2017), GERD (gastroesophageal reflux disease), Hypertension, Knee osteoarthritis (10/21/2022), Major neurocognitive disorder due to multiple etiologies (04/14/2023), Microcytic anemia (11/24/2020), Other hyperlipidemia (04/27/2021), Personal history of colonic polyps (2022), and Prostate cancer.    NEUROIMAGING:  Results for orders placed or performed during the hospital encounter of 05/03/23   MRI Brain Without Contrast    Narrative    EXAMINATION:  MRI BRAIN WITHOUT CONTRAST    CLINICAL HISTORY:  Memory loss; Chronic systolic (congestive) heart failure    TECHNIQUE:  Multiplanar multisequence MR imaging of the brain was performed without contrast.    COMPARISON:  None.    FINDINGS:  Mild to moderate global cortical atrophy without lobar predominance.  No evidence of hydrocephalus.  Score 0 bilateral medial temporal lobe atrophy.    Confluent periventricular and scattered subcortical T2/FLAIR hyperintense signal noted in the cerebral white matter.    No evidence of acute territorial infarct, intracranial hemorrhage, or mass lesion.  No abnormal restricted diffusion or susceptibility artifact.  No midline shift or mass effect.    Midline structures and posterior fossa structures are unremarkable.  Basal cisterns are clear.  Major vascular flow voids are unremarkable.    Cranium is unremarkable.  Postsurgical appearance of the  bilateral orbital lens.  Orbits and globes otherwise within normal limits.  Mild mucosal thickening throughout the paranasal sinuses with small mucosal retention cyst of the left maxillary sinus.  Mastoid air cells are clear.      Impression    No acute intracranial abnormality.    Mild-to-moderate global cortical atrophy without lobar predominance.    Nonspecific white matter changes likely related to chronic microvascular ischemia, Fazekas score 3.    Mild bilateral paranasal sinus disease.      Electronically signed by: Prabhakar Araiza  Date:    05/03/2023  Time:    09:14       Current medications: Mr. Douglas has a current medication list which includes the following prescription(s): albuterol, albuterol, aspirin, empagliflozin, empagliflozin, ferrous sulfate, trelegy ellipta, furosemide, memantine, metoprolol succinate, potassium chloride sa, and entresto.     Review of patient's allergies indicates:   Allergen Reactions    Fish containing products     Iodine and iodide containing products     Shellfish containing products        PSYCHIATRIC HISTORY: Denied a history of psychiatric concerns.     SUICIDAL IDEATION:  History: Denied  Current Stressors: His wife's death, cognitive and physical changes, settling his wife's estate.   Active Suicidal Ideation:Denied  Plan/Intent: Denied    FAMILY HISTORY: family history includes Diabetes in his mother; Peripheral vascular disease in his mother.    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    Learning Difficulties: He discussed difficulties learning across math, reading, and writing. He had good teachers who provided him with assistance.    Special Education: He completed school before relevant supports were available.    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     Relationship Status/ Support Network:   :  No,  in 01/2024 for the second time.    : No   Children: 1 son he raised but who is not a biological child, and 2 biological daughters, 7 grandchildren and great grandchildren      SUBSTANCE USE:   Alcohol: Mr. Douglas reported a history of heavy alcohol use with continued use, heaviest on the weekends. He discussed that he may have three beers a day during the weekdays but this can increase with social engagement. He discussed that he used to drink heavily (12 beers) daily or six days a week during the period from 1252-0436. He discussed that in the past he drank as much as half a fifth of whiskey and several beers a day.   Recreational Substances: Denied.   Tobacco Products: He is current smoker, who smokes 2-3 packs per week; with a 69-pack-year history.    MENTAL STATUS AND OBSERVATIONS:  APPEARANCE: Casually dressed and adequate grooming/hygiene.   ALERTNESS/ORIENTATION: Attentive and alert. Mr. Douglas was generally oriented to person, place, time, and situation, missing the date only, by two days.  GAIT/MOTOR: Mr. Douglas walked with the assistance of a cane. He had left knee pain, occasionally wincing when he moved his left leg and associated antalgic gait.   SENSORY: Hearing and corrected vision were adequate for testing .   SPEECH/LANGUAGE: Normal in rate, rhythm, tone, and volume. Expressive and receptive language were grossly intact.  MOOD/AFFECT: Mood ranged from sad and anxious to euthymic. Affect was restricted but mood-congruent.   INTERPERSONAL BEHAVIOR: Rapport was quickly and easily established   THOUGHT PROCESSES: Thoughts seemed logical and goal-directed.   TESTING OBSERVATIONS: Mr. Douglas did all that was asked of him without complaint or criticism. He attended to all instructions although appeared to have difficulties with comprehension and retention, often requiring repetition of task instructions. Tests of memory and visuospatial tasks appeared difficult for him. He consistently  "called the color red, "brown."    RESULTS     Tests Administered (manual norms used unless otherwise indicated): [employer] - Test of Premorbid Functioning (TOPF),  Jachin in the Hand Test , Wechsler Adult Intelligence Scale 4th Ed. (WAIS-IV) select subtests, Controlled Oral Word Association Test (COWAT; [facility] norms), Semantic Fluency (Animals; [facility] norms), Neuropsychological Assessment Battery (NAB) Naming and Auditory Comprehension , Nicolas Verbal Learning Test, Revised (HVLT-R), Wechsler Memory Scale, 4th Ed. Logical Memory (WMS IV-LM), Brief Visuospatial Memory Test, Revised (BVMT-R), Trail Making Test (TMT A/B; [facility] norms), Grooved Pegboard Test ([facility] norms), Activities of Daily Living Questionnaire (ADL-Q; caregiver form), Neuropsychiatric Inventory (NPI-Q) , Geriatric Depression Scale (GDS) and Hardy Anxiety Inventory (FARIBA).    Score Label T-Score Standard Score Z-Score Scaled Score %ile Rank   Exceptionally High > 70 > 130 > 2.0  > 16 > 98   Above Average 64-69 120-129 1.4-1.9 15 91-97   High Average 57-63 110-119 0.7-1.3 12-14 75-90   Average 44-56  0.6 to -0.6 8-11 25-74   Low Average 37-43 80-89 -1.3 to -0.7 6-7 9-24   Below Average 30-36 70-79 -2.0 to -1.4 4-5 2-8   Exceptionally Low < 30 < 70  < -2.0 < 4 < 2       Performance Validity: Mr. Douglas completed both stand-alone and embedded measures of task engagement. Below-cutoff performance on any one stand-alone measure and/ or any two embedded measures of task engagement is highly unlikely to occur outside the context of poor or inconsistent effort during testing. Mr. Douglas scored above predetermined cutoffs on all administered measures of task engagement. As such, there is no evidence to suggest poor or inconsistent engagement and their performance is deemed to be a reasonable reflection of their day-to-day cognitive status.       PREMORBID FUNCTIONING Raw Score Type of Standardized Score Standardized Score Percentile/CP " Descriptor   TOPF simple dem. eFSIQ - SS 83 13 Low Average   TOPF pred. eFSIQ - SS 70 2 Below Average   TOPF simple + pred. eFSIQ - SS 74 4 Below Average   INTELLECTUAL FUNCTIONING Raw Score Type of Standardized Score Standardized Score Percentile/CP Descriptor   WAIS-IV         VCI - SS 76 5 Below Average   TREY - SS 73 4 Below Average   WMI - SS 77 6 Below Average   PSI - SS 76 5 Below Average   FSIQ - SS 71 3 Below Average   GAI - ss 72 3 Below Average   LANGUAGE FUNCTIONING Raw Score Type of Standardized Score Standardized Score Percentile/CP Descriptor   WAIS-IV Information 6 ss 6 9 Low Average   TOPF Word Reading 10 SS 74 4 Below Average   NAB Auditory Comprehension 83 Tscore 41 18 Low Average   NAB Auditory Comprehension Colors 13 - - 100 WNL   NAB Auditory Comprehension Shapes 21 - - 10 Low Average   NAB Auditory Comprehension Colors/Shapes/Numbers 20 - - 15 Low Average   NAB Auditory Comprehension Pointing 6 - - 100 WNL   NAB Auditory Comprehension Yes/No 8 - - 23 Low Average   NAB Auditory Comprehension Paper Folding 15 - - 39 Average   NAB Naming 25 Tscore 40 16 Low Average   NAB Naming Percent Correct After Semantic Cuing 0 - - 38 Average   NAB Naming Percent Correct After Phonemic Cuing 0 - - 20 Low Average   FAS 18 Tscore 46 34 Average   Animal Naming 14 Tscore 58 79 High Average   VISUOSPATIAL FUNCTIONING Raw Score Type of Standardized Score Standardized Score Percentile/CP Descriptor   WAIS-IV Block Design 4 ss 4 2 Below Average   WAIS-IV Visual Puzzles 6 ss 6 9 Low Average   BVMT-R Copy 8 zscore -7.5 <0.1 Exceptionally Low   LEARNING & MEMORY Raw Score Type of Standardized Score Standardized Score Percentile/CP Descriptor   HVLT-R         Total Immediate (4, 4, 4/ 12) 12 Tscore 29 2 Below Average   Delayed Recall 3 Tscore 33 4 Below Average   Retention % 75 Tscore 47 38 Average   Hits 9 - - - -   False Positives 0 - - - -   Discrimination  9 Tscore 41 18 Low Average   WMS-IV Subtests         LM I 13  ss 5 5 Below Average   LM II 8 ss 8 25 Average   LM Recognition 14 - - 10-16 Low Average   BVMT-R         IR (0, 1, 2/ 12) 3 zscore -2.2 1 Exceptionally Low   DR 2 zscore -1.7 5 Exceptionally Low   Discrimination Index 2 - -3.1 0 Exceptionally Low   ATTENTION/WORKING MEMORY Raw Score Type of Standardized Score Standardized Score Percentile/CP Descriptor   WAIS-IV Digit Span 6 ss 6 9 Low Average         DS Forward 7 ss 7 16 Low Average         DS Backward 5 ss 7 16 Low Average         DS Sequence 4 ss 7 16 Low Average         Longest Digit Forward 5 - - - -         Longest Digit Backward 3 - - - -         Longest Digit Sequence 4 - - - -   WAIS-IV Arithmetic 6 ss 6 9 Low Average   MENTAL PROCESSING SPEED Raw Score Type of Standardized Score Standardized Score Percentile/CP Descriptor   WAIS-IV Symbol Search 6 ss 4 2 Below Average   WAIS-IV Coding 26 ss 7 16 Low Average   TMT A  56 Tscore 49 46 Average   TMT A errors 0 - - - -   EXECUTIVE FUNCTIONING Raw Score Type of Standardized Score Standardized Score Percentile/CP Descriptor   TMT B 304 Tscore 35 7 Below Average   TMT B errors 4 - - - -   WAIS-IV Similarities 5 ss 5 5 Below Average   WAIS-IV Matrix Reasoning 6 ss 6 9 Low Average   FRONTOMOTOR  Raw Score Type of Standardized Score Standardized Score Percentile/CP Descriptor   GPT  Tscore 49 46 Average   GPD  Tscore 47 38 Average   FUNCTIONAL  Raw Score Type of Standardized Score Standardized Score Percentile/CP Descriptor   ADLQ Self Care Activities - Percent - 11 None to Mild   ADLQ Household Care - Percent - 44 Moderate Impairment   ADLQ Employment & Recreation - Percent - 56 Moderate Impairment   ADLQ Shopping & Money - Percent - 50 Moderate Impairment   ADLQ Travel - Percent - 67 Moderate Impairment   ADLQ Communication - Percent - 53 Moderate Impairment   MOOD & PERSONALITY Raw Score Type of Standardized Score Standardized Score Percentile/CP Descriptor   GDS-30 18 - - - Mild   FARIBA 6 - - -  Minimal   ss = scaled score (mean = 10, SD = 3); SS = standard score (mean = 100, SD = 15); Tscore mean = 50, SD = 10; zscore (mean = 0.00, SD = 1)           4/9/2024     2:20 PM   NPIQ RFS   WHO IS FILLING OUT FORM? Caregiver   Does this patient have false beliefs, such as thinking that others are stealing from him/her or planning to harm him/her in some way? No   Does this patient have hallucinations such as false visions or voices? Quiroz she/he seem to hear or see things that are not present? No   Is the patient resistive to help from others at times, or hard to handle? Yes   Agitation Agression Severity 1   Agitation/Agression Distress 2   Does the patient seem sad or say that he/she is depressed? No   Does the patient become upset when  from you? Does he/she have any other signs of nervousness such as shortness of breath, sighing, being unable tor elax, or feeling excessively tense? No   Does the patient appear to feel good or act excessively happy? Yes   Elation/Euphoria Severity 1   Elation/Euphoria Distress 1   Does this patient seem less interested in his/her usual activities or in the activities and plans of others? No   Does this patient seem to act cumpolsively, for example, talking to strangers as if she/he knows them, or saying things that may hurt people's feelings? Yes   Dis-inhibition Severity 1   Dis-inhibition Distress 1   Is the patient impatient and cranky? Does he/she have difficulty coping with delays or waiting for planned activities? Yes   Irritability/Liability Severity 1   Irritability/Liability Distress 2   Does the patient engage in repetitive activities such as pacing around the house, handling buttons, wrapping string, or doing other things repeatedly? No   Does this patient awaken you during the night, rise too early in the morning, or take excessive naps during the day? No   Has the patient lost or gained weight, or had a change in the type of food he/she likes? Yes    Apetitie/Eating Severity 2   Apetite/Eating Distress 1   NPI Total Severity Score 6   NPI Total Distress Score 7       Billing Documentation     Time spent conducting face to face interview with the patient: 68 minutes; 59233.  Time on review of neuropsychological test data and relevant records, data interpretation, and writing/ documentation: 92 minutes; 45343 &01202 (x1).  Psychometrist time spent in the administration and scoring of 2 or more neuropsychological tests 242 minutes; 26775 & 01376 (x7).     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

## 2024-04-10 ENCOUNTER — TUMOR BOARD CONFERENCE (OUTPATIENT)
Dept: PULMONOLOGY | Facility: CLINIC | Age: 84
End: 2024-04-10
Payer: MEDICARE

## 2024-04-10 NOTE — PROGRESS NOTES
[facility]     Patient Name: Richy Douglas    MRN: [id number]    Date of Tumor Board: 4/9/2024    Diagnosis:  Pulmonary nodule    Referring Provider:  Michael Jackson MD    Present PCTP Providers: Michael Jackson MD, Vu Mclaughlin MD, Nima Jimenes MD, EFE Argueta, Lindsey Krause NP, Elizabeth LeJeune, NP, EFE Raines    Smoking hx: 68 pack years      Diagnostic Work Up:    Nodify ID: Pre-test risk: 56%, CDT: No significant level, XL2: Reduced risk (55% sensitivity)  Imaging:   CT Chest 3/2024- 7mm CHUCK nodule  CTA Chest 2/2023- slight increase in CHUCK nodule  CT Chest 3/2023- stable 5mm CHUCK nodle  LDCT 9/2022- stable 5mm CHUCK nodule       Board Recommendations: CT Chest in 6 months           The Multidisciplinary Tumor Board (MTB) is intended to assist the treatment team in developing a coordinated, comprehensive management plan for the patient. The deliberations of the MTB are not intended and should not be assumed to indicate any particular course of treatment with regard to the diagnosis, treatment, or management of the patient. Deliberations made or treatment options explored by the MTB are not a substitute for the professional judgment of the treating physician in diagnosing and treating the patient in accordance with the appropriate standard of care, applicable regulations, and facility policies. The MTB shall not be deemed under any circumstance to prescribe, order, or require certain medical care or medical or diagnostic services. The treating physician will remain solely responsible for making all medical, diagnostic, or care decisions concerning the patient.

## 2024-04-19 PROBLEM — F10.10 ALCOHOL USE DISORDER, MILD, ABUSE: Status: ACTIVE | Noted: 2024-04-19

## 2024-04-19 PROBLEM — F43.21 GRIEF: Status: ACTIVE | Noted: 2024-04-19

## 2024-04-23 ENCOUNTER — PATIENT MESSAGE (OUTPATIENT)
Dept: ADMINISTRATIVE | Facility: CLINIC | Age: 84
End: 2024-04-23
Payer: MEDICARE

## 2024-05-01 RX ORDER — POTASSIUM CHLORIDE 750 MG/1
20 TABLET, EXTENDED RELEASE ORAL DAILY
Qty: 90 TABLET | Refills: 1 | Status: SHIPPED | OUTPATIENT
Start: 2024-05-01 | End: 2024-06-13

## 2024-05-17 ENCOUNTER — TELEPHONE (OUTPATIENT)
Dept: PRIMARY CARE CLINIC | Facility: CLINIC | Age: 84
End: 2024-05-17
Payer: MEDICARE

## 2024-05-17 NOTE — TELEPHONE ENCOUNTER
----- Message from Starla Pulido sent at 5/16/2024  4:45 PM CDT -----  Contact: Maximilianoo/ Daughter  Daughter is calling to reschedule appointment from 06/. Please callback [phone]. If calling back tomorrow please callback after 1:00p.m.

## 2024-05-21 ENCOUNTER — TELEPHONE (OUTPATIENT)
Dept: PRIMARY CARE CLINIC | Facility: CLINIC | Age: 84
End: 2024-05-21
Payer: MEDICARE

## 2024-05-21 NOTE — TELEPHONE ENCOUNTER
----- Message from Soren Braxton sent at 5/21/2024  3:55 PM CDT -----  Contact: [phone]  Type:  Sooner Apoointment Request    Caller is requesting a sooner appointment.  Caller declined first available appointment listed below.  Caller will not accept being placed on the waitlist and is requesting a message be sent to doctor.  Name of Caller:Clemencia  When is the first available appointment?6/10  Symptoms:f/u  Would the patient rather a call back or a response via [facility]? Call back   Best Call Back Number:[phone]   Additional Information: n/a      Thanks KB

## 2024-05-22 ENCOUNTER — TELEPHONE (OUTPATIENT)
Dept: PAIN MEDICINE | Facility: CLINIC | Age: 84
End: 2024-05-22
Payer: MEDICARE

## 2024-05-22 ENCOUNTER — OFFICE VISIT (OUTPATIENT)
Dept: CARDIOLOGY | Facility: CLINIC | Age: 84
End: 2024-05-22
Payer: MEDICARE

## 2024-05-22 ENCOUNTER — OFFICE VISIT (OUTPATIENT)
Dept: NEUROLOGY | Facility: CLINIC | Age: 84
End: 2024-05-22
Payer: MEDICARE

## 2024-05-22 VITALS
BODY MASS INDEX: 23.12 KG/M2 | SYSTOLIC BLOOD PRESSURE: 96 MMHG | HEART RATE: 78 BPM | WEIGHT: 152.56 LBS | DIASTOLIC BLOOD PRESSURE: 40 MMHG | HEIGHT: 68 IN

## 2024-05-22 DIAGNOSIS — I50.22 CHRONIC SYSTOLIC CONGESTIVE HEART FAILURE: ICD-10-CM

## 2024-05-22 DIAGNOSIS — I50.23 ACUTE ON CHRONIC SYSTOLIC CONGESTIVE HEART FAILURE: Primary | ICD-10-CM

## 2024-05-22 DIAGNOSIS — F02.80 MAJOR NEUROCOGNITIVE DISORDER DUE TO MULTIPLE ETIOLOGIES: Primary | ICD-10-CM

## 2024-05-22 DIAGNOSIS — F10.230 ALCOHOL DEPENDENCE WITH UNCOMPLICATED WITHDRAWAL: ICD-10-CM

## 2024-05-22 PROCEDURE — 96132 NRPSYC TST EVAL PHYS/QHP 1ST: CPT | Mod: HCNC,S$GLB,, | Performed by: STUDENT IN AN ORGANIZED HEALTH CARE EDUCATION/TRAINING PROGRAM

## 2024-05-22 PROCEDURE — 1126F AMNT PAIN NOTED NONE PRSNT: CPT | Mod: HCNC,CPTII,S$GLB, | Performed by: PHYSICIAN ASSISTANT

## 2024-05-22 PROCEDURE — 99499 UNLISTED E&M SERVICE: CPT | Mod: HCNC,S$GLB,, | Performed by: STUDENT IN AN ORGANIZED HEALTH CARE EDUCATION/TRAINING PROGRAM

## 2024-05-22 PROCEDURE — 1157F ADVNC CARE PLAN IN RCRD: CPT | Mod: HCNC,CPTII,S$GLB, | Performed by: PHYSICIAN ASSISTANT

## 2024-05-22 PROCEDURE — 3074F SYST BP LT 130 MM HG: CPT | Mod: HCNC,CPTII,S$GLB, | Performed by: PHYSICIAN ASSISTANT

## 2024-05-22 PROCEDURE — 1160F RVW MEDS BY RX/DR IN RCRD: CPT | Mod: HCNC,CPTII,S$GLB, | Performed by: PHYSICIAN ASSISTANT

## 2024-05-22 PROCEDURE — 1159F MED LIST DOCD IN RCRD: CPT | Mod: HCNC,CPTII,S$GLB, | Performed by: PHYSICIAN ASSISTANT

## 2024-05-22 PROCEDURE — 99214 OFFICE O/P EST MOD 30 MIN: CPT | Mod: HCNC,S$GLB,, | Performed by: PHYSICIAN ASSISTANT

## 2024-05-22 PROCEDURE — 99999 PR PBB SHADOW E&M-EST. PATIENT-LVL III: CPT | Mod: PBBFAC,HCNC,, | Performed by: PHYSICIAN ASSISTANT

## 2024-05-22 PROCEDURE — 3078F DIAST BP <80 MM HG: CPT | Mod: HCNC,CPTII,S$GLB, | Performed by: PHYSICIAN ASSISTANT

## 2024-05-22 PROCEDURE — 99999 PR PBB SHADOW E&M-EST. PATIENT-LVL I: CPT | Mod: PBBFAC,HCNC,, | Performed by: STUDENT IN AN ORGANIZED HEALTH CARE EDUCATION/TRAINING PROGRAM

## 2024-05-22 NOTE — TELEPHONE ENCOUNTER
Called patient and daughter emily picked up the phone and we scheduled an appt for the patient.  All questions answered.  Vanessa KONG

## 2024-05-22 NOTE — PROGRESS NOTES
Mr. Douglas and his daughter attended a in-person feedback session to discuss the results from his recent neuropsychological testing (see report dated 04/09/2024). We discussed his test results, diagnoses, and my recommendations. Mr. Douglas and his daughter voiced their understanding of the information provided, and voiced their intention to follow through on the recommendations provided. All questions were answered to their satisfaction and Mr. Douglas was provided with a copy of his report. he was encouraged to contact our office with any future questions or concerns.         _____________________  sIaiah Pulido, Ph.D.  Neuropsychologist  Department of Neuropsychology  [facility], [place]       [facility] Documentation     Time on review of neuropsychological test data and relevant records, data interpretation, providing feedback about these results, and writing/ documentation: 38 minutes; [id number]

## 2024-05-22 NOTE — PROGRESS NOTES
[facility] T[facility] Provider Note (Follow-up) Consult Note      HPI:  82 y/o male with PMHx of chronic HFrEF (EF 30%), atrial fibrillation (not on AC due to bleeding risk), HTN, PVD, emphysema, GERD, prostate cancer who presents for one month follow up     Since last visit has not had any ER admits for ADHF, on lasix 40 mg BID, responds well.      No SOB, PND no LE edema     Goes to activity center for meals and activities     Lasix 40 mg BID       PHYSICAL: There were no vitals filed for this visit.       JVD: no,    Heart rhythm: regular  Cardiac murmur: No    S3: no  S4: no  Lungs: clear  Liver span: 10 cm:   Hepatojugular reflux: no  Edema: no,       ASSESSMENT: chronic systolic HF    PLAN:      Patient Instructions:   Instruct the patient to notify this clinic if HH, a physician or an advanced care provider wants to change medication one of their HF medications   Activity and Diet restrictions:   Recommend 2-3 gram sodium restriction and 1500cc- 2000cc fluid restriction.  Encourage physical activity with graded exercise program.  Requested patient to weigh themselves daily, and to notify us if their weight increases by more than 3 lbs in 1 day or 5 lbs in 3 days.    Assigned dry weight on home scale:   Medication changes (include current dose and changed dose)  Continue lasix 40 mg BID  Will keep GDMT as is as pt states daughter checks BP and is ok     CHF education done  Continue to see PCP  RTC 5 months

## 2024-06-04 ENCOUNTER — PATIENT MESSAGE (OUTPATIENT)
Dept: CARDIOLOGY | Facility: CLINIC | Age: 84
End: 2024-06-04
Payer: MEDICARE

## 2024-06-05 RX ORDER — FERROUS SULFATE 325(65) MG
325 TABLET, DELAYED RELEASE (ENTERIC COATED) ORAL DAILY
Qty: 30 TABLET | Refills: 1 | Status: SHIPPED | OUTPATIENT
Start: 2024-06-05

## 2024-06-05 NOTE — HOSPITAL COURSE
1/10/22- pt comfortable on 2L via NC current smoker physical exam consistent with COPD  1/11/22- CT scan of the chest reveals severe emphysematous lungs with bully scattered throughout all lung fields bilaterally.  Consolidation left upper and middle jose lobes consistent with pneumonia.  No masses. Echo reveals new decline in EF to 25% pt reported chest pain on admission  1/12/22- pt in Afib coronary angiogram postponed. Rate controlled on metoprolol. Daily drinker started on librium  1/13/22- pt underwent LHC today with Dr. Tovar result was clean coronaries. EF documented at 50% although transthoracic echo EF was 25%    1/14 patient reports improvement in sx. Tolerated lhc procedure well. After discussing with cards, ok to d/c. Patient will need to discuss initiation of entresto on o/p basis. Continue losartan, bb, eliquis and lasix. New rxs delivered to bedside.    Started on iv rocephin for pna. Will send 3 days of azith on dc. Bcx ngtd. Ambulatory eval for home o2 in setting of copd and chf. Case mgt consulted for dme.     Patient admitted to smoking and drinking. Was initiated on librium for concerns of etoh withdrawal.     Patient seen and evaluated by me. Patient was determined to be suitable for d/c. Patient deemed stable for discharge to home with np to visit after d/c d/t risk for readmission.      
1/12/2022--Patient seen and examined in room, lying in bed. Feels ok today. No chest pain or shortness of breath. Plan  for R/LHC today per Dr Tovar.   1/13/2022 CATH EARLIER TODAY SHOWED NORMAL CORS    DOING WELL, NO DYSPNEA, NO MORE SVT ,   HAD SVT AND WAS PUT ON LIBRIUM YESTERDAY , TODAY AWAKE AND COHERENT NO MORE SVT     1/14/22-Patient seen and examined today, resting in bed. No acute events overnight. No chest pain or SOB. Labs reviewed. H/H 8.8/29.9. No access site complaints. Counseled on ETOH cessation.  
Henry Jarrett  : 1961  Primary: [facility] Medicaid (Medicaid Managed)  Secondary:  [facility] [place] [address] [place]  [facility] [address]  Phone: [phone]  Fax: [phone] Plan Frequency: 2x per week    Plan of Care/Certification Expiration Date: 24        Plan of Care/Certification Expiration Date:  Plan of Care/Certification Expiration Date: 24    Frequency/Duration:   Plan Frequency: 2x per week      Time In/Out:   Time In: 847  Time Out: 932      PT Visit Info:    Total # of Visits Approved: 99  Progress Note Due Date: 24  Total # of Visits to Date: 12  Progress Note Counter: 9  Canceled Appointment: 7      Visit Count:  12    OUTPATIENT PHYSICAL THERAPY:   Treatment Note 2024       Episode  (PT - weakness)               Treatment Diagnosis:    Muscle weakness (generalized)  Unsteadiness on feet  Medical/Referring Diagnosis:    Cerebrovascular disease, unspecified    Referring Physician:  Jennifer Garvin FNP MD Orders:  PT Eval and Treat   Return MD Appt:  unknown   Date of Onset:  No data recorded   Allergies:   Atorvastatin  Restrictions/Precautions:   none      Interventions Planned (Treatment may consist of any combination of the following):     See Assessment Note    Subjective Comments:   Still feel like I look drunk when I walk.      Initial Pain Level::   0   /10    Post Session Pain Level:    0    /10  Medications Last Reviewed:  2024  Updated Objective Findings:  See Evaluation Note from today  Treatment   THERAPEUTIC ACTIVITY: (40 minutes):    Therapeutic activities with education  improve understanding of POC, need for daily exercise to maintain strength, balance, and coordination.  Required min verbal cues to for understanding.  Pt education.  RG, Dynamic gait,  progress, BP   THERAPEUTIC EXERCISE: ( minutes):    Exercises per grid below to improve mobility, strength, balance, coordination, and pain relief . 
on a 0-3 scale, 0 representing the patient’s inability to perform the task and 3 representing independence.  The scores of each section are added together for a total score of 24.  Any score below 19 indicates increased risk for falls.    Tool Used: Timed “Up and Go” (TUG)  Score:  Initial: 14.60 seconds Most Recent: 10.21 seconds (Date: 6/5/2024 )   Interpretation of Score: The test measures, in seconds, the time taken by an individual to stand up from a standard arm chair (seat height 46 cm [18 in], arm height 65 cm [25.6 in]), walk a distance of 3 meters (118 in, approx 10 ft), turn, walk back to the chair and sit down.  If the individual takes longer than 14 seconds to complete TUG, this indicates risk for falls.    MEDICAL NECESSITY:   Patient is expected to demonstrate progress in strength, range of motion, balance, coordination, and functional technique to improve safety during all activities.  REASON FOR SERVICES/OTHER COMMENTS:  Patient continues to require skilled intervention due to weakness and balance and gait deficit.    Regarding Henry Jarrett's therapy, I certify that the treatment plan above will be carried out by a therapist or under their direction.  Thank you for this referral,  Enedelia Porter PT     Referring Physician Signature: Jennifer Garvin FNP No Signature is Required for this note.        Charge Capture  Appt Desk

## 2024-06-10 ENCOUNTER — LAB VISIT (OUTPATIENT)
Dept: LAB | Facility: HOSPITAL | Age: 84
End: 2024-06-10
Attending: FAMILY MEDICINE
Payer: MEDICARE

## 2024-06-10 ENCOUNTER — OFFICE VISIT (OUTPATIENT)
Dept: PRIMARY CARE CLINIC | Facility: CLINIC | Age: 84
End: 2024-06-10
Payer: MEDICARE

## 2024-06-10 VITALS
WEIGHT: 153.88 LBS | BODY MASS INDEX: 23.32 KG/M2 | TEMPERATURE: 99 F | HEART RATE: 88 BPM | RESPIRATION RATE: 19 BRPM | OXYGEN SATURATION: 96 % | DIASTOLIC BLOOD PRESSURE: 40 MMHG | SYSTOLIC BLOOD PRESSURE: 100 MMHG | HEIGHT: 68 IN

## 2024-06-10 DIAGNOSIS — D63.8 ANEMIA OF CHRONIC DISEASE: ICD-10-CM

## 2024-06-10 DIAGNOSIS — I70.0 ATHEROSCLEROSIS OF ABDOMINAL AORTA: ICD-10-CM

## 2024-06-10 DIAGNOSIS — J43.9 PULMONARY EMPHYSEMA, UNSPECIFIED EMPHYSEMA TYPE: ICD-10-CM

## 2024-06-10 DIAGNOSIS — Z74.8 DEPENDENT FOR TRANSPORTATION: ICD-10-CM

## 2024-06-10 DIAGNOSIS — M25.562 CHRONIC PAIN OF BOTH KNEES: ICD-10-CM

## 2024-06-10 DIAGNOSIS — Z02.71 ISSUE OF MEDICAL CERTIFICATE FOR DISABILITY EXAMINATION: ICD-10-CM

## 2024-06-10 DIAGNOSIS — G89.29 CHRONIC PAIN OF BOTH KNEES: ICD-10-CM

## 2024-06-10 DIAGNOSIS — R73.09 ABNORMAL GLUCOSE: ICD-10-CM

## 2024-06-10 DIAGNOSIS — D46.1 IDIOPATHIC REFRACTORY ANEMIA: ICD-10-CM

## 2024-06-10 DIAGNOSIS — I50.22 CHRONIC SYSTOLIC CONGESTIVE HEART FAILURE: ICD-10-CM

## 2024-06-10 DIAGNOSIS — M17.12 PRIMARY OSTEOARTHRITIS OF LEFT KNEE: Primary | ICD-10-CM

## 2024-06-10 DIAGNOSIS — F02.80 MAJOR NEUROCOGNITIVE DISORDER DUE TO MULTIPLE ETIOLOGIES: ICD-10-CM

## 2024-06-10 DIAGNOSIS — I10 PRIMARY HYPERTENSION: ICD-10-CM

## 2024-06-10 DIAGNOSIS — M25.561 CHRONIC PAIN OF BOTH KNEES: ICD-10-CM

## 2024-06-10 LAB
ANISOCYTOSIS BLD QL SMEAR: ABNORMAL
BASOPHILS # BLD AUTO: 0.07 K/UL (ref 0–0.2)
BASOPHILS NFR BLD: 1.2 % (ref 0–1.9)
DIFFERENTIAL METHOD BLD: ABNORMAL
EOSINOPHIL # BLD AUTO: 0.7 K/UL (ref 0–0.5)
EOSINOPHIL NFR BLD: 11.7 % (ref 0–8)
ERYTHROCYTE [DISTWIDTH] IN BLOOD BY AUTOMATED COUNT: 24.6 % (ref 11.5–14.5)
HCT VFR BLD AUTO: 32.5 % (ref 40–54)
HGB BLD-MCNC: 10.5 G/DL (ref 14–18)
HYPOCHROMIA BLD QL SMEAR: ABNORMAL
IMM GRANULOCYTES # BLD AUTO: 0.01 K/UL (ref 0–0.04)
IMM GRANULOCYTES NFR BLD AUTO: 0.2 % (ref 0–0.5)
LYMPHOCYTES # BLD AUTO: 1.2 K/UL (ref 1–4.8)
LYMPHOCYTES NFR BLD: 21.1 % (ref 18–48)
MCH RBC QN AUTO: 27.6 PG (ref 27–31)
MCHC RBC AUTO-ENTMCNC: 32.3 G/DL (ref 32–36)
MCV RBC AUTO: 85 FL (ref 82–98)
MONOCYTES # BLD AUTO: 0.7 K/UL (ref 0.3–1)
MONOCYTES NFR BLD: 12.6 % (ref 4–15)
NEUTROPHILS # BLD AUTO: 3.1 K/UL (ref 1.8–7.7)
NEUTROPHILS NFR BLD: 53.2 % (ref 38–73)
NRBC BLD-RTO: 0 /100 WBC
OVALOCYTES BLD QL SMEAR: ABNORMAL
PLATELET # BLD AUTO: 325 K/UL (ref 150–450)
PMV BLD AUTO: ABNORMAL FL (ref 9.2–12.9)
POIKILOCYTOSIS BLD QL SMEAR: SLIGHT
RBC # BLD AUTO: 3.81 M/UL (ref 4.6–6.2)
SCHISTOCYTES BLD QL SMEAR: ABNORMAL
SCHISTOCYTES BLD QL SMEAR: PRESENT
SPHEROCYTES BLD QL SMEAR: ABNORMAL
TARGETS BLD QL SMEAR: ABNORMAL
WBC # BLD AUTO: 5.88 K/UL (ref 3.9–12.7)

## 2024-06-10 PROCEDURE — 99215 OFFICE O/P EST HI 40 MIN: CPT | Mod: HCNC,S$GLB,, | Performed by: FAMILY MEDICINE

## 2024-06-10 PROCEDURE — 84443 ASSAY THYROID STIM HORMONE: CPT | Mod: HCNC | Performed by: FAMILY MEDICINE

## 2024-06-10 PROCEDURE — 82728 ASSAY OF FERRITIN: CPT | Mod: HCNC | Performed by: FAMILY MEDICINE

## 2024-06-10 PROCEDURE — 1159F MED LIST DOCD IN RCRD: CPT | Mod: HCNC,CPTII,S$GLB, | Performed by: FAMILY MEDICINE

## 2024-06-10 PROCEDURE — 85025 COMPLETE CBC W/AUTO DIFF WBC: CPT | Mod: HCNC | Performed by: FAMILY MEDICINE

## 2024-06-10 PROCEDURE — 83880 ASSAY OF NATRIURETIC PEPTIDE: CPT | Mod: HCNC | Performed by: FAMILY MEDICINE

## 2024-06-10 PROCEDURE — 80053 COMPREHEN METABOLIC PANEL: CPT | Mod: HCNC | Performed by: FAMILY MEDICINE

## 2024-06-10 PROCEDURE — 3288F FALL RISK ASSESSMENT DOCD: CPT | Mod: HCNC,CPTII,S$GLB, | Performed by: FAMILY MEDICINE

## 2024-06-10 PROCEDURE — 83036 HEMOGLOBIN GLYCOSYLATED A1C: CPT | Mod: HCNC | Performed by: FAMILY MEDICINE

## 2024-06-10 PROCEDURE — 3078F DIAST BP <80 MM HG: CPT | Mod: HCNC,CPTII,S$GLB, | Performed by: FAMILY MEDICINE

## 2024-06-10 PROCEDURE — 80061 LIPID PANEL: CPT | Mod: HCNC | Performed by: FAMILY MEDICINE

## 2024-06-10 PROCEDURE — 1157F ADVNC CARE PLAN IN RCRD: CPT | Mod: HCNC,CPTII,S$GLB, | Performed by: FAMILY MEDICINE

## 2024-06-10 PROCEDURE — 1101F PT FALLS ASSESS-DOCD LE1/YR: CPT | Mod: HCNC,CPTII,S$GLB, | Performed by: FAMILY MEDICINE

## 2024-06-10 PROCEDURE — 99999 PR PBB SHADOW E&M-EST. PATIENT-LVL V: CPT | Mod: PBBFAC,HCNC,, | Performed by: FAMILY MEDICINE

## 2024-06-10 PROCEDURE — 1160F RVW MEDS BY RX/DR IN RCRD: CPT | Mod: HCNC,CPTII,S$GLB, | Performed by: FAMILY MEDICINE

## 2024-06-10 PROCEDURE — 83735 ASSAY OF MAGNESIUM: CPT | Mod: HCNC | Performed by: FAMILY MEDICINE

## 2024-06-10 PROCEDURE — 36415 COLL VENOUS BLD VENIPUNCTURE: CPT | Mod: HCNC,PN | Performed by: FAMILY MEDICINE

## 2024-06-10 PROCEDURE — 1125F AMNT PAIN NOTED PAIN PRSNT: CPT | Mod: HCNC,CPTII,S$GLB, | Performed by: FAMILY MEDICINE

## 2024-06-10 PROCEDURE — 83540 ASSAY OF IRON: CPT | Mod: HCNC | Performed by: FAMILY MEDICINE

## 2024-06-10 PROCEDURE — G2211 COMPLEX E/M VISIT ADD ON: HCPCS | Mod: HCNC,S$GLB,, | Performed by: FAMILY MEDICINE

## 2024-06-10 PROCEDURE — 3074F SYST BP LT 130 MM HG: CPT | Mod: HCNC,CPTII,S$GLB, | Performed by: FAMILY MEDICINE

## 2024-06-10 RX ORDER — DICLOFENAC SODIUM 10 MG/G
4 GEL TOPICAL 4 TIMES DAILY
Qty: 100 G | Refills: 3 | Status: SHIPPED | OUTPATIENT
Start: 2024-06-10

## 2024-06-10 NOTE — PROGRESS NOTES
Chief Complaint  Chief Complaint   Patient presents with    Annual Exam     Knee pain        HPI  Richy Douglas is a 83 y.o. male with multiple medical diagnoses as listed in the medical history and problem list that presents for  in person visit.    The patient's last visit with me was on 2/26/2024.     History of Present Illness    83-year-old male presenting for annual exam and knee pain.    Reports left knee pain worse than right, described as 'bone-on-bone'. Left knee bothers him when walking and after sitting too long, requiring him to walk it out. Recently obtained a new cane to assist with ambulation. Has received bilateral knee injections in the past, most recently in October which provided some relief. Denies any falls or swelling.    On medications for heart failure including Entresto, metoprolol, Jardiance, and Lasix. Recently seen by cardiology in the [facility]. Denies any issues with breathing or shortness of breath. Reports being able to walk without difficulty other than knee pain.    Reports sleeping well, denying any issues with sleep quality or duration.    Reports not having eaten anything today as of 3:35 pm.    Currently taking an iron supplement.       [facility]    2/26/2024  3:55 PM CST - Filed by Patient   This questionnaire should take approximately 5 to 10 minutes to complete.  To begin, press Let's Begin and then press Continue. Let's Begin   On average, how many days per week do you engage in moderate to strenuous exercise (like a brisk walk)? 3 days   On average, how many minutes do you engage in exercise at this level? 10 min   Do you feel stress - tense, restless, nervous, or anxious, or unable to sleep at night because your mind is troubled all the time - these days?    Do you belong to any clubs or organizations such as Scientologist groups, unions, fraternal or athletic groups, or school groups? No   How often do you attend meetings of the clubs or organizations you belong  to? Patient declined   In a typical week, how many times do you talk on the phone with family, friends, or neighbors? Once a week   How often do you get together with friends or relatives? Once a week   Are you , , , , never , or living with a partner?    How hard is it for you to pay for the very basics like food, housing, medical care, and heating? Patient declined   Within the past 12 months, you worried that your food would run out before you got the money to buy more. Patient declined   Within the past 12 months, the food you bought just didnt last and you didnt have money to get more. Patient declined   In the past 12 months, has lack of transportation kept you from medical appointments or from getting medications? Patient declined   In the past 12 months, has lack of transportation kept you from meetings, work, or from getting things needed for daily living? Patient declined   How often do you have a drink containing alcohol? Monthly or less   How many drinks containing alcohol do you have on a typical day when you are drinking? 1 or 2   How often do you have six or more drinks on one occasion? Patient declined   In the last 12 months, was there a time when you were not able to pay the mortgage or rent on time? Patient declined   In the last 12 months, how many places have you lived? (range: at least 0)    In the last 12 months, was there a time when you did not have a steady place to sleep or slept in a shelter (including now)? Patient declined          Pmh, Psh, Family Hx, Social Hx updated in Epic Tabs today.     Review of Systems   Constitutional:  Negative for chills and fatigue.   HENT:  Negative for sore throat and trouble swallowing.    Respiratory:  Negative for cough and shortness of breath.    Cardiovascular:  Negative for chest pain and leg swelling.   Gastrointestinal:  Negative for abdominal pain, constipation, diarrhea and nausea.   Genitourinary:   Negative for difficulty urinating, dysuria and flank pain.   Musculoskeletal:  Positive for arthralgias and gait problem. Negative for joint swelling.        Bilateral knee pain, left worse than right for arthritis    Neurological:  Negative for dizziness and headaches.       Physical Exam  Vitals and nursing note reviewed.   Constitutional:       General: He is awake.      Appearance: Normal appearance. He is well-developed, well-groomed and normal weight.   HENT:      Head: Normocephalic and atraumatic.      Right Ear: Tympanic membrane and external ear normal.      Left Ear: Tympanic membrane and external ear normal.      Nose: Nose normal.   Eyes:      Conjunctiva/sclera: Conjunctivae normal.   Neck:      Thyroid: No thyromegaly or thyroid tenderness.   Cardiovascular:      Rate and Rhythm: Normal rate and regular rhythm.      Heart sounds: Murmur heard.   Pulmonary:      Effort: Pulmonary effort is normal. No accessory muscle usage.      Breath sounds: Normal breath sounds.   Musculoskeletal:         General: Tenderness and deformity present.      Cervical back: Normal range of motion and neck supple.   Neurological:      General: No focal deficit present.      Mental Status: He is alert. Mental status is at baseline.   Psychiatric:         Attention and Perception: Attention normal.         Mood and Affect: Mood normal.         Speech: Speech normal.         Behavior: Behavior normal. Behavior is cooperative.         Thought Content: Thought content normal.         Judgment: Judgment normal.       Physical Exam    Vitals: BP: 100/40. Weight: 153 lbs.         ASSESSMENT/PLAN:    Lab Results   Component Value Date    WBC 8.33 01/22/2024    HGB 11.9 (L) 01/22/2024    HCT 37.4 (L) 01/22/2024     (H) 01/22/2024    CHOL 144 09/17/2020    TRIG 45 09/17/2020    HDL 61 09/17/2020    ALT 15 12/30/2023    AST 26 12/30/2023     01/22/2024    K 4.3 01/22/2024     01/22/2024    CREATININE 1.1 01/22/2024     BUN 43 (H) 01/22/2024    CO2 25 01/22/2024    TSH 1.046 04/14/2023    INR 1.4 (H) 09/24/2021       1. Primary osteoarthritis of left knee  -     Ambulatory referral/consult to Pain Clinic; Future; Expected date: 06/17/2024  -     diclofenac sodium (VOLTAREN) 1 % Gel; Apply 4 grams topically 4 (four) times daily. Bilateral knees  Dispense: 100 g; Refill: 3    2. Chronic pain of both knees  -     Ambulatory referral/consult to Pain Clinic; Future; Expected date: 06/17/2024  -     diclofenac sodium (VOLTAREN) 1 % Gel; Apply 4 grams topically 4 (four) times daily. Bilateral knees  Dispense: 100 g; Refill: 3  -     Hemoglobin A1C; Future; Expected date: 06/10/2024  -     Lipid Panel; Future; Expected date: 06/10/2024  -     Comprehensive Metabolic Panel; Future; Expected date: 06/10/2024  -     CBC Auto Differential; Future; Expected date: 06/10/2024  -     TSH; Future; Expected date: 06/10/2024  -     Magnesium; Future; Expected date: 06/10/2024  -     BNP; Future; Expected date: 06/10/2024  -     Iron and TIBC; Future; Expected date: 06/10/2024  -     Ferritin; Future; Expected date: 06/10/2024    3. Chronic systolic congestive heart failure  -     Hemoglobin A1C; Future; Expected date: 06/10/2024  -     Lipid Panel; Future; Expected date: 06/10/2024  -     Comprehensive Metabolic Panel; Future; Expected date: 06/10/2024  -     CBC Auto Differential; Future; Expected date: 06/10/2024  -     TSH; Future; Expected date: 06/10/2024  -     Magnesium; Future; Expected date: 06/10/2024  -     BNP; Future; Expected date: 06/10/2024  -     Iron and TIBC; Future; Expected date: 06/10/2024  -     Ferritin; Future; Expected date: 06/10/2024    4. Issue of medical certificate for disability examination    5. Dependent for transportation    6. Pulmonary emphysema, unspecified emphysema type  -     Hemoglobin A1C; Future; Expected date: 06/10/2024  -     Lipid Panel; Future; Expected date: 06/10/2024  -     Comprehensive  Metabolic Panel; Future; Expected date: 06/10/2024  -     CBC Auto Differential; Future; Expected date: 06/10/2024  -     TSH; Future; Expected date: 06/10/2024  -     Magnesium; Future; Expected date: 06/10/2024  -     BNP; Future; Expected date: 06/10/2024  -     Iron and TIBC; Future; Expected date: 06/10/2024  -     Ferritin; Future; Expected date: 06/10/2024    7. Major neurocognitive disorder due to multiple etiologies  -     Hemoglobin A1C; Future; Expected date: 06/10/2024  -     Lipid Panel; Future; Expected date: 06/10/2024  -     Comprehensive Metabolic Panel; Future; Expected date: 06/10/2024  -     CBC Auto Differential; Future; Expected date: 06/10/2024  -     TSH; Future; Expected date: 06/10/2024  -     Magnesium; Future; Expected date: 06/10/2024  -     BNP; Future; Expected date: 06/10/2024  -     Iron and TIBC; Future; Expected date: 06/10/2024  -     Ferritin; Future; Expected date: 06/10/2024    8. Idiopathic refractory anemia  -     Hemoglobin A1C; Future; Expected date: 06/10/2024  -     Lipid Panel; Future; Expected date: 06/10/2024  -     Comprehensive Metabolic Panel; Future; Expected date: 06/10/2024  -     CBC Auto Differential; Future; Expected date: 06/10/2024  -     TSH; Future; Expected date: 06/10/2024  -     Magnesium; Future; Expected date: 06/10/2024  -     BNP; Future; Expected date: 06/10/2024  -     Iron and TIBC; Future; Expected date: 06/10/2024  -     Ferritin; Future; Expected date: 06/10/2024    9. Atherosclerosis of abdominal aorta  -     Hemoglobin A1C; Future; Expected date: 06/10/2024  -     Lipid Panel; Future; Expected date: 06/10/2024  -     Comprehensive Metabolic Panel; Future; Expected date: 06/10/2024  -     CBC Auto Differential; Future; Expected date: 06/10/2024  -     TSH; Future; Expected date: 06/10/2024  -     Magnesium; Future; Expected date: 06/10/2024  -     BNP; Future; Expected date: 06/10/2024  -     Iron and TIBC; Future; Expected date:  06/10/2024  -     Ferritin; Future; Expected date: 06/10/2024    10. Anemia of chronic disease    11. Primary hypertension  -     Hemoglobin A1C; Future; Expected date: 06/10/2024  -     Lipid Panel; Future; Expected date: 06/10/2024  -     Comprehensive Metabolic Panel; Future; Expected date: 06/10/2024  -     CBC Auto Differential; Future; Expected date: 06/10/2024  -     TSH; Future; Expected date: 06/10/2024  -     Magnesium; Future; Expected date: 06/10/2024  -     BNP; Future; Expected date: 06/10/2024  -     Iron and TIBC; Future; Expected date: 06/10/2024  -     Ferritin; Future; Expected date: 06/10/2024    12. Abnormal glucose  -     Hemoglobin A1C; Future; Expected date: 06/10/2024  -     Lipid Panel; Future; Expected date: 06/10/2024  -     Comprehensive Metabolic Panel; Future; Expected date: 06/10/2024  -     CBC Auto Differential; Future; Expected date: 06/10/2024  -     TSH; Future; Expected date: 06/10/2024  -     Magnesium; Future; Expected date: 06/10/2024  -     BNP; Future; Expected date: 06/10/2024  -     Iron and TIBC; Future; Expected date: 06/10/2024  -     Ferritin; Future; Expected date: 06/10/2024        Assessment & Plan    HEART FAILURE:  - Patient with history of heart failure on Entresto, metoprolol 25 mg daily, Jardiance 10 mg daily, and Lasix 40 mg daily, recently seen by Ms. Maria D Fleming in Cardiology on 5/22 with BP 96/40.  - Today, /40 and weight stable at 153 lbs.  - Unable to prescribe traditional arthritis pain medications due to heart failure.  - Ordered labs today to check kidney function, heart number, and iron studies.  - Will reach out to Dr. Garces and Ms. Fleming, patient's heart failure care team, about getting a new scale sent to patient or his daughter.  - Advised patient that heart failure clinic is following him more consistently now, but still needs to follow up with PCP at least annually.  KNEE PAIN:  - Severe left knee pain, bone-on-bone, worse than right  knee pain.  - Last knee injections by Dr. Grant in October provided 50-60% relief, but severe pain persists in left knee.  - Considering repeat injections by Dr. Davis as an alternative.  - Sent topical Voltaren gel to pharmacy as temporary relief measure until next injection.  - Started Voltaren gel as needed.  - Discussed that Voltaren gel is an anti-inflammatory gel to rub on knees or any joint except chest, 3-4 times daily as needed.  - Referring to Dr. Davis, pain management specialist, for consideration of repeat knee injections.  IRON SUPPLEMENTATION:  - Continued iron supplement daily, pharmacy contacted office for refill which was sent on 5th. Labs ordered today.   VACCINATION:  Declines pna vaccine today  - The other  above diagnoses were reassessed during today's visit. The associated diagnoses are linked to their chronic conditions. These conditions are currently stable, and will be monitored through associated labs, imaging, and treated with the associated medications as listed per EPIC in the patient's Medication List. I will refill medications to continue ongoing care as requested per the patient or pharmacy when needed.         X-Ray Chest PA And Lateral  Narrative: EXAM:  XR CHEST PA AND LATERAL    CLINICAL HISTORY: Emphysema    COMPARISON: None available.    FINDINGS: Moderate edematous changes within the upper lobes. No confluent airspace opacity or consolidation.  There is no evidence of pleural effusion, pneumothorax, or other acute pulmonary disease.  The cardiomediastinal silhouette is within normal limits.  No acute osseous abnormality is evident.       Moderate scattered degenerative change and atherosclerotic disease.  Impression:  No acute cardiopulmonary abnormality.    Finalized on: 3/28/2024 6:24 PM By:  Syed Lozada MD  [facility]# [id number]      2024-03-28 18:26:[id number]    [id number]      Follow-up: Follow up in about 1 year (around 6/10/2025) for physical with Dr CAMPBELL.    I spent a total of  50      minutes face to face and non-face to face on the date of this visit.This includes time preparing to see the patient (eg, review of tests, notes), obtaining and/or reviewing additional history from an independent historian and/or outside medical records, documenting clinical information in the electronic health record, independently interpreting results and/or communicating results to the patient/family/caregiver, or care coordinator.  Visit today included increased complexity associated with the care of the episodic problem addressed and managing the longitudinal care of the patient due to the serious and/or complex managed problem(s).    This note was generated with the assistance of ambient listening technology. Verbal consent was obtained by the patient and accompanying visitor(s) for the recording of patient appointment to facilitate this note. I attest to having reviewed and edited the generated note for accuracy, though some syntax or spelling errors may persist. Please contact the author of this note for any clarification.       There are no Patient Instructions on file for this visit.

## 2024-06-10 NOTE — Clinical Note
Pt reports he does NOT have home weight scale. I see he is in the HF clinic. Can someone from the [facility] HF clinic reach out to his daughter to see about getting him a home scale for daily weigh in's? I'm getting labs today on him and will share with you all. Thanks. Vivian Ch MD

## 2024-06-11 ENCOUNTER — TELEPHONE (OUTPATIENT)
Dept: CARDIOLOGY | Facility: CLINIC | Age: 84
End: 2024-06-11
Payer: MEDICARE

## 2024-06-11 LAB
ALBUMIN SERPL BCP-MCNC: 4.1 G/DL (ref 3.5–5.2)
ALP SERPL-CCNC: 37 U/L (ref 55–135)
ALT SERPL W/O P-5'-P-CCNC: 11 U/L (ref 10–44)
ANION GAP SERPL CALC-SCNC: 12 MMOL/L (ref 8–16)
AST SERPL-CCNC: 20 U/L (ref 10–40)
BILIRUB SERPL-MCNC: 0.7 MG/DL (ref 0.1–1)
BNP SERPL-MCNC: 120 PG/ML (ref 0–99)
BUN SERPL-MCNC: 35 MG/DL (ref 8–23)
CALCIUM SERPL-MCNC: 9.9 MG/DL (ref 8.7–10.5)
CHLORIDE SERPL-SCNC: 108 MMOL/L (ref 95–110)
CHOLEST SERPL-MCNC: 123 MG/DL (ref 120–199)
CHOLEST/HDLC SERPL: 2.5 {RATIO} (ref 2–5)
CO2 SERPL-SCNC: 22 MMOL/L (ref 23–29)
CREAT SERPL-MCNC: 1.4 MG/DL (ref 0.5–1.4)
EST. GFR  (NO RACE VARIABLE): 49.9 ML/MIN/1.73 M^2
ESTIMATED AVG GLUCOSE: 105 MG/DL (ref 68–131)
FERRITIN SERPL-MCNC: 421 NG/ML (ref 20–300)
GLUCOSE SERPL-MCNC: 66 MG/DL (ref 70–110)
HBA1C MFR BLD: 5.3 % (ref 4–5.6)
HDLC SERPL-MCNC: 50 MG/DL (ref 40–75)
HDLC SERPL: 40.7 % (ref 20–50)
IRON SERPL-MCNC: 100 UG/DL (ref 45–160)
LDLC SERPL CALC-MCNC: 61.6 MG/DL (ref 63–159)
MAGNESIUM SERPL-MCNC: 2.3 MG/DL (ref 1.6–2.6)
NONHDLC SERPL-MCNC: 73 MG/DL
POTASSIUM SERPL-SCNC: 5.6 MMOL/L (ref 3.5–5.1)
PROT SERPL-MCNC: 7.1 G/DL (ref 6–8.4)
SATURATED IRON: 33 % (ref 20–50)
SODIUM SERPL-SCNC: 142 MMOL/L (ref 136–145)
TOTAL IRON BINDING CAPACITY: 300 UG/DL (ref 250–450)
TRANSFERRIN SERPL-MCNC: 203 MG/DL (ref 200–375)
TRIGL SERPL-MCNC: 57 MG/DL (ref 30–150)
TSH SERPL DL<=0.005 MIU/L-ACNC: 1.17 UIU/ML (ref 0.4–4)

## 2024-06-11 NOTE — TELEPHONE ENCOUNTER
Called and spoke to pt daughter. Pt scheduled for Friday 06/14/24 with Dr. Garces.     ----- Message from Uriel Garces MD sent at 6/10/2024  5:42 PM CDT -----  OK sure I will discuss with him and family, if my staff can sched can an appt with me can review labs and any further CV workup needed. Thanks!    - PM  ----- Message -----  From: Vivian Ch MD  Sent: 6/10/2024   4:33 PM CDT  To: Uriel Garces MD; E[facility] M[facility]ain    Pt reports he does NOT have home weight scale. I see he is in the HF clinic. Can someone from the [facility] HF clinic reach out to his daughter to see about getting him a home scale for daily weigh in's? I'm getting labs today on him and will share with you all. Thanks. Vivian Ch MD

## 2024-06-13 ENCOUNTER — TELEPHONE (OUTPATIENT)
Dept: CARDIOLOGY | Facility: CLINIC | Age: 84
End: 2024-06-13
Payer: MEDICARE

## 2024-06-13 DIAGNOSIS — I50.22 CHRONIC SYSTOLIC CONGESTIVE HEART FAILURE: Primary | ICD-10-CM

## 2024-06-13 DIAGNOSIS — I50.42 CHRONIC COMBINED SYSTOLIC AND DIASTOLIC CONGESTIVE HEART FAILURE, NYHA CLASS 3: ICD-10-CM

## 2024-06-13 NOTE — TELEPHONE ENCOUNTER
Called and spoke to pt daughter. Advised as per Dr. Garces     ----- Message from Uriel Garces MD sent at 6/13/2024  8:26 AM CDT -----  OK sounds good!    If my staff can let him know to stop taking any extra potassium tablets he should be okay on rest of meds. I just DC'd the K 20 meq. His BNP is the best I've seen in forever. Thanks    - PM  ----- Message -----  From: Vivian Ch MD  Sent: 6/12/2024   5:37 PM CDT  To: Uriel Garces MD    Pt is seeing you in 1-2 days. Potassium is elevated. I know he is on entresto. For your review.

## 2024-06-14 ENCOUNTER — HOSPITAL ENCOUNTER (OUTPATIENT)
Dept: CARDIOLOGY | Facility: HOSPITAL | Age: 84
Discharge: HOME | End: 2024-06-14
Attending: INTERNAL MEDICINE
Payer: MEDICARE

## 2024-06-14 ENCOUNTER — OFFICE VISIT (OUTPATIENT)
Dept: CARDIOLOGY | Facility: CLINIC | Age: 84
End: 2024-06-14
Payer: MEDICARE

## 2024-06-14 VITALS
DIASTOLIC BLOOD PRESSURE: 42 MMHG | WEIGHT: 150.81 LBS | BODY MASS INDEX: 22.93 KG/M2 | OXYGEN SATURATION: 98 % | HEART RATE: 83 BPM | SYSTOLIC BLOOD PRESSURE: 94 MMHG

## 2024-06-14 DIAGNOSIS — R06.02 SHORTNESS OF BREATH: ICD-10-CM

## 2024-06-14 DIAGNOSIS — K55.21 AVM (ARTERIOVENOUS MALFORMATION) OF SMALL BOWEL, ACQUIRED WITH HEMORRHAGE: ICD-10-CM

## 2024-06-14 DIAGNOSIS — I70.0 ATHEROSCLEROSIS OF ABDOMINAL AORTA: ICD-10-CM

## 2024-06-14 DIAGNOSIS — G89.29 CHRONIC PAIN OF LEFT KNEE: ICD-10-CM

## 2024-06-14 DIAGNOSIS — R60.0 LOCALIZED EDEMA: ICD-10-CM

## 2024-06-14 DIAGNOSIS — I50.22 CHRONIC SYSTOLIC CONGESTIVE HEART FAILURE: Primary | ICD-10-CM

## 2024-06-14 DIAGNOSIS — I50.22 CHRONIC SYSTOLIC CONGESTIVE HEART FAILURE: ICD-10-CM

## 2024-06-14 DIAGNOSIS — I48.0 PAROXYSMAL ATRIAL FIBRILLATION: ICD-10-CM

## 2024-06-14 DIAGNOSIS — R06.02 SOB (SHORTNESS OF BREATH): ICD-10-CM

## 2024-06-14 DIAGNOSIS — I77.810 DILATED AORTIC ROOT: ICD-10-CM

## 2024-06-14 DIAGNOSIS — I50.23 ACUTE ON CHRONIC SYSTOLIC CONGESTIVE HEART FAILURE: ICD-10-CM

## 2024-06-14 DIAGNOSIS — I48.0 PAF (PAROXYSMAL ATRIAL FIBRILLATION): ICD-10-CM

## 2024-06-14 DIAGNOSIS — I48.11 LONGSTANDING PERSISTENT ATRIAL FIBRILLATION: ICD-10-CM

## 2024-06-14 DIAGNOSIS — M25.562 CHRONIC PAIN OF LEFT KNEE: ICD-10-CM

## 2024-06-14 DIAGNOSIS — D50.0 IRON DEFICIENCY ANEMIA DUE TO CHRONIC BLOOD LOSS: ICD-10-CM

## 2024-06-14 DIAGNOSIS — I73.9 PVD (PERIPHERAL VASCULAR DISEASE): ICD-10-CM

## 2024-06-14 DIAGNOSIS — I50.42 CHRONIC COMBINED SYSTOLIC AND DIASTOLIC CONGESTIVE HEART FAILURE, NYHA CLASS 3: ICD-10-CM

## 2024-06-14 DIAGNOSIS — R06.09 DOE (DYSPNEA ON EXERTION): ICD-10-CM

## 2024-06-14 PROCEDURE — 1160F RVW MEDS BY RX/DR IN RCRD: CPT | Mod: CPTII,S$GLB,, | Performed by: INTERNAL MEDICINE

## 2024-06-14 PROCEDURE — 99999 PR PBB SHADOW E&M-EST. PATIENT-LVL III: CPT | Mod: PBBFAC,,, | Performed by: INTERNAL MEDICINE

## 2024-06-14 PROCEDURE — 1101F PT FALLS ASSESS-DOCD LE1/YR: CPT | Mod: CPTII,S$GLB,, | Performed by: INTERNAL MEDICINE

## 2024-06-14 PROCEDURE — 93005 ELECTROCARDIOGRAM TRACING: CPT

## 2024-06-14 PROCEDURE — 93010 ELECTROCARDIOGRAM REPORT: CPT | Mod: ,,, | Performed by: INTERNAL MEDICINE

## 2024-06-14 PROCEDURE — 3078F DIAST BP <80 MM HG: CPT | Mod: CPTII,S$GLB,, | Performed by: INTERNAL MEDICINE

## 2024-06-14 PROCEDURE — 1157F ADVNC CARE PLAN IN RCRD: CPT | Mod: CPTII,S$GLB,, | Performed by: INTERNAL MEDICINE

## 2024-06-14 PROCEDURE — 1159F MED LIST DOCD IN RCRD: CPT | Mod: CPTII,S$GLB,, | Performed by: INTERNAL MEDICINE

## 2024-06-14 PROCEDURE — 99214 OFFICE O/P EST MOD 30 MIN: CPT | Mod: 25,S$GLB,, | Performed by: INTERNAL MEDICINE

## 2024-06-14 PROCEDURE — 3288F FALL RISK ASSESSMENT DOCD: CPT | Mod: CPTII,S$GLB,, | Performed by: INTERNAL MEDICINE

## 2024-06-14 PROCEDURE — 3074F SYST BP LT 130 MM HG: CPT | Mod: CPTII,S$GLB,, | Performed by: INTERNAL MEDICINE

## 2024-06-14 RX ORDER — FUROSEMIDE 40 MG/1
40 TABLET ORAL DAILY
Qty: 90 TABLET | Refills: 1 | Status: SHIPPED | OUTPATIENT
Start: 2024-06-14 | End: 2024-12-11

## 2024-06-14 RX ORDER — METOPROLOL SUCCINATE 25 MG/1
12.5 TABLET, EXTENDED RELEASE ORAL DAILY
Qty: 30 TABLET | Refills: 6 | Status: SHIPPED | OUTPATIENT
Start: 2024-06-14 | End: 2025-08-08

## 2024-06-14 RX ORDER — SACUBITRIL AND VALSARTAN 24; 26 MG/1; MG/1
1 TABLET, FILM COATED ORAL 2 TIMES DAILY
Qty: 60 TABLET | Refills: 3 | Status: SHIPPED | OUTPATIENT
Start: 2024-06-14 | End: 2024-06-14

## 2024-06-14 RX ORDER — SACUBITRIL AND VALSARTAN 24; 26 MG/1; MG/1
1 TABLET, FILM COATED ORAL 2 TIMES DAILY
Qty: 60 TABLET | Refills: 3 | Status: SHIPPED | OUTPATIENT
Start: 2024-06-14

## 2024-06-14 RX ORDER — MIDODRINE HYDROCHLORIDE 5 MG/1
5 TABLET ORAL
Qty: 90 TABLET | Refills: 3 | Status: SHIPPED | OUTPATIENT
Start: 2024-06-14 | End: 2025-06-14

## 2024-06-14 RX ORDER — METOPROLOL SUCCINATE 25 MG/1
12.5 TABLET, EXTENDED RELEASE ORAL DAILY
Qty: 30 TABLET | Refills: 6 | Status: SHIPPED | OUTPATIENT
Start: 2024-06-14 | End: 2024-06-14

## 2024-06-14 RX ORDER — FUROSEMIDE 40 MG/1
40 TABLET ORAL DAILY
Qty: 90 TABLET | Refills: 1 | Status: SHIPPED | OUTPATIENT
Start: 2024-06-14 | End: 2024-06-14

## 2024-06-14 RX ORDER — ASPIRIN 81 MG/1
81 TABLET ORAL DAILY
Qty: 90 TABLET | Refills: 3 | Status: SHIPPED | OUTPATIENT
Start: 2024-06-14 | End: 2025-06-14

## 2024-06-15 LAB
OHS QRS DURATION: 84 MS
OHS QTC CALCULATION: 449 MS

## 2024-06-18 ENCOUNTER — HOSPITAL ENCOUNTER (EMERGENCY)
Facility: HOSPITAL | Age: 84
Discharge: HOME | End: 2024-06-18
Attending: FAMILY MEDICINE
Payer: MEDICARE

## 2024-06-18 VITALS
HEART RATE: 72 BPM | SYSTOLIC BLOOD PRESSURE: 114 MMHG | RESPIRATION RATE: 23 BRPM | OXYGEN SATURATION: 96 % | DIASTOLIC BLOOD PRESSURE: 57 MMHG | WEIGHT: 152.31 LBS | BODY MASS INDEX: 23.16 KG/M2 | TEMPERATURE: 99 F

## 2024-06-18 DIAGNOSIS — I95.0 IDIOPATHIC HYPOTENSION: ICD-10-CM

## 2024-06-18 DIAGNOSIS — R06.02 SHORTNESS OF BREATH: Primary | ICD-10-CM

## 2024-06-18 LAB
ALBUMIN SERPL BCP-MCNC: 4.2 G/DL (ref 3.5–5.2)
ALP SERPL-CCNC: 40 U/L (ref 55–135)
ALT SERPL W/O P-5'-P-CCNC: 8 U/L (ref 10–44)
ANION GAP SERPL CALC-SCNC: 12 MMOL/L (ref 8–16)
AST SERPL-CCNC: 19 U/L (ref 10–40)
BASOPHILS # BLD AUTO: 0.07 K/UL (ref 0–0.2)
BASOPHILS NFR BLD: 1.2 % (ref 0–1.9)
BILIRUB SERPL-MCNC: 0.8 MG/DL (ref 0.1–1)
BNP SERPL-MCNC: 95 PG/ML (ref 0–99)
BUN SERPL-MCNC: 24 MG/DL (ref 8–23)
CALCIUM SERPL-MCNC: 9.7 MG/DL (ref 8.7–10.5)
CHLORIDE SERPL-SCNC: 106 MMOL/L (ref 95–110)
CO2 SERPL-SCNC: 22 MMOL/L (ref 23–29)
CREAT SERPL-MCNC: 1.2 MG/DL (ref 0.5–1.4)
DIFFERENTIAL METHOD BLD: ABNORMAL
EOSINOPHIL # BLD AUTO: 0.7 K/UL (ref 0–0.5)
EOSINOPHIL NFR BLD: 12.5 % (ref 0–8)
ERYTHROCYTE [DISTWIDTH] IN BLOOD BY AUTOMATED COUNT: 23.4 % (ref 11.5–14.5)
EST. GFR  (NO RACE VARIABLE): >60 ML/MIN/1.73 M^2
GLUCOSE SERPL-MCNC: 101 MG/DL (ref 70–110)
HCT VFR BLD AUTO: 31.6 % (ref 40–54)
HGB BLD-MCNC: 10.4 G/DL (ref 14–18)
HYPOCHROMIA BLD QL SMEAR: ABNORMAL
IMM GRANULOCYTES # BLD AUTO: 0.03 K/UL (ref 0–0.04)
IMM GRANULOCYTES NFR BLD AUTO: 0.5 % (ref 0–0.5)
LYMPHOCYTES # BLD AUTO: 1.2 K/UL (ref 1–4.8)
LYMPHOCYTES NFR BLD: 19.5 % (ref 18–48)
MCH RBC QN AUTO: 26.6 PG (ref 27–31)
MCHC RBC AUTO-ENTMCNC: 32.9 G/DL (ref 32–36)
MCV RBC AUTO: 81 FL (ref 82–98)
MONOCYTES # BLD AUTO: 0.7 K/UL (ref 0.3–1)
MONOCYTES NFR BLD: 12 % (ref 4–15)
NEUTROPHILS # BLD AUTO: 3.2 K/UL (ref 1.8–7.7)
NEUTROPHILS NFR BLD: 54.3 % (ref 38–73)
NRBC BLD-RTO: 0 /100 WBC
OVALOCYTES BLD QL SMEAR: ABNORMAL
PLATELET # BLD AUTO: 350 K/UL (ref 150–450)
PLATELET BLD QL SMEAR: ABNORMAL
PMV BLD AUTO: 10.5 FL (ref 9.2–12.9)
POTASSIUM SERPL-SCNC: 3.7 MMOL/L (ref 3.5–5.1)
PROT SERPL-MCNC: 7.4 G/DL (ref 6–8.4)
RBC # BLD AUTO: 3.91 M/UL (ref 4.6–6.2)
SCHISTOCYTES BLD QL SMEAR: ABNORMAL
SCHISTOCYTES BLD QL SMEAR: PRESENT
SODIUM SERPL-SCNC: 140 MMOL/L (ref 136–145)
TARGETS BLD QL SMEAR: ABNORMAL
TROPONIN I SERPL DL<=0.01 NG/ML-MCNC: 0.02 NG/ML (ref 0–0.03)
WBC # BLD AUTO: 5.9 K/UL (ref 3.9–12.7)

## 2024-06-18 PROCEDURE — 93010 ELECTROCARDIOGRAM REPORT: CPT | Mod: ,,, | Performed by: INTERNAL MEDICINE

## 2024-06-18 PROCEDURE — 85025 COMPLETE CBC W/AUTO DIFF WBC: CPT | Performed by: NURSE PRACTITIONER

## 2024-06-18 PROCEDURE — 93005 ELECTROCARDIOGRAM TRACING: CPT

## 2024-06-18 PROCEDURE — 80053 COMPREHEN METABOLIC PANEL: CPT | Performed by: NURSE PRACTITIONER

## 2024-06-18 PROCEDURE — 99285 EMERGENCY DEPT VISIT HI MDM: CPT | Mod: 25

## 2024-06-18 PROCEDURE — 84484 ASSAY OF TROPONIN QUANT: CPT | Performed by: NURSE PRACTITIONER

## 2024-06-18 PROCEDURE — 83880 ASSAY OF NATRIURETIC PEPTIDE: CPT | Performed by: NURSE PRACTITIONER

## 2024-06-19 ENCOUNTER — HOSPITAL ENCOUNTER (OUTPATIENT)
Dept: RADIOLOGY | Facility: HOSPITAL | Age: 84
Discharge: HOME | End: 2024-06-19
Attending: INTERNAL MEDICINE
Payer: MEDICARE

## 2024-06-19 ENCOUNTER — PATIENT MESSAGE (OUTPATIENT)
Dept: PULMONOLOGY | Facility: CLINIC | Age: 84
End: 2024-06-19

## 2024-06-19 ENCOUNTER — OFFICE VISIT (OUTPATIENT)
Dept: PULMONOLOGY | Facility: CLINIC | Age: 84
End: 2024-06-19
Attending: INTERNAL MEDICINE
Payer: MEDICARE

## 2024-06-19 VITALS
RESPIRATION RATE: 18 BRPM | OXYGEN SATURATION: 97 % | HEART RATE: 82 BPM | HEIGHT: 68 IN | DIASTOLIC BLOOD PRESSURE: 56 MMHG | WEIGHT: 150.44 LBS | SYSTOLIC BLOOD PRESSURE: 98 MMHG | BODY MASS INDEX: 22.8 KG/M2

## 2024-06-19 DIAGNOSIS — R91.1 SOLITARY PULMONARY NODULE: ICD-10-CM

## 2024-06-19 DIAGNOSIS — J43.2 CENTRILOBULAR EMPHYSEMA: Primary | ICD-10-CM

## 2024-06-19 DIAGNOSIS — J43.1 PANLOBULAR EMPHYSEMA: ICD-10-CM

## 2024-06-19 LAB
OHS QRS DURATION: 88 MS
OHS QTC CALCULATION: 461 MS

## 2024-06-19 PROCEDURE — 71250 CT THORAX DX C-: CPT | Mod: TC

## 2024-06-19 PROCEDURE — 1101F PT FALLS ASSESS-DOCD LE1/YR: CPT | Mod: CPTII,S$GLB,, | Performed by: INTERNAL MEDICINE

## 2024-06-19 PROCEDURE — 99999 PR PBB SHADOW E&M-EST. PATIENT-LVL IV: CPT | Mod: PBBFAC,,, | Performed by: INTERNAL MEDICINE

## 2024-06-19 PROCEDURE — 99214 OFFICE O/P EST MOD 30 MIN: CPT | Mod: S$GLB,,, | Performed by: INTERNAL MEDICINE

## 2024-06-19 PROCEDURE — 1160F RVW MEDS BY RX/DR IN RCRD: CPT | Mod: CPTII,S$GLB,, | Performed by: INTERNAL MEDICINE

## 2024-06-19 PROCEDURE — 3078F DIAST BP <80 MM HG: CPT | Mod: CPTII,S$GLB,, | Performed by: INTERNAL MEDICINE

## 2024-06-19 PROCEDURE — 3074F SYST BP LT 130 MM HG: CPT | Mod: CPTII,S$GLB,, | Performed by: INTERNAL MEDICINE

## 2024-06-19 PROCEDURE — 3288F FALL RISK ASSESSMENT DOCD: CPT | Mod: CPTII,S$GLB,, | Performed by: INTERNAL MEDICINE

## 2024-06-19 PROCEDURE — 71250 CT THORAX DX C-: CPT | Mod: 26,,, | Performed by: RADIOLOGY

## 2024-06-19 PROCEDURE — 1157F ADVNC CARE PLAN IN RCRD: CPT | Mod: CPTII,S$GLB,, | Performed by: INTERNAL MEDICINE

## 2024-06-19 PROCEDURE — 1159F MED LIST DOCD IN RCRD: CPT | Mod: CPTII,S$GLB,, | Performed by: INTERNAL MEDICINE

## 2024-06-19 RX ORDER — ALBUTEROL SULFATE 90 UG/1
2 AEROSOL, METERED RESPIRATORY (INHALATION) EVERY 4 HOURS PRN
Qty: 8.5 G | Refills: 11 | Status: SHIPPED | OUTPATIENT
Start: 2024-06-19

## 2024-06-19 RX ORDER — GABAPENTIN 300 MG/1
300 CAPSULE ORAL NIGHTLY
COMMUNITY

## 2024-06-19 RX ORDER — FLUTICASONE FUROATE, UMECLIDINIUM BROMIDE AND VILANTEROL TRIFENATATE 200; 62.5; 25 UG/1; UG/1; UG/1
1 POWDER RESPIRATORY (INHALATION) DAILY
Qty: 60 EACH | Refills: 11 | Status: SHIPPED | OUTPATIENT
Start: 2024-06-19

## 2024-06-19 NOTE — FIRST PROVIDER EVALUATION
Medical screening examination initiated.  I have conducted a focused provider triage encounter, findings are as follows:    Brief history of present illness:  pt presents with c/o shortness of breath and hypotension    There were no vitals filed for this visit.    Pertinent physical exam:      Brief workup plan:  cardiac workup    Preliminary workup initiated; this workup will be continued and followed by the physician or advanced practice provider that is assigned to the patient when roomed.

## 2024-06-19 NOTE — PROGRESS NOTES
Subjective:     Patient ID: Richy Douglas is a 83 y.o. male.    Chief Complaint:  83 y.o. former smoker with Chronic Obstructive Pulmonary Disease here for review of CT of chest right upper lobe nodule      HPI    COPD  He presents for evaluation and treatment of COPD. The patient is not currently have symptoms / an exacerbation. The patient has COPD for approximately 11 - 12 years. Symptoms include cough with sputum production  have included dyspnea, cough, wheezing and fatigue, and typically last 2 weeks. Previous episodes have been exacerbated by any exercise. Current treatment includes albuterol inhaler, which generally provides some relief of symptoms.   He uses 1 pillows at night. Patient currently is not on home oxygen therapy.. The patient is having no constitutional symptoms, denying fever, chills, anorexia, or weight loss. The patient has been hospitalized for this condition before. He has a history of 68 pack years. The patient is experiencing exercise intolerance (difficulty climbing 1 flights of stairs).  Stopped smoking  Qualifies for oxygen    Lung Nodule  He presents for evaluation and treatment of a lung nodule. The patient reports that the imaging was performed to evaluate symptoms of dyspnea on exertion which have been present for 5 years and are unchanged. Symptoms are exacerbated by exercise and relieved by rest. Other symptoms include non productive cough. He has a history of 68 pack years. The patient has no known exposure to tuberculosis. The patient does not have a history of cancer.    History of prostate cancer - operated in 2005     https://reference.[employer]/calculator/4/solitary-pulmonary-nodule-malignancy-risk  Results for Solitary Pulmonary Nodule Malignancy Risk by [employer]  Probability of Malignancy: 55.6 %    This probability is only an estimate and clinical assessment is required to further refine this estimation.      Answers calculated to formulate result:    1. Age? -- 83  Years  2. Smoker (current or previous)? -- Yes  3. Extra-thoracic cancer more than 5 years previous? -- Yes - prostate  4. Diameter? -- 7 mm  5. Upper Lobe? -- Yes  6. Spiculated? -- No  7. PET? -- Not Performed      March 19, 2024 at 12:49  Calculated at: https://qxmd.com/calculator_4/solitary-pulmonary-nodule-malignancy-risk  Get Calculate by [employer] for iOS, Android and web at http:///calculate      Past Medical History:   Diagnosis Date    Alcoholic peripheral neuropathy 03/01/2023    Anemia of chronic disease     Aortic aneurysm     Atrial fibrillation with RVR 09/24/2021    Chronic systolic congestive heart failure 08/31/2017    Depression     Emphysema of lung 08/31/2017    GERD (gastroesophageal reflux disease)     Hypertension     Knee osteoarthritis 10/21/2022    Major neurocognitive disorder due to multiple etiologies 04/14/2023    Microcytic anemia 11/24/2020    Other hyperlipidemia 04/27/2021    Personal history of colonic polyps 2022    Prostate cancer      Past Surgical History:   Procedure Laterality Date    CATHETERIZATION OF BOTH LEFT AND RIGHT HEART N/A 1/13/2022    Procedure: CATHETERIZATION, HEART, BOTH LEFT AND RIGHT;  Surgeon: Janneth Tovar MD;  Location: [facility] CATH LAB;  Service: Cardiology;  Laterality: N/A;  poss cx    COLONOSCOPY      COLONOSCOPY N/A 6/30/2022    Procedure: COLONOSCOPY;  Surgeon: Sandra Perez MD;  Location: [facility];  Service: Endoscopy;  Laterality: N/A;    COLONOSCOPY N/A 7/1/2022    Procedure: COLONOSCOPY;  Surgeon: Woodrow Christian MD;  Location: [facility];  Service: Endoscopy;  Laterality: N/A;    CORONARY ANGIOGRAPHY N/A 1/13/2022    Procedure: ANGIOGRAM, CORONARY ARTERY;  Surgeon: Janneth Tovar MD;  Location: [facility] CATH LAB;  Service: Cardiology;  Laterality: N/A;    ESOPHAGOGASTRODUODENOSCOPY N/A 6/30/2022    Procedure: EGD (ESOPHAGOGASTRODUODENOSCOPY);  Surgeon: Sandra Perez MD;  Location: [facility];  Service: Endoscopy;  Laterality: N/A;     INJECTION OF ANESTHETIC AGENT AROUND NERVE Bilateral 6/20/2023    Procedure: Bilateral Genicular nerve block RN IV Sedation;  Surgeon: Devon rGant MD;  Location: [facility];  Service: Pain Management;  Laterality: Bilateral;    INJECTION OF JOINT Bilateral 10/21/2022    Procedure: Bilateral intraarticular knee injection with local ALLERGIC TO IODINE;  Surgeon: Devon Grant MD;  Location: [facility];  Service: Pain Management;  Laterality: Bilateral;    INJECTION OF JOINT Bilateral 1/31/2023    Procedure: Bilateral IA knee joint injection with steroid RN IV Sedation;  Surgeon: Devon Grant MD;  Location: [facility];  Service: Pain Management;  Laterality: Bilateral;    INJECTION OF JOINT Bilateral 5/10/2023    Procedure: Bilateral IA knee joint injection Synvisc RN IV Sedation;  Surgeon: Devon Grant MD;  Location: [facility];  Service: Pain Management;  Laterality: Bilateral;    INJECTION OF JOINT Bilateral 11/7/2023    Procedure: Bilateral intraarticular knee injection;  Surgeon: Devon Grant MD;  Location: [facility];  Service: Pain Management;  Laterality: Bilateral;    PROSTATE SURGERY      RADIOFREQUENCY THERMOCOAGULATION Left 9/1/2023    Procedure: Left Genicular Nerve RFA;  Surgeon: Devon Grant MD;  Location: [facility];  Service: Pain Management;  Laterality: Left;    RADIOFREQUENCY THERMOCOAGULATION Right 9/15/2023    Procedure: Right Genicular Nerve RFA RN IV Sedation;  Surgeon: Devon Grant MD;  Location: [facility];  Service: Pain Management;  Laterality: Right;    SPINE SURGERY       Review of patient's allergies indicates:   Allergen Reactions    Fish containing products     Iodine and iodide containing products     Shellfish containing products      Current Outpatient Medications on File Prior to Visit   Medication Sig Dispense Refill    albuterol (PROVENTIL) 2.5 mg /3 mL (0.083 %) nebulizer solution Take 3 mLs (2.5 mg total) by nebulization every 4 to 6  hours as needed for Wheezing or Shortness of Breath. 360 mL 11    aspirin (ECOTRIN) 81 MG EC tablet Take 1 tablet (81 mg total) by mouth once daily. 90 tablet 3    diclofenac sodium (VOLTAREN) 1 % Gel Apply 4 grams topically 4 (four) times daily. Bilateral knees 100 g 3    empagliflozin (JARDIANCE) 10 mg tablet Take 1 tablet (10 mg total) by mouth once daily. 90 tablet 3    ferrous sulfate 325 (65 FE) MG EC tablet Take 1 tablet (325 mg total) by mouth once daily. 30 tablet 1    gabapentin (NEURONTIN) 300 MG capsule Take 300 mg by mouth every evening.      memantine (NAMENDA) 5 MG Tab Take 1 tablet (5 mg total) by mouth 2 (two) times daily. 60 tablet 11    [DISCONTINUED] albuterol (PROVENTIL/VENTOLIN HFA) 90 mcg/actuation inhaler Inhale 2 puffs into the lungs every 4 (four) hours as needed for Wheezing or Shortness of Breath. 8.5 g 11    furosemide (LASIX) 40 MG tablet Take 1 tablet (40 mg total) by mouth once daily. Do not take if BP is below 110/70 (Patient not taking: Reported on 6/19/2024) 90 tablet 1    metoprolol succinate (TOPROL-XL) 25 MG 24 hr tablet Take 0.5 tablets (12.5 mg total) by mouth once daily. Do no take if BP is below 110/70 (Patient not taking: Reported on 6/19/2024) 30 tablet 6    midodrine (PROAMATINE) 5 MG Tab Take 1 tablet (5 mg total) by mouth every meal as needed (give if BP is below 90/70). (Patient not taking: Reported on 6/19/2024) 90 tablet 3    sacubitriL-valsartan (ENTRESTO) 24-26 mg per tablet Take 1 tablet by mouth 2 (two) times daily. Do no take if BP is below 110/70 (Patient not taking: Reported on 6/19/2024) 60 tablet 3    [DISCONTINUED] fluticasone-umeclidin-vilanter (TRELEGY ELLIPTA) 200-62.5-25 mcg inhaler Inhale 1 puff into the lungs once daily. (Patient not taking: Reported on 6/19/2024) 60 each 11     No current facility-administered medications on file prior to visit.     Social History     Socioeconomic History    Marital status:      Spouse name: jenn      Number of children: 6   Tobacco Use    Smoking status: Some Days     Current packs/day: 0.50     Average packs/day: 1 pack/day for 70.3 years (69.7 ttl pk-yrs)     Types: Cigarettes     Start date: [birth year]1/1954     Last attempt to quit: 3/1/2023     Passive exposure: Past    Smokeless tobacco: Never   Substance and Sexual Activity    Alcohol use: Yes     Comment: reports only drinks red wine when games are on    Drug use: Never    Sexual activity: Yes     Partners: Female     Social Determinants of Health     Financial Resource Strain: Patient Declined (2/26/2024)    Overall Financial Resource Strain (CARDIA)     Difficulty of Paying Living Expenses: Patient declined   Food Insecurity: Patient Declined (2/26/2024)    Hunger Vital Sign     Worried About Running Out of Food in the Last Year: Patient declined     Ran Out of Food in the Last Year: Patient declined   Transportation Needs: Patient Declined (2/26/2024)    PRAPARE - Transportation     Lack of Transportation (Medical): Patient declined     Lack of Transportation (Non-Medical): Patient declined   Physical Activity: Insufficiently Active (2/26/2024)    Exercise Vital Sign     Days of Exercise per Week: 3 days     Minutes of Exercise per Session: 10 min   Stress: No Stress Concern Present (8/30/2023)    [employer] - Occupational Stress Questionnaire     Feeling of Stress : Not at all   Housing Stability: Patient Declined (2/26/2024)    Housing Stability Vital Sign     Unable to Pay for Housing in the Last Year: Patient declined     Number of Places Lived in the Last Year: 1     Unstable Housing in the Last Year: Patient declined     Family History   Problem Relation Name Age of Onset    Diabetes Mother      Peripheral vascular disease Mother         Review of Systems   Constitutional:  Positive for fatigue. Negative for fever.   HENT:  Positive for postnasal drip, rhinorrhea and congestion.    Eyes:  Negative for redness and itching.  "  Respiratory:  Positive for cough, sputum production, shortness of breath, dyspnea on extertion, use of rescue inhaler and Paroxysmal Nocturnal Dyspnea.    Cardiovascular:  Positive for palpitations. Negative for chest pain and leg swelling.   Genitourinary:  Negative for difficulty urinating and hematuria.   Endocrine:  Negative for cold intolerance and heat intolerance.    Musculoskeletal:  Positive for arthralgias.   Skin:  Negative for rash.   Gastrointestinal:  Negative for nausea and abdominal pain.   Neurological:  Negative for dizziness, syncope, weakness and light-headedness.   Hematological:  Negative for adenopathy. Does not bruise/bleed easily.   Psychiatric/Behavioral:  Negative for sleep disturbance. The patient is not nervous/anxious.        Objective:      BP (!) 98/56   Pulse 82   Resp 18   Ht 5' 8" (1.727 m)   Wt 68.2 kg (150 lb 7.4 oz)   SpO2 97%   BMI 22.88 kg/m²   Physical Exam  Vitals and nursing note reviewed.   Constitutional:       Appearance: He is well-developed.   HENT:      Head: Normocephalic and atraumatic.      Nose: Congestion and rhinorrhea present.   Eyes:      Conjunctiva/sclera: Conjunctivae normal.      Pupils: Pupils are equal, round, and reactive to light.   Neck:      Thyroid: No thyromegaly.      Vascular: No JVD.      Trachea: No tracheal deviation.   Cardiovascular:      Rate and Rhythm: Normal rate. Rhythm irregular.      Heart sounds: No murmur heard.  Pulmonary:      Breath sounds: Examination of the right-lower field reveals wheezing. Examination of the left-lower field reveals wheezing. Decreased breath sounds and wheezing present. No rhonchi or rales.   Abdominal:      General: Bowel sounds are normal.      Palpations: Abdomen is soft.   Musculoskeletal:         General: No tenderness. Normal range of motion.      Cervical back: Neck supple.   Lymphadenopathy:      Cervical: No cervical adenopathy.   Skin:     General: Skin is warm and dry.   Neurological:     " " Mental Status: He is alert and oriented to person, place, and time.       Personal Diagnostic Review  CT stable nodule        6/19/2024    11:07 AM   Pulmonary Studies Review   SpO2 97 %   Height 5' 8" (1.727 m)   Weight 68.2 kg (150 lb 7.4 oz)   BMI (Calculated) 22.9   Predicted Distance 282.51   Predicted Distance Meters (Calculated) 461.56 meters       CT Chest Without Contrast  Narrative: EXAMINATION:  CT CHEST WITHOUT CONTRAST    CLINICAL HISTORY:  Abnormal xray - lung nodule, < 1 cm, mod-high risk; Solitary pulmonary nodule    TECHNIQUE:  Axial images performed through the chest without IV contrast.  Sagittal and coronal reformats obtained.    COMPARISON:  CT chest 03/19/2024    FINDINGS:  Heart: Normal size.  No pericardial effusion.  Advanced multi vessel coronary arterial calcifications    Mediastinum: No adenopathy.  Unremarkable.    Vasculature: Advanced aortic atherosclerosis.    Lungs: Stable areas of scarring in the upper lungs.  Advanced emphysematous changes.  Again seen is the 7 mm lingular segment left upper lobe pulmonary nodule (series 4 is 252).  No significant change from the most recent exam..    Esophagus: Unremarkable.    Upper Abdomen: No abnormality of the partially imaged upper abdomen.    Bones: No acute fracture. Stable degenerative change.  Impression: Stable CT chest.  7 mm lingular nodule without significant change.    Continued imaging surveillance advised    All CT scans at this facility use dose modulation, iterative reconstruction and/or weight based dosing when appropriate to reduce radiation dose to as low as reasonably achievable.    Electronically signed by: Awais Benito MD  Date:    06/19/2024  Time:    11:32      Office Spirometry Results:         6/19/2024    11:07 AM 6/18/2024    11:17 PM 6/18/2024    10:45 PM 6/18/2024    10:32 PM 6/18/2024     9:13 PM 6/14/2024     3:08 PM 6/10/2024     2:57 PM   Pulmonary Function Tests   SpO2 97 % 96 % 94 % 97 % 91 % 98 % 96 % " "  Height 5' 8" (1.727 m)      5' 8" (1.727 m)   Weight 68.2 kg (150 lb 7.4 oz)    69.1 kg (152 lb 5.4 oz) 68.4 kg (150 lb 12.7 oz) 69.8 kg (153 lb 14.1 oz)   BMI (Calculated) 22.9     22.9 23.4         6/19/2024    11:07 AM   Pulmonary Studies Review   SpO2 97 %   Height 5' 8" (1.727 m)   Weight 68.2 kg (150 lb 7.4 oz)   BMI (Calculated) 22.9   Predicted Distance 282.51   Predicted Distance Meters (Calculated) 461.56 meters   [facility] - Pulmonary Function  Six Minute Walk      SUMMARY      Ordering Provider: Dr. Jackson              Interpreting Provider: Dr. Jackson  Performing nurse/tech/RT: NADJA Pritchard, RRT  Diagnosis: Emphysema  Height: 5' 8" (172.7 cm)  Weight: 67.3 kg (148 lb 4.2 oz)  BMI (Calculated): 22.5              Patient Race:                                                                 Phase Oxygen Assessment Supplemental O2 Heart   Rate Blood Pressure [place] Dyspnea Scale Rating   Resting 96 % Room Air 85 bpm 98/48 2   Exercise             Minute             1 92 % Room Air 89 bpm       2 92 % Room Air 90 bpm       3 96 % Room Air 89 bpm       4 100 % Room Air 94 bpm       5 94 % Room Air 96 bpm       6  96 % Room Air 95 bpm 116/57 3   Recovery             Minute             1 97 % Room Air 96 bpm       2 97 % Room Air 90 bpm       3 97 % Room Air 85 bpm       4 97 % Room Air 85 bpm 147/63 2      Six Minute Walk Summary  6MWT Status: completed without stopping  Patient Reported: Dyspnea         Interpretation:  Did the patient stop or pause?: No  Total Time Walked (Calculated): 360 seconds  Final Partial Lap Distance (feet): 0 feet  Total Distance Meters (Calculated): 243.84 meters  Predicted Distance Meters (Calculated): 463.32 meters  Percentage of Predicted (Calculated): 52.63  Peak VO2 (Calculated): 11.3  Mets: 3.23  Has The Patient Had a Previous Six Minute Walk Test?: Yes     Previous 6MWT Results  Has The Patient Had a Previous Six Minute Walk Test?: Yes  Date of Previous Test: " 03/28/23  Total Time Walked: 346 seconds  Total Distance (meters): 182.88  Predicted Distance (meters): 512.76 meters  Percentage of Predicted: 35.67  Percent Change (Calculated): -0.33         Assessment:            Centrilobular emphysema    Solitary pulmonary nodule  -     CT Chest Without Contrast; Future; Expected date: 06/19/2024    Panlobular emphysema  -     albuterol (PROVENTIL/VENTOLIN HFA) 90 mcg/actuation inhaler; Inhale 2 puffs into the lungs every 4 (four) hours as needed for Wheezing or Shortness of Breath.  Dispense: 8.5 g; Refill: 11  -     fluticasone-umeclidin-vilanter (TRELEGY ELLIPTA) 200-62.5-25 mcg inhaler; Inhale 1 puff into the lungs once daily.  Dispense: 60 each; Refill: 11          Outpatient Encounter Medications as of 6/19/2024   Medication Sig Dispense Refill    albuterol (PROVENTIL) 2.5 mg /3 mL (0.083 %) nebulizer solution Take 3 mLs (2.5 mg total) by nebulization every 4 to 6 hours as needed for Wheezing or Shortness of Breath. 360 mL 11    aspirin (ECOTRIN) 81 MG EC tablet Take 1 tablet (81 mg total) by mouth once daily. 90 tablet 3    diclofenac sodium (VOLTAREN) 1 % Gel Apply 4 grams topically 4 (four) times daily. Bilateral knees 100 g 3    empagliflozin (JARDIANCE) 10 mg tablet Take 1 tablet (10 mg total) by mouth once daily. 90 tablet 3    ferrous sulfate 325 (65 FE) MG EC tablet Take 1 tablet (325 mg total) by mouth once daily. 30 tablet 1    gabapentin (NEURONTIN) 300 MG capsule Take 300 mg by mouth every evening.      memantine (NAMENDA) 5 MG Tab Take 1 tablet (5 mg total) by mouth 2 (two) times daily. 60 tablet 11    [DISCONTINUED] albuterol (PROVENTIL/VENTOLIN HFA) 90 mcg/actuation inhaler Inhale 2 puffs into the lungs every 4 (four) hours as needed for Wheezing or Shortness of Breath. 8.5 g 11    albuterol (PROVENTIL/VENTOLIN HFA) 90 mcg/actuation inhaler Inhale 2 puffs into the lungs every 4 (four) hours as needed for Wheezing or Shortness of Breath. 8.5 g 11     fluticasone-umeclidin-vilanter (TRELEGY ELLIPTA) 200-62.5-25 mcg inhaler Inhale 1 puff into the lungs once daily. 60 each 11    furosemide (LASIX) 40 MG tablet Take 1 tablet (40 mg total) by mouth once daily. Do not take if BP is below 110/70 (Patient not taking: Reported on 6/19/2024) 90 tablet 1    metoprolol succinate (TOPROL-XL) 25 MG 24 hr tablet Take 0.5 tablets (12.5 mg total) by mouth once daily. Do no take if BP is below 110/70 (Patient not taking: Reported on 6/19/2024) 30 tablet 6    midodrine (PROAMATINE) 5 MG Tab Take 1 tablet (5 mg total) by mouth every meal as needed (give if BP is below 90/70). (Patient not taking: Reported on 6/19/2024) 90 tablet 3    sacubitriL-valsartan (ENTRESTO) 24-26 mg per tablet Take 1 tablet by mouth 2 (two) times daily. Do no take if BP is below 110/70 (Patient not taking: Reported on 6/19/2024) 60 tablet 3    [DISCONTINUED] aspirin (ECOTRIN) 81 MG EC tablet Take 1 tablet (81 mg total) by mouth once daily. 30 tablet 0    [DISCONTINUED] empagliflozin (JARDIANCE) 10 mg tablet Take 1 tablet (10 mg total) by mouth once daily. 90 tablet 3    [DISCONTINUED] empagliflozin (JARDIANCE) 10 mg tablet Take 1 tablet by mouth once daily.      [DISCONTINUED] ferrous sulfate 325 (65 FE) MG EC tablet Take 1 tablet (325 mg total) by mouth once daily. 30 tablet 1    [DISCONTINUED] fluticasone-umeclidin-vilanter (TRELEGY ELLIPTA) 200-62.5-25 mcg inhaler Inhale 1 puff into the lungs once daily. (Patient not taking: Reported on 6/19/2024) 60 each 11    [DISCONTINUED] furosemide (LASIX) 40 MG tablet Take 1 tablet (40 mg total) by mouth 2 (two) times a day. 90 tablet 3    [DISCONTINUED] metoprolol succinate (TOPROL-XL) 25 MG 24 hr tablet Take 0.5 tablets (12.5 mg total) by mouth once daily. 30 tablet 6    [DISCONTINUED] potassium chloride SA (K-DUR,KLOR-CON M) 10 MEQ tablet Take 2 tablets (20 mEq total) by mouth once daily. 90 tablet 1    [DISCONTINUED] sacubitriL-valsartan (ENTRESTO)  mg  per tablet Take 1 tablet by mouth 2 (two) times daily. 180 tablet 1     No facility-administered encounter medications on file as of 6/19/2024.     Plan:       Requested Prescriptions     Signed Prescriptions Disp Refills    albuterol (PROVENTIL/VENTOLIN HFA) 90 mcg/actuation inhaler 8.5 g 11     Sig: Inhale 2 puffs into the lungs every 4 (four) hours as needed for Wheezing or Shortness of Breath.    fluticasone-umeclidin-vilanter (TRELEGY ELLIPTA) 200-62.5-25 mcg inhaler 60 each 11     Sig: Inhale 1 puff into the lungs once daily.     Problem List Items Addressed This Visit       Emphysema of lung - Primary    Relevant Medications    albuterol (PROVENTIL/VENTOLIN HFA) 90 mcg/actuation inhaler    fluticasone-umeclidin-vilanter (TRELEGY ELLIPTA) 200-62.5-25 mcg inhaler    Solitary pulmonary nodule    Relevant Orders    CT Chest Without Contrast          Follow up in about 7 months (around 1/19/2025) for CT - on return visit, Follow up with NP.    MEDICAL DECISION MAKING: Moderate to high complexity.  Overall, the multiple problems listed are of moderate to high severity that may impact quality of life and activities of daily living. Side effects of medications, treatment plan as well as options and alternatives reviewed and discussed with patient. There was counseling of patient concerning these issues.    Total time spent in counseling and coordination of care - 30  minutes of total time spent on the encounter, which includes face to face time and non-face to face time preparing to see the patient (eg, review of tests), Obtaining and/or reviewing separately obtained history, Documenting clinical information in the electronic or other health record, Independently interpreting results (not separately reported) and communicating results to the patient/family/caregiver, or Care coordination (not separately reported).    Time was used in discussion of prognosis, risks, benefits of treatment, instructions and compliance  with regimen . Discussion with other physicians and/or health care providers - home health or for use of durable medical equipment (oxygen, nebulizers, CPAP, BiPAP) occurred.

## 2024-06-19 NOTE — ED PROVIDER NOTES
"SCRIBE #1 NOTE: I, Alan Fields, am scribing for, and in the presence of, Alyssa Wan MD. I have scribed the entire note.       History     Chief Complaint   Patient presents with    Hypotension    Shortness of Breath     Pt's nurse reports that pt has been hypotensive. Midodrine 5mg given at 1900. Pt c/o having "the shakes" and being slight sob.      Review of patient's allergies indicates:   Allergen Reactions    Fish containing products     Iodine and iodide containing products     Shellfish containing products          History of Present Illness     HPI    6/18/2024, 11:32 PM  History obtained from the patient and caretaker        History of Present Illness: Richy Douglas is a 83 y.o. male patient with a PMHx of GERD, aortic aneurysm, HTN, prostate cancer, CHF, HLD, AFIB, major neurocognitive disorder, and depression who presents to the Emergency Department for evaluation of hypotension which onset gradually. Pt states he feels bad. Symptoms are constant and moderate in severity. No mitigating or exacerbating factors reported. Patient denies any CP, nausea, vomiting, fever, and all other sxs at this time.     Pt's caretaker takes pt has had low BP, shivering and SOB. She notes that he has been taking Midodrine. Pt has also been losing weight but denies any appetite change. Pt does daily breathing treatments and uses an inhaler as needed. He currently smokes. He has a follow-up tomorrow with Dr. Jackson (Pulmonology) for CT.      Arrival mode: Personal vehicle      PCP: Vivian Ch MD        Past Medical History:  Past Medical History:   Diagnosis Date    Alcoholic peripheral neuropathy 03/01/2023    Anemia of chronic disease     Aortic aneurysm     Atrial fibrillation with RVR 09/24/2021    Chronic systolic congestive heart failure 08/31/2017    Depression     Emphysema of lung 08/31/2017    GERD (gastroesophageal reflux disease)     Hypertension     Knee osteoarthritis 10/21/2022    Major " neurocognitive disorder due to multiple etiologies 04/14/2023    Microcytic anemia 11/24/2020    Other hyperlipidemia 04/27/2021    Personal history of colonic polyps 2022    Prostate cancer        Past Surgical History:  Past Surgical History:   Procedure Laterality Date    CATHETERIZATION OF BOTH LEFT AND RIGHT HEART N/A 1/13/2022    Procedure: CATHETERIZATION, HEART, BOTH LEFT AND RIGHT;  Surgeon: Janneth Tovar MD;  Location: [facility] CATH LAB;  Service: Cardiology;  Laterality: N/A;  poss cx    COLONOSCOPY      COLONOSCOPY N/A 6/30/2022    Procedure: COLONOSCOPY;  Surgeon: Sandra Perez MD;  Location: [facility] ENDO;  Service: Endoscopy;  Laterality: N/A;    COLONOSCOPY N/A 7/1/2022    Procedure: COLONOSCOPY;  Surgeon: Woodrow Christian MD;  Location: [facility];  Service: Endoscopy;  Laterality: N/A;    CORONARY ANGIOGRAPHY N/A 1/13/2022    Procedure: ANGIOGRAM, CORONARY ARTERY;  Surgeon: Janneth Tovar MD;  Location: [facility] CATH LAB;  Service: Cardiology;  Laterality: N/A;    ESOPHAGOGASTRODUODENOSCOPY N/A 6/30/2022    Procedure: EGD (ESOPHAGOGASTRODUODENOSCOPY);  Surgeon: Sandra Perez MD;  Location: [facility];  Service: Endoscopy;  Laterality: N/A;    INJECTION OF ANESTHETIC AGENT AROUND NERVE Bilateral 6/20/2023    Procedure: Bilateral Genicular nerve block RN IV Sedation;  Surgeon: Devon Grant MD;  Location: [facility] PAIN MGT;  Service: Pain Management;  Laterality: Bilateral;    INJECTION OF JOINT Bilateral 10/21/2022    Procedure: Bilateral intraarticular knee injection with local ALLERGIC TO IODINE;  Surgeon: Devon Grant MD;  Location: [facility] PAIN MGT;  Service: Pain Management;  Laterality: Bilateral;    INJECTION OF JOINT Bilateral 1/31/2023    Procedure: Bilateral IA knee joint injection with steroid RN IV Sedation;  Surgeon: Devon Grant MD;  Location: [facility] PAIN MGT;  Service: Pain Management;  Laterality: Bilateral;    INJECTION OF JOINT Bilateral 5/10/2023    Procedure: Bilateral IA  knee joint injection Synvisc RN IV Sedation;  Surgeon: Devon Grant MD;  Location: [facility] PAIN MGT;  Service: Pain Management;  Laterality: Bilateral;    INJECTION OF JOINT Bilateral 11/7/2023    Procedure: Bilateral intraarticular knee injection;  Surgeon: Devon Grant MD;  Location: [facility] PAIN MGT;  Service: Pain Management;  Laterality: Bilateral;    PROSTATE SURGERY      RADIOFREQUENCY THERMOCOAGULATION Left 9/1/2023    Procedure: Left Genicular Nerve RFA;  Surgeon: Devon Grant MD;  Location: [facility] PAIN MGT;  Service: Pain Management;  Laterality: Left;    RADIOFREQUENCY THERMOCOAGULATION Right 9/15/2023    Procedure: Right Genicular Nerve RFA RN IV Sedation;  Surgeon: Devon Grant MD;  Location: [facility] PAIN MGT;  Service: Pain Management;  Laterality: Right;    SPINE SURGERY           Family History:  Family History   Problem Relation Name Age of Onset    Diabetes Mother      Peripheral vascular disease Mother         Social History:  Social History     Tobacco Use    Smoking status: Some Days     Current packs/day: 0.50     Average packs/day: 1 pack/day for 70.3 years (69.7 ttl pk-yrs)     Types: Cigarettes     Start date: 1[birth year]1/1954     Last attempt to quit: 3/1/2023     Passive exposure: Past    Smokeless tobacco: Never   Substance and Sexual Activity    Alcohol use: Yes     Comment: reports only drinks red wine when games are on    Drug use: Never    Sexual activity: Yes     Partners: Female        Review of Systems     Review of Systems   Constitutional:  Positive for unexpected weight change (Loss). Negative for appetite change, chills and fever.        + Malaise   HENT:  Negative for sore throat.    Respiratory:  Positive for shortness of breath.    Cardiovascular:  Negative for chest pain.   Gastrointestinal:  Negative for nausea and vomiting.   Endocrine: Positive for cold intolerance (Shivering).   Genitourinary:  Negative for dysuria.   Musculoskeletal:  Negative for back pain.   Skin:  Negative  for rash.   Neurological:  Negative for weakness.   Hematological:  Does not bruise/bleed easily.   All other systems reviewed and are negative.       Physical Exam     Initial Vitals [06/18/24 2113]   BP Pulse Resp Temp SpO2   (!) 100/50 92 18 98.7 °F (37.1 °C) (!) 91 %      MAP       --          Physical Exam  Nursing Notes and Vital Signs Reviewed.  Constitutional: Patient is in no acute distress. Well-developed and well-nourished.  Head: Atraumatic. Normocephalic.  Eyes: PERRL. EOM intact. Conjunctivae are not pale. No scleral icterus.  ENT: Mucous membranes are moist. Oropharynx is clear and symmetric.    Neck: Supple. Full ROM. No lymphadenopathy.  Cardiovascular: Regular rate. Regular rhythm. No murmurs, rubs, or gallops. Distal pulses are 2+ and symmetric.  Pulmonary/Chest: No respiratory distress. Expiratory wheezing bilaterally   Abdominal: Soft and non-distended.  There is no tenderness.  No rebound, guarding, or rigidity. Good bowel sounds.  Genitourinary: No CVA tenderness  Musculoskeletal: Moves all extremities. No obvious deformities. No edema. No calf tenderness.  Skin: Warm and dry.  Neurological:  Alert, awake, and appropriate.  Normal speech.  No acute focal neurological deficits are appreciated.  Psychiatric: Normal affect. Good eye contact. Appropriate in content.     ED Course   Procedures  ED Vital Signs:  Vitals:    06/18/24 2113 06/18/24 2232 06/18/24 2245 06/18/24 2317   BP: (!) 100/50 (!) 111/52 126/60 (!) 114/57   Pulse: 92 77 75 72   Resp: 18  (!) 24 (!) 23   Temp: 98.7 °F (37.1 °C)      TempSrc: Oral      SpO2: (!) 91% 97% (!) 94% 96%   Weight: 69.1 kg (152 lb 5.4 oz)          Abnormal Lab Results:  Labs Reviewed   CBC W/ AUTO DIFFERENTIAL - Abnormal; Notable for the following components:       Result Value    RBC 3.91 (*)     Hemoglobin 10.4 (*)     Hematocrit 31.6 (*)     MCV 81 (*)     MCH 26.6 (*)     RDW 23.4 (*)     Eos # 0.7 (*)     Eosinophil % 12.5 (*)     All other components  within normal limits   COMPREHENSIVE METABOLIC PANEL - Abnormal; Notable for the following components:    CO2 22 (*)     BUN 24 (*)     Alkaline Phosphatase 40 (*)     ALT 8 (*)     All other components within normal limits   B-TYPE NATRIURETIC PEPTIDE   TROPONIN I        All Lab Results:  Results for orders placed or performed during the hospital encounter of 06/18/24   Brain natriuretic peptide   Result Value Ref Range    BNP 95 0 - 99 pg/mL   CBC auto differential   Result Value Ref Range    WBC 5.90 3.90 - 12.70 K/uL    RBC 3.91 (L) 4.60 - 6.20 M/uL    Hemoglobin 10.4 (L) 14.0 - 18.0 g/dL    Hematocrit 31.6 (L) 40.0 - 54.0 %    MCV 81 (L) 82 - 98 fL    MCH 26.6 (L) 27.0 - 31.0 pg    MCHC 32.9 32.0 - 36.0 g/dL    RDW 23.4 (H) 11.5 - 14.5 %    Platelets 350 150 - 450 K/uL    MPV 10.5 9.2 - 12.9 fL    Immature Granulocytes 0.5 0.0 - 0.5 %    Gran # (ANC) 3.2 1.8 - 7.7 K/uL    Immature Grans (Abs) 0.03 0.00 - 0.04 K/uL    Lymph # 1.2 1.0 - 4.8 K/uL    Mono # 0.7 0.3 - 1.0 K/uL    Eos # 0.7 (H) 0.0 - 0.5 K/uL    Baso # 0.07 0.00 - 0.20 K/uL    nRBC 0 0 /100 WBC    Gran % 54.3 38.0 - 73.0 %    Lymph % 19.5 18.0 - 48.0 %    Mono % 12.0 4.0 - 15.0 %    Eosinophil % 12.5 (H) 0.0 - 8.0 %    Basophil % 1.2 0.0 - 1.9 %    Platelet Estimate Appears normal     Hypo Occasional     Ovalocytes Occasional     Target Cells Occasional     Schistocytes Present     Fragmented Cells Occasional     Differential Method Automated    Comprehensive metabolic panel   Result Value Ref Range    Sodium 140 136 - 145 mmol/L    Potassium 3.7 3.5 - 5.1 mmol/L    Chloride 106 95 - 110 mmol/L    CO2 22 (L) 23 - 29 mmol/L    Glucose 101 70 - 110 mg/dL    BUN 24 (H) 8 - 23 mg/dL    Creatinine 1.2 0.5 - 1.4 mg/dL    Calcium 9.7 8.7 - 10.5 mg/dL    Total Protein 7.4 6.0 - 8.4 g/dL    Albumin 4.2 3.5 - 5.2 g/dL    Total Bilirubin 0.8 0.1 - 1.0 mg/dL    Alkaline Phosphatase 40 (L) 55 - 135 U/L    AST 19 10 - 40 U/L    ALT 8 (L) 10 - 44 U/L    eGFR >60  >60 mL/min/1.73 m^2    Anion Gap 12 8 - 16 mmol/L   Troponin I   Result Value Ref Range    Troponin I 0.020 0.000 - 0.026 ng/mL   EKG 12-lead   Result Value Ref Range    QRS Duration 88 ms    [facility] QTC Calculation 461 ms        Imaging Results:  Imaging Results              X-Ray Chest 1 View (Final result)  Result time 06/18/24 21:40:35      Final result by Dmitri Mckeon MD (06/18/24 21:40:35)                   Impression:      Stable exam      Electronically signed by: Dmitri Mckeon  Date:    06/18/2024  Time:    21:40               Narrative:    EXAMINATION:  XR CHEST 1 VIEW    CLINICAL HISTORY:  Shortness of breath    TECHNIQUE:  Single frontal view of the chest was performed.    COMPARISON:  Prior    FINDINGS:  No pleural effusion or pneumothorax.  Mild perihilar interstitial opacities similar to prior exam.    The cardiac silhouette is normal in size. The hilar and mediastinal contours are unremarkable.    Bones are intact.                                       The EKG was ordered, reviewed, and independently interpreted by the ED provider.  Interpretation time: 21:43  Rate: 85 BPM  Rhythm: Sinus rhythm with 1st degree A-V bloc  Interpretation: Minimal voltage criteria for LVH, may be normal variant ( Sokolow-Rehman ). No STEMI.             The Emergency Provider reviewed the vital signs and test results, which are outlined above.     ED Discussion     11:44 PM: Reassessed pt at this time. Discussed with pt all pertinent ED information and results. Discussed pt dx and plan of tx. Gave pt all f/u and return to the ED instructions. All questions and concerns were addressed at this time. Pt expresses understanding of information and instructions, and is comfortable with plan to discharge. Pt is stable for discharge.    I discussed with patient and/or family/caretaker that evaluation in the ED does not suggest any emergent or life threatening medical conditions requiring immediate intervention beyond what was  provided in the ED, and I believe patient is safe for discharge.  Regardless, an unremarkable evaluation in the ED does not preclude the development or presence of a serious of life threatening condition. As such, patient was instructed to return immediately for any worsening or change in current symptoms.            Medical Decision Making  Amount and/or Complexity of Data Reviewed  Labs: ordered. Decision-making details documented in ED Course.  Radiology: ordered. Decision-making details documented in ED Course.  ECG/medicine tests: ordered and independent interpretation performed. Decision-making details documented in ED Course.    Risk  Decision regarding hospitalization.                ED Medication(s):  Medications - No data to display    Discharge Medication List as of 6/18/2024 11:27 PM           Follow-up Information       Schedule an appointment as soon as possible for a visit  with Vivian Ch MD.    Specialty: Family Medicine  Why: As needed  Contact information:  [id number] [facility] [address]  [phone]                                 Scribe Attestation:   Scribe #1: I performed the above scribed service and the documentation accurately describes the services I performed. I attest to the accuracy of the note.     Attending:   Physician Attestation Statement for Scribe #1: I, Alyssa Wan MD, personally performed the services described in this documentation, as scribed by Alan Fields, in my presence, and it is both accurate and complete.           Clinical Impression       ICD-10-CM ICD-9-CM   1. Shortness of breath  R06.02 786.05   2. Idiopathic hypotension  I95.0 458.9       Disposition:   Disposition: Discharged  Condition: Stable        Alyssa Wan MD  06/22/24 [id number]

## 2024-06-24 DIAGNOSIS — F10.230 ALCOHOL DEPENDENCE WITH UNCOMPLICATED WITHDRAWAL: ICD-10-CM

## 2024-06-24 DIAGNOSIS — F02.80 MAJOR NEUROCOGNITIVE DISORDER DUE TO MULTIPLE ETIOLOGIES: ICD-10-CM

## 2024-06-25 RX ORDER — MEMANTINE HYDROCHLORIDE 5 MG/1
5 TABLET ORAL 2 TIMES DAILY
Qty: 180 TABLET | Refills: 3 | Status: SHIPPED | OUTPATIENT
Start: 2024-06-25

## 2024-06-25 RX ORDER — FERROUS SULFATE 325(65) MG
325 TABLET, DELAYED RELEASE (ENTERIC COATED) ORAL DAILY
Qty: 90 TABLET | Refills: 3 | Status: SHIPPED | OUTPATIENT
Start: 2024-06-25

## 2024-06-28 ENCOUNTER — HOSPITAL ENCOUNTER (OUTPATIENT)
Dept: CARDIOLOGY | Facility: HOSPITAL | Age: 84
Discharge: HOME | End: 2024-06-28
Attending: INTERNAL MEDICINE
Payer: MEDICARE

## 2024-06-28 VITALS
DIASTOLIC BLOOD PRESSURE: 70 MMHG | HEIGHT: 68 IN | SYSTOLIC BLOOD PRESSURE: 162 MMHG | WEIGHT: 150 LBS | BODY MASS INDEX: 22.73 KG/M2

## 2024-06-28 VITALS
HEIGHT: 68 IN | SYSTOLIC BLOOD PRESSURE: 98 MMHG | WEIGHT: 150 LBS | DIASTOLIC BLOOD PRESSURE: 56 MMHG | BODY MASS INDEX: 22.73 KG/M2

## 2024-06-28 DIAGNOSIS — I73.9 PVD (PERIPHERAL VASCULAR DISEASE): ICD-10-CM

## 2024-06-28 LAB
LEFT ABI: 1.17
LEFT ANT TIBIAL SYS PSV: 78 CM/S
LEFT ARM BP: 159 MMHG
LEFT CFA PSV: 177 CM/S
LEFT DORSALIS PEDIS: 190 MMHG
LEFT PERONEAL SYS PSV: 96 CM/S
LEFT POPLITEAL PSV: 96 CM/S
LEFT POSTERIOR TIBIAL: 179 MMHG
LEFT PROFUNDA SYS PSV: 175 CM/S
LEFT SUPER FEMORAL DIST SYS PSV: 84 CM/S
LEFT SUPER FEMORAL MID SYS PSV: 133 CM/S
LEFT SUPER FEMORAL OSTIAL SYS PSV: 152 CM/S
LEFT SUPER FEMORAL PROX SYS PSV: 133 CM/S
LEFT TBI: 0.86
LEFT TIB/PER TRUNK SYS PSV: 86 CM/S
LEFT TOE PRESSURE: 139 MMHG
OHS CV LEFT LOWER EXTREMITY ABI (NO CALC): 1.1
OHS CV RIGHT ABI LOWER EXTREMITY (NO CALC): 1.1
RIGHT ABI: 1.18
RIGHT ANT TIBIAL SYS PSV: 59 CM/S
RIGHT ARM BP: 162 MMHG
RIGHT CFA PSV: 155 CM/S
RIGHT DORSALIS PEDIS: 181 MMHG
RIGHT PERONEAL SYS PSV: 127 CM/S
RIGHT POPLITEAL PSV: 82 CM/S
RIGHT POSTERIOR TIBIAL: 191 MMHG
RIGHT PROFUNDA SYS PSV: 238 CM/S
RIGHT SUPER FEMORAL DIST SYS PSV: 132 CM/S
RIGHT SUPER FEMORAL MID SYS PSV: 138 CM/S
RIGHT SUPER FEMORAL OSTIAL SYS PSV: 121 CM/S
RIGHT SUPER FEMORAL PROX SYS PSV: 93 CM/S
RIGHT TBI: 0.85
RIGHT TIB/PER TRUNK SYS PSV: 95 CM/S
RIGHT TOE PRESSURE: 137 MMHG

## 2024-06-28 PROCEDURE — 93922 UPR/L XTREMITY ART 2 LEVELS: CPT | Mod: 26,,, | Performed by: INTERNAL MEDICINE

## 2024-06-28 PROCEDURE — 93922 UPR/L XTREMITY ART 2 LEVELS: CPT

## 2024-06-28 PROCEDURE — 93925 LOWER EXTREMITY STUDY: CPT

## 2024-06-28 PROCEDURE — 93925 LOWER EXTREMITY STUDY: CPT | Mod: 26,,, | Performed by: INTERNAL MEDICINE

## 2024-07-01 ENCOUNTER — TELEPHONE (OUTPATIENT)
Dept: CARDIOLOGY | Facility: CLINIC | Age: 84
End: 2024-07-01
Payer: MEDICARE

## 2024-07-01 DIAGNOSIS — I73.9 PVD (PERIPHERAL VASCULAR DISEASE): Primary | ICD-10-CM

## 2024-07-01 NOTE — TELEPHONE ENCOUNTER
Contacted patient regarding results patient stated understanding had no further questions or concerns.  ----- Message from Uriel Garces MD sent at 7/1/2024  1:10 PM CDT -----  Please contact the patient and let them know that their results of leg arteries reveal moderate to severe blockages - referred to Dr. Bo with interventional cardiology clinic to discuss leg angiogram and treatment if needed.

## 2024-07-03 ENCOUNTER — PATIENT MESSAGE (OUTPATIENT)
Dept: CARDIOLOGY | Facility: CLINIC | Age: 84
End: 2024-07-03
Payer: MEDICARE

## 2024-07-03 DIAGNOSIS — Z71.89 COMPLEX CARE COORDINATION: ICD-10-CM

## 2024-07-05 ENCOUNTER — PATIENT MESSAGE (OUTPATIENT)
Dept: CARDIOLOGY | Facility: CLINIC | Age: 84
End: 2024-07-05
Payer: MEDICARE

## 2024-07-15 ENCOUNTER — LAB VISIT (OUTPATIENT)
Dept: LAB | Facility: HOSPITAL | Age: 84
End: 2024-07-15
Attending: INTERNAL MEDICINE
Payer: MEDICARE

## 2024-07-15 DIAGNOSIS — I50.42 CHRONIC COMBINED SYSTOLIC AND DIASTOLIC CONGESTIVE HEART FAILURE, NYHA CLASS 3: ICD-10-CM

## 2024-07-15 LAB
ALBUMIN SERPL BCP-MCNC: 4.1 G/DL (ref 3.5–5.2)
ALP SERPL-CCNC: 36 U/L (ref 55–135)
ALT SERPL W/O P-5'-P-CCNC: 11 U/L (ref 10–44)
ANION GAP SERPL CALC-SCNC: 9 MMOL/L (ref 8–16)
AST SERPL-CCNC: 22 U/L (ref 10–40)
BILIRUB SERPL-MCNC: 1.4 MG/DL (ref 0.1–1)
BNP SERPL-MCNC: 880 PG/ML (ref 0–99)
BUN SERPL-MCNC: 12 MG/DL (ref 8–23)
CALCIUM SERPL-MCNC: 9.7 MG/DL (ref 8.7–10.5)
CHLORIDE SERPL-SCNC: 109 MMOL/L (ref 95–110)
CO2 SERPL-SCNC: 26 MMOL/L (ref 23–29)
CREAT SERPL-MCNC: 0.9 MG/DL (ref 0.5–1.4)
EST. GFR  (NO RACE VARIABLE): >60 ML/MIN/1.73 M^2
GLUCOSE SERPL-MCNC: 79 MG/DL (ref 70–110)
MAGNESIUM SERPL-MCNC: 2.2 MG/DL (ref 1.6–2.6)
POTASSIUM SERPL-SCNC: 4.3 MMOL/L (ref 3.5–5.1)
PROT SERPL-MCNC: 7.1 G/DL (ref 6–8.4)
SODIUM SERPL-SCNC: 144 MMOL/L (ref 136–145)

## 2024-07-15 PROCEDURE — 83880 ASSAY OF NATRIURETIC PEPTIDE: CPT | Mod: HCNC | Performed by: INTERNAL MEDICINE

## 2024-07-15 PROCEDURE — 36415 COLL VENOUS BLD VENIPUNCTURE: CPT | Mod: HCNC | Performed by: INTERNAL MEDICINE

## 2024-07-15 PROCEDURE — 80053 COMPREHEN METABOLIC PANEL: CPT | Mod: HCNC | Performed by: INTERNAL MEDICINE

## 2024-07-15 PROCEDURE — 83735 ASSAY OF MAGNESIUM: CPT | Mod: HCNC | Performed by: INTERNAL MEDICINE

## 2024-07-16 ENCOUNTER — TELEPHONE (OUTPATIENT)
Dept: CARDIOLOGY | Facility: CLINIC | Age: 84
End: 2024-07-16
Payer: MEDICARE

## 2024-07-16 NOTE — TELEPHONE ENCOUNTER
Spoke with pt's daughter in regard to pt's lab results Pt's daughter VB understanding.     ----- Message from Uriel Garces MD sent at 7/15/2024  5:19 PM CDT -----  Please contact the patient and let them know that their results reveal elevated BNP which is much higher than recently due to extra fluid - need to restart lasix if not taking it and monitor BP and weights - follow up with me next week will repeat labs as needed. Continue low salt diet. Kidney function, potassium and magnesium are stable.

## 2024-07-17 ENCOUNTER — OFFICE VISIT (OUTPATIENT)
Dept: CARDIOLOGY | Facility: CLINIC | Age: 84
End: 2024-07-17
Payer: MEDICARE

## 2024-07-17 VITALS
HEIGHT: 68 IN | OXYGEN SATURATION: 97 % | SYSTOLIC BLOOD PRESSURE: 124 MMHG | HEART RATE: 70 BPM | DIASTOLIC BLOOD PRESSURE: 60 MMHG | WEIGHT: 157.63 LBS | BODY MASS INDEX: 23.89 KG/M2

## 2024-07-17 DIAGNOSIS — I35.1 NONRHEUMATIC AORTIC VALVE INSUFFICIENCY: ICD-10-CM

## 2024-07-17 DIAGNOSIS — G62.1 ALCOHOLIC PERIPHERAL NEUROPATHY: ICD-10-CM

## 2024-07-17 DIAGNOSIS — F10.20 UNCOMPLICATED ALCOHOL DEPENDENCE: ICD-10-CM

## 2024-07-17 DIAGNOSIS — I73.9 PVD (PERIPHERAL VASCULAR DISEASE): Primary | ICD-10-CM

## 2024-07-17 DIAGNOSIS — K55.21 AVM (ARTERIOVENOUS MALFORMATION) OF SMALL BOWEL, ACQUIRED WITH HEMORRHAGE: ICD-10-CM

## 2024-07-17 DIAGNOSIS — E78.49 OTHER HYPERLIPIDEMIA: ICD-10-CM

## 2024-07-17 DIAGNOSIS — I50.9 ACUTE ON CHRONIC CONGESTIVE HEART FAILURE, UNSPECIFIED HEART FAILURE TYPE: ICD-10-CM

## 2024-07-17 DIAGNOSIS — Z87.891 EX-SMOKER: ICD-10-CM

## 2024-07-17 DIAGNOSIS — R29.898 BILATERAL LEG WEAKNESS: ICD-10-CM

## 2024-07-17 DIAGNOSIS — I27.20 PULMONARY HYPERTENSION: ICD-10-CM

## 2024-07-17 DIAGNOSIS — J43.2 CENTRILOBULAR EMPHYSEMA: ICD-10-CM

## 2024-07-17 DIAGNOSIS — I70.0 ATHEROSCLEROSIS OF ABDOMINAL AORTA: ICD-10-CM

## 2024-07-17 DIAGNOSIS — R60.0 BILATERAL LEG EDEMA: ICD-10-CM

## 2024-07-17 DIAGNOSIS — Z72.0 NICOTINE ABUSE: ICD-10-CM

## 2024-07-17 DIAGNOSIS — D63.8 ANEMIA OF CHRONIC DISEASE: ICD-10-CM

## 2024-07-17 DIAGNOSIS — I50.23 ACUTE ON CHRONIC SYSTOLIC CONGESTIVE HEART FAILURE: ICD-10-CM

## 2024-07-17 DIAGNOSIS — I50.22 CHRONIC SYSTOLIC CONGESTIVE HEART FAILURE: ICD-10-CM

## 2024-07-17 DIAGNOSIS — I48.91 ATRIAL FIBRILLATION, UNSPECIFIED TYPE: ICD-10-CM

## 2024-07-17 DIAGNOSIS — D50.0 IRON DEFICIENCY ANEMIA DUE TO CHRONIC BLOOD LOSS: ICD-10-CM

## 2024-07-17 PROCEDURE — 99999 PR PBB SHADOW E&M-EST. PATIENT-LVL V: CPT | Mod: PBBFAC,HCNC,, | Performed by: INTERNAL MEDICINE

## 2024-07-17 NOTE — PROGRESS NOTES
Subjective:   Patient ID:  Richy Douglas is a 83 y.o. male who presents for evaluation of Shortness of Breath (Walking )      HPI  The patient came in today for cardiac consult of Shortness of Breath        Richy Douglas is a 83 y.o. 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            11/9/23  ECHO 2/2023 with LVEF 30%, RV dec function, mild TR, Mod AI, inc CVP, PASP 80 mmHg, mod dilated aortic root.   He saw Dr. Christianson in Aug 2023 - deferred ICD again.   BP and HR stable today. BMI 24 - 170 lbs - from 159 lbs.         6/14/24  BP 94/42, HR 83     ECHO 2/2024 - LV EF 30-35%, normal RV, mild AI, mild MR, mild TR, PASP 70 mmHg.   Has more SANCHEZ - will see pulm as well.          BNP (pg/mL)   Date Value   06/10/2024 120 (H)   12/28/2023 3,572 (H)   11/13/2023 2,565 (H)   05/09/2023 3,158 (H)   04/05/2023 2,728 (H)         Patient has fairly good exercise tolerance. He does recycling, separation.      Patient is compliant with medications.     FH - mother - DM, PVD, tobacco     Results for orders placed during the hospital encounter of 02/09/24     Echo     Interpretation Summary    Left Ventricle: The left ventricle is moderately dilated. Normal wall thickness. There is eccentric hypertrophy. Moderate global hypokinesis present. There is moderately reduced systolic function with a visually estimated ejection fraction of 30 - 35%. There is normal diastolic function.    Right Ventricle: Mild right ventricular enlargement. Wall thickness is normal. Right ventricle wall motion  is normal. Systolic function is normal.    Left Atrium: Left atrium is moderately dilated.    Right Atrium: Right atrium is mildly dilated.    Aortic Valve: There is mild aortic regurgitation with a centrally directed jet.    Mitral Valve: There is mild regurgitation with a centrally directed jet.    Tricuspid Valve: There is mild regurgitation  with a centrally directed jet.    Pulmonary Artery: The estimated pulmonary artery systolic pressure is 70 mmHg.    IVC/SVC: Normal venous pressure at 3 mmHg.        Results for orders placed during the hospital encounter of 02/11/23     Echo     Interpretation Summary  · The left ventricle is normal in size with concentric hypertrophy and moderately decreased systolic function.  · Severe left atrial enlargement.  · Trivial pericardial effusion.  · Mild tricuspid regurgitation.  · Severe right atrial enlargement.  · The estimated ejection fraction is 30%.  · There is severe left ventricular global hypokinesis.  · Mild right ventricular enlargement with mildly to moderately reduced right ventricular systolic function.  · Moderate aortic regurgitation.  · Elevated central venous pressure (15 mmHg).  · The estimated PA systolic pressure is 80 mmHg.  · There is pulmonary hypertension.  · The ascending aorta is moderately dilated.  · The aortic root is moderately dilated.  · Atrial fibrillation observed.      Aortic Root - End Diastolic   Ao root annulus 4.69 cm           Sinus 4.84 cm           STJ 4.89 cm           Ascending aorta 4.62 cm                   LE arterial u/s 5/2022  Conclusion     Normal BLE MARIBEL with distal occlusions noted with moderate to severe plaque/blockage  Right distal PT/PER arteries are occluded  Left distal PT with monophasic waveforms, L GUILLE occluded        Conclusion 5/2022     A Baker's cyst measuring 1.67 cm x 3.77 cm was seen in the right extremity.  A Baker's cyst measuring 1.53 cm x 2.78 cm was seen in the left extremity.  There is no evidence of a left lower extremity DVT.  The right smaller saphenous vein is abnormal. A chronic thrombus is present.              Results for orders placed during the hospital encounter of 01/09/22     Cardiac catheterization     Conclusion  · The pre-procedure left ventricular end diastolic pressure was 13.  · The estimated blood loss was none.  · The  coronary arteries were normal..  · The filling pressures on the right and left were normal.     The procedure log was documented by Documenter: Jenna Cantu, RN and verified by Janneth Tovar MD.     Date: 1/13/2022  Time: 5:22 PM     7.17.2024    REFERRED FROM DR SIM FOR PVD  HE HAS BEEN SMOKING DRINKING HE HAS BILATERAL KNEE PAIN  HAS SWELLING IN LEFT KNEE. USES A CANE HE USES COMPRESSION SOCKS.   NO SWELLING IN FEET. HE IS SHORT OF BREATH ABOUT LESS THAN 10 FEET. NO DISCOILORATION IN FEET . NO DIABETES   Past Medical History:   Diagnosis Date    Alcoholic peripheral neuropathy 03/01/2023    Anemia of chronic disease     Aortic aneurysm     Atrial fibrillation with RVR 09/24/2021    Chronic systolic congestive heart failure 08/31/2017    Depression     Emphysema of lung 08/31/2017    GERD (gastroesophageal reflux disease)     Hypertension     Knee osteoarthritis 10/21/2022    Major neurocognitive disorder due to multiple etiologies 04/14/2023    Microcytic anemia 11/24/2020    Other hyperlipidemia 04/27/2021    Personal history of colonic polyps 2022    Prostate cancer        Past Surgical History:   Procedure Laterality Date    CATHETERIZATION OF BOTH LEFT AND RIGHT HEART N/A 1/13/2022    Procedure: CATHETERIZATION, HEART, BOTH LEFT AND RIGHT;  Surgeon: Janneth Tovar MD;  Location: [facility] CATH LAB;  Service: Cardiology;  Laterality: N/A;  poss cx    COLONOSCOPY      COLONOSCOPY N/A 6/30/2022    Procedure: COLONOSCOPY;  Surgeon: Sandra Perez MD;  Location: [facility];  Service: Endoscopy;  Laterality: N/A;    COLONOSCOPY N/A 7/1/2022    Procedure: COLONOSCOPY;  Surgeon: Woodrow Christian MD;  Location: [facility];  Service: Endoscopy;  Laterality: N/A;    CORONARY ANGIOGRAPHY N/A 1/13/2022    Procedure: ANGIOGRAM, CORONARY ARTERY;  Surgeon: Janneth Tovar MD;  Location: [facility] CATH LAB;  Service: Cardiology;  Laterality: N/A;    ESOPHAGOGASTRODUODENOSCOPY N/A 6/30/2022    Procedure: EGD  (ESOPHAGOGASTRODUODENOSCOPY);  Surgeon: Sandra Perez MD;  Location: [facility] ENDO;  Service: Endoscopy;  Laterality: N/A;    INJECTION OF ANESTHETIC AGENT AROUND NERVE Bilateral 6/20/2023    Procedure: Bilateral Genicular nerve block RN IV Sedation;  Surgeon: Devon Grant MD;  Location: [facility] PAIN [facility]GT;  Service: Pain Management;  Laterality: Bilateral;    INJECTION OF JOINT Bilateral 10/21/2022    Procedure: Bilateral intraarticular knee injection with local ALLERGIC TO IODINE;  Surgeon: Devon Grant MD;  Location: [facility];  Service: Pain Management;  Laterality: Bilateral;    INJECTION OF JOINT Bilateral 1/31/2023    Procedure: Bilateral IA knee joint injection with steroid RN IV Sedation;  Surgeon: Devon Grant MD;  Location: [facility];  Service: Pain Management;  Laterality: Bilateral;    INJECTION OF JOINT Bilateral 5/10/2023    Procedure: Bilateral IA knee joint injection Synvisc RN IV Sedation;  Surgeon: Devon Grant MD;  Location: [facility];  Service: Pain Management;  Laterality: Bilateral;    INJECTION OF JOINT Bilateral 11/7/2023    Procedure: Bilateral intraarticular knee injection;  Surgeon: Devon Grant MD;  Location: [facility];  Service: Pain Management;  Laterality: Bilateral;    PROSTATE SURGERY      RADIOFREQUENCY THERMOCOAGULATION Left 9/1/2023    Procedure: Left Genicular Nerve RFA;  Surgeon: Devon Grant MD;  Location: [facility];  Service: Pain Management;  Laterality: Left;    RADIOFREQUENCY THERMOCOAGULATION Right 9/15/2023    Procedure: Right Genicular Nerve RFA RN IV Sedation;  Surgeon: Devon Grant MD;  Location: [facility];  Service: Pain Management;  Laterality: Right;    SPINE SURGERY         Social History     Tobacco Use    Smoking status: Some Days     Current packs/day: 0.50     Average packs/day: 1 pack/day for 70.4 years (69.8 ttl pk-yrs)     Types: Cigarettes     Start date: 1/1/1954     Last attempt to quit: 3/1/2023     Passive exposure:  Past    Smokeless tobacco: Never   Substance Use Topics    Alcohol use: Yes     Comment: reports only drinks red wine when games are on    Drug use: Never       Family History   Problem Relation Name Age of Onset    Diabetes Mother      Peripheral vascular disease Mother         Current Outpatient Medications   Medication Sig    albuterol (PROVENTIL) 2.5 mg /3 mL (0.083 %) nebulizer solution Take 3 mLs (2.5 mg total) by nebulization every 4 to 6 hours as needed for Wheezing or Shortness of Breath.    albuterol (PROVENTIL/VENTOLIN HFA) 90 mcg/actuation inhaler Inhale 2 puffs into the lungs every 4 (four) hours as needed for Wheezing or Shortness of Breath.    aspirin (ECOTRIN) 81 MG EC tablet Take 1 tablet (81 mg total) by mouth once daily.    diclofenac sodium (VOLTAREN) 1 % Gel Apply 4 grams topically 4 (four) times daily. Bilateral knees    empagliflozin (JARDIANCE) 10 mg tablet Take 1 tablet (10 mg total) by mouth once daily.    ferrous sulfate 325 (65 FE) MG EC tablet Take 1 tablet (325 mg total) by mouth once daily.    fluticasone-umeclidin-vilanter (TRELEGY ELLIPTA) 200-62.5-25 mcg inhaler Inhale 1 puff into the lungs once daily.    gabapentin (NEURONTIN) 300 MG capsule Take 300 mg by mouth every evening.    memantine (NAMENDA) 5 MG Tab Take 1 tablet (5 mg total) by mouth 2 (two) times daily.    furosemide (LASIX) 40 MG tablet Take 1 tablet (40 mg total) by mouth once daily. Do not take if BP is below 110/70 (Patient not taking: Reported on 6/19/2024)    metoprolol succinate (TOPROL-XL) 25 MG 24 hr tablet Take 0.5 tablets (12.5 mg total) by mouth once daily. Do no take if BP is below 110/70 (Patient not taking: Reported on 6/19/2024)    midodrine (PROAMATINE) 5 MG Tab Take 1 tablet (5 mg total) by mouth every meal as needed (give if BP is below 90/70). (Patient not taking: Reported on 6/19/2024)    sacubitriL-valsartan (ENTRESTO) 24-26 mg per tablet Take 1 tablet by mouth 2 (two) times daily. Do no take if BP  is below 110/70 (Patient not taking: Reported on 6/19/2024)     No current facility-administered medications for this visit.     Current Outpatient Medications on File Prior to Visit   Medication Sig    albuterol (PROVENTIL) 2.5 mg /3 mL (0.083 %) nebulizer solution Take 3 mLs (2.5 mg total) by nebulization every 4 to 6 hours as needed for Wheezing or Shortness of Breath.    albuterol (PROVENTIL/VENTOLIN HFA) 90 mcg/actuation inhaler Inhale 2 puffs into the lungs every 4 (four) hours as needed for Wheezing or Shortness of Breath.    aspirin (ECOTRIN) 81 MG EC tablet Take 1 tablet (81 mg total) by mouth once daily.    diclofenac sodium (VOLTAREN) 1 % Gel Apply 4 grams topically 4 (four) times daily. Bilateral knees    empagliflozin (JARDIANCE) 10 mg tablet Take 1 tablet (10 mg total) by mouth once daily.    ferrous sulfate 325 (65 FE) MG EC tablet Take 1 tablet (325 mg total) by mouth once daily.    fluticasone-umeclidin-vilanter (TRELEGY ELLIPTA) 200-62.5-25 mcg inhaler Inhale 1 puff into the lungs once daily.    gabapentin (NEURONTIN) 300 MG capsule Take 300 mg by mouth every evening.    memantine (NAMENDA) 5 MG Tab Take 1 tablet (5 mg total) by mouth 2 (two) times daily.    furosemide (LASIX) 40 MG tablet Take 1 tablet (40 mg total) by mouth once daily. Do not take if BP is below 110/70 (Patient not taking: Reported on 6/19/2024)    metoprolol succinate (TOPROL-XL) 25 MG 24 hr tablet Take 0.5 tablets (12.5 mg total) by mouth once daily. Do no take if BP is below 110/70 (Patient not taking: Reported on 6/19/2024)    midodrine (PROAMATINE) 5 MG Tab Take 1 tablet (5 mg total) by mouth every meal as needed (give if BP is below 90/70). (Patient not taking: Reported on 6/19/2024)    sacubitriL-valsartan (ENTRESTO) 24-26 mg per tablet Take 1 tablet by mouth 2 (two) times daily. Do no take if BP is below 110/70 (Patient not taking: Reported on 6/19/2024)     No current facility-administered medications on file prior to  visit.       Review of patient's allergies indicates:   Allergen Reactions    Fish containing products     Iodine and iodide containing products     Shellfish containing products        Review of Systems   Constitutional: Negative for malaise/fatigue.   Eyes:  Negative for blurred vision.   Cardiovascular:  Positive for dyspnea on exertion. Negative for chest pain, claudication, cyanosis, irregular heartbeat, leg swelling, near-syncope, orthopnea, palpitations and paroxysmal nocturnal dyspnea.   Respiratory:  Positive for shortness of breath. Negative for cough and hemoptysis.    Hematologic/Lymphatic: Negative for bleeding problem. Does not bruise/bleed easily.   Skin:  Negative for dry skin and itching.   Musculoskeletal:  Negative for falls, muscle weakness and myalgias.   Gastrointestinal:  Negative for abdominal pain, diarrhea, heartburn, hematemesis, hematochezia and melena.   Genitourinary:  Negative for flank pain and hematuria.   Neurological:  Negative for dizziness, focal weakness, headaches, light-headedness, numbness, paresthesias, seizures and weakness.   Psychiatric/Behavioral:  Negative for altered mental status and memory loss. The patient is not nervous/anxious.    Allergic/Immunologic: Negative for hives.       Objective:   Physical Exam  Vitals and nursing note reviewed.   Constitutional:       General: He is not in acute distress.     Appearance: He is well-developed. He is not diaphoretic.   HENT:      Head: Normocephalic and atraumatic.   Eyes:      General:         Right eye: No discharge.         Left eye: No discharge.      Pupils: Pupils are equal, round, and reactive to light.   Neck:      Thyroid: No thyromegaly.      Vascular: No JVD.   Cardiovascular:      Rate and Rhythm: Normal rate and regular rhythm.      Pulses: Intact distal pulses.           Carotid pulses are 2+ on the right side and 2+ on the left side.       Radial pulses are 2+ on the right side and 2+ on the left side.       "  Femoral pulses are 2+ on the right side and 2+ on the left side.       Popliteal pulses are 2+ on the right side and 2+ on the left side.        Dorsalis pedis pulses are 0 on the right side and 0 on the left side.        Posterior tibial pulses are 0 on the right side and 0 on the left side.      Heart sounds: Normal heart sounds. No murmur heard.     No friction rub. No gallop.   Pulmonary:      Effort: Pulmonary effort is normal. No respiratory distress.      Breath sounds: Normal breath sounds. No wheezing or rales.   Chest:      Chest wall: No tenderness.   Abdominal:      General: Bowel sounds are normal. There is no distension.      Palpations: Abdomen is soft.      Tenderness: There is no abdominal tenderness.   Musculoskeletal:         General: Normal range of motion.      Cervical back: Neck supple.      Right lower leg: No edema.      Left lower leg: Edema present.      Comments: Swollen left knee joint    Skin:     General: Skin is warm and dry.      Findings: No erythema or rash.   Neurological:      General: No focal deficit present.      Mental Status: He is alert and oriented to person, place, and time.      Cranial Nerves: No cranial nerve deficit.   Psychiatric:         Mood and Affect: Mood normal.         Behavior: Behavior normal.       Vitals:    07/17/24 1119 07/17/24 1124   BP: 130/68 124/60   BP Location: Left arm Right arm   Patient Position: Sitting Sitting   BP Method: Small (Manual) Small (Manual)   Pulse: 70 70   SpO2: 97% 97%   Weight: 71.5 kg (157 lb 10.1 oz) 71.5 kg (157 lb 10.1 oz)   Height: 5' 8" (1.727 m) 5' 8" (1.727 m)     Lab Results   Component Value Date    CHOL 123 06/10/2024    CHOL 144 09/17/2020     Body mass index is 23.97 kg/m².   Lab Results   Component Value Date    HGBA1C 5.3 06/10/2024      BMP  Lab Results   Component Value Date     07/15/2024    K 4.3 07/15/2024     07/15/2024    CO2 26 07/15/2024    BUN 12 07/15/2024    CREATININE 0.9 07/15/2024    " CALCIUM 9.7 07/15/2024    ANIONGAP 9 07/15/2024    EGFRNORACEVR >60.0 07/15/2024      Lab Results   Component Value Date    HDL 50 06/10/2024    HDL 61 09/17/2020     Lab Results   Component Value Date    LDLCALC 61.6 (L) 06/10/2024    LDLCALC 74.0 09/17/2020     Lab Results   Component Value Date    TRIG 57 06/10/2024    TRIG 45 09/17/2020     Lab Results   Component Value Date    CHOLHDL 40.7 06/10/2024    CHOLHDL 42.4 09/17/2020       Chemistry        Component Value Date/Time     07/15/2024 1044    K 4.3 07/15/2024 1044     07/15/2024 1044    CO2 26 07/15/2024 1044    BUN 12 07/15/2024 1044    CREATININE 0.9 07/15/2024 1044    GLU 79 07/15/2024 1044        Component Value Date/Time    CALCIUM 9.7 07/15/2024 1044    ALKPHOS 36 (L) 07/15/2024 1044    AST 22 07/15/2024 1044    ALT 11 07/15/2024 1044    BILITOT 1.4 (H) 07/15/2024 1044    ESTGFRAFRICA >60 06/24/2022 0842    EGFRNONAA >60 06/24/2022 0842          Lab Results   Component Value Date    TSH 1.170 06/10/2024     Lab Results   Component Value Date    INR 1.4 (H) 09/24/2021    INR 1.1 03/01/2021     Lab Results   Component Value Date    WBC 5.90 06/18/2024    HGB 10.4 (L) 06/18/2024    HCT 31.6 (L) 06/18/2024    MCV 81 (L) 06/18/2024     06/18/2024     BNP  @LABRCNTIP(BNP,BNPTRIAGEBLO)@  Estimated Creatinine Clearance: 60.2 mL/min (based on SCr of 0.9 mg/dL).  Interpretation Summary         RLE  50 to 75 % lesion of profunda and occluded pTA. Triphasic waveform. MARIBEL 1.10 normal    LLE occluded PTA and distal GUILLE. MARIBEL 1.10 normal    Two bakers cysts noted left leg measuring 5.3 cm x 1.5 cm and 2.5 cm x 1.0 cm Left GUILLE has retrograde flow noted.    Interpretation Summary         The right resting MARIBEL reduction is normal.    The left resting MARIBEL reduction is normal.    Findings    MARIBEL The right resting MARIBEL reduction is normal.   The right ankle [DT] artery has biphasic flow.   The right ankle [DP] artery has triphasic flow.   The left  resting MARIBEL reduction is normal.   The left ankle [PT] artery has biphasic flow.  The left ankle [DP] artery has triphasic flow.             Right   Right arm  mmHg         Right posterior tibial 191 mmHg         Right dorsalis pedis 181 mmHg         Right MARIBEL 1.18         Right toe pressure 137 mmHg         Right TBI 0.85          Left   Left arm  mmHg         Left posterior tibial 179 mmHg         Left dorsalis pedis 190 mmHg         Left MARIBEL 1.17         Left toe pressure 139 mmHg         Left TBI 0.86               Assessment:     1. PVD (peripheral vascular disease)    2. Anemia of chronic disease    3. Chronic systolic congestive heart failure    4. Centrilobular emphysema    5. Iron deficiency anemia due to chronic blood loss    6. Nicotine abuse    7. Nonrheumatic aortic valve insufficiency    8. Atherosclerosis of abdominal aorta    9. Other hyperlipidemia    10. Atrial fibrillation, unspecified type    11. Uncomplicated alcohol dependence    12. AVM (arteriovenous malformation) of small bowel, acquired with hemorrhage    13. Alcoholic peripheral neuropathy    14. Bilateral leg weakness    15. Bilateral leg edema    16. Pulmonary hypertension    17. Ex-smoker    18. Acute on chronic congestive heart failure, unspecified heart failure type    19. Acute on chronic systolic congestive heart failure    Asymptomatic pvd mostly his disease is infrapopliteal segment w/o any suggestion of limb loss or claudication. He is very limited in his mobility due to his lung disease he continues to smoke and drink etoh. He was counseled. He is on antiplatelets his lipids are on target. His capillary refill is normal. I have reviewed his imaging studies myself agree with official interpretation . At this stage he needs aggressive rf modification and  continued current therapy and use apporpriate footwear. No intervention is needed for his pvd  As far as chf he is on on appropriate medical therapy continue same and  follow low salt diet stop etoh and smoking.    Plan:   Continue current therapy  Cardiac low salt diet.  Risk factor modification and excercise program.  Smoking cessation counseling  F/u iprn

## 2024-08-16 RX ORDER — FUROSEMIDE 40 MG/1
40 TABLET ORAL DAILY
Qty: 90 TABLET | Refills: 1 | Status: SHIPPED | OUTPATIENT
Start: 2024-08-16 | End: 2025-02-12

## 2024-08-20 RX ORDER — FUROSEMIDE 40 MG/1
40 TABLET ORAL DAILY
Qty: 90 TABLET | Refills: 1 | Status: SHIPPED | OUTPATIENT
Start: 2024-08-20 | End: 2025-02-16

## 2024-08-29 ENCOUNTER — TELEPHONE (OUTPATIENT)
Dept: PAIN MEDICINE | Facility: CLINIC | Age: 84
End: 2024-08-29
Payer: MEDICARE

## 2024-08-29 NOTE — PROGRESS NOTES
Established Patient Chronic Pain Note (Follow up Visit)  PCP: Vivian Ch MD    Chief Complaint:   Chief Complaint   Patient presents with    Knee Pain     Right and left           SUBJECTIVE:    Interval History (8/30/2024):  Richy Douglas presents today for follow-up visit.  The patient c/o bilateral knee pain, worse with the left knee. We reached out to speak with his daughter, Bentley, via telephone call during the visit today. Patient reports pain as 10/10 today. He is interested in repeating nerve burn on both knees.   Patient continues to utilize compound cream and occasionally takes Tylenol and Gabapentin for the pain.  No recent falls.    Interval history 10/19/2023  Patient presents status post left-sided genicular cooled radiofrequency ablation 09/01/2023 and right-sided cooled radiofrequency ablation 09/15/2023.  Patient reports approximately 50-60% sustained relief in bilateral knee pain following his procedure.  He continues to report severe pain, mostly on the left, which today is rated an 8/10.  Patient reports pain is primarily along the suprapatellar and lateral aspect of both knees.  Pain is exacerbated with knee flexion, extension and with standing and with ambulation.  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  He does continue physician directed physical therapy exercises at home, over the last 6 weeks without meaningful improvement in his knee pain, range of motion or functionality.      Interval History (8/16/2023): Richy Douglas presents today for follow-up visit.  he underwent bilateral genicular block on 6/20/23.  The patient reports that he is/was better following the procedure.  he reports 80% pain relief X 2 days before pain returned.  Continues to report achy pain in both knees, left worse than right with intermittent swelling. He has now tried physical therapy, steroid injections, and gel injections without relief.  Unable to take NSAIDs secondary to cardiac history.  Patient reports pain as 10/10 today.      Interval History (6/6/2023): Richy Douglas presents today for follow-up visit.  he underwent bilateral synvisc injection  on 5/10/23.  The patient reports that he is/was better following the procedure, but  he reports only 30% pain relief.  The changes lasted 4 weeks so far.  The changes have continued through this visit.  Patient reports pain as 8/10 today. Continues to report achy pain in both knees, left worse than right with intermittent swelling. He has now tried physical therapy, steroid injections, and gel injections without relief. Unable to take NSAIDs secondary to cardiac history.    Interval History (4/24/2023): Richy Douglas presents today for follow-up visit.  he underwent  bilateral IA knee injection  on 01/31/23.  The patient reports that he is/was better following the procedure.  he reports 50% pain relief.  The changes 2 months before pain returned to baseline. Continues to report achy pain in both knees, left worse than right with intermittent swelling.  Patient reports pain as 10/10 today.    Interval History (1/11/2023):  Richy Douglas presents today for follow-up visit.  Patient was last seen on 11/16/2022. At that visit, the plan was to repeat knee injections as needed. Last injection bilateral IA knee injection with steroid on 10/21/22 with 60% relief. Patient reports pain as 9/10 today. He reports achy pain in both knees, left worse than right with intermittent swelling. He continues to work outside the home and also performs a majority of the household chores.      Interval History (11/16/2022): Richy Douglas presents today for follow-up visit.  he underwent bilateral IA knee injection with steroid on 10/21/22.  The patient reports that he is/was better following the procedure.  he reports 60% pain relief.  The changes lasted 4 weeks so far.  The changes have continued through this visit, though he  notes some diminished relief in his left knee. He still works and reports the knee becomes swollen and gives out if he is up for too long.  Patient reports pain as 8/10 today. Since last visit, he has followed up with podiatry for ankle pain and right big toe pain with onychomycosis and big toe nail falling off. He also reports intermittent swelling of his left leg/ankle and intermittent shortness of breath. History of CHF, followed by cardiology, most recent visit 11/02/2022    X-ray lumbar spine 09/28/2022  FINDINGS:  Vertebral body heights maintained.  Trace anterolisthesis of L4 on L5 and retrolisthesis L1 on L2.  No significant change in alignment on extension or flexion..  Multilevel degenerative disc height loss, most severe L2-3.  Multilevel facet arthropathy and osteophyte changes.  No definite pars defects..  Atherosclerotic vascular calcification.  No acute abnormality.     Impression:     Multilevel prominent degenerative findings.    X-ray knee bilateral 09/28/2022  FINDINGS:  Bilateral arthritic changes present including moderate to severe right knee lateral compartment and left knee medial compartment joint space narrowing.  Bilateral chondrocalcinosis noted.  No acute abnormality appreciated.     Initial HPI 09/28/2022  Richy Douglas is a 83 y.o. male with past medical history significant for alcohol dependence, lung emphysema, aortic aneurysm, atrial fibrillation, systolic congestive heart failure, hypertension, hyperlipidemia, anemia of chronic disease, GERD, nicotine dependence who presents to the clinic for the evaluation of bilateral knee pain.  Patient reports knee pain is in present since April 2022 without inciting accident injury or trauma.  Today patient reports pain is rated a 7/10 and is constant.  Patient reports pain in the popliteal fossa as well as pain which radiates distally to the dorsum of the foot in L4 distribution.  Patient reports associated superficial swelling along the  suprapatellar aspect of bilateral knees.  Pain is exacerbated with knee flexion, extension, moving from sitting to standing and with ambulation.  Patient does endorse associated weakness in the lower extremities associated with his pain.  Of note patient reports prior lower back surgery [facility] with improvement in lower back and radicular pain.  Patient has not tried membrane stabilizing agents or interventions at this time.  Patient has tried physician directed physical therapy exercises at home without significant relief.  Patient also reports right big toe pain with onychomycosis and big toe nail falling off.    Patient reports significant motor weakness.  Patient denies night fever/night sweats, urinary incontinence, bowel incontinence, significant weight loss, and loss of sensations.      Pain Disability Index Review:         8/30/2024     8:05 AM 10/19/2023    10:54 AM 8/16/2023    12:17 PM   Last 3 PDI Scores   Pain Disability Index (PDI) 20 64 70       Non-Pharmacologic Treatments:  Physical Therapy/Home Exercise: yes  Ice/Heat:no  TENS: no  Acupuncture: no  Massage: no  Chiropractic: no    Other: no      Pain Medications:  - Adjuvant Medications: Neurontin (Gabapentin)    Pain Procedures:   -10/21/2022: bilateral IA knee injection with steroid with 60% relief  -01/31/2023: bilateral IA knee injection with 50% relief  -05/10/2023: bilateral synvisc injection with 30% relief  -06/20/2023: bilateral genicular block with 80% relief  -09/01/2023: Left-sided cooled genicular radiofrequency ablation  -09/15/2023: Right-sided cooled genicular radiofrequency ablation  -11/07/2023: Bilateral IA knee injections    Past Medical History:   Diagnosis Date    Alcoholic peripheral neuropathy 03/01/2023    Anemia of chronic disease     Aortic aneurysm     Atrial fibrillation with RVR 09/24/2021    Chronic systolic congestive heart failure 08/31/2017    Depression     Emphysema of lung 08/31/2017    GERD  (gastroesophageal reflux disease)     Hypertension     Knee osteoarthritis 10/21/2022    Major neurocognitive disorder due to multiple etiologies 04/14/2023    Microcytic anemia 11/24/2020    Other hyperlipidemia 04/27/2021    Personal history of colonic polyps 2022    Prostate cancer      Past Surgical History:   Procedure Laterality Date    CATHETERIZATION OF BOTH LEFT AND RIGHT HEART N/A 1/13/2022    Procedure: CATHETERIZATION, HEART, BOTH LEFT AND RIGHT;  Surgeon: Janneth Tovar MD;  Location: [facility];  Service: Cardiology;  Laterality: N/A;  poss cx    COLONOSCOPY      COLONOSCOPY N/A 6/30/2022    Procedure: COLONOSCOPY;  Surgeon: Sandra Perez MD;  Location: [facility];  Service: Endoscopy;  Laterality: N/A;    COLONOSCOPY N/A 7/1/2022    Procedure: COLONOSCOPY;  Surgeon: Woodrow Christian MD;  Location: [facility];  Service: Endoscopy;  Laterality: N/A;    CORONARY ANGIOGRAPHY N/A 1/13/2022    Procedure: ANGIOGRAM, CORONARY ARTERY;  Surgeon: Janneth Tovar MD;  Location: [facility];  Service: Cardiology;  Laterality: N/A;    ESOPHAGOGASTRODUODENOSCOPY N/A 6/30/2022    Procedure: EGD (ESOPHAGOGASTRODUODENOSCOPY);  Surgeon: Sandra Perez MD;  Location: [facility];  Service: Endoscopy;  Laterality: N/A;    INJECTION OF ANESTHETIC AGENT AROUND NERVE Bilateral 6/20/2023    Procedure: Bilateral Genicular nerve block RN IV Sedation;  Surgeon: Devon Grant MD;  Location: [facility];  Service: Pain Management;  Laterality: Bilateral;    INJECTION OF JOINT Bilateral 10/21/2022    Procedure: Bilateral intraarticular knee injection with local ALLERGIC TO IODINE;  Surgeon: Devon Grant MD;  Location: [facility];  Service: Pain Management;  Laterality: Bilateral;    INJECTION OF JOINT Bilateral 1/31/2023    Procedure: Bilateral IA knee joint injection with steroid RN IV Sedation;  Surgeon: Devon Grant MD;  Location: [facility];  Service: Pain Management;  Laterality: Bilateral;     INJECTION OF JOINT Bilateral 5/10/2023    Procedure: Bilateral IA knee joint injection Synvisc RN IV Sedation;  Surgeon: Devon Grant MD;  Location: [facility];  Service: Pain Management;  Laterality: Bilateral;    INJECTION OF JOINT Bilateral 11/7/2023    Procedure: Bilateral intraarticular knee injection;  Surgeon: Devon Grant MD;  Location: [facility];  Service: Pain Management;  Laterality: Bilateral;    PROSTATE SURGERY      RADIOFREQUENCY THERMOCOAGULATION Left 9/1/2023    Procedure: Left Genicular Nerve RFA;  Surgeon: Devon Grant MD;  Location: [facility];  Service: Pain Management;  Laterality: Left;    RADIOFREQUENCY THERMOCOAGULATION Right 9/15/2023    Procedure: Right Genicular Nerve RFA RN IV Sedation;  Surgeon: Devon Grant MD;  Location: [facility];  Service: Pain Management;  Laterality: Right;    SPINE SURGERY       Review of patient's allergies indicates:   Allergen Reactions    Fish containing products     Iodine and iodide containing products     Shellfish containing products        Current Outpatient Medications   Medication Sig    albuterol (PROVENTIL) 2.5 mg /3 mL (0.083 %) nebulizer solution Take 3 mLs (2.5 mg total) by nebulization every 4 to 6 hours as needed for Wheezing or Shortness of Breath.    albuterol (PROVENTIL/VENTOLIN HFA) 90 mcg/actuation inhaler Inhale 2 puffs into the lungs every 4 (four) hours as needed for Wheezing or Shortness of Breath.    aspirin (ECOTRIN) 81 MG EC tablet Take 1 tablet (81 mg total) by mouth once daily.    diclofenac sodium (VOLTAREN) 1 % Gel Apply 4 grams topically 4 (four) times daily. Bilateral knees    empagliflozin (JARDIANCE) 10 mg tablet Take 1 tablet (10 mg total) by mouth once daily.    ferrous sulfate 325 (65 FE) MG EC tablet Take 1 tablet (325 mg total) by mouth once daily.    fluticasone-umeclidin-vilanter (TRELEGY ELLIPTA) 200-62.5-25 mcg inhaler Inhale 1 puff into the lungs once daily.    furosemide (LASIX) 40 MG  "tablet Take 1 tablet (40 mg total) by mouth once daily. Do not take if BP is below 110/70    gabapentin (NEURONTIN) 300 MG capsule Take 300 mg by mouth every evening.    memantine (NAMENDA) 5 MG Tab Take 1 tablet (5 mg total) by mouth 2 (two) times daily.    metoprolol succinate (TOPROL-XL) 25 MG 24 hr tablet Take 0.5 tablets (12.5 mg total) by mouth once daily. Do no take if BP is below 110/70 (Patient not taking: Reported on 6/19/2024)    midodrine (PROAMATINE) 5 MG Tab Take 1 tablet (5 mg total) by mouth every meal as needed (give if BP is below 90/70). (Patient not taking: Reported on 6/19/2024)    sacubitriL-valsartan (ENTRESTO) 24-26 mg per tablet Take 1 tablet by mouth 2 (two) times daily. Do no take if BP is below 110/70 (Patient not taking: Reported on 6/19/2024)     No current facility-administered medications for this visit.       Review of Systems     GENERAL:  No weight loss, malaise or fevers.  HEENT:   No recent changes in vision or hearing  NECK:  Negative for lumps, no difficulty with swallowing.  RESPIRATORY:  Negative for cough, wheezing or shortness of breath, patient denies any recent URI.  CARDIOVASCULAR:  Negative for chest pain or palpitations.  GI:  Negative for abdominal discomfort, blood in stools or black stools or change in bowel habits.  MUSCULOSKELETAL:  See HPI.  SKIN:  Negative for lesions, rash, and itching.  PSYCH:  No mood disorder or recent psychosocial stressors.   HEMATOLOGY/LYMPHOLOGY:  Negative for prolonged bleeding, bruising easily or swollen nodes.    NEURO:   No history of syncope, paralysis, seizures or tremors.  All other reviewed and negative other than HPI.    OBJECTIVE:    BP (!) 102/56   Pulse 85   Resp 17   Ht 5' 8" (1.727 m)   Wt 65.4 kg (144 lb 2.9 oz)   BMI 21.92 kg/m²       Physical Exam    GENERAL: Well appearing, in no acute distress, alert and oriented x3.  PSYCH:  Mood and affect appropriate.  SKIN: Skin color, texture, turgor normal, no rashes or " lesions.  HEAD/FACE:  Normocephalic, atraumatic. Cranial nerves grossly intact.  PULM: No evidence of respiratory difficulty, symmetric chest rise.  GI:  Soft and non-tender.    EXTREMITIES: Peripheral joint ROM is reduced with pain.  No deformities, edema, or skin discoloration. Good capillary refill.    RIGHT Lower extremity: Hip flexion 5/5, Hip Abduction 5/5, Hip Adduction 5/5, Knee extension 5/5, Knee flexion 5/5, Ankle dorsiflexion5/5, Extensor hallucis longus 5/5, Ankle plantarflexion 5/5  LEFT Lower extremity:  Hip flexion 5/5, Hip Abduction 5/5,Hip Adduction 5/5, Knee extension 5/5, Knee flexion 5/5, Ankle dorsiflexion 5/5, Extensor hallucis longus 5/5, Ankle plantarflexion 5/5  -Normal testing knee (patellar) jerk and ankle (achilles) jerk    NEURO: Bilateral upper and lower extremity coordination and muscle stretch reflexes are physiologic and symmetric. No loss of sensation is noted.  Bilateral Non-pitting edema to lower extremities, left >> right    Knee: Bilateral  - Swelling: Present, mild  - TTP: Present over medial/ lateral joint line  - ROM: Decreased secondary to pain  - Pain with extension: Present  - Pain with flexion: Present  - Crepitus: Present      GAIT:  Antalgic    Imaging:     Bilateral Knee X-rays  2/6/2023    Narrative & Impression  EXAMINATION:  XR KNEE 3 VIEW BILATERAL     CLINICAL HISTORY:  XR KNEE 3 VIEW BILATERALPain in unspecified knee     COMPARISON:  09/28/2022     FINDINGS:  Eight views bilateral knees     No evidence of acute fracture or dislocation.  Mild osteopenia.  Moderate tricompartment degenerative changes with joint space narrowing, sclerosis and marginal osteophytes.  Chondrocalcinosis seen within the right medial meniscus and left lateral meniscus.  There is mild irregularity at the medial compartment of the left knee without definite evidence for osteochondral defect.  Dense vascular calcifications   moderate left-sided joint effusion.     Impression:     Moderate  tricompartment degenerative changes greater on the left with moderate left-sided joint effusion.        Electronically signed by:Syed Lozada MD  Date:                                            02/06/2023  Time:                                           14:23         X-ray lumbar spine 09/28/2022  FINDINGS:  Vertebral body heights maintained.  Trace anterolisthesis of L4 on L5 and retrolisthesis L1 on L2.  No significant change in alignment on extension or flexion..  Multilevel degenerative disc height loss, most severe L2-3.  Multilevel facet arthropathy and osteophyte changes.  No definite pars defects..  Atherosclerotic vascular calcification.  No acute abnormality.     Impression:     Multilevel prominent degenerative findings.    X-ray knee bilateral 09/28/2022  FINDINGS:  Bilateral arthritic changes present including moderate to severe right knee lateral compartment and left knee medial compartment joint space narrowing.  Bilateral chondrocalcinosis noted.  No acute abnormality appreciated.        ASSESSMENT: 83 y.o. year old male with knee and leg pain, consistent with     1. Chronic pain of both knees  Case Request-RAD/Other Procedure Area: Bilateral Genicular Nerve RFA      2. Primary osteoarthritis of both knees  Case Request-RAD/Other Procedure Area: Bilateral Genicular Nerve RFA        PLAN:   - Interventions:  Schedule  patient for  repeat Bilateral Genicular nerve RFA for more sustained relief of knee pain. Explained the risks and benefits of the procedure in detail with the patient today in clinic along with alternative treatment options, and the patient elected to pursue the intervention.        - S/p bilateral intra-articular knee injection 11/7/23 with good relief.    - Anticoagulation use: Yes aspirin, secondary prophylaxis. Does not need to stop.      report:  Reviewed and consistent with medication use as prescribed.    - Medications:  Continue using  prescription compound cream . Compound  medication will include flurbiprofen 10%, baclofen 2%, cyclobenzaprine 2%, gabapentin 6%, orphenadrine 5%, tetracaine 2% with ketamine 30% transdermal gel for anti-inflammatory, neuropathic and anesthetic properties.  Patient can apply this medication 2-4 times daily to painful areas.    - - Can take Tylenol (acetaminophen) 1000mg (two tablets of 500mg) 3x per day as needed for pain (to take up to max dose of 3000mg per day).      - Therapy:   -We discussed continuing physician directed at home physical therapy to help manage the patient/s painful condition. The patient was counseled that muscle strengthening will improve the long term prognosis in regards to pain and may also help increase range of motion and mobility.     - Imaging: Reviewed bilateral knee x-ray with patient and answered any questions they had regarding study.      - Follow up visit: return to clinic in 4 weeks after procedure    The above plan and management options were discussed at length with patient. Patient is in agreement with the above and verbalized understanding.    - I discussed the goals of interventional chronic pain management with the patient on today's visit. We discussed a multimodal and systematic approach to pain.  This includes diagnostic and therapeutic injections, adjuvant pharmacologic treatment, physical therapy, and at times psychiatry.  I emphasized the importance of regular exercise, core strengthening and stretching, diet and weight loss as a cornerstone of long-term pain management.    - This condition does not require this patient to take time off of work, and the primary goal of our Pain Management services is to improve the patient's functional capacity.  - Patient Questions: Answered all of the patient's questions regarding diagnoses, therapy, treatment and next steps      Alvaro Sutherland PA-C  Interventional Pain Management  [facility]

## 2024-08-29 NOTE — H&P (VIEW-ONLY)
Established Patient Chronic Pain Note (Follow up Visit)  PCP: Vivian Ch MD    Chief Complaint:   Chief Complaint   Patient presents with    Knee Pain     Right and left           SUBJECTIVE:    Interval History (8/30/2024):  Richy Douglas presents today for follow-up visit.  The patient c/o bilateral knee pain, worse with the left knee. We reached out to speak with his daughter, Bentley, via telephone call during the visit today. Patient reports pain as 10/10 today. He is interested in repeating nerve burn on both knees.   Patient continues to utilize compound cream and occasionally takes Tylenol and Gabapentin for the pain.  No recent falls.    Interval history 10/19/2023  Patient presents status post left-sided genicular cooled radiofrequency ablation 09/01/2023 and right-sided cooled radiofrequency ablation 09/15/2023.  Patient reports approximately 50-60% sustained relief in bilateral knee pain following his procedure.  He continues to report severe pain, mostly on the left, which today is rated an 8/10.  Patient reports pain is primarily along the suprapatellar and lateral aspect of both knees.  Pain is exacerbated with knee flexion, extension and with standing and with ambulation.  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  He does continue physician directed physical therapy exercises at home, over the last 6 weeks without meaningful improvement in his knee pain, range of motion or functionality.      Interval History (8/16/2023): Richy Douglas presents today for follow-up visit.  he underwent bilateral genicular block on 6/20/23.  The patient reports that he is/was better following the procedure.  he reports 80% pain relief X 2 days before pain returned.  Continues to report achy pain in both knees, left worse than right with intermittent swelling. He has now tried physical therapy, steroid injections, and gel injections without relief.  Unable to take NSAIDs secondary to cardiac history.  Patient reports pain as 10/10 today.      Interval History (6/6/2023): Richy Douglas presents today for follow-up visit.  he underwent bilateral synvisc injection  on 5/10/23.  The patient reports that he is/was better following the procedure, but  he reports only 30% pain relief.  The changes lasted 4 weeks so far.  The changes have continued through this visit.  Patient reports pain as 8/10 today. Continues to report achy pain in both knees, left worse than right with intermittent swelling. He has now tried physical therapy, steroid injections, and gel injections without relief. Unable to take NSAIDs secondary to cardiac history.    Interval History (4/24/2023): Richy Douglas presents today for follow-up visit.  he underwent  bilateral IA knee injection  on 01/31/23.  The patient reports that he is/was better following the procedure.  he reports 50% pain relief.  The changes 2 months before pain returned to baseline. Continues to report achy pain in both knees, left worse than right with intermittent swelling.  Patient reports pain as 10/10 today.    Interval History (1/11/2023):  Richy Douglas presents today for follow-up visit.  Patient was last seen on 11/16/2022. At that visit, the plan was to repeat knee injections as needed. Last injection bilateral IA knee injection with steroid on 10/21/22 with 60% relief. Patient reports pain as 9/10 today. He reports achy pain in both knees, left worse than right with intermittent swelling. He continues to work outside the home and also performs a majority of the household chores.      Interval History (11/16/2022): Richy Douglas presents today for follow-up visit.  he underwent bilateral IA knee injection with steroid on 10/21/22.  The patient reports that he is/was better following the procedure.  he reports 60% pain relief.  The changes lasted 4 weeks so far.  The changes have continued through this visit, though he  notes some diminished relief in his left knee. He still works and reports the knee becomes swollen and gives out if he is up for too long.  Patient reports pain as 8/10 today. Since last visit, he has followed up with podiatry for ankle pain and right big toe pain with onychomycosis and big toe nail falling off. He also reports intermittent swelling of his left leg/ankle and intermittent shortness of breath. History of CHF, followed by cardiology, most recent visit 11/02/2022    X-ray lumbar spine 09/28/2022  FINDINGS:  Vertebral body heights maintained.  Trace anterolisthesis of L4 on L5 and retrolisthesis L1 on L2.  No significant change in alignment on extension or flexion..  Multilevel degenerative disc height loss, most severe L2-3.  Multilevel facet arthropathy and osteophyte changes.  No definite pars defects..  Atherosclerotic vascular calcification.  No acute abnormality.     Impression:     Multilevel prominent degenerative findings.    X-ray knee bilateral 09/28/2022  FINDINGS:  Bilateral arthritic changes present including moderate to severe right knee lateral compartment and left knee medial compartment joint space narrowing.  Bilateral chondrocalcinosis noted.  No acute abnormality appreciated.     Initial HPI 09/28/2022  Richy Douglas is a 83 y.o. male with past medical history significant for alcohol dependence, lung emphysema, aortic aneurysm, atrial fibrillation, systolic congestive heart failure, hypertension, hyperlipidemia, anemia of chronic disease, GERD, nicotine dependence who presents to the clinic for the evaluation of bilateral knee pain.  Patient reports knee pain is in present since April 2022 without inciting accident injury or trauma.  Today patient reports pain is rated a 7/10 and is constant.  Patient reports pain in the popliteal fossa as well as pain which radiates distally to the dorsum of the foot in L4 distribution.  Patient reports associated superficial swelling along the  suprapatellar aspect of bilateral knees.  Pain is exacerbated with knee flexion, extension, moving from sitting to standing and with ambulation.  Patient does endorse associated weakness in the lower extremities associated with his pain.  Of note patient reports prior lower back surgery [facility] with improvement in lower back and radicular pain.  Patient has not tried membrane stabilizing agents or interventions at this time.  Patient has tried physician directed physical therapy exercises at home without significant relief.  Patient also reports right big toe pain with onychomycosis and big toe nail falling off.    Patient reports significant motor weakness.  Patient denies night fever/night sweats, urinary incontinence, bowel incontinence, significant weight loss, and loss of sensations.      Pain Disability Index Review:         8/30/2024     8:05 AM 10/19/2023    10:54 AM 8/16/2023    12:17 PM   Last 3 PDI Scores   Pain Disability Index (PDI) 20 64 70       Non-Pharmacologic Treatments:  Physical Therapy/Home Exercise: yes  Ice/Heat:no  TENS: no  Acupuncture: no  Massage: no  Chiropractic: no    Other: no      Pain Medications:  - Adjuvant Medications: Neurontin (Gabapentin)    Pain Procedures:   -10/21/2022: bilateral IA knee injection with steroid with 60% relief  -01/31/2023: bilateral IA knee injection with 50% relief  -05/10/2023: bilateral synvisc injection with 30% relief  -06/20/2023: bilateral genicular block with 80% relief  -09/01/2023: Left-sided cooled genicular radiofrequency ablation  -09/15/2023: Right-sided cooled genicular radiofrequency ablation  -11/07/2023: Bilateral IA knee injections    Past Medical History:   Diagnosis Date    Alcoholic peripheral neuropathy 03/01/2023    Anemia of chronic disease     Aortic aneurysm     Atrial fibrillation with RVR 09/24/2021    Chronic systolic congestive heart failure 08/31/2017    Depression     Emphysema of lung 08/31/2017    GERD  (gastroesophageal reflux disease)     Hypertension     Knee osteoarthritis 10/21/2022    Major neurocognitive disorder due to multiple etiologies 04/14/2023    Microcytic anemia 11/24/2020    Other hyperlipidemia 04/27/2021    Personal history of colonic polyps 2022    Prostate cancer      Past Surgical History:   Procedure Laterality Date    CATHETERIZATION OF BOTH LEFT AND RIGHT HEART N/A 1/13/2022    Procedure: CATHETERIZATION, HEART, BOTH LEFT AND RIGHT;  Surgeon: Janneth Tovar MD;  Location: [facility] CATH LAB;  Service: Cardiology;  Laterality: N/A;  poss cx    COLONOSCOPY      COLONOSCOPY N/A 6/30/2022    Procedure: COLONOSCOPY;  Surgeon: Sandra Perez MD;  Location: [facility] ENDO;  Service: Endoscopy;  Laterality: N/A;    COLONOSCOPY N/A 7/1/2022    Procedure: COLONOSCOPY;  Surgeon: Woodrow Christian MD;  Location: [facility];  Service: Endoscopy;  Laterality: N/A;    CORONARY ANGIOGRAPHY N/A 1/13/2022    Procedure: ANGIOGRAM, CORONARY ARTERY;  Surgeon: Janneth Tovar MD;  Location: [facility] CATH LAB;  Service: Cardiology;  Laterality: N/A;    ESOPHAGOGASTRODUODENOSCOPY N/A 6/30/2022    Procedure: EGD (ESOPHAGOGASTRODUODENOSCOPY);  Surgeon: Sandra Perez MD;  Location: [facility];  Service: Endoscopy;  Laterality: N/A;    INJECTION OF ANESTHETIC AGENT AROUND NERVE Bilateral 6/20/2023    Procedure: Bilateral Genicular nerve block RN IV Sedation;  Surgeon: Devon Grant MD;  Location: [facility] MGT;  Service: Pain Management;  Laterality: Bilateral;    INJECTION OF JOINT Bilateral 10/21/2022    Procedure: Bilateral intraarticular knee injection with local ALLERGIC TO IODINE;  Surgeon: Devon Grant MD;  Location: [facility] PAIN MGT;  Service: Pain Management;  Laterality: Bilateral;    INJECTION OF JOINT Bilateral 1/31/2023    Procedure: Bilateral IA knee joint injection with steroid RN IV Sedation;  Surgeon: Devon Grant MD;  Location: [facility] PAIN MGT;  Service: Pain Management;  Laterality: Bilateral;     INJECTION OF JOINT Bilateral 5/10/2023    Procedure: Bilateral IA knee joint injection Synvisc RN IV Sedation;  Surgeon: Devon Grant MD;  Location: [facility] PAIN MGT;  Service: Pain Management;  Laterality: Bilateral;    INJECTION OF JOINT Bilateral 11/7/2023    Procedure: Bilateral intraarticular knee injection;  Surgeon: Devon Grant MD;  Location: [facility] PAIN MGT;  Service: Pain Management;  Laterality: Bilateral;    PROSTATE SURGERY      RADIOFREQUENCY THERMOCOAGULATION Left 9/1/2023    Procedure: Left Genicular Nerve RFA;  Surgeon: Deovn Grant MD;  Location: [facility] PAIN MGT;  Service: Pain Management;  Laterality: Left;    RADIOFREQUENCY THERMOCOAGULATION Right 9/15/2023    Procedure: Right Genicular Nerve RFA RN IV Sedation;  Surgeon: Devon Grant MD;  Location: [facility] PAIN MGT;  Service: Pain Management;  Laterality: Right;    SPINE SURGERY       Review of patient's allergies indicates:   Allergen Reactions    Fish containing products     Iodine and iodide containing products     Shellfish containing products        Current Outpatient Medications   Medication Sig    albuterol (PROVENTIL) 2.5 mg /3 mL (0.083 %) nebulizer solution Take 3 mLs (2.5 mg total) by nebulization every 4 to 6 hours as needed for Wheezing or Shortness of Breath.    albuterol (PROVENTIL/VENTOLIN HFA) 90 mcg/actuation inhaler Inhale 2 puffs into the lungs every 4 (four) hours as needed for Wheezing or Shortness of Breath.    aspirin (ECOTRIN) 81 MG EC tablet Take 1 tablet (81 mg total) by mouth once daily.    diclofenac sodium (VOLTAREN) 1 % Gel Apply 4 grams topically 4 (four) times daily. Bilateral knees    empagliflozin (JARDIANCE) 10 mg tablet Take 1 tablet (10 mg total) by mouth once daily.    ferrous sulfate 325 (65 FE) MG EC tablet Take 1 tablet (325 mg total) by mouth once daily.    fluticasone-umeclidin-vilanter (TRELEGY ELLIPTA) 200-62.5-25 mcg inhaler Inhale 1 puff into the lungs once daily.    furosemide (LASIX) 40 MG  "tablet Take 1 tablet (40 mg total) by mouth once daily. Do not take if BP is below 110/70    gabapentin (NEURONTIN) 300 MG capsule Take 300 mg by mouth every evening.    memantine (NAMENDA) 5 MG Tab Take 1 tablet (5 mg total) by mouth 2 (two) times daily.    metoprolol succinate (TOPROL-XL) 25 MG 24 hr tablet Take 0.5 tablets (12.5 mg total) by mouth once daily. Do no take if BP is below 110/70 (Patient not taking: Reported on 6/19/2024)    midodrine (PROAMATINE) 5 MG Tab Take 1 tablet (5 mg total) by mouth every meal as needed (give if BP is below 90/70). (Patient not taking: Reported on 6/19/2024)    sacubitriL-valsartan (ENTRESTO) 24-26 mg per tablet Take 1 tablet by mouth 2 (two) times daily. Do no take if BP is below 110/70 (Patient not taking: Reported on 6/19/2024)     No current facility-administered medications for this visit.       Review of Systems     GENERAL:  No weight loss, malaise or fevers.  HEENT:   No recent changes in vision or hearing  NECK:  Negative for lumps, no difficulty with swallowing.  RESPIRATORY:  Negative for cough, wheezing or shortness of breath, patient denies any recent URI.  CARDIOVASCULAR:  Negative for chest pain or palpitations.  GI:  Negative for abdominal discomfort, blood in stools or black stools or change in bowel habits.  MUSCULOSKELETAL:  See HPI.  SKIN:  Negative for lesions, rash, and itching.  PSYCH:  No mood disorder or recent psychosocial stressors.   HEMATOLOGY/LYMPHOLOGY:  Negative for prolonged bleeding, bruising easily or swollen nodes.    NEURO:   No history of syncope, paralysis, seizures or tremors.  All other reviewed and negative other than HPI.    OBJECTIVE:    BP (!) 102/56   Pulse 85   Resp 17   Ht 5' 8" (1.727 m)   Wt 65.4 kg (144 lb 2.9 oz)   BMI 21.92 kg/m²       Physical Exam    GENERAL: Well appearing, in no acute distress, alert and oriented x3.  PSYCH:  Mood and affect appropriate.  SKIN: Skin color, texture, turgor normal, no rashes or " lesions.  HEAD/FACE:  Normocephalic, atraumatic. Cranial nerves grossly intact.  PULM: No evidence of respiratory difficulty, symmetric chest rise.  GI:  Soft and non-tender.    EXTREMITIES: Peripheral joint ROM is reduced with pain.  No deformities, edema, or skin discoloration. Good capillary refill.    RIGHT Lower extremity: Hip flexion 5/5, Hip Abduction 5/5, Hip Adduction 5/5, Knee extension 5/5, Knee flexion 5/5, Ankle dorsiflexion5/5, Extensor hallucis longus 5/5, Ankle plantarflexion 5/5  LEFT Lower extremity:  Hip flexion 5/5, Hip Abduction 5/5,Hip Adduction 5/5, Knee extension 5/5, Knee flexion 5/5, Ankle dorsiflexion 5/5, Extensor hallucis longus 5/5, Ankle plantarflexion 5/5  -Normal testing knee (patellar) jerk and ankle (achilles) jerk    NEURO: Bilateral upper and lower extremity coordination and muscle stretch reflexes are physiologic and symmetric. No loss of sensation is noted.  Bilateral Non-pitting edema to lower extremities, left >> right    Knee: Bilateral  - Swelling: Present, mild  - TTP: Present over medial/ lateral joint line  - ROM: Decreased secondary to pain  - Pain with extension: Present  - Pain with flexion: Present  - Crepitus: Present      GAIT:  Antalgic    Imaging:     Bilateral Knee X-rays  2/6/2023    Narrative & Impression  EXAMINATION:  XR KNEE 3 VIEW BILATERAL     CLINICAL HISTORY:  XR KNEE 3 VIEW BILATERALPain in unspecified knee     COMPARISON:  09/28/2022     FINDINGS:  Eight views bilateral knees     No evidence of acute fracture or dislocation.  Mild osteopenia.  Moderate tricompartment degenerative changes with joint space narrowing, sclerosis and marginal osteophytes.  Chondrocalcinosis seen within the right medial meniscus and left lateral meniscus.  There is mild irregularity at the medial compartment of the left knee without definite evidence for osteochondral defect.  Dense vascular calcifications   moderate left-sided joint effusion.     Impression:     Moderate  tricompartment degenerative changes greater on the left with moderate left-sided joint effusion.        Electronically signed by:Syed Lozada MD  Date:                                            02/06/2023  Time:                                           14:23         X-ray lumbar spine 09/28/2022  FINDINGS:  Vertebral body heights maintained.  Trace anterolisthesis of L4 on L5 and retrolisthesis L1 on L2.  No significant change in alignment on extension or flexion..  Multilevel degenerative disc height loss, most severe L2-3.  Multilevel facet arthropathy and osteophyte changes.  No definite pars defects..  Atherosclerotic vascular calcification.  No acute abnormality.     Impression:     Multilevel prominent degenerative findings.    X-ray knee bilateral 09/28/2022  FINDINGS:  Bilateral arthritic changes present including moderate to severe right knee lateral compartment and left knee medial compartment joint space narrowing.  Bilateral chondrocalcinosis noted.  No acute abnormality appreciated.        ASSESSMENT: 83 y.o. year old male with knee and leg pain, consistent with     1. Chronic pain of both knees  Case Request-RAD/Other Procedure Area: Bilateral Genicular Nerve RFA      2. Primary osteoarthritis of both knees  Case Request-RAD/Other Procedure Area: Bilateral Genicular Nerve RFA        PLAN:   - Interventions:  Schedule  patient for  repeat Bilateral Genicular nerve RFA for more sustained relief of knee pain. Explained the risks and benefits of the procedure in detail with the patient today in clinic along with alternative treatment options, and the patient elected to pursue the intervention.        - S/p bilateral intra-articular knee injection 11/7/23 with good relief.    - Anticoagulation use: Yes aspirin, secondary prophylaxis. Does not need to stop.      report:  Reviewed and consistent with medication use as prescribed.    - Medications:  Continue using  prescription compound cream . Compound  medication will include flurbiprofen 10%, baclofen 2%, cyclobenzaprine 2%, gabapentin 6%, orphenadrine 5%, tetracaine 2% with ketamine 30% transdermal gel for anti-inflammatory, neuropathic and anesthetic properties.  Patient can apply this medication 2-4 times daily to painful areas.    - - Can take Tylenol (acetaminophen) 1000mg (two tablets of 500mg) 3x per day as needed for pain (to take up to max dose of 3000mg per day).      - Therapy:   -We discussed continuing physician directed at home physical therapy to help manage the patient/s painful condition. The patient was counseled that muscle strengthening will improve the long term prognosis in regards to pain and may also help increase range of motion and mobility.     - Imaging: Reviewed bilateral knee x-ray with patient and answered any questions they had regarding study.      - Follow up visit: return to clinic in 4 weeks after procedure    The above plan and management options were discussed at length with patient. Patient is in agreement with the above and verbalized understanding.    - I discussed the goals of interventional chronic pain management with the patient on today's visit. We discussed a multimodal and systematic approach to pain.  This includes diagnostic and therapeutic injections, adjuvant pharmacologic treatment, physical therapy, and at times psychiatry.  I emphasized the importance of regular exercise, core strengthening and stretching, diet and weight loss as a cornerstone of long-term pain management.    - This condition does not require this patient to take time off of work, and the primary goal of our Pain Management services is to improve the patient's functional capacity.  - Patient Questions: Answered all of the patient's questions regarding diagnoses, therapy, treatment and next steps      Alvaro Sutherland PA-C  Interventional Pain Management  [facility]

## 2024-08-30 ENCOUNTER — OFFICE VISIT (OUTPATIENT)
Dept: PAIN MEDICINE | Facility: CLINIC | Age: 84
End: 2024-08-30
Payer: MEDICARE

## 2024-08-30 ENCOUNTER — OFFICE VISIT (OUTPATIENT)
Dept: OPHTHALMOLOGY | Facility: CLINIC | Age: 84
End: 2024-08-30
Payer: MEDICARE

## 2024-08-30 VITALS
WEIGHT: 144.19 LBS | RESPIRATION RATE: 17 BRPM | HEIGHT: 68 IN | HEART RATE: 85 BPM | SYSTOLIC BLOOD PRESSURE: 102 MMHG | BODY MASS INDEX: 21.85 KG/M2 | DIASTOLIC BLOOD PRESSURE: 56 MMHG

## 2024-08-30 DIAGNOSIS — H26.493 PCO (POSTERIOR CAPSULAR OPACIFICATION), BILATERAL: ICD-10-CM

## 2024-08-30 DIAGNOSIS — H52.203 ASTIGMATISM WITH PRESBYOPIA, BILATERAL: ICD-10-CM

## 2024-08-30 DIAGNOSIS — M25.562 CHRONIC PAIN OF BOTH KNEES: Primary | ICD-10-CM

## 2024-08-30 DIAGNOSIS — M17.0 PRIMARY OSTEOARTHRITIS OF BOTH KNEES: ICD-10-CM

## 2024-08-30 DIAGNOSIS — G89.29 CHRONIC PAIN OF BOTH KNEES: Primary | ICD-10-CM

## 2024-08-30 DIAGNOSIS — M25.561 CHRONIC PAIN OF BOTH KNEES: Primary | ICD-10-CM

## 2024-08-30 DIAGNOSIS — H52.4 ASTIGMATISM WITH PRESBYOPIA, BILATERAL: ICD-10-CM

## 2024-08-30 DIAGNOSIS — Z96.1 PSEUDOPHAKIA OF BOTH EYES: ICD-10-CM

## 2024-08-30 DIAGNOSIS — H40.013 OPEN ANGLE WITH BORDERLINE FINDINGS OF BOTH EYES: Primary | ICD-10-CM

## 2024-08-30 PROCEDURE — 99999 PR PBB SHADOW E&M-EST. PATIENT-LVL IV: CPT | Mod: PBBFAC,HCNC,, | Performed by: PHYSICIAN ASSISTANT

## 2024-08-30 PROCEDURE — 99999 PR PBB SHADOW E&M-EST. PATIENT-LVL III: CPT | Mod: PBBFAC,HCNC,, | Performed by: OPTOMETRIST

## 2024-08-30 NOTE — PROGRESS NOTES
HPI     Glaucoma Suspect            Comments: Pts last exam was 9/28/2020 with DNL. Has not had an exam since  PCIOL OU  Vision changes since last eye exam?: unsure     Any eye pain today: no    Other ocular symptoms: no    Interested in contact lens fitting today? no                     Last edited by Katie Hartman MA on 8/30/2024  1:46 PM.            Assessment /Plan     For exam results, see Encounter Report.    Open angle with borderline findings of both eyes  -     Posterior Segment OCT Optic Nerve- Both eyes  Normal IOP today OU  Overall stable gOCT today OD, OS compared to previous 2020 scan  No evidence of glaucoma at this time but based on risk factors recommend to continue monitoring.   Monitor 12 months    Pseudophakia of both eyes  PCO (posterior capsular opacification), bilateral  Stable OU  Mild PCO OU, YAG not yet indicated  Monitor 12 months    Astigmatism with presbyopia, bilateral  Eyeglass Final Rx       Eyeglass Final Rx         Sphere Cylinder Axis Add    Right Geraldine +0.75 170 +2.50    Left Geraldine +0.50 160 +2.50      Expiration Date: 8/30/2025                      RTC 1 yr for dilated eye exam and gOCT or PRN if any problems.   Discussed above and answered questions.

## 2024-09-04 ENCOUNTER — OFFICE VISIT (OUTPATIENT)
Dept: CARDIOLOGY | Facility: CLINIC | Age: 84
End: 2024-09-04
Payer: MEDICARE

## 2024-09-04 ENCOUNTER — OFFICE VISIT (OUTPATIENT)
Dept: PODIATRY | Facility: CLINIC | Age: 84
End: 2024-09-04
Payer: MEDICARE

## 2024-09-04 VITALS — WEIGHT: 146.63 LBS | HEIGHT: 68 IN | BODY MASS INDEX: 22.22 KG/M2

## 2024-09-04 VITALS
SYSTOLIC BLOOD PRESSURE: 120 MMHG | WEIGHT: 146.63 LBS | DIASTOLIC BLOOD PRESSURE: 52 MMHG | OXYGEN SATURATION: 98 % | HEART RATE: 68 BPM | HEIGHT: 68 IN | BODY MASS INDEX: 22.22 KG/M2

## 2024-09-04 DIAGNOSIS — I48.0 PAROXYSMAL ATRIAL FIBRILLATION: ICD-10-CM

## 2024-09-04 DIAGNOSIS — G62.1 ALCOHOLIC PERIPHERAL NEUROPATHY: Primary | ICD-10-CM

## 2024-09-04 DIAGNOSIS — D63.8 ANEMIA OF CHRONIC DISEASE: Primary | ICD-10-CM

## 2024-09-04 DIAGNOSIS — I27.20 PULMONARY HYPERTENSION: ICD-10-CM

## 2024-09-04 DIAGNOSIS — K55.21 AVM (ARTERIOVENOUS MALFORMATION) OF SMALL BOWEL, ACQUIRED WITH HEMORRHAGE: ICD-10-CM

## 2024-09-04 DIAGNOSIS — I70.0 ATHEROSCLEROSIS OF ABDOMINAL AORTA: ICD-10-CM

## 2024-09-04 DIAGNOSIS — R29.898 BILATERAL LEG WEAKNESS: ICD-10-CM

## 2024-09-04 DIAGNOSIS — E78.49 OTHER HYPERLIPIDEMIA: ICD-10-CM

## 2024-09-04 DIAGNOSIS — J43.2 CENTRILOBULAR EMPHYSEMA: ICD-10-CM

## 2024-09-04 DIAGNOSIS — D50.0 IRON DEFICIENCY ANEMIA DUE TO CHRONIC BLOOD LOSS: ICD-10-CM

## 2024-09-04 DIAGNOSIS — I50.22 CHRONIC SYSTOLIC CONGESTIVE HEART FAILURE: ICD-10-CM

## 2024-09-04 DIAGNOSIS — R06.02 SHORTNESS OF BREATH: ICD-10-CM

## 2024-09-04 DIAGNOSIS — I73.9 PVD (PERIPHERAL VASCULAR DISEASE): ICD-10-CM

## 2024-09-04 DIAGNOSIS — R60.0 LOCALIZED EDEMA: ICD-10-CM

## 2024-09-04 DIAGNOSIS — I50.9 ACUTE ON CHRONIC CONGESTIVE HEART FAILURE, UNSPECIFIED HEART FAILURE TYPE: ICD-10-CM

## 2024-09-04 DIAGNOSIS — B35.1 DERMATOPHYTOSIS OF NAIL: ICD-10-CM

## 2024-09-04 DIAGNOSIS — I50.42 CHRONIC COMBINED SYSTOLIC AND DIASTOLIC CONGESTIVE HEART FAILURE, NYHA CLASS 3: ICD-10-CM

## 2024-09-04 DIAGNOSIS — R60.0 BILATERAL LEG EDEMA: ICD-10-CM

## 2024-09-04 PROCEDURE — 99999 PR PBB SHADOW E&M-EST. PATIENT-LVL III: CPT | Mod: PBBFAC,,, | Performed by: PODIATRIST

## 2024-09-04 PROCEDURE — 99499 UNLISTED E&M SERVICE: CPT | Mod: S$GLB,,, | Performed by: PODIATRIST

## 2024-09-04 PROCEDURE — 11721 DEBRIDE NAIL 6 OR MORE: CPT | Mod: Q9,S$GLB,, | Performed by: PODIATRIST

## 2024-09-04 PROCEDURE — 99999 PR PBB SHADOW E&M-EST. PATIENT-LVL IV: CPT | Mod: PBBFAC,HCNC,, | Performed by: INTERNAL MEDICINE

## 2024-09-04 NOTE — PROGRESS NOTES
Subjective:   Patient ID:  Richy Douglas is a 83 y.o. male who presents for cardiac consult of Pre-op Exam and Medication Management (Pt daughter stated pt stopped metoprolol for about a month now due to stabilized BP running at about 116/82.)    Referring Physician:    Vivian Ch MD [[id number] [place] [address]      Reason for consult: CHF, AI    HPI  The patient came in today for cardiac consult of Pre-op Exam and Medication Management (Pt daughter stated pt stopped metoprolol for about a month now due to stabilized BP running at about 116/82.)      Richy Douglas is a 83 y.o. 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         6/14/24  BP 94/42, HR 83    ECHO 2/2024 - LV EF 30-35%, normal RV, mild AI, mild MR, mild TR, PASP 70 mmHg.   Has more SANCHEZ - will see pulm as well.     BNP (pg/mL)   Date Value   07/15/2024 880 (H)   06/18/2024 95   06/10/2024 120 (H)   12/28/2023 3,572 (H)   11/13/2023 2,565 (H)        9/4/24  BP and HR stable. BMI 22 - 146 lbs    BNP (pg/mL)   Date Value   07/15/2024 880 (H)   06/18/2024 95   06/10/2024 120 (H)   12/28/2023 3,572 (H)   11/13/2023 2,565 (H)      BNP last month elevated --> 880  LE u/s 6/2024 with moderate to severe PAD referred to Dr. Bo - Asymptomatic pvd mostly his disease is infrapopliteal segment w/o any suggestion of limb loss or claudication. He is very limited in his mobility due to his lung disease he continues to smoke and drink etoh.     He is doing well overall now.     He does recycling, separation.     FH - mother - DM, PVD, tobacco    Interpretation Summary 6/2024  Show Result Comparison     RLE  50 to 75 % lesion of profunda and occluded pTA. Triphasic waveform. MARIBEL 1.10 normal    LLE occluded PTA and distal GUILLE. MARIBEL 1.10 normal    Two bakers cysts noted left leg measuring 5.3 cm x 1.5 cm and 2.5 cm x 1.0 cm Left GUILLE has  retrograde flow noted.    Results for orders placed during the hospital encounter of 02/09/24    Echo    Interpretation Summary    Left Ventricle: The left ventricle is moderately dilated. Normal wall thickness. There is eccentric hypertrophy. Moderate global hypokinesis present. There is moderately reduced systolic function with a visually estimated ejection fraction of 30 - 35%. There is normal diastolic function.    Right Ventricle: Mild right ventricular enlargement. Wall thickness is normal. Right ventricle wall motion  is normal. Systolic function is normal.    Left Atrium: Left atrium is moderately dilated.    Right Atrium: Right atrium is mildly dilated.    Aortic Valve: There is mild aortic regurgitation with a centrally directed jet.    Mitral Valve: There is mild regurgitation with a centrally directed jet.    Tricuspid Valve: There is mild regurgitation with a centrally directed jet.    Pulmonary Artery: The estimated pulmonary artery systolic pressure is 70 mmHg.    IVC/SVC: Normal venous pressure at 3 mmHg.      Results for orders placed during the hospital encounter of 02/11/23    Echo    Interpretation Summary  · The left ventricle is normal in size with concentric hypertrophy and moderately decreased systolic function.  · Severe left atrial enlargement.  · Trivial pericardial effusion.  · Mild tricuspid regurgitation.  · Severe right atrial enlargement.  · The estimated ejection fraction is 30%.  · There is severe left ventricular global hypokinesis.  · Mild right ventricular enlargement with mildly to moderately reduced right ventricular systolic function.  · Moderate aortic regurgitation.  · Elevated central venous pressure (15 mmHg).  · The estimated PA systolic pressure is 80 mmHg.  · There is pulmonary hypertension.  · The ascending aorta is moderately dilated.  · The aortic root is moderately dilated.  · Atrial fibrillation observed.  Aortic Root - End Diastolic   Ao root annulus 4.69 cm           Sinus 4.84 cm          STJ 4.89 cm          Ascending aorta 4.62 cm               LE arterial u/s 5/2022  Conclusion    Normal BLE MARIBEL with distal occlusions noted with moderate to severe plaque/blockage  Right distal PT/PER arteries are occluded  Left distal PT with monophasic waveforms, L GUILLE occluded      Conclusion 5/2022    A Baker's cyst measuring 1.67 cm x 3.77 cm was seen in the right extremity.  A Baker's cyst measuring 1.53 cm x 2.78 cm was seen in the left extremity.  There is no evidence of a left lower extremity DVT.  The right smaller saphenous vein is abnormal. A chronic thrombus is present.          Results for orders placed during the hospital encounter of 01/09/22    Cardiac catheterization    Conclusion  · The pre-procedure left ventricular end diastolic pressure was 13.  · The estimated blood loss was none.  · The coronary arteries were normal..  · The filling pressures on the right and left were normal.    The procedure log was documented by Documenter: Jenna Cantu, RN and verified by Janneth Tovar MD.    Date: 1/13/2022  Time: 5:22 PM          Past Medical History:   Diagnosis Date    Alcoholic peripheral neuropathy 03/01/2023    Anemia of chronic disease     Aortic aneurysm     Atrial fibrillation with RVR 09/24/2021    Chronic systolic congestive heart failure 08/31/2017    Depression     Emphysema of lung 08/31/2017    GERD (gastroesophageal reflux disease)     Hypertension     Knee osteoarthritis 10/21/2022    Major neurocognitive disorder due to multiple etiologies 04/14/2023    Microcytic anemia 11/24/2020    Other hyperlipidemia 04/27/2021    Personal history of colonic polyps 2022    Prostate cancer        Past Surgical History:   Procedure Laterality Date    CATHETERIZATION OF BOTH LEFT AND RIGHT HEART N/A 1/13/2022    Procedure: CATHETERIZATION, HEART, BOTH LEFT AND RIGHT;  Surgeon: Janneth Tovar MD;  Location: [facility] CATH LAB;  Service: Cardiology;  Laterality: N/A;  poss  cx    COLONOSCOPY      COLONOSCOPY N/A 6/30/2022    Procedure: COLONOSCOPY;  Surgeon: Sandra Perez MD;  Location: [facility] ENDO;  Service: Endoscopy;  Laterality: N/A;    COLONOSCOPY N/A 7/1/2022    Procedure: COLONOSCOPY;  Surgeon: Woodrow Christian MD;  Location: [facility] ENDO;  Service: Endoscopy;  Laterality: N/A;    CORONARY ANGIOGRAPHY N/A 1/13/2022    Procedure: ANGIOGRAM, CORONARY ARTERY;  Surgeon: Janneth Tovar MD;  Location: [facility] CATH LAB;  Service: Cardiology;  Laterality: N/A;    ESOPHAGOGASTRODUODENOSCOPY N/A 6/30/2022    Procedure: EGD (ESOPHAGOGASTRODUODENOSCOPY);  Surgeon: Sandra Perez MD;  Location: [facility] ENDO;  Service: Endoscopy;  Laterality: N/A;    INJECTION OF ANESTHETIC AGENT AROUND NERVE Bilateral 6/20/2023    Procedure: Bilateral Genicular nerve block RN IV Sedation;  Surgeon: Devon Grant MD;  Location: [facility] PAIN MGT;  Service: Pain Management;  Laterality: Bilateral;    INJECTION OF JOINT Bilateral 10/21/2022    Procedure: Bilateral intraarticular knee injection with local ALLERGIC TO IODINE;  Surgeon: Devon Grant MD;  Location: [facility] PAIN MGT;  Service: Pain Management;  Laterality: Bilateral;    INJECTION OF JOINT Bilateral 1/31/2023    Procedure: Bilateral IA knee joint injection with steroid RN IV Sedation;  Surgeon: Devon Grant MD;  [facility];  Service: Pain Management;  Laterality: Bilateral;    INJECTION OF JOINT Bilateral 5/10/2023    Procedure: Bilateral IA knee joint injection Synvisc RN IV Sedation;  Surgeon: Devon Grant MD;  Location: [facility] PAIN MGT;  Service: Pain Management;  Laterality: Bilateral;    INJECTION OF JOINT Bilateral 11/7/2023    Procedure: Bilateral intraarticular knee injection;  Surgeon: Devon Grant MD;  Location: [facility] PAIN MGT;  Service: Pain Management;  Laterality: Bilateral;    PROSTATE SURGERY      RADIOFREQUENCY THERMOCOAGULATION Left 9/1/2023    Procedure: Left Genicular Nerve RFA;  Surgeon: Devon Grant MD;   Location: [facility] PAIN MGT;  Service: Pain Management;  Laterality: Left;    RADIOFREQUENCY THERMOCOAGULATION Right 9/15/2023    Procedure: Right Genicular Nerve RFA RN IV Sedation;  Surgeon: Devon Grant MD;  Location: [facility] PAIN MGT;  Service: Pain Management;  Laterality: Right;    SPINE SURGERY         Social History     Tobacco Use    Smoking status: Some Days     Current packs/day: 0.50     Average packs/day: 1 pack/day for 70.5 years (69.8 ttl pk-yrs)     Types: Cigarettes     Start date: 1/1/1954     Last attempt to quit: 3/1/2023     Passive exposure: Past    Smokeless tobacco: Never   Substance Use Topics    Alcohol use: Yes     Comment: reports only drinks red wine when games are on    Drug use: Never       Family History   Problem Relation Name Age of Onset    Diabetes Mother      Peripheral vascular disease Mother         Patient's Medications   New Prescriptions    No medications on file   Previous Medications    ALBUTEROL (PROVENTIL) 2.5 MG /3 ML (0.083 %) NEBULIZER SOLUTION    Take 3 mLs (2.5 mg total) by nebulization every 4 to 6 hours as needed for Wheezing or Shortness of Breath.    ALBUTEROL (PROVENTIL/VENTOLIN HFA) 90 MCG/ACTUATION INHALER    Inhale 2 puffs into the lungs every 4 (four) hours as needed for Wheezing or Shortness of Breath.    ASPIRIN (ECOTRIN) 81 MG EC TABLET    Take 1 tablet (81 mg total) by mouth once daily.    DICLOFENAC SODIUM (VOLTAREN) 1 % GEL    Apply 4 grams topically 4 (four) times daily. Bilateral knees    EMPAGLIFLOZIN (JARDIANCE) 10 MG TABLET    Take 1 tablet (10 mg total) by mouth once daily.    FERROUS SULFATE 325 (65 FE) MG EC TABLET    Take 1 tablet (325 mg total) by mouth once daily.    FLUTICASONE-UMECLIDIN-VILANTER (TRELEGY ELLIPTA) 200-62.5-25 MCG INHALER    Inhale 1 puff into the lungs once daily.    FUROSEMIDE (LASIX) 40 MG TABLET    Take 1 tablet (40 mg total) by mouth once daily. Do not take if BP is below 110/70    GABAPENTIN (NEURONTIN) 300 MG CAPSULE     "Take 300 mg by mouth every evening.    MEMANTINE (NAMENDA) 5 MG TAB    Take 1 tablet (5 mg total) by mouth 2 (two) times daily.    METOPROLOL SUCCINATE (TOPROL-XL) 25 MG 24 HR TABLET    Take 0.5 tablets (12.5 mg total) by mouth once daily. Do no take if BP is below 110/70    MIDODRINE (PROAMATINE) 5 MG TAB    Take 1 tablet (5 mg total) by mouth every meal as needed (give if BP is below 90/70).    SACUBITRIL-VALSARTAN (ENTRESTO) 24-26 MG PER TABLET    Take 1 tablet by mouth 2 (two) times daily. Do no take if BP is below 110/70   Modified Medications    No medications on file   Discontinued Medications    No medications on file       Review of Systems   Constitutional:  Positive for malaise/fatigue.   HENT: Negative.     Eyes: Negative.    Respiratory:  Positive for shortness of breath.    Cardiovascular:  Positive for chest pain and leg swelling.   Gastrointestinal: Negative.    Genitourinary: Negative.    Musculoskeletal:  Positive for back pain and joint pain.   Skin: Negative.    Neurological: Negative.    Endo/Heme/Allergies: Negative.    Psychiatric/Behavioral: Negative.     All 12 systems otherwise negative.      Wt Readings from Last 3 Encounters:   09/04/24 66.5 kg (146 lb 9.7 oz)   08/30/24 65.4 kg (144 lb 2.9 oz)   07/17/24 71.5 kg (157 lb 10.1 oz)     Temp Readings from Last 3 Encounters:   06/18/24 98.7 °F (37.1 °C) (Oral)   06/10/24 98.7 °F (37.1 °C) (Tympanic)   12/31/23 98 °F (36.7 °C)     BP Readings from Last 3 Encounters:   09/04/24 (!) 120/52   08/30/24 (!) 102/56   07/17/24 124/60     Pulse Readings from Last 3 Encounters:   09/04/24 68   08/30/24 85   07/17/24 70       BP (!) 120/52 (BP Location: Right arm, Patient Position: Sitting, BP Method: Medium (Manual))   Pulse 68   Ht 5' 8" (1.727 m)   Wt 66.5 kg (146 lb 9.7 oz)   SpO2 98%   BMI 22.29 kg/m²     Objective:   Physical Exam  Vitals and nursing note reviewed.   Constitutional:       General: He is not in acute distress.     Appearance: " He is well-developed. He is ill-appearing. He is not diaphoretic.   HENT:      Head: Normocephalic and atraumatic.      Nose: Nose normal.   Eyes:      General: No scleral icterus.     Conjunctiva/sclera: Conjunctivae normal.   Neck:      Thyroid: No thyromegaly.      Vascular: No JVD.   Cardiovascular:      Rate and Rhythm: Normal rate and regular rhythm.      Heart sounds: S1 normal and S2 normal. Murmur heard.      No friction rub. No gallop. No S3 or S4 sounds.   Pulmonary:      Effort: Pulmonary effort is normal. No respiratory distress.      Breath sounds: Normal breath sounds. No stridor. No wheezing or rales.   Chest:      Chest wall: No tenderness.   Abdominal:      General: Bowel sounds are normal. There is no distension.      Palpations: Abdomen is soft. There is no mass.      Tenderness: There is no abdominal tenderness. There is no rebound.   Genitourinary:     Comments: Deferred  Musculoskeletal:         General: No tenderness or deformity. Normal range of motion.      Cervical back: Normal range of motion and neck supple.      Right lower leg: Edema present.      Left lower leg: Edema present.   Lymphadenopathy:      Cervical: No cervical adenopathy.   Skin:     General: Skin is warm and dry.      Coloration: Skin is not pale.      Findings: No erythema or rash.   Neurological:      Mental Status: He is alert and oriented to person, place, and time.      Motor: No abnormal muscle tone.      Coordination: Coordination normal.   Psychiatric:         Behavior: Behavior normal.         Thought Content: Thought content normal.         Judgment: Judgment normal.         Lab Results   Component Value Date     07/15/2024    K 4.3 07/15/2024     07/15/2024    CO2 26 07/15/2024    BUN 12 07/15/2024    CREATININE 0.9 07/15/2024    GLU 79 07/15/2024    HGBA1C 5.3 06/10/2024    MG 2.2 07/15/2024    AST 22 07/15/2024    ALT 11 07/15/2024    ALBUMIN 4.1 07/15/2024    PROT 7.1 07/15/2024    BILITOT 1.4  (H) 07/15/2024    WBC 5.90 06/18/2024    HGB 10.4 (L) 06/18/2024    HCT 31.6 (L) 06/18/2024    MCV 81 (L) 06/18/2024     06/18/2024    INR 1.4 (H) 09/24/2021    TSH 1.170 06/10/2024    CHOL 123 06/10/2024    HDL 50 06/10/2024    LDLCALC 61.6 (L) 06/10/2024    TRIG 57 06/10/2024     (H) 07/15/2024     Assessment:      1. Anemia of chronic disease    2. Chronic systolic congestive heart failure    3. Centrilobular emphysema    4. Iron deficiency anemia due to chronic blood loss    5. Other hyperlipidemia    6. AVM (arteriovenous malformation) of small bowel, acquired with hemorrhage    7. Atherosclerosis of abdominal aorta    8. Bilateral leg weakness    9. Bilateral leg edema    10. Pulmonary hypertension    11. Acute on chronic congestive heart failure, unspecified heart failure type    12. PVD (peripheral vascular disease)    13. Chronic combined systolic and diastolic congestive heart failure, NYHA class 3    14. Shortness of breath    15. Localized edema    16. Paroxysmal atrial fibrillation            Plan:   1. 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 Emphysema with dyspnea, pulm HTN; still smokes   - cont tx PCP/Pulm    3. HTN with aortic root moderately dilated with AI -low BP now  - titrate meds  - cont to monitor   - start midodrine PRN     4. HLD with aortic athero and Coronary artery calcifications  - cont statin     5. PAF, diagnosed in setting of sepsis/PNA, had SVT in hosp - sec to alc withdrawal  - cont BB; stop Anticoag  - [place] AF, Dr. Christianson discussed with pt to stop anticoag sec risk of bleeding    6. AVM/Symptomatic anemia s/p PRBC with fe def  - angioectasias noted in recent EGD s/p APC  - off  Eliquis now  - f/u heme/onc - recent Fe levels improved     7. PVD, Leg Edema, pain with claudication   - LE arterial u/s with distal occlusions/monophasic waveforms, normal BLE MARIBEL.   -6/2024 moderate to severe PAD referred to Dr. Bo - Asymptomatic pvd - will monitor     8. Fall with hematoma in right hip  - f/u pain management for knee pain/baker's cysts  - s/p knee injections - proceed for RFA for with Dr. Grant     Visit today included increased complexity associated with the care of the episodic problem CHF addressed and managing the longitudinal care of the patient due to the serious and/or complex managed problem(s) .      Thank you for allowing me to participate in this patient's care. Please do not hesitate to contact me with any questions or concerns. Consult note has been forwarded to the referral physician.

## 2024-09-10 NOTE — PRE-PROCEDURE INSTRUCTIONS
Spoke with patients daughter regarding procedure scheduled on 9.18    Arrival time 1145     Has patient been sick with fever or on antibiotics within the last 7 days? No     Does the patient have any open wounds, sores or rashes? No     Does the patient have any recent fractures? no     Has patient received a vaccination within the last 7 days? No     Received the COVID vaccination? yes     Has the patient stopped all medications as directed? na     Does patient have a pacemaker, defibrillator, or implantable stimulator? No     Does the patient have a ride to and from procedure and someone reliable to remain with patient?  daughter     Is the patient diabetic? no     Does the patient have sleep apnea? Or use O2 at home? no     Is the patient receiving sedation? Yes      Is the patient instructed to remain NPO beginning at midnight the night before their procedure? yes     Procedure location confirmed with patient? Yes     Covid- Denies signs/symptoms. Instructed to notify PAT/MD if any changes.

## 2024-09-15 NOTE — PROGRESS NOTES
Subjective:     Patient ID: Richy Douglas is a 83 y.o. male.    Chief Complaint: Nail Care (Non-diabetic pt wears crocs, PCP Vivian Ch last seen 6/10/2024)    Richy is a 83 y.o. male who presents to the clinic for evaluation and treatment of high risk feet. 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 The patient's chief complaint is long, thick toenails. This patient has documented high risk feet requiring routine maintenance secondary to peripheral neuropathy.    PCP: Vivian Ch MD    Date Last Seen by PCP: 06/10/2024    Current shoe gear:  Affected Foot: Slip-on shoes     Unaffected Foot: Slip-on shoes    Last encounter in this department: Visit date not found    Hemoglobin A1C   Date Value Ref Range Status   06/10/2024 5.3 4.0 - 5.6 % Final     Comment:     ADA Screening Guidelines:  5.7-6.4%  Consistent with prediabetes  >or=6.5%  Consistent with diabetes    High levels of fetal hemoglobin interfere with the HbA1C  assay. Heterozygous hemoglobin variants (HbS, HgC, etc)do  not significantly interfere with this assay.   However, presence of multiple variants may affect accuracy.         Patient Active Problem List   Diagnosis    Anemia of chronic disease    Chronic systolic congestive heart failure    Emphysema of lung    Hypertension    History of lower gastrointestinal bleeding    Iron deficiency anemia due to chronic blood loss    Nicotine abuse    Nonrheumatic aortic valve insufficiency     Calcified granuloma of lung    Shortness of breath    Atherosclerosis of abdominal aorta    Other hyperlipidemia    Atrial fibrillation    Bacteremia    Alcohol dependence    AVM (arteriovenous malformation) of small bowel, acquired with hemorrhage    Acute blood loss anemia    Solitary pulmonary nodule    Primary osteoarthritis of both knees    Chest pain    COVID-19    Alcoholic peripheral neuropathy    Exercise hypoxemia    Major neurocognitive disorder due to multiple etiologies    Other amnesia    Bilateral leg weakness    Bilateral leg edema    Idiopathic refractory anemia    Elevated bilirubin    Pulmonary hypertension    History of colon polyp    Abnormality of gait and mobility    Scrotal edema    CHF exacerbation    Acute on chronic systolic congestive heart failure    Alcohol use disorder, mild, abuse    Grief    PVD (peripheral vascular disease)       Medication List with Changes/Refills   Current Medications    ALBUTEROL (PROVENTIL) 2.5 MG /3 ML (0.083 %) NEBULIZER SOLUTION    Take 3 mLs (2.5 mg total) by nebulization every 4 to 6 hours as needed for Wheezing or Shortness of Breath.    ALBUTEROL (PROVENTIL/VENTOLIN HFA) 90 MCG/ACTUATION INHALER    Inhale 2 puffs into the lungs every 4 (four) hours as needed for Wheezing or Shortness of Breath.    ASPIRIN (ECOTRIN) 81 MG EC TABLET    Take 1 tablet (81 mg total) by mouth once daily.    DICLOFENAC SODIUM (VOLTAREN) 1 % GEL    Apply 4 grams topically 4 (four) times daily. Bilateral knees    EMPAGLIFLOZIN (JARDIANCE) 10 MG TABLET    Take 1 tablet (10 mg total) by mouth once daily.    FERROUS SULFATE 325 (65 FE) MG EC TABLET    Take 1 tablet (325 mg total) by mouth once daily.    FLUTICASONE-UMECLIDIN-VILANTER (TRELEGY ELLIPTA) 200-62.5-25 MCG INHALER    Inhale 1 puff into the lungs once daily.    FUROSEMIDE (LASIX) 40 MG TABLET    Take 1 tablet (40 mg total) by mouth once daily. Do not take if BP is below 110/70    GABAPENTIN (NEURONTIN) 300 MG CAPSULE     Take 300 mg by mouth every evening.    MEMANTINE (NAMENDA) 5 MG TAB    Take 1 tablet (5 mg total) by mouth 2 (two) times daily.    METOPROLOL SUCCINATE (TOPROL-XL) 25 MG 24 HR TABLET    Take 0.5 tablets (12.5 mg total) by mouth once daily. Do no take if BP is below 110/70    MIDODRINE (PROAMATINE) 5 MG TAB    Take 1 tablet (5 mg total) by mouth every meal as needed (give if BP is below 90/70).    SACUBITRIL-VALSARTAN (ENTRESTO) 24-26 MG PER TABLET    Take 1 tablet by mouth 2 (two) times daily. Do no take if BP is below 110/70       Review of patient's allergies indicates:   Allergen Reactions    Fish containing products     Iodine and iodide containing products     Shellfish containing products        Past Surgical History:   Procedure Laterality Date    CATHETERIZATION OF BOTH LEFT AND RIGHT HEART N/A 1/13/2022    Procedure: CATHETERIZATION, HEART, BOTH LEFT AND RIGHT;  Surgeon: Janneth Tovar MD;  Location: [facility];  Service: Cardiology;  Laterality: N/A;  poss cx    COLONOSCOPY      COLONOSCOPY N/A 6/30/2022    Procedure: COLONOSCOPY;  Surgeon: Sandra Perez MD;  Location: [facility];  Service: Endoscopy;  Laterality: N/A;    COLONOSCOPY N/A 7/1/2022    Procedure: COLONOSCOPY;  Surgeon: Woodrow Christian MD;  Location: [facility];  Service: Endoscopy;  Laterality: N/A;    CORONARY ANGIOGRAPHY N/A 1/13/2022    Procedure: ANGIOGRAM, CORONARY ARTERY;  Surgeon: Janneth Tovar MD;  Location: [facility];  Service: Cardiology;  Laterality: N/A;    ESOPHAGOGASTRODUODENOSCOPY N/A 6/30/2022    Procedure: EGD (ESOPHAGOGASTRODUODENOSCOPY);  Surgeon: Sandra Perez MD;  Location: [facility];  Service: Endoscopy;  Laterality: N/A;    INJECTION OF ANESTHETIC AGENT AROUND NERVE Bilateral 6/20/2023    Procedure: Bilateral Genicular nerve block RN IV Sedation;  Surgeon: Devon Grant MD;  Location: [facility];  Service: Pain Management;  Laterality: Bilateral;    INJECTION OF JOINT Bilateral 10/21/2022     Procedure: Bilateral intraarticular knee injection with local ALLERGIC TO IODINE;  Surgeon: Devon Grant MD;  Location: [facility];  Service: Pain Management;  Laterality: Bilateral;    INJECTION OF JOINT Bilateral 1/31/2023    Procedure: Bilateral IA knee joint injection with steroid RN IV Sedation;  Surgeon: Devon Grant MD;  Location: [facility];  Service: Pain Management;  Laterality: Bilateral;    INJECTION OF JOINT Bilateral 5/10/2023    Procedure: Bilateral IA knee joint injection Synvisc RN IV Sedation;  Surgeon: Devon Grant MD;  Location: [facility];  Service: Pain Management;  Laterality: Bilateral;    INJECTION OF JOINT Bilateral 11/7/2023    Procedure: Bilateral intraarticular knee injection;  Surgeon: Devon Grant MD;  Location: [facility];  Service: Pain Management;  Laterality: Bilateral;    PROSTATE SURGERY      RADIOFREQUENCY THERMOCOAGULATION Left 9/1/2023    Procedure: Left Genicular Nerve RFA;  Surgeon: Devon Grant MD;  Location: [facility];  Service: Pain Management;  Laterality: Left;    RADIOFREQUENCY THERMOCOAGULATION Right 9/15/2023    Procedure: Right Genicular Nerve RFA RN IV Sedation;  Surgeon: Devon Grant MD;  Location: [facility];  Service: Pain Management;  Laterality: Right;    SPINE SURGERY         Family History   Problem Relation Name Age of Onset    Diabetes Mother      Peripheral vascular disease Mother         Social History     Socioeconomic History    Marital status:      Spouse name: jenn     Number of children: 6   Tobacco Use    Smoking status: Some Days     Current packs/day: 0.50     Average packs/day: 1 pack/day for 70.5 years (69.8 ttl pk-yrs)     Types: Cigarettes     Start date: 1[birth year]1/1954     Last attempt to quit: 3/1/2023     Passive exposure: Past    Smokeless tobacco: Never   Substance and Sexual Activity    Alcohol use: Yes     Comment: reports only drinks red wine when games are on    Drug use: Never    Sexual  "activity: Yes     Partners: Female     Social Determinants of Health     Financial Resource Strain: Patient Declined (2/26/2024)    Overall Financial Resource Strain (CARDIA)     Difficulty of Paying Living Expenses: Patient declined   Food Insecurity: Patient Declined (2/26/2024)    Hunger Vital Sign     Worried About Running Out of Food in the Last Year: Patient declined     Ran Out of Food in the Last Year: Patient declined   Transportation Needs: Patient Declined (2/26/2024)    PRAPARE - Transportation     Lack of Transportation (Medical): Patient declined     Lack of Transportation (Non-Medical): Patient declined   Physical Activity: Insufficiently Active (2/26/2024)    Exercise Vital Sign     Days of Exercise per Week: 3 days     Minutes of Exercise per Session: 10 min   Stress: No Stress Concern Present (8/30/2023)    [employer] [place] [employer] - Occupational Stress Questionnaire     Feeling of Stress : Not at all   Housing Stability: Patient Declined (2/26/2024)    Housing Stability Vital Sign     Unable to Pay for Housing in the Last Year: Patient declined     Number of Places Lived in the Last Year: 1     Unstable Housing in the Last Year: Patient declined       Vitals:    09/04/24 1534   Weight: 66.5 kg (146 lb 9.7 oz)   Height: 5' 8" (1.727 m)   PainSc: 0-No pain       Review of Systems   Constitutional:  Negative for chills and fever.   Respiratory:  Negative for shortness of breath.    Cardiovascular:  Negative for chest pain, palpitations, orthopnea, claudication and leg swelling.   Gastrointestinal:  Negative for diarrhea, nausea and vomiting.   Musculoskeletal:  Negative for joint pain.   Skin:  Negative for rash.   Neurological:  Positive for tingling. Negative for dizziness, sensory change, focal weakness and weakness.   Psychiatric/Behavioral: Negative.           Objective:      PHYSICAL EXAM: Apperance: Alert and orient in no distress,well developed, and with good attention to " grooming and body habits  Patient presents ambulating in [facility]  Lower Extremity Physical Exam:  VASCULAR: Dorsalis pedis pulses 1/4 bilateral and Posterior Tibial pulses 1/4 bilateral. Capillary fill time <blanched bilateral. Mild edema observed bilateral. Varicosities absent bilateral. Skin temperature of the lower extremities is warm to warm, proximal to distal. Hair growth dim bilateral.  DERMATOLOGICAL: No skin rashes, subcutaneous nodules, lesions, or ulcers observed bilateral. Dry skin noted to bilateral lower extremities. Nails 1,2,3,4,5 bilateral elongated, thickened, and discolored with subungual debris.  NEUROLOGICAL: Light touch, sharp-dull, proprioception all present and equal bilaterally.    MUSCULOSKELETAL: Muscle strength is 5/5 for foot inverters, everters, plantarflexors, and dorsiflexors. Muscle tone is normal.          Assessment:       ICD-10-CM ICD-9-CM   1. Alcoholic peripheral neuropathy  G62.1 357.5   2. Dermatophytosis of nail - Right Foot  B35.1 110.1       Plan:   Alcoholic peripheral neuropathy    Dermatophytosis of nail - Right Foot    I counseled the patient on his conditions, regarding findings of my examination, my impressions, and usual treatment plan.   Appointment spent on education about the neuropathy, and prevention of limb loss.  Shoe inspection. Patient instructed on proper foot hygeine. We discussed wearing proper shoe gear, daily foot inspections, never walking without protective shoe gear, never putting sharp instruments to feet.    With patient's permission, nails 1-5 bilateral were debrided/trimmed in length and thickness aggressively to their soft tissue attachment mechanically and with electric , removing all offending nail and debris. Patient relates relief following the procedure.  Patient  will continue to monitor the areas daily, inspect feet, wear protective shoe gear when ambulatory, moisturizer to maintain skin integrity.   Patient to return 6 months or  sooner if needed.          Nick Adams DPM  [facility]

## 2024-09-18 ENCOUNTER — HOSPITAL ENCOUNTER (OUTPATIENT)
Facility: HOSPITAL | Age: 84
Discharge: HOME | End: 2024-09-18
Attending: ANESTHESIOLOGY | Admitting: ANESTHESIOLOGY
Payer: MEDICARE

## 2024-09-18 VITALS
TEMPERATURE: 98 F | BODY MASS INDEX: 21.95 KG/M2 | OXYGEN SATURATION: 96 % | SYSTOLIC BLOOD PRESSURE: 144 MMHG | RESPIRATION RATE: 18 BRPM | HEART RATE: 70 BPM | HEIGHT: 68 IN | WEIGHT: 144.81 LBS | DIASTOLIC BLOOD PRESSURE: 60 MMHG

## 2024-09-18 DIAGNOSIS — M17.9 KNEE OSTEOARTHRITIS: ICD-10-CM

## 2024-09-18 LAB — POCT GLUCOSE: 88 MG/DL (ref 70–110)

## 2024-09-18 PROCEDURE — 63600175 PHARM REV CODE 636 W HCPCS: Mod: JZ,JG | Performed by: ANESTHESIOLOGY

## 2024-09-18 PROCEDURE — 99153 MOD SED SAME PHYS/QHP EA: CPT | Performed by: ANESTHESIOLOGY

## 2024-09-18 PROCEDURE — 64624 DSTRJ NULYT AGT GNCLR NRV: CPT | Mod: 50,,, | Performed by: ANESTHESIOLOGY

## 2024-09-18 PROCEDURE — 25000003 PHARM REV CODE 250: Performed by: ANESTHESIOLOGY

## 2024-09-18 PROCEDURE — 64624 DSTRJ NULYT AGT GNCLR NRV: CPT | Mod: 50 | Performed by: ANESTHESIOLOGY

## 2024-09-18 PROCEDURE — 99152 MOD SED SAME PHYS/QHP 5/>YRS: CPT | Performed by: ANESTHESIOLOGY

## 2024-09-18 RX ORDER — FENTANYL CITRATE 50 UG/ML
INJECTION, SOLUTION INTRAMUSCULAR; INTRAVENOUS
Status: DISCONTINUED | OUTPATIENT
Start: 2024-09-18 | End: 2024-09-18 | Stop reason: HOSPADM

## 2024-09-18 RX ORDER — INDOMETHACIN 25 MG/1
CAPSULE ORAL
Status: DISCONTINUED | OUTPATIENT
Start: 2024-09-18 | End: 2024-09-18 | Stop reason: HOSPADM

## 2024-09-18 RX ORDER — MIDAZOLAM HYDROCHLORIDE 1 MG/ML
INJECTION, SOLUTION INTRAMUSCULAR; INTRAVENOUS
Status: DISCONTINUED | OUTPATIENT
Start: 2024-09-18 | End: 2024-09-18 | Stop reason: HOSPADM

## 2024-09-18 RX ORDER — LIDOCAINE HYDROCHLORIDE 20 MG/ML
INJECTION, SOLUTION EPIDURAL; INFILTRATION; INTRACAUDAL; PERINEURAL
Status: DISCONTINUED | OUTPATIENT
Start: 2024-09-18 | End: 2024-09-18 | Stop reason: HOSPADM

## 2024-09-18 RX ORDER — TRIAMCINOLONE ACETONIDE 40 MG/ML
INJECTION, SUSPENSION INTRA-ARTICULAR; INTRAMUSCULAR
Status: DISCONTINUED | OUTPATIENT
Start: 2024-09-18 | End: 2024-09-18 | Stop reason: HOSPADM

## 2024-09-18 RX ORDER — BUPIVACAINE HYDROCHLORIDE 2.5 MG/ML
INJECTION, SOLUTION EPIDURAL; INFILTRATION; INTRACAUDAL
Status: DISCONTINUED | OUTPATIENT
Start: 2024-09-18 | End: 2024-09-18 | Stop reason: HOSPADM

## 2024-09-18 NOTE — DISCHARGE INSTRUCTIONS

## 2024-09-18 NOTE — OP NOTE
Procedure Note:  Bilateral Geniculate nerve cooled radiofrequency ablation under fluoroscopy     1) medial superior epicondyle geniculate nerve cooled radiofrequency ablation  2) lateral superior epicondyle geniculate nerve cooled radiofrequency ablation  3) medial tibial metaphysis geniculate nerve cooled radiofrequency ablation  4) xray guidance for needle placement  5) Conscious sedation provided by MD                                Surgeon: Devon Grant MD    Assistant: None    Pre-Op Diagnosis:  Primary osteoarthritis of both knees [M17.0]    Post-Op Diagnosis: Primary osteoarthritis of both knees [M17.0]    EBL: None    Complications: None    Specimens: None    Description of procedure:    After written consent was obtained, patient placed in supine position.  The area over the medial and lateral aspect of the superior epi-condyle of the femur and the medial tibial metaphysis were prepped with chlorhexidine.  The area was draped in the usual sterile fashion.  Approximately 3 mL total 1% lidocaine was infiltrated into the skin overlying the 3 predetermined entry points. A 17 gauge 10mm active tip needle was then advanced under fluoroscopy in the AP and lateral views into the positions of the geniculate nerves at these levels. This was followed by motor testing at each of the nerves to ensure that there was no dorsiflexion of the foot.  After negative aspiration and no paresthesias there was injection of 3mL of 2% lidocaine into each of these  3 areas. This was followed by cooled thermal lesioning at 80 degrees celsius for 150 seconds at each site. After negative aspiration and no paresthesias there was injection of  5 mL of 0.25% bupivacaine and 40 mg triamcinolone, divided into each of these  3 areas  Needle was withdrawn and a sterile band-aid applied to the skin.    Conscious sedation provided by M.D    The patient was monitored with continuous pulse oximetry, EKG, and intermittent blood pressure monitors.   The patient was hemodynamically stable throughout the entire process was responsive to voice, and breathing spontaneously.  Supplemental O2 was provided at 2L/min via nasal cannula.  Patient was comfortable for the duration of the procedure. (See nurse documentation and case log for sedation time)    There was a total of 2 mg IV Midazolam and 100 mcg fentanyl given for the procedure    Patient tolerated the procedure well, and was reporting improvement of pain symptoms after the injection.  She was discharged from the clinic in stable condition.

## 2024-09-18 NOTE — DISCHARGE SUMMARY
Discharge Note  Short Stay      SUMMARY     Admit Date: 9/18/2024    Attending Physician: Devon Grant MD        Discharge Physician: Devon Grant MD        Discharge Date: 9/18/2024 11:27 AM    Procedure(s) (LRB):  Bilateral Genicular Nerve RFA (Bilateral)    Final Diagnosis: Chronic pain of both knees [M25.561, M25.562, G89.29]  Primary osteoarthritis of both knees [M17.0]    Disposition: Home or self care    Patient Instructions:   Current Discharge Medication List        CONTINUE these medications which have NOT CHANGED    Details   albuterol (PROVENTIL) 2.5 mg /3 mL (0.083 %) nebulizer solution Take 3 mLs (2.5 mg total) by nebulization every 4 to 6 hours as needed for Wheezing or Shortness of Breath.  Qty: 360 mL, Refills: 11    Comments: ChronicObstructivePulmonaryDiseaseCode:[id number]  Associated Diagnoses: Panlobular emphysema; Pulmonary emphysema, unspecified emphysema type      albuterol (PROVENTIL/VENTOLIN HFA) 90 mcg/actuation inhaler Inhale 2 puffs into the lungs every 4 (four) hours as needed for Wheezing or Shortness of Breath.  Qty: 8.5 g, Refills: 11    Comments: Dispense formulary  Associated Diagnoses: Panlobular emphysema      aspirin (ECOTRIN) 81 MG EC tablet Take 1 tablet (81 mg total) by mouth once daily.  Qty: 90 tablet, Refills: 3      diclofenac sodium (VOLTAREN) 1 % Gel Apply 4 grams topically 4 (four) times daily. Bilateral knees  Qty: 100 g, Refills: 3    Associated Diagnoses: Primary osteoarthritis of left knee; Chronic pain of both knees      empagliflozin (JARDIANCE) 10 mg tablet Take 1 tablet (10 mg total) by mouth once daily.  Qty: 90 tablet, Refills: 3      ferrous sulfate 325 (65 FE) MG EC tablet Take 1 tablet (325 mg total) by mouth once daily.  Qty: 90 tablet, Refills: 3      fluticasone-umeclidin-vilanter (TRELEGY ELLIPTA) 200-62.5-25 mcg inhaler Inhale 1 puff into the lungs once daily.  Qty: 60 each, Refills: 11    Associated Diagnoses: Panlobular emphysema       furosemide (LASIX) 40 MG tablet Take 1 tablet (40 mg total) by mouth once daily. Do not take if BP is below 110/70  Qty: 90 tablet, Refills: 1      gabapentin (NEURONTIN) 300 MG capsule Take 300 mg by mouth every evening.      memantine (NAMENDA) 5 MG Tab Take 1 tablet (5 mg total) by mouth 2 (two) times daily.  Qty: 180 tablet, Refills: 3    Associated Diagnoses: Alcohol dependence with uncomplicated withdrawal; Major neurocognitive disorder due to multiple etiologies      metoprolol succinate (TOPROL-XL) 25 MG 24 hr tablet Take 0.5 tablets (12.5 mg total) by mouth once daily. Do no take if BP is below 110/70  Qty: 30 tablet, Refills: 6    Comments: .      midodrine (PROAMATINE) 5 MG Tab Take 1 tablet (5 mg total) by mouth every meal as needed (give if BP is below 90/70).  Qty: 90 tablet, Refills: 3      sacubitriL-valsartan (ENTRESTO) 24-26 mg per tablet Take 1 tablet by mouth 2 (two) times daily. Do no take if BP is below 110/70  Qty: 60 tablet, Refills: 3                 Discharge Diagnosis: Chronic pain of both knees [M25.561, M25.562, G89.29]  Primary osteoarthritis of both knees [M17.0]  Condition on Discharge: Stable with no complications to procedure   Diet on Discharge: Same as before.  Activity: as per instruction sheet.  Discharge to: Home with a responsible adult.  Follow up: 2-4 weeks       Please call the office at [phone] if you experience any weakness or loss of sensation, fever > 101.5, pain uncontrolled with oral medications, persistent nausea/vomiting/or diarrhea, redness or drainage from the incisions, or any other worrisome concerns. If physician on call was not reached or could not communicate with our office for any reason please go to the nearest emergency department

## 2024-09-26 DIAGNOSIS — Z00.00 ENCOUNTER FOR MEDICARE ANNUAL WELLNESS EXAM: ICD-10-CM

## 2024-10-09 ENCOUNTER — OFFICE VISIT (OUTPATIENT)
Dept: CARDIOLOGY | Facility: CLINIC | Age: 84
End: 2024-10-09
Payer: MEDICARE

## 2024-10-09 VITALS
HEART RATE: 88 BPM | WEIGHT: 142.63 LBS | BODY MASS INDEX: 21.62 KG/M2 | SYSTOLIC BLOOD PRESSURE: 142 MMHG | HEIGHT: 68 IN | OXYGEN SATURATION: 95 % | DIASTOLIC BLOOD PRESSURE: 60 MMHG

## 2024-10-09 DIAGNOSIS — I50.23 ACUTE ON CHRONIC SYSTOLIC CONGESTIVE HEART FAILURE: Primary | ICD-10-CM

## 2024-10-09 PROCEDURE — 99999 PR PBB SHADOW E&M-EST. PATIENT-LVL III: CPT | Mod: PBBFAC,HCNC,, | Performed by: PHYSICIAN ASSISTANT

## 2024-10-09 PROCEDURE — 1157F ADVNC CARE PLAN IN RCRD: CPT | Mod: HCNC,CPTII,S$GLB, | Performed by: PHYSICIAN ASSISTANT

## 2024-10-09 PROCEDURE — 1160F RVW MEDS BY RX/DR IN RCRD: CPT | Mod: HCNC,CPTII,S$GLB, | Performed by: PHYSICIAN ASSISTANT

## 2024-10-09 PROCEDURE — 3077F SYST BP >= 140 MM HG: CPT | Mod: HCNC,CPTII,S$GLB, | Performed by: PHYSICIAN ASSISTANT

## 2024-10-09 PROCEDURE — 99214 OFFICE O/P EST MOD 30 MIN: CPT | Mod: HCNC,S$GLB,, | Performed by: PHYSICIAN ASSISTANT

## 2024-10-09 PROCEDURE — 3078F DIAST BP <80 MM HG: CPT | Mod: HCNC,CPTII,S$GLB, | Performed by: PHYSICIAN ASSISTANT

## 2024-10-09 PROCEDURE — 1159F MED LIST DOCD IN RCRD: CPT | Mod: HCNC,CPTII,S$GLB, | Performed by: PHYSICIAN ASSISTANT

## 2024-10-09 NOTE — PROGRESS NOTES
[facility] Provider Note (Follow-up) Consult Note      HPI:  82 y/o male with PMHx of chronic HFrEF (EF 30%), atrial fibrillation (not on AC due to bleeding risk), HTN, PVD, emphysema, GERD, prostate cancer who presents for 3 month follow up     Is now off BB as this was causing some hypotension issues    Remains on SGLT2i and ARNi for GDMT    Also taking lasix still 40 mg daily, responds well    [facility]    Main issue is pain in left knee-no relief after injection    No PND    PHYSICAL:   Vitals:    10/09/24 1429   BP: (!) 142/60   Pulse: 88          JVD: no,    Heart rhythm: regular  Cardiac murmur: No    S3: no  S4: no  Lungs: clear  Liver span: 10 cm:   Hepatojugular reflux: no  Edema: no,       ASSESSMENT: chronic systolic HF    PLAN:      Patient Instructions:   Instruct the patient to notify this clinic if HH, a physician or an advanced care provider wants to change medication one of their HF medications   Activity and Diet restrictions:   Recommend 2-3 gram sodium restriction and 1500cc- 2000cc fluid restriction.  Encourage physical activity with graded exercise program.  Requested patient to weigh themselves daily, and to notify us if their weight increases by more than 3 lbs in 1 day or 5 lbs in 3 days.    Assigned dry weight on home scale: 64 kg  Medication changes (include current dose and changed dose)  OK to stay off toprol  Patient assistance for jardiance  Stop midodrine  CHF education done  RTC 3 months  Lasix daily, ok to take extra prn symptoms or weight gain

## 2024-10-14 ENCOUNTER — PATIENT MESSAGE (OUTPATIENT)
Dept: PAIN MEDICINE | Facility: CLINIC | Age: 84
End: 2024-10-14
Payer: MEDICARE

## 2024-10-14 DIAGNOSIS — G89.29 CHRONIC PAIN OF BOTH KNEES: Primary | ICD-10-CM

## 2024-10-14 DIAGNOSIS — M25.561 CHRONIC PAIN OF BOTH KNEES: Primary | ICD-10-CM

## 2024-10-14 DIAGNOSIS — G89.29 CHRONIC PAIN OF LEFT KNEE: ICD-10-CM

## 2024-10-14 DIAGNOSIS — M25.562 CHRONIC PAIN OF BOTH KNEES: Primary | ICD-10-CM

## 2024-10-14 DIAGNOSIS — M17.0 PRIMARY OSTEOARTHRITIS OF BOTH KNEES: ICD-10-CM

## 2024-10-14 DIAGNOSIS — M25.562 CHRONIC PAIN OF LEFT KNEE: ICD-10-CM

## 2024-10-18 ENCOUNTER — OFFICE VISIT (OUTPATIENT)
Dept: PAIN MEDICINE | Facility: CLINIC | Age: 84
End: 2024-10-18
Payer: MEDICARE

## 2024-10-18 VITALS
RESPIRATION RATE: 17 BRPM | HEART RATE: 86 BPM | HEIGHT: 68 IN | BODY MASS INDEX: 21.9 KG/M2 | SYSTOLIC BLOOD PRESSURE: 147 MMHG | DIASTOLIC BLOOD PRESSURE: 61 MMHG | WEIGHT: 144.5 LBS

## 2024-10-18 DIAGNOSIS — G89.29 CHRONIC PAIN OF BOTH KNEES: ICD-10-CM

## 2024-10-18 DIAGNOSIS — M25.561 CHRONIC PAIN OF BOTH KNEES: ICD-10-CM

## 2024-10-18 DIAGNOSIS — M25.562 CHRONIC PAIN OF BOTH KNEES: ICD-10-CM

## 2024-10-18 DIAGNOSIS — M17.0 PRIMARY OSTEOARTHRITIS OF BOTH KNEES: Primary | ICD-10-CM

## 2024-10-18 PROCEDURE — 99999 PR PBB SHADOW E&M-EST. PATIENT-LVL IV: CPT | Mod: PBBFAC,HCNC,, | Performed by: PHYSICIAN ASSISTANT

## 2024-10-18 RX ORDER — GABAPENTIN 300 MG/1
300 CAPSULE ORAL NIGHTLY
Qty: 30 CAPSULE | Refills: 3 | Status: SHIPPED | OUTPATIENT
Start: 2024-10-18

## 2024-10-18 NOTE — PROGRESS NOTES
Established Patient Chronic Pain Note (Follow up Visit)  PCP: Vivian Ch MD    Chief Complaint:   Chief Complaint   Patient presents with    Follow-up     Injection follow up          SUBJECTIVE:  Interval History (10/18/2024): Richy Douglas presents today for follow-up visit.  he underwent Bilateral  Genicular Nerve RFA  on 9/18/24.  The patient reports that he is/was better following the procedure.  he reports 80% pain relief with the right knee and minimal relief with the left knee.    The changes have continued through this visit.  Patient reports pain in let knee  as 10/10 today. He currently does not complain of any right knee pain.   Patient requests a refill on compound cream with ketamine as well as gabapentin.     Interval History (08/30/2024):  Richy Douglas presents today for follow-up visit.  The patient c/o bilateral knee pain, worse with the left knee. We reached out to speak with his daughter, Bentley, via telephone call during the visit today. Patient reports pain as 10/10 today. He is interested in repeating nerve burn on both knees.   Patient continues to utilize compound cream and occasionally takes Tylenol and Gabapentin for the pain.  No recent falls.    Interval history 10/19/2023  Patient presents status post left-sided genicular cooled radiofrequency ablation 09/01/2023 and right-sided cooled radiofrequency ablation 09/15/2023.  Patient reports approximately 50-60% sustained relief in bilateral knee pain following his procedure.  He continues to report severe pain, mostly on the left, which today is rated an 8/10.  Patient reports pain is primarily along the suprapatellar and lateral aspect of both knees.  Pain is exacerbated with knee flexion, extension and with standing and with ambulation.  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  He does continue physician directed physical therapy exercises at home, over the last 6  weeks without meaningful improvement in his knee pain, range of motion or functionality.      Interval History (8/16/2023): Richy Douglas presents today for follow-up visit.  he underwent bilateral genicular block on 6/20/23.  The patient reports that he is/was better following the procedure.  he reports 80% pain relief X 2 days before pain returned.  Continues to report achy pain in both knees, left worse than right with intermittent swelling. He has now tried physical therapy, steroid injections, and gel injections without relief. Unable to take NSAIDs secondary to cardiac history.  Patient reports pain as 10/10 today.      Interval History (6/6/2023): Richy Douglas presents today for follow-up visit.  he underwent bilateral synvisc injection  on 5/10/23.  The patient reports that he is/was better following the procedure, but  he reports only 30% pain relief.  The changes lasted 4 weeks so far.  The changes have continued through this visit.  Patient reports pain as 8/10 today. Continues to report achy pain in both knees, left worse than right with intermittent swelling. He has now tried physical therapy, steroid injections, and gel injections without relief. Unable to take NSAIDs secondary to cardiac history.    Interval History (4/24/2023): Richy Douglas presents today for follow-up visit.  he underwent  bilateral IA knee injection  on 01/31/23.  The patient reports that he is/was better following the procedure.  he reports 50% pain relief.  The changes 2 months before pain returned to baseline. Continues to report achy pain in both knees, left worse than right with intermittent swelling.  Patient reports pain as 10/10 today.    Interval History (1/11/2023):  Richy Douglas presents today for follow-up visit.  Patient was last seen on 11/16/2022. At that visit, the plan was to repeat knee injections as needed. Last injection bilateral IA knee injection with steroid on 10/21/22 with 60% relief. Patient reports  pain as 9/10 today. He reports achy pain in both knees, left worse than right with intermittent swelling. He continues to work outside the home and also performs a majority of the household chores.      Interval History (11/16/2022): Richy Douglas presents today for follow-up visit.  he underwent bilateral IA knee injection with steroid on 10/21/22.  The patient reports that he is/was better following the procedure.  he reports 60% pain relief.  The changes lasted 4 weeks so far.  The changes have continued through this visit, though he notes some diminished relief in his left knee. He still works and reports the knee becomes swollen and gives out if he is up for too long.  Patient reports pain as 8/10 today. Since last visit, he has followed up with podiatry for ankle pain and right big toe pain with onychomycosis and big toe nail falling off. He also reports intermittent swelling of his left leg/ankle and intermittent shortness of breath. History of CHF, followed by cardiology, most recent visit 11/02/2022    X-ray lumbar spine 09/28/2022  FINDINGS:  Vertebral body heights maintained.  Trace anterolisthesis of L4 on L5 and retrolisthesis L1 on L2.  No significant change in alignment on extension or flexion..  Multilevel degenerative disc height loss, most severe L2-3.  Multilevel facet arthropathy and osteophyte changes.  No definite pars defects..  Atherosclerotic vascular calcification.  No acute abnormality.     Impression:     Multilevel prominent degenerative findings.    X-ray knee bilateral 09/28/2022  FINDINGS:  Bilateral arthritic changes present including moderate to severe right knee lateral compartment and left knee medial compartment joint space narrowing.  Bilateral chondrocalcinosis noted.  No acute abnormality appreciated.     Initial HPI 09/28/2022  Richy Douglas is a 84 y.o. male with past medical history significant for alcohol dependence, lung emphysema, aortic aneurysm, atrial fibrillation,  systolic congestive heart failure, hypertension, hyperlipidemia, anemia of chronic disease, GERD, nicotine dependence who presents to the clinic for the evaluation of bilateral knee pain.  Patient reports knee pain is in present since April 2022 without inciting accident injury or trauma.  Today patient reports pain is rated a 7/10 and is constant.  Patient reports pain in the popliteal fossa as well as pain which radiates distally to the dorsum of the foot in L4 distribution.  Patient reports associated superficial swelling along the suprapatellar aspect of bilateral knees.  Pain is exacerbated with knee flexion, extension, moving from sitting to standing and with ambulation.  Patient does endorse associated weakness in the lower extremities associated with his pain.  Of note patient reports prior lower back surgery [facility] with improvement in lower back and radicular pain.  Patient has not tried membrane stabilizing agents or interventions at this time.  Patient has tried physician directed physical therapy exercises at home without significant relief.  Patient also reports right big toe pain with onychomycosis and big toe nail falling off.    Patient reports significant motor weakness.  Patient denies night fever/night sweats, urinary incontinence, bowel incontinence, significant weight loss, and loss of sensations.      Pain Disability Index Review:         10/18/2024     8:10 AM 8/30/2024     8:05 AM 10/19/2023    10:54 AM   Last 3 PDI Scores   Pain Disability Index (PDI) 70 20 64       Non-Pharmacologic Treatments:  Physical Therapy/Home Exercise: yes  Ice/Heat:no  TENS: no  Acupuncture: no  Massage: no  Chiropractic: no    Other: no      Pain Medications:  - Adjuvant Medications: Neurontin (Gabapentin)    Pain Procedures:   -10/21/2022: bilateral IA knee injection with steroid with 60% relief  -01/31/2023: bilateral IA knee injection with 50% relief  -05/10/2023: bilateral synvisc injection with  30% relief  -06/20/2023: bilateral genicular block with 80% relief  -09/01/2023: Left-sided cooled genicular radiofrequency ablation  -09/15/2023: Right-sided cooled genicular radiofrequency ablation  -11/07/2023: Bilateral IA knee injections  -09/18/2024: Bilateral Genicular Nerve RFA with 80% relief  Past Medical History:   Diagnosis Date    Alcoholic peripheral neuropathy 03/01/2023    Anemia of chronic disease     Aortic aneurysm     Atrial fibrillation with RVR 09/24/2021    Chronic systolic congestive heart failure 08/31/2017    Depression     Emphysema of lung 08/31/2017    GERD (gastroesophageal reflux disease)     Hypertension     Knee osteoarthritis 10/21/2022    Major neurocognitive disorder due to multiple etiologies 04/14/2023    Microcytic anemia 11/24/2020    Other hyperlipidemia 04/27/2021    Personal history of colonic polyps 2022    Prostate cancer      Past Surgical History:   Procedure Laterality Date    CATHETERIZATION OF BOTH LEFT AND RIGHT HEART N/A 1/13/2022    Procedure: CATHETERIZATION, HEART, BOTH LEFT AND RIGHT;  Surgeon: Janneth Tovar MD;  Location: [facility] CATH LAB;  Service: Cardiology;  Laterality: N/A;  poss cx    COLONOSCOPY      COLONOSCOPY N/A 6/30/2022    Procedure: COLONOSCOPY;  Surgeon: Sandra Perez MD;  Location: [facility] ENDO;  Service: Endoscopy;  Laterality: N/A;    COLONOSCOPY N/A 7/1/2022    Procedure: COLONOSCOPY;  Surgeon: Woodrow Christian MD;  Location: [facility];  Service: Endoscopy;  Laterality: N/A;    CORONARY ANGIOGRAPHY N/A 1/13/2022    Procedure: ANGIOGRAM, CORONARY ARTERY;  Surgeon: Janneth Tovar MD;  Location: [facility] CATH LAB;  Service: Cardiology;  Laterality: N/A;    ESOPHAGOGASTRODUODENOSCOPY N/A 6/30/2022    Procedure: EGD (ESOPHAGOGASTRODUODENOSCOPY);  Surgeon: Sandra Perez MD;  Location: [facility];  Service: Endoscopy;  Laterality: N/A;    INJECTION OF ANESTHETIC AGENT AROUND NERVE Bilateral 6/20/2023    Procedure: Bilateral Genicular  nerve block RN IV Sedation;  Surgeon: Devon Grant MD;  Location: [facility];  Service: Pain Management;  Laterality: Bilateral;    INJECTION OF JOINT Bilateral 10/21/2022    Procedure: Bilateral intraarticular knee injection with local ALLERGIC TO IODINE;  Surgeon: Devon Grant MD;  Location: [facility];  Service: Pain Management;  Laterality: Bilateral;    INJECTION OF JOINT Bilateral 1/31/2023    Procedure: Bilateral IA knee joint injection with steroid RN IV Sedation;  Surgeon: Devon Grant MD;  Location: [facility];  Service: Pain Management;  Laterality: Bilateral;    INJECTION OF JOINT Bilateral 5/10/2023    Procedure: Bilateral IA knee joint injection Synvisc RN IV Sedation;  Surgeon: Devon Grant MD;  Location: [facility];  Service: Pain Management;  Laterality: Bilateral;    INJECTION OF JOINT Bilateral 11/7/2023    Procedure: Bilateral intraarticular knee injection;  Surgeon: Devon Grant MD;  Location: [facility];  Service: Pain Management;  Laterality: Bilateral;    PROSTATE SURGERY      RADIOFREQUENCY THERMOCOAGULATION Left 9/1/2023    Procedure: Left Genicular Nerve RFA;  Surgeon: Devon Grant MD;  Location: [facility];  Service: Pain Management;  Laterality: Left;    RADIOFREQUENCY THERMOCOAGULATION Right 9/15/2023    Procedure: Right Genicular Nerve RFA RN IV Sedation;  Surgeon: Devon Grant MD;  Location: [facility];  Service: Pain Management;  Laterality: Right;    RADIOFREQUENCY THERMOCOAGULATION Bilateral 9/18/2024    Procedure: Bilateral Genicular Nerve RFA;  Surgeon: Devon Grant MD;  Location: [facility];  Service: Pain Management;  Laterality: Bilateral;    SPINE SURGERY       Review of patient's allergies indicates:   Allergen Reactions    Fish containing products     Iodine and iodide containing products     Shellfish containing products        Current Outpatient Medications   Medication Sig    albuterol (PROVENTIL) 2.5 mg /3 mL (0.083 %)  nebulizer solution Take 3 mLs (2.5 mg total) by nebulization every 4 to 6 hours as needed for Wheezing or Shortness of Breath.    albuterol (PROVENTIL/VENTOLIN HFA) 90 mcg/actuation inhaler Inhale 2 puffs into the lungs every 4 (four) hours as needed for Wheezing or Shortness of Breath.    aspirin (ECOTRIN) 81 MG EC tablet Take 1 tablet (81 mg total) by mouth once daily.    diclofenac sodium (VOLTAREN) 1 % Gel Apply 4 grams topically 4 (four) times daily. Bilateral knees    empagliflozin (JARDIANCE) 10 mg tablet Take 1 tablet (10 mg total) by mouth once daily.    ferrous sulfate 325 (65 FE) MG EC tablet Take 1 tablet (325 mg total) by mouth once daily.    fluticasone-umeclidin-vilanter (TRELEGY ELLIPTA) 200-62.5-25 mcg inhaler Inhale 1 puff into the lungs once daily.    furosemide (LASIX) 40 MG tablet Take 1 tablet (40 mg total) by mouth once daily. Do not take if BP is below 110/70    gabapentin (NEURONTIN) 300 MG capsule Take 300 mg by mouth every evening.    memantine (NAMENDA) 5 MG Tab Take 1 tablet (5 mg total) by mouth 2 (two) times daily.    midodrine (PROAMATINE) 5 MG Tab Take 1 tablet (5 mg total) by mouth every meal as needed (give if BP is below 90/70).    sacubitriL-valsartan (ENTRESTO) 24-26 mg per tablet Take 1 tablet by mouth 2 (two) times daily. Do no take if BP is below 110/70     No current facility-administered medications for this visit.       Review of Systems     GENERAL:  No weight loss, malaise or fevers.  HEENT:   No recent changes in vision or hearing  NECK:  Negative for lumps, no difficulty with swallowing.  RESPIRATORY:  Negative for cough, wheezing or shortness of breath, patient denies any recent URI.  CARDIOVASCULAR:  Negative for chest pain or palpitations.  GI:  Negative for abdominal discomfort, blood in stools or black stools or change in bowel habits.  MUSCULOSKELETAL:  See HPI.  SKIN:  Negative for lesions, rash, and itching.  PSYCH:  No mood disorder or recent psychosocial  "stressors.   HEMATOLOGY/LYMPHOLOGY:  Negative for prolonged bleeding, bruising easily or swollen nodes.    NEURO:   No history of syncope, paralysis, seizures or tremors.  All other reviewed and negative other than HPI.    OBJECTIVE:    BP (!) 147/61   Pulse 86   Resp 17   Ht 5' 8" (1.727 m)   Wt 65.5 kg (144 lb 8.2 oz)   BMI 21.97 kg/m²     Physical Exam    GENERAL: Well appearing, in no acute distress, alert and oriented x3.  PSYCH:  Mood and affect appropriate.  SKIN: Skin color, texture, turgor normal, no rashes or lesions.  HEAD/FACE:  Normocephalic, atraumatic. Cranial nerves grossly intact.  PULM: No evidence of respiratory difficulty, symmetric chest rise.  GI:  Soft and non-tender.    EXTREMITIES: Peripheral joint ROM is reduced with pain.  No deformities, edema, or skin discoloration. Good capillary refill.    RIGHT Lower extremity: Hip flexion 5/5, Hip Abduction 5/5, Hip Adduction 5/5, Knee extension 5/5, Knee flexion 5/5, Ankle dorsiflexion5/5, Extensor hallucis longus 5/5, Ankle plantarflexion 5/5  LEFT Lower extremity:  Hip flexion 5/5, Hip Abduction 5/5,Hip Adduction 5/5, Knee extension 5/5, Knee flexion 5/5, Ankle dorsiflexion 5/5, Extensor hallucis longus 5/5, Ankle plantarflexion 5/5  -Normal testing knee (patellar) jerk and ankle (achilles) jerk    NEURO: Bilateral upper and lower extremity coordination and muscle stretch reflexes are physiologic and symmetric. No loss of sensation is noted.  Bilateral Non-pitting edema to lower extremities, left >> right    Knee: Bilateral  - Swelling:None  - TTP:Present over left patella only  - ROM: Preserved  - Pain with extension: Present- left knee only  - Pain with flexion: Present- left knee only  - Crepitus: Present , more pronounced in left knee    No pain with R knee joint.  GAIT:  Antalgic, Ambulates with a cane.    Imaging:   Bilateral Knee X-rays  2/6/2023    Narrative & Impression  EXAMINATION:  XR KNEE 3 VIEW BILATERAL     CLINICAL " HISTORY:  XR KNEE 3 VIEW BILATERALPain in unspecified knee     COMPARISON:  09/28/2022     FINDINGS:  Eight views bilateral knees     No evidence of acute fracture or dislocation.  Mild osteopenia.  Moderate tricompartment degenerative changes with joint space narrowing, sclerosis and marginal osteophytes.  Chondrocalcinosis seen within the right medial meniscus and left lateral meniscus.  There is mild irregularity at the medial compartment of the left knee without definite evidence for osteochondral defect.  Dense vascular calcifications   moderate left-sided joint effusion.     Impression:     Moderate tricompartment degenerative changes greater on the left with moderate left-sided joint effusion.        Electronically signed by:Syed Lozada MD  Date:                                            02/06/2023  Time:                                           14:23         X-ray lumbar spine 09/28/2022  FINDINGS:  Vertebral body heights maintained.  Trace anterolisthesis of L4 on L5 and retrolisthesis L1 on L2.  No significant change in alignment on extension or flexion..  Multilevel degenerative disc height loss, most severe L2-3.  Multilevel facet arthropathy and osteophyte changes.  No definite pars defects..  Atherosclerotic vascular calcification.  No acute abnormality.     Impression:     Multilevel prominent degenerative findings.    X-ray knee bilateral 09/28/2022  FINDINGS:  Bilateral arthritic changes present including moderate to severe right knee lateral compartment and left knee medial compartment joint space narrowing.  Bilateral chondrocalcinosis noted.  No acute abnormality appreciated.        ASSESSMENT: 84 y.o. year old male with knee and leg pain, consistent with     1. Primary osteoarthritis of both knees  gabapentin (NEURONTIN) 300 MG capsule      2. Chronic pain of both knees  gabapentin (NEURONTIN) 300 MG capsule          PLAN:   - Interventions:  None at this time.     -S/p repeat Bilateral  Genicular nerve RFA on 9/18/24 with 80% relief.  - S/p bilateral intra-articular knee injection 11/7/23 with good relief.    - Anticoagulation use: Yes aspirin, secondary prophylaxis.     report:  Reviewed and consistent with medication use as prescribed.    - Medications:  Continue using  prescription compound cream . Compound medication will include flurbiprofen 10%, baclofen 2%, cyclobenzaprine 2%, gabapentin 6%, orphenadrine 5%, tetracaine 2% with ketamine 30% transdermal gel for anti-inflammatory, neuropathic and anesthetic properties.  Patient can apply this medication 2-4 times daily to painful areas. Will provide refill on compound cream.     - - Can take Tylenol (acetaminophen) 1000mg (two tablets of 500mg) 3x per day as needed for pain (to take up to max dose of 3000mg per day).    - Continue Gabapentin 300 mg nightly. Refill provided today.     - Therapy:   -We discussed continuing physician directed at home physical therapy to help manage the patient/s painful condition. The patient was counseled that muscle strengthening will improve the long term prognosis in regards to pain and may also help increase range of motion and mobility.     - Imaging: Reviewed bilateral knee x-ray with patient and answered any questions they had regarding study.      - Follow up visit: return to clinic in 3  months or as needed.    The above plan and management options were discussed at length with patient. Patient is in agreement with the above and verbalized understanding.    - I discussed the goals of interventional chronic pain management with the patient on today's visit. We discussed a multimodal and systematic approach to pain.  This includes diagnostic and therapeutic injections, adjuvant pharmacologic treatment, physical therapy, and at times psychiatry.  I emphasized the importance of regular exercise, core strengthening and stretching, diet and weight loss as a cornerstone of long-term pain management.    -  This condition does not require this patient to take time off of work, and the primary goal of our Pain Management services is to improve the patient's functional capacity.  - Patient Questions: Answered all of the patient's questions regarding diagnoses, therapy, treatment and next steps      Alvaro Sutherland PA-C  Interventional Pain Management  [facility]

## 2024-10-21 RX ORDER — TIZANIDINE 4 MG/1
4 TABLET ORAL EVERY 8 HOURS PRN
Qty: 45 TABLET | Refills: 0 | Status: SHIPPED | OUTPATIENT
Start: 2024-10-21 | End: 2024-11-20

## 2024-12-31 NOTE — TELEPHONE ENCOUNTER
No care due was identified.  [facility] Embedded Care Due Messages. Reference number: [id number].   12/31/2024 8:31:25 AM CST

## 2025-01-09 PROCEDURE — 99284 EMERGENCY DEPT VISIT MOD MDM: CPT | Mod: 25,HCNC

## 2025-01-10 ENCOUNTER — HOSPITAL ENCOUNTER (EMERGENCY)
Facility: HOSPITAL | Age: 85
Discharge: HOME | End: 2025-01-10
Attending: EMERGENCY MEDICINE
Payer: MEDICARE

## 2025-01-10 VITALS
SYSTOLIC BLOOD PRESSURE: 136 MMHG | OXYGEN SATURATION: 95 % | RESPIRATION RATE: 20 BRPM | HEART RATE: 70 BPM | TEMPERATURE: 98 F | WEIGHT: 149 LBS | HEIGHT: 68 IN | DIASTOLIC BLOOD PRESSURE: 62 MMHG | BODY MASS INDEX: 22.58 KG/M2

## 2025-01-10 DIAGNOSIS — M25.521 PAIN IN RIGHT ELBOW: ICD-10-CM

## 2025-01-10 DIAGNOSIS — M25.531 PAIN IN RIGHT WRIST: ICD-10-CM

## 2025-01-10 DIAGNOSIS — M25.561 PAIN IN RIGHT KNEE: ICD-10-CM
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
## 2025-01-10 PROCEDURE — 12002 RPR S/N/AX/GEN/TRNK2.6-7.5CM: CPT | Mod: HCNC

## 2025-01-10 PROCEDURE — 63600175 PHARM REV CODE 636 W HCPCS: Mod: HCNC

## 2025-01-10 PROCEDURE — 25000003 PHARM REV CODE 250: Mod: HCNC

## 2025-01-10 RX ORDER — LIDOCAINE HYDROCHLORIDE 10 MG/ML
10 INJECTION, SOLUTION EPIDURAL; INFILTRATION; INTRACAUDAL; PERINEURAL
Status: COMPLETED | OUTPATIENT
Start: 2025-01-10 | End: 2025-01-10

## 2025-01-10 RX ORDER — MUPIROCIN 20 MG/G
OINTMENT TOPICAL 3 TIMES DAILY
Qty: 22 G | Refills: 1 | Status: SHIPPED | OUTPATIENT
Start: 2025-01-10

## 2025-01-10 RX ADMIN — BACITRACIN ZINC, NEOMYCIN, POLYMYXIN B 1 EACH: 400; 3.5; 5 OINTMENT TOPICAL at 12:01

## 2025-01-10 RX ADMIN — LIDOCAINE HYDROCHLORIDE 100 MG: 10 SOLUTION INTRAVENOUS at 12:01

## 2025-01-10 NOTE — ED PROVIDER NOTES
Encounter Date: 1/9/2025       History     Chief Complaint   Patient presents with    Injury     Pt had fall at home; c/o R knee pain with swelling. Minor abrasion to R wrist/minor lac to R elbow. Pt denies LOC.      84-year-old male with past medical history of dementia, anemia, AFib, CHF, GERD, HTN, osteoarthritis, HLD presenting to the emergency department with complaints of right knee pain, right wrist pain, and right elbow pain after sustaining a fall just prior to arrival.  Patient states that he was sleeping when he was having a bad dream.  Patient reports getting up while he was sleeping and sustaining a fall.  Small laceration noted to right elbow; small abrasion to right medial wrist.  Denies any other injuries.  Denies fever, chills, dizziness, weakness, syncope, chest pain, palpitations, shortness of breath, abdominal pain, nausea, vomiting, and all other symptoms.    The history is provided by the patient and a relative.     Review of patient's allergies indicates:   Allergen Reactions    Fish containing products     Iodine and iodide containing products     Shellfish containing products      Past Medical History:   Diagnosis Date    Alcoholic peripheral neuropathy 03/01/2023    Anemia of chronic disease     Aortic aneurysm     Atrial fibrillation with RVR 09/24/2021    Chronic systolic congestive heart failure 08/31/2017    Depression     Emphysema of lung 08/31/2017    GERD (gastroesophageal reflux disease)     Hypertension     Knee osteoarthritis 10/21/2022    Major neurocognitive disorder due to multiple etiologies 04/14/2023    Microcytic anemia 11/24/2020    Other hyperlipidemia 04/27/2021    Personal history of colonic polyps 2022    Prostate cancer      Past Surgical History:   Procedure Laterality Date    CATHETERIZATION OF BOTH LEFT AND RIGHT HEART N/A 1/13/2022    Procedure: CATHETERIZATION, HEART, BOTH LEFT AND RIGHT;  Surgeon: Janneth Tovar MD;  Location: [facility] CATH LAB;  Service:  Cardiology;  Laterality: N/A;  poss cx    COLONOSCOPY      COLONOSCOPY N/A 6/30/2022    Procedure: COLONOSCOPY;  Surgeon: Sandra Perez MD;  Location: [facility] ENDO;  Service: Endoscopy;  Laterality: N/A;    COLONOSCOPY N/A 7/1/2022    Procedure: COLONOSCOPY;  Surgeon: Woodrow Christian MD;  Location: [facility] ENDO;  Service: Endoscopy;  Laterality: N/A;    CORONARY ANGIOGRAPHY N/A 1/13/2022    Procedure: ANGIOGRAM, CORONARY ARTERY;  Surgeon: Janneth Tovar MD;  Location: [facility] CATH LAB;  Service: Cardiology;  Laterality: N/A;    ESOPHAGOGASTRODUODENOSCOPY N/A 6/30/2022    Procedure: EGD (ESOPHAGOGASTRODUODENOSCOPY);  Surgeon: Sandra Perez MD;  Location: [facility] ENDO;  Service: Endoscopy;  Laterality: N/A;    INJECTION OF ANESTHETIC AGENT AROUND NERVE Bilateral 6/20/2023    Procedure: Bilateral Genicular nerve block RN IV Sedation;  Surgeon: Devon Grant MD;  Location: [facility] PAIN MGT;  Service: Pain Management;  Laterality: Bilateral;    INJECTION OF JOINT Bilateral 10/21/2022    Procedure: Bilateral intraarticular knee injection with local ALLERGIC TO IODINE;  Surgeon: Devon Grant MD;  Location: [facility] PAIN MGT;  Service: Pain Management;  Laterality: Bilateral;    INJECTION OF JOINT Bilateral 1/31/2023    Procedure: Bilateral IA knee joint injection with steroid RN IV Sedation;  Surgeon: Devon Grant MD;  [facility];  Service: Pain Management;  Laterality: Bilateral;    INJECTION OF JOINT Bilateral 5/10/2023    Procedure: Bilateral IA knee joint injection Synvisc RN IV Sedation;  Surgeon: Devon Grant MD;  [facility];  Service: Pain Management;  Laterality: Bilateral;    INJECTION OF JOINT Bilateral 11/7/2023    Procedure: Bilateral intraarticular knee injection;  Surgeon: Devon Grant MD;  [facility];  Service: Pain Management;  Laterality: Bilateral;    PROSTATE SURGERY      RADIOFREQUENCY THERMOCOAGULATION Left 9/1/2023    Procedure: Left Genicular Nerve  RFA;  Surgeon: Devon Grant MD;  Location: [facility] PAIN T;  Service: Pain Management;  Laterality: Left;    RADIOFREQUENCY THERMOCOAGULATION Right 9/15/2023    Procedure: Right Genicular Nerve RFA RN IV Sedation;  Surgeon: Devon Grant MD;  Location: [facility] PAIN MGT;  Service: Pain Management;  Laterality: Right;    RADIOFREQUENCY THERMOCOAGULATION Bilateral 9/18/2024    Procedure: Bilateral Genicular Nerve RFA;  Surgeon: Devon Grant MD;  Location: [facility] PAIN T;  Service: Pain Management;  Laterality: Bilateral;    SPINE SURGERY       Family History   Problem Relation Name Age of Onset    Diabetes Mother      Peripheral vascular disease Mother       Social History     Tobacco Use    Smoking status: Some Days     Current packs/day: 0.50     Average packs/day: 1 pack/day for 70.9 years (70.0 ttl pk-yrs)     Types: Cigarettes     Start date: 1/1/1954     Last attempt to quit: 3/1/2023     Passive exposure: Past    Smokeless tobacco: Never   Substance Use Topics    Alcohol use: Yes     Comment: reports only drinks red wine when games are on    Drug use: Never     Review of Systems   Constitutional:  Negative for fever.   HENT:  Negative for sore throat.    Respiratory:  Negative for cough and shortness of breath.    Cardiovascular:  Negative for chest pain and palpitations.   Gastrointestinal:  Negative for abdominal pain and nausea.   Genitourinary:  Negative for dysuria.   Musculoskeletal:  Positive for arthralgias (Right elbow, knee, wrist) and joint swelling (Right knee). Negative for back pain, myalgias, neck pain and neck stiffness.   Skin:  Positive for wound (Right elbow and wrist). Negative for rash.   Neurological:  Negative for dizziness, syncope, weakness, light-headedness and headaches.   Hematological:  Does not bruise/bleed easily.   All other systems reviewed and are negative.      Physical Exam     Initial Vitals [01/09/25 2253]   BP Pulse Resp Temp SpO2   132/62 87 20 97.8 °F (36.6 °C) 99 %       MAP       --         Physical Exam    Constitutional: He appears well-developed and well-nourished. No distress.   HENT:   Head: Normocephalic and atraumatic.   Right Ear: External ear normal.   Left Ear: External ear normal.   Nose: Nose normal. Mouth/Throat: Oropharynx is clear and moist.   Eyes: Conjunctivae and EOM are normal. Pupils are equal, round, and reactive to light.   Neck: Neck supple.   Normal range of motion.  Cardiovascular:  Normal rate, regular rhythm, normal heart sounds and intact distal pulses.           Pulmonary/Chest: Effort normal and breath sounds normal. No respiratory distress. He has no wheezes. He has no rhonchi. He has no rales.   Abdominal: Abdomen is soft. Bowel sounds are normal.   Musculoskeletal:         General: Normal range of motion.      Cervical back: Normal range of motion and neck supple.      Comments: Right upper extremity:  3 cm laceration noted to the extensor surface of the elbow.  Small abrasion noted to the medial wrist.  No deformity noted.  No edema, ecchymosis, erythema.  No signs of joint laxity.  Tenderness to palpation over the wrist and elbow; otherwise nontender to palpation.  Full range of motion throughout without pain.  Neurovascularly intact.  Compartments are soft.    Right lower extremity:  Edema noted to lateral knee.  No open wounds, erythema, ecchymosis, or signs of cellulitis.  No obvious deformity.  No signs of joint laxity.  Tender to palpation over the lateral joint line; otherwise nontender to palpation.  Full range of motion throughout without pain.  Neurovascularly intact.  Compartments are soft.     Neurological: He is alert and oriented to person, place, and time. No cranial nerve deficit. GCS score is 15. GCS eye subscore is 4. GCS verbal subscore is 5. GCS motor subscore is 6.   Skin: Skin is warm and dry.   Psychiatric: He has a normal mood and affect.         ED Course   Lac Repair    Date/Time: 1/10/2025 4:24 AM    Performed by: Andrade  Sarah ESPAÑA PA-C  Authorized by: Chinmay Buchanan Jr., MD    Consent:     Consent obtained:  Verbal    Consent given by:  Patient (Son)    Risks, benefits, and alternatives were discussed: yes      Risks discussed:  Infection and pain  Universal protocol:     Patient identity confirmed:  Verbally with patient  Anesthesia:     Anesthesia method:  Local infiltration    Local anesthetic:  Lidocaine 1% w/o epi  Laceration details:     Location:  Shoulder/arm    Shoulder/arm location:  R elbow    Length (cm):  3  Pre-procedure details:     Preparation:  Patient was prepped and draped in usual sterile fashion  Exploration:     Limited defect created (wound extended): yes      Imaging outcome: foreign body not noted    Treatment:     Area cleansed with:  Saline    Amount of cleaning:  Standard    Irrigation solution:  Sterile saline    Irrigation volume:  150 ml    Irrigation method:  Syringe  Skin repair:     Repair method:  Sutures    Suture size:  3-0    Suture material:  Prolene    Suture technique:  Simple interrupted    Number of sutures:  3  Approximation:     Approximation:  Close  Repair type:     Repair type:  Simple  Post-procedure details:     Dressing:  Antibiotic ointment, non-adherent dressing and sterile dressing    Procedure completion:  Tolerated well, no immediate complications  Orthopedic Injury    Date/Time: 1/10/2025 6:04 PM    Performed by: Sarah Zafar PA-C  Authorized by: Chinmay Buchanan Jr., MD    Location procedure was performed:  [facility] EMERGENCY DEPARTMENT  Assisting Provider:  Chinmay Buchanan Jr., MD  Injury:     Injury location:  Knee    Location details:  Right knee    Injury type:  Soft tissue      Pre-procedure assessment:     Neurovascular status: Neurovascularly intact      Distal perfusion: normal      Neurological function: normal      Range of motion: normal        Selections made in this section will also lock the Injury type section above.:     Supplies used:  Elastic  bandage    Complications: No    Post-procedure assessment:     Neurovascular status: Neurovascularly intact      Distal perfusion: normal      Neurological function: normal      Range of motion: normal      Patient tolerance:  Patient tolerated the procedure well with no immediate complications    Labs Reviewed - No data to display       Imaging Results              X-Ray Knee 3 View Right (Final result)  Result time 01/10/25 01:51:54      Final result by Kevin Junior MD (01/10/25 01:51:54)                   Impression:      As above.      Electronically signed by: Kevin Junior MD  Date:    01/10/2025  Time:    01:51               Narrative:    EXAMINATION:  XR KNEE 3 VIEW RIGHT    CLINICAL HISTORY:  Pain in right knee    TECHNIQUE:  AP, lateral, and Merchant views of the right knee were performed.    COMPARISON:  02/06/2023    FINDINGS:  No convincing radiographic evidence of acute displaced fracture or dislocation of the right knee.  There is moderate prepatellar soft tissue edema which reflect posttraumatic hematoma or prepatellar bursitis.  No grossly displaced/distracted patellar fracture appreciated, although if there is strong clinical concern, CT could be performed for more sensitive assessment.  There is tricompartmental osteoarthrosis with joint space narrowing and chondrocalcinosis.  No large suprapatellar joint effusion appreciated.  Prominent vascular calcifications are noted.                                       X-Ray Elbow Complete Right (Final result)  Result time 01/10/25 01:49:38      Final result by Kevin Junior MD (01/10/25 01:49:38)                   Impression:      No convincing radiographic evidence of acute displaced fracture or dislocation of the right elbow.  Further evaluation and follow-up as warranted.      Electronically signed by: Kevin Junior MD  Date:    01/10/2025  Time:    01:49               Narrative:    EXAMINATION:  XR ELBOW COMPLETE 3 VIEW  RIGHT    CLINICAL HISTORY:  . Pain in right elbow    TECHNIQUE:  AP, lateral, and oblique views of the right elbow were performed.    COMPARISON:  None    FINDINGS:  No definite radiographic evidence of acute displaced fracture of the right elbow.  There is degenerative osteoarthrosis present.  No evidence of dislocation.  No large joint effusion appreciated radiographically allowing for suboptimal patient positioning.  There is enthesopathic change at the distal triceps insertion.                                       X-Ray Wrist Complete Right (Final result)  Result time 01/10/25 01:48:12      Final result by Kevin Junior MD (01/10/25 01:48:12)                   Impression:      No radiographic evidence of acute displaced fracture or dislocation of the right wrist.  Degenerative changes as above.      Electronically signed by: Kevin Junior MD  Date:    01/10/2025  Time:    01:48               Narrative:    EXAMINATION:  XR WRIST COMPLETE 3 VIEWS RIGHT    CLINICAL HISTORY:  Pain in right wrist    TECHNIQUE:  PA, lateral, and oblique views of the right wrist were performed.    COMPARISON:  None    FINDINGS:  No radiographic evidence of acute displaced fracture or dislocation of the right wrist.  There is degenerative osteoarthrosis with chondrocalcinosis.  Degenerative changes noted of the thumb MCP joint.                                       Medications   LIDOcaine (PF) 10 mg/ml (1%) injection 100 mg (100 mg Infiltration Given by Provider 1/10/25 0030)   neomycin-bacitracnZn-polymyxnB packet 1 each (1 each Topical (Top) Given by Provider 1/10/25 0030)     Medical Decision Making  Amount and/or Complexity of Data Reviewed  Radiology: ordered.     Details: X-ray right knee:No convincing radiographic evidence of acute displaced fracture or dislocation of the right knee.  There is moderate prepatellar soft tissue edema which reflect posttraumatic hematoma or prepatellar bursitis.  No grossly  displaced/distracted patellar fracture appreciated, although if there is strong clinical concern, CT could be performed for more sensitive assessment.  There is tricompartmental osteoarthrosis with joint space narrowing and chondrocalcinosis.  No large suprapatellar joint effusion appreciated.  Prominent vascular calcifications are noted.    X-ray right wrist:  No radiographic evidence of acute displaced fracture or dislocation of the right wrist.  Degenerative changes.    X-ray right elbow:  No convincing radiographic evidence of acute displaced fracture or dislocation of the right elbow.    Risk  OTC drugs.  Prescription drug management.  Risk Details: Regarding LACERATION CARE, patient was instructed to wash hands with soap and warm water before and after caring for wound; keep the wound dry for the first 24 to 48 hours and then gently clean the wound once or twice a day with cool water using soap to clean around the wound; avoid using alcohol or hydrogen peroxide to clean wound unless directed to; and use bandages to keep wound clean and protected and to prevent swelling.  Advised patient to contact primary healthcare provider if wound splits open; becomes extremely painful; appears to not be healing; has a foreign object present; develop a purulent discharge; or note the skin around the wound becoming numb, edematous, or erythematous.  Patient instructed to follow up with primary care provider for wound re-check or closure removal in 8-10 days.                                       Clinical Impression:  Final diagnoses:  [M25.531] Pain in right wrist  [M25.521] Pain in right elbow  [M25.561] Pain in right knee  [W19.XXXA] Fall, initial encounter (Primary)          ED Disposition Condition    Discharge Stable          ED Prescriptions       Medication Sig Dispense Start Date End Date Auth. Provider    mupirocin (BACTROBAN) 2 % ointment Apply topically 3 (three) times daily. 15 g 1/10/2025 -- Sarah Zafar,  ZAIRE          Follow-up Information       Follow up With Specialties Details Why Contact Info    [facility] - Emergency Dept. Emergency Medicine  If symptoms worsen [id number] [facility] [id number] [phone]             Sarah Zafar PA-C  01/10/25 [id number]       Sarah Zafar PA-C  01/10/25 4020

## 2025-01-13 ENCOUNTER — OFFICE VISIT (OUTPATIENT)
Dept: CARDIOLOGY | Facility: CLINIC | Age: 85
End: 2025-01-13
Payer: MEDICARE

## 2025-01-13 ENCOUNTER — HOSPITAL ENCOUNTER (OUTPATIENT)
Dept: RADIOLOGY | Facility: HOSPITAL | Age: 85
Discharge: HOME | End: 2025-01-13
Attending: INTERNAL MEDICINE
Payer: MEDICARE

## 2025-01-13 VITALS
SYSTOLIC BLOOD PRESSURE: 120 MMHG | HEIGHT: 68 IN | HEART RATE: 80 BPM | WEIGHT: 143.75 LBS | DIASTOLIC BLOOD PRESSURE: 54 MMHG | BODY MASS INDEX: 21.78 KG/M2

## 2025-01-13 DIAGNOSIS — I50.23 ACUTE ON CHRONIC SYSTOLIC CONGESTIVE HEART FAILURE: Primary | ICD-10-CM

## 2025-01-13 DIAGNOSIS — I48.0 PAROXYSMAL ATRIAL FIBRILLATION: ICD-10-CM

## 2025-01-13 DIAGNOSIS — I50.22 CHRONIC SYSTOLIC CONGESTIVE HEART FAILURE: ICD-10-CM

## 2025-01-13 DIAGNOSIS — R91.1 SOLITARY PULMONARY NODULE: ICD-10-CM

## 2025-01-13 PROCEDURE — 71250 CT THORAX DX C-: CPT | Mod: 26,HCNC,, | Performed by: RADIOLOGY

## 2025-01-13 PROCEDURE — 99999 PR PBB SHADOW E&M-EST. PATIENT-LVL III: CPT | Mod: PBBFAC,HCNC,, | Performed by: PHYSICIAN ASSISTANT

## 2025-01-13 PROCEDURE — 99214 OFFICE O/P EST MOD 30 MIN: CPT | Mod: HCNC,S$GLB,, | Performed by: PHYSICIAN ASSISTANT

## 2025-01-13 PROCEDURE — 71250 CT THORAX DX C-: CPT | Mod: TC,HCNC

## 2025-01-13 PROCEDURE — 1157F ADVNC CARE PLAN IN RCRD: CPT | Mod: HCNC,CPTII,S$GLB, | Performed by: PHYSICIAN ASSISTANT

## 2025-01-13 PROCEDURE — 3074F SYST BP LT 130 MM HG: CPT | Mod: HCNC,CPTII,S$GLB, | Performed by: PHYSICIAN ASSISTANT

## 2025-01-13 PROCEDURE — 1159F MED LIST DOCD IN RCRD: CPT | Mod: HCNC,CPTII,S$GLB, | Performed by: PHYSICIAN ASSISTANT

## 2025-01-13 PROCEDURE — 3078F DIAST BP <80 MM HG: CPT | Mod: HCNC,CPTII,S$GLB, | Performed by: PHYSICIAN ASSISTANT

## 2025-01-13 PROCEDURE — 1126F AMNT PAIN NOTED NONE PRSNT: CPT | Mod: HCNC,CPTII,S$GLB, | Performed by: PHYSICIAN ASSISTANT

## 2025-01-13 PROCEDURE — 1160F RVW MEDS BY RX/DR IN RCRD: CPT | Mod: HCNC,CPTII,S$GLB, | Performed by: PHYSICIAN ASSISTANT

## 2025-01-13 NOTE — PROGRESS NOTES
[facility] Provider Note (Follow-up) Consult Note      HPI:  82 y/o male with PMHx of chronic HFrEF (EF 30%), atrial fibrillation (not on AC due to bleeding risk), HTN, PVD, emphysema, GERD, prostate cancer who presents for 3 month follow up      Is now off BB as this was causing some hypotension issues     Remains on SGLT2i and ARNi for GDMT     Also taking lasix still 40 mg daily, responds well     [facility]     HAd a fall last month after waking from bad dream, fell.      No PND    CT scan today     PHYSICAL:   Vitals:    01/13/25 0935   BP: (!) 120/54   Pulse: 80          JVD: no,    Heart rhythm: regular  Cardiac murmur: No    S3: no  S4: no  Lungs: clear  Liver span: 10 cm:   Hepatojugular reflux: no  Edema: no,       ASSESSMENT: chronic systolic HF    PLAN:      Patient Instructions:   Instruct the patient to notify this clinic if HH, a physician or an advanced care provider wants to change medication one of their HF medications   Activity and Diet restrictions:   Recommend 2-3 gram sodium restriction and 1500cc- 2000cc fluid restriction.  Encourage physical activity with graded exercise program.  Requested patient to weigh themselves daily, and to notify us if their weight increases by more than 3 lbs in 1 day or 5 lbs in 3 days.    Assigned dry weight on home scale: 65 kg  Medication changes (include current dose and changed dose)  Jardiance paperwork  ARNi low dose BID  Lasix daily  CHF education done  RTC 6 months

## 2025-01-27 ENCOUNTER — TELEPHONE (OUTPATIENT)
Dept: PAIN MEDICINE | Facility: CLINIC | Age: 85
End: 2025-01-27
Payer: MEDICARE

## 2025-01-27 RX ORDER — FUROSEMIDE 40 MG/1
40 TABLET ORAL DAILY
Qty: 90 TABLET | Refills: 1 | Status: SHIPPED | OUTPATIENT
Start: 2025-01-27 | End: 2025-07-26

## 2025-01-27 NOTE — PROGRESS NOTES
"Established Patient Chronic Pain Note (Follow up Visit)  PCP: Vivian Ch MD    Chief Complaint:   Chief Complaint   Patient presents with    Follow-up          SUBJECTIVE:    Interval History (1/28/2025):  Patient's daughter was called during this visit to discuss current issues.    Richy Douglas presents today for follow-up visit.  Patient was last seen on 10/18/2024.  The patient reports a fall while sleeping earlier this month. "I was fighting in my sleep and tore up my night stand. " Patient reports pain as 8/10 today. He currently has knee pain, worse with the right knee. The swelling in the right knee has improved since his fall. He still has soft tissue swelling present at right elbow but denies elbow pain.  Patient continues to utilize compound cream, voltaren gel, tylenol and gabapentin.    Interval History (10/18/24): Richy Douglas presents today for follow-up visit.  he underwent Bilateral  Genicular Nerve RFA  on 9/18/2024.  The patient reports that he is/was better following the procedure.  he reports 80% pain relief with the right knee and minimal relief with the left knee.    The changes have continued through this visit.  Patient reports pain in let knee  as 10/10 today. He currently does not complain of any right knee pain.   Patient requests a refill on compound cream with ketamine as well as gabapentin.     Interval History (08/30/2024):  Richy Douglas presents today for follow-up visit.  The patient c/o bilateral knee pain, worse with the left knee. We reached out to speak with his daughter, Bentley, via telephone call during the visit today. Patient reports pain as 10/10 today. He is interested in repeating nerve burn on both knees.   Patient continues to utilize compound cream and occasionally takes Tylenol and Gabapentin for the pain.  No recent falls.    Interval history 10/19/2023  Patient presents status post left-sided genicular cooled radiofrequency ablation 09/01/2023 and right-sided " cooled radiofrequency ablation 09/15/2023.  Patient reports approximately 50-60% sustained relief in bilateral knee pain following his procedure.  He continues to report severe pain, mostly on the left, which today is rated an 8/10.  Patient reports pain is primarily along the suprapatellar and lateral aspect of both knees.  Pain is exacerbated with knee flexion, extension and with standing and with ambulation.  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  He does continue physician directed physical therapy exercises at home, over the last 6 weeks without meaningful improvement in his knee pain, range of motion or functionality.      Interval History (8/16/2023): Richy Douglas presents today for follow-up visit.  he underwent bilateral genicular block on 6/20/23.  The patient reports that he is/was better following the procedure.  he reports 80% pain relief X 2 days before pain returned.  Continues to report achy pain in both knees, left worse than right with intermittent swelling. He has now tried physical therapy, steroid injections, and gel injections without relief. Unable to take NSAIDs secondary to cardiac history.  Patient reports pain as 10/10 today.      Interval History (6/6/2023): Richy Douglas presents today for follow-up visit.  he underwent bilateral synvisc injection  on 5/10/23.  The patient reports that he is/was better following the procedure, but  he reports only 30% pain relief.  The changes lasted 4 weeks so far.  The changes have continued through this visit.  Patient reports pain as 8/10 today. Continues to report achy pain in both knees, left worse than right with intermittent swelling. He has now tried physical therapy, steroid injections, and gel injections without relief. Unable to take NSAIDs secondary to cardiac history.    Interval History (4/24/2023): Richy Douglas presents today for follow-up visit.  he underwent  bilateral  IA knee injection  on 01/31/23.  The patient reports that he is/was better following the procedure.  he reports 50% pain relief.  The changes 2 months before pain returned to baseline. Continues to report achy pain in both knees, left worse than right with intermittent swelling.  Patient reports pain as 10/10 today.    Interval History (1/11/2023):  Richy Douglas presents today for follow-up visit.  Patient was last seen on 11/16/2022. At that visit, the plan was to repeat knee injections as needed. Last injection bilateral IA knee injection with steroid on 10/21/22 with 60% relief. Patient reports pain as 9/10 today. He reports achy pain in both knees, left worse than right with intermittent swelling. He continues to work outside the home and also performs a majority of the household chores.      Interval History (11/16/2022): Richy Douglas presents today for follow-up visit.  he underwent bilateral IA knee injection with steroid on 10/21/22.  The patient reports that he is/was better following the procedure.  he reports 60% pain relief.  The changes lasted 4 weeks so far.  The changes have continued through this visit, though he notes some diminished relief in his left knee. He still works and reports the knee becomes swollen and gives out if he is up for too long.  Patient reports pain as 8/10 today. Since last visit, he has followed up with podiatry for ankle pain and right big toe pain with onychomycosis and big toe nail falling off. He also reports intermittent swelling of his left leg/ankle and intermittent shortness of breath. History of CHF, followed by cardiology, most recent visit 11/02/2022    X-ray lumbar spine 09/28/2022  FINDINGS:  Vertebral body heights maintained.  Trace anterolisthesis of L4 on L5 and retrolisthesis L1 on L2.  No significant change in alignment on extension or flexion..  Multilevel degenerative disc height loss, most severe L2-3.  Multilevel facet arthropathy and osteophyte  changes.  No definite pars defects..  Atherosclerotic vascular calcification.  No acute abnormality.     Impression:     Multilevel prominent degenerative findings.    X-ray knee bilateral 09/28/2022  FINDINGS:  Bilateral arthritic changes present including moderate to severe right knee lateral compartment and left knee medial compartment joint space narrowing.  Bilateral chondrocalcinosis noted.  No acute abnormality appreciated.     Initial HPI 09/28/2022  Richy Douglas is a 84 y.o. male with past medical history significant for alcohol dependence, lung emphysema, aortic aneurysm, atrial fibrillation, systolic congestive heart failure, hypertension, hyperlipidemia, anemia of chronic disease, GERD, nicotine dependence who presents to the clinic for the evaluation of bilateral knee pain.  Patient reports knee pain is in present since April 2022 without inciting accident injury or trauma.  Today patient reports pain is rated a 7/10 and is constant.  Patient reports pain in the popliteal fossa as well as pain which radiates distally to the dorsum of the foot in L4 distribution.  Patient reports associated superficial swelling along the suprapatellar aspect of bilateral knees.  Pain is exacerbated with knee flexion, extension, moving from sitting to standing and with ambulation.  Patient does endorse associated weakness in the lower extremities associated with his pain.  Of note patient reports prior lower back surgery [facility] with improvement in lower back and radicular pain.  Patient has not tried membrane stabilizing agents or interventions at this time.  Patient has tried physician directed physical therapy exercises at home without significant relief.  Patient also reports right big toe pain with onychomycosis and big toe nail falling off.    Patient reports significant motor weakness.  Patient denies night fever/night sweats, urinary incontinence, bowel incontinence, significant weight loss, and loss  of sensations.      Pain Disability Index Review:         1/28/2025     7:48 AM 10/18/2024     8:10 AM 8/30/2024     8:05 AM   Last 3 PDI Scores   Pain Disability Index (PDI) 56 70 20       Non-Pharmacologic Treatments:  Physical Therapy/Home Exercise: yes  Ice/Heat:no  TENS: no  Acupuncture: no  Massage: no  Chiropractic: no    Other: no      Pain Medications:  - Adjuvant Medications: Neurontin (Gabapentin)    Pain Procedures:   -10/21/2022: bilateral IA knee injection with steroid with 60% relief  -01/31/2023: bilateral IA knee injection with 50% relief  -05/10/2023: bilateral synvisc injection with 30% relief  -06/20/2023: bilateral genicular block with 80% relief  -09/01/2023: Left-sided cooled genicular radiofrequency ablation  -09/15/2023: Right-sided cooled genicular radiofrequency ablation  -11/07/2023: Bilateral IA knee injections  -09/18/2024: Bilateral Genicular Nerve RFA with 80% relief  Past Medical History:   Diagnosis Date    Alcoholic peripheral neuropathy 03/01/2023    Anemia of chronic disease     Aortic aneurysm     Atrial fibrillation with RVR 09/24/2021    Chronic systolic congestive heart failure 08/31/2017    Depression     Emphysema of lung 08/31/2017    GERD (gastroesophageal reflux disease)     Hypertension     Knee osteoarthritis 10/21/2022    Major neurocognitive disorder due to multiple etiologies 04/14/2023    Microcytic anemia 11/24/2020    Other hyperlipidemia 04/27/2021    Personal history of colonic polyps 2022    Prostate cancer      Past Surgical History:   Procedure Laterality Date    CATHETERIZATION OF BOTH LEFT AND RIGHT HEART N/A 1/13/2022    Procedure: CATHETERIZATION, HEART, BOTH LEFT AND RIGHT;  Surgeon: Janneth Tovar MD;  Location: [facility] CATH LAB;  Service: Cardiology;  Laterality: N/A;  poss cx    COLONOSCOPY      COLONOSCOPY N/A 6/30/2022    Procedure: COLONOSCOPY;  Surgeon: Sandra Perez MD;  Location: [facility] ENDO;  Service: Endoscopy;  Laterality: N/A;     COLONOSCOPY N/A 7/1/2022    Procedure: COLONOSCOPY;  Surgeon: Woodrow Christian MD;  Location: [facility] ENDO;  Service: Endoscopy;  Laterality: N/A;    CORONARY ANGIOGRAPHY N/A 1/13/2022    Procedure: ANGIOGRAM, CORONARY ARTERY;  Surgeon: Janneth Tovar MD;  Location: [facility] CATH LAB;  Service: Cardiology;  Laterality: N/A;    ESOPHAGOGASTRODUODENOSCOPY N/A 6/30/2022    Procedure: EGD (ESOPHAGOGASTRODUODENOSCOPY);  Surgeon: Sandra Perez MD;  Location: [facility] ENDO;  Service: Endoscopy;  Laterality: N/A;    INJECTION OF ANESTHETIC AGENT AROUND NERVE Bilateral 6/20/2023    Procedure: Bilateral Genicular nerve block RN IV Sedation;  Surgeon: Devon Grant MD;  Location: [facility];  Service: Pain Management;  Laterality: Bilateral;    INJECTION OF JOINT Bilateral 10/21/2022    Procedure: Bilateral intraarticular knee injection with local ALLERGIC TO IODINE;  Surgeon: Devon Grant MD;  Location: [facility];  Service: Pain Management;  Laterality: Bilateral;    INJECTION OF JOINT Bilateral 1/31/2023    Procedure: Bilateral IA knee joint injection with steroid RN IV Sedation;  Surgeon: Devon Grant MD;  Location: [facility];  Service: Pain Management;  Laterality: Bilateral;    INJECTION OF JOINT Bilateral 5/10/2023    Procedure: Bilateral IA knee joint injection Synvisc RN IV Sedation;  Surgeon: Devon Grant MD;  Location: [facility];  Service: Pain Management;  Laterality: Bilateral;    INJECTION OF JOINT Bilateral 11/7/2023    Procedure: Bilateral intraarticular knee injection;  Surgeon: Devon Grant MD;  Location: [facility];  Service: Pain Management;  Laterality: Bilateral;    PROSTATE SURGERY      RADIOFREQUENCY THERMOCOAGULATION Left 9/1/2023    Procedure: Left Genicular Nerve RFA;  Surgeon: Devon Grant MD;  Location: [facility];  Service: Pain Management;  Laterality: Left;    RADIOFREQUENCY THERMOCOAGULATION Right 9/15/2023    Procedure: Right Genicular Nerve RFA RN IV  Sedation;  Surgeon: Devon Grant MD;  Location: [facility] PAIN MGT;  Service: Pain Management;  Laterality: Right;    RADIOFREQUENCY THERMOCOAGULATION Bilateral 9/18/2024    Procedure: Bilateral Genicular Nerve RFA;  Surgeon: Devon Grant MD;  Location: [facility] PAIN MGT;  Service: Pain Management;  Laterality: Bilateral;    SPINE SURGERY       Review of patient's allergies indicates:   Allergen Reactions    Fish containing products     Iodine and iodide containing products     Shellfish containing products        Current Outpatient Medications   Medication Sig    albuterol (PROVENTIL) 2.5 mg /3 mL (0.083 %) nebulizer solution Take 3 mLs (2.5 mg total) by nebulization every 4 to 6 hours as needed for Wheezing or Shortness of Breath.    albuterol (PROVENTIL/VENTOLIN HFA) 90 mcg/actuation inhaler Inhale 2 puffs into the lungs every 4 (four) hours as needed for Wheezing or Shortness of Breath.    aspirin (ECOTRIN) 81 MG EC tablet Take 1 tablet (81 mg total) by mouth once daily.    diclofenac sodium (VOLTAREN) 1 % Gel Apply 4 grams topically 4 (four) times daily. Bilateral knees    empagliflozin (JARDIANCE) 10 mg tablet Take 1 tablet (10 mg total) by mouth once daily.    ferrous sulfate 325 (65 FE) MG EC tablet Take 1 tablet (325 mg total) by mouth once daily.    fluticasone-umeclidin-vilanter (TRELEGY ELLIPTA) 200-62.5-25 mcg inhaler Inhale 1 puff into the lungs once daily.    furosemide (LASIX) 40 MG tablet Take 1 tablet (40 mg total) by mouth once daily. Do not take if BP is below 110/70    gabapentin (NEURONTIN) 300 MG capsule Take 1 capsule (300 mg total) by mouth every evening.    memantine (NAMENDA) 5 MG Tab Take 1 tablet (5 mg total) by mouth 2 (two) times daily.    midodrine (PROAMATINE) 5 MG Tab Take 1 tablet (5 mg total) by mouth every meal as needed (give if BP is below 90/70).    mupirocin (BACTROBAN) 2 % ointment Apply topically 3 (three) times daily.    sacubitriL-valsartan (ENTRESTO) 24-26 mg per tablet  "Take 1 tablet by mouth 2 (two) times daily. Do no take if BP is below 110/70     No current facility-administered medications for this visit.       Review of Systems     GENERAL:  No weight loss, malaise or fevers.  HEENT:   No recent changes in vision or hearing  NECK:  Negative for lumps, no difficulty with swallowing.  RESPIRATORY:  Negative for cough, wheezing or shortness of breath, patient denies any recent URI.  CARDIOVASCULAR:  Negative for chest pain or palpitations.  GI:  Negative for abdominal discomfort, blood in stools or black stools or change in bowel habits.  MUSCULOSKELETAL:  See HPI.  SKIN:  Negative for lesions, rash, and itching.  PSYCH:  No mood disorder or recent psychosocial stressors.   HEMATOLOGY/LYMPHOLOGY:  Negative for prolonged bleeding, bruising easily or swollen nodes.    NEURO:   No history of syncope, paralysis, seizures or tremors.  All other reviewed and negative other than HPI.    OBJECTIVE:    BP (!) 143/66   Pulse 97   Resp 17   Ht 5' 8" (1.727 m)   Wt 66.2 kg (145 lb 15.1 oz)   BMI 22.19 kg/m²     Physical Exam    GENERAL: Well appearing, in no acute distress, alert and oriented x3.  PSYCH:  Mood and affect appropriate.  SKIN: Skin color, texture, turgor normal, no rashes or lesions.  HEAD/FACE:  Normocephalic, atraumatic. Cranial nerves grossly intact.  PULM: No evidence of respiratory difficulty, symmetric chest rise.  GI:  Soft and non-tender.    EXTREMITIES: Peripheral joint ROM is reduced with pain.  No deformities, edema, or skin discoloration. Good capillary refill.    RIGHT Lower extremity: Hip flexion 5/5, Hip Abduction 5/5, Hip Adduction 5/5, Knee extension 5/5, Knee flexion 5/5, Ankle dorsiflexion5/5, Extensor hallucis longus 5/5, Ankle plantarflexion 5/5  LEFT Lower extremity:  Hip flexion 5/5, Hip Abduction 5/5,Hip Adduction 5/5, Knee extension 5/5, Knee flexion 5/5, Ankle dorsiflexion 5/5, Extensor hallucis longus 5/5, Ankle plantarflexion 5/5  -Normal " testing knee (patellar) jerk and ankle (achilles) jerk    NEURO: Bilateral upper and lower extremity coordination and muscle stretch reflexes are physiologic and symmetric. No loss of sensation is noted.  Bilateral Non-pitting edema to lower extremities, left >> right    Knee: Bilateral  - Swelling:mild, right knee  - TTP: MJL, LJL Right > left knee  - ROM: Preserved  - Pain with extension: Present  - Pain with flexion: Present  - Crepitus: Present    Right Elbow Exam     Other   Erythema: absent  Sensation: normal    Comments:  Swelling noted at R olecranon bursa  No pain or TTP          GAIT:  Antalgic, Ambulates with a cane.    Imaging/ Diagnostic Studies/ Labs (Reviewed on 1/28/2025):    Right Elbow X-rays 1/10/25        Bilateral Knee X-rays  2/6/2023    Narrative & Impression  EXAMINATION:  XR KNEE 3 VIEW BILATERAL     CLINICAL HISTORY:  XR KNEE 3 VIEW BILATERALPain in unspecified knee     COMPARISON:  09/28/2022     FINDINGS:  Eight views bilateral knees     No evidence of acute fracture or dislocation.  Mild osteopenia.  Moderate tricompartment degenerative changes with joint space narrowing, sclerosis and marginal osteophytes.  Chondrocalcinosis seen within the right medial meniscus and left lateral meniscus.  There is mild irregularity at the medial compartment of the left knee without definite evidence for osteochondral defect.  Dense vascular calcifications   moderate left-sided joint effusion.     Impression:     Moderate tricompartment degenerative changes greater on the left with moderate left-sided joint effusion.        Electronically signed by:Syed Lozada MD  Date:                                            02/06/2023  Time:                                           14:23         X-ray lumbar spine 09/28/2022  FINDINGS:  Vertebral body heights maintained.  Trace anterolisthesis of L4 on L5 and retrolisthesis L1 on L2.  No significant change in alignment on extension or flexion..  Multilevel  degenerative disc height loss, most severe L2-3.  Multilevel facet arthropathy and osteophyte changes.  No definite pars defects..  Atherosclerotic vascular calcification.  No acute abnormality.     Impression:     Multilevel prominent degenerative findings.    X-ray knee bilateral 09/28/2022  FINDINGS:  Bilateral arthritic changes present including moderate to severe right knee lateral compartment and left knee medial compartment joint space narrowing.  Bilateral chondrocalcinosis noted.  No acute abnormality appreciated.        ASSESSMENT: 84 y.o. year old male with knee and leg pain, consistent with     1. Primary osteoarthritis of both knees  Case Request-RAD/Other Procedure Area: Bilateral Genicular Nerve RFA (Knee RFA)      2. Traumatic hematoma of right elbow, subsequent encounter  Ambulatory referral/consult to Orthopedics      3. Chronic pain of both knees  Case Request-RAD/Other Procedure Area: Bilateral Genicular Nerve RFA (Knee RFA)            PLAN:   - Interventions:  Schedule patient for repeat bilateral Genicular Nerve RFA in March.  Explained the risks and benefits of the procedure in detail with the patient today in clinic along with alternative treatment options, and the patient and daughter elected to pursue the intervention.        -S/p repeat Bilateral Genicular nerve RFA on 9/18/24 with 80% relief for nearly 6 months  - S/p bilateral intra-articular knee injection 11/7/23 with good relief.    - Anticoagulation use: Yes aspirin, secondary prophylaxis. Does not need to stop.     report:  Reviewed and consistent with medication use as prescribed.    - Medications:  Continue using  prescription compound cream . Compound medication will include flurbiprofen 10%, baclofen 2%, cyclobenzaprine 2%, gabapentin 6%, orphenadrine 5%, tetracaine 2% with ketamine 30% transdermal gel for anti-inflammatory, neuropathic and anesthetic properties.  Patient can apply this medication 2-4 times daily to painful  areas.    - - Can take Tylenol (acetaminophen) 1000mg (two tablets of 500mg) 3x per day as needed for pain (to take up to max dose of 3000mg per day).    - Continue Gabapentin 300 mg nightly.     - Continue Voltaren gel as needed.    - Therapy:   -We discussed continuing physician directed at home physical therapy to help manage the patient/s painful condition. The patient was counseled that muscle strengthening will improve the long term prognosis in regards to pain and may also help increase range of motion and mobility.     - Imaging: Reviewed previous x-rays, including x-rays of right elbow.     -Consults/Referrals: Referral to Ortho to reassess right elbow, consider aspiration if does not improve    - Follow up visit: return to clinic 4 weeks after knee RFA.    The above plan and management options were discussed at length with patient. Patient is in agreement with the above and verbalized understanding.    - I discussed the goals of interventional chronic pain management with the patient on today's visit. We discussed a multimodal and systematic approach to pain.  This includes diagnostic and therapeutic injections, adjuvant pharmacologic treatment, physical therapy, and at times psychiatry.  I emphasized the importance of regular exercise, core strengthening and stretching, diet and weight loss as a cornerstone of long-term pain management.    - This condition does not require this patient to take time off of work, and the primary goal of our Pain Management services is to improve the patient's functional capacity.  - Patient Questions: Answered all of the patient's questions regarding diagnoses, therapy, treatment and next steps      Alvaro Sutherland PA-C  Interventional Pain Management  [facility]

## 2025-01-28 ENCOUNTER — PATIENT MESSAGE (OUTPATIENT)
Dept: PAIN MEDICINE | Facility: CLINIC | Age: 85
End: 2025-01-28

## 2025-01-28 ENCOUNTER — OFFICE VISIT (OUTPATIENT)
Dept: PAIN MEDICINE | Facility: CLINIC | Age: 85
End: 2025-01-28
Payer: MEDICARE

## 2025-01-28 VITALS
HEIGHT: 68 IN | WEIGHT: 145.94 LBS | RESPIRATION RATE: 17 BRPM | DIASTOLIC BLOOD PRESSURE: 66 MMHG | HEART RATE: 97 BPM | SYSTOLIC BLOOD PRESSURE: 143 MMHG | BODY MASS INDEX: 22.12 KG/M2

## 2025-01-28 DIAGNOSIS — M25.562 CHRONIC PAIN OF BOTH KNEES: ICD-10-CM

## 2025-01-28 DIAGNOSIS — M17.0 PRIMARY OSTEOARTHRITIS OF BOTH KNEES: Primary | ICD-10-CM

## 2025-01-28 DIAGNOSIS — G89.29 CHRONIC PAIN OF BOTH KNEES: ICD-10-CM

## 2025-01-28 DIAGNOSIS — M25.561 CHRONIC PAIN OF BOTH KNEES: ICD-10-CM

## 2025-01-28 DIAGNOSIS — S50.01XD TRAUMATIC HEMATOMA OF RIGHT ELBOW, SUBSEQUENT ENCOUNTER: ICD-10-CM

## 2025-01-28 PROCEDURE — 3078F DIAST BP <80 MM HG: CPT | Mod: HCNC,CPTII,S$GLB, | Performed by: PHYSICIAN ASSISTANT

## 2025-01-28 PROCEDURE — 3077F SYST BP >= 140 MM HG: CPT | Mod: HCNC,CPTII,S$GLB, | Performed by: PHYSICIAN ASSISTANT

## 2025-01-28 PROCEDURE — 99212 OFFICE O/P EST SF 10 MIN: CPT | Mod: HCNC,S$GLB,, | Performed by: PHYSICIAN ASSISTANT

## 2025-01-28 PROCEDURE — 1159F MED LIST DOCD IN RCRD: CPT | Mod: HCNC,CPTII,S$GLB, | Performed by: PHYSICIAN ASSISTANT

## 2025-01-28 PROCEDURE — 1157F ADVNC CARE PLAN IN RCRD: CPT | Mod: HCNC,CPTII,S$GLB, | Performed by: PHYSICIAN ASSISTANT

## 2025-01-28 PROCEDURE — 3288F FALL RISK ASSESSMENT DOCD: CPT | Mod: HCNC,CPTII,S$GLB, | Performed by: PHYSICIAN ASSISTANT

## 2025-01-28 PROCEDURE — 1125F AMNT PAIN NOTED PAIN PRSNT: CPT | Mod: HCNC,CPTII,S$GLB, | Performed by: PHYSICIAN ASSISTANT

## 2025-01-28 PROCEDURE — 99999 PR PBB SHADOW E&M-EST. PATIENT-LVL V: CPT | Mod: PBBFAC,HCNC,, | Performed by: PHYSICIAN ASSISTANT

## 2025-01-28 PROCEDURE — 1100F PTFALLS ASSESS-DOCD GE2>/YR: CPT | Mod: HCNC,CPTII,S$GLB, | Performed by: PHYSICIAN ASSISTANT

## 2025-01-30 DIAGNOSIS — Z00.00 ENCOUNTER FOR MEDICARE ANNUAL WELLNESS EXAM: ICD-10-CM

## 2025-02-04 ENCOUNTER — OFFICE VISIT (OUTPATIENT)
Dept: ORTHOPEDICS | Facility: CLINIC | Age: 85
End: 2025-02-04
Payer: MEDICARE

## 2025-02-04 DIAGNOSIS — S50.01XD TRAUMATIC HEMATOMA OF RIGHT ELBOW, SUBSEQUENT ENCOUNTER: ICD-10-CM

## 2025-02-04 PROCEDURE — 1159F MED LIST DOCD IN RCRD: CPT | Mod: HCNC,CPTII,S$GLB, | Performed by: ORTHOPAEDIC SURGERY

## 2025-02-04 PROCEDURE — 1160F RVW MEDS BY RX/DR IN RCRD: CPT | Mod: HCNC,CPTII,S$GLB, | Performed by: ORTHOPAEDIC SURGERY

## 2025-02-04 PROCEDURE — 3288F FALL RISK ASSESSMENT DOCD: CPT | Mod: HCNC,CPTII,S$GLB, | Performed by: ORTHOPAEDIC SURGERY

## 2025-02-04 PROCEDURE — 1157F ADVNC CARE PLAN IN RCRD: CPT | Mod: HCNC,CPTII,S$GLB, | Performed by: ORTHOPAEDIC SURGERY

## 2025-02-04 PROCEDURE — 99999 PR PBB SHADOW E&M-EST. PATIENT-LVL III: CPT | Mod: PBBFAC,HCNC,, | Performed by: ORTHOPAEDIC SURGERY

## 2025-02-04 PROCEDURE — 20605 DRAIN/INJ JOINT/BURSA W/O US: CPT | Mod: HCNC,RT,S$GLB, | Performed by: ORTHOPAEDIC SURGERY

## 2025-02-04 PROCEDURE — 1101F PT FALLS ASSESS-DOCD LE1/YR: CPT | Mod: HCNC,CPTII,S$GLB, | Performed by: ORTHOPAEDIC SURGERY

## 2025-02-04 PROCEDURE — 1125F AMNT PAIN NOTED PAIN PRSNT: CPT | Mod: HCNC,CPTII,S$GLB, | Performed by: ORTHOPAEDIC SURGERY

## 2025-02-04 PROCEDURE — 99203 OFFICE O/P NEW LOW 30 MIN: CPT | Mod: HCNC,25,S$GLB, | Performed by: ORTHOPAEDIC SURGERY

## 2025-02-04 NOTE — PROGRESS NOTES
Subjective:     Patient ID: Richy Douglas is a 84 y.o. male.    Chief Complaint: Pain of the Right Elbow      HPI:  The patient is a 84-year-old male who apparently fell and hit his right elbow 01/10/2025.  He had an abrasion and a olecranon bursal hematoma.  He had an incision and drainage procedure.  He had sutures that have been removed.  He did blood cultures that never grew out anything.  He has a recurrence of his olecranon bursal swelling and requests aspiration today    Past Medical History:   Diagnosis Date    Alcoholic peripheral neuropathy 03/01/2023    Anemia of chronic disease     Aortic aneurysm     Atrial fibrillation with RVR 09/24/2021    Chronic systolic congestive heart failure 08/31/2017    Depression     Emphysema of lung 08/31/2017    GERD (gastroesophageal reflux disease)     Hypertension     Knee osteoarthritis 10/21/2022    Major neurocognitive disorder due to multiple etiologies 04/14/2023    Microcytic anemia 11/24/2020    Other hyperlipidemia 04/27/2021    Personal history of colonic polyps 2022    Prostate cancer      Past Surgical History:   Procedure Laterality Date    CATHETERIZATION OF BOTH LEFT AND RIGHT HEART N/A 1/13/2022    Procedure: CATHETERIZATION, HEART, BOTH LEFT AND RIGHT;  Surgeon: Janneth Tovar MD;  Location: [facility] CATH LAB;  Service: Cardiology;  Laterality: N/A;  poss cx    COLONOSCOPY      COLONOSCOPY N/A 6/30/2022    Procedure: COLONOSCOPY;  Surgeon: Sandra Perez MD;  Location: [facility] ENDO;  Service: Endoscopy;  Laterality: N/A;    COLONOSCOPY N/A 7/1/2022    Procedure: COLONOSCOPY;  Surgeon: Woodrow Christian MD;  Location: [facility] ENDO;  Service: Endoscopy;  Laterality: N/A;    CORONARY ANGIOGRAPHY N/A 1/13/2022    Procedure: ANGIOGRAM, CORONARY ARTERY;  Surgeon: Janneth Tovar MD;  Location: [facility] CATH LAB;  Service: Cardiology;  Laterality: N/A;    ESOPHAGOGASTRODUODENOSCOPY N/A 6/30/2022    Procedure: EGD (ESOPHAGOGASTRODUODENOSCOPY);  Surgeon: Sandra  EMRE Perez MD;  Location: [facility] ENDO;  Service: Endoscopy;  Laterality: N/A;    INJECTION OF ANESTHETIC AGENT AROUND NERVE Bilateral 6/20/2023    Procedure: Bilateral Genicular nerve block RN IV Sedation;  Surgeon: Devon Grant MD;  Location: [facility]IN [facility]GT;  Service: Pain Management;  Laterality: Bilateral;    INJECTION OF JOINT Bilateral 10/21/2022    Procedure: Bilateral intraarticular knee injection with local ALLERGIC TO IODINE;  Surgeon: Devon Grant MD;  Location: [facility];  Service: Pain Management;  Laterality: Bilateral;    INJECTION OF JOINT Bilateral 1/31/2023    Procedure: Bilateral IA knee joint injection with steroid RN IV Sedation;  Surgeon: Devon Grant MD;  Location: [facility];  Service: Pain Management;  Laterality: Bilateral;    INJECTION OF JOINT Bilateral 5/10/2023    Procedure: Bilateral IA knee joint injection Synvisc RN IV Sedation;  Surgeon: Devon Grant MD;  Location: [facility];  Service: Pain Management;  Laterality: Bilateral;    INJECTION OF JOINT Bilateral 11/7/2023    Procedure: Bilateral intraarticular knee injection;  Surgeon: Devon Grant MD;  Location: [facility];  Service: Pain Management;  Laterality: Bilateral;    PROSTATE SURGERY      RADIOFREQUENCY THERMOCOAGULATION Left 9/1/2023    Procedure: Left Genicular Nerve RFA;  Surgeon: Devon Grant MD;  Location: [facility];  Service: Pain Management;  Laterality: Left;    RADIOFREQUENCY THERMOCOAGULATION Right 9/15/2023    Procedure: Right Genicular Nerve RFA RN IV Sedation;  Surgeon: Devon Grant MD;  Location: [facility];  Service: Pain Management;  Laterality: Right;    RADIOFREQUENCY THERMOCOAGULATION Bilateral 9/18/2024    Procedure: Bilateral Genicular Nerve RFA;  Surgeon: Devon Grant MD;  Location: [facility];  Service: Pain Management;  Laterality: Bilateral;    SPINE SURGERY       Family History   Problem Relation Name Age of Onset    Diabetes Mother      Peripheral vascular  disease Mother       Social History     Socioeconomic History    Marital status:      Spouse name: jenn     Number of children: 6   Tobacco Use    Smoking status: Some Days     Current packs/day: 0.50     Average packs/day: 1 pack/day for 70.9 years (70.0 ttl pk-yrs)     Types: Cigarettes     Start date: 1[birth year]1/1954     Last attempt to quit: 3/1/2023     Passive exposure: Past    Smokeless tobacco: Never   Substance and Sexual Activity    Alcohol use: Yes     Comment: reports only drinks red wine when games are on    Drug use: Never    Sexual activity: Yes     Partners: Female     Social Drivers of Health     Financial Resource Strain: Patient Declined (2/26/2024)    Overall Financial Resource Strain (CARDIA)     Difficulty of Paying Living Expenses: Patient declined   Food Insecurity: Patient Declined (2/26/2024)    Hunger Vital Sign     Worried About Running Out of Food in the Last Year: Patient declined     Ran Out of Food in the Last Year: Patient declined   Transportation Needs: Patient Declined (2/26/2024)    PRAPARE - Transportation     Lack of Transportation (Medical): Patient declined     Lack of Transportation (Non-Medical): Patient declined   Physical Activity: Insufficiently Active (2/26/2024)    Exercise Vital Sign     Days of Exercise per Week: 3 days     Minutes of Exercise per Session: 10 min   Stress: No Stress Concern Present (8/30/2023)    [employer] [employer] - Occupational Stress Questionnaire     Feeling of Stress : Not at all   Housing Stability: Patient Declined (2/26/2024)    Housing Stability Vital Sign     Unable to Pay for Housing in the Last Year: Patient declined     Number of Places Lived in the Last Year: 1     Unstable Housing in the Last Year: Patient declined     Medication List with Changes/Refills   Current Medications    ALBUTEROL (PROVENTIL) 2.5 MG /3 ML (0.083 %) NEBULIZER SOLUTION    Take 3 mLs (2.5 mg total) by nebulization every 4 to 6 hours as  needed for Wheezing or Shortness of Breath.    ALBUTEROL (PROVENTIL/VENTOLIN HFA) 90 MCG/ACTUATION INHALER    Inhale 2 puffs into the lungs every 4 (four) hours as needed for Wheezing or Shortness of Breath.    ASPIRIN (ECOTRIN) 81 MG EC TABLET    Take 1 tablet (81 mg total) by mouth once daily.    DICLOFENAC SODIUM (VOLTAREN) 1 % GEL    Apply 4 grams topically 4 (four) times daily. Bilateral knees    EMPAGLIFLOZIN (JARDIANCE) 10 MG TABLET    Take 1 tablet (10 mg total) by mouth once daily.    FERROUS SULFATE 325 (65 FE) MG EC TABLET    Take 1 tablet (325 mg total) by mouth once daily.    FLUTICASONE-UMECLIDIN-VILANTER (TRELEGY ELLIPTA) 200-62.5-25 MCG INHALER    Inhale 1 puff into the lungs once daily.    FUROSEMIDE (LASIX) 40 MG TABLET    Take 1 tablet (40 mg total) by mouth once daily. Do not take if BP is below 110/70    GABAPENTIN (NEURONTIN) 300 MG CAPSULE    Take 1 capsule (300 mg total) by mouth every evening.    MEMANTINE (NAMENDA) 5 MG TAB    Take 1 tablet (5 mg total) by mouth 2 (two) times daily.    MIDODRINE (PROAMATINE) 5 MG TAB    Take 1 tablet (5 mg total) by mouth every meal as needed (give if BP is below 90/70).    MUPIROCIN (BACTROBAN) 2 % OINTMENT    Apply topically 3 (three) times daily.    SACUBITRIL-VALSARTAN (ENTRESTO) 24-26 MG PER TABLET    Take 1 tablet by mouth 2 (two) times daily. Do no take if BP is below 110/70     Review of patient's allergies indicates:   Allergen Reactions    Fish containing products     Iodine and iodide containing products     Shellfish containing products      Review of Systems   Constitutional: Negative for malaise/fatigue.   HENT:  Negative for hearing loss.    Eyes:  Negative for double vision and visual disturbance.   Cardiovascular:  Positive for chest pain and irregular heartbeat.   Respiratory:  Positive for shortness of breath.    Endocrine: Negative for cold intolerance.   Hematologic/Lymphatic: Does not bruise/bleed easily.   Skin:  Negative for poor  wound healing and suspicious lesions.   Musculoskeletal:  Positive for arthritis, joint pain and joint swelling. Negative for gout.   Gastrointestinal:  Negative for nausea and vomiting.   Genitourinary:  Negative for dysuria.   Neurological:  Positive for focal weakness, numbness, paresthesias and sensory change.   Psychiatric/Behavioral:  Positive for altered mental status and substance abuse. Negative for depression and memory loss. The patient is not nervous/anxious.    Allergic/Immunologic: Negative for persistent infections.       Objective:   There is no height or weight on file to calculate BMI.  There were no vitals filed for this visit.             General    Constitutional: He is oriented to person, place, and time. He appears well-developed and well-nourished. No distress.   HENT:   Head: Normocephalic.   Eyes: EOM are normal.   Pulmonary/Chest: Effort normal.   Neurological: He is oriented to person, place, and time.   Psychiatric: He has a normal mood and affect.             Right Hand/Wrist Exam     Other     Neuorologic Exam    Median Distribution: normal  Ulnar Distribution: normal  Radial Distribution: normal      Right Elbow Exam     Inspection   Scars: present  Effusion: present    Other   Sensation: normal    Comments:  The patient has a scar olecranon bursal area.  There is an effusion.  There is no sign of infection.          Vascular Exam       Capillary Refill  Right Hand: normal capillary refill          Relevant imaging results reviewed and interpreted by me, discussed with the patient and / or family today radiographs right elbow showed chondrocalcinosis and early arthritic change  Assessment:     Encounter Diagnosis   Name Primary?    Traumatic hematoma of right elbow, subsequent encounter         Plan:     The patient fell the day he was seen in the ER 01/10/2025 and had an abrasion but cultures have been negative.  He was treated with antibiotics.  I aspirated the right elbow today  yielding 10 cc of kevin blood with an 18 gauge 20 cc syringe.  A Band-Aid was applied.  An elbow pad was applied.  Avoiding mechanical trauma was encouraged.  He also has a very small prepatellar bursitis right knee with no evidence infection today.  I talked to the patient's daughter at his request.  He was told to return in 2 or 3 months if his swelling has not resolved.                Disclaimer: This note was prepared using a voice recognition system and is likely to have sound alike errors within the text.

## 2025-02-04 NOTE — PROCEDURES
Intermediate Joint Aspiration/Injection: R olecranon bursa    Date/Time: 2/4/2025 8:45 AM    Performed by: Carson Garcia MD  Authorized by: Carson Garcia MD    Consent Done?:  Yes (Verbal)  Indications:  Pain    Location:  Elbow  Site:  R olecranon bursa  Prep: Patient was prepped and draped in usual sterile fashion    Ultrasonic Guidance for needle placement: No  Needle size:  18 G  Approach:  Dorsal

## 2025-03-04 ENCOUNTER — PATIENT MESSAGE (OUTPATIENT)
Dept: PRIMARY CARE CLINIC | Facility: CLINIC | Age: 85
End: 2025-03-04
Payer: MEDICARE

## 2025-03-26 ENCOUNTER — TELEPHONE (OUTPATIENT)
Dept: PAIN MEDICINE | Facility: CLINIC | Age: 85
End: 2025-03-26
Payer: MEDICARE

## 2025-03-26 NOTE — TELEPHONE ENCOUNTER
Copied from CRM #[id number]. Topic: Appointments - Appointment Cancellation  >> Mar 26, 2025  8:47 AM Marely wrote:      Who Called:  Becky/ Daughter     Would the patient rather a call back or a response via [facility]?  Call back if any questions   Best Call Back Number:  [phone]  Additional Information: Christamaizabel is calling in regards to the procedure scheduled on 03/28 and states pt would like to have the appt canceled

## 2025-04-08 ENCOUNTER — TELEPHONE (OUTPATIENT)
Dept: PULMONOLOGY | Facility: CLINIC | Age: 85
End: 2025-04-08
Payer: MEDICARE

## 2025-04-08 ENCOUNTER — OFFICE VISIT (OUTPATIENT)
Dept: HOME HEALTH SERVICES | Facility: CLINIC | Age: 85
End: 2025-04-08
Payer: MEDICARE

## 2025-04-08 VITALS
DIASTOLIC BLOOD PRESSURE: 47 MMHG | BODY MASS INDEX: 21.98 KG/M2 | OXYGEN SATURATION: 96 % | WEIGHT: 145 LBS | HEIGHT: 68 IN | HEART RATE: 86 BPM | TEMPERATURE: 98 F | SYSTOLIC BLOOD PRESSURE: 125 MMHG

## 2025-04-08 DIAGNOSIS — I50.22 CHRONIC SYSTOLIC CONGESTIVE HEART FAILURE: ICD-10-CM

## 2025-04-08 DIAGNOSIS — Z00.00 ENCOUNTER FOR MEDICARE ANNUAL WELLNESS EXAM: Primary | ICD-10-CM

## 2025-04-08 DIAGNOSIS — I35.1 NONRHEUMATIC AORTIC VALVE INSUFFICIENCY: ICD-10-CM

## 2025-04-08 DIAGNOSIS — K55.21 AVM (ARTERIOVENOUS MALFORMATION) OF SMALL BOWEL, ACQUIRED WITH HEMORRHAGE: ICD-10-CM

## 2025-04-08 DIAGNOSIS — R26.9 ABNORMALITY OF GAIT AND MOBILITY: ICD-10-CM

## 2025-04-08 DIAGNOSIS — J43.2 CENTRILOBULAR EMPHYSEMA: ICD-10-CM

## 2025-04-08 DIAGNOSIS — Z72.0 NICOTINE ABUSE: ICD-10-CM

## 2025-04-08 DIAGNOSIS — E78.49 OTHER HYPERLIPIDEMIA: ICD-10-CM

## 2025-04-08 DIAGNOSIS — I48.91 ATRIAL FIBRILLATION, UNSPECIFIED TYPE: ICD-10-CM

## 2025-04-08 DIAGNOSIS — I10 PRIMARY HYPERTENSION: ICD-10-CM

## 2025-04-08 DIAGNOSIS — R60.0 BILATERAL LEG EDEMA: ICD-10-CM

## 2025-04-08 DIAGNOSIS — D50.0 IRON DEFICIENCY ANEMIA DUE TO CHRONIC BLOOD LOSS: ICD-10-CM

## 2025-04-08 DIAGNOSIS — I70.0 ATHEROSCLEROSIS OF ABDOMINAL AORTA: ICD-10-CM

## 2025-04-08 DIAGNOSIS — M17.0 PRIMARY OSTEOARTHRITIS OF BOTH KNEES: ICD-10-CM

## 2025-04-08 DIAGNOSIS — F10.21 ALCOHOL DEPENDENCE IN REMISSION: ICD-10-CM

## 2025-04-08 DIAGNOSIS — R91.1 SOLITARY PULMONARY NODULE: ICD-10-CM

## 2025-04-08 DIAGNOSIS — J98.4 CALCIFIED GRANULOMA OF LUNG: ICD-10-CM

## 2025-04-08 DIAGNOSIS — I27.20 PULMONARY HYPERTENSION: ICD-10-CM

## 2025-04-08 DIAGNOSIS — D46.1 IDIOPATHIC REFRACTORY ANEMIA: ICD-10-CM

## 2025-04-08 DIAGNOSIS — F17.200 NEEDS SMOKING CESSATION EDUCATION: ICD-10-CM

## 2025-04-08 DIAGNOSIS — F02.80 MAJOR NEUROCOGNITIVE DISORDER DUE TO MULTIPLE ETIOLOGIES: ICD-10-CM

## 2025-04-08 DIAGNOSIS — I73.9 PVD (PERIPHERAL VASCULAR DISEASE): ICD-10-CM

## 2025-04-08 PROBLEM — G62.1 ALCOHOLIC PERIPHERAL NEUROPATHY: Status: RESOLVED | Noted: 2023-03-01 | Resolved: 2025-04-08

## 2025-04-08 PROBLEM — I50.23 ACUTE ON CHRONIC SYSTOLIC CONGESTIVE HEART FAILURE: Status: RESOLVED | Noted: 2023-12-29 | Resolved: 2025-04-08

## 2025-04-08 PROBLEM — U07.1 COVID-19: Status: RESOLVED | Noted: 2023-02-11 | Resolved: 2025-04-08

## 2025-04-08 PROBLEM — R78.81 BACTEREMIA: Status: RESOLVED | Noted: 2021-09-26 | Resolved: 2025-04-08

## 2025-04-08 PROBLEM — D62 ACUTE BLOOD LOSS ANEMIA: Status: RESOLVED | Noted: 2022-07-01 | Resolved: 2025-04-08

## 2025-04-08 PROBLEM — I50.9 CHF EXACERBATION: Status: RESOLVED | Noted: 2023-12-28 | Resolved: 2025-04-08

## 2025-04-08 PROBLEM — N50.89 SCROTAL EDEMA: Status: RESOLVED | Noted: 2023-12-28 | Resolved: 2025-04-08

## 2025-04-08 NOTE — Clinical Note
Medicare awv complete. Health maintenance:  pneumonia vaccines due-encouraged pt to obtain at a pharmacy.  Pt declined all other vaccines. I added LDCT lung screen to HM topics.   R/[facility] normal coronaries 1/2022 however Ct chest 1/13/2025 showed Severe coronary artery calcifications. Last seen by Dr. Garces in 2024.  Message sent to his staff to call pt's daughter to schedule a follow up with Dr. Garces. Called daughter to inform of ct chest results and the follow up needed.   Patient is due for follow up with pulmonology.  Message sent to Dr. Jackson staff to call patient's daughter to schedule an appointment.

## 2025-04-08 NOTE — PATIENT INSTRUCTIONS
Counseling and Referral of Other Preventative  (Italic type indicates deductible and co-insurance are waived)    Patient Name: Richy Douglas  Today's Date: 4/8/2025    Health Maintenance       Date Due Completion Date    TETANUS VACCINE Never done ---    Pneumococcal Vaccines (Age 50+) (1 of 2 - PCV) Never done ---    Shingles Vaccine (1 of 2) Never done ---    RSV Vaccine (Age 60+ and Pregnant patients) (1 - 1-dose 75+ series) Never done ---    Influenza Vaccine (1) Never done ---    COVID-19 Vaccine (4 - 2024-25 season) 09/01/2024 12/27/2021    Lipid Panel 06/10/2029 6/10/2024        No orders of the defined types were placed in this encounter.      The following information is provided to all patients.  This information is to help you find resources for any of the problems found today that may be affecting your health:                  Living healthy guide: www.[facility].[place].[facility]      Understanding Diabetes: www.diabetes.org      Eating healthy: www.cdc.gov/healthyweight      CDC home safety checklist: www.cdc.gov/steadi/patient.html      Agency on Aging: www.goea.[place].[facility]      Alcoholics anonymous (AA): www.aa.org      Physical Activity: www.nuris.nih.gov/rx6tpcl      Tobacco use: www.quitwithusla.org

## 2025-04-08 NOTE — PROGRESS NOTES
"Richy Douglas presented for a follow-up Medicare AWV today. The following components were reviewed and updated:    Medical history  Family History  Social history  Allergies and Current Medications  Health Risk Assessment  Health Maintenance  Care Team    **See Completed Assessments for Annual Wellness visit with in the encounter summary    The following assessments were completed:  Depression Screening  Cognitive function Screening    Timed Get Up Test  Whisper Test      Opioid documentation:      Patient does not have a current opioid prescription.          Vitals:    04/08/25 1019   BP: (!) 125/47   Pulse: 86   Temp: 98.1 °F (36.7 °C)   TempSrc: Temporal   SpO2: 96%   Weight: 65.8 kg (145 lb)   Height: 5' 8" (1.727 m)     Body mass index is 22.05 kg/m².       Physical Exam  HENT:      Mouth/Throat:      Mouth: Mucous membranes are moist.   Cardiovascular:      Rate and Rhythm: Normal rate and regular rhythm.      Pulses: Normal pulses.      Heart sounds: Murmur heard.   Pulmonary:      Effort: Pulmonary effort is normal.      Breath sounds: Normal breath sounds.   Musculoskeletal:      Comments: Limp on the left knee. Leans more to the right. kyphosis   Skin:     General: Skin is warm and dry.   Neurological:      General: No focal deficit present.      Mental Status: He is alert and oriented to person, place, and time.      Gait: Gait abnormal.   Psychiatric:         Mood and Affect: Mood normal.         Behavior: Behavior normal.         Rafa son in law present.     Diagnoses and health risks identified today and associated recommendations/orders:  1. Encounter for Medicare annual wellness exam  Medicare awv complete. Health maintenance:  pneumonia vaccines due-encouraged pt to obtain at a pharmacy.  Pt declined all other vaccines. I added LDCT lung screen to HM topics.     R/[facility] normal coronaries 1/2022 however Ct chest 1/13/2025 showed Severe coronary artery calcifications. Last seen by Dr. Garces in 2024.  " Message sent to his staff to call pt's daughter to schedule a follow up with Dr. Garces. Called daughter to inform of ct chest results and the follow up needed.     2. Major neurocognitive disorder due to multiple etiologies  Symptoms stable. Continue  namenda. Follow up with pcp.     3. Centrilobular emphysema  Symptoms stable. Continue albuterol and Trelegy .  Patient is due for follow up with pulmonology.  Message sent to Dr. Jackson staff to call patient's daughter to schedule an appointment.    4. Alcohol dependence in remission  CAGE score of 0.   Pt denies ever having an issue with alcohol. Currently drinks 1-2 beers per week. On chart review, had SVT in hosp - sec to alc withdrawal. He was on librium in the past for daily drinking in 2022. Recommend to quit drinking. F/u with pcp.     5. Idiopathic refractory anemia  Symptoms and labs stable. Continue iron . Follow up with pcp.      6. Atrial fibrillation, unspecified type  Symptoms and hr stable. Continue aspirin.  Follow up with cardiology.    Per Dr. Garces 9/4/24 stop Anticoag. - [place] AF, Dr. Christianson discussed with pt to stop anticoag sec risk of bleeding    7. Atherosclerosis of abdominal aorta  Symptoms stable. Continue  aspirin. Follow up with pcp.      8. Chronic systolic congestive heart failure  Lab Results   Component Value Date    HGBA1C 5.3 06/10/2024    Symptoms and weight stable. Continue entresto, aspirin, lasix, jardiance.  Follow up with cardiology.      9. Primary hypertension  Bp stable. Occasionally low bp-continue midodrine. Continue entresto. F/u with pcp.     10. Pulmonary hypertension  Symptoms stable. Continue  lasix. Follow up with pcp.      11. PVD (peripheral vascular disease)  LE arterial u/s with distal occlusions/monophasic waveforms, normal BLE MARIBEL.   -6/2024 moderate to severe PAD referred to Dr. Bo - Asymptomatic pvd - will monitor. Continue aspirin. F/u with pcp.     12. Other hyperlipidemia  Lab Results   Component Value  Date    CHOL 123 06/10/2024    CHOL 144 09/17/2020     Lab Results   Component Value Date    HDL 50 06/10/2024    HDL 61 09/17/2020     Lab Results   Component Value Date    LDLCALC 61.6 (L) 06/10/2024    LDLCALC 74.0 09/17/2020     Lab Results   Component Value Date    TRIG 57 06/10/2024    TRIG 45 09/17/2020       Lab Results   Component Value Date    CHOLHDL 40.7 06/10/2024    CHOLHDL 42.4 09/17/2020    Off statin. Lipids stable. F/u with pcp.     13. Nonrheumatic aortic valve insufficiency  symptoms stable. Continue current management.  Follow up with cardiology.      14. Calcified granuloma of lung  Ct chest 1/13/25 Stable nodular opacity in the superior segment the left lower lobe. Decreased size of the previously described lingular pulmonary nodule. No new pulmonary nodules. F/u with pulmonology.     15. Solitary pulmonary nodule  Ct chest 1/13/25 Decreased size of the previously described lingular nodule. Recommend annual low-dose chest CT screening. F/u with pulmonology.     16. AVM (arteriovenous malformation) of small bowel, acquired with hemorrhage  Symptoms stable. No signs or symptoms of bleeding. Follow up with pcp.      17. Iron deficiency anemia due to chronic blood loss  Lab Results   Component Value Date    HGB 10.4 (L) 06/18/2024    Labs stable. Continue  iron. Continue routine monitoring. Managed by PCP.       18. Primary osteoarthritis of both knees  Pain controlled. Continue  gabapentin and Voltaren gel. Follow up with pcp.      19. Bilateral leg edema  Symptoms stable. Continue Lasix . Follow up with pcp.      20. Abnormality of gait and mobility  Chronic. Fall precautions recommended and discussed. Follow up with PCP.      21. Nicotine abuse  Smoking status: Some Days   Passive exposure: Past   Uses: Cigarettes   Packs/day: 0.25   Average packs/day: 1 packs/day for 71.1 years   Pack years: 69.9   Pt states he is ready to quit and will do it on his own. Declined smoking cessation program. Son  in law will help set a date and stick to it.   Yearly LDCT lung screen. Next due 1/13/2026.           Provided Richy with a 5-10 year written screening schedule and personal prevention plan. Recommendations were developed using the USPSTF age appropriate recommendations. Education, counseling, and referrals were provided as needed.  After Visit Summary printed and given to patient which includes a list of additional screenings\tests needed.    Follow up in about 1 year (around 4/8/2026) for annual wellness visit.      SULEMA Webber      I offered to discuss advanced care planning, including how to pick a person who would make decisions for you if you were unable to make them for yourself, called a health care power of , and what kind of decisions you might make such as use of life sustaining treatments such as ventilators and tube feeding when faced with a life limiting illness recorded on a living will that they will need to know. (How you want to be cared for as you near the end of your natural life)     X  Patient has advanced directives on file, which we reviewed, and they do not wish to make changes.

## 2025-04-08 NOTE — TELEPHONE ENCOUNTER
----- Message from SULEMA Naranjo sent at 4/8/2025 10:49 AM CDT -----  Please call pt's daughter to schedule pt's appointment with Dr. Jackson for follow up.

## 2025-04-09 ENCOUNTER — TELEPHONE (OUTPATIENT)
Dept: CARDIOLOGY | Facility: CLINIC | Age: 85
End: 2025-04-09
Payer: MEDICARE

## 2025-04-09 DIAGNOSIS — I73.9 PVD (PERIPHERAL VASCULAR DISEASE): ICD-10-CM

## 2025-04-09 DIAGNOSIS — I10 HYPERTENSION, UNSPECIFIED TYPE: Primary | ICD-10-CM

## 2025-04-09 NOTE — TELEPHONE ENCOUNTER
Spoke with daughter Clemencia and set up appt for patient tomorrow at [facility]. Daughter states that he will have to bring himself but if we have any questions please feel free to call her on her cell.    ----- Message from SULEMA Naranjo sent at 4/8/2025  4:36 PM CDT -----  Ct chest 1/13/2025 showed Severe coronary artery calcifications. Last seen by Dr. Garces in 2024. Please call pt's daughter to schedule a follow up appointment. Thank you, Mary Lou

## 2025-04-10 ENCOUNTER — HOSPITAL ENCOUNTER (OUTPATIENT)
Dept: CARDIOLOGY | Facility: HOSPITAL | Age: 85
Discharge: HOME | End: 2025-04-10
Attending: INTERNAL MEDICINE
Payer: MEDICARE

## 2025-04-10 ENCOUNTER — RESULTS FOLLOW-UP (OUTPATIENT)
Dept: CARDIOLOGY | Facility: CLINIC | Age: 85
End: 2025-04-10

## 2025-04-10 ENCOUNTER — OFFICE VISIT (OUTPATIENT)
Dept: CARDIOLOGY | Facility: CLINIC | Age: 85
End: 2025-04-10
Payer: MEDICARE

## 2025-04-10 VITALS
BODY MASS INDEX: 21.92 KG/M2 | DIASTOLIC BLOOD PRESSURE: 56 MMHG | HEART RATE: 53 BPM | SYSTOLIC BLOOD PRESSURE: 138 MMHG | WEIGHT: 144.19 LBS

## 2025-04-10 DIAGNOSIS — I27.20 PULMONARY HYPERTENSION: ICD-10-CM

## 2025-04-10 DIAGNOSIS — I50.22 CHRONIC SYSTOLIC CONGESTIVE HEART FAILURE: Primary | ICD-10-CM

## 2025-04-10 DIAGNOSIS — R06.02 SOB (SHORTNESS OF BREATH): ICD-10-CM

## 2025-04-10 DIAGNOSIS — R06.02 SHORTNESS OF BREATH: ICD-10-CM

## 2025-04-10 DIAGNOSIS — I73.9 PVD (PERIPHERAL VASCULAR DISEASE): ICD-10-CM

## 2025-04-10 DIAGNOSIS — R60.0 BILATERAL LEG EDEMA: ICD-10-CM

## 2025-04-10 DIAGNOSIS — I70.0 ATHEROSCLEROSIS OF ABDOMINAL AORTA: ICD-10-CM

## 2025-04-10 DIAGNOSIS — I48.0 PAROXYSMAL ATRIAL FIBRILLATION: ICD-10-CM

## 2025-04-10 DIAGNOSIS — I48.0 PAF (PAROXYSMAL ATRIAL FIBRILLATION): ICD-10-CM

## 2025-04-10 DIAGNOSIS — I35.1 NONRHEUMATIC AORTIC VALVE INSUFFICIENCY: ICD-10-CM

## 2025-04-10 DIAGNOSIS — R29.898 BILATERAL LEG WEAKNESS: ICD-10-CM

## 2025-04-10 DIAGNOSIS — R60.0 LOCALIZED EDEMA: ICD-10-CM

## 2025-04-10 DIAGNOSIS — D50.0 IRON DEFICIENCY ANEMIA DUE TO CHRONIC BLOOD LOSS: ICD-10-CM

## 2025-04-10 DIAGNOSIS — K55.21 AVM (ARTERIOVENOUS MALFORMATION) OF SMALL BOWEL, ACQUIRED WITH HEMORRHAGE: ICD-10-CM

## 2025-04-10 DIAGNOSIS — I10 HYPERTENSION, UNSPECIFIED TYPE: ICD-10-CM

## 2025-04-10 DIAGNOSIS — E78.49 OTHER HYPERLIPIDEMIA: ICD-10-CM

## 2025-04-10 DIAGNOSIS — I50.42 CHRONIC COMBINED SYSTOLIC AND DIASTOLIC CONGESTIVE HEART FAILURE, NYHA CLASS 3: ICD-10-CM

## 2025-04-10 DIAGNOSIS — Z87.891 EX-SMOKER: ICD-10-CM

## 2025-04-10 DIAGNOSIS — D63.8 ANEMIA OF CHRONIC DISEASE: ICD-10-CM

## 2025-04-10 LAB
OHS QRS DURATION: 92 MS
OHS QTC CALCULATION: 448 MS

## 2025-04-10 PROCEDURE — 93005 ELECTROCARDIOGRAM TRACING: CPT

## 2025-04-10 PROCEDURE — 99999 PR PBB SHADOW E&M-EST. PATIENT-LVL III: CPT | Mod: PBBFAC,,, | Performed by: INTERNAL MEDICINE

## 2025-04-10 RX ORDER — FUROSEMIDE 40 MG/1
40 TABLET ORAL DAILY
Qty: 90 TABLET | Refills: 1 | Status: SHIPPED | OUTPATIENT
Start: 2025-04-10 | End: 2025-10-07

## 2025-04-10 RX ORDER — SACUBITRIL AND VALSARTAN 24; 26 MG/1; MG/1
1 TABLET, FILM COATED ORAL 2 TIMES DAILY
Qty: 180 TABLET | Refills: 1 | Status: SHIPPED | OUTPATIENT
Start: 2025-04-10

## 2025-04-10 NOTE — PROGRESS NOTES
Subjective:   Patient ID:  Richy Douglas is a 84 y.o. male who presents for cardiac consult of No chief complaint on file.    Referring Physician:    Vivian Ch MD [id number] [place] [address]      Reason for consult: CHF, AI    HPI  The patient came in today for cardiac consult of No chief complaint on file.      Richy Douglas is a 84 y.o. 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       9/4/24  BP and HR stable. BMI 22 - 146 lbs    BNP (pg/mL)   Date Value   07/15/2024 880 (H)   06/18/2024 95   06/10/2024 120 (H)   12/28/2023 3,572 (H)   11/13/2023 2,565 (H)      BNP last month elevated --> 880  LE u/s 6/2024 with moderate to severe PAD referred to Dr. Bo - Asymptomatic pvd mostly his disease is infrapopliteal segment w/o any suggestion of limb loss or claudication. He is very limited in his mobility due to his lung disease he continues to smoke and drink etoh.     4/10/25    Is now off BB as this was causing some hypotension issues  Remains on SGLT2i and ARNi for GDMT  Also taking lasix still 40 mg daily, responds well  [facility]  HAd a fall last month after waking from bad dream, fell.     Pt had visit in CHF clinic in Jan 2025 - stable overall.   BP and HR stable. BMI 21 - 144 lbs     ECG - NSR, 1st deg AVB, LVH     He did recycling, separation.     FH - mother - DM, PVD, tobacco    Interpretation Summary 6/2024  Show Result Comparison     RLE  50 to 75 % lesion of profunda and occluded pTA. Triphasic waveform. MARIBEL 1.10 normal    LLE occluded PTA and distal GUILLE. MARIBEL 1.10 normal    Two bakers cysts noted left leg measuring 5.3 cm x 1.5 cm and 2.5 cm x 1.0 cm Left GUILLE has retrograde flow noted.    Results for orders placed during the hospital encounter of 02/09/24    Echo    Interpretation Summary    Left Ventricle: The left ventricle is moderately  dilated. Normal wall thickness. There is eccentric hypertrophy. Moderate global hypokinesis present. There is moderately reduced systolic function with a visually estimated ejection fraction of 30 - 35%. There is normal diastolic function.    Right Ventricle: Mild right ventricular enlargement. Wall thickness is normal. Right ventricle wall motion  is normal. Systolic function is normal.    Left Atrium: Left atrium is moderately dilated.    Right Atrium: Right atrium is mildly dilated.    Aortic Valve: There is mild aortic regurgitation with a centrally directed jet.    Mitral Valve: There is mild regurgitation with a centrally directed jet.    Tricuspid Valve: There is mild regurgitation with a centrally directed jet.    Pulmonary Artery: The estimated pulmonary artery systolic pressure is 70 mmHg.    IVC/SVC: Normal venous pressure at 3 mmHg.      Results for orders placed during the hospital encounter of 02/11/23    Echo    Interpretation Summary  · The left ventricle is normal in size with concentric hypertrophy and moderately decreased systolic function.  · Severe left atrial enlargement.  · Trivial pericardial effusion.  · Mild tricuspid regurgitation.  · Severe right atrial enlargement.  · The estimated ejection fraction is 30%.  · There is severe left ventricular global hypokinesis.  · Mild right ventricular enlargement with mildly to moderately reduced right ventricular systolic function.  · Moderate aortic regurgitation.  · Elevated central venous pressure (15 mmHg).  · The estimated PA systolic pressure is 80 mmHg.  · There is pulmonary hypertension.  · The ascending aorta is moderately dilated.  · The aortic root is moderately dilated.  · Atrial fibrillation observed.  Aortic Root - End Diastolic   Ao root annulus 4.69 cm          Sinus 4.84 cm          STJ 4.89 cm          Ascending aorta 4.62 cm             LE arterial u/s 5/2022  Conclusion    Normal BLE MARIBEL with distal occlusions noted with moderate  to severe plaque/blockage  Right distal PT/PER arteries are occluded  Left distal PT with monophasic waveforms, L GUILLE occluded      Conclusion 5/2022    A Baker's cyst measuring 1.67 cm x 3.77 cm was seen in the right extremity.  A Baker's cyst measuring 1.53 cm x 2.78 cm was seen in the left extremity.  There is no evidence of a left lower extremity DVT.  The right smaller saphenous vein is abnormal. A chronic thrombus is present.          Results for orders placed during the hospital encounter of 01/09/22    Cardiac catheterization    Conclusion  · The pre-procedure left ventricular end diastolic pressure was 13.  · The estimated blood loss was none.  · The coronary arteries were normal..  · The filling pressures on the right and left were normal.    The procedure log was documented by Documenter: Jenna Cantu, RN and verified by Janneth Tovar MD.    Date: 1/13/2022  Time: 5:22 PM          Past Medical History:   Diagnosis Date    Alcoholic peripheral neuropathy 03/01/2023    Anemia of chronic disease     Aortic aneurysm     Atrial fibrillation with RVR 09/24/2021    Chronic systolic congestive heart failure 08/31/2017    Depression     Emphysema of lung 08/31/2017    GERD (gastroesophageal reflux disease)     Hypertension     Knee osteoarthritis 10/21/2022    Major neurocognitive disorder due to multiple etiologies 04/14/2023    Microcytic anemia 11/24/2020    Other hyperlipidemia 04/27/2021    Personal history of colonic polyps 2022    Prostate cancer        Past Surgical History:   Procedure Laterality Date    CATHETERIZATION OF BOTH LEFT AND RIGHT HEART N/A 1/13/2022    Procedure: CATHETERIZATION, HEART, BOTH LEFT AND RIGHT;  Surgeon: Janneth Tovar MD;  Location: [facility];  Service: Cardiology;  Laterality: N/A;  poss cx    COLONOSCOPY      COLONOSCOPY N/A 6/30/2022    Procedure: COLONOSCOPY;  Surgeon: Sandra Perez MD;  Location: [facility];  Service: Endoscopy;  Laterality: N/A;     COLONOSCOPY N/A 7/1/2022    Procedure: COLONOSCOPY;  Surgeon: Woodrow Christian MD;  Location: [facility] END[facility];  Service: Endoscopy;  Laterality: N/A;    CORONARY ANGIOGRAPHY N/A 1/13/2022    Procedure: ANGIOGRAM, CORONARY ARTERY;  Surgeon: Janneth Tovar MD;  Location: [facility] CATH LAB;  Service: Cardiology;  Laterality: N/A;    ESOPHAGOGASTRODUODENOSCOPY N/A 6/30/2022    Procedure: EGD (ESOPHAGOGASTRODUODENOSCOPY);  Surgeon: Sandra Perez MD;  Location: [facility] ENDO;  Service: Endoscopy;  Laterality: N/A;    INJECTION OF ANESTHETIC AGENT AROUND NERVE Bilateral 6/20/2023    Procedure: Bilateral Genicular nerve block RN IV Sedation;  Surgeon: Devon Grant MD;  Location: [facility];  Service: Pain Management;  Laterality: Bilateral;    INJECTION OF JOINT Bilateral 10/21/2022    Procedure: Bilateral intraarticular knee injection with local ALLERGIC TO IODINE;  Surgeon: Devon Grant MD;  Location: [facility];  Service: Pain Management;  Laterality: Bilateral;    INJECTION OF JOINT Bilateral 1/31/2023    Procedure: Bilateral IA knee joint injection with steroid RN IV Sedation;  Surgeon: Devon Grant MD;  Location: [facility];  Service: Pain Management;  Laterality: Bilateral;    INJECTION OF JOINT Bilateral 5/10/2023    Procedure: Bilateral IA knee joint injection Synvisc RN IV Sedation;  Surgeon: Devon Grant MD;  Location: [facility];  Service: Pain Management;  Laterality: Bilateral;    INJECTION OF JOINT Bilateral 11/7/2023    Procedure: Bilateral intraarticular knee injection;  Surgeon: Devon Grant MD;  Location: [facility];  Service: Pain Management;  Laterality: Bilateral;    PROSTATE SURGERY      RADIOFREQUENCY THERMOCOAGULATION Left 9/1/2023    Procedure: Left Genicular Nerve RFA;  Surgeon: Devon Grant MD;  Location: [facility];  Service: Pain Management;  Laterality: Left;    RADIOFREQUENCY THERMOCOAGULATION Right 9/15/2023    Procedure: Right Genicular Nerve RFA RN IV  Sedation;  Surgeon: Devon Grant MD;  Location: [facility]IN [facility]GT;  Service: Pain Management;  Laterality: Right;    RADIOFREQUENCY THERMOCOAGULATION Bilateral 9/18/2024    Procedure: Bilateral Genicular Nerve RFA;  Surgeon: Devon Grant MD;  Location: [facility]IN [facility];  Service: Pain Management;  Laterality: Bilateral;    SPINE SURGERY         Social History     Tobacco Use    Smoking status: Some Days     Current packs/day: 0.25     Average packs/day: 1 pack/day for 71.1 years (69.9 ttl pk-yrs)     Types: Cigarettes     Start date: [birth year]1/1954     Last attempt to quit: 3/1/2023     Passive exposure: Past    Smokeless tobacco: Never   Substance Use Topics    Alcohol use: Yes     Alcohol/week: 2.0 standard drinks of alcohol     Types: 2 Cans of beer per week     Comment: 1 beer once or twice a week    Drug use: Never       Family History   Problem Relation Name Age of Onset    Diabetes Mother      Peripheral vascular disease Mother         Patient's Medications   New Prescriptions    No medications on file   Previous Medications    ALBUTEROL (PROVENTIL/VENTOLIN HFA) 90 MCG/ACTUATION INHALER    Inhale 2 puffs into the lungs every 4 (four) hours as needed for Wheezing or Shortness of Breath.    ASPIRIN (ECOTRIN) 81 MG EC TABLET    Take 1 tablet (81 mg total) by mouth once daily.    DICLOFENAC SODIUM (VOLTAREN) 1 % GEL    Apply 4 grams topically 4 (four) times daily. Bilateral knees    EMPAGLIFLOZIN (JARDIANCE) 10 MG TABLET    Take 1 tablet (10 mg total) by mouth once daily.    FERROUS SULFATE 325 (65 FE) MG EC TABLET    Take 1 tablet (325 mg total) by mouth once daily.    FLUTICASONE-UMECLIDIN-VILANTER (TRELEGY ELLIPTA) 200-62.5-25 MCG INHALER    Inhale 1 puff into the lungs once daily.    FUROSEMIDE (LASIX) 40 MG TABLET    Take 1 tablet (40 mg total) by mouth once daily. Do not take if BP is below 110/70    GABAPENTIN (NEURONTIN) 300 MG CAPSULE    Take 1 capsule (300 mg total) by mouth every evening.    MEMANTINE  (NAMENDA) 5 MG TAB    Take 1 tablet (5 mg total) by mouth 2 (two) times daily.    MIDODRINE (PROAMATINE) 5 MG TAB    Take 1 tablet (5 mg total) by mouth every meal as needed (give if BP is below 90/70).    SACUBITRIL-VALSARTAN (ENTRESTO) 24-26 MG PER TABLET    Take 1 tablet by mouth 2 (two) times daily. Do no take if BP is below 110/70   Modified Medications    No medications on file   Discontinued Medications    No medications on file       Review of Systems   Constitutional:  Positive for malaise/fatigue.   HENT: Negative.     Eyes: Negative.    Respiratory:  Positive for shortness of breath.    Cardiovascular:  Positive for chest pain and leg swelling.   Gastrointestinal: Negative.    Genitourinary: Negative.    Musculoskeletal:  Positive for back pain and joint pain.   Skin: Negative.    Neurological: Negative.    Endo/Heme/Allergies: Negative.    Psychiatric/Behavioral: Negative.     All 12 systems otherwise negative.      Wt Readings from Last 3 Encounters:   04/10/25 65.4 kg (144 lb 2.9 oz)   04/08/25 65.8 kg (145 lb)   01/28/25 66.2 kg (145 lb 15.1 oz)     Temp Readings from Last 3 Encounters:   04/08/25 98.1 °F (36.7 °C) (Temporal)   01/09/25 97.8 °F (36.6 °C) (Oral)   09/18/24 97.5 °F (36.4 °C) (Temporal)     BP Readings from Last 3 Encounters:   04/10/25 (!) 138/56   04/08/25 (!) 125/47   01/28/25 (!) 143/66     Pulse Readings from Last 3 Encounters:   04/10/25 (!) 53   04/08/25 86   01/28/25 97       BP (!) 138/56 (BP Location: Right arm, Patient Position: Sitting)   Pulse (!) 53   Wt 65.4 kg (144 lb 2.9 oz)   BMI 21.92 kg/m²     Objective:   Physical Exam  Vitals and nursing note reviewed.   Constitutional:       General: He is not in acute distress.     Appearance: He is well-developed. He is ill-appearing. He is not diaphoretic.   HENT:      Head: Normocephalic and atraumatic.      Nose: Nose normal.   Eyes:      General: No scleral icterus.     Conjunctiva/sclera: Conjunctivae normal.   Neck:       Thyroid: No thyromegaly.      Vascular: No JVD.   Cardiovascular:      Rate and Rhythm: Normal rate and regular rhythm.      Heart sounds: S1 normal and S2 normal. Murmur heard.      No friction rub. No gallop. No S3 or S4 sounds.   Pulmonary:      Effort: Pulmonary effort is normal. No respiratory distress.      Breath sounds: Normal breath sounds. No stridor. No wheezing or rales.   Chest:      Chest wall: No tenderness.   Abdominal:      General: Bowel sounds are normal. There is no distension.      Palpations: Abdomen is soft. There is no mass.      Tenderness: There is no abdominal tenderness. There is no rebound.   Genitourinary:     Comments: Deferred  Musculoskeletal:         General: No tenderness or deformity. Normal range of motion.      Cervical back: Normal range of motion and neck supple.      Right lower leg: Edema present.      Left lower leg: Edema present.   Lymphadenopathy:      Cervical: No cervical adenopathy.   Skin:     General: Skin is warm and dry.      Coloration: Skin is not pale.      Findings: No erythema or rash.   Neurological:      Mental Status: He is alert and oriented to person, place, and time.      Motor: No abnormal muscle tone.      Coordination: Coordination normal.   Psychiatric:         Behavior: Behavior normal.         Thought Content: Thought content normal.         Judgment: Judgment normal.         Lab Results   Component Value Date     07/15/2024    K 4.3 07/15/2024     07/15/2024    CO2 26 07/15/2024    BUN 12 07/15/2024    CREATININE 0.9 07/15/2024    GLU 79 07/15/2024    HGBA1C 5.3 06/10/2024    MG 2.2 07/15/2024    AST 22 07/15/2024    ALT 11 07/15/2024    ALBUMIN 4.1 07/15/2024    PROT 7.1 07/15/2024    BILITOT 1.4 (H) 07/15/2024    WBC 5.90 06/18/2024    HGB 10.4 (L) 06/18/2024    HCT 31.6 (L) 06/18/2024    MCV 81 (L) 06/18/2024     06/18/2024    INR 1.4 (H) 09/24/2021    TSH 1.170 06/10/2024    CHOL 123 06/10/2024    HDL 50 06/10/2024     LDLCALC 61.6 (L) 06/10/2024    TRIG 57 06/10/2024     (H) 07/15/2024     Assessment:      1. Chronic systolic congestive heart failure    2. PVD (peripheral vascular disease)    3. Paroxysmal atrial fibrillation    4. Anemia of chronic disease    5. Iron deficiency anemia due to chronic blood loss    6. Other hyperlipidemia    7. AVM (arteriovenous malformation) of small bowel, acquired with hemorrhage    8. Atherosclerosis of abdominal aorta    9. Bilateral leg weakness    10. Pulmonary hypertension    11. Bilateral leg edema    12. Chronic combined systolic and diastolic congestive heart failure, NYHA class 3    13. Shortness of breath    14. Localized edema    15. Nonrheumatic aortic valve insufficiency    16. PAF (paroxysmal atrial fibrillation)    17. SOB (shortness of breath)    18. Ex-smoker          Plan:   1. BIV failure EF 30%, AI with aortic atherosc;   --> 3158 --> 120 --> 880  - R/LHC normal coronaries 1/2022  - ECHO 2/2024 - LV EF 30-35%, normal RV, mild AI, mild MR, mild TR, PASP 70 mmHg  - cont Entresto and BB - low dose if BP allows   - cont lasix 40mg daily but hold if BP lower   - repeat labs     2. Emphysema with dyspnea, pulm HTN; still smokes   - cont tx PCP/Pulm    3. HTN with aortic root moderately dilated with AI -low BP now  - titrate meds  - cont to monitor   - start midodrine PRN     4. HLD with aortic athero and Coronary artery calcifications  - cont statin     5. PAF, diagnosed in setting of sepsis/PNA, had SVT in hosp - sec to alc withdrawal  - cont BB; stop Anticoag  - Lone AF, Dr. Christianson discussed with pt to stop anticoag sec risk of bleeding    6. AVM/Symptomatic anemia s/p PRBC with fe def  - angioectasias noted in recent EGD s/p APC  - off Eliquis now  - f/u heme/onc - recent Fe levels improved     7. PVD, Leg Edema, pain with claudication   - LE arterial u/s with distal occlusions/monophasic waveforms, normal BLE MARIBEL.   -6/2024 moderate to severe PAD referred to  Dr. Bo - Asymptomatic pvd - will monitor     8. Fall with hematoma in right hip  - f/u pain management for knee pain/baker's cysts  - s/p knee injections - proceed for RFA for with Dr. Grant     Visit today included increased complexity associated with the care of the episodic problem CHF addressed and managing the longitudinal care of the patient due to the serious and/or complex managed problem(s) .      Thank you for allowing me to participate in this patient's care. Please do not hesitate to contact me with any questions or concerns. Consult note has been forwarded to the referral physician.

## 2025-04-11 ENCOUNTER — TELEPHONE (OUTPATIENT)
Dept: CARDIOLOGY | Facility: CLINIC | Age: 85
End: 2025-04-11
Payer: MEDICARE

## 2025-04-11 NOTE — TELEPHONE ENCOUNTER
Spoke to pt daughter notified pbr        Uriel Garces MD to Mili Trevino Staff  (Selected Message)      4/10/25  3:45 PM  Result Note  Please contact the patient and let them know that their results reveal improving fluid level - BNP - continue current medications and monitor BP and weights and follow up as scheduled in clinic. Kidneys, potassium and magnesium are stable.  Thanks

## 2025-04-16 DIAGNOSIS — F10.230 ALCOHOL DEPENDENCE WITH UNCOMPLICATED WITHDRAWAL: ICD-10-CM

## 2025-04-16 DIAGNOSIS — F02.80 MAJOR NEUROCOGNITIVE DISORDER DUE TO MULTIPLE ETIOLOGIES: ICD-10-CM

## 2025-04-16 RX ORDER — MEMANTINE HYDROCHLORIDE 5 MG/1
5 TABLET ORAL 2 TIMES DAILY
Qty: 180 TABLET | Refills: 3 | Status: SHIPPED | OUTPATIENT
Start: 2025-04-16

## 2025-04-16 RX ORDER — FERROUS SULFATE 325(65) MG
TABLET, DELAYED RELEASE (ENTERIC COATED) ORAL
Qty: 90 TABLET | Refills: 3 | Status: SHIPPED | OUTPATIENT
Start: 2025-04-16

## 2025-04-16 NOTE — TELEPHONE ENCOUNTER
Refill Routing Note   Medication(s) are not appropriate for processing by [facility] for the following reason(s):        Outside of protocol    [facility] action(s):  Route             Appointments  past 12m or future 3m with PCP    Date Provider   Last Visit   6/10/2024 Vivian Ch MD   Next Visit   7/14/2025 Vivian Ch MD   ED visits in past 90 days: 0        Note composed:12:02 PM 04/16/2025

## 2025-05-27 ENCOUNTER — OFFICE VISIT (OUTPATIENT)
Dept: PULMONOLOGY | Facility: CLINIC | Age: 85
End: 2025-05-27
Payer: MEDICARE

## 2025-05-27 VITALS
HEART RATE: 59 BPM | SYSTOLIC BLOOD PRESSURE: 120 MMHG | DIASTOLIC BLOOD PRESSURE: 72 MMHG | HEIGHT: 68 IN | OXYGEN SATURATION: 98 % | RESPIRATION RATE: 20 BRPM | WEIGHT: 142.75 LBS | BODY MASS INDEX: 21.63 KG/M2

## 2025-05-27 DIAGNOSIS — I48.91 ATRIAL FIBRILLATION, UNSPECIFIED TYPE: ICD-10-CM

## 2025-05-27 DIAGNOSIS — J43.1 PANLOBULAR EMPHYSEMA: ICD-10-CM

## 2025-05-27 DIAGNOSIS — J43.2 CENTRILOBULAR EMPHYSEMA: Primary | ICD-10-CM

## 2025-05-27 DIAGNOSIS — R06.02 SHORTNESS OF BREATH: ICD-10-CM

## 2025-05-27 DIAGNOSIS — J98.4 CALCIFIED GRANULOMA OF LUNG: ICD-10-CM

## 2025-05-27 DIAGNOSIS — R91.1 SOLITARY PULMONARY NODULE: ICD-10-CM

## 2025-05-27 PROCEDURE — 3074F SYST BP LT 130 MM HG: CPT | Mod: CPTII,HCNC,S$GLB, | Performed by: INTERNAL MEDICINE

## 2025-05-27 PROCEDURE — 3078F DIAST BP <80 MM HG: CPT | Mod: CPTII,HCNC,S$GLB, | Performed by: INTERNAL MEDICINE

## 2025-05-27 PROCEDURE — 99213 OFFICE O/P EST LOW 20 MIN: CPT | Mod: HCNC,S$GLB,, | Performed by: INTERNAL MEDICINE

## 2025-05-27 PROCEDURE — 1126F AMNT PAIN NOTED NONE PRSNT: CPT | Mod: CPTII,HCNC,S$GLB, | Performed by: INTERNAL MEDICINE

## 2025-05-27 PROCEDURE — 1101F PT FALLS ASSESS-DOCD LE1/YR: CPT | Mod: CPTII,HCNC,S$GLB, | Performed by: INTERNAL MEDICINE

## 2025-05-27 PROCEDURE — 1157F ADVNC CARE PLAN IN RCRD: CPT | Mod: CPTII,HCNC,S$GLB, | Performed by: INTERNAL MEDICINE

## 2025-05-27 PROCEDURE — 1160F RVW MEDS BY RX/DR IN RCRD: CPT | Mod: CPTII,HCNC,S$GLB, | Performed by: INTERNAL MEDICINE

## 2025-05-27 PROCEDURE — 99999 PR PBB SHADOW E&M-EST. PATIENT-LVL III: CPT | Mod: PBBFAC,HCNC,, | Performed by: INTERNAL MEDICINE

## 2025-05-27 PROCEDURE — 1159F MED LIST DOCD IN RCRD: CPT | Mod: CPTII,HCNC,S$GLB, | Performed by: INTERNAL MEDICINE

## 2025-05-27 PROCEDURE — 3288F FALL RISK ASSESSMENT DOCD: CPT | Mod: CPTII,HCNC,S$GLB, | Performed by: INTERNAL MEDICINE

## 2025-05-27 RX ORDER — ALBUTEROL SULFATE 90 UG/1
2 INHALANT RESPIRATORY (INHALATION) EVERY 4 HOURS PRN
Qty: 8.5 G | Refills: 11 | Status: SHIPPED | OUTPATIENT
Start: 2025-05-27

## 2025-05-27 RX ORDER — FLUTICASONE FUROATE, UMECLIDINIUM BROMIDE AND VILANTEROL TRIFENATATE 200; 62.5; 25 UG/1; UG/1; UG/1
1 POWDER RESPIRATORY (INHALATION) DAILY
Qty: 60 EACH | Refills: 11 | Status: SHIPPED | OUTPATIENT
Start: 2025-05-27

## 2025-05-27 NOTE — PROGRESS NOTES
Subjective:     Patient ID: Richy Douglas is a 84 y.o. male.    Chief Complaint:  84 y.o. occasional  smoker with Chronic Obstructive Pulmonary Disease fro review - doing well      HPI    COPD  He presents for evaluation and treatment of COPD. The patient is not currently have symptoms / an exacerbation. The patient has COPD for approximately 11 - 12 years. Symptoms include cough with sputum production  have included dyspnea, cough, wheezing and fatigue, and typically last 2 weeks. Previous episodes have been exacerbated by any exercise. Current treatment includes albuterol inhaler, which generally provides some relief of symptoms.   He uses 1 pillows at night. Patient currently is not on home oxygen therapy.. The patient is having no constitutional symptoms, denying fever, chills, anorexia, or weight loss. The patient has been hospitalized for this condition before. He has a history of 68 pack years. The patient is experiencing exercise intolerance (difficulty climbing 1 flights of stairs).  Stopped smoking  Qualifies for oxygen    Lung Nodule  He presents for evaluation and treatment of a lung nodule. The patient reports that the imaging was performed to evaluate symptoms of dyspnea on exertion which have been present for 5 years and are unchanged. Symptoms are exacerbated by exercise and relieved by rest. Other symptoms include non productive cough. He has a history of 68 pack years. The patient has no known exposure to tuberculosis. The patient does not have a history of cancer.    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    Anemia of chronic disease     Aortic aneurysm     Atrial fibrillation with RVR 09/24/2021    Chronic systolic congestive heart failure 08/31/2017    Depression     Emphysema of lung 08/31/2017    GERD (gastroesophageal reflux disease)     Hypertension     Knee osteoarthritis 10/21/2022    Major  neurocognitive disorder due to multiple etiologies 04/14/2023    Microcytic anemia 11/24/2020    Other hyperlipidemia 04/27/2021    Personal history of colonic polyps 2022    Prostate cancer      Past Surgical History:   Procedure Laterality Date    CATHETERIZATION OF BOTH LEFT AND RIGHT HEART N/A 1/13/2022    Procedure: CATHETERIZATION, HEART, BOTH LEFT AND RIGHT;  Surgeon: Janneth Tovar MD;  Location: [facility] CATH LAB;  Service: Cardiology;  Laterality: N/A;  poss cx    COLONOSCOPY      COLONOSCOPY N/A 6/30/2022    Procedure: COLONOSCOPY;  Surgeon: Sandra Perez MD;  Location: [facility] ENDO;  Service: Endoscopy;  Laterality: N/A;    COLONOSCOPY N/A 7/1/2022    Procedure: COLONOSCOPY;  Surgeon: Woodrow Christian MD;  Location: [facility];  Service: Endoscopy;  Laterality: N/A;    CORONARY ANGIOGRAPHY N/A 1/13/2022    Procedure: ANGIOGRAM, CORONARY ARTERY;  Surgeon: Janneth Tovar MD;  Location: [facility] CATH LAB;  Service: Cardiology;  Laterality: N/A;    ESOPHAGOGASTRODUODENOSCOPY N/A 6/30/2022    Procedure: EGD (ESOPHAGOGASTRODUODENOSCOPY);  Surgeon: Sandra Perez MD;  Location: [facility];  Service: Endoscopy;  Laterality: N/A;    INJECTION OF ANESTHETIC AGENT AROUND NERVE Bilateral 6/20/2023    Procedure: Bilateral Genicular nerve block RN IV Sedation;  Surgeon: Devon Grant MD;  Location: [facility] PAIN MGT;  Service: Pain Management;  Laterality: Bilateral;    INJECTION OF JOINT Bilateral 10/21/2022    Procedure: Bilateral intraarticular knee injection with local ALLERGIC TO IODINE;  Surgeon: Devon Grant MD;  Location: [facility] PAIN MGT;  Service: Pain Management;  Laterality: Bilateral;    INJECTION OF JOINT Bilateral 1/31/2023    Procedure: Bilateral IA knee joint injection with steroid RN IV Sedation;  Surgeon: Devon Grant MD;  Location: [facility] PAIN MGT;  Service: Pain Management;  Laterality: Bilateral;    INJECTION OF JOINT Bilateral 5/10/2023    Procedure: Bilateral IA knee joint injection Synvisc  RN IV Sedation;  Surgeon: Devon Grant MD;  Location: [facility] PAIN MGT;  Service: Pain Management;  Laterality: Bilateral;    INJECTION OF JOINT Bilateral 11/7/2023    Procedure: Bilateral intraarticular knee injection;  Surgeon: Devon Grant MD;  Location: [facility] PAIN MGT;  Service: Pain Management;  Laterality: Bilateral;    PROSTATE SURGERY      RADIOFREQUENCY THERMOCOAGULATION Left 9/1/2023    Procedure: Left Genicular Nerve RFA;  Surgeon: Devon Grant MD;  Location: [facility] PAIN MGT;  Service: Pain Management;  Laterality: Left;    RADIOFREQUENCY THERMOCOAGULATION Right 9/15/2023    Procedure: Right Genicular Nerve RFA RN IV Sedation;  Surgeon: Devon Grant MD;  Location: [facility] PAIN MGT;  Service: Pain Management;  Laterality: Right;    RADIOFREQUENCY THERMOCOAGULATION Bilateral 9/18/2024    Procedure: Bilateral Genicular Nerve RFA;  Surgeon: Devon Grant MD;  Location: [facility] PAIN MGT;  Service: Pain Management;  Laterality: Bilateral;    SPINE SURGERY       Review of patient's allergies indicates:   Allergen Reactions    Fish containing products     Iodine and iodide containing products     Shellfish containing products      Current Outpatient Medications on File Prior to Visit   Medication Sig Dispense Refill    aspirin (ECOTRIN) 81 MG EC tablet Take 1 tablet (81 mg total) by mouth once daily. 90 tablet 3    diclofenac sodium (VOLTAREN) 1 % Gel Apply 4 grams topically 4 (four) times daily. Bilateral knees 100 g 3    empagliflozin (JARDIANCE) 10 mg tablet Take 1 tablet (10 mg total) by mouth once daily. 90 tablet 3    ferrous sulfate 325 (65 FE) MG EC tablet TAKE 1 TABLET ONE TIME DAILY 90 tablet 3    furosemide (LASIX) 40 MG tablet Take 1 tablet (40 mg total) by mouth once daily. Do not take if BP is below 110/70 90 tablet 1    gabapentin (NEURONTIN) 300 MG capsule Take 1 capsule (300 mg total) by mouth every evening. 30 capsule 3    memantine (NAMENDA) 5 MG Tab TAKE 1 TABLET TWICE DAILY 180 tablet 3     midodrine (PROAMATINE) 5 MG Tab Take 1 tablet (5 mg total) by mouth every meal as needed (give if BP is below 90/70). 90 tablet 3    sacubitriL-valsartan (ENTRESTO) 24-26 mg per tablet Take 1 tablet by mouth 2 (two) times daily. Do no take if BP is below 110/70 180 tablet 1    [DISCONTINUED] albuterol (PROVENTIL/VENTOLIN HFA) 90 mcg/actuation inhaler Inhale 2 puffs into the lungs every 4 (four) hours as needed for Wheezing or Shortness of Breath. 8.5 g 11    [DISCONTINUED] fluticasone-umeclidin-vilanter (TRELEGY ELLIPTA) 200-62.5-25 mcg inhaler Inhale 1 puff into the lungs once daily. 60 each 11     No current facility-administered medications on file prior to visit.     Social History     Socioeconomic History    Marital status:      Spouse name: jenn     Number of children: 6   Tobacco Use    Smoking status: Some Days     Current packs/day: 0.25     Average packs/day: 1 pack/day for 71.2 years (69.9 ttl pk-yrs)     Types: Cigarettes     Start date: 1[birth year]1/1954     Last attempt to quit: 3/1/2023     Passive exposure: Past    Smokeless tobacco: Never   Substance and Sexual Activity    Alcohol use: Yes     Alcohol/week: 2.0 standard drinks of alcohol     Types: 2 Cans of beer per week     Comment: 1 beer once or twice a week    Drug use: Never    Sexual activity: Yes     Partners: Female     Social Drivers of Health     Financial Resource Strain: Low Risk  (4/9/2025)    Overall Financial Resource Strain (CARDIA)     Difficulty of Paying Living Expenses: Not very hard   Food Insecurity: No Food Insecurity (4/9/2025)    Hunger Vital Sign     Worried About Running Out of Food in the Last Year: Never true     Ran Out of Food in the Last Year: Never true   Transportation Needs: No Transportation Needs (4/9/2025)    PRAPARE - Transportation     Lack of Transportation (Medical): No     Lack of Transportation (Non-Medical): No   Physical Activity: Insufficiently Active (4/9/2025)    Exercise Vital Sign     Days of  "Exercise per Week: 2 days     Minutes of Exercise per Session: 20 min   Stress: No Stress Concern Present (4/9/2025)    [place] [place] [employer] - Occupational Stress Questionnaire     Feeling of Stress : Only a little   Housing Stability: Low Risk  (4/9/2025)    Housing Stability Vital Sign     Unable to Pay for Housing in the Last Year: No     Number of Times Moved in the Last Year: 1     Homeless in the Last Year: No     Family History   Problem Relation Name Age of Onset    Diabetes Mother      Peripheral vascular disease Mother         Review of Systems   Constitutional:  Positive for fatigue. Negative for fever.   HENT:  Positive for postnasal drip, rhinorrhea and congestion.    Eyes:  Negative for redness and itching.   Respiratory:  Positive for cough, sputum production, shortness of breath, dyspnea on extertion, use of rescue inhaler and Paroxysmal Nocturnal Dyspnea.    Cardiovascular:  Positive for palpitations. Negative for chest pain and leg swelling.   Genitourinary:  Negative for difficulty urinating and hematuria.   Endocrine:  Negative for cold intolerance and heat intolerance.    Musculoskeletal:  Positive for arthralgias.   Skin:  Negative for rash.   Gastrointestinal:  Negative for nausea and abdominal pain.   Neurological:  Negative for dizziness, syncope, weakness and light-headedness.   Hematological:  Negative for adenopathy. Does not bruise/bleed easily.   Psychiatric/Behavioral:  Negative for sleep disturbance. The patient is not nervous/anxious.        Objective:      /72 (Patient Position: Sitting)   Pulse (!) 59   Resp 20   Ht 5' 8" (1.727 m)   Wt 64.8 kg (142 lb 12 oz)   SpO2 98%   BMI 21.70 kg/m²   Physical Exam  Vitals and nursing note reviewed.   Constitutional:       Appearance: Normal appearance. He is well-developed.   HENT:      Head: Normocephalic and atraumatic.      Nose: Congestion and rhinorrhea present.   Eyes:      Conjunctiva/sclera: " "Conjunctivae normal.      Pupils: Pupils are equal, round, and reactive to light.   Neck:      Thyroid: No thyromegaly.      Vascular: No JVD.      Trachea: No tracheal deviation.   Cardiovascular:      Rate and Rhythm: Normal rate. Rhythm irregular.      Heart sounds: No murmur heard.  Pulmonary:      Breath sounds: Examination of the right-lower field reveals wheezing. Examination of the left-lower field reveals wheezing. Decreased breath sounds and wheezing present. No rhonchi or rales.   Abdominal:      General: Bowel sounds are normal.      Palpations: Abdomen is soft.   Musculoskeletal:         General: No tenderness. Normal range of motion.      Cervical back: Neck supple.   Lymphadenopathy:      Cervical: No cervical adenopathy.   Skin:     General: Skin is warm and dry.   Neurological:      Mental Status: He is alert and oriented to person, place, and time.       Personal Diagnostic Review  CT stable nodule        5/27/2025     9:30 AM   Pulmonary Studies Review   SpO2 98 %   Height 5' 8" (1.727 m)   Weight 64.8 kg (142 lb 12 oz)   BMI (Calculated) 21.7   Predicted Distance 282.3   Predicted Distance Meters (Calculated) 462.7 meters       CT Chest Without Contrast  Narrative: EXAMINATION:  CT CHEST WITHOUT CONTRAST    CLINICAL HISTORY:  Solitary pulmonary noduleLung nodule, > 8mm;    TECHNIQUE:  Axial CT images performed through the chest without intravenous contrast.    COMPARISON:  06/19/2024, 03/19/2024, 03/28/2023, 09/08/2022    FINDINGS:  Fusiform aneurysmal dilatation of the ascending aorta measuring up to 5.1 cm.  Aortic atherosclerosis.  Severe coronary artery calcifications.  No thoracic adenopathy.    Severe emphysema.  Bandlike scarring in the left upper lobe is stable.  Stable nodular opacity in the superior segment the left lower lobe.  Decreased size of the previously described lingular pulmonary nodule.  No new pulmonary nodules.  No effusions.  No pneumothorax.    The upper abdominal " "visualized organs are within normal limits.    Multilevel thoracolumbar spondylosis.  Impression: Stable emphysema and left lower lobe pulmonary nodule.  Decreased size of the previously described lingular nodule.  Recommend annual low-dose chest CT screening.    Stable ascending aortic aneurysm.    All CT scans at this facility are performed  using dose modulation techniques as appropriate to performed exam including the following:  automated exposure control; adjustment of mA and/or kV according to the patients size (this includes techniques or standardized protocols for targeted exams where dose is matched to indication/reason for exam: i.e. extremities or head);  iterative reconstruction technique.    Electronically signed by: Jimmie Dwyer MD  Date:    01/13/2025  Time:    09:16      Office Spirometry Results:         5/27/2025     9:30 AM 4/10/2025     8:46 AM 4/8/2025    10:19 AM 1/28/2025     7:47 AM 1/13/2025     9:35 AM 1/10/2025     2:03 AM 1/10/2025    12:38 AM   Pulmonary Function Tests   SpO2 98 %  96 %   95 % 99 %   Height 5' 8" (1.727 m)  5' 8" (1.727 m) 5' 8" (1.727 m) 5' 8" (1.727 m)     Weight 64.8 kg (142 lb 12 oz) 65.4 kg (144 lb 2.9 oz) 65.8 kg (145 lb) 66.2 kg (145 lb 15.1 oz) 65.2 kg (143 lb 11.8 oz)     BMI (Calculated) 21.7  22.1 22.2 21.9           5/27/2025     9:30 AM   Pulmonary Studies Review   SpO2 98 %   Height 5' 8" (1.727 m)   Weight 64.8 kg (142 lb 12 oz)   BMI (Calculated) 21.7   Predicted Distance 282.3   Predicted Distance Meters (Calculated) 462.7 meters   [facility] - Pulmonary Function  Six Minute Walk      SUMMARY      Ordering Provider: Dr. Jackson              Interpreting Provider: Dr. Jackson  Performing nurse/tech/RT: NADJA Pritchard RRT  Diagnosis: Emphysema  Height: 5' 8" (172.7 cm)  Weight: 67.3 kg (148 lb 4.2 oz)  BMI (Calculated): 22.5              Patient Race:                                                                 Phase Oxygen Assessment Supplemental " O2 Heart   Rate Blood Pressure [place] Dyspnea Scale Rating   Resting 96 % Room Air 85 bpm 98/48 2   Exercise             Minute             1 92 % Room Air 89 bpm       2 92 % Room Air 90 bpm       3 96 % Room Air 89 bpm       4 100 % Room Air 94 bpm       5 94 % Room Air 96 bpm       6  96 % Room Air 95 bpm 116/57 3   Recovery             Minute             1 97 % Room Air 96 bpm       2 97 % Room Air 90 bpm       3 97 % Room Air 85 bpm       4 97 % Room Air 85 bpm 147/63 2      Six Minute Walk Summary  6MWT Status: completed without stopping  Patient Reported: Dyspnea         Interpretation:  Did the patient stop or pause?: No  Total Time Walked (Calculated): 360 seconds  Final Partial Lap Distance (feet): 0 feet  Total Distance Meters (Calculated): 243.84 meters  Predicted Distance Meters (Calculated): 463.32 meters  Percentage of Predicted (Calculated): 52.63  Peak VO2 (Calculated): 11.3  Mets: 3.23  Has The Patient Had a Previous Six Minute Walk Test?: Yes     Previous 6MWT Results  Has The Patient Had a Previous Six Minute Walk Test?: Yes  Date of Previous Test: 03/28/23  Total Time Walked: 346 seconds  Total Distance (meters): 182.88  Predicted Distance (meters): 512.76 meters  Percentage of Predicted: 35.67  Percent Change (Calculated): -0.33         Assessment:            Centrilobular emphysema    Calcified granuloma of lung    Shortness of breath  -     albuterol (PROVENTIL/VENTOLIN HFA) 90 mcg/actuation inhaler; Inhale 2 puffs into the lungs every 4 (four) hours as needed for Wheezing or Shortness of Breath (take before exercise).  Dispense: 8.5 g; Refill: 11  -     Spirometry with/without bronchodilator; Future; Expected date: 11/27/2025    Solitary pulmonary nodule  -     CT Chest Without Contrast; Future; Expected date: 05/27/2025    Panlobular emphysema  -     albuterol (PROVENTIL/VENTOLIN HFA) 90 mcg/actuation inhaler; Inhale 2 puffs into the lungs every 4 (four) hours as needed for Wheezing or  Shortness of Breath (take before exercise).  Dispense: 8.5 g; Refill: 11  -     fluticasone-umeclidin-vilanter (TRELEGY ELLIPTA) 200-62.5-25 mcg inhaler; Inhale 1 puff into the lungs once daily. Controller.  Dispense: 60 each; Refill: 11  -     Spirometry with/without bronchodilator; Future; Expected date: 11/27/2025    Atrial fibrillation, unspecified type          Outpatient Encounter Medications as of 5/27/2025   Medication Sig Dispense Refill    aspirin (ECOTRIN) 81 MG EC tablet Take 1 tablet (81 mg total) by mouth once daily. 90 tablet 3    diclofenac sodium (VOLTAREN) 1 % Gel Apply 4 grams topically 4 (four) times daily. Bilateral knees 100 g 3    empagliflozin (JARDIANCE) 10 mg tablet Take 1 tablet (10 mg total) by mouth once daily. 90 tablet 3    ferrous sulfate 325 (65 FE) MG EC tablet TAKE 1 TABLET ONE TIME DAILY 90 tablet 3    furosemide (LASIX) 40 MG tablet Take 1 tablet (40 mg total) by mouth once daily. Do not take if BP is below 110/70 90 tablet 1    gabapentin (NEURONTIN) 300 MG capsule Take 1 capsule (300 mg total) by mouth every evening. 30 capsule 3    memantine (NAMENDA) 5 MG Tab TAKE 1 TABLET TWICE DAILY 180 tablet 3    midodrine (PROAMATINE) 5 MG Tab Take 1 tablet (5 mg total) by mouth every meal as needed (give if BP is below 90/70). 90 tablet 3    sacubitriL-valsartan (ENTRESTO) 24-26 mg per tablet Take 1 tablet by mouth 2 (two) times daily. Do no take if BP is below 110/70 180 tablet 1    [DISCONTINUED] albuterol (PROVENTIL/VENTOLIN HFA) 90 mcg/actuation inhaler Inhale 2 puffs into the lungs every 4 (four) hours as needed for Wheezing or Shortness of Breath. 8.5 g 11    [DISCONTINUED] fluticasone-umeclidin-vilanter (TRELEGY ELLIPTA) 200-62.5-25 mcg inhaler Inhale 1 puff into the lungs once daily. 60 each 11    albuterol (PROVENTIL/VENTOLIN HFA) 90 mcg/actuation inhaler Inhale 2 puffs into the lungs every 4 (four) hours as needed for Wheezing or Shortness of Breath (take before exercise). 8.5  g 11    fluticasone-umeclidin-vilanter (TRELEGY ELLIPTA) 200-62.5-25 mcg inhaler Inhale 1 puff into the lungs once daily. Controller. 60 each 11     No facility-administered encounter medications on file as of 5/27/2025.     Plan:       Requested Prescriptions     Signed Prescriptions Disp Refills    albuterol (PROVENTIL/VENTOLIN HFA) 90 mcg/actuation inhaler 8.5 g 11     Sig: Inhale 2 puffs into the lungs every 4 (four) hours as needed for Wheezing or Shortness of Breath (take before exercise).    fluticasone-umeclidin-vilanter (TRELEGY ELLIPTA) 200-62.5-25 mcg inhaler 60 each 11     Sig: Inhale 1 puff into the lungs once daily. Controller.     Problem List Items Addressed This Visit       Atrial fibrillation    Calcified granuloma of lung    Emphysema of lung - Primary    Relevant Medications    albuterol (PROVENTIL/VENTOLIN HFA) 90 mcg/actuation inhaler    fluticasone-umeclidin-vilanter (TRELEGY ELLIPTA) 200-62.5-25 mcg inhaler    Other Relevant Orders    Spirometry with/without bronchodilator    Shortness of breath    Relevant Medications    albuterol (PROVENTIL/VENTOLIN HFA) 90 mcg/actuation inhaler    Other Relevant Orders    Spirometry with/without bronchodilator    Solitary pulmonary nodule    Relevant Orders    CT Chest Without Contrast          Follow up in about 8 months (around 1/27/2026) for jodie - on return, CT - on return visit.    MEDICAL DECISION MAKING: Moderate to high complexity.  Overall, the multiple problems listed are of moderate to high severity that may impact quality of life and activities of daily living. Side effects of medications, treatment plan as well as options and alternatives reviewed and discussed with patient. There was counseling of patient concerning these issues.    Total time spent in counseling and coordination of care - 30  minutes of total time spent on the encounter, which includes face to face time and non-face to face time preparing to see the patient (eg, review of  tests), Obtaining and/or reviewing separately obtained history, Documenting clinical information in the electronic or other health record, Independently interpreting results (not separately reported) and communicating results to the patient/family/caregiver, or Care coordination (not separately reported).    Time was used in discussion of prognosis, risks, benefits of treatment, instructions and compliance with regimen . Discussion with other physicians and/or health care providers - home health or for use of durable medical equipment (oxygen, nebulizers, CPAP, BiPAP) occurred.

## 2025-07-01 ENCOUNTER — OFFICE VISIT (OUTPATIENT)
Dept: CARDIOLOGY | Facility: CLINIC | Age: 85
End: 2025-07-01
Payer: MEDICARE

## 2025-07-01 VITALS
SYSTOLIC BLOOD PRESSURE: 112 MMHG | BODY MASS INDEX: 22.26 KG/M2 | DIASTOLIC BLOOD PRESSURE: 50 MMHG | WEIGHT: 146.38 LBS | OXYGEN SATURATION: 97 % | HEART RATE: 75 BPM

## 2025-07-01 DIAGNOSIS — R29.898 BILATERAL LEG WEAKNESS: ICD-10-CM

## 2025-07-01 DIAGNOSIS — R06.02 SHORTNESS OF BREATH: ICD-10-CM

## 2025-07-01 DIAGNOSIS — R06.02 SOB (SHORTNESS OF BREATH): ICD-10-CM

## 2025-07-01 DIAGNOSIS — I48.0 PAF (PAROXYSMAL ATRIAL FIBRILLATION): ICD-10-CM

## 2025-07-01 DIAGNOSIS — K55.21 AVM (ARTERIOVENOUS MALFORMATION) OF SMALL BOWEL, ACQUIRED WITH HEMORRHAGE: ICD-10-CM

## 2025-07-01 DIAGNOSIS — D50.0 IRON DEFICIENCY ANEMIA DUE TO CHRONIC BLOOD LOSS: ICD-10-CM

## 2025-07-01 DIAGNOSIS — D63.8 ANEMIA OF CHRONIC DISEASE: ICD-10-CM

## 2025-07-01 DIAGNOSIS — I50.42 CHRONIC COMBINED SYSTOLIC AND DIASTOLIC CONGESTIVE HEART FAILURE, NYHA CLASS 3: ICD-10-CM

## 2025-07-01 DIAGNOSIS — I27.20 PULMONARY HYPERTENSION: ICD-10-CM

## 2025-07-01 DIAGNOSIS — I48.0 PAROXYSMAL ATRIAL FIBRILLATION: ICD-10-CM

## 2025-07-01 DIAGNOSIS — J43.2 CENTRILOBULAR EMPHYSEMA: ICD-10-CM

## 2025-07-01 DIAGNOSIS — E78.49 OTHER HYPERLIPIDEMIA: Primary | ICD-10-CM

## 2025-07-01 DIAGNOSIS — R60.0 LOCALIZED EDEMA: ICD-10-CM

## 2025-07-01 PROCEDURE — 3078F DIAST BP <80 MM HG: CPT | Mod: CPTII,HCNC,S$GLB, | Performed by: INTERNAL MEDICINE

## 2025-07-01 PROCEDURE — 3288F FALL RISK ASSESSMENT DOCD: CPT | Mod: CPTII,HCNC,S$GLB, | Performed by: INTERNAL MEDICINE

## 2025-07-01 PROCEDURE — 3074F SYST BP LT 130 MM HG: CPT | Mod: CPTII,HCNC,S$GLB, | Performed by: INTERNAL MEDICINE

## 2025-07-01 PROCEDURE — 99999 PR PBB SHADOW E&M-EST. PATIENT-LVL III: CPT | Mod: PBBFAC,HCNC,, | Performed by: INTERNAL MEDICINE

## 2025-07-01 PROCEDURE — 1157F ADVNC CARE PLAN IN RCRD: CPT | Mod: CPTII,HCNC,S$GLB, | Performed by: INTERNAL MEDICINE

## 2025-07-01 PROCEDURE — 1101F PT FALLS ASSESS-DOCD LE1/YR: CPT | Mod: CPTII,HCNC,S$GLB, | Performed by: INTERNAL MEDICINE

## 2025-07-01 PROCEDURE — G2211 COMPLEX E/M VISIT ADD ON: HCPCS | Mod: HCNC,S$GLB,, | Performed by: INTERNAL MEDICINE

## 2025-07-01 PROCEDURE — 1160F RVW MEDS BY RX/DR IN RCRD: CPT | Mod: CPTII,HCNC,S$GLB, | Performed by: INTERNAL MEDICINE

## 2025-07-01 PROCEDURE — 1159F MED LIST DOCD IN RCRD: CPT | Mod: CPTII,HCNC,S$GLB, | Performed by: INTERNAL MEDICINE

## 2025-07-01 PROCEDURE — 99214 OFFICE O/P EST MOD 30 MIN: CPT | Mod: HCNC,S$GLB,, | Performed by: INTERNAL MEDICINE

## 2025-07-01 RX ORDER — MIDODRINE HYDROCHLORIDE 5 MG/1
5 TABLET ORAL
Qty: 90 TABLET | Refills: 3 | Status: SHIPPED | OUTPATIENT
Start: 2025-07-01 | End: 2026-07-01

## 2025-07-01 RX ORDER — SACUBITRIL AND VALSARTAN 24; 26 MG/1; MG/1
1 TABLET, FILM COATED ORAL 2 TIMES DAILY
Qty: 180 TABLET | Refills: 1 | Status: SHIPPED | OUTPATIENT
Start: 2025-07-01

## 2025-07-01 NOTE — PROGRESS NOTES
Subjective:   Patient ID:  Richy Douglas is a 84 y.o. male who presents for cardiac consult of No chief complaint on file.    Referring Physician:    Vivian Ch MD [[id number] [place] [address]      Reason for consult: CHF, AI    HPI  The patient came in today for cardiac consult of No chief complaint on file.      Richy Douglas is a 84 y.o. 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       9/4/24  BP and HR stable. BMI 22 - 146 lbs    BNP (pg/mL)   Date Value   04/10/2025 234 (H)   07/15/2024 880 (H)   06/18/2024 95   06/10/2024 120 (H)   12/28/2023 3,572 (H)   11/13/2023 2,565 (H)      BNP last month elevated --> 880  LE u/s 6/2024 with moderate to severe PAD referred to Dr. Bo - Asymptomatic pvd mostly his disease is infrapopliteal segment w/o any suggestion of limb loss or claudication. He is very limited in his mobility due to his lung disease he continues to smoke and drink etoh.     4/10/25    Is now off BB as this was causing some hypotension issues  Remains on SGLT2i and ARNi for GDMT  Also taking lasix still 40 mg daily, responds well  [facility]  HAd a fall last month after waking from bad dream, fell.   Pt had visit in CHF clinic in Jan 2025 - stable overall.   BP and HR stable. BMI 21 - 144 lbs   ECG - NSR, 1st deg AVB, LVH     7/1/25  BP low normal today. HR 70s.   BMI 22 - 146 - stable weight  Last BNP improving, feels well otherwise.       He did recycling, separation.     FH - mother - DM, PVD, tobacco    Interpretation Summary 6/2024  Show Result Comparison     RLE  50 to 75 % lesion of profunda and occluded pTA. Triphasic waveform. MARIBEL 1.10 normal    LLE occluded PTA and distal GUILLE. MARIBEL 1.10 normal    Two bakers cysts noted left leg measuring 5.3 cm x 1.5 cm and 2.5 cm x 1.0 cm Left GUILLE has retrograde flow noted.    Results for orders  placed during the hospital encounter of 02/09/24    Echo    Interpretation Summary    Left Ventricle: The left ventricle is moderately dilated. Normal wall thickness. There is eccentric hypertrophy. Moderate global hypokinesis present. There is moderately reduced systolic function with a visually estimated ejection fraction of 30 - 35%. There is normal diastolic function.    Right Ventricle: Mild right ventricular enlargement. Wall thickness is normal. Right ventricle wall motion  is normal. Systolic function is normal.    Left Atrium: Left atrium is moderately dilated.    Right Atrium: Right atrium is mildly dilated.    Aortic Valve: There is mild aortic regurgitation with a centrally directed jet.    Mitral Valve: There is mild regurgitation with a centrally directed jet.    Tricuspid Valve: There is mild regurgitation with a centrally directed jet.    Pulmonary Artery: The estimated pulmonary artery systolic pressure is 70 mmHg.    IVC/SVC: Normal venous pressure at 3 mmHg.      Results for orders placed during the hospital encounter of 02/11/23    Echo    Interpretation Summary  · The left ventricle is normal in size with concentric hypertrophy and moderately decreased systolic function.  · Severe left atrial enlargement.  · Trivial pericardial effusion.  · Mild tricuspid regurgitation.  · Severe right atrial enlargement.  · The estimated ejection fraction is 30%.  · There is severe left ventricular global hypokinesis.  · Mild right ventricular enlargement with mildly to moderately reduced right ventricular systolic function.  · Moderate aortic regurgitation.  · Elevated central venous pressure (15 mmHg).  · The estimated PA systolic pressure is 80 mmHg.  · There is pulmonary hypertension.  · The ascending aorta is moderately dilated.  · The aortic root is moderately dilated.  · Atrial fibrillation observed.  Aortic Root - End Diastolic   Ao root annulus 4.69 cm          Sinus 4.84 cm          STJ 4.89 cm           Ascending aorta 4.62 cm             LE arterial u/s 5/2022  Conclusion    Normal BLE MARIBEL with distal occlusions noted with moderate to severe plaque/blockage  Right distal PT/PER arteries are occluded  Left distal PT with monophasic waveforms, L GUILLE occluded      Conclusion 5/2022    A Baker's cyst measuring 1.67 cm x 3.77 cm was seen in the right extremity.  A Baker's cyst measuring 1.53 cm x 2.78 cm was seen in the left extremity.  There is no evidence of a left lower extremity DVT.  The right smaller saphenous vein is abnormal. A chronic thrombus is present.          Results for orders placed during the hospital encounter of 01/09/22    Cardiac catheterization    Conclusion  · The pre-procedure left ventricular end diastolic pressure was 13.  · The estimated blood loss was none.  · The coronary arteries were normal..  · The filling pressures on the right and left were normal.    The procedure log was documented by Documenter: Jenna Cantu, RN and verified by Janneth Tovar MD.    Date: 1/13/2022  Time: 5:22 PM          Past Medical History:   Diagnosis Date    Alcoholic peripheral neuropathy 03/01/2023    Anemia of chronic disease     Aortic aneurysm     Atrial fibrillation with RVR 09/24/2021    Chronic systolic congestive heart failure 08/31/2017    Depression     Emphysema of lung 08/31/2017    GERD (gastroesophageal reflux disease)     Hypertension     Knee osteoarthritis 10/21/2022    Major neurocognitive disorder due to multiple etiologies 04/14/2023    Microcytic anemia 11/24/2020    Other hyperlipidemia 04/27/2021    Personal history of colonic polyps 2022    Prostate cancer        Past Surgical History:   Procedure Laterality Date    CATHETERIZATION OF BOTH LEFT AND RIGHT HEART N/A 1/13/2022    Procedure: CATHETERIZATION, HEART, BOTH LEFT AND RIGHT;  Surgeon: Janneth Tovar MD;  Location: [facility] CATH LAB;  Service: Cardiology;  Laterality: N/A;  poss cx    COLONOSCOPY      COLONOSCOPY N/A 6/30/2022     Procedure: COLONOSCOPY;  Surgeon: Sandra Perez MD;  Location: [facility] ENDO;  Service: Endoscopy;  Laterality: N/A;    COLONOSCOPY N/A 7/1/2022    Procedure: COLONOSCOPY;  Surgeon: Woodrow Christian MD;  Location: [facility] ENDO;  Service: Endoscopy;  Laterality: N/A;    CORONARY ANGIOGRAPHY N/A 1/13/2022    Procedure: ANGIOGRAM, CORONARY ARTERY;  Surgeon: Janneth Tovar MD;  Location: [facility] CATH LAB;  Service: Cardiology;  Laterality: N/A;    ESOPHAGOGASTRODUODENOSCOPY N/A 6/30/2022    Procedure: EGD (ESOPHAGOGASTRODUODENOSCOPY);  Surgeon: Sandra Perez MD;  Location: [facility] ENDO;  Service: Endoscopy;  Laterality: N/A;    INJECTION OF ANESTHETIC AGENT AROUND NERVE Bilateral 6/20/2023    Procedure: Bilateral Genicular nerve block RN IV Sedation;  Surgeon: Devon Grant MD;  Location: [facility]GT;  Service: Pain Management;  Laterality: Bilateral;    INJECTION OF JOINT Bilateral 10/21/2022    Procedure: Bilateral intraarticular knee injection with local ALLERGIC TO IODINE;  Surgeon: Devon Grant MD;  Location: [facility];  Service: Pain Management;  Laterality: Bilateral;    INJECTION OF JOINT Bilateral 1/31/2023    Procedure: Bilateral IA knee joint injection with steroid RN IV Sedation;  Surgeon: Devon Grant MD;  Location: [facility];  Service: Pain Management;  Laterality: Bilateral;    INJECTION OF JOINT Bilateral 5/10/2023    Procedure: Bilateral IA knee joint injection Synvisc RN IV Sedation;  Surgeon: Devon Grant MD;  Location: [facility];  Service: Pain Management;  Laterality: Bilateral;    INJECTION OF JOINT Bilateral 11/7/2023    Procedure: Bilateral intraarticular knee injection;  Surgeon: Devon Grant MD;  Location: [facility];  Service: Pain Management;  Laterality: Bilateral;    PROSTATE SURGERY      RADIOFREQUENCY THERMOCOAGULATION Left 9/1/2023    Procedure: Left Genicular Nerve RFA;  Surgeon: Devon Grant MD;  Location: [facility];  Service: Pain Management;   Laterality: Left;    RADIOFREQUENCY THERMOCOAGULATION Right 9/15/2023    Procedure: Right Genicular Nerve RFA RN IV Sedation;  Surgeon: Devon Grant MD;  Location: [facility];  Service: Pain Management;  Laterality: Right;    RADIOFREQUENCY THERMOCOAGULATION Bilateral 9/18/2024    Procedure: Bilateral Genicular Nerve RFA;  Surgeon: Devon Grant MD;  Location: [facility];  Service: Pain Management;  Laterality: Bilateral;    SPINE SURGERY         Social History     Tobacco Use    Smoking status: Some Days     Current packs/day: 0.25     Average packs/day: 1 pack/day for 71.3 years (70.0 ttl pk-yrs)     Types: Cigarettes     Start date: [birth year]1/1954     Last attempt to quit: 3/1/2023     Passive exposure: Past    Smokeless tobacco: Never   Substance Use Topics    Alcohol use: Yes     Alcohol/week: 2.0 standard drinks of alcohol     Types: 2 Cans of beer per week     Comment: 1 beer once or twice a week    Drug use: Never       Family History   Problem Relation Name Age of Onset    Diabetes Mother      Peripheral vascular disease Mother         Patient's Medications   New Prescriptions    No medications on file   Previous Medications    ALBUTEROL (PROVENTIL/VENTOLIN HFA) 90 MCG/ACTUATION INHALER    Inhale 2 puffs into the lungs every 4 (four) hours as needed for Wheezing or Shortness of Breath (take before exercise).    ASPIRIN (ECOTRIN) 81 MG EC TABLET    Take 1 tablet (81 mg total) by mouth once daily.    DICLOFENAC SODIUM (VOLTAREN) 1 % GEL    Apply 4 grams topically 4 (four) times daily. Bilateral knees    EMPAGLIFLOZIN (JARDIANCE) 10 MG TABLET    Take 1 tablet (10 mg total) by mouth once daily.    FERROUS SULFATE 325 (65 FE) MG EC TABLET    TAKE 1 TABLET ONE TIME DAILY    FLUTICASONE-UMECLIDIN-VILANTER (TRELEGY ELLIPTA) 200-62.5-25 MCG INHALER    Inhale 1 puff into the lungs once daily. Controller.    FUROSEMIDE (LASIX) 40 MG TABLET    Take 1 tablet (40 mg total) by mouth once daily. Do not take if BP is  below 110/70    GABAPENTIN (NEURONTIN) 300 MG CAPSULE    Take 1 capsule (300 mg total) by mouth every evening.    MEMANTINE (NAMENDA) 5 MG TAB    TAKE 1 TABLET TWICE DAILY   Modified Medications    Modified Medication Previous Medication    MIDODRINE (PROAMATINE) 5 MG TAB midodrine (PROAMATINE) 5 MG Tab       Take 1 tablet (5 mg total) by mouth every meal as needed (give if BP is below 90/70).    Take 1 tablet (5 mg total) by mouth every meal as needed (give if BP is below 90/70).    SACUBITRIL-VALSARTAN (ENTRESTO) 24-26 MG PER TABLET sacubitriL-valsartan (ENTRESTO) 24-26 mg per tablet       Take 1 tablet by mouth 2 (two) times daily. Do not take if BP is below 110/70    Take 1 tablet by mouth 2 (two) times daily. Do no take if BP is below 110/70   Discontinued Medications    No medications on file       Review of Systems   Constitutional:  Positive for malaise/fatigue.   HENT: Negative.     Eyes: Negative.    Respiratory:  Positive for shortness of breath.    Cardiovascular:  Positive for chest pain and leg swelling.   Gastrointestinal: Negative.    Genitourinary: Negative.    Musculoskeletal:  Positive for back pain and joint pain.   Skin: Negative.    Neurological: Negative.    Endo/Heme/Allergies: Negative.    Psychiatric/Behavioral: Negative.     All 12 systems otherwise negative.      Wt Readings from Last 3 Encounters:   07/01/25 66.4 kg (146 lb 6.2 oz)   05/27/25 64.8 kg (142 lb 12 oz)   04/10/25 65.4 kg (144 lb 2.9 oz)     Temp Readings from Last 3 Encounters:   04/08/25 98.1 °F (36.7 °C) (Temporal)   01/09/25 97.8 °F (36.6 °C) (Oral)   09/18/24 97.5 °F (36.4 °C) (Temporal)     BP Readings from Last 3 Encounters:   07/01/25 (!) 112/50   05/27/25 120/72   04/10/25 (!) 138/56     Pulse Readings from Last 3 Encounters:   07/01/25 75   05/27/25 (!) 59   04/10/25 (!) 53       BP (!) 112/50 (BP Location: Left arm, Patient Position: Sitting)   Pulse 75   Wt 66.4 kg (146 lb 6.2 oz)   SpO2 97%   BMI 22.26 kg/m²      Objective:   Physical Exam  Vitals and nursing note reviewed.   Constitutional:       General: He is not in acute distress.     Appearance: He is well-developed. He is ill-appearing. He is not diaphoretic.   HENT:      Head: Normocephalic and atraumatic.      Nose: Nose normal.   Eyes:      General: No scleral icterus.     Conjunctiva/sclera: Conjunctivae normal.   Neck:      Thyroid: No thyromegaly.      Vascular: No JVD.   Cardiovascular:      Rate and Rhythm: Normal rate and regular rhythm.      Heart sounds: S1 normal and S2 normal. Murmur heard.      No friction rub. No gallop. No S3 or S4 sounds.   Pulmonary:      Effort: Pulmonary effort is normal. No respiratory distress.      Breath sounds: Normal breath sounds. No stridor. No wheezing or rales.   Chest:      Chest wall: No tenderness.   Abdominal:      General: Bowel sounds are normal. There is no distension.      Palpations: Abdomen is soft. There is no mass.      Tenderness: There is no abdominal tenderness. There is no rebound.   Genitourinary:     Comments: Deferred  Musculoskeletal:         General: No tenderness or deformity. Normal range of motion.      Cervical back: Normal range of motion and neck supple.      Right lower leg: Edema present.      Left lower leg: Edema present.   Lymphadenopathy:      Cervical: No cervical adenopathy.   Skin:     General: Skin is warm and dry.      Coloration: Skin is not pale.      Findings: No erythema or rash.   Neurological:      Mental Status: He is alert and oriented to person, place, and time.      Motor: No abnormal muscle tone.      Coordination: Coordination normal.   Psychiatric:         Behavior: Behavior normal.         Thought Content: Thought content normal.         Judgment: Judgment normal.         Lab Results   Component Value Date     04/10/2025     07/15/2024    K 4.2 04/10/2025    K 4.3 07/15/2024     04/10/2025     07/15/2024    CO2 31 (H) 04/10/2025    CO2 26  07/15/2024    BUN 28 (H) 04/10/2025    CREATININE 1.0 04/10/2025    GLU 95 04/10/2025    GLU 79 07/15/2024    HGBA1C 5.3 06/10/2024    MG 2.3 04/10/2025    AST 22 07/15/2024    ALT 11 07/15/2024    ALBUMIN 4.1 07/15/2024    PROT 7.1 07/15/2024    BILITOT 1.4 (H) 07/15/2024    WBC 5.90 06/18/2024    HGB 10.4 (L) 06/18/2024    HCT 31.6 (L) 06/18/2024    MCV 81 (L) 06/18/2024     06/18/2024    INR 1.4 (H) 09/24/2021    TSH 1.170 06/10/2024    CHOL 123 06/10/2024    HDL 50 06/10/2024    LDLCALC 61.6 (L) 06/10/2024    TRIG 57 06/10/2024     (H) 04/10/2025     Assessment:      1. Other hyperlipidemia    2. Anemia of chronic disease    3. Iron deficiency anemia due to chronic blood loss    4. Chronic combined systolic and diastolic congestive heart failure, NYHA class 3    5. Pulmonary hypertension    6. AVM (arteriovenous malformation) of small bowel, acquired with hemorrhage    7. PAF (paroxysmal atrial fibrillation)    8. Shortness of breath    9. Bilateral leg weakness    10. Paroxysmal atrial fibrillation    11. Centrilobular emphysema    12. SOB (shortness of breath)    13. Localized edema            Plan:   1. BIV failure EF 30%, AI with aortic atherosc;   --> 3158 --> 120 --> 880  - R/[facility] normal coronaries 1/2022  - ECHO 2/2024 - LV EF 30-35%, normal RV, mild AI, mild MR, mild TR, PASP 70 mmHg  - cont Entresto and BB - low dose if BP allows   - cont lasix 40mg daily but hold if BP lower   - repeat labs  PRN   - repeat ECHO ordered     2. Emphysema with dyspnea, pulm HTN; still smokes   - cont tx PCP/Pulm    3. HTN with aortic root moderately dilated with AI -low BP now  - titrate meds  - cont to monitor   - start midodrine PRN     4. HLD with aortic athero and Coronary artery calcifications  - cont statin     5. PAF, diagnosed in setting of sepsis/PNA, had SVT in hosp - sec to alc withdrawal  - cont BB; stop Anticoag  - [place]one AF, Dr. Christianson discussed with pt to stop anticoag sec risk of  bleeding    6. AVM/Symptomatic anemia s/p PRBC with fe def  - angioectasias noted in recent EGD s/p APC  - off Eliquis now  - f/u heme/onc -    7. PVD, Leg Edema, pain with claudication   - LE arterial u/s with distal occlusions/monophasic waveforms, normal BLE MARIBEL.   -6/2024 moderate to severe PAD referred to Dr. Bo - Asymptomatic pvd - will monitor     8. Fall with hematoma in right hip  - f/u pain management for knee pain/baker's cysts  - s/p knee injections - proceed for RFA for with Dr. Grant     Visit today included increased complexity associated with the care of the episodic problem CHF addressed and managing the longitudinal care of the patient due to the serious and/or complex managed problem(s) .      Thank you for allowing me to participate in this patient's care. Please do not hesitate to contact me with any questions or concerns. Consult note has been forwarded to the referral physician.

## 2025-07-02 RX ORDER — FUROSEMIDE 40 MG/1
40 TABLET ORAL DAILY
Qty: 90 TABLET | Refills: 1 | Status: SHIPPED | OUTPATIENT
Start: 2025-07-02 | End: 2025-12-29

## 2025-07-07 ENCOUNTER — PATIENT MESSAGE (OUTPATIENT)
Dept: PRIMARY CARE CLINIC | Facility: CLINIC | Age: 85
End: 2025-07-07
Payer: MEDICARE

## 2025-07-07 RX ORDER — ASPIRIN 81 MG/1
81 TABLET ORAL DAILY
Qty: 90 TABLET | Refills: 3 | Status: SHIPPED | OUTPATIENT
Start: 2025-07-07 | End: 2026-07-07

## 2025-07-14 ENCOUNTER — HOSPITAL ENCOUNTER (OUTPATIENT)
Dept: CARDIOLOGY | Facility: HOSPITAL | Age: 85
Discharge: HOME | End: 2025-07-14
Attending: INTERNAL MEDICINE
Payer: MEDICARE

## 2025-07-14 ENCOUNTER — OFFICE VISIT (OUTPATIENT)
Dept: CARDIOLOGY | Facility: CLINIC | Age: 85
End: 2025-07-14
Payer: MEDICARE

## 2025-07-14 ENCOUNTER — OFFICE VISIT (OUTPATIENT)
Dept: PRIMARY CARE CLINIC | Facility: CLINIC | Age: 85
End: 2025-07-14
Payer: MEDICARE

## 2025-07-14 VITALS
HEIGHT: 68 IN | SYSTOLIC BLOOD PRESSURE: 110 MMHG | DIASTOLIC BLOOD PRESSURE: 60 MMHG | WEIGHT: 143.5 LBS | OXYGEN SATURATION: 89 % | HEART RATE: 82 BPM | BODY MASS INDEX: 21.75 KG/M2

## 2025-07-14 VITALS
SYSTOLIC BLOOD PRESSURE: 112 MMHG | BODY MASS INDEX: 21.67 KG/M2 | WEIGHT: 143 LBS | DIASTOLIC BLOOD PRESSURE: 50 MMHG | HEIGHT: 68 IN

## 2025-07-14 DIAGNOSIS — D51.3 OTHER DIETARY VITAMIN B12 DEFICIENCY ANEMIA: ICD-10-CM

## 2025-07-14 DIAGNOSIS — M25.562 CHRONIC PAIN OF BOTH KNEES: ICD-10-CM

## 2025-07-14 DIAGNOSIS — K55.21 AVM (ARTERIOVENOUS MALFORMATION) OF SMALL BOWEL, ACQUIRED WITH HEMORRHAGE: ICD-10-CM

## 2025-07-14 DIAGNOSIS — E78.49 OTHER HYPERLIPIDEMIA: ICD-10-CM

## 2025-07-14 DIAGNOSIS — M25.561 CHRONIC PAIN OF BOTH KNEES: ICD-10-CM

## 2025-07-14 DIAGNOSIS — R60.0 LOCALIZED EDEMA: ICD-10-CM

## 2025-07-14 DIAGNOSIS — I48.0 PAROXYSMAL ATRIAL FIBRILLATION: ICD-10-CM

## 2025-07-14 DIAGNOSIS — F10.230 ALCOHOL DEPENDENCE WITH UNCOMPLICATED WITHDRAWAL: ICD-10-CM

## 2025-07-14 DIAGNOSIS — J43.2 CENTRILOBULAR EMPHYSEMA: ICD-10-CM

## 2025-07-14 DIAGNOSIS — D63.8 ANEMIA OF CHRONIC DISEASE: ICD-10-CM

## 2025-07-14 DIAGNOSIS — R06.02 SHORTNESS OF BREATH: ICD-10-CM

## 2025-07-14 DIAGNOSIS — F02.80 MAJOR NEUROCOGNITIVE DISORDER DUE TO MULTIPLE ETIOLOGIES: ICD-10-CM

## 2025-07-14 DIAGNOSIS — I70.0 ATHEROSCLEROSIS OF ABDOMINAL AORTA: ICD-10-CM

## 2025-07-14 DIAGNOSIS — I10 PRIMARY HYPERTENSION: ICD-10-CM

## 2025-07-14 DIAGNOSIS — I27.20 PULMONARY HYPERTENSION: ICD-10-CM

## 2025-07-14 DIAGNOSIS — R06.02 SOB (SHORTNESS OF BREATH): ICD-10-CM

## 2025-07-14 DIAGNOSIS — I50.42 CHRONIC COMBINED SYSTOLIC AND DIASTOLIC CONGESTIVE HEART FAILURE, NYHA CLASS 3: ICD-10-CM

## 2025-07-14 DIAGNOSIS — I48.0 PAF (PAROXYSMAL ATRIAL FIBRILLATION): ICD-10-CM

## 2025-07-14 DIAGNOSIS — R29.898 BILATERAL LEG WEAKNESS: ICD-10-CM

## 2025-07-14 DIAGNOSIS — G89.29 CHRONIC PAIN OF BOTH KNEES: ICD-10-CM

## 2025-07-14 DIAGNOSIS — D46.1 IDIOPATHIC REFRACTORY ANEMIA: ICD-10-CM

## 2025-07-14 DIAGNOSIS — I50.22 CHRONIC SYSTOLIC CONGESTIVE HEART FAILURE: ICD-10-CM

## 2025-07-14 DIAGNOSIS — I50.22 CHRONIC SYSTOLIC CONGESTIVE HEART FAILURE: Primary | ICD-10-CM

## 2025-07-14 DIAGNOSIS — D50.0 IRON DEFICIENCY ANEMIA DUE TO CHRONIC BLOOD LOSS: ICD-10-CM

## 2025-07-14 DIAGNOSIS — M17.12 PRIMARY OSTEOARTHRITIS OF LEFT KNEE: ICD-10-CM

## 2025-07-14 DIAGNOSIS — J43.9 PULMONARY EMPHYSEMA, UNSPECIFIED EMPHYSEMA TYPE: ICD-10-CM

## 2025-07-14 DIAGNOSIS — R73.09 ABNORMAL GLUCOSE: ICD-10-CM

## 2025-07-14 DIAGNOSIS — D63.8 ANEMIA OF CHRONIC DISEASE: Primary | ICD-10-CM

## 2025-07-14 DIAGNOSIS — Z74.8 DEPENDENT FOR TRANSPORTATION: ICD-10-CM

## 2025-07-14 LAB
AORTIC ROOT ANNULUS: 4.6 CM
AORTIC SIZE INDEX (SOV): 2.6 CM/M2
AORTIC SIZE INDEX: 2.9 CM/M2
ASCENDING AORTA: 5.1 CM
AV INDEX (PROSTH): 0.75
AV MEAN GRADIENT: 4 MMHG
AV PEAK GRADIENT: 7 MMHG
AV REGURGITATION PRESSURE HALF TIME: 393 MS
AV VALVE AREA BY VELOCITY RATIO: 4.7 CM²
AV VALVE AREA: 4.6 CM²
AV VELOCITY RATIO: 0.77
BSA FOR ECHO PROCEDURE: 1.76 M2
CV ECHO LV RWT: 0.36 CM
DOP CALC AO PEAK VEL: 1.3 M/S
DOP CALC AO VTI: 31.2 CM
DOP CALC LVOT AREA: 6.2 CM2
DOP CALC LVOT DIAMETER: 2.8 CM
DOP CALC LVOT PEAK VEL: 1 M/S
DOP CALC LVOT STROKE VOLUME: 143.4 CM3
DOP CALC RVOT PEAK VEL: 0.73 M/S
DOP CALC RVOT VTI: 14.7 CM
DOP CALCLVOT PEAK VEL VTI: 23.3 CM
E WAVE DECELERATION TIME: 195 MSEC
E/A RATIO: 0.42
E/E' RATIO: 6 M/S
ECHO LV POSTERIOR WALL: 1 CM (ref 0.6–1.1)
FRACTIONAL SHORTENING: 16.4 % (ref 28–44)
INTERVENTRICULAR SEPTUM: 1.1 CM (ref 0.6–1.1)
IVC DIAMETER: 1.39 CM
IVRT: 100 MSEC
LA MAJOR: 5.1 CM
LA MINOR: 4.8 CM
LA WIDTH: 4.1 CM
LEFT ATRIUM AREA SYSTOLIC (APICAL 2 CHAMBER): 23.64 CM2
LEFT ATRIUM AREA SYSTOLIC (APICAL 4 CHAMBER): 19 CM2
LEFT ATRIUM SIZE: 3.8 CM
LEFT ATRIUM VOLUME INDEX MOD: 42 ML/M2
LEFT ATRIUM VOLUME INDEX: 37 ML/M2
LEFT ATRIUM VOLUME MOD: 74 ML
LEFT ATRIUM VOLUME: 65 CM3
LEFT INTERNAL DIMENSION IN SYSTOLE: 4.6 CM (ref 2.1–4)
LEFT VENTRICLE DIASTOLIC VOLUME INDEX: 84.75 ML/M2
LEFT VENTRICLE DIASTOLIC VOLUME: 150 ML
LEFT VENTRICLE END SYSTOLIC VOLUME APICAL 2 CHAMBER: 94.19 ML
LEFT VENTRICLE END SYSTOLIC VOLUME APICAL 4 CHAMBER: 57.44 ML
LEFT VENTRICLE MASS INDEX: 128.5 G/M2
LEFT VENTRICLE SYSTOLIC VOLUME INDEX: 53.7 ML/M2
LEFT VENTRICLE SYSTOLIC VOLUME: 95 ML
LEFT VENTRICULAR INTERNAL DIMENSION IN DIASTOLE: 5.5 CM (ref 3.5–6)
LEFT VENTRICULAR MASS: 227.4 G
LV LATERAL E/E' RATIO: 5 M/S
LV SEPTAL E/E' RATIO: 8.8 M/S
LVED V (TEICH): 150.17 ML
LVES V (TEICH): 95.2 ML
LVOT MG: 1.78 MMHG
LVOT MV: 0.61 CM/S
MV PEAK A VEL: 0.83 M/S
MV PEAK E VEL: 0.35 M/S
MV STENOSIS PRESSURE HALF TIME: 56.56 MS
MV VALVE AREA P 1/2 METHOD: 3.89 CM2
OHS CV RV/LV RATIO: 0.64 CM
PISA AR MAX VEL: 4.65 M/S
PISA TR MAX VEL: 2.1 M/S
PV MEAN GRADIENT: 1 MMHG
PV MV: 0.5 M/S
PV PEAK GRADIENT: 2 MMHG
PV PEAK VELOCITY: 0.74 M/S
RA MAJOR: 5.72 CM
RA PRESSURE ESTIMATED: 3 MMHG
RA WIDTH: 4.21 CM
RIGHT VENTRICLE DIASTOLIC BASEL DIMENSION: 3.5 CM
RIGHT VENTRICULAR END-DIASTOLIC DIMENSION: 3.48 CM
RV TB RVSP: 5 MMHG
SINUS: 4.6 CM
STJ: 5 CM
TDI LATERAL: 0.07 M/S
TDI SEPTAL: 0.04 M/S
TDI: 0.06 M/S
TR MAX PG: 17 MMHG
TRICUSPID ANNULAR PLANE SYSTOLIC EXCURSION: 2 CM
TV REST PULMONARY ARTERY PRESSURE: 21 MMHG
Z-SCORE OF LEFT VENTRICULAR DIMENSION IN END DIASTOLE: 1.17
Z-SCORE OF LEFT VENTRICULAR DIMENSION IN END SYSTOLE: 3.31

## 2025-07-14 PROCEDURE — 3078F DIAST BP <80 MM HG: CPT | Mod: CPTII,HCNC,S$GLB, | Performed by: PHYSICIAN ASSISTANT

## 2025-07-14 PROCEDURE — 1126F AMNT PAIN NOTED NONE PRSNT: CPT | Mod: CPTII,HCNC,S$GLB, | Performed by: PHYSICIAN ASSISTANT

## 2025-07-14 PROCEDURE — 93306 TTE W/DOPPLER COMPLETE: CPT | Mod: 26,HCNC,, | Performed by: INTERNAL MEDICINE

## 2025-07-14 PROCEDURE — 1159F MED LIST DOCD IN RCRD: CPT | Mod: CPTII,HCNC,S$GLB, | Performed by: PHYSICIAN ASSISTANT

## 2025-07-14 PROCEDURE — 1157F ADVNC CARE PLAN IN RCRD: CPT | Mod: CPTII,HCNC,S$GLB, | Performed by: PHYSICIAN ASSISTANT

## 2025-07-14 PROCEDURE — 1160F RVW MEDS BY RX/DR IN RCRD: CPT | Mod: CPTII,HCNC,S$GLB, | Performed by: PHYSICIAN ASSISTANT

## 2025-07-14 PROCEDURE — 93306 TTE W/DOPPLER COMPLETE: CPT | Mod: HCNC

## 2025-07-14 PROCEDURE — 99999 PR PBB SHADOW E&M-EST. PATIENT-LVL III: CPT | Mod: PBBFAC,HCNC,, | Performed by: PHYSICIAN ASSISTANT

## 2025-07-14 PROCEDURE — 3074F SYST BP LT 130 MM HG: CPT | Mod: CPTII,HCNC,S$GLB, | Performed by: PHYSICIAN ASSISTANT

## 2025-07-14 PROCEDURE — 99214 OFFICE O/P EST MOD 30 MIN: CPT | Mod: HCNC,S$GLB,, | Performed by: PHYSICIAN ASSISTANT

## 2025-07-14 NOTE — PATIENT INSTRUCTIONS
Can get Prevnar 20 and RSV vaccines at [facility] without a prescription from the doctor.

## 2025-07-14 NOTE — PROGRESS NOTES
Chief Complaint  Chief Complaint   Patient presents with    Follow-up     Chronic conditions        HPI  Richy Douglas is a 84 y.o. male with multiple medical diagnoses as listed in the medical history and problem list that presents for  virtual visit.       History of Present Illness    CHIEF COMPLAINT:  - Patient presents for a video visit to follow up on multiple chronic conditions, including heart failure, emphysema, and peripheral vascular disease.    HPI:  Patient presents for a video visit today to follow up on multiple chronic conditions. He had several appointments earlier in the day, including one with cardiology for heart failure. 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 He previously had right and left heart catheterizations with normal coronaries in 2022.    He has emphysema with dyspnea and pulmonary hypertension. He continues to smoke, currently consuming about a pack of cigarettes every 2 days. His daughter reports that his smoking has increased recently, possibly due to stress or grief. He uses a Trelegy inhaler and performs breathing treatments with Albuterol every other night, which his daughter reports improves his breathing.    He has a history of paroxysmal atrial fibrillation, for which he is on a beta-blocker. He is not currently on an anticoagulant due to bleeding risks associated with his age and comorbid conditions. He has a prior arteriovenous malformation (AVM) with symptomatic anemia status post micronemia, and is evaluated by hematology and oncology for this condition.    He has peripheral vascular disease with leg edema and pain with claudication. He was referred to Dr. Rae for assessment of his peripheral vascular disease in June 2024. He also sees pain management for knee pain and has a Baker's cyst with hematoma in his right hip.    Patient reports feeling well overall. He denies any recent shortness of  breath or chest pain. His daughter mentions that she can hear him become dyspneic and breathe heavily at times. He states his legs have been stable and his energy levels have been adequate. His memory has improved since starting medication to slow memory loss.    He uses Gabapentin as needed for pain, particularly when his back hurts due to previous back surgery. His daughter reports that he rarely takes the Gabapentin.    He denies bleeding, blood in urine, blood in stool, and falls.    MEDICATIONS:  - Aspirin 81 mg, daily  - Furosemide, to help with fluid retention due to heart failure  - Medication to increase blood pressure  - Medication to relax the heart and improve pumping  - Trelegy, for breathing  - Albuterol, nebulizer treatment every other night, for breathing  - Iron supplement, for anemia  - Gabapentin, as needed at night, for pain (particularly back pain)  - Memory medication, to slow down memory loss  - Discontinued anticoagulant due to risks of bleeding with age and comorbid conditions  - Changed to 81 mg aspirin, previously on a different dosage or type of blood thinner    PMH:  - Biventricular heart failure: EF at 30%  - Aortic insufficiency with aortic atherosclerosis  - Emphysema  - Pulmonary hypertension  - Coronary artery calcifications  - Paroxysmal atrial fibrillation  - Loeve fib  - Prior AVM with   symptomatic anemia  - Micronemia  - Iron deficiency anemia  - Peripheral vascular disease    RECENT/REMOTE SURGICAL HISTORY:  - Back surgery: Years ago, before daughter was born, indication not specified  - Right heart and left heart catheterization: 2022, normal coronaries    SOCIAL HISTORY:  - Smoking: Currently smokes, about a pack every 2 days         Pmh, Psh, Family Hx, Social Hx updated in Epic Tabs today.     Review of Systems   Constitutional:  Negative for activity change and unexpected weight change.   HENT:  Negative for hearing loss, rhinorrhea and trouble swallowing.    Eyes:   Negative for discharge and visual disturbance.   Respiratory:  Positive for wheezing. Negative for chest tightness.    Cardiovascular:  Negative for chest pain and palpitations.   Gastrointestinal:  Negative for blood in stool, constipation, diarrhea and vomiting.   Endocrine: Positive for polydipsia. Negative for polyuria.   Genitourinary:  Negative for difficulty urinating, hematuria and urgency.   Musculoskeletal:  Negative for arthralgias, joint swelling and neck pain.   Neurological:  Negative for weakness, light-headedness and headaches.   Psychiatric/Behavioral:  Negative for confusion, decreased concentration and dysphoric mood. The patient is not nervous/anxious.            Physical Exam  Vitals reviewed.   Constitutional:       General: He is awake. He is not in acute distress.     Appearance: Normal appearance. He is well-developed, well-groomed and normal weight. He is not ill-appearing.   HENT:      Head: Normocephalic and atraumatic.      Right Ear: External ear normal.      Left Ear: External ear normal.      Nose: Nose normal.      Mouth/Throat:      Lips: Pink.   Eyes:      Conjunctiva/sclera: Conjunctivae normal.   Pulmonary:      Effort: Pulmonary effort is normal.   Neurological:      Mental Status: He is alert. Mental status is at baseline.   Psychiatric:         Attention and Perception: Attention and perception normal. He is attentive.         Mood and Affect: Mood and affect normal. Mood is not anxious or depressed. Affect is not labile, blunt, angry or inappropriate.         Speech: Speech normal. He is communicative. Speech is not rapid and pressured, delayed, slurred or tangential.         Behavior: Behavior normal. Behavior is not agitated, slowed, aggressive, withdrawn, hyperactive or combative. Behavior is cooperative.         Thought Content: Thought content normal. Thought content is not paranoid or delusional. Thought content does not include homicidal or suicidal ideation. Thought  "content does not include homicidal or suicidal plan.         Cognition and Memory: Cognition and memory normal. Memory is not impaired. He does not exhibit impaired recent memory or impaired remote memory.         Judgment: Judgment normal. Judgment is not impulsive or inappropriate.       Physical Exam    TEST RESULTS:  - BNP: April 2023  - Magnesium: April 2023  - Kidney function tests: April 2023, described as "very good"  - Right heart catheterization: 2022, normal coronaries  - Left heart catheterization: 2022, normal coronaries  IMAGING:  - Echocardiogram: Prior to today, showed biventricular failure with EF at 30%, aortic insufficiency with aortic atherosclerosis           ASSESSMENT/PLAN:  1. Anemia of chronic disease  -     Hemoglobin A1C; Future; Expected date: 07/15/2025  -     CBC Without Differential; Future; Expected date: 07/15/2025  -     Comprehensive Metabolic Panel; Future; Expected date: 07/15/2025  -     TSH; Future; Expected date: 07/15/2025  -     Lipid Panel; Future; Expected date: 07/15/2025  -     Ferritin; Future; Expected date: 07/15/2025  -     Iron and TIBC; Future; Expected date: 07/15/2025  -     BNP; Future; Expected date: 07/15/2025    2. Major neurocognitive disorder due to multiple etiologies  -     Hemoglobin A1C; Future; Expected date: 07/15/2025  -     CBC Without Differential; Future; Expected date: 07/15/2025  -     Comprehensive Metabolic Panel; Future; Expected date: 07/15/2025  -     TSH; Future; Expected date: 07/15/2025  -     Lipid Panel; Future; Expected date: 07/15/2025  -     Ferritin; Future; Expected date: 07/15/2025  -     Iron and TIBC; Future; Expected date: 07/15/2025  -     BNP; Future; Expected date: 07/15/2025    3. Centrilobular emphysema  -     Hemoglobin A1C; Future; Expected date: 07/15/2025  -     CBC Without Differential; Future; Expected date: 07/15/2025  -     Comprehensive Metabolic Panel; Future; Expected date: 07/15/2025  -     TSH; Future; " Expected date: 07/15/2025  -     Lipid Panel; Future; Expected date: 07/15/2025  -     Ferritin; Future; Expected date: 07/15/2025  -     Iron and TIBC; Future; Expected date: 07/15/2025  -     BNP; Future; Expected date: 07/15/2025    4. Chronic systolic congestive heart failure  -     Hemoglobin A1C; Future; Expected date: 07/15/2025  -     CBC Without Differential; Future; Expected date: 07/15/2025  -     Comprehensive Metabolic Panel; Future; Expected date: 07/15/2025  -     TSH; Future; Expected date: 07/15/2025  -     Lipid Panel; Future; Expected date: 07/15/2025  -     Ferritin; Future; Expected date: 07/15/2025  -     Iron and TIBC; Future; Expected date: 07/15/2025  -     BNP; Future; Expected date: 07/15/2025    5. Primary hypertension  -     Hemoglobin A1C; Future; Expected date: 07/15/2025  -     CBC Without Differential; Future; Expected date: 07/15/2025  -     Comprehensive Metabolic Panel; Future; Expected date: 07/15/2025  -     TSH; Future; Expected date: 07/15/2025  -     Lipid Panel; Future; Expected date: 07/15/2025  -     Ferritin; Future; Expected date: 07/15/2025  -     Iron and TIBC; Future; Expected date: 07/15/2025  -     BNP; Future; Expected date: 07/15/2025    6. 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 Primary osteoarthritis of left knee  -     Hemoglobin A1C; Future; Expected date: 07/15/2025  -     CBC Without Differential; Future; Expected date: 07/15/2025  -     Comprehensive Metabolic Panel; Future; Expected date: 07/15/2025  -     TSH; Future; Expected date: 07/15/2025  -     Lipid Panel; Future; Expected date: 07/15/2025  -     Ferritin; Future; Expected date: 07/15/2025  -     Iron and TIBC; Future; Expected date: 07/15/2025  -     BNP; Future; Expected date: 07/15/2025    9. Chronic pain of both knees    10. Dependent for transportation    11. Pulmonary emphysema, unspecified emphysema type    12. Abnormal glucose  -     Hemoglobin A1C; Future; Expected date: 07/15/2025    13. Atherosclerosis of abdominal aorta    14. Alcohol dependence with uncomplicated withdrawal  -     Vitamin B12; Future; Expected date: 07/15/2025    15. Idiopathic refractory anemia  -     Vitamin B12; Future; Expected date: 07/15/2025    16. Other dietary vitamin B12 deficiency anemia  -     Vitamin B12; Future; Expected date: 07/15/2025      Assessment & Plan    D46.1 Idiopathic refractory anemia  F02.80 Major neurocognitive disorder due to multiple etiologies  F10.230 Alcohol dependence with uncomplicated withdrawal  I50.22 Chronic systolic congestive heart failure  J43.2 Centrilobular emphysema  J43.9 Pulmonary emphysema, unspecified emphysema type  D63.8 Anemia of chronic disease  I10 Primary hypertension  E78.49 Other hyperlipidemia  K55.21 AVM (arteriovenous malformation) of small bowel, acquired with hemorrhage  M17.12 Primary osteoarthritis of left knee  M25.561, M25.562,  G89.29 Chronic pain of both knees  Z74.8 Dependent for transportation  R73.09 Abnormal glucose  I70.0 Atherosclerosis of abdominal aorta  D51.3 Other dietary vitamin B12 deficiency anemia    IMPRESSION:  Reviewed recent cardiology visit and echo results.  Assessed current symptoms, including shortness of breath, chest pain, and leg edema.  Evaluated medication changes made by cardiology, including aspirin 81 mg, Furosemide, and heart medications.  Noted improvement in memory with current medications.  Assessed pain management needs, particularly for back pain.  Reviewed recent lab work from April, noting good renal function and BNP levels.  Determined need for updated blood count and iron studies.    PLAN SUMMARY:  - Ordered labs: chem 14, TSH, CBC, ferritin, iron, TIBC, BNP, A1C, lipid panel, B12 levels at [facility] location  - Continued Trelegy inhaler  - Continued Gabapentin as needed, preferably at night  - Continued iron tablets  - Recommend pneumonia and RSV vaccines for lung health protection  - Advised on smoking cessation strategies  - Follow-up in 6 months to review lab results and adjust medications if necessary    IDIOPATHIC REFRACTORY ANEMIA:  - Continued iron tablets.  - Ordered CBC, ferritin, iron, TIBC for tomorrow at [facility] location.    CHRONIC SYSTOLIC CONGESTIVE HEART FAILURE:  - Ordered BNP for tomorrow at [facility] location.    CENTRILOBULAR EMPHYSEMA:  - Continued Trelegy inhaler.  - Explained the purpose of pneumonia and RSV vaccines in protecting lung health.    PULMONARY EMPHYSEMA, UNSPECIFIED EMPHYSEMA TYPE:  - See management under Centrilobular Emphysema.    ANEMIA OF CHRONIC DISEASE:  - See management under Idiopathic Refractory Anemia.    OTHER HYPERLIPIDEMIA:  - Ordered lipid panel for tomorrow at [facility] location.    PRIMARY OSTEOARTHRITIS OF LEFT KNEE:  - Continued Gabapentin as needed, preferably at night due to potential drowsiness.    CHRONIC PAIN OF BOTH KNEES:  - See management under  Primary Osteoarthritis of Left Knee.    ABNORMAL GLUCOSE:  - Ordered A1C for tomorrow at [facility] location.    OTHER DIETARY VITAMIN B12 DEFICIENCY ANEMIA:  - Ordered B12 levels for tomorrow at [facility] location.    LIFESTYLE CHANGES:  - Recommend pausing and considering alternatives before smoking when stressed.  - Ordered chem 14, TSH for tomorrow at [facility] location.  - Follow up in 6 months to review lab results and adjust medications if necessary.       CT Chest Without Contrast  Narrative: EXAMINATION:  CT CHEST WITHOUT CONTRAST    CLINICAL HISTORY:  Solitary pulmonary noduleLung nodule, > 8mm;    TECHNIQUE:  Axial CT images performed through the chest without intravenous contrast.    COMPARISON:  06/19/2024, 03/19/2024, 03/28/2023, 09/08/2022    FINDINGS:  Fusiform aneurysmal dilatation of the ascending aorta measuring up to 5.1 cm.  Aortic atherosclerosis.  Severe coronary artery calcifications.  No thoracic adenopathy.    Severe emphysema.  Bandlike scarring in the left upper lobe is stable.  Stable nodular opacity in the superior segment the left lower lobe.  Decreased size of the previously described lingular pulmonary nodule.  No new pulmonary nodules.  No effusions.  No pneumothorax.    The upper abdominal visualized organs are within normal limits.    Multilevel thoracolumbar spondylosis.  Impression: Stable emphysema and left lower lobe pulmonary nodule.  Decreased size of the previously described lingular nodule.  Recommend annual low-dose chest CT screening.    Stable ascending aortic aneurysm.    All CT scans at this facility are performed  using dose modulation techniques as appropriate to performed exam including the following:  automated exposure control; adjustment of mA and/or kV according to the patients size (this includes techniques or standardized protocols for targeted exams where dose is matched to indication/reason for exam: i.e. extremities or head);  iterative reconstruction  technique.    Electronically signed by: Jimmie Dwyer MD  Date:    01/13/2025  Time:    09:16    Lab Results   Component Value Date    WBC 5.90 06/18/2024    HGB 10.4 (L) 06/18/2024    HCT 31.6 (L) 06/18/2024     06/18/2024    CHOL 123 06/10/2024    TRIG 57 06/10/2024    HDL 50 06/10/2024    ALT 11 07/15/2024    AST 22 07/15/2024     04/10/2025    K 4.2 04/10/2025     04/10/2025    CREATININE 1.0 04/10/2025    BUN 28 (H) 04/10/2025    CO2 31 (H) 04/10/2025    TSH 1.170 06/10/2024    INR 1.4 (H) 09/24/2021    HGBA1C 5.3 06/10/2024       Follow-up: Follow up in about 6 months (around 1/14/2026) for OV Tree Pineda NP or Dr. Ch- o f/u .    ______________________________________________________________________    Face to Face time with patient:  3:38 PM CDT - 355pm     The patient location is:  home  The chief complaint leading to consultation is: noted   Visit type: Virtual visit with synchronous audio and video   Each patient to whom he or she provides medical services by telemedicine is:  (1) informed of the relationship between the physician and patient and the respective role of any other health care provider with respect to management of the patient; and (2) notified that he or she may decline to receive medical services by telemedicine and may withdraw from such care at any time.    I spent a total of   30    minutes face to face and non-face to face on the date of this visit.This includes time preparing to see the patient (eg, review of tests, notes), obtaining and/or reviewing additional history from an independent historian and/or outside medical records, documenting clinical information in the electronic health record, independently interpreting results and/or communicating results to the patient/family/caregiver, or care coordinator.  Visit today included increased complexity associated with the care of the episodic problem addressed and managing the longitudinal care of the patient due to  the serious and/or complex managed problem(s).    This note was generated with the assistance of ambient listening technology. Verbal consent was obtained by the patient and accompanying visitor(s) for the recording of patient appointment to facilitate this note. I attest to having reviewed and edited the generated note for accuracy, though some syntax or spelling errors may persist. Please contact the author of this note for any clarification.

## 2025-07-14 NOTE — PROGRESS NOTES
[facility] Provider Note (Follow-up) Consult Note      HPI:  84 y/o male with PMHx of chronic HFrEF (EF 30%), atrial fibrillation (not on AC due to bleeding risk), HTN, PVD, emphysema, GERD, prostate cancer who presents for 3 month follow up      Is now off BB as this was causing some hypotension issues     Remains on SGLT2i and ARNi for GDMT     Also taking lasix still 40 mg daily, responds well     [facility]     No PND     Echo today     PHYSICAL:   Vitals:    07/14/25 1254   BP: 110/60   Pulse: 82        JVD: no,    Heart rhythm: regular  Cardiac murmur: No    S3: yes  S4: no  Lungs: clear  Liver span: 1 cm:   Hepatojugular reflux: no  Edema: no,       ASSESSMENT: chronic systolic HF    PLAN:      Patient Instructions:   Instruct the patient to notify this clinic if HH, a physician or an advanced care provider wants to change medication one of their HF medications   Activity and Diet restrictions:   Recommend 2-3 gram sodium restriction and 1500cc- 2000cc fluid restriction.  Encourage physical activity with graded exercise program.  Requested patient to weigh themselves daily, and to notify us if their weight increases by more than 3 lbs in 1 day or 5 lbs in 3 days.      Medication changes (include current dose and changed dose)  1. BIV failure EF 30%, AI with aortic atherosc;   --> 3158 --> 120 --> 880  - R/[facility] normal coronaries 1/2022  - ECHO 2/2024 - LV EF 30-35%, normal RV, mild AI, mild MR, mild TR, PASP 70 mmHg  - cont Entresto and BB - low dose if BP allows   - cont lasix 40mg daily but hold if BP lower   - repeat labs  PRN   - repeat ECHO ordered      2. Emphysema with dyspnea, pulm HTN; still smokes   - cont tx PCP/Pulm     3. HTN with aortic root moderately dilated with AI -low BP now  - titrate meds  - cont to monitor   - start midodrine PRN      4. HLD with aortic athero and Coronary artery calcifications  - cont statin      5. PAF, diagnosed in setting of sepsis/PNA, had SVT  in hosp - sec to alc withdrawal  - cont BB; stop Anticoag  - [place] AF, Dr. Christianson discussed with pt to stop anticoag sec risk of bleeding     6. AVM/Symptomatic anemia s/p PRBC with fe def  - angioectasias noted in recent EGD s/p APC  - off Eliquis now  - f/u heme/onc -     7. PVD, Leg Edema, pain with claudication   - LE arterial u/s with distal occlusions/monophasic waveforms, normal BLE MARIBEL.   -6/2024 moderate to severe PAD referred to Dr. Bo - Asymptomatic pvd - will monitor      8. Fall with hematoma in right hip  - f/u pain management for knee pain/baker's cysts

## 2025-07-15 ENCOUNTER — LAB VISIT (OUTPATIENT)
Dept: LAB | Facility: HOSPITAL | Age: 85
End: 2025-07-15
Attending: FAMILY MEDICINE
Payer: MEDICARE

## 2025-07-15 DIAGNOSIS — D51.3 OTHER DIETARY VITAMIN B12 DEFICIENCY ANEMIA: ICD-10-CM

## 2025-07-15 DIAGNOSIS — K55.21 AVM (ARTERIOVENOUS MALFORMATION) OF SMALL BOWEL, ACQUIRED WITH HEMORRHAGE: ICD-10-CM

## 2025-07-15 DIAGNOSIS — D46.1 IDIOPATHIC REFRACTORY ANEMIA: ICD-10-CM

## 2025-07-15 DIAGNOSIS — R73.09 ABNORMAL GLUCOSE: ICD-10-CM

## 2025-07-15 DIAGNOSIS — F10.230 ALCOHOL DEPENDENCE WITH UNCOMPLICATED WITHDRAWAL: ICD-10-CM

## 2025-07-15 DIAGNOSIS — F02.80 MAJOR NEUROCOGNITIVE DISORDER DUE TO MULTIPLE ETIOLOGIES: ICD-10-CM

## 2025-07-15 DIAGNOSIS — M17.12 PRIMARY OSTEOARTHRITIS OF LEFT KNEE: ICD-10-CM

## 2025-07-15 DIAGNOSIS — I50.22 CHRONIC SYSTOLIC CONGESTIVE HEART FAILURE: ICD-10-CM

## 2025-07-15 DIAGNOSIS — E78.49 OTHER HYPERLIPIDEMIA: ICD-10-CM

## 2025-07-15 DIAGNOSIS — D63.8 ANEMIA OF CHRONIC DISEASE: ICD-10-CM

## 2025-07-15 DIAGNOSIS — J43.2 CENTRILOBULAR EMPHYSEMA: ICD-10-CM

## 2025-07-15 DIAGNOSIS — I10 PRIMARY HYPERTENSION: ICD-10-CM

## 2025-07-15 LAB
ALBUMIN SERPL BCP-MCNC: 4.7 G/DL (ref 3.5–5.2)
ALP SERPL-CCNC: 40 UNIT/L (ref 40–150)
ALT SERPL W/O P-5'-P-CCNC: 13 UNIT/L (ref 10–44)
ANION GAP (OHS): 12 MMOL/L (ref 8–16)
AST SERPL-CCNC: 27 UNIT/L (ref 11–45)
BILIRUB SERPL-MCNC: 1.3 MG/DL (ref 0.1–1)
BNP SERPL-MCNC: 138 PG/ML (ref 0–99)
BUN SERPL-MCNC: 34 MG/DL (ref 8–23)
CALCIUM SERPL-MCNC: 9.9 MG/DL (ref 8.7–10.5)
CHLORIDE SERPL-SCNC: 101 MMOL/L (ref 95–110)
CHOLEST SERPL-MCNC: 126 MG/DL (ref 120–199)
CHOLEST/HDLC SERPL: 2.4 {RATIO} (ref 2–5)
CO2 SERPL-SCNC: 25 MMOL/L (ref 23–29)
CREAT SERPL-MCNC: 1.2 MG/DL (ref 0.5–1.4)
EAG (OHS): 91 MG/DL (ref 68–131)
ERYTHROCYTE [DISTWIDTH] IN BLOOD BY AUTOMATED COUNT: 23.7 % (ref 11.5–14.5)
FERRITIN SERPL-MCNC: 305 NG/ML (ref 20–300)
GFR SERPLBLD CREATININE-BSD FMLA CKD-EPI: 60 ML/MIN/1.73/M2
GLUCOSE SERPL-MCNC: 82 MG/DL (ref 70–110)
HBA1C MFR BLD: 4.8 % (ref 4–5.6)
HCT VFR BLD AUTO: 37.4 % (ref 40–54)
HDLC SERPL-MCNC: 52 MG/DL (ref 40–75)
HDLC SERPL: 41.3 % (ref 20–50)
HGB BLD-MCNC: 12.3 GM/DL (ref 14–18)
IRON SATN MFR SERPL: 85 % (ref 20–50)
IRON SERPL-MCNC: 261 UG/DL (ref 45–160)
LDLC SERPL CALC-MCNC: 63 MG/DL (ref 63–159)
MCH RBC QN AUTO: 26.8 PG (ref 27–31)
MCHC RBC AUTO-ENTMCNC: 32.9 G/DL (ref 32–36)
MCV RBC AUTO: 82 FL (ref 82–98)
NONHDLC SERPL-MCNC: 74 MG/DL
PLATELET # BLD AUTO: 289 K/UL (ref 150–450)
PMV BLD AUTO: ABNORMAL FL
POTASSIUM SERPL-SCNC: 3.9 MMOL/L (ref 3.5–5.1)
PROT SERPL-MCNC: 7.8 GM/DL (ref 6–8.4)
RBC # BLD AUTO: 4.59 M/UL (ref 4.6–6.2)
SODIUM SERPL-SCNC: 138 MMOL/L (ref 136–145)
TIBC SERPL-MCNC: 306 UG/DL (ref 250–450)
TRANSFERRIN SERPL-MCNC: 207 MG/DL (ref 200–375)
TRIGL SERPL-MCNC: 55 MG/DL (ref 30–150)
TSH SERPL-ACNC: 1.98 UIU/ML (ref 0.4–4)
VIT B12 SERPL-MCNC: 1017 PG/ML (ref 210–950)
WBC # BLD AUTO: 6.68 K/UL (ref 3.9–12.7)

## 2025-07-15 PROCEDURE — 85027 COMPLETE CBC AUTOMATED: CPT | Mod: HCNC

## 2025-07-15 PROCEDURE — 36415 COLL VENOUS BLD VENIPUNCTURE: CPT | Mod: HCNC

## 2025-07-15 PROCEDURE — 82607 VITAMIN B-12: CPT | Mod: HCNC

## 2025-07-15 PROCEDURE — 83036 HEMOGLOBIN GLYCOSYLATED A1C: CPT | Mod: HCNC

## 2025-07-15 PROCEDURE — 80061 LIPID PANEL: CPT | Mod: HCNC

## 2025-07-15 PROCEDURE — 82728 ASSAY OF FERRITIN: CPT | Mod: HCNC

## 2025-07-15 PROCEDURE — 80053 COMPREHEN METABOLIC PANEL: CPT | Mod: HCNC

## 2025-07-15 PROCEDURE — 83880 ASSAY OF NATRIURETIC PEPTIDE: CPT | Mod: HCNC

## 2025-07-15 PROCEDURE — 84443 ASSAY THYROID STIM HORMONE: CPT | Mod: HCNC

## 2025-07-15 PROCEDURE — 83540 ASSAY OF IRON: CPT | Mod: HCNC

## (undated) DEVICE — CATH CV QD LUMN 6FRX110CM

## (undated) DEVICE — SEE MEDLINE ITEM 157187

## (undated) DEVICE — KIT GLIDESHEATH SLEND 6FR 10CM

## (undated) DEVICE — KIT SYR REUSABLE

## (undated) DEVICE — SHEATH INTRODUCER 6FR 11CM

## (undated) DEVICE — CATH JL4 5FR

## (undated) DEVICE — CATH PIG145 INFINITI 5X110CM

## (undated) DEVICE — KIT MANIFOLD LOW PRESS TUBING

## (undated) DEVICE — PACK CATH LAB CUSTOM BR

## (undated) DEVICE — GUIDE WIRE J-TIP 260CM .025

## (undated) DEVICE — BAND TR COMP DEVICE REG 24CM

## (undated) DEVICE — GUIDEWIRE EMERALD .035IN 260CM

## (undated) DEVICE — OMNIPAQUE 300MG 150ML VIAL

## (undated) DEVICE — CATH OPTITORQUE TIGER 5F 100CM

## (undated) DEVICE — ANGIOTOUCH KIT

## (undated) DEVICE — GUIDEWIRE WHOLEY HI TORQ 175CM